# Patient Record
Sex: FEMALE | Race: WHITE | NOT HISPANIC OR LATINO | Employment: OTHER | URBAN - METROPOLITAN AREA
[De-identification: names, ages, dates, MRNs, and addresses within clinical notes are randomized per-mention and may not be internally consistent; named-entity substitution may affect disease eponyms.]

---

## 2017-01-19 ENCOUNTER — OFFICE VISIT (OUTPATIENT)
Dept: GASTROENTEROLOGY | Facility: CLINIC | Age: 82
End: 2017-01-19

## 2017-01-19 VITALS
DIASTOLIC BLOOD PRESSURE: 62 MMHG | SYSTOLIC BLOOD PRESSURE: 104 MMHG | HEIGHT: 60 IN | BODY MASS INDEX: 20.65 KG/M2 | WEIGHT: 105.2 LBS

## 2017-01-19 DIAGNOSIS — R63.4 WEIGHT LOSS: ICD-10-CM

## 2017-01-19 DIAGNOSIS — R19.7 DIARRHEA, UNSPECIFIED TYPE: ICD-10-CM

## 2017-01-19 DIAGNOSIS — K59.00 CONSTIPATION, UNSPECIFIED CONSTIPATION TYPE: ICD-10-CM

## 2017-01-19 DIAGNOSIS — K52.839 MICROSCOPIC COLITIS, UNSPECIFIED MICROSCOPIC COLITIS TYPE: Primary | ICD-10-CM

## 2017-01-19 PROCEDURE — 99213 OFFICE O/P EST LOW 20 MIN: CPT | Performed by: NURSE PRACTITIONER

## 2017-01-19 RX ORDER — LEFLUNOMIDE 10 MG/1
10 TABLET ORAL DAILY
COMMUNITY
Start: 2016-11-12 | End: 2018-05-31 | Stop reason: HOSPADM

## 2017-01-19 RX ORDER — KETOROLAC TROMETHAMINE 5 MG/ML
SOLUTION OPHTHALMIC
COMMUNITY
Start: 2017-01-13 | End: 2018-05-17

## 2017-01-19 NOTE — PROGRESS NOTES
Chief Complaint   Patient presents with   • Diarrhea   • Microscopic Colitis     Subjective     HPI  Arlin Hutson is a 81 y.o. female who presents to follow up after treatment of microscopic colitis with Entocort. At her last visit Nov. 2016 with Dr Shook her diarrhea was improved and she was instructed to taper Entocort slowly. She has taken 1, 3mg entocort for the past several weeks and her diarrhea has resolved. She has 1-2 soft, formed BMs per day. She has mild abdominal cramping prior to defecation but it is minimal in nature. She is concerned that not the diarrhea has resolved she will return to her baseline of constipation and hard stools. We discussed that if that occurs she can take stool softeners as needed which previously relieved her constipation.   According to records she has lost approximatly 8lbs since march2016 due to the diarrhea. Her weight has stabilized since her diarrhea has resolved. She drinks supplement shakes almost daily.   She has a hsitory of GERD well controlled on Prilosec 20mg daily. She denies breakthrough symptoms   She denies SOA, fatigue, trouble swallowing, melena, BRBPR, abdominal pain, nausea, or vomiting.    Past Medical History   Diagnosis Date   • Colon polyp    • CREST syndrome    • GERD (gastroesophageal reflux disease)    • History of CT scan of abdomen 03/11/2011     constipation, sig tics, 6 mm hepatic cyst   • History of rheumatoid arthritis    • Hyperlipidemia    • Hypertension    • Osteoarthritis    • Raynaud's disease    • Sjogren's syndrome        Social History     Social History   • Marital status: Single     Spouse name: N/A   • Number of children: N/A   • Years of education: N/A     Social History Main Topics   • Smoking status: Never Smoker   • Smokeless tobacco: Never Used   • Alcohol use Yes      Comment: on occ   • Drug use: No   • Sexual activity: Not Asked     Other Topics Concern   • None     Social History Narrative         Current Outpatient  Prescriptions:   •  alendronate (FOSAMAX) 70 MG tablet, , Disp: , Rfl:   •  amLODIPine (NORVASC) 5 MG tablet, , Disp: , Rfl:   •  aspirin 81 MG EC tablet, Take 81 mg by mouth daily., Disp: , Rfl:   •  Budesonide (ENTOCORT EC) 3 MG 24 hr capsule, Take 3 mg by mouth every morning., Disp: , Rfl:   •  Calcium Carb-Cholecalciferol (CALCIUM 600 + D PO), Take  by mouth., Disp: , Rfl:   •  Cholecalciferol (VITAMIN D3) 1000 UNITS capsule, Take  by mouth., Disp: , Rfl:   •  escitalopram (LEXAPRO) 10 MG tablet, Take 10 mg by mouth Daily., Disp: , Rfl:   •  ketorolac (ACULAR) 0.5 % ophthalmic solution, , Disp: , Rfl:   •  leflunomide (ARAVA) 10 MG tablet, , Disp: , Rfl:   •  Multiple Vitamins-Minerals (CENTRUM SILVER PO), Take  by mouth., Disp: , Rfl:   •  omeprazole (PriLOSEC) 20 MG capsule, , Disp: , Rfl:   •  SALINE NASAL SPRAY NA, into each nostril., Disp: , Rfl:   •  traZODone (DESYREL) 50 MG tablet, , Disp: , Rfl:   •  vitamin C (ASCORBIC ACID) 500 MG tablet, Take 500 mg by mouth daily., Disp: , Rfl:     Review of Systems   Constitutional: Negative for appetite change and unexpected weight change.   HENT: Negative for trouble swallowing.    Respiratory: Negative for choking and shortness of breath.    Cardiovascular: Negative for chest pain.   Gastrointestinal: Positive for abdominal pain and constipation. Negative for abdominal distention, blood in stool, diarrhea, nausea and vomiting.   Musculoskeletal: Negative for back pain.   Skin: Negative for color change.   Neurological: Negative for dizziness.   Hematological: Does not bruise/bleed easily.   Psychiatric/Behavioral: Negative.        Objective   Vitals:    01/19/17 0957   BP: 104/62       Physical Exam   Constitutional: She is oriented to person, place, and time. She appears well-developed and well-nourished.   HENT:   Head: Normocephalic and atraumatic.   Eyes: EOM are normal. Pupils are equal, round, and reactive to light.   Cardiovascular: Normal rate, regular  rhythm and normal heart sounds.    Pulmonary/Chest: Effort normal.   Abdominal: Soft. Bowel sounds are normal. She exhibits no distension and no mass. There is no tenderness. No hernia.   Musculoskeletal: Normal range of motion.   Neurological: She is alert and oriented to person, place, and time.   Skin: Skin is dry.   Psychiatric: She has a normal mood and affect. Her behavior is normal. Judgment and thought content normal.       Assessment/Plan   Arlin was seen today for diarrhea and microscopic colitis.    Diagnoses and all orders for this visit:    Microscopic colitis, unspecified microscopic colitis type  Comments:  Resolved. Ok to stop entocort. Call if diarrhea returns    Diarrhea, unspecified type  Comments:  resolved    Constipation, unspecified constipation type  Comments:  stool softener if needed. continue probiotic. Water 6-8oz daily.    Weight loss  Comments:  stabilized. Continue ensure or boost shakes as needed. monitor weight weekly      For any additional questions, concerns or changes to your condition after today's office visit please contact the office at [phone].

## 2017-01-19 NOTE — MR AVS SNAPSHOT
Arlin Urbanjuani   1/19/2017 10:00 AM   Office Visit    Dept Phone:  [phone]   Encounter #:  [id number]    Provider:  CHIRAG Merida   Department:  [facility] GASTROENTEROLOGY                Your Full Care Plan              Today's Medication Changes          These changes are accurate as of: 1/19/17 12:34 PM.  If you have any questions, ask your nurse or doctor.               Medication(s)that have changed:     leflunomide 10 MG tablet   Commonly known as:  ARAVA   What changed:  Another medication with the same name was removed. Continue taking this medication, and follow the directions you see here.   Changed by:  CHIRAG Merida         Stop taking medication(s)listed here:     cholestyramine 4 GM/DOSE powder   Commonly known as:  QUESTRAN   Stopped by:  CHIRAG Merida           colestipol 1 G tablet   Commonly known as:  COLESTID   Stopped by:  CHIRAG Merida           diphenoxylate-atropine 2.5-0.025 MG per tablet   Commonly known as:  LOMOTIL   Stopped by:  CHIRAG Merida           docusate sodium 100 MG capsule   Commonly known as:  COLACE   Stopped by:  CHIRAG Merida           Fish Oil 600 MG capsule   Stopped by:  CHIRAG Merida           METAMUCIL PO   Stopped by:  CHIRAG Merida           naproxen 500 MG tablet   Commonly known as:  NAPROSYN   Stopped by:  CHIRAG Merida           REFRESH EYE ITCH RELIEF OP   Stopped by:  CHIRAG Merida           simvastatin 10 MG tablet   Commonly known as:  ZOCOR   Stopped by:  CHIRAG Merida           SYSTANE ULTRA OP   Stopped by:  CHIRAG Merida           VOLTAREN 1 % gel gel   Generic drug:  diclofenac   Stopped by:  CHIRAG Merida                      Your Updated Medication List          This list is accurate as of: 1/19/17 12:34 PM.  Always use your most recent med list.                alendronate 70 MG tablet      Commonly known as:  FOSAMAX       amLODIPine 5 MG tablet   Commonly known as:  NORVASC       aspirin 81 MG EC tablet       Budesonide 3 MG 24 hr capsule   Commonly known as:  ENTOCORT EC       CALCIUM 600 + D PO       CENTRUM SILVER PO       escitalopram 10 MG tablet   Commonly known as:  LEXAPRO       ketorolac 0.5 % ophthalmic solution   Commonly known as:  ACULAR       leflunomide 10 MG tablet   Commonly known as:  ARAVA       omeprazole 20 MG capsule   Commonly known as:  priLOSEC       SALINE NASAL SPRAY NA       traZODone 50 MG tablet   Commonly known as:  DESYREL       vitamin C 500 MG tablet   Commonly known as:  ASCORBIC ACID       Vitamin D3 1000 UNITS capsule               You Were Diagnosed With        Codes Comments    Microscopic colitis, unspecified microscopic colitis type    -  Primary ICD-10-CM: K52.839  ICD-9-CM: 558.9 Resolved. Ok to stop entocort. Call if diarrhea returns    Diarrhea, unspecified type     ICD-10-CM: R19.7  ICD-9-CM: 787.91 resolved    Constipation, unspecified constipation type     ICD-10-CM: K59.00  ICD-9-CM: 564.00 stool softener if needed. continue probiotic. Water 6-8oz daily.    Weight loss     ICD-10-CM: R63.4  ICD-9-CM: 783.21 stabilized. Continue ensure or boost shakes as needed. monitor weight weekly      Instructions     None    Patient Instructions History      Upcoming Appointments     Visit Type Date Time Department    FOLLOW UP 1/19/2017 10:00 AM [facility]      [employer] Signup     Our records indicate that you have an active [facility] account.    You can view your After Visit Summary by going to [employer] and logging in with your [facility] username and password.  If you don't have a [employer] username and password but a parent or guardian has access to your record, the parent or guardian should login with their own [employer] username and password and access your record to view the After Visit Summary.    If you have questions, you  "can email [email or web address] or call [phone] to talk to our MyChart staff.  Remember, MyChart is NOT to be used for urgent needs.  For medical emergencies, dial 911.               Other Info from Your Visit           Allergies     Sulfa Antibiotics        Reason for Visit     Diarrhea     Microscopic Colitis           Vital Signs     Blood Pressure Height Weight Body Mass Index Smoking Status       104/62 60\" (152.4 cm) 105 lb 3.2 oz (47.7 kg) 20.55 kg/m2 Never Smoker       Problems and Diagnoses Noted     Microscopic colitis    -  Primary    Diarrhea        Constipation        Abnormal weight loss            "

## 2017-01-20 RX ORDER — ALENDRONATE SODIUM 70 MG/1
TABLET ORAL
Qty: 12 TABLET | Refills: 3 | Status: SHIPPED | OUTPATIENT
Start: 2017-01-20 | End: 2018-02-22

## 2017-02-16 ENCOUNTER — TELEPHONE (OUTPATIENT)
Dept: GASTROENTEROLOGY | Facility: CLINIC | Age: 82
End: 2017-02-16

## 2017-02-16 NOTE — TELEPHONE ENCOUNTER
Pt called and reports that 3 wks ago she stopped her entocort.  She reports a week ago the diarrhea started again.  She states her diarrhea is watery brown and she gets no warning when it is about to happen. She states she is going 6 plus times a day.  She denies a fever and chills.  She states she has tried pepto bismo and lomotil and nothing is helping.  She reports she does have 2 refills left on her entocort, but if she has to go back on it, she prefers to be on the 3mg dose instead of the 9mg dose.  Pt would like to speak with Dr Shook.  Advised would send message to Dr Shook.  Pt verb understanding.

## 2017-02-16 NOTE — TELEPHONE ENCOUNTER
I called her but did not get an answer.  Please call her back and tell her that she can go ahead and restart the Entocort.  She could take 3 mg pills 1 pill once daily and lets see how that works.  Have her follow-up with me in the office in 6 weeks and if the Entocort does not work have her let me know sooner.

## 2017-02-16 NOTE — TELEPHONE ENCOUNTER
Called pt and advised per Dr Shook that she can go ahead and restart the entocort.  She can take 3mg one pill once daily and see how that works.  Advised her to f/u in 6 wks with Dr Shook and advised her to let us know if entocort does not work sooner. .  Pt verb understanding and made f/u appt for 03/27 at 8a with Dr Shook.

## 2017-03-27 ENCOUNTER — OFFICE VISIT (OUTPATIENT)
Dept: GASTROENTEROLOGY | Facility: CLINIC | Age: 82
End: 2017-03-27

## 2017-03-27 VITALS
DIASTOLIC BLOOD PRESSURE: 72 MMHG | WEIGHT: 104.6 LBS | HEIGHT: 60 IN | BODY MASS INDEX: 20.54 KG/M2 | SYSTOLIC BLOOD PRESSURE: 114 MMHG

## 2017-03-27 DIAGNOSIS — K63.5 COLON POLYPS: ICD-10-CM

## 2017-03-27 DIAGNOSIS — K21.9 GASTROESOPHAGEAL REFLUX DISEASE, ESOPHAGITIS PRESENCE NOT SPECIFIED: ICD-10-CM

## 2017-03-27 DIAGNOSIS — K52.839 MICROSCOPIC COLITIS, UNSPECIFIED MICROSCOPIC COLITIS TYPE: Primary | ICD-10-CM

## 2017-03-27 PROCEDURE — 99213 OFFICE O/P EST LOW 20 MIN: CPT | Performed by: INTERNAL MEDICINE

## 2017-03-27 NOTE — PROGRESS NOTES
Chief Complaint   Patient presents with   • Follow-up     Microscopic Colitis    • Diarrhea       History of Present Illness: 82 yo female with microscopic colitis and is on Entocort 3 mg one daily and still has diarrhea 2-3 times/week. She has 2-4 Bm/day, and she may have a day with no BM's, and that day she will have bloating and gas. This morning she had one normal BM.  No rectal bleeding or melena. She has lower abdominal pain when she has bloating. Her energy level is not good. No nausea or vomiting. NO fevers, chills. Her weight has been stable.     Past Medical History:   Diagnosis Date   • Colon polyp    • CREST syndrome    • GERD (gastroesophageal reflux disease)    • History of CT scan of abdomen 03/11/2011    constipation, sig tics, 6 mm hepatic cyst   • History of rheumatoid arthritis    • Hyperlipidemia    • Hypertension    • Osteoarthritis    • Raynaud's disease    • Sjogren's syndrome        Past Surgical History:   Procedure Laterality Date   • CATARACT EXTRACTION     • COLONOSCOPY  02/26/2016    tics, microscopic colitis,    • COLONOSCOPY  07/01/2011    sig tics, inflammation, polyp   • DILATATION AND CURETTAGE  1980   • KNEE SURGERY Right    • UPPER GASTROINTESTINAL ENDOSCOPY      erythema   • WRIST SURGERY Right 2008         Current Outpatient Prescriptions:   •  alendronate (FOSAMAX) 70 MG tablet, TAKE 1 TABLET ONE TIME WEEKLY, Disp: 12 tablet, Rfl: 3  •  amLODIPine (NORVASC) 5 MG tablet, , Disp: , Rfl:   •  aspirin 81 MG EC tablet, Take 81 mg by mouth daily., Disp: , Rfl:   •  Budesonide (ENTOCORT EC) 3 MG 24 hr capsule, Take 3 mg by mouth every morning., Disp: , Rfl:   •  Calcium Carb-Cholecalciferol (CALCIUM 600 + D PO), Take  by mouth., Disp: , Rfl:   •  Cholecalciferol (VITAMIN D3) 1000 UNITS capsule, Take  by mouth., Disp: , Rfl:   •  escitalopram (LEXAPRO) 10 MG tablet, Take 10 mg by mouth Daily., Disp: , Rfl:   •  leflunomide (ARAVA) 10 MG tablet, , Disp: , Rfl:   •  Multiple  Vitamins-Minerals (CENTRUM SILVER PO), Take  by mouth., Disp: , Rfl:   •  omeprazole (PriLOSEC) 20 MG capsule, , Disp: , Rfl:   •  SALINE NASAL SPRAY NA, into each nostril., Disp: , Rfl:   •  traZODone (DESYREL) 50 MG tablet, , Disp: , Rfl:   •  vitamin C (ASCORBIC ACID) 500 MG tablet, Take 500 mg by mouth daily., Disp: , Rfl:   •  ketorolac (ACULAR) 0.5 % ophthalmic solution, , Disp: , Rfl:     Allergies   Allergen Reactions   • Sulfa Antibiotics        Family History   Problem Relation Age of Onset   • Ulcerative colitis Mother        Social History     Social History   • Marital status: Single     Spouse name: N/A   • Number of children: N/A   • Years of education: N/A     Occupational History   • Not on file.     Social History Main Topics   • Smoking status: Never Smoker   • Smokeless tobacco: Never Used   • Alcohol use Yes      Comment: on occ   • Drug use: No   • Sexual activity: Not on file     Other Topics Concern   • Not on file     Social History Narrative       Review of Systems   Gastrointestinal: Positive for diarrhea.   All other systems reviewed and are negative.      Vitals:    03/27/17 0825   BP: 114/72       Physical Exam   Constitutional: She is oriented to person, place, and time. She appears well-developed and well-nourished. No distress.   HENT:   Head: Normocephalic and atraumatic. Hair is normal.   Right Ear: Hearing, tympanic membrane, external ear and ear canal normal. No drainage. No decreased hearing is noted.   Left Ear: Hearing, tympanic membrane, external ear and ear canal normal. No decreased hearing is noted.   Nose: No nasal deformity.   Mouth/Throat: Oropharynx is clear and moist.   Eyes: Conjunctivae, EOM and lids are normal. Pupils are equal, round, and reactive to light. Right eye exhibits no discharge. Left eye exhibits no discharge.   Neck: Normal range of motion. Neck supple. No JVD present. No tracheal deviation present. No thyromegaly present.   Cardiovascular: Normal  rate, regular rhythm, normal heart sounds, intact distal pulses and normal pulses.  Exam reveals no gallop and no friction rub.    No murmur heard.  Pulmonary/Chest: Effort normal and breath sounds normal. No respiratory distress. She has no wheezes. She has no rales. She exhibits no tenderness.   Abdominal: Soft. Bowel sounds are normal. She exhibits no distension and no mass. There is no tenderness. There is no rebound and no guarding. No hernia.   Musculoskeletal: Normal range of motion. She exhibits no edema, tenderness or deformity.   Lymphadenopathy:     She has no cervical adenopathy.   Neurological: She is alert and oriented to person, place, and time. She has normal reflexes. She displays normal reflexes. No cranial nerve deficit. She exhibits normal muscle tone. Coordination normal.   Skin: Skin is warm and dry. No rash noted. She is not diaphoretic. No erythema.   Psychiatric: She has a normal mood and affect. Her behavior is normal. Judgment and thought content normal.   Vitals reviewed.      Arlin was seen today for follow-up and diarrhea.    Diagnoses and all orders for this visit:    Microscopic colitis, unspecified microscopic colitis type    Gastroesophageal reflux disease, esophagitis presence not specified    Colon polyps     Assessment:  1) Diarrhea with a h/o microscopic colitis.  2) h/o colon polyps.  3) GERD    Recommendations:  1) We talked about possibly increasing the Entocort to 2/day but she prefers to stay on the same dose and to take Lomotil prn.  2) F/U 12 mos.       No Follow-up on file.

## 2017-06-08 ENCOUNTER — HOSPITAL ENCOUNTER (OUTPATIENT)
Dept: GENERAL RADIOLOGY | Facility: HOSPITAL | Age: 82
Discharge: HOME | End: 2017-06-08
Attending: INTERNAL MEDICINE | Admitting: INTERNAL MEDICINE

## 2017-06-08 DIAGNOSIS — R52 PAIN: ICD-10-CM

## 2017-06-08 PROCEDURE — 73560 X-RAY EXAM OF KNEE 1 OR 2: CPT

## 2017-06-13 ENCOUNTER — TRANSCRIBE ORDERS (OUTPATIENT)
Dept: OBSTETRICS AND GYNECOLOGY | Age: 82
End: 2017-06-13

## 2017-06-13 DIAGNOSIS — Z12.31 VISIT FOR SCREENING MAMMOGRAM: Primary | ICD-10-CM

## 2017-06-20 ENCOUNTER — OFFICE VISIT (OUTPATIENT)
Dept: ORTHOPEDIC SURGERY | Facility: CLINIC | Age: 82
End: 2017-06-20

## 2017-06-20 VITALS — WEIGHT: 107 LBS | BODY MASS INDEX: 21.01 KG/M2 | HEIGHT: 60 IN | TEMPERATURE: 98 F

## 2017-06-20 DIAGNOSIS — G89.29 CHRONIC PAIN OF RIGHT KNEE: Primary | ICD-10-CM

## 2017-06-20 DIAGNOSIS — M25.561 CHRONIC PAIN OF RIGHT KNEE: Primary | ICD-10-CM

## 2017-06-20 PROCEDURE — 99213 OFFICE O/P EST LOW 20 MIN: CPT | Performed by: NURSE PRACTITIONER

## 2017-06-20 PROCEDURE — 20610 DRAIN/INJ JOINT/BURSA W/O US: CPT | Performed by: NURSE PRACTITIONER

## 2017-06-20 RX ORDER — LIDOCAINE HYDROCHLORIDE 10 MG/ML
4 INJECTION, SOLUTION INFILTRATION; PERINEURAL
Status: COMPLETED | OUTPATIENT
Start: 2017-06-20 | End: 2017-06-20

## 2017-06-20 RX ORDER — BUPIVACAINE HYDROCHLORIDE 2.5 MG/ML
2 INJECTION, SOLUTION INFILTRATION; PERINEURAL
Status: COMPLETED | OUTPATIENT
Start: 2017-06-20 | End: 2017-06-20

## 2017-06-20 RX ORDER — DIPHENOXYLATE HYDROCHLORIDE AND ATROPINE SULFATE 2.5; .025 MG/1; MG/1
1-2 TABLET ORAL 4 TIMES DAILY PRN
COMMUNITY
Start: 2017-03-28 | End: 2020-06-30

## 2017-06-20 RX ORDER — METHYLPREDNISOLONE ACETATE 80 MG/ML
80 INJECTION, SUSPENSION INTRA-ARTICULAR; INTRALESIONAL; INTRAMUSCULAR; SOFT TISSUE
Status: COMPLETED | OUTPATIENT
Start: 2017-06-20 | End: 2017-06-20

## 2017-06-20 RX ADMIN — LIDOCAINE HYDROCHLORIDE 4 ML: 10 INJECTION, SOLUTION INFILTRATION; PERINEURAL at 10:11

## 2017-06-20 RX ADMIN — BUPIVACAINE HYDROCHLORIDE 2 ML: 2.5 INJECTION, SOLUTION INFILTRATION; PERINEURAL at 10:11

## 2017-06-20 RX ADMIN — METHYLPREDNISOLONE ACETATE 80 MG: 80 INJECTION, SUSPENSION INTRA-ARTICULAR; INTRALESIONAL; INTRAMUSCULAR; SOFT TISSUE at 10:11

## 2017-06-20 NOTE — PROGRESS NOTES
Patient: Arlin Hutson  YOB: 1935 81 y.o. female  Medical Record Number: [id number]    Chief Complaints:   Chief Complaint   Patient presents with   • Right Knee - Pain, Edema, Establish Care       History of Present Illness:Arlin Hutson is a 81 y.o. female who presents With complaints of right knee pain.  Patient reports she has had issues off and on for many years but is progressively gotten worse over the last 6 months.  She denies any injury.  She describes the right knee pain as a moderate to severe constant stabbing type pain with intermittent swelling.  She reports her pain is worse with walking and only slightly better with rest ice and anti-inflammatories.  She also has pain in her lower back which started after her right knee became more painful.    Allergies:   Allergies   Allergen Reactions   • Sulfa Antibiotics        Medications:   Current Outpatient Prescriptions   Medication Sig Dispense Refill   • alendronate (FOSAMAX) 70 MG tablet TAKE 1 TABLET ONE TIME WEEKLY 12 tablet 3   • amLODIPine (NORVASC) 5 MG tablet      • aspirin 81 MG EC tablet Take 81 mg by mouth daily.     • Budesonide (ENTOCORT EC) 3 MG 24 hr capsule Take 3 mg by mouth every morning.     • Calcium Carb-Cholecalciferol (CALCIUM 600 + D PO) Take  by mouth.     • Cholecalciferol (VITAMIN D3) 1000 UNITS capsule Take  by mouth.     • ketorolac (ACULAR) 0.5 % ophthalmic solution      • leflunomide (ARAVA) 10 MG tablet      • Multiple Vitamins-Minerals (CENTRUM SILVER PO) Take  by mouth.     • omeprazole (PriLOSEC) 20 MG capsule      • SALINE NASAL SPRAY NA into each nostril.     • traZODone (DESYREL) 50 MG tablet      • vitamin C (ASCORBIC ACID) 500 MG tablet Take 500 mg by mouth daily.     • diphenoxylate-atropine (LOMOTIL) 2.5-0.025 MG per tablet      • escitalopram (LEXAPRO) 10 MG tablet Take 10 mg by mouth Daily.       No current facility-administered medications for this visit.          The following portions of the  "patient's history were reviewed and updated as appropriate: allergies, current medications, past family history, past medical history, past social history, past surgical history and problem list.    Review of Systems:   A 14 point review of systems was performed. All systems negative except pertinent positives/negative listed in HPI above    Physical Exam:   Vitals:    06/20/17 0935   Temp: 98 °F (36.7 °C)   TempSrc: Temporal Artery    Weight: 107 lb (48.5 kg)   Height: 60\" (152.4 cm)       General: A and O x 3, ASA, NAD    SCLERA:    Normal    DENTITION:   Normal  Skin clear no unusual lesions noted  Right knee no appreciable effusion, 120° flexion neutral in extension with a positive medial Kiera negative Lockman calf is soft and nontender    Radiology:  Xrays right knee were reviewed today secondary to pain and show significant arthritic changes, no comparative views available    Assessment/Plan:  Osteoarthritis right knee    We will proceed with right knee cortisone injection, refer the patient outpatient physical therapy not only for her knee but also her back.  Hopefully if we can get her knee feeling better her back will also follow.  If not we might consider doing x-rays and then MRI.  She will follow up with us in 2 months.    Large Joint Arthrocentesis  Date/Time: 6/20/2017 10:11 AM  Consent given by: patient  Site marked: site marked  Timeout: Immediately prior to procedure a time out was called to verify the correct patient, procedure, equipment, support staff and site/side marked as required   Supporting Documentation  Indications: pain and joint swelling   Procedure Details  Location: knee - R knee  Preparation: Patient was prepped and draped in the usual sterile fashion  Needle size: 18 G  Approach: anterolateral  Medications administered: 4 mL lidocaine 1 %; 80 mg methylPREDNISolone acetate 80 MG/ML; 2 mL bupivacaine            "

## 2017-06-28 ENCOUNTER — HOSPITAL ENCOUNTER (OUTPATIENT)
Dept: PHYSICAL THERAPY | Facility: HOSPITAL | Age: 82
Setting detail: THERAPIES SERIES
Discharge: HOME | End: 2017-06-28

## 2017-06-28 DIAGNOSIS — M54.50 ACUTE LEFT-SIDED LOW BACK PAIN WITHOUT SCIATICA: ICD-10-CM

## 2017-06-28 DIAGNOSIS — G89.29 CHRONIC PAIN OF RIGHT KNEE: Primary | ICD-10-CM

## 2017-06-28 DIAGNOSIS — M25.561 CHRONIC PAIN OF RIGHT KNEE: Primary | ICD-10-CM

## 2017-06-28 DIAGNOSIS — R26.2 DIFFICULTY WALKING: ICD-10-CM

## 2017-06-28 PROCEDURE — 97110 THERAPEUTIC EXERCISES: CPT

## 2017-06-28 PROCEDURE — 97161 PT EVAL LOW COMPLEX 20 MIN: CPT

## 2017-06-28 PROCEDURE — G8978 MOBILITY CURRENT STATUS: HCPCS

## 2017-06-28 PROCEDURE — G8979 MOBILITY GOAL STATUS: HCPCS

## 2017-06-28 NOTE — THERAPY EVALUATION
Outpatient Physical Therapy Ortho Initial Evaluation  [facility]     Patient Name: Arlin Hutson  : 1935  MRN: [id number]  Today's Date: 2017      Visit Date: 2017    There is no problem list on file for this patient.       Past Medical History:   Diagnosis Date   • Colon polyp    • CREST syndrome    • GERD (gastroesophageal reflux disease)    • History of CT scan of abdomen 2011    constipation, sig tics, 6 mm hepatic cyst   • History of rheumatoid arthritis    • Hyperlipidemia    • Hypertension    • Osteoarthritis    • Raynaud's disease    • Sjogren's syndrome         Past Surgical History:   Procedure Laterality Date   • CATARACT EXTRACTION     • COLONOSCOPY  2016    tics, microscopic colitis,    • COLONOSCOPY  2011    sig tics, inflammation, polyp   • DILATATION AND CURETTAGE     • KNEE SURGERY Right    • UPPER GASTROINTESTINAL ENDOSCOPY      erythema   • WRIST SURGERY Right        Visit Dx:     ICD-10-CM ICD-9-CM   1. Chronic pain of right knee M25.561 719.46    G89.29 338.29   2. Difficulty walking R26.2 719.7   3. Acute left-sided low back pain without sciatica M54.5 724.2             Patient History       17 1400          History    Chief Complaint Pain;Difficulty Walking;Difficulty with daily activities;Joint stiffness  -CN      Type of Pain Knee pain;Back pain  -CN      Date Current Problem(s) Began --   Many years  -CN      Brief Description of Current Complaint Pt reports R knee pain ongoing for many years, however condition seems to be worsening. Pt has history of meniscal surgery on the R and had cortizone injection on 17. Pt also c/o L knee pain today as well as L sided low back pain which she believes is secondary to gait abnormality due to knee pain. Pt with history positive for OA and RA and travels a lot. Pt reports that she fells as if her knee might buckle at times, although this has never happened.   -CN      Onset Date- PT  6/28/17  -CN      Patient/Caregiver Goals Relieve pain;Improve mobility;Return to prior level of function  -CN      Occupation/sports/leisure activities Walking, traveling  -CN      What clinical tests have you had for this problem? X-ray  -CN      Results of Clinical Tests Significant arthritis  -CN      Pain     Pain Location Knee;Back  -CN      Pain at Present 7  -CN      Pain at Best 2  -CN      Pain at Worst 9  -CN      Pain Frequency Constant/continuous  -CN      Pain Description Aching;Sharp  -CN      What Performance Factors Make the Current Problem(s) WORSE? Standing, walking, sitting for extended periods, stairs  -CN      What Performance Factors Make the Current Problem(s) BETTER? Heat, elevation, tylenol  -CN      Tolerance Time- Standing 45 min with pain  -CN      Tolerance Time- Walking 45 min with severe pain  -CN      Is your sleep disturbed? Yes   sometimes due to pain, other times restroom  -CN      What position do you sleep in? Supine;Right sidelying;Left sidelying  -CN      Difficulties with ADL's? Yes, difficulty crossing legs when sitting, making bed, vacuuming  -CN      Difficulties with recreational activities? 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 Listening  -CN      Pt Participated in POC and Goals Yes  -CN      Safety    Are you being hurt, hit, or frightened by anyone at home or in your life? No  -CN      Are you being neglected by a caregiver No  -CN        User Key  (r) = Recorded By, (t) = Taken By, (c) = Cosigned By    Initials Name Provider Type    CN Neelima Blake PT Physical Therapist                PT Ortho       06/28/17 1400    Posture/Observations    Alignment Options Forward head;Rounded shoulders;Iliac crests;Genu varus  -CN     Forward Head Mild  -CN    Rounded Shoulders Mild  -CN    Iliac crests Left:;Elevated  -CN    Genu varus Bilateral:;Mild  -CN    Sensation    Sensation WNL? WFL  -CN    Additional Comments Pt denies numbness and tingling  -CN    Lumbosacral Palpation    Quadratus Lumborum Left:;Tender  -CN    Erector Spinae (Paraspinals) Left:;Tender;Guarded/taut  -CN    Hip/Thigh Palpation    Greater Trochanter Left:;Tender  -CN    Knee Palpation    Medial Joint Line Right:;Tender  -CN    Semimembranosis Bilateral:;Tender  -CN    Semitendinosis Bilateral:;Tender  -CN    Patellar Accessory Motions    Superior glide Hypomobile  -CN    Inferior glide Hypomobile  -CN    Medial glide Hypomobile  -CN    Lateral glide Hypomobile  -CN    Lateral tilt Hypomobile  -CN    Knee Special Tests    Valgus stress (MCL lesion) Bilateral:;Negative  -CN    Varus stress (LCL lesion) Bilateral:;Negative  -CN    Left Knee    Extension/Flexion ROM Details ext=-3/0; flex=137/146  -CN    Right Knee    Extension/Flexion ROM Details ext=-1/1; flex=139/145  -CN    Left Hip    Hip Flexion Gross Movement (3+/5) fair plus  -CN    Hip ABduction Gross Movement (4/5) good  -CN    Hip ADduction Gross Movement (4+/5) good plus  -CN    Hip Int (Medial) Rotation Gross Movement (4+/5) good plus  -CN    Hip Ext (Lat) Rotation Gross Movement (4/5) good  -CN    Right Hip    Hip Flexion Gross Movement (4-/5) good minus  -CN    Hip ABduction Gross Movement (4/5) good  -CN    Hip ADduction Gross Movement (4+/5) good plus  -CN    Hip Int (Medial) Rotation Gross Movement (4+/5) good plus  -CN    Hip Ext (Lat) Rotation Gross Movement (4/5) good  -CN    Left Knee    Knee Extension Gross Movement (4/5) good  -CN    Knee Flexion Gross Movement (5/5) normal  -CN    Right Knee    Knee Extension Gross Movement (4/5) good  -CN    Knee Flexion Gross Movement (4/5) good  -CN    Left Ankle/Foot    Ankle PF Gross Movement (5/5) normal  -CN    Ankle Dorsiflexion Gross Movement (5/5) normal   -CN    Right Ankle/Foot    Ankle PF Gross Movement (5/5) normal  -CN    Ankle Dorsiflexion Gross Movement (5/5) normal  -CN    Lower Extremity Flexibility    Hamstrings Mildly limited  -CN    Gastrocnemius Mildly limited  -CN    Gait Assessment/Treatment    Gait, Comment Dec R arm swing, dec hip extension  -CN      User Key  (r) = Recorded By, (t) = Taken By, (c) = Cosigned By    Initials Name Provider Type    NÉSTOR Blake PT Physical Therapist                            Therapy Education       06/28/17 1510          Therapy Education    Given HEP;Symptoms/condition management;Pain management;Posture/body mechanics   Anatomy, goals of PT, eval findings, ambulation with cane  -CN      Program New  -CN      How Provided Verbal;Demonstration;Written  -CN      Provided to Patient  -CN      Level of Understanding Teach back education performed  -CN        User Key  (r) = Recorded By, (t) = Taken By, (c) = Cosigned By    Initials Name Provider Type    NÉSTOR Blake, PT Physical Therapist                PT OP Goals       06/28/17 1500       PT Short Term Goals    STG Date to Achieve 07/19/17  -CN     STG 1 Pt will report subjective pain at 4/10 or less to demonstrate symptom management with ADLs and all household mobility.   -CN     STG 1 Progress New  -CN     STG 2 Pt will be independent and compliant with HEP, symptom management and joint protection in order to minimize stress on affected tissues.    -CN     STG 2 Progress New  -CN     Long Term Goals    LTG Date to Achieve 08/09/17  -CN     LTG 1 Pt will improve gross knee strength to 4+/5 B in order to improve functional mobility.   -CN     LTG 1 Progress New  -CN     LTG 2 Pt will improve score on Knee Outcome Survey  to 75% for improved functional mobility.   -CN     LTG 2 Progress New  -CN     LTG 3 Pt will ambulate with least restrictive AD and normalized gait pattern in order to increase safety with gait and participate in all ADLs.   -CN      LTG 3 Progress New  -CN     Time Calculation    PT Goal Re-Cert Due Date 07/28/17  -CN       User Key  (r) = Recorded By, (t) = Taken By, (c) = Cosigned By    Initials Name Provider Type    NÉSTOR Blake, PT Physical Therapist                PT Assessment/Plan       06/28/17 1514       PT Assessment    Functional Limitations Decreased safety during functional activities;Limitations in community activities;Impaired gait;Limitations in functional capacity and performance;Impaired locomotion;Performance in leisure activities;Limitation in home management;Performance in self-care ADL  -CN     Impairments Balance;Gait;Impaired flexibility;Muscle strength;Pain;Posture;Joint mobility;Poor body mechanics  -CN     Assessment Comments Pt is an 81 year old female presenting to therapy with c/o R knee pain which has been ongoing for many years. Pt has history of meniscal surgery on the R and also c/o L knee and L sided low back pain today. Pt has had xray which shows significant OA inthe R knee. Pt had cortizone injection on 6/20 which helped to relieve R knee pain, however has difficulty ambulating secondary to L knee and low back pain. Pt demonstrates slight genu varus B as well as decreased knee strength. Pt's hip flexor and HS strength decreased on the L compared to the R and pt reports tenderness to palpation in L QL, paraspinals as well as greater trochanter. Pt seems to have multiple body part involvement secondray to impaired gait pattern which she reports began secondary to knee pain. Pt educated on maintaining neutral spine, ambulating with walking stick which she brought to the clinic today as well as education on arthritis. Pt scored 61% on the KOS where 100% is no disability. Pt would benefit from skilled PT to address the deficits and return to PLOF.   -CN     Please refer to paper survey for additional self-reported information Yes  -CN     Rehab Potential Good  -CN     Patient/caregiver  participated in establishment of treatment plan and goals Yes  -CN     Patient would benefit from skilled therapy intervention Yes  -CN     PT Plan    PT Frequency 2x/week  -CN     Predicted Duration of Therapy Intervention (days/wks) 6 weeks  -CN     Planned CPT's? PT EVAL LOW COMPLEXITY: 62434;PT RE-EVAL: 77402;PT THER PROC EA 15 MIN: 16439;PT THER ACT EA 15 MIN: 82242;PT MANUAL THERAPY EA 15 MIN: 35540;PT NEUROMUSC RE-EDUCATION EA 15 MIN: 14601;PT GAIT TRAINING EA 15 MIN: 44149;PT AQUATIC THERAPY EA 15 MIN: 80936;PT HOT OR COLD PACK TREAT MCARE;PT ELECTRICAL STIM UNATTEND:   -CN     Physical Therapy Interventions (Optional Details) neuromuscular re-education;stretching;home exercise program;joint mobilization;patient/family education;postural re-education;strengthening;modalities;gait training;manual therapy techniques  -CN     PT Plan Comments PT to treat 2x/week for 6 weeks consisting of strengthening, flexibility and stability exercises. Consider Nustep warm up, PPT, SAQ, SL clams and LTR next visit.   -CN       User Key  (r) = Recorded By, (t) = Taken By, (c) = Cosigned By    Initials Name Provider Type    NÉSTOR Blake, CHER Physical Therapist                  Exercises       06/28/17 1400          Exercise 1    Exercise Name 1 Quad sets  -CN      Cueing 1 Verbal  -CN      Reps 1 10  -CN      Time (Seconds) 1 5  -CN      Exercise 2    Exercise Name 2 LAQ with pillow squeeze  -CN      Cueing 2 Demo  -CN      Reps 2 10  -CN      Exercise 3    Exercise Name 3 Piriformis stretch  -CN      Cueing 3 Verbal  -CN      Reps 3 3  -CN      Time (Seconds) 3 20  -CN      Exercise 4    Exercise Name 4 HL hip abduction with TA, B and uni  -CN      Cueing 4 Verbal  -CN      Reps 4 10  -CN        User Key  (r) = Recorded By, (t) = Taken By, (c) = Cosigned By    Initials Name Provider Type    NÉSTOR Blake, PT Physical Therapist                              Outcome Measures       06/28/17 1500           Knee Outcome Score    Knee Outcome Score Comments 61% where 100% is no disability  -CN      Functional Assessment    Outcome Measure Options Knee Outcome Score- ADL  -CN        User Key  (r) = Recorded By, (t) = Taken By, (c) = Cosigned By    Initials Name Provider Type    CN Neelima Blake, PT Physical Therapist            Time Calculation:   Start Time: 1415  Stop Time: 1511  Time Calculation (min): 56 min     Therapy Charges for Today     Code Description Service Date Service Provider Modifiers Qty    69175031340 HC PT MOBILITY CURRENT 6/28/2017 Neelima Blake, PT GP, CJ 1    56309546023 HC PT MOBILITY PROJECTED 6/28/2017 Neelima Blake, PT GP, CI 1    60328541321 HC PT THER PROC EA 15 MIN 6/28/2017 Neelima Blake, PT GP 1    73955062374 HC PT EVAL LOW COMPLEXITY 3 6/28/2017 Neelima Blake, PT GP 1          PT G-Codes  PT Professional Judgement Used?: Yes  Outcome Measure Options: Knee Outcome Score- ADL  Score: 61% where 100% is no disability  Functional Limitation: Mobility: Walking and moving around  Mobility: Walking and Moving Around Current Status (): At least 20 percent but less than 40 percent impaired, limited or restricted  Mobility: Walking and Moving Around Goal Status (): At least 1 percent but less than 20 percent impaired, limited or restricted         Neelima Blake PT  6/28/2017

## 2017-06-30 ENCOUNTER — HOSPITAL ENCOUNTER (OUTPATIENT)
Dept: PHYSICAL THERAPY | Facility: HOSPITAL | Age: 82
Setting detail: THERAPIES SERIES
Discharge: HOME | End: 2017-06-30

## 2017-06-30 DIAGNOSIS — M25.561 CHRONIC PAIN OF RIGHT KNEE: Primary | ICD-10-CM

## 2017-06-30 DIAGNOSIS — M54.50 ACUTE LEFT-SIDED LOW BACK PAIN WITHOUT SCIATICA: ICD-10-CM

## 2017-06-30 DIAGNOSIS — G89.29 CHRONIC PAIN OF RIGHT KNEE: Primary | ICD-10-CM

## 2017-06-30 DIAGNOSIS — R26.2 DIFFICULTY WALKING: ICD-10-CM

## 2017-06-30 PROCEDURE — 97110 THERAPEUTIC EXERCISES: CPT | Performed by: PHYSICAL THERAPIST

## 2017-07-05 ENCOUNTER — APPOINTMENT (OUTPATIENT)
Dept: PHYSICAL THERAPY | Facility: HOSPITAL | Age: 82
End: 2017-07-05

## 2017-07-05 ENCOUNTER — HOSPITAL ENCOUNTER (OUTPATIENT)
Dept: PHYSICAL THERAPY | Facility: HOSPITAL | Age: 82
Setting detail: THERAPIES SERIES
Discharge: HOME | End: 2017-07-05

## 2017-07-05 DIAGNOSIS — G89.29 CHRONIC PAIN OF RIGHT KNEE: Primary | ICD-10-CM

## 2017-07-05 DIAGNOSIS — M54.50 ACUTE LEFT-SIDED LOW BACK PAIN WITHOUT SCIATICA: ICD-10-CM

## 2017-07-05 DIAGNOSIS — R26.2 DIFFICULTY WALKING: ICD-10-CM

## 2017-07-05 DIAGNOSIS — M25.561 CHRONIC PAIN OF RIGHT KNEE: Primary | ICD-10-CM

## 2017-07-05 PROCEDURE — 97110 THERAPEUTIC EXERCISES: CPT

## 2017-07-05 NOTE — THERAPY TREATMENT NOTE
Outpatient Physical Therapy Ortho Treatment Note  [facility]     Patient Name: Arlin Hutson  : 1935  MRN: [id number]  Today's Date: 2017      Visit Date: 2017    Visit Dx:    ICD-10-CM ICD-9-CM   1. Chronic pain of right knee M25.561 719.46    G89.29 338.29   2. Difficulty walking R26.2 719.7   3. Acute left-sided low back pain without sciatica M54.5 724.2       There is no problem list on file for this patient.       Past Medical History:   Diagnosis Date   • Colon polyp    • CREST syndrome    • GERD (gastroesophageal reflux disease)    • History of CT scan of abdomen 2011    constipation, sig tics, 6 mm hepatic cyst   • History of rheumatoid arthritis    • Hyperlipidemia    • Hypertension    • Osteoarthritis    • Raynaud's disease    • Sjogren's syndrome         Past Surgical History:   Procedure Laterality Date   • CATARACT EXTRACTION     • COLONOSCOPY  2016    tics, microscopic colitis,    • COLONOSCOPY  2011    sig tics, inflammation, polyp   • DILATATION AND CURETTAGE     • KNEE SURGERY Right    • UPPER GASTROINTESTINAL ENDOSCOPY      erythema   • WRIST SURGERY Right                              PT Assessment/Plan       17 1005       PT Assessment    Assessment Comments Pt reports slight pain with piriformis stretch and educated pt to ease on exercise so only stretch felt, no pain. Pt reports slight L hip pain with standing exercises and cueing given for proper muscle activation in order to decrease pain. Pt continues to demonstrate weakness in quad, glute and abductor tissues.   -CN     PT Plan    PT Plan Comments Updated HEP next session. Consider PPT with bridges, SLR (if no low back pain) and minisquats as well as adding weights to exercises.   -CN       User Key  (r) = Recorded By, (t) = Taken By, (c) = Cosigned By    Initials Name Provider Type    NÉSTOR Blake, PT Physical Therapist                    Exercises       17 0900           Subjective Comments    Subjective Comments I went to a 4th of July community event yesterday and am sore from being up on it. I am doing my exercises every day. Pt confused appointment time today and arrived at 9:25 for 8:30 appointment.   -CN      Subjective Pain    Able to rate subjective pain? yes  -CN      Pre-Treatment Pain Level 5  -CN      Post-Treatment Pain Level 5  -CN      Exercise 1    Exercise Name 1 Quad sets  -CN      Cueing 1 Verbal  -CN      Reps 1 15  -CN      Time (Seconds) 1 5  -CN      Exercise 3    Exercise Name 3 Piriformis stretch  -CN      Cueing 3 Verbal  -CN      Reps 3 3  -CN      Time (Seconds) 3 20  -CN      Exercise 4    Exercise Name 4 HL hip abduction with TA, B and uni  -CN      Cueing 4 Verbal  -CN      Reps 4 15  -CN      Exercise 6    Exercise Name 6 LTR  -CN      Reps 6 20  -CN      Time (Seconds) 6 5  -CN      Exercise 7    Exercise Name 7 PPT  -CN      Reps 7 10  -CN      Time (Seconds) 7 10  -CN      Exercise 9    Exercise Name 9 S/L Clamshells  -CN      Sets 9 2  -CN      Reps 9 10  -CN      Exercise 10    Exercise Name 10 Standing hamstring curls  -CN      Sets 10 2  -CN      Reps 10 10  -CN      Exercise 11    Exercise Name 11 hamstring stretch on mat  -CN      Sets 11 3  -CN      Time (Seconds) 11 30  -CN      Exercise 12    Exercise Name 12 Standing  hip abduction  -CN      Cueing 12 Demo  -CN      Reps 12 10  -CN      Exercise 13    Exercise Name 13 Standing HS stretch on calf  -CN      Cueing 13 Verbal  -CN      Reps 13 3  -CN      Time (Seconds) 13 20  -CN        User Key  (r) = Recorded By, (t) = Taken By, (c) = Cosigned By    Initials Name Provider Type    NÉSTOR Blake, PT Physical Therapist                               PT OP Goals       07/05/17 0900       PT Short Term Goals    STG Date to Achieve 07/19/17  -CN     STG 1 Pt will report subjective pain at 4/10 or less to demonstrate symptom management with ADLs and all household mobility.    -CN     STG 1 Progress Ongoing  -CN     STG 1 Progress Comments Pt reports pain rated 5/10 today.   -CN     STG 2 Pt will be independent and compliant with HEP, symptom management and joint protection in order to minimize stress on affected tissues.    -CN     STG 2 Progress Partially Met  -CN     STG 2 Progress Comments Requires cueing for correct performance of exercises.   -CN     Long Term Goals    LTG Date to Achieve 08/09/17  -CN     LTG 1 Pt will improve gross knee strength to 4+/5 B in order to improve functional mobility.   -CN     LTG 1 Progress Ongoing  -CN     LTG 2 Pt will improve score on Knee Outcome Survey  to 75% for improved functional mobility.   -CN     LTG 2 Progress Ongoing  -CN     LTG 3 Pt will ambulate with least restrictive AD and normalized gait pattern in order to increase safety with gait and participate in all ADLs.   -CN     LTG 3 Progress Ongoing  -CN       User Key  (r) = Recorded By, (t) = Taken By, (c) = Cosigned By    Initials Name Provider Type    NÉSTOR Blake PT Physical Therapist                Therapy Education       07/05/17 0933          Therapy Education    Given HEP;Symptoms/condition management;Pain management;Posture/body mechanics  -CN      Program Progressed  -CN      How Provided Verbal;Demonstration  -CN      Provided to Patient  -CN      Level of Understanding Teach back education performed  -CN        User Key  (r) = Recorded By, (t) = Taken By, (c) = Cosigned By    Initials Name Provider Type    NÉSTOR Blake PT Physical Therapist                Time Calculation:   Start Time: 0925  Stop Time: 1000  Time Calculation (min): 35 min    Therapy Charges for Today     Code Description Service Date Service Provider Modifiers Qty    73163574723  PT THER PROC EA 15 MIN 7/5/2017 Neelima Blake PT GP 2                    Neelima Blake PT  7/5/2017

## 2017-07-06 ENCOUNTER — HOSPITAL ENCOUNTER (OUTPATIENT)
Dept: MAMMOGRAPHY | Facility: HOSPITAL | Age: 82
Discharge: HOME | End: 2017-07-06
Attending: OBSTETRICS & GYNECOLOGY | Admitting: OBSTETRICS & GYNECOLOGY

## 2017-07-06 DIAGNOSIS — Z12.31 VISIT FOR SCREENING MAMMOGRAM: ICD-10-CM

## 2017-07-06 PROCEDURE — G0202 SCR MAMMO BI INCL CAD: HCPCS

## 2017-07-11 ENCOUNTER — HOSPITAL ENCOUNTER (OUTPATIENT)
Dept: PHYSICAL THERAPY | Facility: HOSPITAL | Age: 82
Setting detail: THERAPIES SERIES
Discharge: HOME | End: 2017-07-11

## 2017-07-11 DIAGNOSIS — R26.2 DIFFICULTY WALKING: ICD-10-CM

## 2017-07-11 DIAGNOSIS — M54.50 ACUTE LEFT-SIDED LOW BACK PAIN WITHOUT SCIATICA: ICD-10-CM

## 2017-07-11 DIAGNOSIS — G89.29 CHRONIC PAIN OF RIGHT KNEE: Primary | ICD-10-CM

## 2017-07-11 DIAGNOSIS — M25.561 CHRONIC PAIN OF RIGHT KNEE: Primary | ICD-10-CM

## 2017-07-11 PROCEDURE — 97110 THERAPEUTIC EXERCISES: CPT

## 2017-07-11 NOTE — THERAPY TREATMENT NOTE
Outpatient Physical Therapy Ortho Treatment Note  [facility]     Patient Name: Arlin Hutson  : 1935  MRN: [id number]  Today's Date: 2017      Visit Date: 2017    Visit Dx:    ICD-10-CM ICD-9-CM   1. Chronic pain of right knee M25.561 719.46    G89.29 338.29   2. Difficulty walking R26.2 719.7   3. Acute left-sided low back pain without sciatica M54.5 724.2       There is no problem list on file for this patient.       Past Medical History:   Diagnosis Date   • Colon polyp    • CREST syndrome    • GERD (gastroesophageal reflux disease)    • History of CT scan of abdomen 2011    constipation, sig tics, 6 mm hepatic cyst   • History of rheumatoid arthritis    • Hyperlipidemia    • Hypertension    • Osteoarthritis    • Raynaud's disease    • Sjogren's syndrome         Past Surgical History:   Procedure Laterality Date   • CATARACT EXTRACTION     • COLONOSCOPY  2016    tics, microscopic colitis,    • COLONOSCOPY  2011    sig tics, inflammation, polyp   • DILATATION AND CURETTAGE     • KNEE SURGERY Right    • UPPER GASTROINTESTINAL ENDOSCOPY      erythema   • WRIST SURGERY Right                              PT Assessment/Plan       17 1439       PT Assessment    Assessment Comments Pt reports slightly increased knee pain yesterday and today from unknown origin. Pt states that L sided low back pain seems to be improving today, and pt notes reduced pain at end of therapy session. Progressed R knee and hip strengthening exercises and added weights as tolerated.   -CN     PT Plan    PT Plan Comments Continue with Nustep warm up and assess tolerance to added exercises today. Progress hip and knee strengthening and lumbar spine flexibility and stability exercises as tolerated.   -CN       User Key  (r) = Recorded By, (t) = Taken By, (c) = Cosigned By    Initials Name Provider Type    NÉSTOR Blake, PT Physical Therapist                    Exercises        07/11/17 1200          Subjective Comments    Subjective Comments My knee really started hurting yesterday and I didn't do anything differently. It feels a little better today. My back and L hip feel better today though.   -CN      Subjective Pain    Able to rate subjective pain? yes  -CN      Pre-Treatment Pain Level 5  -CN      Post-Treatment Pain Level 2  -CN      Exercise 1    Exercise Name 1 Quad sets  -CN      Cueing 1 Verbal  -CN      Reps 1 15  -CN      Time (Seconds) 1 5  -CN      Exercise 3    Exercise Name 3 Piriformis stretch  -CN      Cueing 3 Verbal  -CN      Reps 3 3  -CN      Time (Seconds) 3 20  -CN      Exercise 4    Exercise Name 4 HL hip abduction with TA, B and uni  -CN      Cueing 4 Verbal  -CN      Reps 4 15  -CN      Exercise 5    Exercise Name 5 Warmup on Nustep, L5  -CN      Time (Minutes) 5 6  -CN      Exercise 6    Exercise Name 6 LTR  -CN      Reps 6 20  -CN      Time (Seconds) 6 5  -CN      Exercise 7    Exercise Name 7 PPT  -CN      Reps 7 10  -CN      Time (Seconds) 7 10  -CN      Exercise 8    Exercise Name 8 SAQ  -CN      Cueing 8 Verbal  -CN      Weights/Plates 8 2  -CN      Sets 8 2  -CN      Reps 8 10  -CN      Exercise 9    Exercise Name 9 SL hip abduction  -CN      Sets 9 2  -CN      Reps 9 10  -CN      Exercise 10    Exercise Name 10 Standing hamstring curls  -CN      Weights/Plates 10 2  -CN      Sets 10 2  -CN      Reps 10 10  -CN      Exercise 14    Exercise Name 14 Bridges with PPT  -CN      Cueing 14 Verbal  -CN      Reps 14 10  -CN      Exercise 15    Exercise Name 15 SLR with quad set  -CN      Cueing 15 Verbal  -CN      Sets 15 2  -CN      Reps 15 10  -CN        User Key  (r) = Recorded By, (t) = Taken By, (c) = Cosigned By    Initials Name Provider Type    NÉSTOR Blake, PT Physical Therapist                               PT OP Goals       07/11/17 1400       PT Short Term Goals    STG Date to Achieve 07/19/17  -CN     STG 1 Pt will report subjective pain  at 4/10 or less to demonstrate symptom management with ADLs and all household mobility.   -CN     STG 1 Progress Ongoing  -CN     STG 1 Progress Comments Pt reports pain rated 5/10 which decreased to 2/10 after exercises.   -CN     STG 2 Pt will be independent and compliant with HEP, symptom management and joint protection in order to minimize stress on affected tissues.    -CN     STG 2 Progress Met  -CN     STG 2 Progress Comments Pt performing current HEP at home daily.   -CN     Long Term Goals    LTG Date to Achieve 08/09/17  -CN     LTG 1 Pt will improve gross knee strength to 4+/5 B in order to improve functional mobility.   -CN     LTG 1 Progress Ongoing  -CN     LTG 2 Pt will improve score on Knee Outcome Survey  to 75% for improved functional mobility.   -CN     LTG 2 Progress Ongoing  -CN     LTG 3 Pt will ambulate with least restrictive AD and normalized gait pattern in order to increase safety with gait and participate in all ADLs.   -CN     LTG 3 Progress Ongoing  -CN       User Key  (r) = Recorded By, (t) = Taken By, (c) = Cosigned By    Initials Name Provider Type    NÉSTOR Blake PT Physical Therapist                Therapy Education       07/11/17 1224          Therapy Education    Given HEP;Symptoms/condition management;Pain management;Posture/body mechanics  -CN      Program Progressed  -CN      How Provided Verbal;Demonstration;Written  -CN      Provided to Patient  -CN      Level of Understanding Teach back education performed  -CN        User Key  (r) = Recorded By, (t) = Taken By, (c) = Cosigned By    Initials Name Provider Type    NÉSTOR Blake PT Physical Therapist                Time Calculation:   Start Time: 1215  Stop Time: 1300  Time Calculation (min): 45 min    Therapy Charges for Today     Code Description Service Date Service Provider Modifiers Qty    00028509988  PT THER PROC EA 15 MIN 7/11/2017 Neelima Blake PT GP 3                    Neelima  Johnna Blake, PT  7/11/2017

## 2017-07-14 ENCOUNTER — HOSPITAL ENCOUNTER (OUTPATIENT)
Dept: PHYSICAL THERAPY | Facility: HOSPITAL | Age: 82
Setting detail: THERAPIES SERIES
Discharge: HOME | End: 2017-07-14

## 2017-07-14 DIAGNOSIS — R26.2 DIFFICULTY WALKING: ICD-10-CM

## 2017-07-14 DIAGNOSIS — G89.29 CHRONIC PAIN OF RIGHT KNEE: Primary | ICD-10-CM

## 2017-07-14 DIAGNOSIS — M25.561 CHRONIC PAIN OF RIGHT KNEE: Primary | ICD-10-CM

## 2017-07-14 DIAGNOSIS — M54.50 ACUTE LEFT-SIDED LOW BACK PAIN WITHOUT SCIATICA: ICD-10-CM

## 2017-07-14 PROCEDURE — 97110 THERAPEUTIC EXERCISES: CPT | Performed by: PHYSICAL THERAPIST

## 2017-07-14 NOTE — THERAPY TREATMENT NOTE
Outpatient Physical Therapy Ortho Treatment Note  [facility]     Patient Name: Arlin Hutson  : 1935  MRN: [id number]  Today's Date: 2017      Visit Date: 2017    Visit Dx:    ICD-10-CM ICD-9-CM   1. Chronic pain of right knee M25.561 719.46    G89.29 338.29   2. Acute left-sided low back pain without sciatica M54.5 724.2   3. Difficulty walking R26.2 719.7       There is no problem list on file for this patient.       Past Medical History:   Diagnosis Date   • Colon polyp    • CREST syndrome    • GERD (gastroesophageal reflux disease)    • History of CT scan of abdomen 2011    constipation, sig tics, 6 mm hepatic cyst   • History of rheumatoid arthritis    • Hyperlipidemia    • Hypertension    • Osteoarthritis    • Raynaud's disease    • Sjogren's syndrome         Past Surgical History:   Procedure Laterality Date   • CATARACT EXTRACTION     • COLONOSCOPY  2016    tics, microscopic colitis,    • COLONOSCOPY  2011    sig tics, inflammation, polyp   • DILATATION AND CURETTAGE     • KNEE SURGERY Right    • UPPER GASTROINTESTINAL ENDOSCOPY      erythema   • WRIST SURGERY Right                              PT Assessment/Plan       17 1537       PT Assessment    Assessment Comments Reporting decreased pain with activity today. Added 2lb ankle weight with certain exercises to progress strengthening program.  Overall, moving in the right direction with respect to pain and functional mobility.   -CK     PT Plan    PT Plan Comments Continue with Nustep and progression of hip/knee/core strengthening as tolerated by symptoms.   -CK       User Key  (r) = Recorded By, (t) = Taken By, (c) = Cosigned By    Initials Name Provider Type    CHEL Morales, PT Physical Therapist                    Exercises       17 0900          Subjective Comments    Subjective Comments Yesterday was a bad day. Today is not bad.   -CK      Subjective Pain    Able to rate subjective  pain? yes  -CK      Pre-Treatment Pain Level 3  -CK      Post-Treatment Pain Level 2  -CK      Exercise 1    Exercise Name 1 Quad sets  -CK      Cueing 1 Verbal  -CK      Reps 1 15  -CK      Time (Seconds) 1 5  -CK      Exercise 2    Exercise Name 2 LAQ   -CK      Cueing 2 Demo  -CK      Equipment 2 Cuff Weight  -CK      Weights/Plates 2 2  -CK      Reps 2 10  -CK      Time (Seconds) 2 5  -CK      Exercise 3    Exercise Name 3 Piriformis/Figure 4 stretch  -CK      Cueing 3 Verbal  -CK      Reps 3 3  -CK      Time (Seconds) 3 20  -CK      Exercise 4    Exercise Name 4 HL hip abduction with TA, B and uni  -CK      Cueing 4 Verbal  -CK      Resistance 4 Red  -CK      Reps 4 15   10  -CK      Exercise 5    Exercise Name 5 Warmup on Nustep, L5  -CK      Time (Minutes) 5 6  -CK      Exercise 6    Exercise Name 6 LTR  -CK      Reps 6 10  -CK      Time (Seconds) 6 5  -CK      Exercise 7    Exercise Name 7 PPT  -CK      Reps 7 10  -CK      Time (Seconds) 7 10  -CK      Exercise 9    Exercise Name 9 SL hip abduction  -CK      Cueing 9 Verbal  -CK      Equipment 9 Cuff Weight  -CK      Weights/Plates 9 2  -CK      Reps 9 15  -CK      Exercise 10    Exercise Name 10 Standing hamstring curls  -CK      Weights/Plates 10 2  -CK      Sets 10 2  -CK      Reps 10 10  -CK      Exercise 11    Exercise Name 11 hamstring stretch on step  -CK      Sets 11 2  -CK      Time (Seconds) 11 30  -CK      Exercise 12    Exercise Name 12 Standing  hip abduction  -CK      Cueing 12 Demo  -CK      Equipment 12 Cuff Weight  -CK      Weights/Plates 12 2  -CK      Reps 12 10  -CK      Exercise 13    Exercise Name 13 calf stretch on step  -CK      Reps 13 3  -CK      Time (Seconds) 13 20  -CK      Exercise 14    Exercise Name 14 Bridges with adduction  -CK      Cueing 14 Verbal  -CK      Reps 14 10  -CK        User Key  (r) = Recorded By, (t) = Taken By, (c) = Cosigned By    Initials Name Provider Type    CK Latrell Morales, PT Physical Therapist                                        Time Calculation:   Start Time: 0915  Stop Time: 1000  Time Calculation (min): 45 min    Therapy Charges for Today     Code Description Service Date Service Provider Modifiers Qty    86298965505 HC PT THER PROC EA 15 MIN 7/14/2017 Latrell Morales, PT GP 3                    Latrell Morales, PT  7/14/2017

## 2017-07-17 ENCOUNTER — HOSPITAL ENCOUNTER (OUTPATIENT)
Dept: PHYSICAL THERAPY | Facility: HOSPITAL | Age: 82
Setting detail: THERAPIES SERIES
Discharge: HOME | End: 2017-07-17

## 2017-07-17 DIAGNOSIS — M25.561 CHRONIC PAIN OF RIGHT KNEE: Primary | ICD-10-CM

## 2017-07-17 DIAGNOSIS — R26.2 DIFFICULTY WALKING: ICD-10-CM

## 2017-07-17 DIAGNOSIS — M54.50 ACUTE LEFT-SIDED LOW BACK PAIN WITHOUT SCIATICA: ICD-10-CM

## 2017-07-17 DIAGNOSIS — G89.29 CHRONIC PAIN OF RIGHT KNEE: Primary | ICD-10-CM

## 2017-07-17 PROCEDURE — 97110 THERAPEUTIC EXERCISES: CPT

## 2017-07-17 NOTE — THERAPY TREATMENT NOTE
Outpatient Physical Therapy Ortho Treatment Note  [facility]     Patient Name: Arlin Hutson  : 1935  MRN: [id number]  Today's Date: 2017      Visit Date: 2017    Visit Dx:    ICD-10-CM ICD-9-CM   1. Chronic pain of right knee M25.561 719.46    G89.29 338.29   2. Acute left-sided low back pain without sciatica M54.5 724.2   3. Difficulty walking R26.2 719.7       There is no problem list on file for this patient.       Past Medical History:   Diagnosis Date   • Colon polyp    • CREST syndrome    • GERD (gastroesophageal reflux disease)    • History of CT scan of abdomen 2011    constipation, sig tics, 6 mm hepatic cyst   • History of rheumatoid arthritis    • Hyperlipidemia    • Hypertension    • Osteoarthritis    • Raynaud's disease    • Sjogren's syndrome         Past Surgical History:   Procedure Laterality Date   • CATARACT EXTRACTION     • COLONOSCOPY  2016    tics, microscopic colitis,    • COLONOSCOPY  2011    sig tics, inflammation, polyp   • DILATATION AND CURETTAGE     • KNEE SURGERY Right    • UPPER GASTROINTESTINAL ENDOSCOPY      erythema   • WRIST SURGERY Right                              PT Assessment/Plan       17 1456       PT Assessment    Assessment Comments Pt reports decreased pain today and continued improvement with HEP. Continued with hip and knee strenghtening with increased resistance and weights which pt was able to tolerate without pain. Pt continues to c/o L sided low back pain which may be improving slightly with PT.   -CN     PT Plan    PT Plan Comments Continue to progress as tolerated with hip and knee strengthening. Consider minisquats and heel raises next visit.   -CN       User Key  (r) = Recorded By, (t) = Taken By, (c) = Cosigned By    Initials Name Provider Type    NÉSTOR Blake, PT Physical Therapist                    Exercises       17 0900          Subjective Comments    Subjective Comments It  feels really good today. I am not really having any pain. Yesterday was not as good though.   -CN      Subjective Pain    Able to rate subjective pain? yes  -CN      Pre-Treatment Pain Level 0  -CN      Post-Treatment Pain Level 0  -CN      Exercise 1    Exercise Name 1 Quad sets  -CN      Cueing 1 Verbal  -CN      Reps 1 15  -CN      Time (Seconds) 1 5  -CN      Exercise 2    Exercise Name 2 LAQ   -CN      Cueing 2 Demo  -CN      Equipment 2 Cuff Weight  -CN      Weights/Plates 2 2  -CN      Reps 2 10  -CN      Time (Seconds) 2 5  -CN      Exercise 3    Exercise Name 3 Piriformis/Figure 4 stretch  -CN      Cueing 3 Verbal  -CN      Reps 3 3  -CN      Time (Seconds) 3 20  -CN      Exercise 4    Exercise Name 4 HL hip abduction with TA, B and uni  -CN      Cueing 4 Verbal  -CN      Resistance 4 Red  -CN      Reps 4 15   10  -CN      Exercise 5    Exercise Name 5 Warmup on Nustep, L5  -CN      Time (Minutes) 5 6  -CN      Exercise 6    Exercise Name 6 LTR  -CN      Reps 6 10  -CN      Time (Seconds) 6 5  -CN      Exercise 7    Exercise Name 7 PPT  -CN      Reps 7 10  -CN      Time (Seconds) 7 10  -CN      Exercise 9    Exercise Name 9 SL hip abduction  -CN      Cueing 9 Verbal  -CN      Equipment 9 Cuff Weight  -CN      Weights/Plates 9 2  -CN      Reps 9 15  -CN      Exercise 10    Exercise Name 10 Standing hamstring curls  -CN      Weights/Plates 10 2  -CN      Sets 10 2  -CN      Reps 10 10  -CN      Exercise 11    Exercise Name 11 hamstring stretch on step  -CN      Sets 11 3  -CN      Time (Seconds) 11 30  -CN      Exercise 12    Exercise Name 12 Standing  hip abduction  -CN      Cueing 12 Demo  -CN      Equipment 12 Cuff Weight  -CN      Weights/Plates 12 2  -CN      Sets 12 2  -CN      Reps 12 10  -CN      Exercise 13    Exercise Name 13 calf stretch on step  -CN      Reps 13 3  -CN      Time (Seconds) 13 20  -CN      Exercise 14    Exercise Name 14 Bridges with adduction  -CN      Cueing 14 Verbal  -CN       Reps 14 10  -CN      Exercise 15    Exercise Name 15 SLR with quad set  -CN      Cueing 15 Verbal  -CN      Weights/Plates 15 2  -CN      Sets 15 2  -CN      Reps 15 10  -CN        User Key  (r) = Recorded By, (t) = Taken By, (c) = Cosigned By    Initials Name Provider Type    NÉSTOR Blake, PT Physical Therapist                               PT OP Goals       07/17/17 0900       PT Short Term Goals    STG Date to Achieve 07/19/17  -CN     STG 1 Pt will report subjective pain at 4/10 or less to demonstrate symptom management with ADLs and all household mobility.   -CN     STG 1 Progress Ongoing  -CN     STG 1 Progress Comments Pt reports no pain today, however increased pain yesterday.   -CN     STG 2 Pt will be independent and compliant with HEP, symptom management and joint protection in order to minimize stress on affected tissues.    -CN     STG 2 Progress Met  -CN     Long Term Goals    LTG Date to Achieve 08/09/17  -CN     LTG 1 Pt will improve gross knee strength to 4+/5 B in order to improve functional mobility.   -CN     LTG 1 Progress Ongoing  -CN     LTG 2 Pt will improve score on Knee Outcome Survey  to 75% for improved functional mobility.   -CN     LTG 2 Progress Ongoing  -CN     LTG 3 Pt will ambulate with least restrictive AD and normalized gait pattern in order to increase safety with gait and participate in all ADLs.   -CN     LTG 3 Progress Ongoing  -CN       User Key  (r) = Recorded By, (t) = Taken By, (c) = Cosigned By    Initials Name Provider Type    NÉSTOR Blake, CHER Physical Therapist                Therapy Education       07/17/17 0919          Therapy Education    Given HEP;Symptoms/condition management;Pain management;Posture/body mechanics  -CN      Program Progressed  -CN      How Provided Verbal;Demonstration  -CN      Provided to Patient  -CN      Level of Understanding Teach back education performed  -CN        User Key  (r) = Recorded By, (t) = Taken By, (c)  = Cosigned By    Initials Name Provider Type    CN Neelima Blake, PT Physical Therapist                Time Calculation:   Start Time: 0915  Stop Time: 1000  Time Calculation (min): 45 min    Therapy Charges for Today     Code Description Service Date Service Provider Modifiers Qty    [id number]  PT THER PROC EA 15 MIN 7/17/2017 Neelima Blake, PT GP 3                    Neelima Blake, PT  7/17/2017

## 2017-07-18 ENCOUNTER — APPOINTMENT (OUTPATIENT)
Dept: PHYSICAL THERAPY | Facility: HOSPITAL | Age: 82
End: 2017-07-18

## 2017-07-19 ENCOUNTER — HOSPITAL ENCOUNTER (OUTPATIENT)
Dept: PHYSICAL THERAPY | Facility: HOSPITAL | Age: 82
Setting detail: THERAPIES SERIES
Discharge: HOME | End: 2017-07-19

## 2017-07-19 DIAGNOSIS — M54.50 ACUTE LEFT-SIDED LOW BACK PAIN WITHOUT SCIATICA: ICD-10-CM

## 2017-07-19 DIAGNOSIS — M25.561 CHRONIC PAIN OF RIGHT KNEE: Primary | ICD-10-CM

## 2017-07-19 DIAGNOSIS — R26.2 DIFFICULTY WALKING: ICD-10-CM

## 2017-07-19 DIAGNOSIS — G89.29 CHRONIC PAIN OF RIGHT KNEE: Primary | ICD-10-CM

## 2017-07-19 PROCEDURE — 97110 THERAPEUTIC EXERCISES: CPT

## 2017-07-19 NOTE — THERAPY TREATMENT NOTE
Outpatient Physical Therapy Ortho Treatment Note  [facility]     Patient Name: Arlin Hutson  : 1935  MRN: [id number]  Today's Date: 2017      Visit Date: 2017    Visit Dx:    ICD-10-CM ICD-9-CM   1. Chronic pain of right knee M25.561 719.46    G89.29 338.29   2. Acute left-sided low back pain without sciatica M54.5 724.2   3. Difficulty walking R26.2 719.7       There is no problem list on file for this patient.       Past Medical History:   Diagnosis Date   • Colon polyp    • CREST syndrome    • GERD (gastroesophageal reflux disease)    • History of CT scan of abdomen 2011    constipation, sig tics, 6 mm hepatic cyst   • History of rheumatoid arthritis    • Hyperlipidemia    • Hypertension    • Osteoarthritis    • Raynaud's disease    • Sjogren's syndrome         Past Surgical History:   Procedure Laterality Date   • CATARACT EXTRACTION     • COLONOSCOPY  2016    tics, microscopic colitis,    • COLONOSCOPY  2011    sig tics, inflammation, polyp   • DILATATION AND CURETTAGE     • KNEE SURGERY Right    • UPPER GASTROINTESTINAL ENDOSCOPY      erythema   • WRIST SURGERY Right                              PT Assessment/Plan       17 4601       PT Assessment    Assessment Comments Pt continues to report improvement in symptoms and states that L sided low back pain is much improved since evaluation. Pt continues to progress with exercises and able to tolerate weights and increased resistance.   -CN     PT Plan    PT Plan Comments Consider D/C in next 2-3 weeks pending no increase in symptoms.   -CN       User Key  (r) = Recorded By, (t) = Taken By, (c) = Cosigned By    Initials Name Provider Type    NÉSTOR Blake, PT Physical Therapist                    Exercises       17 1703          Subjective Comments    Subjective Comments It is feeling good. I went [place] [employer] today and was walking around and it didn't really bother me. My back is feeling  better than when I started.   -CN      Subjective Pain    Able to rate subjective pain? yes  -CN      Pre-Treatment Pain Level 2  -CN      Exercise 1    Exercise Name 1 Quad sets  -CN      Cueing 1 Verbal  -CN      Reps 1 15  -CN      Time (Seconds) 1 5  -CN      Exercise 2    Exercise Name 2 LAQ   -CN      Cueing 2 Demo  -CN      Equipment 2 Cuff Weight  -CN      Weights/Plates 2 2  -CN      Reps 2 15  -CN      Time (Seconds) 2 5  -CN      Exercise 3    Exercise Name 3 Piriformis/Figure 4 stretch  -CN      Cueing 3 Verbal  -CN      Reps 3 3  -CN      Time (Seconds) 3 20  -CN      Exercise 4    Exercise Name 4 HL hip abduction with TA, B and uni  -CN      Cueing 4 Verbal  -CN      Resistance 4 Green  -CN      Reps 4 15  -CN      Exercise 5    Exercise Name 5 Warmup on Nustep, L5  -CN      Time (Minutes) 5 6  -CN      Exercise 6    Exercise Name 6 LTR  -CN      Reps 6 10  -CN      Time (Seconds) 6 5  -CN      Exercise 7    Exercise Name 7 PPT  -CN      Reps 7 10  -CN      Time (Seconds) 7 10  -CN      Exercise 9    Exercise Name 9 SL hip abduction  -CN      Cueing 9 Verbal  -CN      Equipment 9 Cuff Weight  -CN      Weights/Plates 9 2  -CN      Reps 9 15  -CN      Exercise 10    Exercise Name 10 Standing hamstring curls  -CN      Weights/Plates 10 2  -CN      Sets 10 2  -CN      Reps 10 10  -CN      Exercise 11    Exercise Name 11 hamstring stretch on step  -CN      Reps 11 3  -CN      Time (Seconds) 11 30  -CN      Exercise 12    Exercise Name 12 Standing  hip abduction  -CN      Cueing 12 Demo  -CN      Equipment 12 Cuff Weight  -CN      Weights/Plates 12 2  -CN      Sets 12 2  -CN      Reps 12 10  -CN      Exercise 13    Exercise Name 13 calf stretch on step  -CN      Reps 13 3  -CN      Time (Seconds) 13 20  -CN      Exercise 14    Exercise Name 14 Bridges with adduction  -CN      Cueing 14 Verbal  -CN      Reps 14 10  -CN      Exercise 15    Exercise Name 15 SLR with quad set  -CN      Cueing 15 Verbal  -CN       Weights/Plates 15 2  -CN      Sets 15 2  -CN      Reps 15 10  -CN      Exercise 16    Exercise Name 16 Minisquats (stopped secondary to L knee pain)  -CN      Reps 16 3  -CN      Exercise 17    Exercise Name 17 Heel raises  -CN      Cueing 17 Demo  -CN      Reps 17 20  -CN        User Key  (r) = Recorded By, (t) = Taken By, (c) = Cosigned By    Initials Name Provider Type    NÉSTOR Blake PT Physical Therapist                                   Therapy Education       07/19/17 1702          Therapy Education    Given HEP;Symptoms/condition management;Pain management;Posture/body mechanics  -CN      Program Progressed  -CN      How Provided Verbal;Demonstration  -CN      Provided to Patient  -CN      Level of Understanding Teach back education performed  -CN        User Key  (r) = Recorded By, (t) = Taken By, (c) = Cosigned By    Initials Name Provider Type    NÉSTOR Blake PT Physical Therapist                Time Calculation:   Start Time: 1700  Stop Time: 1745  Time Calculation (min): 45 min    Therapy Charges for Today     Code Description Service Date Service Provider Modifiers Qty    [id number]  PT THER PROC EA 15 MIN 7/19/2017 Neelima Blake, CHER GP 3                    Neelima Blake PT  7/19/2017

## 2017-07-21 ENCOUNTER — APPOINTMENT (OUTPATIENT)
Dept: PHYSICAL THERAPY | Facility: HOSPITAL | Age: 82
End: 2017-07-21

## 2017-07-25 ENCOUNTER — HOSPITAL ENCOUNTER (OUTPATIENT)
Dept: PHYSICAL THERAPY | Facility: HOSPITAL | Age: 82
Setting detail: THERAPIES SERIES
Discharge: HOME | End: 2017-07-25

## 2017-07-25 DIAGNOSIS — M54.50 ACUTE LEFT-SIDED LOW BACK PAIN WITHOUT SCIATICA: ICD-10-CM

## 2017-07-25 DIAGNOSIS — G89.29 CHRONIC PAIN OF RIGHT KNEE: Primary | ICD-10-CM

## 2017-07-25 DIAGNOSIS — R26.2 DIFFICULTY WALKING: ICD-10-CM

## 2017-07-25 DIAGNOSIS — M25.561 CHRONIC PAIN OF RIGHT KNEE: Primary | ICD-10-CM

## 2017-07-25 PROCEDURE — G8979 MOBILITY GOAL STATUS: HCPCS

## 2017-07-25 PROCEDURE — 97110 THERAPEUTIC EXERCISES: CPT

## 2017-07-25 PROCEDURE — G8978 MOBILITY CURRENT STATUS: HCPCS

## 2017-07-25 NOTE — THERAPY PROGRESS REPORT/RE-CERT
Outpatient Physical Therapy Ortho Re-Assessment  [facility]     Patient Name: Arlin Hutson  : 1935  MRN: [id number]  Today's Date: 2017      Visit Date: 2017    There is no problem list on file for this patient.       Past Medical History:   Diagnosis Date   • Colon polyp    • CREST syndrome    • GERD (gastroesophageal reflux disease)    • History of CT scan of abdomen 2011    constipation, sig tics, 6 mm hepatic cyst   • History of rheumatoid arthritis    • Hyperlipidemia    • Hypertension    • Osteoarthritis    • Raynaud's disease    • Sjogren's syndrome         Past Surgical History:   Procedure Laterality Date   • CATARACT EXTRACTION     • COLONOSCOPY  2016    tics, microscopic colitis,    • COLONOSCOPY  2011    sig tics, inflammation, polyp   • DILATATION AND CURETTAGE     • KNEE SURGERY Right    • UPPER GASTROINTESTINAL ENDOSCOPY      erythema   • WRIST SURGERY Right        Visit Dx:     ICD-10-CM ICD-9-CM   1. Chronic pain of right knee M25.561 719.46    G89.29 338.29   2. Acute left-sided low back pain without sciatica M54.5 724.2   3. Difficulty walking R26.2 719.7                 PT Ortho       17 0900    Left Knee    Knee Extension Gross Movement (4+/5) good plus  -CN    Knee Flexion Gross Movement (5/5) normal  -CN    Right Knee    Knee Extension Gross Movement (4+/5) good plus  -CN    Knee Flexion Gross Movement (4+/5) good plus  -CN      User Key  (r) = Recorded By, (t) = Taken By, (c) = Cosigned By    Initials Name Provider Type    CN Neelima Blake, PT Physical Therapist                            Therapy Education       17 1018          Therapy Education    Given HEP;Symptoms/condition management;Pain management;Posture/body mechanics  -CN      Program Reinforced  -CN      How Provided Verbal;Demonstration  -CN      Provided to Patient  -CN      Level of Understanding Teach back education performed  -CN        User Key  (r) =  Recorded By, (t) = Taken By, (c) = Cosigned By    Initials Name Provider Type    NÉSTOR Blake PT Physical Therapist                PT OP Goals       07/25/17 1000       PT Short Term Goals    STG Date to Achieve 07/19/17  -CN     STG 1 Pt will report subjective pain at 4/10 or less to demonstrate symptom management with ADLs and all household mobility.   -CN     STG 1 Progress Met  -CN     STG 1 Progress Comments Pt has reported pain less than or equal to 4/10 for past several weeks.   -CN     STG 2 Pt will be independent and compliant with HEP, symptom management and joint protection in order to minimize stress on affected tissues.    -CN     STG 2 Progress Met  -CN     Long Term Goals    LTG Date to Achieve 08/09/17  -CN     LTG 1 Pt will improve gross knee strength to 4+/5 B in order to improve functional mobility.   -CN     LTG 1 Progress Met  -CN     LTG 2 Pt will improve score on Knee Outcome Survey  to 75% for improved functional mobility.   -CN     LTG 2 Progress Progressing  -CN     LTG 2 Progress Comments Pt scored 69% on the KOS where 100% is no disability.   -CN     LTG 3 Pt will ambulate with least restrictive AD and normalized gait pattern in order to increase safety with gait and participate in all ADLs.   -CN     LTG 3 Progress Progressing  -CN     LTG 3 Progress Comments Pt no longer ambulating with walking stick, however slightly antalgic gait pattern at times due to R knee pain.   -CN       User Key  (r) = Recorded By, (t) = Taken By, (c) = Cosigned By    Initials Name Provider Type    NÉSTOR Blake PT Physical Therapist                PT Assessment/Plan       07/25/17 0926       PT Assessment    Functional Limitations Impaired gait;Limitations in functional capacity and performance  -CN     Impairments Pain;Endurance;Posture  -CN     Assessment Comments Pt has attended 8 skilled therapy sessions for treatment of R knee pain with subsequent L sided low back pain. Pt  reports overall improvement in symptoms with exercises and demonstrates improved walking pattern. Pt continues to report increased pain at times which comes and goes, however advised that secondary to OA, she will likely continue to have increased pain at times. Pt does report increased L sided low back pain with extended ambulation secondary to impairments in gait pattern. Pt states that pain is better managed at this time and she has been compliant with HEP. Pt to trial D/C today and call to reschedule if symptoms worsen.   -CN     Please refer to paper survey for additional self-reported information Yes  -CN     Rehab Potential Good  -CN     Patient/caregiver participated in establishment of treatment plan and goals Yes  -CN     Patient would benefit from skilled therapy intervention Yes  -CN     PT Plan    Planned CPT's? PT RE-EVAL: 40267;PT THER PROC EA 15 MIN: 82881;PT THER ACT EA 15 MIN: 16133;PT MANUAL THERAPY EA 15 MIN: 54298;PT NEUROMUSC RE-EDUCATION EA 15 MIN: 03318;PT GAIT TRAINING EA 15 MIN: 33081;PT AQUATIC THERAPY EA 15 MIN: 95790;PT HOT OR COLD PACK TREAT MCARE  -CN     PT Plan Comments Pt to trial D/C today and continue with current HEP. Pt to return in 2-3 weeks if symptoms worsen.   -CN       User Key  (r) = Recorded By, (t) = Taken By, (c) = Cosigned By    Initials Name Provider Type    CN Neelima Blake, PT Physical Therapist                  Exercises       07/25/17 0900          Subjective Comments    Subjective Comments My knee is hurting a little more today than normal. My back is still feeling better though.   -CN      Subjective Pain    Able to rate subjective pain? yes  -CN      Pre-Treatment Pain Level 4  -CN      Exercise 1    Exercise Name 1 Quad sets  -CN      Cueing 1 Verbal  -CN      Reps 1 15  -CN      Time (Seconds) 1 5  -CN      Exercise 3    Exercise Name 3 Piriformis/Figure 4 stretch  -CN      Cueing 3 Verbal  -CN      Reps 3 3  -CN      Time (Seconds) 3 20  -CN       Exercise 4    Exercise Name 4 HL hip abduction with TA, B and uni  -CN      Cueing 4 Verbal  -CN      Reps 4 15  -CN      Resistance 4 Green  -CN      Exercise 5    Exercise Name 5 Warmup on Nustep, L5  -CN      Time (Minutes) 5 6  -CN      Exercise 10    Exercise Name 10 Standing hamstring curls  -CN      Sets 10 2  -CN      Reps 10 10  -CN      Exercise 11    Exercise Name 11 hamstring stretch on step  -CN      Reps 11 3  -CN      Time (Seconds) 11 30  -CN      Exercise 12    Exercise Name 12 Standing  hip abduction  -CN      Cueing 12 Demo  -CN      Sets 12 2  -CN      Reps 12 10  -CN      Exercise 13    Exercise Name 13 calf stretch on step  -CN      Reps 13 3  -CN      Time (Seconds) 13 20  -CN      Exercise 15    Exercise Name 15 SLR with quad set  -CN      Cueing 15 Verbal  -CN      Sets 15 2  -CN      Reps 15 10  -CN        User Key  (r) = Recorded By, (t) = Taken By, (c) = Cosigned By    Initials Name Provider Type    NÉSTOR Blake PT Physical Therapist                              Outcome Measures       07/25/17 1000          Knee Outcome Score    Knee Outcome Score Comments 69% where 100% is no disability.   -CN      Functional Assessment    Outcome Measure Options Knee Outcome Score- ADL  -CN        User Key  (r) = Recorded By, (t) = Taken By, (c) = Cosigned By    Initials Name Provider Type    NÉSTOR Blake PT Physical Therapist            Time Calculation:   Start Time: 0915  Stop Time: 1000  Time Calculation (min): 45 min     Therapy Charges for Today     Code Description Service Date Service Provider Modifiers Qty    03964458186 HC PT MOBILITY CURRENT 7/25/2017 Neelima Blake, PT GP, CJ 1    41180894986 HC PT MOBILITY PROJECTED 7/25/2017 Neelima Blake, PT GP, CI 1    43470728769 HC PT THER PROC EA 15 MIN 7/25/2017 Neelima Blake, PT GP 3          PT G-Codes  PT Professional Judgement Used?: Yes  Outcome Measure Options: Knee Outcome Score-  ADL  Score: 69% where 100% is no disability  Functional Limitation: Mobility: Walking and moving around  Mobility: Walking and Moving Around Current Status (): At least 20 percent but less than 40 percent impaired, limited or restricted  Mobility: Walking and Moving Around Goal Status (): At least 1 percent but less than 20 percent impaired, limited or restricted         Neelima Blake, PT  7/25/2017

## 2017-07-27 ENCOUNTER — APPOINTMENT (OUTPATIENT)
Dept: PHYSICAL THERAPY | Facility: HOSPITAL | Age: 82
End: 2017-07-27

## 2017-08-01 ENCOUNTER — APPOINTMENT (OUTPATIENT)
Dept: PHYSICAL THERAPY | Facility: HOSPITAL | Age: 82
End: 2017-08-01

## 2017-08-03 ENCOUNTER — APPOINTMENT (OUTPATIENT)
Dept: PHYSICAL THERAPY | Facility: HOSPITAL | Age: 82
End: 2017-08-03

## 2017-08-22 ENCOUNTER — OFFICE VISIT (OUTPATIENT)
Dept: ORTHOPEDIC SURGERY | Facility: CLINIC | Age: 82
End: 2017-08-22

## 2017-08-22 VITALS — BODY MASS INDEX: 20.81 KG/M2 | HEIGHT: 60 IN | WEIGHT: 106 LBS | TEMPERATURE: 98.7 F

## 2017-08-22 DIAGNOSIS — G89.29 CHRONIC LEFT-SIDED LOW BACK PAIN WITHOUT SCIATICA: Primary | ICD-10-CM

## 2017-08-22 DIAGNOSIS — M54.50 CHRONIC LEFT-SIDED LOW BACK PAIN WITHOUT SCIATICA: Primary | ICD-10-CM

## 2017-08-22 PROCEDURE — 99213 OFFICE O/P EST LOW 20 MIN: CPT | Performed by: ORTHOPAEDIC SURGERY

## 2017-08-22 NOTE — PROGRESS NOTES
Patient: Arlin Hutson  YOB: 1935 81 y.o. female  Medical Record Number: [id number]    Chief Complaint:   Chief Complaint   Patient presents with   • Right Knee - Follow-up, Pain   • Lower Back - Follow-up, Pain       History of Present Illness:Arlin Hutson is a 81 y.o. female who presents for follow-up of  Left hip and right knee pain.  She had a right knee injection on 6/20/17 and that has helped tremendously.  Her left hip is still bothering her at times it's an ache at other times it severe pain which limits her.  She describes an aching stabbing pain in that area.    Allergies:   Allergies   Allergen Reactions   • Sulfa Antibiotics        Medications:   Current Outpatient Prescriptions   Medication Sig Dispense Refill   • alendronate (FOSAMAX) 70 MG tablet TAKE 1 TABLET ONE TIME WEEKLY 12 tablet 3   • amLODIPine (NORVASC) 5 MG tablet      • aspirin 81 MG EC tablet Take 81 mg by mouth daily.     • Budesonide (ENTOCORT EC) 3 MG 24 hr capsule Take 3 mg by mouth every morning.     • Calcium Carb-Cholecalciferol (CALCIUM 600 + D PO) Take  by mouth.     • Cholecalciferol (VITAMIN D3) 1000 UNITS capsule Take  by mouth.     • diphenoxylate-atropine (LOMOTIL) 2.5-0.025 MG per tablet      • escitalopram (LEXAPRO) 10 MG tablet Take 10 mg by mouth Daily.     • ketorolac (ACULAR) 0.5 % ophthalmic solution      • leflunomide (ARAVA) 10 MG tablet      • Multiple Vitamins-Minerals (CENTRUM SILVER PO) Take  by mouth.     • omeprazole (PriLOSEC) 20 MG capsule      • SALINE NASAL SPRAY NA into each nostril.     • traZODone (DESYREL) 50 MG tablet      • vitamin C (ASCORBIC ACID) 500 MG tablet Take 500 mg by mouth daily.       No current facility-administered medications for this visit.          The following portions of the patient's history were reviewed and updated as appropriate: allergies, current medications, past family history, past medical history, past social history, past surgical history and problem  "list.    Review of Systems:   A 14 point review of systems was performed. All systems negative except pertinent positives/negative listed in HPI above    Physical Exam:   Vitals:    08/22/17 0955   Temp: 98.7 °F (37.1 °C)   Weight: 106 lb (48.1 kg)   Height: 60\" (152.4 cm)       General: A and O x 3, ASA, NAD    SCLERA:    Normal    DENTITION:   Normal  Right knee shows full range of motion there is no effusion no instability skin is normal there is no irritability irritation with motion.  Left sacroiliac joint is tender to palpation she is mildly prominent in that area.  The left hip shows no pain with range of motion.        Assessment/Plan:  Left-sided sacroiliitis.  At this point she still having persistent pain which ranges anywhere from annoyance to codie pain.  I'm going to set her up for a fluoroscopy guided left sacroiliac joint injection.  She will give it about a month to 6 weeks and we'll continue her therapy exercises.  If she's not better after that time she would call and we can set her up for an MRI of the left sacroiliac region.  Right knee osteoarthritis which has responded very well to the cortisone injection 2 months ago.  She is going on a trip in late October and if her pain returns she could call and we will try and get her into see [place] for a right knee injection      Georges Jon MD  8/22/2017  "

## 2017-08-24 DIAGNOSIS — M54.50 BACK PAIN, LUMBOSACRAL: Primary | ICD-10-CM

## 2017-08-31 ENCOUNTER — HOSPITAL ENCOUNTER (OUTPATIENT)
Dept: GENERAL RADIOLOGY | Facility: HOSPITAL | Age: 82
Discharge: HOME | End: 2017-08-31
Attending: ORTHOPAEDIC SURGERY

## 2017-09-05 ENCOUNTER — HOSPITAL ENCOUNTER (OUTPATIENT)
Dept: CT IMAGING | Facility: HOSPITAL | Age: 82
Discharge: HOME | End: 2017-09-05
Attending: ORTHOPAEDIC SURGERY | Admitting: ORTHOPAEDIC SURGERY

## 2017-09-05 DIAGNOSIS — M54.50 BACK PAIN, LUMBOSACRAL: ICD-10-CM

## 2017-09-05 RX ORDER — METHYLPREDNISOLONE ACETATE 40 MG/ML
80 INJECTION, SUSPENSION INTRA-ARTICULAR; INTRALESIONAL; INTRAMUSCULAR; SOFT TISSUE ONCE
Status: DISCONTINUED | OUTPATIENT
Start: 2017-09-05 | End: 2017-09-06 | Stop reason: HOSPADM

## 2017-09-05 RX ORDER — BUPIVACAINE HYDROCHLORIDE 2.5 MG/ML
3 INJECTION, SOLUTION EPIDURAL; INFILTRATION; INTRACAUDAL ONCE
Status: DISCONTINUED | OUTPATIENT
Start: 2017-09-05 | End: 2017-09-06 | Stop reason: HOSPADM

## 2017-09-05 RX ORDER — LIDOCAINE HYDROCHLORIDE 10 MG/ML
10 INJECTION, SOLUTION INFILTRATION; PERINEURAL ONCE
Status: COMPLETED | OUTPATIENT
Start: 2017-09-05 | End: 2017-09-05

## 2017-09-05 RX ADMIN — LIDOCAINE HYDROCHLORIDE 10 ML: 10 INJECTION, SOLUTION INFILTRATION; PERINEURAL at 08:48

## 2017-10-05 ENCOUNTER — CLINICAL SUPPORT (OUTPATIENT)
Dept: ORTHOPEDIC SURGERY | Facility: CLINIC | Age: 82
End: 2017-10-05

## 2017-10-05 VITALS — WEIGHT: 110 LBS | TEMPERATURE: 98.5 F | HEIGHT: 60 IN | BODY MASS INDEX: 21.6 KG/M2

## 2017-10-05 DIAGNOSIS — G89.29 CHRONIC PAIN OF RIGHT KNEE: Primary | ICD-10-CM

## 2017-10-05 DIAGNOSIS — M25.561 CHRONIC PAIN OF RIGHT KNEE: Primary | ICD-10-CM

## 2017-10-05 PROCEDURE — 20610 DRAIN/INJ JOINT/BURSA W/O US: CPT | Performed by: NURSE PRACTITIONER

## 2017-10-05 RX ORDER — METHYLPREDNISOLONE ACETATE 80 MG/ML
80 INJECTION, SUSPENSION INTRA-ARTICULAR; INTRALESIONAL; INTRAMUSCULAR; SOFT TISSUE
Status: COMPLETED | OUTPATIENT
Start: 2017-10-05 | End: 2017-10-05

## 2017-10-05 RX ORDER — BUPIVACAINE HYDROCHLORIDE 5 MG/ML
2 INJECTION, SOLUTION EPIDURAL; INTRACAUDAL
Status: COMPLETED | OUTPATIENT
Start: 2017-10-05 | End: 2017-10-05

## 2017-10-05 RX ADMIN — METHYLPREDNISOLONE ACETATE 80 MG: 80 INJECTION, SUSPENSION INTRA-ARTICULAR; INTRALESIONAL; INTRAMUSCULAR; SOFT TISSUE at 10:12

## 2017-10-05 RX ADMIN — BUPIVACAINE HYDROCHLORIDE 2 ML: 5 INJECTION, SOLUTION EPIDURAL; INTRACAUDAL at 10:12

## 2017-10-05 NOTE — PROGRESS NOTES
10/5/2017    Arlin Hutson is here today for worsening knee pain. Pt has undergone injection of the knee in the past with good resolution of symptoms. Pt is requesting a repeat injection.     KNEE Injection Procedure Note:    Large Joint Arthrocentesis  Date/Time: 10/5/2017 10:12 AM  Consent given by: patient  Site marked: site marked  Timeout: Immediately prior to procedure a time out was called to verify the correct patient, procedure, equipment, support staff and site/side marked as required   Supporting Documentation  Indications: pain and joint swelling   Procedure Details  Location: knee - R knee  Preparation: Patient was prepped and draped in the usual sterile fashion  Needle size: 18 G  Approach: anterolateral  Medications administered: 2 mL bupivacaine (PF) 0.5 %; 80 mg methylPREDNISolone acetate 80 MG/ML          Prior to injection risks were discussed including but not limited to pain, infection, elevated blood sugars.  Patient verbalized understanding and would like to proceed with injection  At the conclusion of the injection I discussed the importance of continued quad strengthening exercises on a daily basis. I will see the patient back if the symptoms should fail to improve or worsen.    Kadie Sandoval APRN  10/5/2017

## 2017-11-06 ENCOUNTER — TELEPHONE (OUTPATIENT)
Dept: GASTROENTEROLOGY | Facility: CLINIC | Age: 82
End: 2017-11-06

## 2017-11-06 RX ORDER — BUDESONIDE 3 MG/1
CAPSULE, COATED PELLETS ORAL
Qty: 60 CAPSULE | Refills: 0 | Status: SHIPPED | OUTPATIENT
Start: 2017-11-06 | End: 2017-11-06 | Stop reason: SDUPTHER

## 2017-11-06 RX ORDER — BUDESONIDE 3 MG/1
CAPSULE, COATED PELLETS ORAL
Qty: 60 CAPSULE | Refills: 0 | Status: SHIPPED | OUTPATIENT
Start: 2017-11-06 | End: 2017-11-13 | Stop reason: SDUPTHER

## 2017-11-06 NOTE — TELEPHONE ENCOUNTER
Called pt and confirmed with pt that she has not had a fever, abd pain, and she has not been on any antibx.      Advised Dr Emanuel recommends resuming entocort 1 pill a day and can increase to 2 pills daily if no improvement.  Also advised she recommends to f/u with Dr Shook or NP in the next few weeks.  Advised would send message to manager for appt.   Pt verb understanding and reports she needs the  Budesonide sent to her [facility] pharmacy instead [facility].  Script re-escribed to [facility].

## 2017-11-06 NOTE — TELEPHONE ENCOUNTER
Would recommend resuming Entocort 1 pill a day, can increase to 2 pills daily if no improvement.  Please ensure that she's had no fever, no recent antibiotics and no abdominal pain.  Please make sure she has a scheduled follow-up with Dr. Knight or the nurse practitioner in the next few weeks

## 2017-11-06 NOTE — TELEPHONE ENCOUNTER
Pt called and reports she had been doing well up until 2 wks ago.  She states she has again developed water diarrhea with urgency.  She states she is having 5 watery stools per day.  Pt reports she had a few left over entorcort and she took them and she had been taking lomotil 6 per day.  She states this is not helping.  Pt states last time she did this Dr Shook put her on entocort.  Pt reports she only has 6 pills left.Pt denies a fever and chills.  Pt is asking what can she take to help get rid of the diarrhea.  Advised that Dr Shook is covering in the hospital but will send message to Dr Emanuel. Pt verb understanding.

## 2017-11-07 ENCOUNTER — OFFICE VISIT (OUTPATIENT)
Dept: ORTHOPEDIC SURGERY | Facility: CLINIC | Age: 82
End: 2017-11-07

## 2017-11-07 VITALS — TEMPERATURE: 99.7 F | BODY MASS INDEX: 21.6 KG/M2 | HEIGHT: 60 IN | WEIGHT: 110 LBS

## 2017-11-07 DIAGNOSIS — M25.561 CHRONIC PAIN OF RIGHT KNEE: Primary | ICD-10-CM

## 2017-11-07 DIAGNOSIS — G89.29 CHRONIC PAIN OF RIGHT KNEE: Primary | ICD-10-CM

## 2017-11-07 PROCEDURE — 99212 OFFICE O/P EST SF 10 MIN: CPT | Performed by: NURSE PRACTITIONER

## 2017-11-13 ENCOUNTER — OFFICE VISIT (OUTPATIENT)
Dept: GASTROENTEROLOGY | Facility: CLINIC | Age: 82
End: 2017-11-13

## 2017-11-13 VITALS
SYSTOLIC BLOOD PRESSURE: 128 MMHG | BODY MASS INDEX: 20.85 KG/M2 | HEIGHT: 60 IN | WEIGHT: 106.2 LBS | DIASTOLIC BLOOD PRESSURE: 78 MMHG

## 2017-11-13 DIAGNOSIS — K52.838 OTHER MICROSCOPIC COLITIS: Primary | ICD-10-CM

## 2017-11-13 DIAGNOSIS — K21.9 GASTROESOPHAGEAL REFLUX DISEASE, ESOPHAGITIS PRESENCE NOT SPECIFIED: ICD-10-CM

## 2017-11-13 DIAGNOSIS — R63.4 WEIGHT LOSS: ICD-10-CM

## 2017-11-13 DIAGNOSIS — R19.7 DIARRHEA, UNSPECIFIED TYPE: ICD-10-CM

## 2017-11-13 PROCEDURE — 99213 OFFICE O/P EST LOW 20 MIN: CPT | Performed by: INTERNAL MEDICINE

## 2017-11-13 RX ORDER — BUDESONIDE 3 MG/1
CAPSULE, COATED PELLETS ORAL
Qty: 90 CAPSULE | Refills: 11 | Status: SHIPPED | OUTPATIENT
Start: 2017-11-13 | End: 2018-05-17

## 2017-11-13 NOTE — PROGRESS NOTES
Chief Complaint   Patient presents with   • Follow-up     Microscopic colitis       History of Present Illness: 6 weeks ago nonbloody diarrhea restarted. She went back on Entocort 3 mg/day and it didn't help. She went up to 2/day and no help. Yesterday she had 4 BM/day. Some lower abdominal cramping.  Some nausea, no vomiting. No fevers, chills. She has lost 3-4 pounds. Last antibiotics long time. She was on prednisone (for neck pain) 6-7 weeks ago.     Past Medical History:   Diagnosis Date   • Colon polyp    • CREST syndrome    • GERD (gastroesophageal reflux disease)    • History of CT scan of abdomen 03/11/2011    constipation, sig tics, 6 mm hepatic cyst   • History of rheumatoid arthritis    • Hyperlipidemia    • Hypertension    • Osteoarthritis    • Raynaud's disease    • Sjogren's syndrome        Past Surgical History:   Procedure Laterality Date   • CATARACT EXTRACTION     • COLONOSCOPY  02/26/2016    tics, microscopic colitis,    • COLONOSCOPY  07/01/2011    sig tics, inflammation, polyp   • DILATATION AND CURETTAGE  1980   • KNEE SURGERY Right    • UPPER GASTROINTESTINAL ENDOSCOPY      erythema   • WRIST SURGERY Right 2008         Current Outpatient Prescriptions:   •  alendronate (FOSAMAX) 70 MG tablet, TAKE 1 TABLET ONE TIME WEEKLY, Disp: 12 tablet, Rfl: 3  •  amLODIPine (NORVASC) 5 MG tablet, , Disp: , Rfl:   •  aspirin 81 MG EC tablet, Take 81 mg by mouth daily., Disp: , Rfl:   •  Budesonide (ENTOCORT EC) 3 MG 24 hr capsule, Take 3 capsules by mouth every morning, Disp: 90 capsule, Rfl: 11  •  Calcium Carb-Cholecalciferol (CALCIUM 600 + D PO), Take  by mouth., Disp: , Rfl:   •  Cholecalciferol (VITAMIN D3) 1000 UNITS capsule, Take  by mouth., Disp: , Rfl:   •  diphenoxylate-atropine (LOMOTIL) 2.5-0.025 MG per tablet, , Disp: , Rfl:   •  escitalopram (LEXAPRO) 10 MG tablet, Take 10 mg by mouth Daily., Disp: , Rfl:   •  ketorolac (ACULAR) 0.5 % ophthalmic solution, , Disp: , Rfl:   •  leflunomide  (ARAVA) 10 MG tablet, , Disp: , Rfl:   •  Multiple Vitamins-Minerals (CENTRUM SILVER PO), Take  by mouth., Disp: , Rfl:   •  omeprazole (PriLOSEC) 20 MG capsule, , Disp: , Rfl:   •  SALINE NASAL SPRAY NA, into each nostril., Disp: , Rfl:   •  traZODone (DESYREL) 50 MG tablet, , Disp: , Rfl:   •  vitamin C (ASCORBIC ACID) 500 MG tablet, Take 500 mg by mouth daily., Disp: , Rfl:     Allergies   Allergen Reactions   • Sulfa Antibiotics        Family History   Problem Relation Age of Onset   • Ulcerative colitis Mother        Social History     Social History   • Marital status: Single     Spouse name: N/A   • Number of children: N/A   • Years of education: N/A     Occupational History   • Not on file.     Social History Main Topics   • Smoking status: Never Smoker   • Smokeless tobacco: Never Used   • Alcohol use Yes      Comment: Infrequent   • Drug use: No   • Sexual activity: No     Other Topics Concern   • Not on file     Social History Narrative       Review of Systems   Gastrointestinal: Positive for diarrhea.   All other systems reviewed and are negative.      Vitals:    11/13/17 0834   BP: 128/78       Physical Exam   Constitutional: She is oriented to person, place, and time. She appears well-developed and well-nourished. No distress.   HENT:   Head: Normocephalic and atraumatic. Hair is normal.   Right Ear: Hearing, tympanic membrane, external ear and ear canal normal. No drainage. No decreased hearing is noted.   Left Ear: Hearing, tympanic membrane, external ear and ear canal normal. No decreased hearing is noted.   Nose: No nasal deformity.   Mouth/Throat: Oropharynx is clear and moist.   Eyes: Conjunctivae, EOM and lids are normal. Pupils are equal, round, and reactive to light. Right eye exhibits no discharge. Left eye exhibits no discharge.   Neck: Normal range of motion. Neck supple. No JVD present. No tracheal deviation present. No thyromegaly present.   Cardiovascular: Normal rate, regular rhythm,  normal heart sounds, intact distal pulses and normal pulses.  Exam reveals no gallop and no friction rub.    No murmur heard.  Pulmonary/Chest: Effort normal and breath sounds normal. No respiratory distress. She has no wheezes. She has no rales. She exhibits no tenderness.   Abdominal: Soft. Bowel sounds are normal. She exhibits no distension and no mass. There is no tenderness. There is no rebound and no guarding. No hernia.   Musculoskeletal: Normal range of motion. She exhibits no edema, tenderness or deformity.   Lymphadenopathy:     She has no cervical adenopathy.   Neurological: She is alert and oriented to person, place, and time. She has normal reflexes. She displays normal reflexes. No cranial nerve deficit. She exhibits normal muscle tone. Coordination normal.   Skin: Skin is warm and dry. No rash noted. She is not diaphoretic. No erythema.   Psychiatric: She has a normal mood and affect. Her behavior is normal. Judgment and thought content normal.   Vitals reviewed.      Arlin was seen today for follow-up.    Diagnoses and all orders for this visit:    Other microscopic colitis  -     Budesonide (ENTOCORT EC) 3 MG 24 hr capsule; Take 3 capsules by mouth every morning  -     Clostridium Difficile EIA - Stool, Per Rectum  -     CBC & Differential  -     Celiac Ab tTG DGP TIgA  -     Comprehensive Metabolic Panel  -     TSH    Diarrhea, unspecified type  -     Clostridium Difficile EIA - Stool, Per Rectum  -     CBC & Differential  -     Celiac Ab tTG DGP TIgA  -     Comprehensive Metabolic Panel  -     TSH    Gastroesophageal reflux disease, esophagitis presence not specified  -     Clostridium Difficile EIA - Stool, Per Rectum  -     CBC & Differential  -     Celiac Ab tTG DGP TIgA  -     Comprehensive Metabolic Panel  -     TSH    Weight loss  -     Clostridium Difficile EIA - Stool, Per Rectum  -     CBC & Differential  -     Celiac Ab tTG DGP TIgA  -     Comprehensive Metabolic Panel  -      TSH      Assessment:  1) Diarrhea with a h/o microscopic colitis.  2) h/o colon polyps.  3) GERD  4) Weight loss     Recommendations:  1) stool for clostridium dificile toxin  2) Increase the Entocort 3 mg to 3/day.  3) Labs: CBC, CMP, celiac sprue antibodies, TSH  4) f/u 1 week. I want to make sure she can travel to [place] before Xmas!!    Return in about 1 week (around 11/20/2017).    Edilberto Shook MD  11/13/2017

## 2017-11-14 LAB
ALBUMIN SERPL-MCNC: 4.2 G/DL (ref 3.5–5.2)
ALBUMIN/GLOB SERPL: 1.6 G/DL
ALP SERPL-CCNC: 90 U/L (ref 39–117)
ALT SERPL-CCNC: 15 U/L (ref 1–33)
AST SERPL-CCNC: 14 U/L (ref 1–32)
BASOPHILS # BLD AUTO: 0.06 10*3/MM3 (ref 0–0.2)
BASOPHILS NFR BLD AUTO: 0.7 % (ref 0–1.5)
BILIRUB SERPL-MCNC: 0.2 MG/DL (ref 0.1–1.2)
BUN SERPL-MCNC: 12 MG/DL (ref 8–23)
BUN/CREAT SERPL: 18.2 (ref 7–25)
CALCIUM SERPL-MCNC: 9.6 MG/DL (ref 8.6–10.5)
CHLORIDE SERPL-SCNC: 100 MMOL/L (ref 98–107)
CO2 SERPL-SCNC: 28.8 MMOL/L (ref 22–29)
CREAT SERPL-MCNC: 0.66 MG/DL (ref 0.57–1)
EOSINOPHIL # BLD AUTO: 0.32 10*3/MM3 (ref 0–0.7)
EOSINOPHIL NFR BLD AUTO: 4 % (ref 0.3–6.2)
ERYTHROCYTE [DISTWIDTH] IN BLOOD BY AUTOMATED COUNT: 15.6 % (ref 11.7–13)
GFR SERPLBLD CREATININE-BSD FMLA CKD-EPI: 104 ML/MIN/1.73
GFR SERPLBLD CREATININE-BSD FMLA CKD-EPI: 86 ML/MIN/1.73
GLIADIN PEPTIDE IGA SER-ACNC: 5 UNITS (ref 0–19)
GLIADIN PEPTIDE IGG SER-ACNC: 2 UNITS (ref 0–19)
GLOBULIN SER CALC-MCNC: 2.7 GM/DL
GLUCOSE SERPL-MCNC: 92 MG/DL (ref 65–99)
HCT VFR BLD AUTO: 37.1 % (ref 35.6–45.5)
HGB BLD-MCNC: 11.8 G/DL (ref 11.9–15.5)
IGA SERPL-MCNC: 433 MG/DL (ref 64–422)
IMM GRANULOCYTES # BLD: 0.02 10*3/MM3 (ref 0–0.03)
IMM GRANULOCYTES NFR BLD: 0.2 % (ref 0–0.5)
LYMPHOCYTES # BLD AUTO: 1.35 10*3/MM3 (ref 0.9–4.8)
LYMPHOCYTES NFR BLD AUTO: 16.9 % (ref 19.6–45.3)
MCH RBC QN AUTO: 30.4 PG (ref 26.9–32)
MCHC RBC AUTO-ENTMCNC: 31.8 G/DL (ref 32.4–36.3)
MCV RBC AUTO: 95.6 FL (ref 80.5–98.2)
MONOCYTES # BLD AUTO: 0.88 10*3/MM3 (ref 0.2–1.2)
MONOCYTES NFR BLD AUTO: 11 % (ref 5–12)
NEUTROPHILS # BLD AUTO: 5.38 10*3/MM3 (ref 1.9–8.1)
NEUTROPHILS NFR BLD AUTO: 67.2 % (ref 42.7–76)
PLATELET # BLD AUTO: 262 10*3/MM3 (ref 140–500)
POTASSIUM SERPL-SCNC: 3.9 MMOL/L (ref 3.5–5.2)
PROT SERPL-MCNC: 6.9 G/DL (ref 6–8.5)
RBC # BLD AUTO: 3.88 10*6/MM3 (ref 3.9–5.2)
SODIUM SERPL-SCNC: 140 MMOL/L (ref 136–145)
TSH SERPL DL<=0.005 MIU/L-ACNC: 6.84 MIU/ML (ref 0.27–4.2)
TTG IGA SER-ACNC: <2 U/ML (ref 0–3)
TTG IGG SER-ACNC: <2 U/ML (ref 0–5)
WBC # BLD AUTO: 8.01 10*3/MM3 (ref 4.5–10.7)

## 2017-11-16 LAB — C DIFF TOX A+B STL QL IA: NEGATIVE

## 2017-11-17 NOTE — PROGRESS NOTES
Tell her that stool for clostridium dificile toxin came back negative, which is good.  The lab work that she had done shows that she is mildly anemic with a hemoglobin of 11.8.  Her white count and platelet count are normal, which is good.  Her thyroid stimulating hormone is mildly elevated at 6.8.  I would have her go to Dr. Rust to have him recheck her thyroid.

## 2017-11-21 ENCOUNTER — TELEPHONE (OUTPATIENT)
Dept: GASTROENTEROLOGY | Facility: CLINIC | Age: 82
End: 2017-11-21

## 2017-11-21 NOTE — TELEPHONE ENCOUNTER
Called pt and advised per Dr Shook that her cdiff came back neg which is good.  The lab work that she had done shows that she is mildly anemic with a hgb of 11.8. Her white cound and platelet count are nomral , which is good.  He tsh is mildly elevated at 6.8. He recommends she have her pcp recheck her thyroid.      Pt verb understanding and states she has already seen Dr Rust and he is aware of her lab work.

## 2017-11-22 ENCOUNTER — OFFICE VISIT (OUTPATIENT)
Dept: GASTROENTEROLOGY | Facility: CLINIC | Age: 82
End: 2017-11-22

## 2017-11-22 VITALS
WEIGHT: 107 LBS | DIASTOLIC BLOOD PRESSURE: 68 MMHG | SYSTOLIC BLOOD PRESSURE: 110 MMHG | BODY MASS INDEX: 21.01 KG/M2 | HEIGHT: 60 IN | TEMPERATURE: 98.1 F

## 2017-11-22 DIAGNOSIS — K21.9 GASTROESOPHAGEAL REFLUX DISEASE, ESOPHAGITIS PRESENCE NOT SPECIFIED: ICD-10-CM

## 2017-11-22 DIAGNOSIS — K52.838 OTHER MICROSCOPIC COLITIS: Primary | ICD-10-CM

## 2017-11-22 DIAGNOSIS — K63.5 POLYP OF COLON, UNSPECIFIED PART OF COLON, UNSPECIFIED TYPE: ICD-10-CM

## 2017-11-22 DIAGNOSIS — R19.7 DIARRHEA, UNSPECIFIED TYPE: ICD-10-CM

## 2017-11-22 PROCEDURE — 99213 OFFICE O/P EST LOW 20 MIN: CPT | Performed by: INTERNAL MEDICINE

## 2017-11-22 RX ORDER — CHOLESTYRAMINE 4 G/9G
1 POWDER, FOR SUSPENSION ORAL
COMMUNITY
End: 2018-02-22

## 2017-11-22 NOTE — PROGRESS NOTES
Chief Complaint   Patient presents with   • Follow-up     Microscopic colitis (Patient feels better)       History of Present Illness: She feels better since she increased the Entocort 3 mg 3/day. She is also on Cholestyramine, Lomotil 2 BID-TID. Now averages 3 BM/day. No nausea or vomting. No rectal bleeding or melena. No abdominal or chest pain. She has gained a few pounds in the last one year.     Past Medical History:   Diagnosis Date   • Colon polyp    • CREST syndrome    • GERD (gastroesophageal reflux disease)    • History of CT scan of abdomen 03/11/2011    constipation, sig tics, 6 mm hepatic cyst   • History of rheumatoid arthritis    • Hyperlipidemia    • Hypertension    • Osteoarthritis    • Raynaud's disease    • Sjogren's syndrome        Past Surgical History:   Procedure Laterality Date   • CATARACT EXTRACTION     • COLONOSCOPY  02/26/2016    tics, microscopic colitis,    • COLONOSCOPY  07/01/2011    sig tics, inflammation, polyp   • DILATATION AND CURETTAGE  1980   • KNEE SURGERY Right    • UPPER GASTROINTESTINAL ENDOSCOPY      erythema   • WRIST SURGERY Right 2008         Current Outpatient Prescriptions:   •  alendronate (FOSAMAX) 70 MG tablet, TAKE 1 TABLET ONE TIME WEEKLY, Disp: 12 tablet, Rfl: 3  •  amLODIPine (NORVASC) 5 MG tablet, , Disp: , Rfl:   •  aspirin 81 MG EC tablet, Take 81 mg by mouth daily., Disp: , Rfl:   •  Budesonide (ENTOCORT EC) 3 MG 24 hr capsule, Take 3 capsules by mouth every morning, Disp: 90 capsule, Rfl: 11  •  Calcium Carb-Cholecalciferol (CALCIUM 600 + D PO), Take  by mouth., Disp: , Rfl:   •  Cholecalciferol (VITAMIN D3) 1000 UNITS capsule, Take  by mouth., Disp: , Rfl:   •  cholestyramine (QUESTRAN) 4 g packet, Take 1 packet by mouth 3 (Three) Times a Day With Meals., Disp: , Rfl:   •  diphenoxylate-atropine (LOMOTIL) 2.5-0.025 MG per tablet, , Disp: , Rfl:   •  escitalopram (LEXAPRO) 10 MG tablet, Take 10 mg by mouth Daily., Disp: , Rfl:   •  ketorolac (ACULAR) 0.5 %  ophthalmic solution, , Disp: , Rfl:   •  leflunomide (ARAVA) 10 MG tablet, , Disp: , Rfl:   •  Multiple Vitamins-Minerals (CENTRUM SILVER PO), Take  by mouth., Disp: , Rfl:   •  omeprazole (PriLOSEC) 20 MG capsule, , Disp: , Rfl:   •  SALINE NASAL SPRAY NA, into each nostril., Disp: , Rfl:   •  traZODone (DESYREL) 50 MG tablet, , Disp: , Rfl:   •  vitamin C (ASCORBIC ACID) 500 MG tablet, Take 500 mg by mouth daily., Disp: , Rfl:     Allergies   Allergen Reactions   • Sulfa Antibiotics        Family History   Problem Relation Age of Onset   • Ulcerative colitis Mother        Social History     Social History   • Marital status: Single     Spouse name: N/A   • Number of children: N/A   • Years of education: N/A     Occupational History   • Not on file.     Social History Main Topics   • Smoking status: Never Smoker   • Smokeless tobacco: Never Used   • Alcohol use Yes      Comment: Infrequent   • Drug use: No   • Sexual activity: No     Other Topics Concern   • Not on file     Social History Narrative       Review of Systems   All other systems reviewed and are negative.      Vitals:    11/22/17 1039   BP: 110/68   Temp: 98.1 °F (36.7 °C)       Physical Exam   Constitutional: She is oriented to person, place, and time. She appears well-developed and well-nourished. No distress.   HENT:   Head: Normocephalic and atraumatic. Hair is normal.   Right Ear: Hearing, tympanic membrane, external ear and ear canal normal. No drainage. No decreased hearing is noted.   Left Ear: Hearing, tympanic membrane, external ear and ear canal normal. No decreased hearing is noted.   Nose: No nasal deformity.   Mouth/Throat: Oropharynx is clear and moist.   Eyes: Conjunctivae, EOM and lids are normal. Pupils are equal, round, and reactive to light. Right eye exhibits no discharge. Left eye exhibits no discharge.   Neck: Normal range of motion. Neck supple. No JVD present. No tracheal deviation present. No thyromegaly present.    Cardiovascular: Normal rate, regular rhythm, normal heart sounds, intact distal pulses and normal pulses.  Exam reveals no gallop and no friction rub.    No murmur heard.  Pulmonary/Chest: Effort normal and breath sounds normal. No respiratory distress. She has no wheezes. She has no rales. She exhibits no tenderness.   Abdominal: Soft. Bowel sounds are normal. She exhibits no distension and no mass. There is no tenderness. There is no rebound and no guarding. No hernia.   Genitourinary: Rectal exam shows guaiac negative stool.   Genitourinary Comments: Mildly low anal sphincter muscle pressure.   Musculoskeletal: Normal range of motion. She exhibits no edema, tenderness or deformity.   Lymphadenopathy:     She has no cervical adenopathy.   Neurological: She is alert and oriented to person, place, and time. She has normal reflexes. She displays normal reflexes. No cranial nerve deficit. She exhibits normal muscle tone. Coordination normal.   Skin: Skin is warm and dry. No rash noted. She is not diaphoretic. No erythema.   Psychiatric: She has a normal mood and affect. Her behavior is normal. Judgment and thought content normal.   Vitals reviewed.      Arlin was seen today for follow-up.    Diagnoses and all orders for this visit:    Other microscopic colitis    Gastroesophageal reflux disease, esophagitis presence not specified    Polyp of colon, unspecified part of colon, unspecified type    Diarrhea, unspecified type      Assessment:  1) Diarrhea with a h/o microscopic colitis. She is better on Entocort 3 mg, 3/day and cholestyramine.  2) h/o colon polyps.  3) GERD  4) Weight loss, resolved  5) She has mildly low anal sphincter pressure with occaisional loss of stool continence.      Recommendations:  1) Continue Entocort 3mg 3 pills/day and cholestyramine  2) f/u 3 mos  3) Kegel exercises.    Return in about 3 months (around 2/22/2018).    Edilberto Shook MD  11/22/2017

## 2017-12-21 ENCOUNTER — DOCUMENTATION (OUTPATIENT)
Dept: PHYSICAL THERAPY | Facility: HOSPITAL | Age: 82
End: 2017-12-21

## 2017-12-21 DIAGNOSIS — M54.50 ACUTE LEFT-SIDED LOW BACK PAIN WITHOUT SCIATICA: ICD-10-CM

## 2017-12-21 DIAGNOSIS — M25.561 CHRONIC PAIN OF RIGHT KNEE: Primary | ICD-10-CM

## 2017-12-21 DIAGNOSIS — G89.29 CHRONIC PAIN OF RIGHT KNEE: Primary | ICD-10-CM

## 2017-12-21 DIAGNOSIS — R26.2 DIFFICULTY WALKING: ICD-10-CM

## 2017-12-21 NOTE — THERAPY DISCHARGE NOTE
Outpatient Physical Therapy Discharge Summary         Patient Name: Arlin Hutson  : 1935  MRN: [id number]    Today's Date: 2017    Visit Dx:    ICD-10-CM ICD-9-CM   1. Chronic pain of right knee M25.561 719.46    G89.29 338.29   2. Acute left-sided low back pain without sciatica M54.5 724.2   3. Difficulty walking R26.2 719.7             PT OP Goals       17 1400       PT Short Term Goals    STG Date to Achieve 17  -CN     STG 1 Pt will report subjective pain at 4/10 or less to demonstrate symptom management with ADLs and all household mobility.   -CN     STG 1 Progress Met  -CN     STG 2 Pt will be independent and compliant with HEP, symptom management and joint protection in order to minimize stress on affected tissues.    -CN     STG 2 Progress Met  -CN     Long Term Goals    LTG Date to Achieve 17  -CN     LTG 1 Pt will improve gross knee strength to 4+/5 B in order to improve functional mobility.   -CN     LTG 1 Progress Met  -CN     LTG 2 Pt will improve score on Knee Outcome Survey  to 75% for improved functional mobility.   -CN     LTG 2 Progress Not Met  -CN     LTG 2 Progress Comments Pt scored 69%  on the KOS where 100% is no disability at last visit.   -CN     LTG 3 Pt will ambulate with least restrictive AD and normalized gait pattern in order to increase safety with gait and participate in all ADLs.   -CN     LTG 3 Progress Partially Met  -CN     LTG 3 Progress Comments Improved gait pattern with occasional antalgia.   -CN       User Key  (r) = Recorded By, (t) = Taken By, (c) = Cosigned By    Initials Name Provider Type    NÉSTOR Blake, PT Physical Therapist          OP PT Discharge Summary  Date of Discharge: 17  Reason for Discharge: Maximum functional potential achieved  Outcomes Achieved: Patient able to partially acheive established goals  Discharge Destination: Home with home program  Discharge Instructions: Pt attended 8 skilled therapy  sessions for treatment of R knee pain and L low back pain. Pt demonstrated symptom improvement with skilled PT  and encouraged to continue with independent HEP for long term management of symptoms.       Time Calculation:                    Neelima Blake, PT  12/21/2017

## 2018-01-25 ENCOUNTER — TRANSCRIBE ORDERS (OUTPATIENT)
Dept: ADMINISTRATIVE | Facility: HOSPITAL | Age: 83
End: 2018-01-25

## 2018-01-25 DIAGNOSIS — R68.84 MAXILLARY PAIN: Primary | ICD-10-CM

## 2018-02-01 ENCOUNTER — HOSPITAL ENCOUNTER (OUTPATIENT)
Dept: CT IMAGING | Facility: HOSPITAL | Age: 83
Discharge: HOME | End: 2018-02-01
Attending: INTERNAL MEDICINE | Admitting: INTERNAL MEDICINE

## 2018-02-01 DIAGNOSIS — R68.84 MAXILLARY PAIN: ICD-10-CM

## 2018-02-01 PROCEDURE — 70486 CT MAXILLOFACIAL W/O DYE: CPT

## 2018-02-13 ENCOUNTER — OFFICE VISIT (OUTPATIENT)
Dept: OBSTETRICS AND GYNECOLOGY | Age: 83
End: 2018-02-13

## 2018-02-13 ENCOUNTER — PROCEDURE VISIT (OUTPATIENT)
Dept: OBSTETRICS AND GYNECOLOGY | Age: 83
End: 2018-02-13

## 2018-02-13 VITALS
BODY MASS INDEX: 20.38 KG/M2 | WEIGHT: 103.8 LBS | SYSTOLIC BLOOD PRESSURE: 140 MMHG | HEIGHT: 60 IN | DIASTOLIC BLOOD PRESSURE: 70 MMHG

## 2018-02-13 DIAGNOSIS — I10 ESSENTIAL HYPERTENSION: ICD-10-CM

## 2018-02-13 DIAGNOSIS — Z78.0 POST-MENOPAUSAL: Primary | ICD-10-CM

## 2018-02-13 DIAGNOSIS — Z00.00 ANNUAL PHYSICAL EXAM: ICD-10-CM

## 2018-02-13 DIAGNOSIS — K21.9 GASTROESOPHAGEAL REFLUX DISEASE, ESOPHAGITIS PRESENCE NOT SPECIFIED: Primary | ICD-10-CM

## 2018-02-13 DIAGNOSIS — K52.9 COLITIS: ICD-10-CM

## 2018-02-13 DIAGNOSIS — M85.89 OSTEOPENIA OF MULTIPLE SITES: ICD-10-CM

## 2018-02-13 PROCEDURE — 77080 DXA BONE DENSITY AXIAL: CPT | Performed by: OBSTETRICS & GYNECOLOGY

## 2018-02-13 PROCEDURE — G0101 CA SCREEN;PELVIC/BREAST EXAM: HCPCS | Performed by: OBSTETRICS & GYNECOLOGY

## 2018-02-13 RX ORDER — AMLODIPINE BESYLATE 2.5 MG/1
2.5 TABLET ORAL EVERY MORNING
COMMUNITY
Start: 2018-01-24 | End: 2021-03-25 | Stop reason: HOSPADM

## 2018-02-13 RX ORDER — LIFITEGRAST 50 MG/ML
SOLUTION/ DROPS OPHTHALMIC
COMMUNITY
Start: 2018-02-05 | End: 2018-05-17

## 2018-02-13 NOTE — PROGRESS NOTES
Subjective   Arlin Hutson is a 82 y.o. female is being seen today for   Chief Complaint   Patient presents with   • Gynecologic Exam   .    History of Present Illness  Patient is here for an annual exam after 2 years.  Overall she doing very well.  She may not travel as much as she used to the long trips take the whole.  She has had a couple of flareups of colitis seem to be okay when she takes a medicine.  She also has some arthritis typical thing.  Her bowels and bladder are doing well does no new family history.  She is due for another bone density she just stopped Fosamax a couple of weeks ago.  No other complaints  The following portions of the patient's history were reviewed and updated as appropriate: allergies, current medications, past family history, past medical history, past social history, past surgical history and problem list.    Vitals:    18 1016   BP: 140/70       PAST MEDICAL HISTORY  Past Medical History:   Diagnosis Date   • Colon polyp    • CREST syndrome    • GERD (gastroesophageal reflux disease)    • History of CT scan of abdomen 2011    constipation, sig tics, 6 mm hepatic cyst   • History of rheumatoid arthritis    • Hyperlipidemia    • Hypertension    • Osteoarthritis    • Raynaud's disease    • Sjogren's syndrome      OB History      Para Term  AB Living    3 3 3       SAB TAB Ectopic Multiple Live Births                Past Surgical History:   Procedure Laterality Date   • CATARACT EXTRACTION     • COLONOSCOPY  2016    tics, microscopic colitis,    • COLONOSCOPY  2011    sig tics, inflammation, polyp   • DILATATION AND CURETTAGE     • KNEE SURGERY Right    • UPPER GASTROINTESTINAL ENDOSCOPY      erythema   • WRIST SURGERY Right      Family History   Problem Relation Age of Onset   • Ulcerative colitis Mother      History   Smoking Status   • Never Smoker   Smokeless Tobacco   • Never Used       Current Outpatient Prescriptions:   •   amLODIPine (NORVASC) 2.5 MG tablet, , Disp: , Rfl:   •  Budesonide (ENTOCORT EC) 3 MG 24 hr capsule, Take 3 capsules by mouth every morning (Patient taking differently: 3 mg Every Morning. Take 1 capsules by mouth every morning), Disp: 90 capsule, Rfl: 11  •  Calcium Carb-Cholecalciferol (CALCIUM 600 + D PO), Take  by mouth., Disp: , Rfl:   •  Cholecalciferol (VITAMIN D3) 1000 UNITS capsule, Take  by mouth., Disp: , Rfl:   •  escitalopram (LEXAPRO) 10 MG tablet, Take 10 mg by mouth Daily., Disp: , Rfl:   •  leflunomide (ARAVA) 10 MG tablet, , Disp: , Rfl:   •  Multiple Vitamins-Minerals (CENTRUM SILVER PO), Take  by mouth., Disp: , Rfl:   •  omeprazole (PriLOSEC) 20 MG capsule, Taking on Mon Wed and Fri, Disp: , Rfl:   •  SALINE NASAL SPRAY NA, into each nostril., Disp: , Rfl:   •  traZODone (DESYREL) 50 MG tablet, , Disp: , Rfl:   •  vitamin C (ASCORBIC ACID) 500 MG tablet, Take 500 mg by mouth daily., Disp: , Rfl:   •  XIIDRA 5 % solution, , Disp: , Rfl:   •  alendronate (FOSAMAX) 70 MG tablet, TAKE 1 TABLET ONE TIME WEEKLY, Disp: 12 tablet, Rfl: 3  •  amLODIPine (NORVASC) 5 MG tablet, , Disp: , Rfl:   •  aspirin 81 MG EC tablet, Take 81 mg by mouth daily., Disp: , Rfl:   •  cholestyramine (QUESTRAN) 4 g packet, Take 1 packet by mouth 3 (Three) Times a Day With Meals., Disp: , Rfl:   •  diphenoxylate-atropine (LOMOTIL) 2.5-0.025 MG per tablet, , Disp: , Rfl:   •  ketorolac (ACULAR) 0.5 % ophthalmic solution, , Disp: , Rfl:     There is no immunization history on file for this patient.    Review of Systems   Constitutional: Negative for chills, fatigue, fever and unexpected weight change.   Respiratory: Negative for shortness of breath and wheezing.    Cardiovascular: Negative for chest pain.   Gastrointestinal: Positive for diarrhea. Negative for abdominal distention, abdominal pain, blood in stool, constipation and nausea.   Genitourinary: Negative for difficulty urinating, dyspareunia, dysuria, frequency,  hematuria, menstrual problem, pelvic pain, urgency and vaginal discharge.   Musculoskeletal: Positive for arthralgias.   Skin: Negative for rash.       Objective   Physical Exam   Constitutional: She is oriented to person, place, and time. Vital signs are normal. She appears well-developed and well-nourished.   Neck: No thyromegaly present.   Cardiovascular: Normal rate, regular rhythm and normal heart sounds.    Pulmonary/Chest: Effort normal. Right breast exhibits no inverted nipple, no mass, no nipple discharge, no skin change and no tenderness. Left breast exhibits no inverted nipple, no mass, no nipple discharge, no skin change and no tenderness. Breasts are symmetrical. There is no breast swelling.   Abdominal: Soft.   Genitourinary: Vagina normal and uterus normal. No breast tenderness, discharge or bleeding. Pelvic exam was performed with patient supine. No labial fusion. There is no rash, tenderness, lesion or injury on the right labia. There is no rash, tenderness, lesion or injury on the left labia. Cervix exhibits no motion tenderness, no discharge and no friability. Right adnexum displays no mass, no tenderness and no fullness. Left adnexum displays no mass, no tenderness and no fullness.   Neurological: She is alert and oriented to person, place, and time.   Psychiatric: She has a normal mood and affect.   Vitals reviewed.        Assessment/Plan   Arlin was seen today for gynecologic exam.    Diagnoses and all orders for this visit:    Gastroesophageal reflux disease, esophagitis presence not specified    Annual physical exam    Osteopenia of multiple sites    Essential hypertension    Colitis      Exam was normal today.  I did not do a Pap smear today.  I did do bone density and is a very slight improvement from 2 years ago.  We will take 1 year off Fosamax and then resumed that.  Mammograms up-to-date and a colonoscopies up-to-date.  Diet and excise were discussed come back in year

## 2018-02-15 ENCOUNTER — OFFICE VISIT (OUTPATIENT)
Dept: ORTHOPEDIC SURGERY | Facility: CLINIC | Age: 83
End: 2018-02-15

## 2018-02-15 VITALS — TEMPERATURE: 98.7 F | HEIGHT: 60 IN | WEIGHT: 104 LBS | BODY MASS INDEX: 20.42 KG/M2

## 2018-02-15 DIAGNOSIS — M17.11 PRIMARY OSTEOARTHRITIS OF RIGHT KNEE: Primary | ICD-10-CM

## 2018-02-15 PROCEDURE — 99212 OFFICE O/P EST SF 10 MIN: CPT | Performed by: ORTHOPAEDIC SURGERY

## 2018-02-15 NOTE — PROGRESS NOTES
Patient: Arlin Hutson  YOB: 1935 82 y.o. female  Medical Record Number: [id number]    Chief Complaints:   Chief Complaint   Patient presents with   • Right Knee - Follow-up, Pain       History of Present Illness:Arlin Hutson is a 82 y.o. female who presents for follow-up of  Right knee pain.  He still having pain but overall better than last visit.  She underwent an injection of her right knee in October and that is helped for some months now.  She still having an aching pain throughout the knee diffusely.  She was unable to participate in physical therapy.    Allergies:   Allergies   Allergen Reactions   • Sulfa Antibiotics        Medications:   Current Outpatient Prescriptions   Medication Sig Dispense Refill   • alendronate (FOSAMAX) 70 MG tablet TAKE 1 TABLET ONE TIME WEEKLY 12 tablet 3   • amLODIPine (NORVASC) 2.5 MG tablet      • aspirin 81 MG EC tablet Take 81 mg by mouth daily.     • Budesonide (ENTOCORT EC) 3 MG 24 hr capsule Take 3 capsules by mouth every morning (Patient taking differently: 3 mg Every Morning. Take 1 capsules by mouth every morning) 90 capsule 11   • Calcium Carb-Cholecalciferol (CALCIUM 600 + D PO) Take  by mouth.     • Cholecalciferol (VITAMIN D3) 1000 UNITS capsule Take  by mouth.     • cholestyramine (QUESTRAN) 4 g packet Take 1 packet by mouth 3 (Three) Times a Day With Meals.     • diphenoxylate-atropine (LOMOTIL) 2.5-0.025 MG per tablet      • escitalopram (LEXAPRO) 10 MG tablet Take 10 mg by mouth Daily.     • ketorolac (ACULAR) 0.5 % ophthalmic solution      • leflunomide (ARAVA) 10 MG tablet      • Multiple Vitamins-Minerals (CENTRUM SILVER PO) Take  by mouth.     • omeprazole (PriLOSEC) 20 MG capsule Taking on Mon Wed and Fri     • SALINE NASAL SPRAY NA into each nostril.     • traZODone (DESYREL) 50 MG tablet      • vitamin C (ASCORBIC ACID) 500 MG tablet Take 500 mg by mouth daily.     • XIIDRA 5 % solution        No current facility-administered  "medications for this visit.          The following portions of the patient's history were reviewed and updated as appropriate: allergies, current medications, past family history, past medical history, past social history, past surgical history and problem list.    Review of Systems:   A 14 point review of systems was performed. All systems negative except pertinent positives/negative listed in HPI above    Physical Exam:   Vitals:    02/15/18 1506   Temp: 98.7 °F (37.1 °C)   Weight: 47.2 kg (104 lb)   Height: 152.4 cm (60\")       General: A and O x 3, ASA, NAD    SCLERA:    Normal    DENTITION:   Normal  Mild pain and irritability with motion.  No effusion no instability she stands in neutral alignment walks with a slight antalgic gait.  Quad strength is 4+ out of 5.    Radiology:  Xrays 3views right knee (ap,lateral, sunrise) taken previously in June demonstrating moderately advanced varus osteoarthritis with bone on bone articulation, subchondral cysts, and periarticular osteophytes      Assessment/Plan:  Right knee moderately severe osteoarthritis.  I'm going to send her to physical therapy.  She may go on to require replacement surgery in the future hopefully she can continue to optimize her leg and avoid that for as long as possible.  I'll see her back as needed.  "

## 2018-02-22 ENCOUNTER — OFFICE VISIT (OUTPATIENT)
Dept: GASTROENTEROLOGY | Facility: CLINIC | Age: 83
End: 2018-02-22

## 2018-02-22 VITALS
DIASTOLIC BLOOD PRESSURE: 80 MMHG | TEMPERATURE: 98.2 F | SYSTOLIC BLOOD PRESSURE: 128 MMHG | HEIGHT: 60 IN | WEIGHT: 105 LBS | BODY MASS INDEX: 20.62 KG/M2

## 2018-02-22 DIAGNOSIS — K52.838 OTHER MICROSCOPIC COLITIS: ICD-10-CM

## 2018-02-22 DIAGNOSIS — R19.7 DIARRHEA, UNSPECIFIED TYPE: Primary | ICD-10-CM

## 2018-02-22 DIAGNOSIS — K21.00 GASTROESOPHAGEAL REFLUX DISEASE WITH ESOPHAGITIS: ICD-10-CM

## 2018-02-22 DIAGNOSIS — R14.3 FLATUS: ICD-10-CM

## 2018-02-22 PROCEDURE — 99214 OFFICE O/P EST MOD 30 MIN: CPT | Performed by: INTERNAL MEDICINE

## 2018-02-22 NOTE — PROGRESS NOTES
Chief Complaint   Patient presents with   • Microscopic colitis   • Diarrhea     occasionally   • Gas       History of Present Illness: 83 yo female with microscopic colitis. She has decreased the Entocort 3 mg to one daily. She will have 0-3 BM/day. When she has no BM that day she will then have lower abdominal cramping (rarely-once/week) and diarrhea. C/o gas. NO rectal bleeding or melena. No nausea or vomiting. No fevers, chills. Weight stable. She rarely takes lomotil 1-2 times/mo. She drinks milk. She tried a lactose free diet and it didn't help.     Past Medical History:   Diagnosis Date   • Colon polyp    • CREST syndrome    • GERD (gastroesophageal reflux disease)    • History of CT scan of abdomen 03/11/2011    constipation, sig tics, 6 mm hepatic cyst   • History of rheumatoid arthritis    • Hyperlipidemia    • Hypertension    • Osteoarthritis    • Raynaud's disease    • Sjogren's syndrome        Past Surgical History:   Procedure Laterality Date   • CATARACT EXTRACTION     • COLONOSCOPY  02/26/2016    tics, microscopic colitis,    • COLONOSCOPY  07/01/2011    sig tics, inflammation, polyp   • DILATATION AND CURETTAGE  1980   • FLEXIBLE SIGMOIDOSCOPY     • KNEE SURGERY Right    • UPPER GASTROINTESTINAL ENDOSCOPY  03/14/2008    erythema   • WRIST SURGERY Right 2008         Current Outpatient Prescriptions:   •  amLODIPine (NORVASC) 2.5 MG tablet, , Disp: , Rfl:   •  Budesonide (ENTOCORT EC) 3 MG 24 hr capsule, Take 3 capsules by mouth every morning (Patient taking differently: 3 mg Every Morning. Take 1 capsules by mouth every morning), Disp: 90 capsule, Rfl: 11  •  Calcium Carb-Cholecalciferol (CALCIUM 600 + D PO), Take  by mouth., Disp: , Rfl:   •  Cholecalciferol (VITAMIN D3) 1000 UNITS capsule, Take  by mouth., Disp: , Rfl:   •  diphenoxylate-atropine (LOMOTIL) 2.5-0.025 MG per tablet, , Disp: , Rfl:   •  escitalopram (LEXAPRO) 10 MG tablet, Take 10 mg by mouth Daily., Disp: , Rfl:   •  ketorolac  (ACULAR) 0.5 % ophthalmic solution, , Disp: , Rfl:   •  leflunomide (ARAVA) 10 MG tablet, , Disp: , Rfl:   •  Multiple Vitamins-Minerals (CENTRUM SILVER PO), Take  by mouth., Disp: , Rfl:   •  omeprazole (PriLOSEC) 20 MG capsule, Taking on Mon Wed and Fri, Disp: , Rfl:   •  SALINE NASAL SPRAY NA, into each nostril., Disp: , Rfl:   •  traZODone (DESYREL) 50 MG tablet, , Disp: , Rfl:   •  vitamin C (ASCORBIC ACID) 500 MG tablet, Take 500 mg by mouth daily., Disp: , Rfl:   •  XIIDRA 5 % solution, , Disp: , Rfl:   •  aspirin 81 MG EC tablet, Take 81 mg by mouth daily., Disp: , Rfl:     Allergies   Allergen Reactions   • Sulfa Antibiotics        Family History   Problem Relation Age of Onset   • Ulcerative colitis Mother        Social History     Social History   • Marital status: Single     Spouse name: N/A   • Number of children: N/A   • Years of education: N/A     Occupational History   • Not on file.     Social History Main Topics   • Smoking status: Never Smoker   • Smokeless tobacco: Never Used   • Alcohol use Yes      Comment: Infrequent   • Drug use: No   • Sexual activity: No     Other Topics Concern   • Not on file     Social History Narrative       Review of Systems   Gastrointestinal: Positive for abdominal pain and diarrhea.   All other systems reviewed and are negative.      Vitals:    02/22/18 0953   BP: 128/80   Temp: 98.2 °F (36.8 °C)       Physical Exam   Constitutional: She is oriented to person, place, and time. She appears well-developed and well-nourished. No distress.   HENT:   Head: Normocephalic and atraumatic. Hair is normal.   Right Ear: Hearing, tympanic membrane, external ear and ear canal normal. No drainage. No decreased hearing is noted.   Left Ear: Hearing, tympanic membrane, external ear and ear canal normal. No decreased hearing is noted.   Nose: No nasal deformity.   Mouth/Throat: Oropharynx is clear and moist.   Eyes: Conjunctivae, EOM and lids are normal. Pupils are equal, round, and  reactive to light. Right eye exhibits no discharge. Left eye exhibits no discharge.   Neck: Normal range of motion. Neck supple. No JVD present. No tracheal deviation present. No thyromegaly present.   Cardiovascular: Normal rate, regular rhythm, normal heart sounds, intact distal pulses and normal pulses.  Exam reveals no gallop and no friction rub.    No murmur heard.  Pulmonary/Chest: Effort normal and breath sounds normal. No respiratory distress. She has no wheezes. She has no rales. She exhibits no tenderness.   Abdominal: Soft. Bowel sounds are normal. She exhibits no distension and no mass. There is no tenderness. There is no rebound and no guarding. No hernia.   Genitourinary: Rectal exam shows guaiac negative stool.   Genitourinary Comments: Normal anorectal exam.   Musculoskeletal: Normal range of motion. She exhibits no edema, tenderness or deformity.   Lymphadenopathy:     She has no cervical adenopathy.   Neurological: She is alert and oriented to person, place, and time. She has normal reflexes. She displays normal reflexes. No cranial nerve deficit. She exhibits normal muscle tone. Coordination normal.   Skin: Skin is warm and dry. No rash noted. She is not diaphoretic. No erythema.   Psychiatric: She has a normal mood and affect. Her behavior is normal. Judgment and thought content normal.   Vitals reviewed.      Arlin was seen today for microscopic colitis, diarrhea and gas.    Diagnoses and all orders for this visit:    Diarrhea, unspecified type  -     CBC & Differential  -     Comprehensive Metabolic Panel  -     Celiac Ab tTG DGP TIgA    Other microscopic colitis  -     CBC & Differential  -     Comprehensive Metabolic Panel  -     Celiac Ab tTG DGP TIgA    Gastroesophageal reflux disease with esophagitis  -     CBC & Differential  -     Comprehensive Metabolic Panel  -     Celiac Ab tTG DGP TIgA    Flatus  -     CBC & Differential  -     Comprehensive Metabolic Panel  -     Celiac Ab tTG DGP  TIgA      Assessment:  1) Diarrhea with a h/o microscopic colitis. She is better on Entocort 3 mg, 3/day and cholestyramine. Could this be from Arava?  2) h/o colon polyps.  3) GERD  4) Weight loss, resolved  5) She has mildly low anal sphincter pressure with occaisional loss of stool continence.  6) Lower abdominal cramps with diarrhea.       Recommendations:  1) Lactose free diet.   2) Labs: CBC, CMP,   3) F/U 4-6 weeks. I want her to talk to her Rheumatologist about possibly stopping the Arava to see if the diarrhea stops?    Return in about 6 weeks (around 4/5/2018).    Edilberto Shook MD  2/22/2018

## 2018-02-23 ENCOUNTER — HOSPITAL ENCOUNTER (OUTPATIENT)
Dept: PHYSICAL THERAPY | Facility: HOSPITAL | Age: 83
Setting detail: THERAPIES SERIES
Discharge: HOME | End: 2018-02-23
Attending: ORTHOPAEDIC SURGERY

## 2018-02-23 DIAGNOSIS — M25.561 CHRONIC PAIN OF RIGHT KNEE: Primary | ICD-10-CM

## 2018-02-23 DIAGNOSIS — G89.29 CHRONIC PAIN OF RIGHT KNEE: Primary | ICD-10-CM

## 2018-02-23 DIAGNOSIS — Z74.09 IMPAIRED MOBILITY: ICD-10-CM

## 2018-02-23 LAB
ALBUMIN SERPL-MCNC: 4.2 G/DL (ref 3.5–5.2)
ALBUMIN/GLOB SERPL: 1.7 G/DL
ALP SERPL-CCNC: 80 U/L (ref 39–117)
ALT SERPL-CCNC: 17 U/L (ref 1–33)
AST SERPL-CCNC: 16 U/L (ref 1–32)
BASOPHILS # BLD AUTO: 0.07 10*3/MM3 (ref 0–0.2)
BASOPHILS NFR BLD AUTO: 1 % (ref 0–1.5)
BILIRUB SERPL-MCNC: 0.2 MG/DL (ref 0.1–1.2)
BUN SERPL-MCNC: 15 MG/DL (ref 8–23)
BUN/CREAT SERPL: 26.8 (ref 7–25)
CALCIUM SERPL-MCNC: 9.3 MG/DL (ref 8.6–10.5)
CHLORIDE SERPL-SCNC: 99 MMOL/L (ref 98–107)
CO2 SERPL-SCNC: 28.5 MMOL/L (ref 22–29)
CREAT SERPL-MCNC: 0.56 MG/DL (ref 0.57–1)
EOSINOPHIL # BLD AUTO: 0.13 10*3/MM3 (ref 0–0.7)
EOSINOPHIL NFR BLD AUTO: 1.8 % (ref 0.3–6.2)
ERYTHROCYTE [DISTWIDTH] IN BLOOD BY AUTOMATED COUNT: 14.6 % (ref 11.7–13)
GFR SERPLBLD CREATININE-BSD FMLA CKD-EPI: 104 ML/MIN/1.73
GFR SERPLBLD CREATININE-BSD FMLA CKD-EPI: 126 ML/MIN/1.73
GLIADIN PEPTIDE IGA SER-ACNC: 4 UNITS (ref 0–19)
GLIADIN PEPTIDE IGG SER-ACNC: 2 UNITS (ref 0–19)
GLOBULIN SER CALC-MCNC: 2.5 GM/DL
GLUCOSE SERPL-MCNC: 84 MG/DL (ref 65–99)
HCT VFR BLD AUTO: 38.5 % (ref 35.6–45.5)
HGB BLD-MCNC: 12 G/DL (ref 11.9–15.5)
IGA SERPL-MCNC: 464 MG/DL (ref 64–422)
IMM GRANULOCYTES # BLD: 0 10*3/MM3 (ref 0–0.03)
IMM GRANULOCYTES NFR BLD: 0 % (ref 0–0.5)
LYMPHOCYTES # BLD AUTO: 1.07 10*3/MM3 (ref 0.9–4.8)
LYMPHOCYTES NFR BLD AUTO: 14.6 % (ref 19.6–45.3)
MCH RBC QN AUTO: 30.7 PG (ref 26.9–32)
MCHC RBC AUTO-ENTMCNC: 31.2 G/DL (ref 32.4–36.3)
MCV RBC AUTO: 98.5 FL (ref 80.5–98.2)
MONOCYTES # BLD AUTO: 0.71 10*3/MM3 (ref 0.2–1.2)
MONOCYTES NFR BLD AUTO: 9.7 % (ref 5–12)
NEUTROPHILS # BLD AUTO: 5.37 10*3/MM3 (ref 1.9–8.1)
NEUTROPHILS NFR BLD AUTO: 72.9 % (ref 42.7–76)
PLATELET # BLD AUTO: 229 10*3/MM3 (ref 140–500)
POTASSIUM SERPL-SCNC: 4.2 MMOL/L (ref 3.5–5.2)
PROT SERPL-MCNC: 6.7 G/DL (ref 6–8.5)
RBC # BLD AUTO: 3.91 10*6/MM3 (ref 3.9–5.2)
SODIUM SERPL-SCNC: 141 MMOL/L (ref 136–145)
TTG IGA SER-ACNC: <2 U/ML (ref 0–3)
TTG IGG SER-ACNC: <2 U/ML (ref 0–5)
WBC # BLD AUTO: 7.35 10*3/MM3 (ref 4.5–10.7)

## 2018-02-23 PROCEDURE — 97110 THERAPEUTIC EXERCISES: CPT | Performed by: PHYSICAL THERAPIST

## 2018-02-23 PROCEDURE — 97161 PT EVAL LOW COMPLEX 20 MIN: CPT | Performed by: PHYSICAL THERAPIST

## 2018-02-23 PROCEDURE — G8979 MOBILITY GOAL STATUS: HCPCS

## 2018-02-23 PROCEDURE — G8978 MOBILITY CURRENT STATUS: HCPCS

## 2018-02-23 PROCEDURE — G8979 MOBILITY GOAL STATUS: HCPCS | Performed by: PHYSICAL THERAPIST

## 2018-02-23 NOTE — THERAPY EVALUATION
Outpatient Physical Therapy Ortho Treatment Note  [facility]     Patient Name: Arlin Hutson  : 1935  MRN: [id number]  Today's Date: 2018      Visit Date: 2018    Visit Dx:    ICD-10-CM ICD-9-CM   1. Chronic pain of right knee M25.561 719.46    G89.29 338.29   2. Impaired mobility Z74.09 799.89       Patient Active Problem List   Diagnosis   • Gastroesophageal reflux disease   • Osteopenia of multiple sites   • Essential hypertension   • Colitis        Past Medical History:   Diagnosis Date   • Colon polyp    • CREST syndrome    • GERD (gastroesophageal reflux disease)    • History of CT scan of abdomen 2011    constipation, sig tics, 6 mm hepatic cyst   • History of rheumatoid arthritis    • Hyperlipidemia    • Hypertension    • Osteoarthritis    • Raynaud's disease    • Sjogren's syndrome         Past Surgical History:   Procedure Laterality Date   • CATARACT EXTRACTION     • COLONOSCOPY  2016    tics, microscopic colitis,    • COLONOSCOPY  2011    sig tics, inflammation, polyp   • DILATATION AND CURETTAGE     • FLEXIBLE SIGMOIDOSCOPY     • KNEE SURGERY Right    • UPPER GASTROINTESTINAL ENDOSCOPY  2008    erythema   • WRIST SURGERY Right              PT Ortho       18 1500    Posture/Observations    Alignment Options Genu varus  -GJ    Genu varus Bilateral:;Mild  -GJ    Quarter Clearing    Quarter Clearing Lower Quarter Clearing  -GJ    Neural Tension Signs- Lower Quarter Clearing    Slump Negative  -GJ    SLR Negative;Bilateral:  -GJ    Myotomal Screen- Lower Quarter Clearing    Hip flexion (L2) Bilateral:;4+ (Good +)  -GJ    Ankle DF (L4) Bilateral:;5 (Normal)  -GJ    Ankle PF (S1) Bilateral:;4- (Good -)  -GJ    Special Tests/Palpation    Special Tests/Palpation Knee;Hip  -GJ    Hip/Thigh Palpation    Gluteus Medius Left:;Tender;Guarded/taut;Trigger point  -GJ    Gluteus Minimus Left:;Tender;Guarded/taut;Trigger point  -GJ    Piriformis  Left:;Tender;Guarded/taut;Trigger point  -GJ    Hip/Thigh Palpation? Yes  -GJ    Knee Palpation    Pes Anserine Bursa Right:;Tender;Swollen  -GJ    Medial Joint Line Right:;Tender  -GJ    Knee Palpation? Yes  -GJ    Knee Special Tests    Bhumi’s sign (DVT) Bilateral:;Negative  -GJ    ROM (Range of Motion)    General ROM lower extremity range of motion deficits identified  -GJ    General LE Assessment    ROM knee, left: LE ROM deficit;knee, right: LE ROM deficit  -GJ    Left Knee    Extension/Flexion AROM Deficit 0-120  -GJ    Right Knee    Extension/Flexion AROM Deficit 5-112  -GJ    MMT (Manual Muscle Testing)    General MMT Assessment lower extremity strength deficits identified  -GJ    Left Hip    Hip ABduction Gross Movement (4/5) good;(4+/5) good plus  -GJ    Right Hip    Hip ABduction Gross Movement (4/5) good;(4+/5) good plus  -GJ    Lower Extremity    Lower Ext Manual Muscle Testing left knee strength deficit;right knee strength deficit  -GJ    Left Knee    Knee Extension Gross Movement (4+/5) good plus  -GJ    Knee Flexion Gross Movement (4+/5) good plus  -GJ    Right Knee    Knee Extension Gross Movement (4/5) good;(4+/5) good plus  -GJ    Knee Flexion Hamstrings (4+/5) good plus  -GJ    Flexibility    Flexibility Tested? Lower Extremity  -GJ    Lower Extremity Flexibility    Hamstrings Bilateral:;WNL  -GJ    Hip Flexors Bilateral:;Mildly limited  -GJ    Quadriceps Bilateral:;Mildly limited  -GJ    Hip External Rotators Bilateral:;Moderately limited  -GJ    Hip Internal Rotators Bilateral:;Moderately limited  -GJ      User Key  (r) = Recorded By, (t) = Taken By, (c) = Cosigned By    Initials Name Provider Type    BRETT Whiteside PT Physical Therapist                            PT Assessment/Plan       02/23/18 1536       PT Assessment    Functional Limitations Impaired gait;Impaired locomotion;Limitation in home management;Performance in self-care ADL;Performance in leisure activities;Limitations in  community activities  -GJ     Impairments Gait;Impaired flexibility;Muscle strength;Pain;Impaired postural alignment;Joint mobility;Range of motion  -GJ     Assessment Comments Ms. Hutson is an 81 y/o female. She reports chronic R knee pain (10+ years).  She reports deep ache R knee, medial in nature (points near pes anserine tissue).  She reports that she is afraid knee will give way, but denies falls.  Denies catching.  She had therapy last year but stopped her exercises.  She reports nagging pain all over body, (+) RA, (+) R Baker's cyst, (+) L SI joint pain that pt. relates to altered gait pattern secondary to R knee pain.  She is not currently ambulating with an AD.  Aggravating activities include standing, sleeping.  Ms. Hutson presents ambulating without AD, mild limp.  She demonstrates mild R genu varus, mild R knee ROM deficits, (+) TTP with bogginess of R pes anserine tissue, (+) L glut medius/minimus trigger points on L.  She reports a KOS score of 53%, scored 0-100, 100 represents no perceived disability.  Ms. Hutson demonstrates stable s/s consistent with degenerative changes of the R knee which limits her ability to fully participate safely in household, community mobility.  She will benefit from skilled physical therapy intervention to address the above impairments.    -GJ     Please refer to paper survey for additional self-reported information Yes  -GJ     Rehab Potential Good  -GJ     Patient/caregiver participated in establishment of treatment plan and goals Yes  -GJ     Patient would benefit from skilled therapy intervention Yes  -GJ     PT Plan    PT Frequency 2x/week  -GJ     Predicted Duration of Therapy Intervention (days/wks) 4-6 weeks   -GJ     Planned CPT's? PT EVAL LOW COMPLEXITY: 02571;PT RE-EVAL: 66872;PT THER PROC EA 15 MIN: 27799;PT MANUAL THERAPY EA 15 MIN: 86177;PT NEUROMUSC RE-EDUCATION EA 15 MIN: 19445;PT AQUATIC THERAPY EA 15 MIN: 98448;PT GAIT TRAINING EA 15 MIN: 55116;PT HOT OR  COLD PACK TREAT [place];PT ULTRASOUND EA 15 MIN: 25104  -GJ     PT Plan Comments to see BIW x 2 weeks, then once a week for 2 weeks then 2 aquatic sessions to establish water program for her to reproduce at [facility].  Warm up gently on recumbent bike, add SAQ, SL clam.  Consider US, 50% pulsed, 1 MHz, 1.5 W/cm2  to R pes anserine  -GJ       User Key  (r) = Recorded By, (t) = Taken By, (c) = Cosigned By    Initials Name Provider Type    BRETT Whiteside, PT Physical Therapist                    Exercises       02/23/18 1100          Exercise 1    Exercise Name 1 HL hip abd  -GJ      Cueing 1 Verbal  -GJ      Reps 1 10  -GJ      Time (Seconds) 1 2  -GJ      Additional Comments RTB  -GJ      Exercise 2    Exercise Name 2 HL hip add  -GJ      Cueing 2 Verbal  -GJ      Reps 2 15  -GJ      Time (Seconds) 2 2  -GJ      Additional Comments ball  -GJ      Exercise 3    Exercise Name 3 QS  -GJ      Cueing 3 Verbal  -GJ      Reps 3 15  -GJ      Time (Seconds) 3 5  -GJ      Exercise 4    Exercise Name 4 piriformis stretch  -GJ      Cueing 4 Verbal  -GJ      Reps 4 3  -GJ      Time (Seconds) 4 20  -GJ      Exercise 5    Exercise Name 5 LAQ  -GJ      Cueing 5 Verbal  -GJ      Reps 5 15  -GJ      Time (Seconds) 5 3  -GJ      Exercise 6    Exercise Name 6 HR  -GJ      Cueing 6 Verbal  -GJ      Reps 6 15  -GJ      Exercise 7    Exercise Name 7 standing HS curl  -GJ      Cueing 7 Demo  -GJ      Reps 7 10  -GJ      Exercise 8    Exercise Name 8 seated TKE into ball  -GJ        User Key  (r) = Recorded By, (t) = Taken By, (c) = Cosigned By    Initials Name Provider Type    BRETT Whiteside, PT Physical Therapist                               PT OP Goals       02/23/18 1300       PT Short Term Goals    STG Date to Achieve 03/23/18  -GJ     STG 1 pt. to be I with initial HEP to facilitate self management of their condition  -GJ     STG 1 Progress New  -GJ     STG 2 pt. to be educated in/verbalize understanding of the importance of  posture/ergonomics in association with their condition to facilitate self management of their condition  -GJ     STG 2 Progress New  -GJ     STG 3 pt to demonstrate near normal heel to toe gait pattern with cane/walking stick to facilitate ease/safety with community mobility  -GJ     STG 3 Progress New  -GJ     Long Term Goals    LTG Date to Achieve 05/23/18  -GJ     LTG 1 pt. to be I with advanced HEP to facilitate self management of their condition  -GJ     LTG 1 Progress New  -GJ     LTG 2 pt. to report a KOS >/= 65 to demonstrate decreased level of perceived disability  -GJ     LTG 2 Progress New  -GJ     LTG 3 pt to demonstrate near normal heel to toe gait pattern without AD to facilitate ease/safety during community mobility  -GJ     LTG 3 Progress New  -GJ     LTG 4 pt to report >/= 50% improved sleep patterns to facilitate optimal recovery  -GJ     LTG 4 Progress New  -GJ     Time Calculation    PT Goal Re-Cert Due Date 05/23/18  -GJ       User Key  (r) = Recorded By, (t) = Taken By, (c) = Cosigned By    Initials Name Provider Type    BRETT Whiteside, PT Physical Therapist          Therapy Education  Education Details: discussed anatomy of the knee and physiology of healing, including realistic expectations/timeframes, discussed benefits of aquatic exercise program and therapy, discussed use of ice, knee brace (pt. has one at home), and discussed benefits of the use of AD with gait.  Discussed ergonomic changes for vacuum cleaning and washing dishes to help with her L sided LBP.   Given: HEP, Symptoms/condition management, Pain management, Posture/body mechanics, Mobility training, Edema management  Program: New  How Provided: Verbal, Demonstration, Written  Provided to: Patient  Level of Understanding: Verbalized, Teach back education performed, Demonstrated    Outcome Measure Options: Knee Outcome Score- ADL  Knee Outcome Score  Knee Outcome Score Comments: 53      Time Calculation:   Start Time:  1045  Stop Time: 1130  Time Calculation (min): 45 min    Therapy Charges for Today     Code Description Service Date Service Provider Modifiers Qty    76597440817 HC PT MOBILITY PROJECTED 2/23/2018 Nando Whiteside, PT GP, CI 1    16555171408 HC PT EVAL LOW COMPLEXITY 1 2/23/2018 Nando Whiteside, PT GP 1    48220057228 HC PT THER PROC EA 15 MIN 2/23/2018 Nando Whiteside, PT GP 2          PT G-Codes  PT Professional Judgement Used?: Yes  Outcome Measure Options: Knee Outcome Score- ADL  Score: 53  Functional Limitation: Mobility: Walking and moving around  Mobility: Walking and Moving Around Goal Status (): At least 1 percent but less than 20 percent impaired, limited or restricted         Nando Whiteside, PT  2/23/2018

## 2018-02-26 ENCOUNTER — TELEPHONE (OUTPATIENT)
Dept: GASTROENTEROLOGY | Facility: CLINIC | Age: 83
End: 2018-02-26

## 2018-02-26 NOTE — TELEPHONE ENCOUNTER
----- Message from Edilberto Shook MD sent at 2/24/2018  4:04 PM EST -----  Tell her that her labs look good. shital

## 2018-02-28 ENCOUNTER — HOSPITAL ENCOUNTER (OUTPATIENT)
Dept: PHYSICAL THERAPY | Facility: HOSPITAL | Age: 83
Setting detail: THERAPIES SERIES
Discharge: HOME | End: 2018-02-28

## 2018-02-28 DIAGNOSIS — Z74.09 IMPAIRED MOBILITY: ICD-10-CM

## 2018-02-28 DIAGNOSIS — G89.29 CHRONIC PAIN OF RIGHT KNEE: Primary | ICD-10-CM

## 2018-02-28 DIAGNOSIS — R26.2 DIFFICULTY WALKING: ICD-10-CM

## 2018-02-28 DIAGNOSIS — M54.50 ACUTE LEFT-SIDED LOW BACK PAIN WITHOUT SCIATICA: ICD-10-CM

## 2018-02-28 DIAGNOSIS — M25.561 CHRONIC PAIN OF RIGHT KNEE: Primary | ICD-10-CM

## 2018-02-28 PROCEDURE — 97110 THERAPEUTIC EXERCISES: CPT

## 2018-03-02 ENCOUNTER — HOSPITAL ENCOUNTER (OUTPATIENT)
Dept: PHYSICAL THERAPY | Facility: HOSPITAL | Age: 83
Setting detail: THERAPIES SERIES
Discharge: HOME | End: 2018-03-02

## 2018-03-02 DIAGNOSIS — Z74.09 IMPAIRED MOBILITY: ICD-10-CM

## 2018-03-02 DIAGNOSIS — R26.2 DIFFICULTY WALKING: ICD-10-CM

## 2018-03-02 DIAGNOSIS — G89.29 CHRONIC PAIN OF RIGHT KNEE: Primary | ICD-10-CM

## 2018-03-02 DIAGNOSIS — M54.50 ACUTE LEFT-SIDED LOW BACK PAIN WITHOUT SCIATICA: ICD-10-CM

## 2018-03-02 DIAGNOSIS — M25.561 CHRONIC PAIN OF RIGHT KNEE: Primary | ICD-10-CM

## 2018-03-02 PROCEDURE — 97110 THERAPEUTIC EXERCISES: CPT

## 2018-03-02 NOTE — THERAPY TREATMENT NOTE
Outpatient Physical Therapy Ortho Treatment Note  [facility]     Patient Name: Arlin Hutson  : 1935  MRN: [id number]  Today's Date: 3/2/2018      Visit Date: 2018    Visit Dx:    ICD-10-CM ICD-9-CM   1. Chronic pain of right knee M25.561 719.46    G89.29 338.29   2. Impaired mobility Z74.09 799.89   3. Acute left-sided low back pain without sciatica M54.5 724.2   4. Difficulty walking R26.2 719.7       Patient Active Problem List   Diagnosis   • Gastroesophageal reflux disease   • Osteopenia of multiple sites   • Essential hypertension   • Colitis        Past Medical History:   Diagnosis Date   • Colon polyp    • CREST syndrome    • GERD (gastroesophageal reflux disease)    • History of CT scan of abdomen 2011    constipation, sig tics, 6 mm hepatic cyst   • History of rheumatoid arthritis    • Hyperlipidemia    • Hypertension    • Osteoarthritis    • Raynaud's disease    • Sjogren's syndrome         Past Surgical History:   Procedure Laterality Date   • CATARACT EXTRACTION     • COLONOSCOPY  2016    tics, microscopic colitis,    • COLONOSCOPY  2011    sig tics, inflammation, polyp   • DILATATION AND CURETTAGE     • FLEXIBLE SIGMOIDOSCOPY     • KNEE SURGERY Right    • UPPER GASTROINTESTINAL ENDOSCOPY  2008    erythema   • WRIST SURGERY Right                              PT Assessment/Plan       18 1018       PT Assessment    Assessment Comments Added weights to open chain exercises. Patient using ice a home for pain control. Asses tolerance to weights. Patient has made appts for [facility]  -       User Key  (r) = Recorded By, (t) = Taken By, (c) = Cosigned By    Initials Name Provider Type    TANA Merlos PTA Physical Therapy Assistant                Modalities       18 9629          Ice    Ice Applied Yes   ice massage followed by ice wrap  -      Location right knee on 2 pillows  -      Rx Minutes 12 mins  -WS      Ice  S/P Rx Yes  -WS        User Key  (r) = Recorded By, (t) = Taken By, (c) = Cosigned By    Initials Name Provider Type    WS Toni Merlos PTA Physical Therapy Assistant                Exercises       03/02/18 0945          Subjective Comments    Subjective Comments Knee is just stiff  -WS      Subjective Pain    Able to rate subjective pain? yes  -WS      Pre-Treatment Pain Level 0  -WS      Exercise 1    Exercise Name 1 HL hip abd  -WS      Cueing 1 Verbal  -WS      Reps 1 15  -WS      Time (Seconds) 1 2  -WS      Additional Comments RTB  -WS      Exercise 2    Exercise Name 2 HL hip add  -WS      Cueing 2 Verbal  -WS      Reps 2 15  -WS      Time (Seconds) 2 2  -WS      Additional Comments ball  -WS      Exercise 3    Exercise Name 3 QS  -WS      Cueing 3 Verbal  -WS      Reps 3 15  -WS      Time (Seconds) 3 5  -WS      Exercise 4    Exercise Name 4 piriformis stretch  -WS      Cueing 4 Verbal  -WS      Reps 4 3  -WS      Time (Seconds) 4 20  -WS      Exercise 5    Exercise Name 5 LAQ  -WS      Cueing 5 Verbal  -WS      Reps 5 15  -WS      Time (Seconds) 5 3  -WS      Additional Comments 3#  -WS      Exercise 6    Exercise Name 6 HR  -WS      Cueing 6 Verbal  -WS      Reps 6 15  -WS      Exercise 7    Exercise Name 7 standing HS curl  -WS      Cueing 7 Demo  -WS      Reps 7 15  -WS      Additional Comments 2#  -WS      Exercise 8    Exercise Name 8 seated TKE into ball  -WS      Reps 8 15  -WS      Exercise 9    Exercise Name 9  clam  -WS      Reps 9 15  -WS      Exercise 10    Exercise Name 10 SAQ  -WS      Reps 10 15  -WS      Additional Comments 3#  -WS      Exercise 11    Exercise Name 11 bike seat 4 with pillow behind back  -WS      Time (Minutes) 11 5  -WS        User Key  (r) = Recorded By, (t) = Taken By, (c) = Cosigned By    Initials Name Provider Type    WS Toni Merlos PTA Physical Therapy Assistant                               PT OP Goals       03/02/18 1000       PT Short Term Goals    STG  Date to Achieve 03/23/18  -WS     STG 1 pt. to be I with initial HEP to facilitate self management of their condition  -WS     STG 1 Progress Met  -WS     STG 2 pt. to be educated in/verbalize understanding of the importance of posture/ergonomics in association with their condition to facilitate self management of their condition  -WS     STG 2 Progress Ongoing  -WS     STG 3 pt to demonstrate near normal heel to toe gait pattern with cane/walking stick to facilitate ease/safety with community mobility  -WS     STG 3 Progress Ongoing  -WS     Long Term Goals    LTG Date to Achieve 05/23/18  -WS     LTG 1 pt. to be I with advanced HEP to facilitate self management of their condition  -WS     LTG 1 Progress Ongoing  -WS     LTG 2 pt. to report a KOS >/= 65 to demonstrate decreased level of perceived disability  -WS     LTG 2 Progress New  -WS     LTG 3 pt to demonstrate near normal heel to toe gait pattern without AD to facilitate ease/safety during community mobility  -WS     LTG 3 Progress New  -WS     LTG 4 pt to report >/= 50% improved sleep patterns to facilitate optimal recovery  -WS     LTG 4 Progress New  -WS       User Key  (r) = Recorded By, (t) = Taken By, (c) = Cosigned By    Initials Name Provider Type     Toni Merlos PTA Physical Therapy Assistant          Therapy Education  Given: HEP, Symptoms/condition management, Pain management, Edema management  Program: Reinforced  How Provided: Verbal  Provided to: Patient              Time Calculation:   Start Time: 0945  Stop Time: 1025  Time Calculation (min): 40 min    Therapy Charges for Today     Code Description Service Date Service Provider Modifiers Qty    19912255309 HC PT THER PROC EA 15 MIN 3/2/2018 Toni Merlos PTA GP 2    88396315478 HC PT HOT OR COLD PACK TREAT [place] 3/2/2018 Toni Merlos PTA GP 1                    Toni Merlos PTA  3/2/2018

## 2018-03-06 ENCOUNTER — HOSPITAL ENCOUNTER (OUTPATIENT)
Dept: PHYSICAL THERAPY | Facility: HOSPITAL | Age: 83
Setting detail: THERAPIES SERIES
Discharge: HOME | End: 2018-03-06

## 2018-03-06 DIAGNOSIS — M25.561 CHRONIC PAIN OF RIGHT KNEE: Primary | ICD-10-CM

## 2018-03-06 DIAGNOSIS — G89.29 CHRONIC PAIN OF RIGHT KNEE: Primary | ICD-10-CM

## 2018-03-06 DIAGNOSIS — Z74.09 IMPAIRED MOBILITY: ICD-10-CM

## 2018-03-06 DIAGNOSIS — R26.2 DIFFICULTY WALKING: ICD-10-CM

## 2018-03-06 PROCEDURE — 97140 MANUAL THERAPY 1/> REGIONS: CPT | Performed by: PHYSICAL THERAPIST

## 2018-03-06 PROCEDURE — 97035 APP MDLTY 1+ULTRASOUND EA 15: CPT | Performed by: PHYSICAL THERAPIST

## 2018-03-06 PROCEDURE — 97110 THERAPEUTIC EXERCISES: CPT | Performed by: PHYSICAL THERAPIST

## 2018-03-06 NOTE — THERAPY TREATMENT NOTE
Outpatient Physical Therapy Ortho Treatment Note  [facility]     Patient Name: Arlin Hutson  : 1935  MRN: [id number]  Today's Date: 3/6/2018      Visit Date: 2018    Visit Dx:    ICD-10-CM ICD-9-CM   1. Chronic pain of right knee M25.561 719.46    G89.29 338.29   2. Impaired mobility Z74.09 799.89   3. Difficulty walking R26.2 719.7       Patient Active Problem List   Diagnosis   • Gastroesophageal reflux disease   • Osteopenia of multiple sites   • Essential hypertension   • Colitis        Past Medical History:   Diagnosis Date   • Colon polyp    • CREST syndrome    • GERD (gastroesophageal reflux disease)    • History of CT scan of abdomen 2011    constipation, sig tics, 6 mm hepatic cyst   • History of rheumatoid arthritis    • Hyperlipidemia    • Hypertension    • Osteoarthritis    • Raynaud's disease    • Sjogren's syndrome         Past Surgical History:   Procedure Laterality Date   • CATARACT EXTRACTION     • COLONOSCOPY  2016    tics, microscopic colitis,    • COLONOSCOPY  2011    sig tics, inflammation, polyp   • DILATATION AND CURETTAGE     • FLEXIBLE SIGMOIDOSCOPY     • KNEE SURGERY Right    • UPPER GASTROINTESTINAL ENDOSCOPY  2008    erythema   • WRIST SURGERY Right                              PT Assessment/Plan       18 1258       PT Assessment    Assessment Comments Ms. Hutson reports L hip pain which she believes is secondary to altered gait pattern from her R knee.  We discussed use of an AD (which ever hand felt more comfortable).  She reports adherence with her HEP at home, so today we decieded to work on a trial of DDN to L glut medius, minimus, piriformis tissue, as well as US to R pes anserine.    -GJ     PT Plan    PT Plan Comments assess response to US of R pes anserine, and DDN to L hip.  Warm up on Nustep next session, review strengthening exercises, repeat DDN as appropriate, consider repeating US to R pes anserine.  She is now  established for several pool visits so she can take that and practice independent management.    -GJ       User Key  (r) = Recorded By, (t) = Taken By, (c) = Cosigned By    Initials Name Provider Type    BRETT Whiteside, PT Physical Therapist                Modalities       03/06/18 0900          Ultrasound [id number]    Location R pes anserine, pt. supine with BLE elevated over pillow  -GJ      Rx Minutes 8  -GJ      Duty Cycle 50  -GJ      Frequency 1.0 MHz  -GJ      Intensity - Wts/cm 1.5  -GJ      Phonophoresis No  -GJ        User Key  (r) = Recorded By, (t) = Taken By, (c) = Cosigned By    Initials Name Provider Type    BRETT Whiteside, PT Physical Therapist                Exercises       03/06/18 0900          Subjective Comments    Subjective Comments I think I'm getting better, i feel like this has changed my gait, I've been trying to work on that  -GJ      Subjective Pain    Pre-Treatment Pain Level 5  -GJ      Exercise 6    Exercise Name 6 HR  -GJ      Cueing 6 Verbal  -GJ      Reps 6 20  -GJ      Exercise 7    Exercise Name 7 standing HS curl  -GJ      Cueing 7 Verbal  -GJ      Reps 7 15  -GJ      Additional Comments 2#  -GJ      Exercise 11    Exercise Name 11 bike seat 3 with pillow behind back  -GJ      Time (Minutes) 11 5  -GJ      Exercise 12    Exercise Name 12 prateek prateek  -GJ      Cueing 12 Demo  -GJ      Time (Minutes) 12 2  -GJ        User Key  (r) = Recorded By, (t) = Taken By, (c) = Cosigned By    Initials Name Provider Type    BRETT Whiteside PT Physical Therapist                        Manual Rx (last 36 hours)      Manual Treatments       03/06/18 0900          Manual Rx 1    Manual Rx 1 Location intramuscular manual therapy  -GJ Assessed pt. for Dry Needling and intramuscular manual therapy. Discussed risks/benefits of Dry Needling with pt, including but not limited to muscle soreness, bruising, vasovagal response, pneumothorax, nerve injury. Patient signed written consent to proceed with  treatment.    Patient position during treatment: R SL, pillow between knees.   Muscles treated: L glut minimus, L glut medius ,L piriformis.  Response to treatment: several moderate LTR's in all tissues.  No immediate adverse response.     palpation used before, during, and after to facilitate assessment Clean needle technique observed at all times, precautions for lung fields, neurovascular structures observed.    DDN performed by Nando Whiteside II, PT, DPT     Manual Rx 1 Duration total manual tiem 13 min  -GJ        User Key  (r) = Recorded By, (t) = Taken By, (c) = Cosigned By    Initials Name Provider Type    GJ Nando Whiteside, PT Physical Therapist                PT OP Goals       03/06/18 0900       PT Short Term Goals    STG Date to Achieve 03/23/18  -GJ     STG 1 pt. to be I with initial HEP to facilitate self management of their condition  -GJ     STG 1 Progress Met  -GJ     STG 2 pt. to be educated in/verbalize understanding of the importance of posture/ergonomics in association with their condition to facilitate self management of their condition  -GJ     STG 2 Progress Ongoing  -GJ     STG 2 Progress Comments reviewed benefits of walking stick/cane in allowing tissue optimal opportunity to heal  -GJ     STG 3 pt to demonstrate near normal heel to toe gait pattern with cane/walking stick to facilitate ease/safety with community mobility  -GJ     STG 3 Progress Ongoing  -GJ     STG 3 Progress Comments mild limp  -GJ     Long Term Goals    LTG Date to Achieve 05/23/18  -GJ     LTG 1 pt. to be I with advanced HEP to facilitate self management of their condition  -GJ     LTG 1 Progress Ongoing  -GJ     LTG 2 pt. to report a KOS >/= 65 to demonstrate decreased level of perceived disability  -GJ     LTG 2 Progress Ongoing  -GJ     LTG 3 pt to demonstrate near normal heel to toe gait pattern without AD to facilitate ease/safety during community mobility  -GJ     LTG 3 Progress Ongoing  -GJ     LTG 4 pt to  report >/= 50% improved sleep patterns to facilitate optimal recovery  -BRETT     LTG 4 Progress Ongoing  -GJ       User Key  (r) = Recorded By, (t) = Taken By, (c) = Cosigned By    Initials Name Provider Type    BRETT Whiteside PT Physical Therapist          Therapy Education  Education Details: HEP to be every other day, discussed use of walking stick to help decrease stress to tissues to allow for most optimal recovery.  Can use in either hand secondary to R knee pain/L hip pain.    Given: HEP, Symptoms/condition management, Pain management, Posture/body mechanics, Mobility training  Program: New  How Provided: Verbal, Demonstration  Provided to: Patient  Level of Understanding: Teach back education performed, Verbalized              Time Calculation:   Start Time: 1000  Stop Time: 1045  Time Calculation (min): 45 min    Therapy Charges for Today     Code Description Service Date Service Provider Modifiers Qty    39920929482 HC PT MANUAL THERAPY EA 15 MIN 3/6/2018 Nando Whiteside, PT GP 1    10402748290 HC PT ULTRASOUND EA 15 MIN 3/6/2018 Nando Whiteside, PT GP 1    12033254296 HC PT THER PROC EA 15 MIN 3/6/2018 Nando Whiteside, PT GP 1                    Nando Whiteside, PT  3/6/2018

## 2018-03-08 ENCOUNTER — HOSPITAL ENCOUNTER (OUTPATIENT)
Dept: PHYSICAL THERAPY | Facility: HOSPITAL | Age: 83
Setting detail: THERAPIES SERIES
Discharge: HOME | End: 2018-03-08

## 2018-03-08 DIAGNOSIS — Z74.09 IMPAIRED MOBILITY: ICD-10-CM

## 2018-03-08 DIAGNOSIS — R26.2 DIFFICULTY WALKING: ICD-10-CM

## 2018-03-08 DIAGNOSIS — M25.561 CHRONIC PAIN OF RIGHT KNEE: Primary | ICD-10-CM

## 2018-03-08 DIAGNOSIS — G89.29 CHRONIC PAIN OF RIGHT KNEE: Primary | ICD-10-CM

## 2018-03-08 PROCEDURE — 97035 APP MDLTY 1+ULTRASOUND EA 15: CPT | Performed by: PHYSICAL THERAPIST

## 2018-03-08 PROCEDURE — 97110 THERAPEUTIC EXERCISES: CPT | Performed by: PHYSICAL THERAPIST

## 2018-03-08 NOTE — THERAPY TREATMENT NOTE
Outpatient Physical Therapy Ortho Treatment Note  [facility]     Patient Name: Arlin Hutson  : 1935  MRN: [id number]  Today's Date: 3/8/2018      Visit Date: 2018    Visit Dx:    ICD-10-CM ICD-9-CM   1. Chronic pain of right knee M25.561 719.46    G89.29 338.29   2. Impaired mobility Z74.09 799.89   3. Difficulty walking R26.2 719.7       Patient Active Problem List   Diagnosis   • Gastroesophageal reflux disease   • Osteopenia of multiple sites   • Essential hypertension   • Colitis        Past Medical History:   Diagnosis Date   • Colon polyp    • CREST syndrome    • GERD (gastroesophageal reflux disease)    • History of CT scan of abdomen 2011    constipation, sig tics, 6 mm hepatic cyst   • History of rheumatoid arthritis    • Hyperlipidemia    • Hypertension    • Osteoarthritis    • Raynaud's disease    • Sjogren's syndrome         Past Surgical History:   Procedure Laterality Date   • CATARACT EXTRACTION     • COLONOSCOPY  2016    tics, microscopic colitis,    • COLONOSCOPY  2011    sig tics, inflammation, polyp   • DILATATION AND CURETTAGE     • FLEXIBLE SIGMOIDOSCOPY     • KNEE SURGERY Right    • UPPER GASTROINTESTINAL ENDOSCOPY  2008    erythema   • WRIST SURGERY Right                              PT Assessment/Plan       18 1200       PT Assessment    Assessment Comments Ms. Hutson is able to progress through her exercises without increase in pain.  She demosntrated decreased TTP R pes anserine tissue.  We repeated US to R pes anserrine tissue.  HEP to continue to be once every other day.   -GJ     PT Plan    PT Plan Comments warm up on Nustep (doesn't bother her hip like the bike did), continue to strenghten LE's.  Repeat US to R pes anserine tissue. Will attend aquatic therapy x 2 visits to establish program that she can then perform independently. (she has access to pool).   -GJ       User Key  (r) = Recorded By, (t) = Taken By, (c) =  Cosigned By    Initials Name Provider Type    GJ Nando Whiteside, PT Physical Therapist                Modalities       03/08/18 1100          Ultrasound 79547    Location R hanane mercere, pt. supine with BLE elevated over pillow  -GJ      Rx Minutes 8  -GJ      Duty Cycle 50  -GJ      Frequency 1.0 MHz  -GJ      Intensity - Wts/cm 1.5  -GJ      Phonophoresis No  -GJ        User Key  (r) = Recorded By, (t) = Taken By, (c) = Cosigned By    Initials Name Provider Type    GJ Nando Whiteside PT Physical Therapist                Exercises       03/08/18 1100          Subjective Comments    Subjective Comments still a little sore in my hip. I feel a pull in my (R hip).   -GJ      Subjective Pain    Pre-Treatment Pain Level 0  -GJ      Exercise 1    Exercise Name 1 HL hip abd  -GJ      Cueing 1 Verbal  -GJ      Reps 1 15  -GJ      Time (Seconds) 1 3  -GJ      Additional Comments GTB  -GJ      Exercise 2    Exercise Name 2 HL hip add  -GJ      Cueing 2 Verbal  -GJ      Reps 2 15  -GJ      Time (Seconds) 2 3  -GJ      Additional Comments ball  -GJ      Exercise 3    Exercise Name 3 QS  -GJ      Cueing 3 Verbal  -GJ      Reps 3 15  -GJ      Time (Seconds) 3 5  -GJ      Exercise 4    Exercise Name 4 piriformis stretch  -GJ      Cueing 4 Verbal  -GJ      Reps 4 3  -GJ      Time (Seconds) 4 20  -GJ      Exercise 5    Exercise Name 5 LAQ  -GJ      Cueing 5 Verbal  -GJ      Reps 5 20  -GJ      Time (Seconds) 5 3  -GJ      Additional Comments 3#  -GJ      Exercise 6    Exercise Name 6 HR  -GJ      Cueing 6 Verbal  -GJ      Reps 6 20  -GJ      Exercise 7    Exercise Name 7 standing HS curl  -GJ      Cueing 7 Verbal  -GJ      Sets 7 2  -GJ      Reps 7 10  -GJ      Additional Comments 2#  -GJ      Exercise 8    Exercise Name 8 seated TKE into ball  -GJ      Cueing 8 Verbal  -GJ      Reps 8 15  -GJ      Time (Seconds) 8 5  -GJ      Exercise 9    Exercise Name 9  clam, SL, bilaterally   -GJ      Cueing 9 Verbal  -GJ      Reps 9 20  -GJ       Exercise 10    Exercise Name 10 SAQ  -GJ      Cueing 10 Verbal  -GJ      Sets 10 2  -GJ      Reps 10 10  -GJ      Additional Comments 3#  -GJ      Exercise 11    Exercise Name 11 Nustep (bike bothered her last visit)  -GJ      Time (Minutes) 11 5  -GJ        User Key  (r) = Recorded By, (t) = Taken By, (c) = Cosigned By    Initials Name Provider Type    GJ Nando Whiteside, PT Physical Therapist                               PT OP Goals       03/08/18 1100       PT Short Term Goals    STG Date to Achieve 03/23/18  -GJ     STG 1 pt. to be I with initial HEP to facilitate self management of their condition  -GJ     STG 1 Progress Met  -GJ     STG 2 pt. to be educated in/verbalize understanding of the importance of posture/ergonomics in association with their condition to facilitate self management of their condition  -GJ     STG 2 Progress Ongoing  -GJ     STG 3 pt to demonstrate near normal heel to toe gait pattern with cane/walking stick to facilitate ease/safety with community mobility  -GJ     STG 3 Progress Ongoing  -GJ     Long Term Goals    LTG Date to Achieve 05/23/18  -GJ     LTG 1 pt. to be I with advanced HEP to facilitate self management of their condition  -GJ     LTG 1 Progress Ongoing  -GJ     LTG 2 pt. to report a KOS >/= 65 to demonstrate decreased level of perceived disability  -GJ     LTG 2 Progress Ongoing  -GJ     LTG 3 pt to demonstrate near normal heel to toe gait pattern without AD to facilitate ease/safety during community mobility  -GJ     LTG 3 Progress Ongoing  -GJ     LTG 4 pt to report >/= 50% improved sleep patterns to facilitate optimal recovery  -GJ     LTG 4 Progress Ongoing  -GJ       User Key  (r) = Recorded By, (t) = Taken By, (c) = Cosigned By    Initials Name Provider Type     Nando Whiteside PT Physical Therapist          Therapy Education  Given: HEP, Symptoms/condition management, Pain management, Posture/body mechanics, Mobility training  Program: Reinforced  How Provided:  Verbal  Provided to: Patient  Level of Understanding: Verbalized              Time Calculation:   Start Time: 1118  Stop Time: 1158  Time Calculation (min): 40 min    Therapy Charges for Today     Code Description Service Date Service Provider Modifiers Qty    53744293689  PT THER PROC EA 15 MIN 3/8/2018 Nando Whiteside, PT GP 2    [id number]  PT ULTRASOUND EA 15 MIN 3/8/2018 Nando Whiteside, PT GP 1                    Nando Whiteside, PT  3/8/2018

## 2018-03-13 ENCOUNTER — HOSPITAL ENCOUNTER (OUTPATIENT)
Dept: PHYSICAL THERAPY | Facility: HOSPITAL | Age: 83
Setting detail: THERAPIES SERIES
Discharge: HOME | End: 2018-03-13

## 2018-03-13 DIAGNOSIS — M25.561 CHRONIC PAIN OF RIGHT KNEE: Primary | ICD-10-CM

## 2018-03-13 DIAGNOSIS — Z74.09 IMPAIRED MOBILITY: ICD-10-CM

## 2018-03-13 DIAGNOSIS — G89.29 CHRONIC PAIN OF RIGHT KNEE: Primary | ICD-10-CM

## 2018-03-13 PROCEDURE — 97110 THERAPEUTIC EXERCISES: CPT | Performed by: PHYSICAL THERAPIST

## 2018-03-13 PROCEDURE — 97035 APP MDLTY 1+ULTRASOUND EA 15: CPT | Performed by: PHYSICAL THERAPIST

## 2018-03-13 NOTE — THERAPY TREATMENT NOTE
Outpatient Physical Therapy Ortho Treatment Note  [facility]     Patient Name: Arlin Hutson  : 1935  MRN: [id number]  Today's Date: 3/13/2018      Visit Date: 2018    Visit Dx:    ICD-10-CM ICD-9-CM   1. Chronic pain of right knee M25.561 719.46    G89.29 338.29   2. Impaired mobility Z74.09 799.89       Patient Active Problem List   Diagnosis   • Gastroesophageal reflux disease   • Osteopenia of multiple sites   • Essential hypertension   • Colitis        Past Medical History:   Diagnosis Date   • Colon polyp    • CREST syndrome    • GERD (gastroesophageal reflux disease)    • History of CT scan of abdomen 2011    constipation, sig tics, 6 mm hepatic cyst   • History of rheumatoid arthritis    • Hyperlipidemia    • Hypertension    • Osteoarthritis    • Raynaud's disease    • Sjogren's syndrome         Past Surgical History:   Procedure Laterality Date   • CATARACT EXTRACTION     • COLONOSCOPY  2016    tics, microscopic colitis,    • COLONOSCOPY  2011    sig tics, inflammation, polyp   • DILATATION AND CURETTAGE     • FLEXIBLE SIGMOIDOSCOPY     • KNEE SURGERY Right    • UPPER GASTROINTESTINAL ENDOSCOPY  2008    erythema   • WRIST SURGERY Right                              PT Assessment/Plan     Row Name 18 1549          PT Assessment    Assessment Comments We increased repetions/resistance on Ms. Hutson's strengthening program today without immediate exacerbation of her condition.  We changed SL clam to SL hip abd and added brides.  Ms. Hutsno continues to be a good candidate for skilled physical therapy   -GJ        PT Plan    PT Plan Comments warm up on Nustep.  Continue to strengthen LE's, repeat US to R pes anserine.  She has upcoming aquatic visits for development of independent program to strengthen LE's that she can then perform independently.   -GJ       User Key  (r) = Recorded By, (t) = Taken By, (c) = Cosigned By    Initials Name Provider Type     GJ Nando Whiteside, PT Physical Therapist                Modalities     Row Name 03/13/18 1400             Ice    Patient reports will apply ice at home to involved area Yes  -GJ         Ultrasound 20836    Location R pes anserine, pt. supine with BLE elevated over pillow  -GJ      Rx Minutes 8  -GJ      Duty Cycle 50  -GJ      Frequency 1.0 MHz  -GJ      Intensity - Wts/cm 1.5  -GJ      Phonophoresis No  -GJ        User Key  (r) = Recorded By, (t) = Taken By, (c) = Cosigned By    Initials Name Provider Type    GJ Nando Whiteside, PT Physical Therapist                Exercises     Row Name 03/13/18 1400             Subjective Comments    Subjective Comments yesterday my knee felt like it was going to give out on me. I'm wearing my knee brace today so it feels better.   -GJ         Subjective Pain    Pre-Treatment Pain Level 3  -GJ         Exercise 1    Exercise Name 1 HL hip abd  -GJ      Cueing 1 Verbal  -GJ      Reps 1 15  -GJ      Additional Comments GTB  -GJ      Time (Seconds) 1 3  -GJ         Exercise 2    Exercise Name 2 HL hip add  -GJ      Cueing 2 Verbal  -GJ      Reps 2 15  -GJ      Additional Comments ball  -GJ      Time (Seconds) 2 3  -GJ         Exercise 3    Exercise Name 3 QS  -GJ      Cueing 3 Verbal  -GJ      Reps 3 15  -GJ      Time (Seconds) 3 5  -GJ         Exercise 5    Exercise Name 5 LAQ  -GJ      Cueing 5 Verbal  -GJ      Reps 5 20  -GJ      Additional Comments 3#  -GJ         Exercise 6    Exercise Name 6 HR  -GJ      Reps 6 20  -GJ      Cueing 6 Verbal  -GJ         Exercise 7    Exercise Name 7 standing HS curl  -GJ      Reps 7 20  -GJ      Additional Comments 2#  -GJ      Cueing 7 Verbal  -GJ         Exercise 8    Exercise Name 8 seated TKE into ball  -GJ      Reps 8 15  -GJ      Cueing 8 Verbal  -GJ      Time (Seconds) 8 5  -GJ         Exercise 9    Exercise Name 9 SL, hip abd bilaterally   -GJ      Reps 9 15  -GJ      Time 9 3 seconds  -GJ      Cueing 9 Verbal  -GJ         Exercise 10     Exercise Name 10 SAQ  -GJ      Reps 10 20  -GJ      Additional Comments 3#  -GJ      Cueing 10 Verbal  -GJ         Exercise 11    Exercise Name 11 Nustep  -GJ      Time 11 5  -GJ         Exercise 12    Exercise Name 12 bridges with ball  -GJ      Cueing 12 Verbal  -GJ      Reps 12 10  -GJ      Additional Comments 2  -GJ        User Key  (r) = Recorded By, (t) = Taken By, (c) = Cosigned By    Initials Name Provider Type    GJ Nando Whiteside, PT Physical Therapist                               PT OP Goals     Row Name 03/13/18 1300          PT Short Term Goals    STG Date to Achieve 03/23/18  -GJ     STG 1 pt. to be I with initial HEP to facilitate self management of their condition  -GJ     STG 1 Progress Met  -GJ     STG 2 pt. to be educated in/verbalize understanding of the importance of posture/ergonomics in association with their condition to facilitate self management of their condition  -GJ     STG 2 Progress Met  -GJ     STG 3 pt to demonstrate near normal heel to toe gait pattern with cane/walking stick to facilitate ease/safety with community mobility  -GJ     STG 3 Progress Ongoing  -GJ     STG 3 Progress Comments mild limp  -GJ        Long Term Goals    LTG Date to Achieve 05/23/18  -GJ     LTG 1 pt. to be I with advanced HEP to facilitate self management of their condition  -GJ     LTG 1 Progress Ongoing  -GJ     LTG 2 pt. to report a KOS >/= 65 to demonstrate decreased level of perceived disability  -GJ     LTG 2 Progress Ongoing  -GJ     LTG 3 pt to demonstrate near normal heel to toe gait pattern without AD to facilitate ease/safety during community mobility  -GJ     LTG 3 Progress Ongoing  -GJ     LTG 4 pt to report >/= 50% improved sleep patterns to facilitate optimal recovery  -GJ     LTG 4 Progress Ongoing  -GJ       User Key  (r) = Recorded By, (t) = Taken By, (c) = Cosigned By    Initials Name Provider Type    GJ Nando Whiteside PT Physical Therapist          Therapy Education  Education  Details: HEP to continue to be once every other day  Given: HEP, Symptoms/condition management, Pain management, Posture/body mechanics, Fall prevention and home safety, Mobility training, Edema management  Program: New  How Provided: Verbal, Demonstration, Written  Provided to: Patient  Level of Understanding: Teach back education performed, Verbalized, Demonstrated              Time Calculation:   Start Time: 1400  Stop Time: 1445  Time Calculation (min): 45 min    Therapy Charges for Today     Code Description Service Date Service Provider Modifiers Qty    09129714146  PT ULTRASOUND EA 15 MIN 3/13/2018 Nando Whiteside, PT  1    81826500637 HC PT THER PROC EA 15 MIN 3/13/2018 Nando Whiteside, PT  2                    Nando Whiteside, PT  3/13/2018

## 2018-03-20 ENCOUNTER — HOSPITAL ENCOUNTER (OUTPATIENT)
Dept: PHYSICAL THERAPY | Facility: HOSPITAL | Age: 83
Setting detail: THERAPIES SERIES
Discharge: HOME | End: 2018-03-20

## 2018-03-20 DIAGNOSIS — Z74.09 IMPAIRED MOBILITY: ICD-10-CM

## 2018-03-20 DIAGNOSIS — R26.2 DIFFICULTY WALKING: ICD-10-CM

## 2018-03-20 DIAGNOSIS — M25.561 CHRONIC PAIN OF RIGHT KNEE: Primary | ICD-10-CM

## 2018-03-20 DIAGNOSIS — G89.29 CHRONIC PAIN OF RIGHT KNEE: Primary | ICD-10-CM

## 2018-03-20 PROCEDURE — 97035 APP MDLTY 1+ULTRASOUND EA 15: CPT | Performed by: PHYSICAL THERAPIST

## 2018-03-20 PROCEDURE — G8979 MOBILITY GOAL STATUS: HCPCS | Performed by: PHYSICAL THERAPIST

## 2018-03-20 PROCEDURE — G8978 MOBILITY CURRENT STATUS: HCPCS | Performed by: PHYSICAL THERAPIST

## 2018-03-20 PROCEDURE — 97110 THERAPEUTIC EXERCISES: CPT | Performed by: PHYSICAL THERAPIST

## 2018-03-20 NOTE — THERAPY PROGRESS REPORT/RE-CERT
Outpatient Physical Therapy Ortho Progress Note  [facility]     Patient Name: Arlin Hutson  : 1935  MRN: [id number]  Today's Date: 3/20/2018      Visit Date: 2018    Visit Dx:    ICD-10-CM ICD-9-CM   1. Chronic pain of right knee M25.561 719.46    G89.29 338.29   2. Impaired mobility Z74.09 799.89   3. Difficulty walking R26.2 719.7       Patient Active Problem List   Diagnosis   • Gastroesophageal reflux disease   • Osteopenia of multiple sites   • Essential hypertension   • Colitis        Past Medical History:   Diagnosis Date   • Colon polyp    • CREST syndrome    • GERD (gastroesophageal reflux disease)    • History of CT scan of abdomen 2011    constipation, sig tics, 6 mm hepatic cyst   • History of rheumatoid arthritis    • Hyperlipidemia    • Hypertension    • Osteoarthritis    • Raynaud's disease    • Sjogren's syndrome         Past Surgical History:   Procedure Laterality Date   • CATARACT EXTRACTION     • COLONOSCOPY  2016    tics, microscopic colitis,    • COLONOSCOPY  2011    sig tics, inflammation, polyp   • DILATATION AND CURETTAGE     • FLEXIBLE SIGMOIDOSCOPY     • KNEE SURGERY Right    • UPPER GASTROINTESTINAL ENDOSCOPY  2008    erythema   • WRIST SURGERY Right              PT Ortho     Row Name 18 0900       Posture/Observations    Genu varus Bilateral:;Mild  -GJ       General ROM    RT Lower Ext Rt Knee Extension/Flexion  -GJ       Right Lower Ext    Rt Knee Extension/Flexion AROM 5-115  -GJ      User Key  (r) = Recorded By, (t) = Taken By, (c) = Cosigned By    Initials Name Provider Type    BRETT Whiteside PT Physical Therapist                            PT Assessment/Plan     Row Name 18 1635          PT Assessment    Functional Limitations Impaired gait;Impaired locomotion;Limitation in home management;Performance in self-care ADL;Performance in leisure activities;Limitations in community activities  -GJ     Impairments  Gait;Impaired flexibility;Muscle strength;Pain;Impaired postural alignment;Joint mobility;Range of motion  -GJ     Assessment Comments Ms. Hutson is an 81 y/o female with PMHx of OA/RA.  She has attended 7 sessions of physical therapy for her R medial knee pain. Her rheumatologist is changing her medications and has recently put her on a steroid.  She reports exacerbation of her R knee pain over the weekend and is walking with a walking stick today.  She continues to demonstrate good AROM R knee.  She has completed her land based PT and is ready to attend 2 sessions of aquatic therapy prior to independent management of her condition.  She reports a KOS of 52%, scored 0-100, 100 represents no perceived disability, (original score of 53%, not an MDIC).  Ms. Hutson demonstrates stable and unchanging s/s consistent with OA of R knee, with compensatory pain L hip, which is improving, which limits her full participation in household, community activities.  She may benefit from skilled physical therapy x 2 additional sessions.    -GJ     Please refer to paper survey for additional self-reported information Yes  -GJ     Rehab Potential Good  -GJ     Patient/caregiver participated in establishment of treatment plan and goals Yes  -GJ     Patient would benefit from skilled therapy intervention Yes  -GJ        PT Plan    PT Frequency 2x/week  -GJ     Predicted Duration of Therapy Intervention (OT Eval) 4 weeks  -GJ     Planned CPT's? PT RE-EVAL: 13408;PT THER PROC EA 15 MIN: 47525;PT MANUAL THERAPY EA 15 MIN: 89736;PT GAIT TRAINING EA 15 MIN: 25411;PT AQUATIC THERAPY EA 15 MIN: 36053;PT NEUROMUSC RE-EDUCATION EA 15 MIN: 55601;PT ULTRASOUND EA 15 MIN: 77905;PT ELECTRICAL STIM UNATTEND: ;PT HOT OR COLD PACK TREAT MCARE  -GJ     PT Plan Comments DC from land therapy, Aquatic therapy x 2 sessions to develop generalized knee/hip strengthenging/stretching program (possibly pre-hab for R TKA), for pt. to practice independently  after her 2 sessions of aquatics.  Will DC chart at that point.   -GJ       User Key  (r) = Recorded By, (t) = Taken By, (c) = Cosigned By    Initials Name Provider Type    GJ Nando Whiteside, PT Physical Therapist                Modalities     Row Name 03/20/18 0900             Ultrasound 07054    Location R pes anserine, nikki. supine with BLE elevated over pillow  -GJ      Rx Minutes 8  -GJ      Duty Cycle 50  -GJ      Frequency 1.0 MHz  -GJ      Intensity - Wts/cm 1.5  -GJ      Phonophoresis No  -GJ        User Key  (r) = Recorded By, (t) = Taken By, (c) = Cosigned By    Initials Name Provider Type    GJ Nando Whiteside, PT Physical Therapist                Exercises     Row Name 03/20/18 0900             Subjective Comments    Subjective Comments I had a really bad weekend. Not sure why..  My knee is just hurting more. My rhematologist started me on steroids yesterday and tht seems to have helped with my knee.   -GJ         Subjective Pain    Pre-Treatment Pain Level 4  -GJ         Exercise 1    Exercise Name 1 HL hip abd  -GJ      Cueing 1 Verbal  -GJ      Reps 1 15  -GJ      Additional Comments GTB  -GJ      Time (Seconds) 1 3  -GJ         Exercise 2    Exercise Name 2 HL hip add  -GJ      Cueing 2 Verbal  -GJ      Reps 2 15  -GJ      Additional Comments bal  -GJ         Exercise 3    Exercise Name 3 QS  -GJ      Cueing 3 Verbal  -GJ      Reps 3 15  -GJ      Time (Seconds) 3 5  -GJ         Exercise 5    Exercise Name 5 LAQ  -GJ      Cueing 5 Verbal  -GJ      Reps 5 20  -GJ      Additional Comments 3#  -GJ         Exercise 6    Exercise Name 6 HR  -GJ      Reps 6 20  -GJ      Cueing 6 Verbal  -GJ         Exercise 7    Exercise Name 7 standing HS curl  -GJ      Reps 7 20  -GJ      Additional Comments 2#  -GJ      Cueing 7 Verbal  -GJ         Exercise 8    Exercise Name 8 seated TKE into ball  -GJ      Reps 8 15  -GJ      Cueing 8 Verbal  -GJ      Time (Seconds) 8 5  -GJ         Exercise 9    Exercise Name 9 SL, hip  abd bilaterally   -GJ      Reps 9 15  -GJ      Cueing 9 Verbal  -GJ         Exercise 10    Exercise Name 10 SAQ  -GJ      Reps 10 20  -GJ      Additional Comments 3#  -GJ      Cueing 10 Verbal  -GJ         Exercise 11    Exercise Name 11 Nustep  -GJ      Time 11 5  -GJ         Exercise 12    Exercise Name 12 bridges with ball  -GJ      Cueing 12 Verbal  -GJ      Reps 12 10  -GJ      Additional Comments 2  -GJ        User Key  (r) = Recorded By, (t) = Taken By, (c) = Cosigned By    Initials Name Provider Type    BRETT Whiteside, PT Physical Therapist                               PT OP Goals     Row Name 03/20/18 0900          PT Short Term Goals    STG Date to Achieve 03/23/18  -GJ     STG 1 pt. to be I with initial HEP to facilitate self management of their condition  -GJ     STG 1 Progress Met  -GJ     STG 2 pt. to be educated in/verbalize understanding of the importance of posture/ergonomics in association with their condition to facilitate self management of their condition  -GJ     STG 2 Progress Met  -GJ     STG 3 pt to demonstrate near normal heel to toe gait pattern with cane/walking stick to facilitate ease/safety with community mobility  -GJ     STG 3 Progress Ongoing  -GJ     STG 3 Progress Comments mild limp, using walking stick today  -GJ        Long Term Goals    LTG Date to Achieve 05/23/18  -GJ     LTG 1 pt. to be I with advanced HEP to facilitate self management of their condition  -GJ     LTG 1 Progress Ongoing  -GJ     LTG 2 pt. to report a KOS >/= 65 to demonstrate decreased level of perceived disability  -GJ     LTG 2 Progress Ongoing  -GJ     LTG 2 Progress Comments 52  -GJ     LTG 3 pt to demonstrate near normal heel to toe gait pattern without AD to facilitate ease/safety during community mobility  -GJ     LTG 3 Progress Ongoing  -GJ     LTG 3 Progress Comments mild limp, using walking stick today  -GJ     LTG 4 pt to report >/= 50% improved sleep patterns to facilitate optimal recovery   -GJ     LTG 4 Progress Ongoing  -GJ     LTG 4 Progress Comments pt reprots difficulty sleeping last night secondary to knee pain.  -GJ       User Key  (r) = Recorded By, (t) = Taken By, (c) = Cosigned By    Initials Name Provider Type    BRETT Whiteside PT Physical Therapist          Therapy Education  Education Details: at length discussion re: what to expect in the next several weeks to months.  Encouraged pt. to continue HEP every other day for at least 6 months to a year.  Discussed upcoming aquatic therapy.  Following aquatic sessions, to continue HEP for a month, if no better to call MD for follow up. Discussed use of AD and pattern.   Given: HEP, Symptoms/condition management, Pain management, Posture/body mechanics, Mobility training, Edema management  Program: Reinforced  How Provided: Verbal  Provided to: Patient  Level of Understanding: Verbalized, Demonstrated    Outcome Measure Options: Knee Outcome Score- ADL  Knee Outcome Score  Knee Outcome Score Comments: 52      Time Calculation:   Start Time: 1000  Stop Time: 1050  Time Calculation (min): 50 min    Therapy Charges for Today     Code Description Service Date Service Provider Modifiers Qty    74200415113 HC PT MOBILITY PROJECTED 3/20/2018 Nando Whiteside, PT GP, CI 1    [id number] HC PT MOBILITY CURRENT 3/20/2018 Nando Whiteside, PT GP, CJ 1    63607843816 HC PT THER PROC EA 15 MIN 3/20/2018 Nando Whiteside, PT GP 2    47524680962 HC PT ULTRASOUND EA 15 MIN 3/20/2018 Nando Whiteside, PT GP 1          PT G-Codes  PT Professional Judgement Used?: Yes  Outcome Measure Options: Knee Outcome Score- ADL  Score: 52  Functional Limitation: Mobility: Walking and moving around  Mobility: Walking and Moving Around Current Status (): At least 20 percent but less than 40 percent impaired, limited or restricted  Mobility: Walking and Moving Around Goal Status (): At least 1 percent but less than 20 percent impaired, limited or restricted         Nando  ZENAIDA Whiteside, PT  3/20/2018

## 2018-04-02 ENCOUNTER — HOSPITAL ENCOUNTER (OUTPATIENT)
Dept: PHYSICAL THERAPY | Facility: HOSPITAL | Age: 83
Setting detail: THERAPIES SERIES
Discharge: HOME | End: 2018-04-02

## 2018-04-04 ENCOUNTER — HOSPITAL ENCOUNTER (OUTPATIENT)
Dept: PHYSICAL THERAPY | Facility: HOSPITAL | Age: 83
Setting detail: THERAPIES SERIES
Discharge: HOME | End: 2018-04-04

## 2018-04-04 DIAGNOSIS — Z74.09 IMPAIRED MOBILITY: ICD-10-CM

## 2018-04-04 DIAGNOSIS — M54.50 ACUTE LEFT-SIDED LOW BACK PAIN WITHOUT SCIATICA: ICD-10-CM

## 2018-04-04 DIAGNOSIS — G89.29 CHRONIC PAIN OF RIGHT KNEE: Primary | ICD-10-CM

## 2018-04-04 DIAGNOSIS — R26.2 DIFFICULTY WALKING: ICD-10-CM

## 2018-04-04 DIAGNOSIS — M25.561 CHRONIC PAIN OF RIGHT KNEE: Primary | ICD-10-CM

## 2018-04-04 PROCEDURE — 97113 AQUATIC THERAPY/EXERCISES: CPT | Performed by: PHYSICAL THERAPIST

## 2018-04-05 ENCOUNTER — OFFICE VISIT (OUTPATIENT)
Dept: GASTROENTEROLOGY | Facility: CLINIC | Age: 83
End: 2018-04-05

## 2018-04-05 VITALS
SYSTOLIC BLOOD PRESSURE: 130 MMHG | TEMPERATURE: 98 F | DIASTOLIC BLOOD PRESSURE: 72 MMHG | BODY MASS INDEX: 21.17 KG/M2 | HEIGHT: 60 IN | WEIGHT: 107.8 LBS

## 2018-04-05 DIAGNOSIS — R19.7 DIARRHEA, UNSPECIFIED TYPE: ICD-10-CM

## 2018-04-05 DIAGNOSIS — K52.838 OTHER MICROSCOPIC COLITIS: Primary | ICD-10-CM

## 2018-04-05 PROCEDURE — 99213 OFFICE O/P EST LOW 20 MIN: CPT | Performed by: NURSE PRACTITIONER

## 2018-04-05 RX ORDER — SENNOSIDES 8.6 MG
1300 CAPSULE ORAL 2 TIMES DAILY
COMMUNITY
End: 2018-05-31 | Stop reason: HOSPADM

## 2018-04-05 NOTE — PROGRESS NOTES
Chief Complaint   Patient presents with   • Follow-up     diarrhea, requesting Endocort adjustment. diarrhea improved       Arlin Hutson is a  82 y.o. female here for a follow up visit for microscopic colitis.     HPI  81 yo f presents today for follow up visit for microscopic colitis with diarrhea. She is a patient of Dr. Shook. She was last seen in the office on 2/22/2018. She has hx of microscopic colitis with diarrhea and gas. She has been taking Endocort 6 mg daily since the last visit and admits her diarrhea, gas and bloating is much improved. She is feeling so much better. She is also off the ARAVA now too per her rheumatologist. She denies any dysphagia, reflux, abd pain, N&V, diarrhea, constipation, rectal bleeding or melena. She admits her appetite is better and her weight is stable.       Past Medical History:   Diagnosis Date   • Colon polyp    • CREST syndrome    • GERD (gastroesophageal reflux disease)    • History of CT scan of abdomen 03/11/2011    constipation, sig tics, 6 mm hepatic cyst   • History of rheumatoid arthritis    • Hyperlipidemia    • Hypertension    • Osteoarthritis    • Raynaud's disease    • Sjogren's syndrome        Past Surgical History:   Procedure Laterality Date   • CATARACT EXTRACTION     • COLONOSCOPY  02/26/2016    tics, microscopic colitis,    • COLONOSCOPY  07/01/2011    sig tics, inflammation, polyp   • DILATATION AND CURETTAGE  1980   • FLEXIBLE SIGMOIDOSCOPY     • KNEE SURGERY Right    • UPPER GASTROINTESTINAL ENDOSCOPY  03/14/2008    erythema   • WRIST SURGERY Right 2008       Scheduled Meds:    Continuous Infusions:  No current facility-administered medications for this visit.     PRN Meds:.    Allergies   Allergen Reactions   • Sulfa Antibiotics        Social History     Social History   • Marital status: Single     Spouse name: N/A   • Number of children: N/A   • Years of education: N/A     Occupational History   • Not on file.     Social History Main Topics   •  Smoking status: Never Smoker   • Smokeless tobacco: Never Used   • Alcohol use Yes      Comment: Infrequent   • Drug use: No   • Sexual activity: No     Other Topics Concern   • Not on file     Social History Narrative   • No narrative on file       Family History   Problem Relation Age of Onset   • Ulcerative colitis Mother        Review of Systems   Constitutional: Negative for appetite change, chills, diaphoresis, fatigue, fever and unexpected weight change.   HENT: Negative for nosebleeds, postnasal drip, sore throat, trouble swallowing and voice change.    Respiratory: Negative for cough, choking, chest tightness, shortness of breath and wheezing.    Cardiovascular: Negative for chest pain.   Gastrointestinal: Negative for abdominal distention, abdominal pain, anal bleeding, blood in stool, constipation, diarrhea, nausea, rectal pain and vomiting.   Endocrine: Negative for polydipsia, polyphagia and polyuria.   Musculoskeletal: Negative for gait problem.   Skin: Negative for rash and wound.   Allergic/Immunologic: Negative for food allergies.   Neurological: Negative for dizziness, speech difficulty and light-headedness.   Psychiatric/Behavioral: Negative for confusion, self-injury, sleep disturbance and suicidal ideas.       Vitals:    04/05/18 0904   BP: 130/72   Temp: 98 °F (36.7 °C)       Physical Exam   Constitutional: She is oriented to person, place, and time. She appears well-developed and well-nourished. She does not appear ill. No distress.   HENT:   Head: Normocephalic.   Eyes: Pupils are equal, round, and reactive to light.   Cardiovascular: Normal rate, regular rhythm and normal heart sounds.    Pulmonary/Chest: Effort normal and breath sounds normal.   Abdominal: Soft. Bowel sounds are normal. She exhibits no distension and no mass. There is no hepatosplenomegaly. There is no tenderness. There is no rebound and no guarding. No hernia.   Musculoskeletal: Normal range of motion.   Neurological: She  is alert and oriented to person, place, and time.   Skin: Skin is warm and dry.   Psychiatric: She has a normal mood and affect. Her speech is normal and behavior is normal. Judgment normal.       No images are attached to the encounter.    Assessment & Plan     1. Other microscopic colitis    2. Diarrhea, unspecified type    Patient is doing much better. I will agree to have her decrease the Budesonide to 3 mg daily x 6 weeks. Patient to call office with update at that time. Follow up with Dr. Shook in 6 months. Call office with any issues.

## 2018-04-12 ENCOUNTER — HOSPITAL ENCOUNTER (OUTPATIENT)
Dept: PHYSICAL THERAPY | Facility: HOSPITAL | Age: 83
Setting detail: THERAPIES SERIES
Discharge: HOME | End: 2018-04-12

## 2018-04-12 DIAGNOSIS — M54.50 ACUTE LEFT-SIDED LOW BACK PAIN WITHOUT SCIATICA: ICD-10-CM

## 2018-04-12 DIAGNOSIS — M25.561 CHRONIC PAIN OF RIGHT KNEE: Primary | ICD-10-CM

## 2018-04-12 DIAGNOSIS — G89.29 CHRONIC PAIN OF RIGHT KNEE: Primary | ICD-10-CM

## 2018-04-12 DIAGNOSIS — R26.2 DIFFICULTY WALKING: ICD-10-CM

## 2018-04-12 DIAGNOSIS — Z74.09 IMPAIRED MOBILITY: ICD-10-CM

## 2018-04-12 PROCEDURE — 97113 AQUATIC THERAPY/EXERCISES: CPT | Performed by: PHYSICAL THERAPIST

## 2018-04-12 NOTE — THERAPY TREATMENT NOTE
Outpatient Physical Therapy Ortho Treatment Note  [facility]     Patient Name: Arlin Hutson  : 1935  MRN: [id number]  Today's Date: 2018      Visit Date: 2018    Visit Dx:    ICD-10-CM ICD-9-CM   1. Chronic pain of right knee M25.561 719.46    G89.29 338.29   2. Impaired mobility Z74.09 799.89   3. Difficulty walking R26.2 719.7   4. Acute left-sided low back pain without sciatica M54.5 724.2       Patient Active Problem List   Diagnosis   • Gastroesophageal reflux disease   • Osteopenia of multiple sites   • Essential hypertension   • Colitis        Past Medical History:   Diagnosis Date   • Colon polyp    • CREST syndrome    • GERD (gastroesophageal reflux disease)    • History of CT scan of abdomen 2011    constipation, sig tics, 6 mm hepatic cyst   • History of rheumatoid arthritis    • Hyperlipidemia    • Hypertension    • Osteoarthritis    • Raynaud's disease    • Sjogren's syndrome         Past Surgical History:   Procedure Laterality Date   • CATARACT EXTRACTION     • COLONOSCOPY  2016    tics, microscopic colitis,    • COLONOSCOPY  2011    sig tics, inflammation, polyp   • DILATATION AND CURETTAGE     • FLEXIBLE SIGMOIDOSCOPY     • KNEE SURGERY Right    • UPPER GASTROINTESTINAL ENDOSCOPY  2008    erythema   • WRIST SURGERY Right                              PT Assessment/Plan     Row Name 18 1231          PT Assessment    Assessment Comments Arlin is here for her second aquatic session which was intended to be her final session, however she will require one more to be independent in the prescribed aquatic program for her knee.  We did use written instructions with pictures today which helped.  Arlin has difficulty positioning the foam roll for LE stretching partly due to her strength and balance.  -DANIEL       User Key  (r) = Recorded By, (t) = Taken By, (c) = Cosigned By    Initials Name Provider Type    DANIEL Albrecht, PT Physical Therapist                     Exercises     Row Name 04/12/18 1000             Subjective Comments    Subjective Comments I took 2 arthritis Tylenol before coming today.  -DANIEL         Subjective Pain    Able to rate subjective pain? yes  -DANIEL      Pre-Treatment Pain Level 3  -DANIEL      Subjective Pain Comment My knee hurt more the day after doing the water exercise.  -DANIEL         Aquatics    Aquatics performed? Yes  -DANIEL         Aquatics LE    Water Walk side;forward;backward   Holding rail, SBA 2 laps ea  -DANIEL      Stretch 1 Hamstrings 20 sec X 2 (no noodle aupport- bothered knee)  -DANIEL      Stretch 2 Calf 20 sec X 2 ea  -DANIEL      Stretch 3 Quads 20 sec X 2 ea  -DANIEL      Stretch Other 1 BKTC with noodle at rail 15 sec X 2  -DANIEL      Stretch Other 2 Piriformis Stretch 20 sec x 1  -DANIEL      Abdominals noodle   small noodle X 15  -DANIEL      Hip Abd/Add 15X ea  -DANIEL      Hip Flex/Ext 15X ea  -DANIEL      March in Place Leg Press with small noodle shallow X 10 ea.  -DANIEL      Mini Squat 15X  -DANIEL      Toe/Heel Raises -/15X  -DANIEL      Uni-Squat Tandem walk 10 ft X 4  -DANIEL      Step Ups 6 inch X 5  -DANIEL      Bicycle bench X 1 min  -DANIEL        User Key  (r) = Recorded By, (t) = Taken By, (c) = Cosigned By    Initials Name Provider Type    DANIEL Albrecht, PT Physical Therapist                               PT OP Goals     Row Name 04/12/18 1000          PT Short Term Goals    STG Date to Achieve 03/23/18  -DANIEL     STG 1 pt. to be I with initial HEP to facilitate self management of their condition  -DANIEL     STG 1 Progress Met  -DANIEL     STG 2 pt. to be educated in/verbalize understanding of the importance of posture/ergonomics in association with their condition to facilitate self management of their condition  -DANIEL     STG 2 Progress Met  -DANIEL     STG 3 pt to demonstrate near normal heel to toe gait pattern with cane/walking stick to facilitate ease/safety with community mobility  -DANIEL     STG 3 Progress Progressing  -DANIEL        Long Term Goals    LTG Date to  Achieve 05/23/18  -DANIEL     LTG 1 pt. to be I with advanced HEP to facilitate self management of their condition  -DANIEL     LTG 1 Progress Partially Met  -DANIEL     LTG 1 Progress Comments Independent with land based program, currently working on aquatic program.  -DANIEL     LTG 2 pt. to report a KOS >/= 65 to demonstrate decreased level of perceived disability  -DANIEL     LTG 2 Progress Ongoing  -DANIEL     LTG 3 pt to demonstrate near normal heel to toe gait pattern without AD to facilitate ease/safety during community mobility  -DANIEL     LTG 3 Progress Ongoing  -DANIEL     LTG 4 pt to report >/= 50% improved sleep patterns to facilitate optimal recovery  -DANIEL     LTG 4 Progress Ongoing  -DANIEL       User Key  (r) = Recorded By, (t) = Taken By, (c) = Cosigned By    Initials Name Provider Type    DANIEL Albrecht, PT Physical Therapist                         Time Calculation:   Start Time: 0950  Stop Time: 1030  Time Calculation (min): 40 min    Therapy Charges for Today     Code Description Service Date Service Provider Modifiers Qty    14334078594 HC PT AQUATIC THERAPY EA 15 MIN 4/12/2018 Gary Albrecht, PT GP 3                    Gary Albrecht, PT  4/12/2018

## 2018-04-16 ENCOUNTER — HOSPITAL ENCOUNTER (OUTPATIENT)
Dept: PHYSICAL THERAPY | Facility: HOSPITAL | Age: 83
Setting detail: THERAPIES SERIES
End: 2018-04-16

## 2018-04-18 ENCOUNTER — HOSPITAL ENCOUNTER (OUTPATIENT)
Dept: PHYSICAL THERAPY | Facility: HOSPITAL | Age: 83
Setting detail: THERAPIES SERIES
Discharge: HOME | End: 2018-04-18

## 2018-04-18 DIAGNOSIS — M25.561 CHRONIC PAIN OF RIGHT KNEE: Primary | ICD-10-CM

## 2018-04-18 DIAGNOSIS — G89.29 CHRONIC PAIN OF RIGHT KNEE: Primary | ICD-10-CM

## 2018-04-18 DIAGNOSIS — M54.50 ACUTE LEFT-SIDED LOW BACK PAIN WITHOUT SCIATICA: ICD-10-CM

## 2018-04-18 DIAGNOSIS — Z74.09 IMPAIRED MOBILITY: ICD-10-CM

## 2018-04-18 DIAGNOSIS — R26.2 DIFFICULTY WALKING: ICD-10-CM

## 2018-04-18 PROCEDURE — G8979 MOBILITY GOAL STATUS: HCPCS | Performed by: PHYSICAL THERAPIST

## 2018-04-18 PROCEDURE — 97113 AQUATIC THERAPY/EXERCISES: CPT | Performed by: PHYSICAL THERAPIST

## 2018-04-18 PROCEDURE — G8978 MOBILITY CURRENT STATUS: HCPCS | Performed by: PHYSICAL THERAPIST

## 2018-04-18 NOTE — THERAPY PROGRESS REPORT/RE-CERT
Outpatient Physical Therapy Ortho Progress Note  [facility]     Patient Name: Arlin Hutson  : 1935  MRN: [id number]  Today's Date: 2018      Visit Date: 2018    Patient Active Problem List   Diagnosis   • Gastroesophageal reflux disease   • Osteopenia of multiple sites   • Essential hypertension   • Colitis        Past Medical History:   Diagnosis Date   • Colon polyp    • CREST syndrome    • GERD (gastroesophageal reflux disease)    • History of CT scan of abdomen 2011    constipation, sig tics, 6 mm hepatic cyst   • History of rheumatoid arthritis    • Hyperlipidemia    • Hypertension    • Osteoarthritis    • Raynaud's disease    • Sjogren's syndrome         Past Surgical History:   Procedure Laterality Date   • CATARACT EXTRACTION     • COLONOSCOPY  2016    tics, microscopic colitis,    • COLONOSCOPY  2011    sig tics, inflammation, polyp   • DILATATION AND CURETTAGE     • FLEXIBLE SIGMOIDOSCOPY     • KNEE SURGERY Right    • UPPER GASTROINTESTINAL ENDOSCOPY  2008    erythema   • WRIST SURGERY Right 2008       Visit Dx:     ICD-10-CM ICD-9-CM   1. Chronic pain of right knee M25.561 719.46    G89.29 338.29   2. Impaired mobility Z74.09 799.89   3. Difficulty walking R26.2 719.7   4. Acute left-sided low back pain without sciatica M54.5 724.2                                       PT OP Goals     Row Name 18 0800          PT Short Term Goals    STG Date to Achieve 18  -DANIEL     STG 1 pt. to be I with initial HEP to facilitate self management of their condition  -DANIEL     STG 1 Progress Met  -DANIEL     STG 2 pt. to be educated in/verbalize understanding of the importance of posture/ergonomics in association with their condition to facilitate self management of their condition  -DANIEL     STG 2 Progress Met  -DANIEL     STG 3 pt to demonstrate near normal heel to toe gait pattern with cane/walking stick to facilitate ease/safety with community mobility  -DANIEL     STG  3 Progress Progressing  -DANIEL     STG 3 Progress Comments Practices with cane at home, however feels so awkward she doesn’t use the cane regularly.  -DANIEL        Long Term Goals    LTG Date to Achieve 05/23/18  -DANIEL     LTG 1 pt. to be I with advanced HEP to facilitate self management of their condition  -DANIEL     LTG 1 Progress Partially Met  -DANIEL     LTG 1 Progress Comments Independent with current basic aquatic program, may need progression in the future.  -DANIEL     LTG 2 pt. to report a KOS >/= 65 to demonstrate decreased level of perceived disability  -DANIEL     LTG 2 Progress Ongoing  -DANIEL     LTG 2 Progress Comments 42/80 or 53% which correlates to 47% perceived disability  -DANIEL     LTG 3 pt to demonstrate near normal heel to toe gait pattern without AD to facilitate ease/safety during community mobility  -DANIEL     LTG 3 Progress Ongoing  -DANIEL     LTG 3 Progress Comments Limp  -DANIEL     LTG 4 pt to report >/= 50% improved sleep patterns to facilitate optimal recovery  -DANIEL     LTG 4 Progress Ongoing  -DANIEL     LTG 4 Progress Comments Knee pain wakes her several times a night.  -DANIEL       User Key  (r) = Recorded By, (t) = Taken By, (c) = Cosigned By    Initials Name Provider Type    DANIEL Albrecht, PT Physical Therapist                PT Assessment/Plan     Row Name 04/18/18 8462          PT Assessment    Functional Limitations Impaired gait;Impaired locomotion;Limitation in home management;Performance in self-care ADL;Performance in leisure activities;Limitations in community activities  -     Impairments Gait;Impaired flexibility;Muscle strength;Pain;Impaired postural alignment;Joint mobility;Range of motion  -DANIEL     Assessment Comments Arlin has been treated for 6 land based and 3 aquatic PT sessions for her R knee.  Her Knee Outcome Survey- ADLs score remains the same at 42/80.  Pain is persistent and causes antalgic gait. A cane was recommended and Arlin is practicing with it at home, however finds it awkward making it  "\"more difficult to walk\".  She does report one fall recently and is unsure what caused it-did not trip or get dizzy.  Arlin reports waking several times a night because of her knee pain.  She does use voltaren gel which she finds helpful and plans to try biofreeze at different times.  Due to recent fall and patient reporting poor balance, Arlin will return to dry land PT for balance training and gait training with cane.  She is planning to continue the aquatic exercise at a community pool independently.   -DANIEL     Please refer to paper survey for additional self-reported information Yes  -DANIEL     Rehab Potential Good  -DANIEL     Patient/caregiver participated in establishment of treatment plan and goals Yes  -DANIEL     Patient would benefit from skilled therapy intervention Yes  -DANIEL        PT Plan    PT Frequency --   1-2X/week  -DANIEL     Planned CPT's? PT AQUATIC THERAPY EA 15 MIN: 03668  -DANIEL     PT Plan Comments Continue PT as per primary therapist discretion.  -DANIEL        Clinical Impression    Predicted Duration of Therapy Intervention (days/wks) 2-4 weeks  -DANIEL       User Key  (r) = Recorded By, (t) = Taken By, (c) = Cosigned By    Initials Name Provider Type    DANIEL Albrecht, PT Physical Therapist                  Exercises     Row Name 04/18/18 0800             Subjective Comments    Subjective Comments My balance is better some, I got my new glasses.   -DANIEL         Subjective Pain    Able to rate subjective pain? yes  -DANIEL      Pre-Treatment Pain Level 6  -DANIEL      Subjective Pain Comment My knee pain wakes me up at night.  -DANIEL         Aquatics    Aquatics performed? Yes  -DANIEL         Aquatics LE    Water Walk side;forward;backward   Holding rail, SBA 2 laps ea  -DANIEL      Stretch 1 Hamstrings 20 sec X 2 (no noodle aupport- bothered knee)  -DANIEL      Stretch 2 Calf 20 sec X 2 ea  -DANIEL      Stretch 3 Quads 20 sec X 2 ea  -DANIEL      Stretch Other 1 BKTC with noodle at rail 15 sec X 2  -DANIEL      Stretch Other 2 Piriformis Stretch " 20 sec x 1  -DANIEL      Abdominals noodle   small noodle X 15  -DANIEL      Hip Abd/Add 15X ea  -DANIEL      Hip Flex/Ext 15X ea  -DANIEL      March in Place Leg Press with small noodle shallow X 10 ea.  -DANIEL      Mini Squat 15X  -DANIEL      Toe/Heel Raises -/15X  -DANIEL      Uni-Squat Tandem walk 10 ft X 4  -DANIEL      Step Ups --  -DANIEL      Bicycle bench X 1 min  -DANIEL        User Key  (r) = Recorded By, (t) = Taken By, (c) = Cosigned By    Initials Name Provider Type    DANIEL Albrecht, PT Physical Therapist                        Outcome Measure Options: Knee Outcome Score- ADL         Time Calculation:   Start Time: 0815  Stop Time: 0900  Time Calculation (min): 45 min     Therapy Charges for Today     Code Description Service Date Service Provider Modifiers Qty    51717310201 HC PT MOBILITY CURRENT 4/18/2018 Gary Albrecht, PT GP, CK 1    51913986997 HC PT MOBILITY PROJECTED 4/18/2018 Gary Albrecht, PT GP, CI 1    86488331399 HC PT AQUATIC THERAPY EA 15 MIN 4/18/2018 Gary Albrecht, PT GP 3          PT G-Codes  Outcome Measure Options: Knee Outcome Score- ADL  Score: 42/80  Functional Limitation: Mobility: Walking and moving around  Mobility: Walking and Moving Around Current Status (): At least 40 percent but less than 60 percent impaired, limited or restricted  Mobility: Walking and Moving Around Goal Status (): At least 1 percent but less than 20 percent impaired, limited or restricted         Gary Albrecht, PT  4/18/2018

## 2018-04-28 ENCOUNTER — APPOINTMENT (OUTPATIENT)
Dept: GENERAL RADIOLOGY | Facility: HOSPITAL | Age: 83
End: 2018-04-28

## 2018-04-28 ENCOUNTER — HOSPITAL ENCOUNTER (EMERGENCY)
Facility: HOSPITAL | Age: 83
Discharge: HOME | End: 2018-04-28
Attending: EMERGENCY MEDICINE | Admitting: EMERGENCY MEDICINE

## 2018-04-28 VITALS
SYSTOLIC BLOOD PRESSURE: 183 MMHG | DIASTOLIC BLOOD PRESSURE: 86 MMHG | HEIGHT: 60 IN | TEMPERATURE: 98 F | OXYGEN SATURATION: 98 % | BODY MASS INDEX: 20.81 KG/M2 | RESPIRATION RATE: 18 BRPM | HEART RATE: 76 BPM | WEIGHT: 106 LBS

## 2018-04-28 DIAGNOSIS — M17.11 OSTEOARTHRITIS OF RIGHT KNEE, UNSPECIFIED OSTEOARTHRITIS TYPE: ICD-10-CM

## 2018-04-28 DIAGNOSIS — M25.561 ACUTE PAIN OF RIGHT KNEE: Primary | ICD-10-CM

## 2018-04-28 PROCEDURE — 73560 X-RAY EXAM OF KNEE 1 OR 2: CPT

## 2018-04-28 PROCEDURE — 99283 EMERGENCY DEPT VISIT LOW MDM: CPT

## 2018-04-28 RX ORDER — ACETAMINOPHEN AND CODEINE PHOSPHATE 300; 30 MG/1; MG/1
1-2 TABLET ORAL EVERY 6 HOURS PRN
Qty: 20 TABLET | Refills: 0 | Status: SHIPPED | OUTPATIENT
Start: 2018-04-28 | End: 2018-05-01

## 2018-04-28 RX ORDER — HYDROCODONE BITARTRATE AND ACETAMINOPHEN 7.5; 325 MG/1; MG/1
1 TABLET ORAL ONCE
Status: COMPLETED | OUTPATIENT
Start: 2018-04-28 | End: 2018-04-28

## 2018-04-28 RX ADMIN — HYDROCODONE BITARTRATE AND ACETAMINOPHEN 1 TABLET: 7.5; 325 TABLET ORAL at 10:05

## 2018-05-01 ENCOUNTER — OFFICE VISIT (OUTPATIENT)
Dept: ORTHOPEDIC SURGERY | Facility: CLINIC | Age: 83
End: 2018-05-01

## 2018-05-01 VITALS — WEIGHT: 106 LBS | TEMPERATURE: 98.7 F | HEIGHT: 60 IN | BODY MASS INDEX: 20.81 KG/M2

## 2018-05-01 DIAGNOSIS — M17.11 PRIMARY OSTEOARTHRITIS OF RIGHT KNEE: ICD-10-CM

## 2018-05-01 DIAGNOSIS — M17.12 PRIMARY OSTEOARTHRITIS OF LEFT KNEE: Primary | ICD-10-CM

## 2018-05-01 PROCEDURE — 99214 OFFICE O/P EST MOD 30 MIN: CPT | Performed by: ORTHOPAEDIC SURGERY

## 2018-05-01 RX ORDER — PREGABALIN 75 MG/1
150 CAPSULE ORAL ONCE
Status: CANCELLED | OUTPATIENT
Start: 2018-05-30 | End: 2018-05-30

## 2018-05-01 RX ORDER — MELOXICAM 15 MG/1
15 TABLET ORAL ONCE
Status: CANCELLED | OUTPATIENT
Start: 2018-05-30 | End: 2018-05-30

## 2018-05-01 RX ORDER — CEFAZOLIN SODIUM 2 G/100ML
2 INJECTION, SOLUTION INTRAVENOUS ONCE
Status: CANCELLED | OUTPATIENT
Start: 2018-05-30 | End: 2018-05-30

## 2018-05-01 NOTE — PROGRESS NOTES
Patient: Arlin Hutson  YOB: 1935 82 y.o. female  Medical Record Number: [id number]    Chief Complaints:   Chief Complaint   Patient presents with   • Right Knee - Pain, Follow-up       History of Present Illness:Arlin Hutson is a 82 y.o. female who presents for follow-up of  Right knee pain.  The pain has markedly worsened over the past few months.  She has a severe stabbing aching pain on the medial side of the knee.  It is worse with weightbearing.  It is now limiting her activities.  She can only walk short distances.  Previous cortisone injections have not helped.  She has done physical therapy without significant relief.    Allergies:   Allergies   Allergen Reactions   • Codeine Diarrhea and Nausea Only   • Sulfa Antibiotics    • Tylenol With Codeine #3 [Acetaminophen-Codeine] Other (See Comments)     Stomach pain       Medications:   Current Outpatient Prescriptions   Medication Sig Dispense Refill   • acetaminophen (TYLENOL 8 HOUR ARTHRITIS PAIN) 650 MG 8 hr tablet Take 1,300 mg by mouth 2 (Two) Times a Day.     • amLODIPine (NORVASC) 2.5 MG tablet      • aspirin 81 MG EC tablet Take 81 mg by mouth daily.     • Budesonide (ENTOCORT EC) 3 MG 24 hr capsule Take 3 capsules by mouth every morning (Patient taking differently: 3 mg.) 90 capsule 11   • Calcium Carb-Cholecalciferol (CALCIUM 600 + D PO) Take  by mouth.     • Carboxymethylcellul-Glycerin (REFRESH OPTIVE OP) Apply  to eye.     • Cholecalciferol (VITAMIN D3) 1000 UNITS capsule Take  by mouth.     • diclofenac (VOLTAREN) 1 % gel gel Apply 4 g topically 4 (Four) Times a Day As Needed.     • diphenoxylate-atropine (LOMOTIL) 2.5-0.025 MG per tablet      • escitalopram (LEXAPRO) 10 MG tablet Take 10 mg by mouth Daily.     • ketorolac (ACULAR) 0.5 % ophthalmic solution      • leflunomide (ARAVA) 10 MG tablet Daily.     • Multiple Vitamins-Minerals (CENTRUM SILVER PO) Take  by mouth.     • omeprazole (PriLOSEC) 20 MG capsule Taking on Mon  "Wed and Fri     • SALINE NASAL SPRAY NA into each nostril.     • TRAZODONE HCL PO Take 25 mg by mouth.     • vitamin C (ASCORBIC ACID) 500 MG tablet Take 500 mg by mouth daily.     • XIIDRA 5 % solution        No current facility-administered medications for this visit.          The following portions of the patient's history were reviewed and updated as appropriate: allergies, current medications, past family history, past medical history, past social history, past surgical history and problem list.    Review of Systems:   A 14 point review of systems was performed. All systems negative except pertinent positives/negative listed in HPI above    Physical Exam:   Vitals:    05/01/18 1540   Temp: 98.7 °F (37.1 °C)   Weight: 48.1 kg (106 lb)   Height: 152.4 cm (60\")   PainSc:   7       General: A and O x 3, ASA, NAD    SCLERA:    Normal    DENTITION:   Normal  Knee:  right    ALIGNMENT:     Varus  ,   Patella  tracks  midline    GAIT:    Antalgic    SKIN:    No abnormality    RANGE OF MOTION:   0  -  120   DEG    STRENGTH:   4  / 5    LIGAMENTS:    No varus / valgus instability.   Negative  Lachman.    MENISCUS:     Negative   Kiera       DISTAL PULSES:    Paplable    DISTAL SENSATION :   Intact    LYMPHATICS:     No   lymphadenopathy    OTHER:          - Positive   effusion      - Crepitance with ROM       Radiology:  Xrays 3views right knee (ap,lateral, sunrise) were ordered and reviewed for evaluation of knee pain demonstratingadvanced varus osteoarthritis with bone on bone articulation, subchondral cysts, and periarticular osteophytes  todays xrays were compared to previous xrays and demonstrate no change    Assessment/Plan:  Right knee advanced end-stage osteoarthritis.  She has failed a full complement of conservative measures.  Continuation of conservative management vs. TKA discussed.  The patient wishes to proceed with total knee replacement.  At this point the patient has failed the full compliment of " conservative treatment and stating complete understanding of the risks/benefits/ anternatives wishes to proceed with surgical treatment.    Risk and benefits of surgery were reviewed.  Including, but not limited to, blood clots or pulmonary embolism, anesthesia risk, infection, fracture, skin/leg numbness, persistent pain/crepitance/popping/catching, failure of the implant, need for future surgeries, hematoma, possible nerve or blood vessel injury, need for transfusion, and potential risk of stroke,heart attack or death, among others.  The patient understands and wishes to proceed.     It was explained that if tissue has been repaired or reconstructed, there is also an increased chance of failure which may require further management.  Following the completion of the discussion, the patient expressed understanding of this planned course of care, all their questions were answered and consent will be obtained preoperatively.    Operative Plan: Right [employer] Total Knee Replacement an overnight staywith home health rehab

## 2018-05-03 ENCOUNTER — TELEPHONE (OUTPATIENT)
Dept: ORTHOPEDIC SURGERY | Facility: CLINIC | Age: 83
End: 2018-05-03

## 2018-05-03 NOTE — TELEPHONE ENCOUNTER
Letter has been completed and patient is aware that it is at the  @ [facility]

## 2018-05-04 PROBLEM — M17.11 PRIMARY OSTEOARTHRITIS OF RIGHT KNEE: Status: ACTIVE | Noted: 2018-05-04

## 2018-05-08 ENCOUNTER — TRANSCRIBE ORDERS (OUTPATIENT)
Dept: ADMINISTRATIVE | Facility: HOSPITAL | Age: 83
End: 2018-05-08

## 2018-05-08 DIAGNOSIS — Z12.31 SCREENING MAMMOGRAM, ENCOUNTER FOR: Primary | ICD-10-CM

## 2018-05-15 ENCOUNTER — PREP FOR SURGERY (OUTPATIENT)
Dept: OTHER | Facility: HOSPITAL | Age: 83
End: 2018-05-15

## 2018-05-15 DIAGNOSIS — M17.11 PRIMARY OSTEOARTHRITIS OF RIGHT KNEE: Primary | ICD-10-CM

## 2018-05-17 ENCOUNTER — APPOINTMENT (OUTPATIENT)
Dept: PREADMISSION TESTING | Facility: HOSPITAL | Age: 83
End: 2018-05-17

## 2018-05-17 VITALS
RESPIRATION RATE: 20 BRPM | OXYGEN SATURATION: 98 % | TEMPERATURE: 98.2 F | DIASTOLIC BLOOD PRESSURE: 103 MMHG | WEIGHT: 105 LBS | BODY MASS INDEX: 20.62 KG/M2 | HEART RATE: 74 BPM | SYSTOLIC BLOOD PRESSURE: 169 MMHG | HEIGHT: 60 IN

## 2018-05-17 DIAGNOSIS — M17.11 PRIMARY OSTEOARTHRITIS OF RIGHT KNEE: ICD-10-CM

## 2018-05-17 LAB
ANION GAP SERPL CALCULATED.3IONS-SCNC: 11 MMOL/L
BILIRUB UR QL STRIP: NEGATIVE
BUN BLD-MCNC: 15 MG/DL (ref 8–23)
BUN/CREAT SERPL: 25.9 (ref 7–25)
CALCIUM SPEC-SCNC: 8.9 MG/DL (ref 8.6–10.5)
CHLORIDE SERPL-SCNC: 102 MMOL/L (ref 98–107)
CLARITY UR: CLEAR
CO2 SERPL-SCNC: 29 MMOL/L (ref 22–29)
COLOR UR: YELLOW
CREAT BLD-MCNC: 0.58 MG/DL (ref 0.57–1)
DEPRECATED RDW RBC AUTO: 52.9 FL (ref 37–54)
ERYTHROCYTE [DISTWIDTH] IN BLOOD BY AUTOMATED COUNT: 14.4 % (ref 11.7–13)
GFR SERPL CREATININE-BSD FRML MDRD: 100 ML/MIN/1.73
GLUCOSE BLD-MCNC: 80 MG/DL (ref 65–99)
GLUCOSE UR STRIP-MCNC: NEGATIVE MG/DL
HCT VFR BLD AUTO: 40.7 % (ref 35.6–45.5)
HGB BLD-MCNC: 12.8 G/DL (ref 11.9–15.5)
HGB UR QL STRIP.AUTO: NEGATIVE
KETONES UR QL STRIP: NEGATIVE
LEUKOCYTE ESTERASE UR QL STRIP.AUTO: NEGATIVE
MCH RBC QN AUTO: 31.6 PG (ref 26.9–32)
MCHC RBC AUTO-ENTMCNC: 31.4 G/DL (ref 32.4–36.3)
MCV RBC AUTO: 100.5 FL (ref 80.5–98.2)
NITRITE UR QL STRIP: NEGATIVE
PH UR STRIP.AUTO: 6 [PH] (ref 5–8)
PLATELET # BLD AUTO: 236 10*3/MM3 (ref 140–500)
PMV BLD AUTO: 11.5 FL (ref 6–12)
POTASSIUM BLD-SCNC: 3.5 MMOL/L (ref 3.5–5.2)
PROT UR QL STRIP: NEGATIVE
RBC # BLD AUTO: 4.05 10*6/MM3 (ref 3.9–5.2)
SODIUM BLD-SCNC: 142 MMOL/L (ref 136–145)
SP GR UR STRIP: 1.01 (ref 1–1.03)
UROBILINOGEN UR QL STRIP: NORMAL
WBC NRBC COR # BLD: 7.86 10*3/MM3 (ref 4.5–10.7)

## 2018-05-17 PROCEDURE — 93005 ELECTROCARDIOGRAM TRACING: CPT

## 2018-05-17 PROCEDURE — 36415 COLL VENOUS BLD VENIPUNCTURE: CPT

## 2018-05-17 PROCEDURE — 81003 URINALYSIS AUTO W/O SCOPE: CPT | Performed by: ORTHOPAEDIC SURGERY

## 2018-05-17 PROCEDURE — 93010 ELECTROCARDIOGRAM REPORT: CPT | Performed by: INTERNAL MEDICINE

## 2018-05-17 PROCEDURE — 80048 BASIC METABOLIC PNL TOTAL CA: CPT | Performed by: ORTHOPAEDIC SURGERY

## 2018-05-17 PROCEDURE — 85027 COMPLETE CBC AUTOMATED: CPT | Performed by: ORTHOPAEDIC SURGERY

## 2018-05-17 RX ORDER — CHOLECALCIFEROL (VITAMIN D3) 50 MCG
2000 TABLET ORAL DAILY
COMMUNITY
End: 2018-05-31 | Stop reason: HOSPADM

## 2018-05-17 RX ORDER — MV-MN/OM3/DHA/EPA/FISH/LUT/ZEA 250-5-1 MG
1 CAPSULE ORAL DAILY
COMMUNITY
End: 2018-05-31 | Stop reason: HOSPADM

## 2018-05-17 RX ORDER — CHLORHEXIDINE GLUCONATE 500 MG/1
1 CLOTH TOPICAL
COMMUNITY
End: 2018-05-31 | Stop reason: HOSPADM

## 2018-05-17 RX ORDER — TRAZODONE HYDROCHLORIDE 50 MG/1
25-50 TABLET ORAL NIGHTLY
COMMUNITY

## 2018-05-17 RX ORDER — BUDESONIDE 3 MG/1
9 CAPSULE, COATED PELLETS ORAL EVERY MORNING
COMMUNITY
End: 2018-06-05 | Stop reason: HOSPADM

## 2018-05-17 ASSESSMENT — KOOS JR
KOOS JR SCORE: 52.465
KOOS JR SCORE: 14

## 2018-05-17 NOTE — DISCHARGE INSTRUCTIONS
Take the following medications the morning of surgery with a small sip of water:    Amlodipine   omeprazole  General Instructions:  • Do not eat solid food after midnight the night before surgery.  • You may drink clear liquids day of surgery but must stop at least one hour before your hospital arrival time.  • It is beneficial for you to have a clear drink that contains carbohydrates the day of surgery.  We suggest a 12 to 20 ounce bottle of Gatorade or Powerade for non-diabetic patients or a 12 to 20 ounce bottle of G2 or Powerade Zero for diabetic patients. (Pediatric patients, are not advised to drink a 12 to 20 ounce carbohydrate drink)    Clear liquids are liquids you can see through.  Nothing red in color.     Plain water                               Sports drinks  Sodas                                   Gelatin (Jell-O)  Fruit juices without pulp such as white grape juice and apple juice  Popsicles that contain no fruit or yogurt  Tea or coffee (no cream or milk added)  Gatorade / Powerade  G2 / Powerade Zero    • Infants may have breast milk up to four hours before surgery.  • Infants drinking formula may drink formula up to six hours before surgery.   • Patients who avoid smoking, chewing tobacco and alcohol for 4 weeks prior to surgery have a reduced risk of post-operative complications.  Quit smoking as many days before surgery as you can.  • Do not smoke, use chewing tobacco or drink alcohol the day of surgery.   • If applicable bring your C-PAP/ BI-PAP machine.  • Bring any papers given to you in the doctor’s office.  • Wear clean comfortable clothes and socks.  • Do not wear contact lenses or make-up.  Bring a case for your glasses.   • Bring crutches or walker if applicable.  • Remove all piercings.  Leave jewelry and any other valuables at home.  • Hair extensions with metal clips must be removed prior to surgery.  • The Pre-Admission Testing nurse will instruct you to bring medications if unable  to obtain an accurate list in Pre-Admission Testing.        If you were given a blood bank ID arm band remember to bring it with you the day of surgery.    Preventing a Surgical Site Infection:  • For 2 to 3 days before surgery, avoid shaving with a razor because the razor can irritate skin and make it easier to develop an infection.  • The night prior to surgery sleep in a clean bed with clean clothing.  Do not allow pets to sleep with you.  • Shower on the morning of surgery using a fresh bar of anti-bacterial soap (such as Dial) and clean washcloth.  Dry with a clean towel and dress in clean clothing.  • Ask your surgeon if you will be receiving antibiotics prior to surgery.  • Make sure you, your family, and all healthcare providers clean their hands with soap and water or an alcohol based hand  before caring for you or your wound.    Day of surgery:5/30/18   0800  Upon arrival, a Pre-op nurse and Anesthesiologist will review your health history, obtain vital signs, and answer questions you may have.  The only belongings needed at this time will be your home medications and if applicable your C-PAP/BI-PAP machine.  If you are staying overnight your family can leave the rest of your belongings in the car and bring them to your room later.  A Pre-op nurse will start an IV and you may receive medication in preparation for surgery, including something to help you relax.  Your family will be able to see you in the Pre-op area.  While you are in surgery your family should notify the waiting room  if they leave the waiting room area and provide a contact phone number.    Please be aware that surgery does come with discomfort.  We want to make every effort to control your discomfort so please discuss any uncontrolled symptoms with your nurse.   Your doctor will most likely have prescribed pain medications.      If you are going home after surgery you will receive individualized written care  instructions before being discharged.  A responsible adult must drive you to and from the hospital on the day of your surgery and stay with you for 24 hours.    If you are staying overnight following surgery, you will be transported to your hospital room following the recovery period.  [facility] has all private rooms.    If you have any questions please call Pre-Admission Testing at [phone].  Deductibles and co-payments are collected on the day of service. Please be prepared to pay the required co-pay, deductible or deposit on the day of service as defined by your plan.    2% CHLORAHEXIDINE GLUCONATE* CLOTH  Preparing or “prepping” skin before surgery can reduce the risk of infection at the surgical site. To make the process easier, [facility] has chosen disposable cloths moistened with a rinse-free, 2% Chlorhexidine Gluconate (CHG) antiseptic solution. The steps below outline the prepping process and should be carefully followed.        Use the prep cloth on the area that is circled in the diagram             Directions Night before Surgery  1) Shower using a fresh bar of anti-bacterial soap (such as Dial) and clean washcloth.  Use a clean towel to completely dry your skin.  2) Do not use any lotions, oils or creams on your skin.  3) Open the package and remove 1 cloth, wipe your skin for 30 seconds in a circular motion.  Allow to dry for 3 minutes.  4) Repeat #3 with second cloth.  5) Do not touch your eyes, ears, or mouth with the prep cloth.  6) Allow the wet prep solution to air dry.  7) Discard the prep cloth and wash your hands with soap and water.   8) Dress in clean bed clothes and sleep on fresh clean bed sheets.   9) You may experience some temporary itching after the prep.    Directions Day of Surgery  1) Repeat steps 1,2,3,4,5,6,7, and 9.   2) Dress in clean clothes before coming to the hospital.    BACTROBAN NASAL OINTMENT  There are many germs normally in your nose.  Bactroban is an ointment that will help reduce these germs. Please follow these instructions for Bactroban use:      __1__The day before surgery in the morning  Date__5/29/18______    _2___The day before surgery in the evening              Date__5/29/18______    _3___The day of surgery in the morning    Date__5/30/18____    **Squirt ½ package of Bactroban Ointment onto a cotton applicator and apply to inside of 1st nostril.  Squirt the remaining Bactroban and apply to the inside of the other nostril.    PERIDEX- ORAL:  Use only if your surgeon has ordered  Use the night before and morning of surgery - Swish, gargle, and spit - do not swallow.

## 2018-05-18 ENCOUNTER — TELEPHONE (OUTPATIENT)
Dept: ORTHOPEDIC SURGERY | Facility: CLINIC | Age: 83
End: 2018-05-18

## 2018-05-18 NOTE — TELEPHONE ENCOUNTER
Review of her preadmission testing patient was found to have an abnormal EKG area did shows possible anteroseptal infarct could be old but is new compared to her previous EKG.  Have faxed copy of the EKG to Dr. Rust at [phone]

## 2018-05-18 NOTE — TELEPHONE ENCOUNTER
In review of her preadmission testing her preop EKG does show possible anterior septal infarct that is old however it is new compared with a previous EKG done in 2009.  I have contacted Dr. Rust and their office will fax over a previous EKG for comparison.  I have also faxed a copy of it to his office for his review

## 2018-05-18 NOTE — TELEPHONE ENCOUNTER
----- Message from CHIRAG Nicolas sent at 5/18/2018  2:05 PM EDT -----  EKG does show a new change, please schedule her with cardiology for clearance

## 2018-05-23 ENCOUNTER — TELEPHONE (OUTPATIENT)
Dept: ORTHOPEDIC SURGERY | Facility: CLINIC | Age: 83
End: 2018-05-23

## 2018-05-23 NOTE — TELEPHONE ENCOUNTER
Received a note from Dr. Rust stating that the patient's EKG is unchanged from August 2015 patient is okay to proceed with surgery

## 2018-05-24 ENCOUNTER — OFFICE VISIT (OUTPATIENT)
Dept: ORTHOPEDIC SURGERY | Facility: CLINIC | Age: 83
End: 2018-05-24

## 2018-05-24 VITALS
DIASTOLIC BLOOD PRESSURE: 80 MMHG | SYSTOLIC BLOOD PRESSURE: 150 MMHG | TEMPERATURE: 98.1 F | BODY MASS INDEX: 20.81 KG/M2 | WEIGHT: 106 LBS | HEIGHT: 60 IN

## 2018-05-24 DIAGNOSIS — M17.11 PRIMARY OSTEOARTHRITIS OF RIGHT KNEE: Primary | ICD-10-CM

## 2018-05-24 PROCEDURE — S0260 H&P FOR SURGERY: HCPCS | Performed by: NURSE PRACTITIONER

## 2018-05-24 NOTE — H&P
Patient: Arlin Hutson    Date of Admission: 5/30/18    YOB: 1935    Medical Record Number: [id number]    Admitting Physician: Dr. Georges Jon    Reason for Admission: End Stage Right Knee OA    History of Present Illness: 82 y.o. female presents with severe end stage knee osteoarthritis which has not been responsive to the full compliment of conservative measures. Despite conservative attempts, there is still severe, constant activity limiting pain. Given the severity of the pain, the patient has elected to proceed with knee replacement.    Allergies:   Allergies   Allergen Reactions   • Codeine Diarrhea and Nausea Only   • Sulfa Antibiotics Hives         Current Medications:  Scheduled Meds:  PRN Meds:.    PMH:     Past Medical History:   Diagnosis Date   • Baker's cyst    • Colon polyp    • CREST syndrome    • Fractures    • GERD (gastroesophageal reflux disease)    • History of CT scan of abdomen 03/11/2011    constipation, sig tics, 6 mm hepatic cyst   • History of rheumatoid arthritis    • Hyperlipidemia    • Hypertension    • Osteoarthritis    • Raynaud's disease    • Sjogren's syndrome    • Ulcerative colitis        PF/Surg/Soc Hx:     Past Surgical History:   Procedure Laterality Date   • CATARACT EXTRACTION, BILATERAL     • COLONOSCOPY  02/26/2016    tics, microscopic colitis,    • COLONOSCOPY  07/01/2011    sig tics, inflammation, polyp   • DILATATION AND CURETTAGE  1980   • FLEXIBLE SIGMOIDOSCOPY     • HAND SURGERY     • KNEE SURGERY Right    • UPPER GASTROINTESTINAL ENDOSCOPY  03/14/2008    erythema   • WRIST FRACTURE SURGERY     • WRIST SURGERY Right 2008    orif        Social History     Occupational History   • Not on file.     Social History Main Topics   • Smoking status: Never Smoker   • Smokeless tobacco: Never Used   • Alcohol use Yes     1 - 3 Glasses of wine per week      Comment: Infrequent   • Drug use: No   • Sexual activity: Not on file      Social History     Social  "History Narrative   • No narrative on file        Family History   Problem Relation Age of Onset   • Ulcerative colitis Mother    • Malig Hyperthermia Neg Hx          Review of Systems:   A 14 point review of systems was performed, pertinent positives discussed above, all other systems are negative    Physical Exam: 82 y.o. female  Vital Signs :   Vitals:    05/24/18 1343   BP: 150/80   BP Location: Left arm   Patient Position: Sitting   Temp: 98.1 °F (36.7 °C)   TempSrc: Temporal Artery    Weight: 48.1 kg (106 lb)   Height: 152.4 cm (60\")     General: Alert and Oriented x 3, No acute distress.  Psych: mood and affect appropriate; recent and remote memory intact  Eyes: conjunctiva clear; pupils equally round and reactive, sclera nonicteric  CV: no peripheral edema  Resp: normal respiratory effort  Skin: no rashes or wounds; normal turgor  Musculosketetal; pain and crepitance with knee range of motion  Vascular: palpable distal pulses    Xrays:  -3 views (AP, lateral, and sunrise) were reviewed demonstrating end-stage OA with bone on bone articulation.  -A full length AP xray was ordered today for purposes of operative alignment demonstrating end stage arthritic findings. There are no previous full length films for review    Assessment:  End-stage Right knee osteoarthritis. Conservative measures have failed.      Plan:  The plan is to proceed with Right Total Knee Replacement. The patient voiced understanding of the risks, benefits, and alternative forms of treatment that were discussed with Dr Jon at the time of scheduling. Patient is planning on going home with home health next day    Kadie Sandoval, APRN  5/24/2018        "

## 2018-05-30 ENCOUNTER — ANESTHESIA (OUTPATIENT)
Dept: PERIOP | Facility: HOSPITAL | Age: 83
End: 2018-05-30

## 2018-05-30 ENCOUNTER — HOSPITAL ENCOUNTER (OUTPATIENT)
Facility: HOSPITAL | Age: 83
Discharge: HOME HEALTH | End: 2018-05-31
Attending: ORTHOPAEDIC SURGERY | Admitting: ORTHOPAEDIC SURGERY

## 2018-05-30 ENCOUNTER — ANESTHESIA EVENT (OUTPATIENT)
Dept: PERIOP | Facility: HOSPITAL | Age: 83
End: 2018-05-30

## 2018-05-30 ENCOUNTER — APPOINTMENT (OUTPATIENT)
Dept: GENERAL RADIOLOGY | Facility: HOSPITAL | Age: 83
End: 2018-05-30

## 2018-05-30 DIAGNOSIS — M17.11 PRIMARY OSTEOARTHRITIS OF RIGHT KNEE: ICD-10-CM

## 2018-05-30 DIAGNOSIS — R26.2 DIFFICULTY WALKING: Primary | ICD-10-CM

## 2018-05-30 PROBLEM — M17.12 PRIMARY OSTEOARTHRITIS OF LEFT KNEE: Status: ACTIVE | Noted: 2018-05-30

## 2018-05-30 PROCEDURE — A9270 NON-COVERED ITEM OR SERVICE: HCPCS | Performed by: ORTHOPAEDIC SURGERY

## 2018-05-30 PROCEDURE — 63710000001 PREGABALIN 75 MG CAPSULE: Performed by: ORTHOPAEDIC SURGERY

## 2018-05-30 PROCEDURE — 63710000001 POVIDONE-IODINE 10 % SOLUTION 30 ML BOTTLE: Performed by: ORTHOPAEDIC SURGERY

## 2018-05-30 PROCEDURE — 63710000001 HYDROCODONE-ACETAMINOPHEN 7.5-325 MG TABLET: Performed by: ORTHOPAEDIC SURGERY

## 2018-05-30 PROCEDURE — 25010000002 PROPOFOL 10 MG/ML EMULSION: Performed by: NURSE ANESTHETIST, CERTIFIED REGISTERED

## 2018-05-30 PROCEDURE — C1713 ANCHOR/SCREW BN/BN,TIS/BN: HCPCS | Performed by: ORTHOPAEDIC SURGERY

## 2018-05-30 PROCEDURE — 25010000002 DEXAMETHASONE PER 1 MG: Performed by: NURSE ANESTHETIST, CERTIFIED REGISTERED

## 2018-05-30 PROCEDURE — 97161 PT EVAL LOW COMPLEX 20 MIN: CPT

## 2018-05-30 PROCEDURE — 25010000002 FENTANYL CITRATE (PF) 100 MCG/2ML SOLUTION: Performed by: NURSE ANESTHETIST, CERTIFIED REGISTERED

## 2018-05-30 PROCEDURE — 25010000003 CEFAZOLIN IN DEXTROSE 2-4 GM/100ML-% SOLUTION: Performed by: ORTHOPAEDIC SURGERY

## 2018-05-30 PROCEDURE — 25010000002 VANCOMYCIN 750 MG RECONSTITUTED SOLUTION: Performed by: ORTHOPAEDIC SURGERY

## 2018-05-30 PROCEDURE — 25010000002 FENTANYL CITRATE (PF) 100 MCG/2ML SOLUTION: Performed by: ANESTHESIOLOGY

## 2018-05-30 PROCEDURE — 27447 TOTAL KNEE ARTHROPLASTY: CPT | Performed by: ORTHOPAEDIC SURGERY

## 2018-05-30 PROCEDURE — 73560 X-RAY EXAM OF KNEE 1 OR 2: CPT

## 2018-05-30 PROCEDURE — G0378 HOSPITAL OBSERVATION PER HR: HCPCS

## 2018-05-30 PROCEDURE — 63710000001 MASTISOL LIQUID: Performed by: ORTHOPAEDIC SURGERY

## 2018-05-30 PROCEDURE — 25010000002 ONDANSETRON PER 1 MG: Performed by: NURSE ANESTHETIST, CERTIFIED REGISTERED

## 2018-05-30 PROCEDURE — 63710000001 POLYETHYLENE GLYCOL PACK: Performed by: ORTHOPAEDIC SURGERY

## 2018-05-30 PROCEDURE — 63710000001 MELOXICAM 15 MG TABLET: Performed by: ORTHOPAEDIC SURGERY

## 2018-05-30 PROCEDURE — G8978 MOBILITY CURRENT STATUS: HCPCS

## 2018-05-30 PROCEDURE — 97110 THERAPEUTIC EXERCISES: CPT

## 2018-05-30 PROCEDURE — G8979 MOBILITY GOAL STATUS: HCPCS

## 2018-05-30 PROCEDURE — 25010000002 MIDAZOLAM PER 1 MG: Performed by: ANESTHESIOLOGY

## 2018-05-30 PROCEDURE — 63710000001 DOCUSATE SODIUM 100 MG CAPSULE: Performed by: ORTHOPAEDIC SURGERY

## 2018-05-30 PROCEDURE — 63710000001 MUPIROCIN 2 % OINTMENT 1 G TUBE: Performed by: ORTHOPAEDIC SURGERY

## 2018-05-30 PROCEDURE — 25010000002 PROMETHAZINE PER 50 MG: Performed by: NURSE ANESTHETIST, CERTIFIED REGISTERED

## 2018-05-30 PROCEDURE — C1776 JOINT DEVICE (IMPLANTABLE): HCPCS | Performed by: ORTHOPAEDIC SURGERY

## 2018-05-30 PROCEDURE — 63710000001 LEFLUNOMIDE 20 MG TABLET: Performed by: ORTHOPAEDIC SURGERY

## 2018-05-30 PROCEDURE — 25010000002 KETOROLAC TROMETHAMINE PER 15 MG: Performed by: ORTHOPAEDIC SURGERY

## 2018-05-30 DEVICE — GENESIS II CRUCIATE RETAINING DEEP                                    FLEXION INSERT SIZE1-2 11M
Type: IMPLANTABLE DEVICE | Site: KNEE | Status: FUNCTIONAL
Brand: GENESIS II

## 2018-05-30 DEVICE — LEGION CRUCIATE RETAINING OXINIUM                                    FEMORAL SIZE 3 RIGHT
Type: IMPLANTABLE DEVICE | Site: KNEE | Status: FUNCTIONAL
Brand: LEGION

## 2018-05-30 DEVICE — GENESIS II NON-POROUS TIBIAL                                    BASEPLATE SIZE 2 RIGHT
Type: IMPLANTABLE DEVICE | Site: KNEE | Status: FUNCTIONAL
Brand: GENESIS II

## 2018-05-30 DEVICE — CMT BONE PALACOS R HI/VISC 1X40: Type: IMPLANTABLE DEVICE | Site: KNEE | Status: FUNCTIONAL

## 2018-05-30 DEVICE — GENESIS II BICONVEX PATELLA 23MM
Type: IMPLANTABLE DEVICE | Site: KNEE | Status: FUNCTIONAL
Brand: GENESIS II

## 2018-05-30 DEVICE — IMPLANTABLE DEVICE: Type: IMPLANTABLE DEVICE | Site: KNEE | Status: FUNCTIONAL

## 2018-05-30 RX ORDER — PROMETHAZINE HYDROCHLORIDE 25 MG/ML
12.5 INJECTION, SOLUTION INTRAMUSCULAR; INTRAVENOUS ONCE AS NEEDED
Status: COMPLETED | OUTPATIENT
Start: 2018-05-30 | End: 2018-05-30

## 2018-05-30 RX ORDER — ONDANSETRON 4 MG/1
4 TABLET, FILM COATED ORAL EVERY 6 HOURS PRN
Status: DISCONTINUED | OUTPATIENT
Start: 2018-05-30 | End: 2018-05-31 | Stop reason: HOSPADM

## 2018-05-30 RX ORDER — HYDROCODONE BITARTRATE AND ACETAMINOPHEN 7.5; 325 MG/1; MG/1
1 TABLET ORAL EVERY 4 HOURS PRN
Status: DISCONTINUED | OUTPATIENT
Start: 2018-05-30 | End: 2018-05-31 | Stop reason: HOSPADM

## 2018-05-30 RX ORDER — FENTANYL CITRATE 50 UG/ML
INJECTION, SOLUTION INTRAMUSCULAR; INTRAVENOUS AS NEEDED
Status: DISCONTINUED | OUTPATIENT
Start: 2018-05-30 | End: 2018-05-30 | Stop reason: SURG

## 2018-05-30 RX ORDER — EPHEDRINE SULFATE 50 MG/ML
INJECTION, SOLUTION INTRAVENOUS AS NEEDED
Status: DISCONTINUED | OUTPATIENT
Start: 2018-05-30 | End: 2018-05-30 | Stop reason: SURG

## 2018-05-30 RX ORDER — ONDANSETRON 2 MG/ML
4 INJECTION INTRAMUSCULAR; INTRAVENOUS EVERY 6 HOURS PRN
Status: DISCONTINUED | OUTPATIENT
Start: 2018-05-30 | End: 2018-05-31 | Stop reason: HOSPADM

## 2018-05-30 RX ORDER — ASPIRIN 325 MG
325 TABLET, DELAYED RELEASE (ENTERIC COATED) ORAL EVERY 12 HOURS SCHEDULED
Status: DISCONTINUED | OUTPATIENT
Start: 2018-05-31 | End: 2018-05-31 | Stop reason: HOSPADM

## 2018-05-30 RX ORDER — ONDANSETRON 4 MG/1
4 TABLET, ORALLY DISINTEGRATING ORAL EVERY 6 HOURS PRN
Status: DISCONTINUED | OUTPATIENT
Start: 2018-05-30 | End: 2018-05-31 | Stop reason: HOSPADM

## 2018-05-30 RX ORDER — LABETALOL HYDROCHLORIDE 5 MG/ML
5 INJECTION, SOLUTION INTRAVENOUS
Status: DISCONTINUED | OUTPATIENT
Start: 2018-05-30 | End: 2018-05-30 | Stop reason: HOSPADM

## 2018-05-30 RX ORDER — ONDANSETRON 2 MG/ML
INJECTION INTRAMUSCULAR; INTRAVENOUS AS NEEDED
Status: DISCONTINUED | OUTPATIENT
Start: 2018-05-30 | End: 2018-05-30 | Stop reason: SURG

## 2018-05-30 RX ORDER — FLUMAZENIL 0.1 MG/ML
0.2 INJECTION INTRAVENOUS AS NEEDED
Status: DISCONTINUED | OUTPATIENT
Start: 2018-05-30 | End: 2018-05-30 | Stop reason: HOSPADM

## 2018-05-30 RX ORDER — BUDESONIDE 3 MG/1
9 CAPSULE, COATED PELLETS ORAL EVERY MORNING
Status: DISCONTINUED | OUTPATIENT
Start: 2018-05-31 | End: 2018-05-31 | Stop reason: HOSPADM

## 2018-05-30 RX ORDER — PROMETHAZINE HYDROCHLORIDE 25 MG/1
25 SUPPOSITORY RECTAL ONCE AS NEEDED
Status: COMPLETED | OUTPATIENT
Start: 2018-05-30 | End: 2018-05-30

## 2018-05-30 RX ORDER — UREA 10 %
3 LOTION (ML) TOPICAL NIGHTLY PRN
Status: DISCONTINUED | OUTPATIENT
Start: 2018-05-30 | End: 2018-05-31 | Stop reason: HOSPADM

## 2018-05-30 RX ORDER — FAMOTIDINE 10 MG/ML
20 INJECTION, SOLUTION INTRAVENOUS ONCE
Status: COMPLETED | OUTPATIENT
Start: 2018-05-30 | End: 2018-05-30

## 2018-05-30 RX ORDER — MIDAZOLAM HYDROCHLORIDE 1 MG/ML
1 INJECTION INTRAMUSCULAR; INTRAVENOUS
Status: DISCONTINUED | OUTPATIENT
Start: 2018-05-30 | End: 2018-05-30 | Stop reason: HOSPADM

## 2018-05-30 RX ORDER — HYDROMORPHONE HYDROCHLORIDE 1 MG/ML
0.5 INJECTION, SOLUTION INTRAMUSCULAR; INTRAVENOUS; SUBCUTANEOUS
Status: DISCONTINUED | OUTPATIENT
Start: 2018-05-30 | End: 2018-05-30 | Stop reason: HOSPADM

## 2018-05-30 RX ORDER — PANTOPRAZOLE SODIUM 40 MG/1
40 TABLET, DELAYED RELEASE ORAL
Status: DISCONTINUED | OUTPATIENT
Start: 2018-05-31 | End: 2018-05-31 | Stop reason: HOSPADM

## 2018-05-30 RX ORDER — TRANEXAMIC ACID 100 MG/ML
INJECTION, SOLUTION INTRAVENOUS AS NEEDED
Status: DISCONTINUED | OUTPATIENT
Start: 2018-05-30 | End: 2018-05-30 | Stop reason: SURG

## 2018-05-30 RX ORDER — ACETAMINOPHEN 10 MG/ML
1000 INJECTION, SOLUTION INTRAVENOUS ONCE
Status: COMPLETED | OUTPATIENT
Start: 2018-05-30 | End: 2018-05-30

## 2018-05-30 RX ORDER — PREGABALIN 75 MG/1
150 CAPSULE ORAL ONCE
Status: COMPLETED | OUTPATIENT
Start: 2018-05-30 | End: 2018-05-30

## 2018-05-30 RX ORDER — HYDROCODONE BITARTRATE AND ACETAMINOPHEN 7.5; 325 MG/1; MG/1
2 TABLET ORAL EVERY 4 HOURS PRN
Status: DISCONTINUED | OUTPATIENT
Start: 2018-05-30 | End: 2018-05-31 | Stop reason: HOSPADM

## 2018-05-30 RX ORDER — CEFAZOLIN SODIUM 2 G/100ML
2 INJECTION, SOLUTION INTRAVENOUS EVERY 8 HOURS
Status: COMPLETED | OUTPATIENT
Start: 2018-05-30 | End: 2018-05-31

## 2018-05-30 RX ORDER — PROPOFOL 10 MG/ML
VIAL (ML) INTRAVENOUS AS NEEDED
Status: DISCONTINUED | OUTPATIENT
Start: 2018-05-30 | End: 2018-05-30 | Stop reason: SURG

## 2018-05-30 RX ORDER — LIDOCAINE HYDROCHLORIDE 10 MG/ML
0.5 INJECTION, SOLUTION EPIDURAL; INFILTRATION; INTRACAUDAL; PERINEURAL ONCE AS NEEDED
Status: DISCONTINUED | OUTPATIENT
Start: 2018-05-30 | End: 2018-05-30 | Stop reason: HOSPADM

## 2018-05-30 RX ORDER — KETOROLAC TROMETHAMINE 30 MG/ML
30 INJECTION, SOLUTION INTRAMUSCULAR; INTRAVENOUS EVERY 8 HOURS
Status: DISCONTINUED | OUTPATIENT
Start: 2018-05-30 | End: 2018-05-31 | Stop reason: HOSPADM

## 2018-05-30 RX ORDER — CEFAZOLIN SODIUM 2 G/100ML
2 INJECTION, SOLUTION INTRAVENOUS ONCE
Status: COMPLETED | OUTPATIENT
Start: 2018-05-30 | End: 2018-05-30

## 2018-05-30 RX ORDER — MELOXICAM 15 MG/1
15 TABLET ORAL ONCE
Status: COMPLETED | OUTPATIENT
Start: 2018-05-30 | End: 2018-05-30

## 2018-05-30 RX ORDER — ONDANSETRON 2 MG/ML
4 INJECTION INTRAMUSCULAR; INTRAVENOUS ONCE AS NEEDED
Status: COMPLETED | OUTPATIENT
Start: 2018-05-30 | End: 2018-05-30

## 2018-05-30 RX ORDER — DEXAMETHASONE SODIUM PHOSPHATE 10 MG/ML
INJECTION INTRAMUSCULAR; INTRAVENOUS AS NEEDED
Status: DISCONTINUED | OUTPATIENT
Start: 2018-05-30 | End: 2018-05-30 | Stop reason: SURG

## 2018-05-30 RX ORDER — MAGNESIUM HYDROXIDE 1200 MG/15ML
LIQUID ORAL AS NEEDED
Status: DISCONTINUED | OUTPATIENT
Start: 2018-05-30 | End: 2018-05-30 | Stop reason: HOSPADM

## 2018-05-30 RX ORDER — WOUND DRESSING ADHESIVE - LIQUID
LIQUID MISCELLANEOUS AS NEEDED
Status: DISCONTINUED | OUTPATIENT
Start: 2018-05-30 | End: 2018-05-30 | Stop reason: HOSPADM

## 2018-05-30 RX ORDER — SODIUM CHLORIDE, SODIUM LACTATE, POTASSIUM CHLORIDE, CALCIUM CHLORIDE 600; 310; 30; 20 MG/100ML; MG/100ML; MG/100ML; MG/100ML
9 INJECTION, SOLUTION INTRAVENOUS CONTINUOUS
Status: DISCONTINUED | OUTPATIENT
Start: 2018-05-30 | End: 2018-05-30 | Stop reason: HOSPADM

## 2018-05-30 RX ORDER — LEFLUNOMIDE 20 MG/1
10 TABLET ORAL DAILY
Status: DISCONTINUED | OUTPATIENT
Start: 2018-05-30 | End: 2018-05-31 | Stop reason: HOSPADM

## 2018-05-30 RX ORDER — FENTANYL CITRATE 50 UG/ML
50 INJECTION, SOLUTION INTRAMUSCULAR; INTRAVENOUS
Status: DISCONTINUED | OUTPATIENT
Start: 2018-05-30 | End: 2018-05-30 | Stop reason: HOSPADM

## 2018-05-30 RX ORDER — DOCUSATE SODIUM 100 MG/1
100 CAPSULE, LIQUID FILLED ORAL 2 TIMES DAILY
Status: DISCONTINUED | OUTPATIENT
Start: 2018-05-30 | End: 2018-05-31 | Stop reason: HOSPADM

## 2018-05-30 RX ORDER — HYDRALAZINE HYDROCHLORIDE 20 MG/ML
5 INJECTION INTRAMUSCULAR; INTRAVENOUS
Status: DISCONTINUED | OUTPATIENT
Start: 2018-05-30 | End: 2018-05-30 | Stop reason: HOSPADM

## 2018-05-30 RX ORDER — EPHEDRINE SULFATE 50 MG/ML
5 INJECTION, SOLUTION INTRAVENOUS ONCE AS NEEDED
Status: DISCONTINUED | OUTPATIENT
Start: 2018-05-30 | End: 2018-05-30 | Stop reason: HOSPADM

## 2018-05-30 RX ORDER — NALOXONE HCL 0.4 MG/ML
0.2 VIAL (ML) INJECTION AS NEEDED
Status: DISCONTINUED | OUTPATIENT
Start: 2018-05-30 | End: 2018-05-30 | Stop reason: HOSPADM

## 2018-05-30 RX ORDER — MIDAZOLAM HYDROCHLORIDE 1 MG/ML
2 INJECTION INTRAMUSCULAR; INTRAVENOUS
Status: DISCONTINUED | OUTPATIENT
Start: 2018-05-30 | End: 2018-05-30 | Stop reason: HOSPADM

## 2018-05-30 RX ORDER — SODIUM CHLORIDE 0.9 % (FLUSH) 0.9 %
1-10 SYRINGE (ML) INJECTION AS NEEDED
Status: DISCONTINUED | OUTPATIENT
Start: 2018-05-30 | End: 2018-05-30 | Stop reason: HOSPADM

## 2018-05-30 RX ORDER — DIPHENHYDRAMINE HYDROCHLORIDE 50 MG/ML
12.5 INJECTION INTRAMUSCULAR; INTRAVENOUS
Status: DISCONTINUED | OUTPATIENT
Start: 2018-05-30 | End: 2018-05-30 | Stop reason: HOSPADM

## 2018-05-30 RX ORDER — BISACODYL 10 MG
10 SUPPOSITORY, RECTAL RECTAL DAILY PRN
Status: DISCONTINUED | OUTPATIENT
Start: 2018-05-30 | End: 2018-05-31 | Stop reason: HOSPADM

## 2018-05-30 RX ORDER — PROMETHAZINE HYDROCHLORIDE 25 MG/1
25 TABLET ORAL ONCE AS NEEDED
Status: COMPLETED | OUTPATIENT
Start: 2018-05-30 | End: 2018-05-30

## 2018-05-30 RX ORDER — MELOXICAM 15 MG/1
15 TABLET ORAL DAILY
Status: DISCONTINUED | OUTPATIENT
Start: 2018-05-31 | End: 2018-05-31 | Stop reason: HOSPADM

## 2018-05-30 RX ORDER — LIDOCAINE HYDROCHLORIDE 20 MG/ML
INJECTION, SOLUTION INFILTRATION; PERINEURAL AS NEEDED
Status: DISCONTINUED | OUTPATIENT
Start: 2018-05-30 | End: 2018-05-30 | Stop reason: SURG

## 2018-05-30 RX ORDER — AMLODIPINE BESYLATE 2.5 MG/1
2.5 TABLET ORAL EVERY MORNING
Status: DISCONTINUED | OUTPATIENT
Start: 2018-05-31 | End: 2018-05-31 | Stop reason: HOSPADM

## 2018-05-30 RX ADMIN — MIDAZOLAM 1 MG: 1 INJECTION INTRAMUSCULAR; INTRAVENOUS at 09:57

## 2018-05-30 RX ADMIN — FAMOTIDINE 20 MG: 10 INJECTION, SOLUTION INTRAVENOUS at 09:57

## 2018-05-30 RX ADMIN — ACETAMINOPHEN 1000 MG: 10 INJECTION, SOLUTION INTRAVENOUS at 11:10

## 2018-05-30 RX ADMIN — POLYETHYLENE GLYCOL 3350 17 G: 17 POWDER, FOR SOLUTION ORAL at 20:25

## 2018-05-30 RX ADMIN — ONDANSETRON 4 MG: 2 INJECTION INTRAMUSCULAR; INTRAVENOUS at 11:59

## 2018-05-30 RX ADMIN — FENTANYL CITRATE 50 MCG: 50 INJECTION INTRAMUSCULAR; INTRAVENOUS at 11:23

## 2018-05-30 RX ADMIN — FENTANYL CITRATE 50 MCG: 50 INJECTION INTRAMUSCULAR; INTRAVENOUS at 12:51

## 2018-05-30 RX ADMIN — PREGABALIN 150 MG: 75 CAPSULE ORAL at 09:51

## 2018-05-30 RX ADMIN — EPHEDRINE SULFATE 5 MG: 50 INJECTION INTRAMUSCULAR; INTRAVENOUS; SUBCUTANEOUS at 11:28

## 2018-05-30 RX ADMIN — SODIUM CHLORIDE, POTASSIUM CHLORIDE, SODIUM LACTATE AND CALCIUM CHLORIDE 500 ML: 600; 310; 30; 20 INJECTION, SOLUTION INTRAVENOUS at 09:51

## 2018-05-30 RX ADMIN — TRANEXAMIC ACID 1000 MG: 100 INJECTION, SOLUTION INTRAVENOUS at 11:07

## 2018-05-30 RX ADMIN — FENTANYL CITRATE 50 MCG: 50 INJECTION INTRAMUSCULAR; INTRAVENOUS at 13:06

## 2018-05-30 RX ADMIN — LIDOCAINE HYDROCHLORIDE 60 MG: 20 INJECTION, SOLUTION INFILTRATION; PERINEURAL at 10:54

## 2018-05-30 RX ADMIN — DOCUSATE SODIUM 100 MG: 100 CAPSULE, LIQUID FILLED ORAL at 20:25

## 2018-05-30 RX ADMIN — KETOROLAC TROMETHAMINE 30 MG: 30 INJECTION, SOLUTION INTRAMUSCULAR at 16:29

## 2018-05-30 RX ADMIN — DEXAMETHASONE SODIUM PHOSPHATE 8 MG: 10 INJECTION INTRAMUSCULAR; INTRAVENOUS at 11:04

## 2018-05-30 RX ADMIN — PROPOFOL 100 MG: 10 INJECTION, EMULSION INTRAVENOUS at 10:54

## 2018-05-30 RX ADMIN — FENTANYL CITRATE 50 MCG: 50 INJECTION INTRAMUSCULAR; INTRAVENOUS at 10:54

## 2018-05-30 RX ADMIN — CEFAZOLIN SODIUM 2 G: 2 INJECTION, SOLUTION INTRAVENOUS at 18:43

## 2018-05-30 RX ADMIN — HYDROCODONE BITARTRATE AND ACETAMINOPHEN 1 TABLET: 7.5; 325 TABLET ORAL at 23:25

## 2018-05-30 RX ADMIN — HYDROCODONE BITARTRATE AND ACETAMINOPHEN 1 TABLET: 7.5; 325 TABLET ORAL at 18:44

## 2018-05-30 RX ADMIN — MELOXICAM 15 MG: 15 TABLET ORAL at 09:51

## 2018-05-30 RX ADMIN — PROMETHAZINE HYDROCHLORIDE 12.5 MG: 25 INJECTION INTRAMUSCULAR; INTRAVENOUS at 13:18

## 2018-05-30 RX ADMIN — TRANEXAMIC ACID 1000 MG: 100 INJECTION, SOLUTION INTRAVENOUS at 12:05

## 2018-05-30 RX ADMIN — LEFLUNOMIDE 10 MG: 20 TABLET ORAL at 18:43

## 2018-05-30 RX ADMIN — SODIUM CHLORIDE, POTASSIUM CHLORIDE, SODIUM LACTATE AND CALCIUM CHLORIDE: 600; 310; 30; 20 INJECTION, SOLUTION INTRAVENOUS at 12:30

## 2018-05-30 RX ADMIN — MIDAZOLAM 1 MG: 1 INJECTION INTRAMUSCULAR; INTRAVENOUS at 10:30

## 2018-05-30 RX ADMIN — FENTANYL CITRATE 50 MCG: 50 INJECTION INTRAMUSCULAR; INTRAVENOUS at 11:37

## 2018-05-30 RX ADMIN — KETOROLAC TROMETHAMINE 30 MG: 30 INJECTION, SOLUTION INTRAMUSCULAR at 23:40

## 2018-05-30 RX ADMIN — SODIUM CHLORIDE 750 MG: 900 INJECTION, SOLUTION INTRAVENOUS at 09:52

## 2018-05-30 RX ADMIN — ONDANSETRON 4 MG: 2 INJECTION INTRAMUSCULAR; INTRAVENOUS at 12:54

## 2018-05-30 RX ADMIN — CEFAZOLIN SODIUM 2 G: 2 INJECTION, SOLUTION INTRAVENOUS at 11:05

## 2018-05-30 RX ADMIN — MUPIROCIN: 20 OINTMENT TOPICAL at 20:25

## 2018-05-30 RX ADMIN — HYDROCODONE BITARTRATE AND ACETAMINOPHEN 1 TABLET: 7.5; 325 TABLET ORAL at 17:25

## 2018-05-30 RX ADMIN — FENTANYL CITRATE 50 MCG: 50 INJECTION INTRAMUSCULAR; INTRAVENOUS at 09:57

## 2018-05-30 RX ADMIN — SODIUM CHLORIDE, POTASSIUM CHLORIDE, SODIUM LACTATE AND CALCIUM CHLORIDE 9 ML/HR: 600; 310; 30; 20 INJECTION, SOLUTION INTRAVENOUS at 09:52

## 2018-05-30 NOTE — ANESTHESIA POSTPROCEDURE EVALUATION
Patient: Arlin Hutson    Procedure Summary     Date:  05/30/18 Room / Location:  [facility] OR [address] [place] [address] MAIN OR    Anesthesia Start:  1047 Anesthesia Stop:  1240    Procedure:  TOTAL KNEE ARTHROPLASTY (Right Knee) Diagnosis:       Primary osteoarthritis of right knee      (Primary osteoarthritis of right knee [M17.11])    Surgeon:  Georges Jon MD Provider:  Onel Drake MD    Anesthesia Type:  general ASA Status:  2          Anesthesia Type: general  Last vitals  BP   157/80 (05/30/18 1325)   Temp   36.5 °C (97.7 °F) (05/30/18 1237)   Pulse   76 (05/30/18 1325)   Resp   16 (05/30/18 1325)     SpO2   94 % (05/30/18 1325)     Post Anesthesia Care and Evaluation    Patient location during evaluation: bedside  Patient participation: complete - patient participated  Level of consciousness: awake and alert  Pain management: adequate  Airway patency: patent  Anesthetic complications: No anesthetic complications  PONV Status: none  Cardiovascular status: acceptable  Respiratory status: acceptable  Hydration status: acceptable    Comments: /80   Pulse 76   Temp 36.5 °C (97.7 °F) (Oral)   Resp 16   SpO2 94%

## 2018-05-30 NOTE — ANESTHESIA PROCEDURE NOTES
Peripheral Block    Patient location during procedure: holding area  Start time: 5/30/2018 9:56 AM  Stop time: 5/30/2018 10:02 AM  Reason for block: at surgeon's request and post-op pain management  Performed by  Anesthesiologist: KASSANDRA MORA  Preanesthetic Checklist  Completed: patient identified, site marked, surgical consent, pre-op evaluation, timeout performed, IV checked, risks and benefits discussed and monitors and equipment checked  Prep:  Pt Position: prone  Sterile barriers:gloves and sterile barriers  Prep: ChloraPrep  Patient monitoring: blood pressure monitoring, continuous pulse oximetry and EKG  Procedure  Sedation:yes  Performed under: local infiltration  Guidance:ultrasound guided  ULTRASOUND INTERPRETATION. Using ultrasound guidance a gauge needle was placed in close proximity to the femoral nerve, at which point, under ultrasound guidance anesthetic was injected in the area of the nerve and spread of the anesthesia was seen on ultrasound in close proximity thereto.  There were no abnormalities seen on ultrasound; a digital image was taken; and the patient tolerated the procedure with no complications. Images:still images obtained    Laterality:right  Block Type:adductor canal block and femoral  Injection Technique:single-shot  Needle Type:echogenic    Medications  Local Injected:ropivacaine 0.5% Local Amount Injected:30mL  Post Assessment  Injection Assessment: negative aspiration for heme, no paresthesia on injection and incremental injection  Patient Tolerance:comfortable throughout block  Complications:no

## 2018-05-30 NOTE — ANESTHESIA PROCEDURE NOTES
Airway  Urgency: elective    Airway not difficult    General Information and Staff    Patient location during procedure: OR  CRNA: YOVANY SONG    Indications and Patient Condition  Indications for airway management: airway protection    Preoxygenated: yes  Mask difficulty assessment: 1 - vent by mask    Final Airway Details  Final airway type: supraglottic airway      Successful airway: classic  Size 3    Number of attempts at approach: 1    Additional Comments  LMA placed easily.  Cuff MOP.

## 2018-05-30 NOTE — ANESTHESIA PREPROCEDURE EVALUATION
Anesthesia Evaluation     Patient summary reviewed and Nursing notes reviewed   NPO Solid Status: > 8 hours  NPO Liquid Status: > 4 hours           Airway   Mallampati: I  Dental      Pulmonary - negative pulmonary ROS and normal exam    breath sounds clear to auscultation  Cardiovascular - normal exam    ECG reviewed    (+) hypertension poorly controlled less than 2 medications, PVD,       Neuro/Psych- negative ROS  GI/Hepatic/Renal/Endo - negative ROS     Musculoskeletal     Abdominal    Substance History - negative use     OB/GYN          Other   (+) arthritis                     Anesthesia Plan    ASA 2     general     intravenous induction   Anesthetic plan and risks discussed with patient.

## 2018-05-31 VITALS
TEMPERATURE: 97 F | HEART RATE: 89 BPM | SYSTOLIC BLOOD PRESSURE: 141 MMHG | OXYGEN SATURATION: 97 % | DIASTOLIC BLOOD PRESSURE: 75 MMHG | RESPIRATION RATE: 18 BRPM | BODY MASS INDEX: 20.62 KG/M2 | WEIGHT: 105 LBS | HEIGHT: 60 IN

## 2018-05-31 LAB
HCT VFR BLD AUTO: 36 % (ref 35.6–45.5)
HGB BLD-MCNC: 11.1 G/DL (ref 11.9–15.5)

## 2018-05-31 PROCEDURE — A9270 NON-COVERED ITEM OR SERVICE: HCPCS | Performed by: ORTHOPAEDIC SURGERY

## 2018-05-31 PROCEDURE — G0378 HOSPITAL OBSERVATION PER HR: HCPCS

## 2018-05-31 PROCEDURE — 63710000001 HYDROCODONE-ACETAMINOPHEN 7.5-325 MG TABLET: Performed by: ORTHOPAEDIC SURGERY

## 2018-05-31 PROCEDURE — 85018 HEMOGLOBIN: CPT | Performed by: ORTHOPAEDIC SURGERY

## 2018-05-31 PROCEDURE — 85014 HEMATOCRIT: CPT | Performed by: ORTHOPAEDIC SURGERY

## 2018-05-31 PROCEDURE — 63710000001 MELOXICAM 15 MG TABLET: Performed by: ORTHOPAEDIC SURGERY

## 2018-05-31 PROCEDURE — 97150 GROUP THERAPEUTIC PROCEDURES: CPT

## 2018-05-31 PROCEDURE — 25010000003 CEFAZOLIN IN DEXTROSE 2-4 GM/100ML-% SOLUTION: Performed by: ORTHOPAEDIC SURGERY

## 2018-05-31 PROCEDURE — 99024 POSTOP FOLLOW-UP VISIT: CPT | Performed by: NURSE PRACTITIONER

## 2018-05-31 PROCEDURE — 63710000001 POLYETHYLENE GLYCOL PACK: Performed by: ORTHOPAEDIC SURGERY

## 2018-05-31 PROCEDURE — 63710000001 BUDESONIDE 3 MG CAPSULE DELAYED-RELEASE PARTICLES: Performed by: ORTHOPAEDIC SURGERY

## 2018-05-31 PROCEDURE — 63710000001 MUPIROCIN 2 % OINTMENT 1 G TUBE: Performed by: ORTHOPAEDIC SURGERY

## 2018-05-31 PROCEDURE — 63710000001 ASPIRIN EC 325 MG TABLET DELAYED-RELEASE: Performed by: ORTHOPAEDIC SURGERY

## 2018-05-31 PROCEDURE — 97110 THERAPEUTIC EXERCISES: CPT

## 2018-05-31 PROCEDURE — 25010000002 KETOROLAC TROMETHAMINE PER 15 MG: Performed by: ORTHOPAEDIC SURGERY

## 2018-05-31 PROCEDURE — 63710000001 PANTOPRAZOLE 40 MG TABLET DELAYED-RELEASE: Performed by: ORTHOPAEDIC SURGERY

## 2018-05-31 PROCEDURE — 63710000001 LEFLUNOMIDE 20 MG TABLET: Performed by: ORTHOPAEDIC SURGERY

## 2018-05-31 PROCEDURE — 63710000001 AMLODIPINE 2.5 MG TABLET: Performed by: ORTHOPAEDIC SURGERY

## 2018-05-31 RX ORDER — PSEUDOEPHEDRINE HCL 30 MG
100 TABLET ORAL 2 TIMES DAILY
Qty: 27 CAPSULE | Refills: 0 | Status: SHIPPED | OUTPATIENT
Start: 2018-05-31 | End: 2018-06-14

## 2018-05-31 RX ORDER — MELOXICAM 15 MG/1
7.5 TABLET ORAL DAILY
Qty: 5 TABLET | Refills: 0 | Status: SHIPPED | OUTPATIENT
Start: 2018-06-01 | End: 2018-06-11

## 2018-05-31 RX ORDER — ONDANSETRON 4 MG/1
4 TABLET, FILM COATED ORAL EVERY 6 HOURS PRN
Qty: 10 TABLET | Refills: 0 | Status: SHIPPED | OUTPATIENT
Start: 2018-05-31 | End: 2018-08-07

## 2018-05-31 RX ORDER — HYDROCODONE BITARTRATE AND ACETAMINOPHEN 7.5; 325 MG/1; MG/1
2 TABLET ORAL EVERY 4 HOURS PRN
Qty: 60 TABLET | Refills: 0 | Status: SHIPPED | OUTPATIENT
Start: 2018-05-31 | End: 2018-06-09

## 2018-05-31 RX ADMIN — CEFAZOLIN SODIUM 2 G: 2 INJECTION, SOLUTION INTRAVENOUS at 03:25

## 2018-05-31 RX ADMIN — PANTOPRAZOLE SODIUM 40 MG: 40 TABLET, DELAYED RELEASE ORAL at 05:41

## 2018-05-31 RX ADMIN — AMLODIPINE BESYLATE 2.5 MG: 2.5 TABLET ORAL at 08:28

## 2018-05-31 RX ADMIN — MUPIROCIN: 20 OINTMENT TOPICAL at 08:26

## 2018-05-31 RX ADMIN — LEFLUNOMIDE 10 MG: 20 TABLET ORAL at 08:27

## 2018-05-31 RX ADMIN — HYDROCODONE BITARTRATE AND ACETAMINOPHEN 1 TABLET: 7.5; 325 TABLET ORAL at 12:05

## 2018-05-31 RX ADMIN — POLYETHYLENE GLYCOL 3350 17 G: 17 POWDER, FOR SOLUTION ORAL at 08:28

## 2018-05-31 RX ADMIN — HYDROCODONE BITARTRATE AND ACETAMINOPHEN 1 TABLET: 7.5; 325 TABLET ORAL at 08:26

## 2018-05-31 RX ADMIN — MELOXICAM 15 MG: 15 TABLET ORAL at 08:27

## 2018-05-31 RX ADMIN — BUDESONIDE 9 MG: 3 CAPSULE ORAL at 08:26

## 2018-05-31 RX ADMIN — KETOROLAC TROMETHAMINE 30 MG: 30 INJECTION, SOLUTION INTRAMUSCULAR at 08:30

## 2018-05-31 RX ADMIN — ASPIRIN 325 MG: 325 TABLET, DELAYED RELEASE ORAL at 08:26

## 2018-06-01 ENCOUNTER — TELEPHONE (OUTPATIENT)
Dept: ORTHOPEDIC SURGERY | Facility: CLINIC | Age: 83
End: 2018-06-01

## 2018-06-02 ENCOUNTER — HOSPITAL ENCOUNTER (INPATIENT)
Facility: HOSPITAL | Age: 83
LOS: 3 days | Discharge: NURSING, REHAB OR LONG-TERM CARE | End: 2018-06-05
Attending: EMERGENCY MEDICINE | Admitting: HOSPITALIST

## 2018-06-02 ENCOUNTER — APPOINTMENT (OUTPATIENT)
Dept: GENERAL RADIOLOGY | Facility: HOSPITAL | Age: 83
End: 2018-06-02

## 2018-06-02 ENCOUNTER — APPOINTMENT (OUTPATIENT)
Dept: CARDIOLOGY | Facility: HOSPITAL | Age: 83
End: 2018-06-02
Attending: INTERNAL MEDICINE

## 2018-06-02 DIAGNOSIS — R26.2 DIFFICULTY IN WALKING: ICD-10-CM

## 2018-06-02 DIAGNOSIS — J18.9 PNEUMONIA OF LEFT LOWER LOBE DUE TO INFECTIOUS ORGANISM: Primary | ICD-10-CM

## 2018-06-02 PROBLEM — Z96.651 HISTORY OF TOTAL RIGHT KNEE REPLACEMENT: Status: ACTIVE | Noted: 2018-06-02

## 2018-06-02 LAB
ALBUMIN SERPL-MCNC: 3.4 G/DL (ref 3.5–5.2)
ALBUMIN/GLOB SERPL: 1.5 G/DL
ALP SERPL-CCNC: 82 U/L (ref 39–117)
ALT SERPL W P-5'-P-CCNC: 16 U/L (ref 1–33)
ANION GAP SERPL CALCULATED.3IONS-SCNC: 13 MMOL/L
ANION GAP SERPL CALCULATED.3IONS-SCNC: 13.1 MMOL/L
AST SERPL-CCNC: 18 U/L (ref 1–32)
BASOPHILS # BLD AUTO: 0.02 10*3/MM3 (ref 0–0.2)
BASOPHILS # BLD AUTO: 0.02 10*3/MM3 (ref 0–0.2)
BASOPHILS NFR BLD AUTO: 0.2 % (ref 0–1.5)
BASOPHILS NFR BLD AUTO: 0.3 % (ref 0–1.5)
BILIRUB SERPL-MCNC: 0.3 MG/DL (ref 0.1–1.2)
BILIRUB UR QL STRIP: NEGATIVE
BUN BLD-MCNC: 12 MG/DL (ref 8–23)
BUN BLD-MCNC: 14 MG/DL (ref 8–23)
BUN/CREAT SERPL: 18.8 (ref 7–25)
BUN/CREAT SERPL: 23.7 (ref 7–25)
CALCIUM SPEC-SCNC: 7.7 MG/DL (ref 8.6–10.5)
CALCIUM SPEC-SCNC: 7.8 MG/DL (ref 8.6–10.5)
CHLORIDE SERPL-SCNC: 100 MMOL/L (ref 98–107)
CHLORIDE SERPL-SCNC: 101 MMOL/L (ref 98–107)
CLARITY UR: CLEAR
CO2 SERPL-SCNC: 24.9 MMOL/L (ref 22–29)
CO2 SERPL-SCNC: 26 MMOL/L (ref 22–29)
COLOR UR: YELLOW
CREAT BLD-MCNC: 0.59 MG/DL (ref 0.57–1)
CREAT BLD-MCNC: 0.64 MG/DL (ref 0.57–1)
CRP SERPL-MCNC: 8.58 MG/DL (ref 0–0.5)
D-LACTATE SERPL-SCNC: 0.8 MMOL/L (ref 0.5–2)
D-LACTATE SERPL-SCNC: 0.8 MMOL/L (ref 0.5–2)
DEPRECATED RDW RBC AUTO: 50.2 FL (ref 37–54)
DEPRECATED RDW RBC AUTO: 50.6 FL (ref 37–54)
EOSINOPHIL # BLD AUTO: 0.03 10*3/MM3 (ref 0–0.7)
EOSINOPHIL # BLD AUTO: 0.19 10*3/MM3 (ref 0–0.7)
EOSINOPHIL NFR BLD AUTO: 0.3 % (ref 0.3–6.2)
EOSINOPHIL NFR BLD AUTO: 2.5 % (ref 0.3–6.2)
ERYTHROCYTE [DISTWIDTH] IN BLOOD BY AUTOMATED COUNT: 13.9 % (ref 11.7–13)
ERYTHROCYTE [DISTWIDTH] IN BLOOD BY AUTOMATED COUNT: 14 % (ref 11.7–13)
ERYTHROCYTE [SEDIMENTATION RATE] IN BLOOD: 39 MM/HR (ref 0–30)
GFR SERPL CREATININE-BSD FRML MDRD: 89 ML/MIN/1.73
GFR SERPL CREATININE-BSD FRML MDRD: 98 ML/MIN/1.73
GLOBULIN UR ELPH-MCNC: 2.2 GM/DL
GLUCOSE BLD-MCNC: 107 MG/DL (ref 65–99)
GLUCOSE BLD-MCNC: 109 MG/DL (ref 65–99)
GLUCOSE UR STRIP-MCNC: NEGATIVE MG/DL
HCT VFR BLD AUTO: 31.1 % (ref 35.6–45.5)
HCT VFR BLD AUTO: 34.2 % (ref 35.6–45.5)
HGB BLD-MCNC: 10.8 G/DL (ref 11.9–15.5)
HGB BLD-MCNC: 9.8 G/DL (ref 11.9–15.5)
HGB UR QL STRIP.AUTO: NEGATIVE
HOLD SPECIMEN: NORMAL
HOLD SPECIMEN: NORMAL
IMM GRANULOCYTES # BLD: 0.02 10*3/MM3 (ref 0–0.03)
IMM GRANULOCYTES # BLD: 0.02 10*3/MM3 (ref 0–0.03)
IMM GRANULOCYTES NFR BLD: 0.2 % (ref 0–0.5)
IMM GRANULOCYTES NFR BLD: 0.3 % (ref 0–0.5)
KETONES UR QL STRIP: NEGATIVE
LEUKOCYTE ESTERASE UR QL STRIP.AUTO: NEGATIVE
LYMPHOCYTES # BLD AUTO: 0.92 10*3/MM3 (ref 0.9–4.8)
LYMPHOCYTES # BLD AUTO: 1.05 10*3/MM3 (ref 0.9–4.8)
LYMPHOCYTES NFR BLD AUTO: 13.9 % (ref 19.6–45.3)
LYMPHOCYTES NFR BLD AUTO: 8.5 % (ref 19.6–45.3)
MCH RBC QN AUTO: 30.8 PG (ref 26.9–32)
MCH RBC QN AUTO: 31 PG (ref 26.9–32)
MCHC RBC AUTO-ENTMCNC: 31.5 G/DL (ref 32.4–36.3)
MCHC RBC AUTO-ENTMCNC: 31.6 G/DL (ref 32.4–36.3)
MCV RBC AUTO: 97.8 FL (ref 80.5–98.2)
MCV RBC AUTO: 98.3 FL (ref 80.5–98.2)
MONOCYTES # BLD AUTO: 0.53 10*3/MM3 (ref 0.2–1.2)
MONOCYTES # BLD AUTO: 0.69 10*3/MM3 (ref 0.2–1.2)
MONOCYTES NFR BLD AUTO: 4.9 % (ref 5–12)
MONOCYTES NFR BLD AUTO: 9.1 % (ref 5–12)
NEUTROPHILS # BLD AUTO: 5.59 10*3/MM3 (ref 1.9–8.1)
NEUTROPHILS # BLD AUTO: 9.32 10*3/MM3 (ref 1.9–8.1)
NEUTROPHILS NFR BLD AUTO: 73.9 % (ref 42.7–76)
NEUTROPHILS NFR BLD AUTO: 85.9 % (ref 42.7–76)
NITRITE UR QL STRIP: NEGATIVE
NRBC BLD MANUAL-RTO: 0 /100 WBC (ref 0–0)
PH UR STRIP.AUTO: 5.5 [PH] (ref 5–8)
PLATELET # BLD AUTO: 169 10*3/MM3 (ref 140–500)
PLATELET # BLD AUTO: 185 10*3/MM3 (ref 140–500)
PMV BLD AUTO: 10.4 FL (ref 6–12)
PMV BLD AUTO: 10.7 FL (ref 6–12)
POTASSIUM BLD-SCNC: 3.2 MMOL/L (ref 3.5–5.2)
POTASSIUM BLD-SCNC: 3.7 MMOL/L (ref 3.5–5.2)
PROT SERPL-MCNC: 5.6 G/DL (ref 6–8.5)
PROT UR QL STRIP: NEGATIVE
RBC # BLD AUTO: 3.18 10*6/MM3 (ref 3.9–5.2)
RBC # BLD AUTO: 3.48 10*6/MM3 (ref 3.9–5.2)
SODIUM BLD-SCNC: 139 MMOL/L (ref 136–145)
SODIUM BLD-SCNC: 139 MMOL/L (ref 136–145)
SP GR UR STRIP: 1.01 (ref 1–1.03)
UROBILINOGEN UR QL STRIP: NORMAL
WBC NRBC COR # BLD: 10.84 10*3/MM3 (ref 4.5–10.7)
WBC NRBC COR # BLD: 7.56 10*3/MM3 (ref 4.5–10.7)
WHOLE BLOOD HOLD SPECIMEN: NORMAL
WHOLE BLOOD HOLD SPECIMEN: NORMAL

## 2018-06-02 PROCEDURE — 85025 COMPLETE CBC W/AUTO DIFF WBC: CPT | Performed by: INTERNAL MEDICINE

## 2018-06-02 PROCEDURE — 25010000002 ONDANSETRON PER 1 MG: Performed by: NURSE PRACTITIONER

## 2018-06-02 PROCEDURE — 87040 BLOOD CULTURE FOR BACTERIA: CPT | Performed by: EMERGENCY MEDICINE

## 2018-06-02 PROCEDURE — 25010000002 MORPHINE PER 10 MG: Performed by: NURSE PRACTITIONER

## 2018-06-02 PROCEDURE — 94799 UNLISTED PULMONARY SVC/PX: CPT

## 2018-06-02 PROCEDURE — 83605 ASSAY OF LACTIC ACID: CPT | Performed by: INTERNAL MEDICINE

## 2018-06-02 PROCEDURE — 25010000002 VANCOMYCIN PER 500 MG: Performed by: NURSE PRACTITIONER

## 2018-06-02 PROCEDURE — 83605 ASSAY OF LACTIC ACID: CPT | Performed by: EMERGENCY MEDICINE

## 2018-06-02 PROCEDURE — 71046 X-RAY EXAM CHEST 2 VIEWS: CPT

## 2018-06-02 PROCEDURE — 86140 C-REACTIVE PROTEIN: CPT | Performed by: EMERGENCY MEDICINE

## 2018-06-02 PROCEDURE — 25010000002 ENOXAPARIN PER 10 MG: Performed by: INTERNAL MEDICINE

## 2018-06-02 PROCEDURE — 25010000002 PIPERACILLIN SOD-TAZOBACTAM PER 1 G: Performed by: INTERNAL MEDICINE

## 2018-06-02 PROCEDURE — 81003 URINALYSIS AUTO W/O SCOPE: CPT | Performed by: EMERGENCY MEDICINE

## 2018-06-02 PROCEDURE — 99285 EMERGENCY DEPT VISIT HI MDM: CPT

## 2018-06-02 PROCEDURE — 73560 X-RAY EXAM OF KNEE 1 OR 2: CPT

## 2018-06-02 PROCEDURE — 80053 COMPREHEN METABOLIC PANEL: CPT | Performed by: EMERGENCY MEDICINE

## 2018-06-02 PROCEDURE — 93971 EXTREMITY STUDY: CPT

## 2018-06-02 PROCEDURE — 85025 COMPLETE CBC W/AUTO DIFF WBC: CPT | Performed by: EMERGENCY MEDICINE

## 2018-06-02 PROCEDURE — 85652 RBC SED RATE AUTOMATED: CPT | Performed by: EMERGENCY MEDICINE

## 2018-06-02 PROCEDURE — 25010000002 PIPERACILLIN SOD-TAZOBACTAM PER 1 G: Performed by: NURSE PRACTITIONER

## 2018-06-02 RX ORDER — SODIUM CHLORIDE 0.9 % (FLUSH) 0.9 %
1-10 SYRINGE (ML) INJECTION AS NEEDED
Status: DISCONTINUED | OUTPATIENT
Start: 2018-06-02 | End: 2018-06-05 | Stop reason: HOSPADM

## 2018-06-02 RX ORDER — CARBOXYMETHYLCELLULOSE SODIUM 10 MG/ML
1 GEL OPHTHALMIC 4 TIMES DAILY PRN
Status: DISCONTINUED | OUTPATIENT
Start: 2018-06-02 | End: 2018-06-05 | Stop reason: HOSPADM

## 2018-06-02 RX ORDER — DOCUSATE SODIUM 100 MG/1
100 CAPSULE, LIQUID FILLED ORAL 2 TIMES DAILY
Status: DISCONTINUED | OUTPATIENT
Start: 2018-06-02 | End: 2018-06-05 | Stop reason: HOSPADM

## 2018-06-02 RX ORDER — AMLODIPINE BESYLATE 2.5 MG/1
2.5 TABLET ORAL EVERY MORNING
Status: DISCONTINUED | OUTPATIENT
Start: 2018-06-03 | End: 2018-06-05 | Stop reason: HOSPADM

## 2018-06-02 RX ORDER — ACETAMINOPHEN 325 MG/1
650 TABLET ORAL EVERY 4 HOURS PRN
Status: DISCONTINUED | OUTPATIENT
Start: 2018-06-02 | End: 2018-06-05 | Stop reason: HOSPADM

## 2018-06-02 RX ORDER — SODIUM CHLORIDE 450 MG/100ML
75 INJECTION, SOLUTION INTRAVENOUS CONTINUOUS
Status: DISCONTINUED | OUTPATIENT
Start: 2018-06-02 | End: 2018-06-04

## 2018-06-02 RX ORDER — ASPIRIN 325 MG
325 TABLET, DELAYED RELEASE (ENTERIC COATED) ORAL EVERY 12 HOURS SCHEDULED
Status: DISCONTINUED | OUTPATIENT
Start: 2018-06-02 | End: 2018-06-05 | Stop reason: HOSPADM

## 2018-06-02 RX ORDER — ONDANSETRON 2 MG/ML
4 INJECTION INTRAMUSCULAR; INTRAVENOUS ONCE
Status: COMPLETED | OUTPATIENT
Start: 2018-06-02 | End: 2018-06-02

## 2018-06-02 RX ORDER — TRAZODONE HYDROCHLORIDE 50 MG/1
25 TABLET ORAL NIGHTLY
Status: DISCONTINUED | OUTPATIENT
Start: 2018-06-02 | End: 2018-06-05 | Stop reason: HOSPADM

## 2018-06-02 RX ORDER — PANTOPRAZOLE SODIUM 40 MG/1
40 TABLET, DELAYED RELEASE ORAL EVERY MORNING
Status: DISCONTINUED | OUTPATIENT
Start: 2018-06-03 | End: 2018-06-05 | Stop reason: HOSPADM

## 2018-06-02 RX ORDER — ONDANSETRON 4 MG/1
4 TABLET, FILM COATED ORAL EVERY 6 HOURS PRN
Status: DISCONTINUED | OUTPATIENT
Start: 2018-06-02 | End: 2018-06-05 | Stop reason: HOSPADM

## 2018-06-02 RX ORDER — VANCOMYCIN HYDROCHLORIDE 1 G/200ML
1000 INJECTION, SOLUTION INTRAVENOUS EVERY 24 HOURS
Status: DISCONTINUED | OUTPATIENT
Start: 2018-06-03 | End: 2018-06-03

## 2018-06-02 RX ORDER — HYDROCODONE BITARTRATE AND ACETAMINOPHEN 7.5; 325 MG/1; MG/1
2 TABLET ORAL EVERY 4 HOURS PRN
Status: DISCONTINUED | OUTPATIENT
Start: 2018-06-02 | End: 2018-06-04

## 2018-06-02 RX ORDER — SODIUM CHLORIDE 0.9 % (FLUSH) 0.9 %
10 SYRINGE (ML) INJECTION AS NEEDED
Status: DISCONTINUED | OUTPATIENT
Start: 2018-06-02 | End: 2018-06-05 | Stop reason: HOSPADM

## 2018-06-02 RX ORDER — MELOXICAM 7.5 MG/1
7.5 TABLET ORAL DAILY
Status: DISCONTINUED | OUTPATIENT
Start: 2018-06-02 | End: 2018-06-05 | Stop reason: HOSPADM

## 2018-06-02 RX ORDER — VANCOMYCIN HYDROCHLORIDE 1 G/200ML
20 INJECTION, SOLUTION INTRAVENOUS ONCE
Status: COMPLETED | OUTPATIENT
Start: 2018-06-02 | End: 2018-06-02

## 2018-06-02 RX ADMIN — SODIUM CHLORIDE 1000 ML: 9 INJECTION, SOLUTION INTRAVENOUS at 07:26

## 2018-06-02 RX ADMIN — TAZOBACTAM SODIUM AND PIPERACILLIN SODIUM 4.5 G: 500; 4 INJECTION, SOLUTION INTRAVENOUS at 10:40

## 2018-06-02 RX ADMIN — ASPIRIN 325 MG: 325 TABLET, DELAYED RELEASE ORAL at 14:29

## 2018-06-02 RX ADMIN — VANCOMYCIN HYDROCHLORIDE 1000 MG: 1 INJECTION, SOLUTION INTRAVENOUS at 13:38

## 2018-06-02 RX ADMIN — ONDANSETRON 4 MG: 2 INJECTION, SOLUTION INTRAMUSCULAR; INTRAVENOUS at 07:25

## 2018-06-02 RX ADMIN — POLYETHYLENE GLYCOL 3350 17 G: 17 POWDER, FOR SOLUTION ORAL at 20:03

## 2018-06-02 RX ADMIN — SODIUM CHLORIDE 75 ML/HR: 4.5 INJECTION, SOLUTION INTRAVENOUS at 14:30

## 2018-06-02 RX ADMIN — HYDROCODONE BITARTRATE AND ACETAMINOPHEN 2 TABLET: 7.5; 325 TABLET ORAL at 14:28

## 2018-06-02 RX ADMIN — HYDROCODONE BITARTRATE AND ACETAMINOPHEN 2 TABLET: 7.5; 325 TABLET ORAL at 20:02

## 2018-06-02 RX ADMIN — DOCUSATE SODIUM 100 MG: 100 CAPSULE, LIQUID FILLED ORAL at 20:02

## 2018-06-02 RX ADMIN — ENOXAPARIN SODIUM 40 MG: 100 INJECTION SUBCUTANEOUS at 14:29

## 2018-06-02 RX ADMIN — TRAZODONE HYDROCHLORIDE 25 MG: 50 TABLET ORAL at 20:02

## 2018-06-02 RX ADMIN — MELOXICAM 7.5 MG: 7.5 TABLET ORAL at 14:29

## 2018-06-02 RX ADMIN — ASPIRIN 325 MG: 325 TABLET, DELAYED RELEASE ORAL at 20:02

## 2018-06-02 RX ADMIN — TAZOBACTAM SODIUM AND PIPERACILLIN SODIUM 3.38 G: 375; 3 INJECTION, SOLUTION INTRAVENOUS at 23:42

## 2018-06-02 RX ADMIN — TAZOBACTAM SODIUM AND PIPERACILLIN SODIUM 3.38 G: 375; 3 INJECTION, SOLUTION INTRAVENOUS at 16:32

## 2018-06-02 RX ADMIN — MORPHINE SULFATE 4 MG: 4 INJECTION INTRAVENOUS at 07:25

## 2018-06-03 LAB
ANION GAP SERPL CALCULATED.3IONS-SCNC: 9.8 MMOL/L
BASOPHILS # BLD AUTO: 0.03 10*3/MM3 (ref 0–0.2)
BASOPHILS NFR BLD AUTO: 0.6 % (ref 0–1.5)
BH CV LOWER VASCULAR LEFT COMMON FEMORAL AUGMENT: NORMAL
BH CV LOWER VASCULAR LEFT COMMON FEMORAL COMPETENT: NORMAL
BH CV LOWER VASCULAR LEFT COMMON FEMORAL COMPRESS: NORMAL
BH CV LOWER VASCULAR LEFT COMMON FEMORAL PHASIC: NORMAL
BH CV LOWER VASCULAR LEFT COMMON FEMORAL SPONT: NORMAL
BH CV LOWER VASCULAR RIGHT COMMON FEMORAL AUGMENT: NORMAL
BH CV LOWER VASCULAR RIGHT COMMON FEMORAL COMPETENT: NORMAL
BH CV LOWER VASCULAR RIGHT COMMON FEMORAL COMPRESS: NORMAL
BH CV LOWER VASCULAR RIGHT COMMON FEMORAL PHASIC: NORMAL
BH CV LOWER VASCULAR RIGHT COMMON FEMORAL SPONT: NORMAL
BH CV LOWER VASCULAR RIGHT DISTAL FEMORAL COMPRESS: NORMAL
BH CV LOWER VASCULAR RIGHT GASTRONEMIUS COMPRESS: NORMAL
BH CV LOWER VASCULAR RIGHT GREATER SAPH AK COMPRESS: NORMAL
BH CV LOWER VASCULAR RIGHT GREATER SAPH BK COMPRESS: NORMAL
BH CV LOWER VASCULAR RIGHT MID FEMORAL AUGMENT: NORMAL
BH CV LOWER VASCULAR RIGHT MID FEMORAL COMPETENT: NORMAL
BH CV LOWER VASCULAR RIGHT MID FEMORAL COMPRESS: NORMAL
BH CV LOWER VASCULAR RIGHT MID FEMORAL PHASIC: NORMAL
BH CV LOWER VASCULAR RIGHT MID FEMORAL SPONT: NORMAL
BH CV LOWER VASCULAR RIGHT PERONEAL COMPRESS: NORMAL
BH CV LOWER VASCULAR RIGHT POPLITEAL AUGMENT: NORMAL
BH CV LOWER VASCULAR RIGHT POPLITEAL COMPETENT: NORMAL
BH CV LOWER VASCULAR RIGHT POPLITEAL COMPRESS: NORMAL
BH CV LOWER VASCULAR RIGHT POPLITEAL PHASIC: NORMAL
BH CV LOWER VASCULAR RIGHT POPLITEAL SPONT: NORMAL
BH CV LOWER VASCULAR RIGHT POSTERIOR TIBIAL COMPRESS: NORMAL
BH CV LOWER VASCULAR RIGHT PROXIMAL FEMORAL COMPRESS: NORMAL
BH CV LOWER VASCULAR RIGHT SAPHENOFEMORAL JUNCTION AUGMENT: NORMAL
BH CV LOWER VASCULAR RIGHT SAPHENOFEMORAL JUNCTION COMPETENT: NORMAL
BH CV LOWER VASCULAR RIGHT SAPHENOFEMORAL JUNCTION COMPRESS: NORMAL
BH CV LOWER VASCULAR RIGHT SAPHENOFEMORAL JUNCTION PHASIC: NORMAL
BH CV LOWER VASCULAR RIGHT SAPHENOFEMORAL JUNCTION SPONT: NORMAL
BH CV VAS POP FLUID COLLECTED: 1
BUN BLD-MCNC: 13 MG/DL (ref 8–23)
BUN/CREAT SERPL: 20 (ref 7–25)
CALCIUM SPEC-SCNC: 7.6 MG/DL (ref 8.6–10.5)
CHLORIDE SERPL-SCNC: 104 MMOL/L (ref 98–107)
CO2 SERPL-SCNC: 26.2 MMOL/L (ref 22–29)
CREAT BLD-MCNC: 0.65 MG/DL (ref 0.57–1)
DEPRECATED RDW RBC AUTO: 50.9 FL (ref 37–54)
EOSINOPHIL # BLD AUTO: 0.27 10*3/MM3 (ref 0–0.7)
EOSINOPHIL NFR BLD AUTO: 5.6 % (ref 0.3–6.2)
ERYTHROCYTE [DISTWIDTH] IN BLOOD BY AUTOMATED COUNT: 14 % (ref 11.7–13)
GFR SERPL CREATININE-BSD FRML MDRD: 87 ML/MIN/1.73
GLUCOSE BLD-MCNC: 91 MG/DL (ref 65–99)
HCT VFR BLD AUTO: 27.7 % (ref 35.6–45.5)
HGB BLD-MCNC: 9.3 G/DL (ref 11.9–15.5)
IMM GRANULOCYTES # BLD: 0.01 10*3/MM3 (ref 0–0.03)
IMM GRANULOCYTES NFR BLD: 0.2 % (ref 0–0.5)
LYMPHOCYTES # BLD AUTO: 0.83 10*3/MM3 (ref 0.9–4.8)
LYMPHOCYTES NFR BLD AUTO: 17.1 % (ref 19.6–45.3)
MCH RBC QN AUTO: 32.7 PG (ref 26.9–32)
MCHC RBC AUTO-ENTMCNC: 33.6 G/DL (ref 32.4–36.3)
MCV RBC AUTO: 97.5 FL (ref 80.5–98.2)
MONOCYTES # BLD AUTO: 0.44 10*3/MM3 (ref 0.2–1.2)
MONOCYTES NFR BLD AUTO: 9.1 % (ref 5–12)
NEUTROPHILS # BLD AUTO: 3.28 10*3/MM3 (ref 1.9–8.1)
NEUTROPHILS NFR BLD AUTO: 67.6 % (ref 42.7–76)
PLATELET # BLD AUTO: 144 10*3/MM3 (ref 140–500)
PMV BLD AUTO: 10.5 FL (ref 6–12)
POTASSIUM BLD-SCNC: 3.6 MMOL/L (ref 3.5–5.2)
RBC # BLD AUTO: 2.84 10*6/MM3 (ref 3.9–5.2)
SODIUM BLD-SCNC: 140 MMOL/L (ref 136–145)
WBC NRBC COR # BLD: 4.85 10*3/MM3 (ref 4.5–10.7)

## 2018-06-03 PROCEDURE — 25010000002 VANCOMYCIN PER 500 MG: Performed by: INTERNAL MEDICINE

## 2018-06-03 PROCEDURE — 25010000002 ENOXAPARIN PER 10 MG: Performed by: INTERNAL MEDICINE

## 2018-06-03 PROCEDURE — 97110 THERAPEUTIC EXERCISES: CPT

## 2018-06-03 PROCEDURE — 97161 PT EVAL LOW COMPLEX 20 MIN: CPT

## 2018-06-03 PROCEDURE — 80048 BASIC METABOLIC PNL TOTAL CA: CPT | Performed by: INTERNAL MEDICINE

## 2018-06-03 PROCEDURE — 85025 COMPLETE CBC W/AUTO DIFF WBC: CPT | Performed by: INTERNAL MEDICINE

## 2018-06-03 PROCEDURE — 25010000002 PIPERACILLIN SOD-TAZOBACTAM PER 1 G: Performed by: INTERNAL MEDICINE

## 2018-06-03 PROCEDURE — 99024 POSTOP FOLLOW-UP VISIT: CPT | Performed by: ORTHOPAEDIC SURGERY

## 2018-06-03 RX ADMIN — PANTOPRAZOLE SODIUM 40 MG: 40 TABLET, DELAYED RELEASE ORAL at 05:44

## 2018-06-03 RX ADMIN — SODIUM CHLORIDE 75 ML/HR: 4.5 INJECTION, SOLUTION INTRAVENOUS at 18:15

## 2018-06-03 RX ADMIN — POLYETHYLENE GLYCOL 3350 17 G: 17 POWDER, FOR SOLUTION ORAL at 21:30

## 2018-06-03 RX ADMIN — VANCOMYCIN HYDROCHLORIDE 1000 MG: 1 INJECTION, SOLUTION INTRAVENOUS at 05:45

## 2018-06-03 RX ADMIN — HYDROCODONE BITARTRATE AND ACETAMINOPHEN 2 TABLET: 7.5; 325 TABLET ORAL at 12:47

## 2018-06-03 RX ADMIN — TRAZODONE HYDROCHLORIDE 25 MG: 50 TABLET ORAL at 21:30

## 2018-06-03 RX ADMIN — ASPIRIN 325 MG: 325 TABLET, DELAYED RELEASE ORAL at 08:53

## 2018-06-03 RX ADMIN — HYDROCODONE BITARTRATE AND ACETAMINOPHEN 1 TABLET: 7.5; 325 TABLET ORAL at 21:30

## 2018-06-03 RX ADMIN — MELOXICAM 7.5 MG: 7.5 TABLET ORAL at 08:53

## 2018-06-03 RX ADMIN — POLYETHYLENE GLYCOL 3350 17 G: 17 POWDER, FOR SOLUTION ORAL at 08:53

## 2018-06-03 RX ADMIN — HYDROCODONE BITARTRATE AND ACETAMINOPHEN 2 TABLET: 7.5; 325 TABLET ORAL at 07:06

## 2018-06-03 RX ADMIN — HYDROCODONE BITARTRATE AND ACETAMINOPHEN 2 TABLET: 7.5; 325 TABLET ORAL at 00:10

## 2018-06-03 RX ADMIN — DOCUSATE SODIUM 100 MG: 100 CAPSULE, LIQUID FILLED ORAL at 08:53

## 2018-06-03 RX ADMIN — TAZOBACTAM SODIUM AND PIPERACILLIN SODIUM 3.38 G: 375; 3 INJECTION, SOLUTION INTRAVENOUS at 07:39

## 2018-06-03 RX ADMIN — TAZOBACTAM SODIUM AND PIPERACILLIN SODIUM 3.38 G: 375; 3 INJECTION, SOLUTION INTRAVENOUS at 15:38

## 2018-06-03 RX ADMIN — ENOXAPARIN SODIUM 40 MG: 100 INJECTION SUBCUTANEOUS at 14:26

## 2018-06-03 RX ADMIN — ASPIRIN 325 MG: 325 TABLET, DELAYED RELEASE ORAL at 21:30

## 2018-06-03 RX ADMIN — HYDROCODONE BITARTRATE AND ACETAMINOPHEN 2 TABLET: 7.5; 325 TABLET ORAL at 17:02

## 2018-06-03 RX ADMIN — AMLODIPINE BESYLATE 2.5 MG: 2.5 TABLET ORAL at 05:44

## 2018-06-03 RX ADMIN — DOCUSATE SODIUM 100 MG: 100 CAPSULE, LIQUID FILLED ORAL at 21:30

## 2018-06-04 ENCOUNTER — DOCUMENTATION (OUTPATIENT)
Dept: PHYSICAL THERAPY | Facility: HOSPITAL | Age: 83
End: 2018-06-04

## 2018-06-04 DIAGNOSIS — G89.29 CHRONIC PAIN OF RIGHT KNEE: Primary | ICD-10-CM

## 2018-06-04 DIAGNOSIS — Z74.09 IMPAIRED MOBILITY: ICD-10-CM

## 2018-06-04 DIAGNOSIS — R26.2 DIFFICULTY WALKING: ICD-10-CM

## 2018-06-04 DIAGNOSIS — M25.561 CHRONIC PAIN OF RIGHT KNEE: Primary | ICD-10-CM

## 2018-06-04 LAB
ANION GAP SERPL CALCULATED.3IONS-SCNC: 11.8 MMOL/L
BASOPHILS # BLD AUTO: 0.04 10*3/MM3 (ref 0–0.2)
BASOPHILS NFR BLD AUTO: 0.8 % (ref 0–1.5)
BUN BLD-MCNC: 8 MG/DL (ref 8–23)
BUN/CREAT SERPL: 14 (ref 7–25)
CALCIUM SPEC-SCNC: 8.2 MG/DL (ref 8.6–10.5)
CHLORIDE SERPL-SCNC: 103 MMOL/L (ref 98–107)
CO2 SERPL-SCNC: 26.2 MMOL/L (ref 22–29)
CREAT BLD-MCNC: 0.57 MG/DL (ref 0.57–1)
DEPRECATED RDW RBC AUTO: 50.1 FL (ref 37–54)
EOSINOPHIL # BLD AUTO: 0.32 10*3/MM3 (ref 0–0.7)
EOSINOPHIL NFR BLD AUTO: 6 % (ref 0.3–6.2)
ERYTHROCYTE [DISTWIDTH] IN BLOOD BY AUTOMATED COUNT: 13.8 % (ref 11.7–13)
GFR SERPL CREATININE-BSD FRML MDRD: 102 ML/MIN/1.73
GLUCOSE BLD-MCNC: 80 MG/DL (ref 65–99)
HCT VFR BLD AUTO: 29.2 % (ref 35.6–45.5)
HGB BLD-MCNC: 9.1 G/DL (ref 11.9–15.5)
IMM GRANULOCYTES # BLD: 0 10*3/MM3 (ref 0–0.03)
IMM GRANULOCYTES NFR BLD: 0 % (ref 0–0.5)
LYMPHOCYTES # BLD AUTO: 0.96 10*3/MM3 (ref 0.9–4.8)
LYMPHOCYTES NFR BLD AUTO: 18 % (ref 19.6–45.3)
MCH RBC QN AUTO: 30.8 PG (ref 26.9–32)
MCHC RBC AUTO-ENTMCNC: 31.2 G/DL (ref 32.4–36.3)
MCV RBC AUTO: 99 FL (ref 80.5–98.2)
MONOCYTES # BLD AUTO: 0.65 10*3/MM3 (ref 0.2–1.2)
MONOCYTES NFR BLD AUTO: 12.2 % (ref 5–12)
NEUTROPHILS # BLD AUTO: 3.35 10*3/MM3 (ref 1.9–8.1)
NEUTROPHILS NFR BLD AUTO: 63 % (ref 42.7–76)
PLATELET # BLD AUTO: 186 10*3/MM3 (ref 140–500)
PMV BLD AUTO: 10.7 FL (ref 6–12)
POTASSIUM BLD-SCNC: 3.3 MMOL/L (ref 3.5–5.2)
RBC # BLD AUTO: 2.95 10*6/MM3 (ref 3.9–5.2)
SODIUM BLD-SCNC: 141 MMOL/L (ref 136–145)
WBC NRBC COR # BLD: 5.32 10*3/MM3 (ref 4.5–10.7)

## 2018-06-04 PROCEDURE — 85025 COMPLETE CBC W/AUTO DIFF WBC: CPT | Performed by: INTERNAL MEDICINE

## 2018-06-04 PROCEDURE — 97110 THERAPEUTIC EXERCISES: CPT

## 2018-06-04 PROCEDURE — 25010000002 ENOXAPARIN PER 10 MG: Performed by: INTERNAL MEDICINE

## 2018-06-04 PROCEDURE — 80048 BASIC METABOLIC PNL TOTAL CA: CPT | Performed by: INTERNAL MEDICINE

## 2018-06-04 PROCEDURE — 94799 UNLISTED PULMONARY SVC/PX: CPT

## 2018-06-04 PROCEDURE — 25010000002 PIPERACILLIN SOD-TAZOBACTAM PER 1 G: Performed by: INTERNAL MEDICINE

## 2018-06-04 RX ORDER — HYDROCODONE BITARTRATE AND ACETAMINOPHEN 7.5; 325 MG/1; MG/1
1 TABLET ORAL EVERY 6 HOURS PRN
Status: DISCONTINUED | OUTPATIENT
Start: 2018-06-04 | End: 2018-06-05 | Stop reason: HOSPADM

## 2018-06-04 RX ORDER — POTASSIUM CHLORIDE 750 MG/1
40 CAPSULE, EXTENDED RELEASE ORAL ONCE
Status: COMPLETED | OUTPATIENT
Start: 2018-06-04 | End: 2018-06-04

## 2018-06-04 RX ORDER — GUAIFENESIN 600 MG/1
600 TABLET, EXTENDED RELEASE ORAL EVERY 12 HOURS SCHEDULED
Status: DISCONTINUED | OUTPATIENT
Start: 2018-06-04 | End: 2018-06-05 | Stop reason: HOSPADM

## 2018-06-04 RX ORDER — HYDROCODONE BITARTRATE AND ACETAMINOPHEN 7.5; 325 MG/1; MG/1
2 TABLET ORAL EVERY 6 HOURS PRN
Status: DISCONTINUED | OUTPATIENT
Start: 2018-06-04 | End: 2018-06-05 | Stop reason: HOSPADM

## 2018-06-04 RX ADMIN — DOCUSATE SODIUM 100 MG: 100 CAPSULE, LIQUID FILLED ORAL at 21:22

## 2018-06-04 RX ADMIN — TAZOBACTAM SODIUM AND PIPERACILLIN SODIUM 3.38 G: 375; 3 INJECTION, SOLUTION INTRAVENOUS at 08:24

## 2018-06-04 RX ADMIN — POTASSIUM CHLORIDE 40 MEQ: 750 CAPSULE, EXTENDED RELEASE ORAL at 15:57

## 2018-06-04 RX ADMIN — HYDROCODONE BITARTRATE AND ACETAMINOPHEN 1 TABLET: 7.5; 325 TABLET ORAL at 03:38

## 2018-06-04 RX ADMIN — TAZOBACTAM SODIUM AND PIPERACILLIN SODIUM 3.38 G: 375; 3 INJECTION, SOLUTION INTRAVENOUS at 00:19

## 2018-06-04 RX ADMIN — AMLODIPINE BESYLATE 2.5 MG: 2.5 TABLET ORAL at 06:32

## 2018-06-04 RX ADMIN — ASPIRIN 325 MG: 325 TABLET, DELAYED RELEASE ORAL at 08:24

## 2018-06-04 RX ADMIN — HYDROCODONE BITARTRATE AND ACETAMINOPHEN 1 TABLET: 7.5; 325 TABLET ORAL at 10:06

## 2018-06-04 RX ADMIN — TAZOBACTAM SODIUM AND PIPERACILLIN SODIUM 3.38 G: 375; 3 INJECTION, SOLUTION INTRAVENOUS at 15:57

## 2018-06-04 RX ADMIN — HYDROCODONE BITARTRATE AND ACETAMINOPHEN 1 TABLET: 7.5; 325 TABLET ORAL at 15:08

## 2018-06-04 RX ADMIN — SODIUM CHLORIDE 75 ML/HR: 4.5 INJECTION, SOLUTION INTRAVENOUS at 06:33

## 2018-06-04 RX ADMIN — ACETAMINOPHEN 650 MG: 325 TABLET ORAL at 18:30

## 2018-06-04 RX ADMIN — GUAIFENESIN 600 MG: 600 TABLET, EXTENDED RELEASE ORAL at 21:21

## 2018-06-04 RX ADMIN — ONDANSETRON 4 MG: 4 TABLET, FILM COATED ORAL at 10:06

## 2018-06-04 RX ADMIN — HYDROCODONE BITARTRATE AND ACETAMINOPHEN 1 TABLET: 7.5; 325 TABLET ORAL at 21:22

## 2018-06-04 RX ADMIN — POLYETHYLENE GLYCOL 3350 17 G: 17 POWDER, FOR SOLUTION ORAL at 08:24

## 2018-06-04 RX ADMIN — TRAZODONE HYDROCHLORIDE 25 MG: 50 TABLET ORAL at 21:21

## 2018-06-04 RX ADMIN — POLYETHYLENE GLYCOL 3350 17 G: 17 POWDER, FOR SOLUTION ORAL at 21:21

## 2018-06-04 RX ADMIN — ENOXAPARIN SODIUM 40 MG: 100 INJECTION SUBCUTANEOUS at 15:57

## 2018-06-04 RX ADMIN — ASPIRIN 325 MG: 325 TABLET, DELAYED RELEASE ORAL at 21:21

## 2018-06-04 RX ADMIN — PANTOPRAZOLE SODIUM 40 MG: 40 TABLET, DELAYED RELEASE ORAL at 06:33

## 2018-06-04 RX ADMIN — DOCUSATE SODIUM 100 MG: 100 CAPSULE, LIQUID FILLED ORAL at 08:24

## 2018-06-04 RX ADMIN — MELOXICAM 7.5 MG: 7.5 TABLET ORAL at 08:24

## 2018-06-04 NOTE — THERAPY DISCHARGE NOTE
Outpatient Physical Therapy Discharge Summary         Patient Name: Arlin Hutson  : 1935  MRN: [id number]    Today's Date: 2018    Visit Dx:    ICD-10-CM ICD-9-CM   1. Chronic pain of right knee M25.561 719.46    G89.29 338.29   2. Impaired mobility Z74.09 799.89   3. Difficulty walking R26.2 719.7             PT OP Goals     Row Name 18 1548          Time Calculation    PT Goal Re-Cert Due Date 18  -DARCI       User Key  (r) = Recorded By, (t) = Taken By, (c) = Cosigned By    Initials Name Provider Type    DARCI Kim, PT Physical Therapist          OP PT Discharge Summary  Date of Discharge: 18  Reason for Discharge: Independent  Outcomes Achieved: Patient able to partially acheive established goals  Discharge Destination: Home with home program  Discharge Instructions/Additional Comments: Ms. Hutson attended 11 sessiosns of physical therapy (land and aquatic) for R knee pain.  She is independent with her HEP, verbalizes a good understanding of her condition.  She is discharged from outpatient physical therapy to practice self management of her condition.       Time Calculation:                    Nando Whiteside, PT  2018

## 2018-06-05 ENCOUNTER — TELEPHONE (OUTPATIENT)
Dept: ORTHOPEDIC SURGERY | Facility: CLINIC | Age: 83
End: 2018-06-05

## 2018-06-05 VITALS
HEIGHT: 60 IN | OXYGEN SATURATION: 91 % | DIASTOLIC BLOOD PRESSURE: 72 MMHG | TEMPERATURE: 98.3 F | SYSTOLIC BLOOD PRESSURE: 166 MMHG | BODY MASS INDEX: 21.79 KG/M2 | WEIGHT: 111 LBS | HEART RATE: 72 BPM | RESPIRATION RATE: 18 BRPM

## 2018-06-05 LAB
ANION GAP SERPL CALCULATED.3IONS-SCNC: 11.4 MMOL/L
BASOPHILS # BLD AUTO: 0.05 10*3/MM3 (ref 0–0.2)
BASOPHILS NFR BLD AUTO: 0.8 % (ref 0–1.5)
BUN BLD-MCNC: 7 MG/DL (ref 8–23)
BUN/CREAT SERPL: 12.7 (ref 7–25)
CALCIUM SPEC-SCNC: 8 MG/DL (ref 8.6–10.5)
CHLORIDE SERPL-SCNC: 103 MMOL/L (ref 98–107)
CO2 SERPL-SCNC: 24.6 MMOL/L (ref 22–29)
CREAT BLD-MCNC: 0.55 MG/DL (ref 0.57–1)
DEPRECATED RDW RBC AUTO: 48.3 FL (ref 37–54)
EOSINOPHIL # BLD AUTO: 0.21 10*3/MM3 (ref 0–0.7)
EOSINOPHIL NFR BLD AUTO: 3.4 % (ref 0.3–6.2)
ERYTHROCYTE [DISTWIDTH] IN BLOOD BY AUTOMATED COUNT: 13.9 % (ref 11.7–13)
GFR SERPL CREATININE-BSD FRML MDRD: 106 ML/MIN/1.73
GLUCOSE BLD-MCNC: 88 MG/DL (ref 65–99)
HCT VFR BLD AUTO: 29 % (ref 35.6–45.5)
HGB BLD-MCNC: 9.1 G/DL (ref 11.9–15.5)
IMM GRANULOCYTES # BLD: 0.01 10*3/MM3 (ref 0–0.03)
IMM GRANULOCYTES NFR BLD: 0.2 % (ref 0–0.5)
LYMPHOCYTES # BLD AUTO: 0.56 10*3/MM3 (ref 0.9–4.8)
LYMPHOCYTES NFR BLD AUTO: 9.1 % (ref 19.6–45.3)
MCH RBC QN AUTO: 30.1 PG (ref 26.9–32)
MCHC RBC AUTO-ENTMCNC: 31.4 G/DL (ref 32.4–36.3)
MCV RBC AUTO: 96 FL (ref 80.5–98.2)
MONOCYTES # BLD AUTO: 0.86 10*3/MM3 (ref 0.2–1.2)
MONOCYTES NFR BLD AUTO: 14 % (ref 5–12)
NEUTROPHILS # BLD AUTO: 4.45 10*3/MM3 (ref 1.9–8.1)
NEUTROPHILS NFR BLD AUTO: 72.7 % (ref 42.7–76)
NRBC BLD MANUAL-RTO: 0 /100 WBC (ref 0–0)
PLATELET # BLD AUTO: 217 10*3/MM3 (ref 140–500)
PMV BLD AUTO: 10.5 FL (ref 6–12)
POTASSIUM BLD-SCNC: 3.3 MMOL/L (ref 3.5–5.2)
RBC # BLD AUTO: 3.02 10*6/MM3 (ref 3.9–5.2)
SODIUM BLD-SCNC: 139 MMOL/L (ref 136–145)
WBC NRBC COR # BLD: 6.13 10*3/MM3 (ref 4.5–10.7)

## 2018-06-05 PROCEDURE — 80048 BASIC METABOLIC PNL TOTAL CA: CPT | Performed by: INTERNAL MEDICINE

## 2018-06-05 PROCEDURE — 25010000002 PIPERACILLIN SOD-TAZOBACTAM PER 1 G: Performed by: INTERNAL MEDICINE

## 2018-06-05 PROCEDURE — 85025 COMPLETE CBC W/AUTO DIFF WBC: CPT | Performed by: INTERNAL MEDICINE

## 2018-06-05 RX ORDER — POTASSIUM CHLORIDE 750 MG/1
40 CAPSULE, EXTENDED RELEASE ORAL ONCE
Status: COMPLETED | OUTPATIENT
Start: 2018-06-05 | End: 2018-06-05

## 2018-06-05 RX ORDER — DOXYCYCLINE 100 MG/1
100 CAPSULE ORAL EVERY 12 HOURS SCHEDULED
Qty: 22 CAPSULE | Refills: 0
Start: 2018-06-05 | End: 2018-06-16

## 2018-06-05 RX ORDER — DOXYCYCLINE HYCLATE 100 MG/1
100 CAPSULE ORAL EVERY 12 HOURS SCHEDULED
Status: DISCONTINUED | OUTPATIENT
Start: 2018-06-05 | End: 2018-06-05

## 2018-06-05 RX ORDER — DOXYCYCLINE 100 MG/1
100 CAPSULE ORAL EVERY 12 HOURS SCHEDULED
Status: DISCONTINUED | OUTPATIENT
Start: 2018-06-05 | End: 2018-06-05 | Stop reason: HOSPADM

## 2018-06-05 RX ADMIN — PANTOPRAZOLE SODIUM 40 MG: 40 TABLET, DELAYED RELEASE ORAL at 06:46

## 2018-06-05 RX ADMIN — DOCUSATE SODIUM 100 MG: 100 CAPSULE, LIQUID FILLED ORAL at 08:56

## 2018-06-05 RX ADMIN — ASPIRIN 325 MG: 325 TABLET, DELAYED RELEASE ORAL at 08:56

## 2018-06-05 RX ADMIN — HYDROCODONE BITARTRATE AND ACETAMINOPHEN 1 TABLET: 7.5; 325 TABLET ORAL at 03:07

## 2018-06-05 RX ADMIN — ONDANSETRON 4 MG: 4 TABLET, FILM COATED ORAL at 12:27

## 2018-06-05 RX ADMIN — POLYETHYLENE GLYCOL 3350 17 G: 17 POWDER, FOR SOLUTION ORAL at 08:56

## 2018-06-05 RX ADMIN — HYDROCODONE BITARTRATE AND ACETAMINOPHEN 1 TABLET: 7.5; 325 TABLET ORAL at 11:23

## 2018-06-05 RX ADMIN — AMLODIPINE BESYLATE 2.5 MG: 2.5 TABLET ORAL at 06:46

## 2018-06-05 RX ADMIN — DOXYCYCLINE 100 MG: 100 CAPSULE ORAL at 11:23

## 2018-06-05 RX ADMIN — GUAIFENESIN 600 MG: 600 TABLET, EXTENDED RELEASE ORAL at 08:56

## 2018-06-05 RX ADMIN — TAZOBACTAM SODIUM AND PIPERACILLIN SODIUM 3.38 G: 375; 3 INJECTION, SOLUTION INTRAVENOUS at 02:00

## 2018-06-05 RX ADMIN — MELOXICAM 7.5 MG: 7.5 TABLET ORAL at 08:56

## 2018-06-05 RX ADMIN — POTASSIUM CHLORIDE 40 MEQ: 750 CAPSULE, EXTENDED RELEASE ORAL at 11:23

## 2018-06-05 NOTE — TELEPHONE ENCOUNTER
Grady with Home Health called back and said to disregard previous request.  Patient is going zain transferred to [facility] for further treatment/rehab.

## 2018-06-05 NOTE — TELEPHONE ENCOUNTER
Grady with [facility] called in regards to Mrs. Oliveros getting discharged from the hospital today for a Verbal order for Nursing.  That verbal order was given and RBB okayed it.  He also stated that the patient was diagnosed with pneumonia,colitis and HBP.  They will resume PT as well as the nursing.

## 2018-06-07 ENCOUNTER — TELEPHONE (OUTPATIENT)
Dept: ORTHOPEDIC SURGERY | Facility: CLINIC | Age: 83
End: 2018-06-07

## 2018-06-07 LAB
BACTERIA SPEC AEROBE CULT: NORMAL
BACTERIA SPEC AEROBE CULT: NORMAL

## 2018-06-15 ENCOUNTER — HOSPITAL ENCOUNTER (OUTPATIENT)
Dept: GENERAL RADIOLOGY | Facility: HOSPITAL | Age: 83
Discharge: HOME | End: 2018-06-15
Attending: INTERNAL MEDICINE | Admitting: INTERNAL MEDICINE

## 2018-06-15 DIAGNOSIS — J18.9 PNEUMONIA, ORGANISM UNSPECIFIED(486): ICD-10-CM

## 2018-06-15 PROCEDURE — 71046 X-RAY EXAM CHEST 2 VIEWS: CPT

## 2018-06-19 ENCOUNTER — OFFICE VISIT (OUTPATIENT)
Dept: ORTHOPEDIC SURGERY | Facility: CLINIC | Age: 83
End: 2018-06-19

## 2018-06-19 VITALS — HEIGHT: 60 IN | WEIGHT: 106 LBS | BODY MASS INDEX: 20.81 KG/M2 | TEMPERATURE: 97.5 F

## 2018-06-19 DIAGNOSIS — Z98.890 S/P KNEE SURGERY: Primary | ICD-10-CM

## 2018-06-19 PROCEDURE — 99024 POSTOP FOLLOW-UP VISIT: CPT | Performed by: NURSE PRACTITIONER

## 2018-06-19 RX ORDER — TRAMADOL HYDROCHLORIDE 50 MG/1
50 TABLET ORAL EVERY 4 HOURS PRN
Qty: 60 TABLET | Refills: 0 | Status: SHIPPED | OUTPATIENT
Start: 2018-06-19 | End: 2019-01-30

## 2018-06-19 NOTE — PROGRESS NOTES
Arlin Hutson : 1935 MRN: [id number] DATE: 2018    DIAGNOSIS: 2 week follow up right total knee      SUBJECTIVE:Patient returns today for 2 week follow up of right total knee replacement. Patient reports doing well with no unusual complaints. Appears to be progressing appropriately.    OBJECTIVE:   Exam:. The incision is healing appropriately. No sign of infection. Range of motion is progressing as expected. The calf is soft and nontender with a negative Homans sign.    ASSESSMENT: 2 week status post right knee replacement.    PLAN: 1) Staples removed and steri strips applied   2) Order given for PT   3) Discontinue NELSY hose   4) Continue ice PRN   5) aspirin 325 mg orally every day for 1 month   6) Follow up in 6 weeks with repeat Xrays of right knee (3views)    Kadie Sandoval, APRN  2018

## 2018-06-21 ENCOUNTER — TRANSCRIBE ORDERS (OUTPATIENT)
Dept: ADMINISTRATIVE | Facility: HOSPITAL | Age: 83
End: 2018-06-21

## 2018-06-21 DIAGNOSIS — J18.9 HEALTHCARE-ASSOCIATED PNEUMONIA: Primary | ICD-10-CM

## 2018-06-22 ENCOUNTER — HOSPITAL ENCOUNTER (OUTPATIENT)
Dept: CT IMAGING | Facility: HOSPITAL | Age: 83
Discharge: HOME | End: 2018-06-22
Attending: INTERNAL MEDICINE | Admitting: INTERNAL MEDICINE

## 2018-06-22 ENCOUNTER — HOSPITAL ENCOUNTER (OUTPATIENT)
Dept: PHYSICAL THERAPY | Facility: HOSPITAL | Age: 83
Setting detail: THERAPIES SERIES
Discharge: HOME | End: 2018-06-22

## 2018-06-22 DIAGNOSIS — M25.661 STIFFNESS OF KNEE JOINT, RIGHT: ICD-10-CM

## 2018-06-22 DIAGNOSIS — Z47.89 ORTHOPEDIC AFTERCARE: ICD-10-CM

## 2018-06-22 DIAGNOSIS — J18.9 HEALTHCARE-ASSOCIATED PNEUMONIA: ICD-10-CM

## 2018-06-22 DIAGNOSIS — R26.2 DIFFICULTY WALKING: Primary | ICD-10-CM

## 2018-06-22 DIAGNOSIS — Z96.651 HISTORY OF ARTHROPLASTY OF RIGHT KNEE: ICD-10-CM

## 2018-06-22 PROCEDURE — 97110 THERAPEUTIC EXERCISES: CPT | Performed by: PHYSICAL THERAPIST

## 2018-06-22 PROCEDURE — G8979 MOBILITY GOAL STATUS: HCPCS | Performed by: PHYSICAL THERAPIST

## 2018-06-22 PROCEDURE — 71250 CT THORAX DX C-: CPT

## 2018-06-22 PROCEDURE — 97161 PT EVAL LOW COMPLEX 20 MIN: CPT | Performed by: PHYSICAL THERAPIST

## 2018-06-22 PROCEDURE — G8978 MOBILITY CURRENT STATUS: HCPCS | Performed by: PHYSICAL THERAPIST

## 2018-06-22 NOTE — THERAPY EVALUATION
Outpatient Physical Therapy Ortho Initial Evaluation  [facility]     Patient Name: Arlin Hutson  : 1935  MRN: [id number]  Today's Date: 2018      Visit Date: 2018    Patient Active Problem List   Diagnosis   • Gastroesophageal reflux disease   • Osteopenia of multiple sites   • Essential hypertension   • Colitis   • Primary osteoarthritis of right knee   • Primary osteoarthritis of left knee   • Pneumonia of left lower lobe due to infectious organism   • History of total right knee replacement        Past Medical History:   Diagnosis Date   • Baker's cyst    • Colon polyp    • CREST syndrome    • Fractures    • GERD (gastroesophageal reflux disease)    • History of CT scan of abdomen 2011    constipation, sig tics, 6 mm hepatic cyst   • History of rheumatoid arthritis    • Hyperlipidemia    • Hypertension    • Osteoarthritis    • Raynaud's disease    • Sjogren's syndrome    • Ulcerative colitis         Past Surgical History:   Procedure Laterality Date   • CATARACT EXTRACTION, BILATERAL     • COLONOSCOPY  2016    tics, microscopic colitis,    • COLONOSCOPY  2011    sig tics, inflammation, polyp   • DILATATION AND CURETTAGE     • EYE SURGERY     • FLEXIBLE SIGMOIDOSCOPY     • FRACTURE SURGERY     • HAND SURGERY     • JOINT REPLACEMENT     • KNEE SURGERY Right    • TOTAL KNEE ARTHROPLASTY Right 2018    Procedure: TOTAL KNEE ARTHROPLASTY;  Surgeon: Georges Jon MD;  Location: [facility] OR;  Service: Orthopedics   • UPPER GASTROINTESTINAL ENDOSCOPY  2008    erythema   • WRIST FRACTURE SURGERY     • WRIST SURGERY Right     orif       Visit Dx:     ICD-10-CM ICD-9-CM   1. Difficulty walking R26.2 719.7   2. Orthopedic aftercare Z47.89 V54.9   3. Stiffness of knee joint, right M25.661 719.56   4. History of arthroplasty of right knee Z96.651 V43.65             Patient History     Row Name 18 1200             History    Chief Complaint Difficulty  Walking;Balance Problems;Falls/history of falls;Difficulty with daily activities;Joint stiffness;Joint swelling;Muscle weakness;Pain  -ZK      Type of Pain Knee pain  -ZK      Date Current Problem(s) Began 05/30/18  -ZK      Brief Description of Current Complaint 82 year old s/p R TKA went home next day but developed temp of 104 and readmitted for pneumonia. 3 days in hosp then 7 days at [facility] rehab. HH PT the past 2 weeks with ROM -8 to 107 at dc a few days ago. Staples out earlier this week with wound and steri strips nicely in place. pt with no prior surgeries and lives alone the past 17 years with no pets. Still drives but today son brought her and attended eval to help with HEP follow thru. Pt also with 11 visits of prehab and PT this year trying to avoid TKA but pain became intense in May thus submitted to surgery.   -ZK      Patient/Caregiver Goals Improve mobility;Return to prior level of function;Relieve pain;Improve strength;Decrease swelling  -ZK      Patient's Rating of General Health Very good  -ZK      Occupation/sports/leisure activities volunteers. book club. travels  -ZK      Are you or can you be pregnant No  -ZK         Pain     Pain Location Knee  -ZK      Pain at Present 6  -ZK      Pain at Best 2  -ZK      Pain at Worst 7  -ZK      What Performance Factors Make the Current Problem(s) WORSE? see KOS  -ZK      What Performance Factors Make the Current Problem(s) BETTER? ice, elevation  -ZK         Fall Risk Assessment    Any falls in the past year: Yes  -ZK      Number of falls reported in the last 12 months 1   3/2018  -ZK      Factors that contributed to the fall: --   not sure what happened? landed on the ground but no injury  -ZK         Daily Activities    Primary Language English  -ZK      Barriers to learning None  -ZK      Pt Participated in POC and Goals Yes  -ZK         Safety    Are you being hurt, hit, or frightened by anyone at home or in your life? No  -ZK      Are you being  "neglected by a caregiver No  -ZK        User Key  (r) = Recorded By, (t) = Taken By, (c) = Cosigned By    Initials Name Provider Type    ZK Zorre Zeno Kimura, PT Physical Therapist                PT Ortho     Row Name 06/22/18 1200       Posture/Observations    Posture/Observations Comments --   leg length appears good but R knee not fully extended yet  -ZK       General ROM    RT Lower Ext Rt Knee Extension/Flexion   -10 to 95 AROM pre ex. -5 to 105 post ex  -ZK       MMT (Manual Muscle Testing)    Additional Documentation --   4- SLR and 4- LAQ with 10 degree lag/tightness  -ZK       Girth    Girth Measured? Right Lower Extremity   2\" joint line and 1\" ankle edema R LE  -ZK       Gait/Stairs Assessment/Training    Assistive Device (Gait) walker, front-wheeled   also using cane and walls at home.   -ZK    Ascending Technique (Stairs) step-to-step  -ZK    Descending Technique (Stairs) step-to-step  -ZK    Comment (Gait/Stairs) wide based gait at first but normalized with cueing  -ZK      User Key  (r) = Recorded By, (t) = Taken By, (c) = Cosigned By    Initials Name Provider Type    ZK Zorre Zeno Kimura, PT Physical Therapist                                  PT OP Goals     Row Name 06/22/18 1200          PT Short Term Goals    STG Date to Achieve 07/22/18  -ZK     STG 1 improve R knee ROM to -3 ext and 115 flexion to allow biking and normal gait pattern.  -ZK     STG 1 Progress New  -ZK     STG 2 decrease knee joint swelling to 1\" and ankle to 1/4\"  -ZK     STG 2 Progress New  -ZK     STG 3 pt to trust gait with cane for outdoor distances  -ZK     STG 3 Progress New  -ZK     STG 4 KOS score to improve from 60 to < 45%  -ZK     STG 4 Progress New  -ZK        Long Term Goals    LTG Date to Achieve 08/22/18  -ZK     LTG 1 ROM R knee to reach 0- 120 or more with minimal pain  -ZK     LTG 2 Pt to amb without cane unless on unsteady surfaces  -ZK     LTG 3 pt to state minimal pain post ex or amb < 3/10  -ZK     LTG 4 pt " "to be indep with good ongoing HEP  -ZK     LTG 5 KOS score to be < 30% by dc  -ZK     LTG 6 pt to return to driving and community activities and traveling as before.   -ZK        Time Calculation    PT Goal Re-Cert Due Date 09/22/18  -ZK       User Key  (r) = Recorded By, (t) = Taken By, (c) = Cosigned By    Initials Name Provider Type    ZK Zorre Zeno Kimura, PT Physical Therapist                PT Assessment/Plan     Row Name 06/22/18 1251          PT Assessment    Functional Limitations Impaired gait;Impaired locomotion;Limitation in home management;Performance in self-care ADL;Performance in leisure activities;Limitations in community activities  -ZK     Impairments Gait;Impaired flexibility;Muscle strength;Pain;Impaired postural alignment;Joint mobility;Range of motion  -ZK     Assessment Comments pt with a slight set back due to pneumonia but back on track with good HH PT and ROM improved 10 degrees with just some light stretching today. Will work more on knee ext stretches and avoid pillow across the back of her knee. Pt has used a number of her PT sessions for 2018 for prehab thus may have to \"budget\" post op rx. Motivated with good family support so this should not be much of an issue. Condition is stable and not complicated thus with good prognosis for goals.   -ZK     Please refer to paper survey for additional self-reported information Yes  -ZK       User Key  (r) = Recorded By, (t) = Taken By, (c) = Cosigned By    Initials Name Provider Type    ZK Zorre Zeno Kimura, PT Physical Therapist                  Exercises     Row Name 06/22/18 1255 06/22/18 1200          Total Minutes    48908 - PT Therapeutic Exercise Minutes 15  -ZK 15  -ZK        Exercise 1    Exercise Name 1  -- knee ext hang stretch, LAQ and SAQ with holds. passive holds knee flexion with 7# david pressure  -ZK       User Key  (r) = Recorded By, (t) = Taken By, (c) = Cosigned By    Initials Name Provider Type    ZK Zorre Zeno Kimura, PT " Physical Therapist                        Outcome Measure Options: Knee Outcome Score- ADL  Knee Outcome Score  Knee Outcome Score Comments: 60%      Time Calculation:     Therapy Suggested Charges     Code   Minutes Charges    65463 (CPT®) Hc Pt Neuromusc Re Education Ea 15 Min      33272 (CPT®) Hc Pt Ther Proc Ea 15 Min 30 2    73440 (CPT®) Hc Gait Training Ea 15 Min      76496 (CPT®) Hc Pt Therapeutic Act Ea 15 Min      03699 (CPT®) Hc Pt Manual Therapy Ea 15 Min      39878 (CPT®) Hc Pt Ther Massage- Per 15 Min      76892 (CPT®) Hc Pt Iontophoresis Ea 15 Min      65508 (CPT®) Hc Pt Elec Stim Ea-Per 15 Min      50041 (CPT®) Hc Pt Ultrasound Ea 15 Min      59680 (CPT®) Hc Pt Self Care/Mgmt/Train Ea 15 Min      Total  30 2          Start Time: 1045  Stop Time: 1130  Time Calculation (min): 45 min  Total Timed Code Minutes- PT: 45 minute(s)     Therapy Charges for Today     Code Description Service Date Service Provider Modifiers Qty    58209214263 HC PT MOBILITY CURRENT 6/22/2018 Zorre Zeno Kimura, PT GP, CL 1    03073614405 HC PT MOBILITY PROJECTED 6/22/2018 Zorre Zeno Kimura, PT GP, CJ 1    60192979657 HC PT EVAL LOW COMPLEXITY 2 6/22/2018 Zorre Zeno Kimura, PT GP 1    68055748573 HC PT THER PROC EA 15 MIN 6/22/2018 Zorre Zeno Kimura, PT GP 1          PT G-Codes  Outcome Measure Options: Knee Outcome Score- ADL  Score: 60  Functional Limitation: Mobility: Walking and moving around  Mobility: Walking and Moving Around Current Status (): At least 60 percent but less than 80 percent impaired, limited or restricted  Mobility: Walking and Moving Around Goal Status (): At least 20 percent but less than 40 percent impaired, limited or restricted         Zorre Zeno Kimura, PT  6/22/2018

## 2018-06-26 ENCOUNTER — HOSPITAL ENCOUNTER (OUTPATIENT)
Dept: PHYSICAL THERAPY | Facility: HOSPITAL | Age: 83
Setting detail: THERAPIES SERIES
Discharge: HOME | End: 2018-06-26

## 2018-06-26 DIAGNOSIS — Z47.89 ORTHOPEDIC AFTERCARE: Primary | ICD-10-CM

## 2018-06-26 PROCEDURE — 97110 THERAPEUTIC EXERCISES: CPT | Performed by: PHYSICAL THERAPIST

## 2018-06-27 NOTE — THERAPY TREATMENT NOTE
Outpatient Physical Therapy Ortho Treatment Note  [facility]     Patient Name: Arlin Hutson  : 1935  MRN: [id number]  Today's Date: 2018      Visit Date: 2018    Visit Dx:    ICD-10-CM ICD-9-CM   1. Orthopedic aftercare Z47.89 V54.9       Patient Active Problem List   Diagnosis   • Gastroesophageal reflux disease   • Osteopenia of multiple sites   • Essential hypertension   • Colitis   • Primary osteoarthritis of right knee   • Primary osteoarthritis of left knee   • Pneumonia of left lower lobe due to infectious organism   • History of total right knee replacement        Past Medical History:   Diagnosis Date   • Baker's cyst    • Colon polyp    • CREST syndrome    • Fractures    • GERD (gastroesophageal reflux disease)    • History of CT scan of abdomen 2011    constipation, sig tics, 6 mm hepatic cyst   • History of rheumatoid arthritis    • Hyperlipidemia    • Hypertension    • Osteoarthritis    • Raynaud's disease    • Sjogren's syndrome    • Ulcerative colitis         Past Surgical History:   Procedure Laterality Date   • CATARACT EXTRACTION, BILATERAL     • COLONOSCOPY  2016    tics, microscopic colitis,    • COLONOSCOPY  2011    sig tics, inflammation, polyp   • DILATATION AND CURETTAGE     • EYE SURGERY     • FLEXIBLE SIGMOIDOSCOPY     • FRACTURE SURGERY     • HAND SURGERY     • JOINT REPLACEMENT     • KNEE SURGERY Right    • TOTAL KNEE ARTHROPLASTY Right 2018    Procedure: TOTAL KNEE ARTHROPLASTY;  Surgeon: Georges Jon MD;  Location: [facility];  Service: Orthopedics   • UPPER GASTROINTESTINAL ENDOSCOPY  2008    erythema   • WRIST FRACTURE SURGERY     • WRIST SURGERY Right     orif                                     Exercises     Row Name 18 0900             Precautions    Existing Precautions/Restrictions --   i feel tired  -         Subjective Pain    Able to rate subjective pain? yes  -      Pre-Treatment Pain Level 3   -DH      Post-Treatment Pain Level 3  -DH         Total Minutes    50122 - PT Therapeutic Exercise Minutes 45  -DH         Exercise 1    Exercise Name 1 --   see flow sheet  -      Cueing 1 Verbal;Tactile  -        User Key  (r) = Recorded By, (t) = Taken By, (c) = Cosigned By    Initials Name Provider Type     Jer Marie, PT Physical Therapist                                            Time Calculation:   Start Time: 1545  Stop Time: 1630  Time Calculation (min): 45 min  Therapy Suggested Charges     Code   Minutes Charges    11927 (CPT®) Hc Pt Neuromusc Re Education Ea 15 Min      67203 (CPT®) Hc Pt Ther Proc Ea 15 Min 45 3    00535 (CPT®) Hc Gait Training Ea 15 Min      55186 (CPT®) Hc Pt Therapeutic Act Ea 15 Min      54961 (CPT®) Hc Pt Manual Therapy Ea 15 Min      03895 (CPT®) Hc Pt Ther Massage- Per 15 Min      38161 (CPT®) Hc Pt Iontophoresis Ea 15 Min      36948 (CPT®) Hc Pt Elec Stim Ea-Per 15 Min      22047 (CPT®) Hc Pt Ultrasound Ea 15 Min      34506 (CPT®)  Pt Self Care/Mgmt/Train Ea 15 Min      Total  45 3        Therapy Charges for Today     Code Description Service Date Service Provider Modifiers Qty    85145737574 HC PT THER PROC EA 15 MIN 6/26/2018 Jer Marie, PT GP 3                    Jer Marie, PT  6/27/2018

## 2018-06-28 ENCOUNTER — HOSPITAL ENCOUNTER (OUTPATIENT)
Dept: PHYSICAL THERAPY | Facility: HOSPITAL | Age: 83
Setting detail: THERAPIES SERIES
Discharge: HOME | End: 2018-06-28

## 2018-06-28 PROCEDURE — 97110 THERAPEUTIC EXERCISES: CPT | Performed by: PHYSICAL THERAPIST

## 2018-06-29 NOTE — THERAPY TREATMENT NOTE
Outpatient Physical Therapy Ortho Treatment Note  [facility]     Patient Name: Arlin Hutson  : 1935  MRN: [id number]  Today's Date: 2018      Visit Date: 2018    Visit Dx:  No diagnosis found.    Patient Active Problem List   Diagnosis   • Gastroesophageal reflux disease   • Osteopenia of multiple sites   • Essential hypertension   • Colitis   • Primary osteoarthritis of right knee   • Primary osteoarthritis of left knee   • Pneumonia of left lower lobe due to infectious organism   • History of total right knee replacement        Past Medical History:   Diagnosis Date   • Baker's cyst    • Colon polyp    • CREST syndrome    • Fractures    • GERD (gastroesophageal reflux disease)    • History of CT scan of abdomen 2011    constipation, sig tics, 6 mm hepatic cyst   • History of rheumatoid arthritis    • Hyperlipidemia    • Hypertension    • Osteoarthritis    • Raynaud's disease    • Sjogren's syndrome    • Ulcerative colitis         Past Surgical History:   Procedure Laterality Date   • CATARACT EXTRACTION, BILATERAL     • COLONOSCOPY  2016    tics, microscopic colitis,    • COLONOSCOPY  2011    sig tics, inflammation, polyp   • DILATATION AND CURETTAGE     • EYE SURGERY     • FLEXIBLE SIGMOIDOSCOPY     • FRACTURE SURGERY     • HAND SURGERY     • JOINT REPLACEMENT     • KNEE SURGERY Right    • TOTAL KNEE ARTHROPLASTY Right 2018    Procedure: TOTAL KNEE ARTHROPLASTY;  Surgeon: Georges Jon MD;  Location: [facility] OR;  Service: Orthopedics   • UPPER GASTROINTESTINAL ENDOSCOPY  2008    erythema   • WRIST FRACTURE SURGERY     • WRIST SURGERY Right     orif                             PT Assessment/Plan     Row Name 18 0803          PT Assessment    Assessment Comments 1 degree extension lag. ambulating freely with use of spc; no LOB;  implemented mild LE strengthening per tolerance. incision is healing without drainage or erythema.   -      Please refer to paper survey for additional self-reported information Yes  -DH     Rehab Potential Good  -DH     Patient/caregiver participated in establishment of treatment plan and goals Yes  -DH       User Key  (r) = Recorded By, (t) = Taken By, (c) = Cosigned By    Initials Name Provider Type    KIT Marie PT Physical Therapist                    Exercises     Row Name 06/29/18 0800             Subjective Pain    Able to rate subjective pain? yes  -DH      Pre-Treatment Pain Level 4  -DH      Post-Treatment Pain Level 2  -DH         Total Minutes    04160 - PT Therapeutic Exercise Minutes 45  -DH         Exercise 1    Exercise Name 1 see flow sheet  -DH      Cueing 1 Verbal;Tactile  -DH        User Key  (r) = Recorded By, (t) = Taken By, (c) = Cosigned By    Initials Name Provider Type    KIT Marie PT Physical Therapist                                            Time Calculation:   Start Time: 1545  Stop Time: 1630  Time Calculation (min): 45 min  Therapy Suggested Charges     Code   Minutes Charges    05770 (CPT®) Hc Pt Neuromusc Re Education Ea 15 Min      43466 (CPT®) Hc Pt Ther Proc Ea 15 Min 45 3    46869 (CPT®) Hc Gait Training Ea 15 Min      83692 (CPT®) Hc Pt Therapeutic Act Ea 15 Min      67724 (CPT®) Hc Pt Manual Therapy Ea 15 Min      81266 (CPT®) Hc Pt Ther Massage- Per 15 Min      78487 (CPT®) Hc Pt Iontophoresis Ea 15 Min      41648 (CPT®) Hc Pt Elec Stim Ea-Per 15 Min      36364 (CPT®) Hc Pt Ultrasound Ea 15 Min      47703 (CPT®) Hc Pt Self Care/Mgmt/Train Ea 15 Min      Total  45 3        Therapy Charges for Today     Code Description Service Date Service Provider Modifiers Qty    85816044397 HC PT THER PROC EA 15 MIN 6/28/2018 Jer Marie, PT GP 3                    Jer Marie, PT  6/29/2018

## 2018-07-03 ENCOUNTER — HOSPITAL ENCOUNTER (OUTPATIENT)
Dept: PHYSICAL THERAPY | Facility: HOSPITAL | Age: 83
Setting detail: THERAPIES SERIES
Discharge: HOME | End: 2018-07-03

## 2018-07-03 DIAGNOSIS — Z47.89 ORTHOPEDIC AFTERCARE: Primary | ICD-10-CM

## 2018-07-03 NOTE — THERAPY TREATMENT NOTE
Outpatient Physical Therapy Ortho Treatment Note  [facility]     Patient Name: Arlin Hutson  : 1935  MRN: [id number]  Today's Date: 7/3/2018      Visit Date: 2018    Visit Dx:    ICD-10-CM ICD-9-CM   1. Orthopedic aftercare Z47.89 V54.9       Patient Active Problem List   Diagnosis   • Gastroesophageal reflux disease   • Osteopenia of multiple sites   • Essential hypertension   • Colitis   • Primary osteoarthritis of right knee   • Primary osteoarthritis of left knee   • Pneumonia of left lower lobe due to infectious organism (CMS/HCC)   • History of total right knee replacement        Past Medical History:   Diagnosis Date   • Baker's cyst    • Colon polyp    • CREST syndrome (CMS/HCC)    • Fractures    • GERD (gastroesophageal reflux disease)    • History of CT scan of abdomen 2011    constipation, sig tics, 6 mm hepatic cyst   • History of rheumatoid arthritis    • Hyperlipidemia    • Hypertension    • Osteoarthritis    • Raynaud's disease    • Sjogren's syndrome (CMS/HCC)    • Ulcerative colitis (CMS/HCC)         Past Surgical History:   Procedure Laterality Date   • CATARACT EXTRACTION, BILATERAL     • COLONOSCOPY  2016    tics, microscopic colitis,    • COLONOSCOPY  2011    sig tics, inflammation, polyp   • DILATATION AND CURETTAGE     • EYE SURGERY     • FLEXIBLE SIGMOIDOSCOPY     • FRACTURE SURGERY     • HAND SURGERY     • JOINT REPLACEMENT     • KNEE SURGERY Right    • TOTAL KNEE ARTHROPLASTY Right 2018    Procedure: TOTAL KNEE ARTHROPLASTY;  Surgeon: Georges Jon MD;  Location: [facility];  Service: Orthopedics   • UPPER GASTROINTESTINAL ENDOSCOPY  2008    erythema   • WRIST FRACTURE SURGERY     • WRIST SURGERY Right     orif                             PT Assessment/Plan     Row Name 18 0927          PT Assessment    Assessment Comments progressed with LE stabilization;   1 degree extension lag;  20 min ambulation tolerance.   progressing well with all intervention provided.   -     Please refer to paper survey for additional self-reported information Yes  -     Rehab Potential Good  -     Patient/caregiver participated in establishment of treatment plan and goals Yes  -       User Key  (r) = Recorded By, (t) = Taken By, (c) = Cosigned By    Initials Name Provider Type     Jer Marie, PT Physical Therapist                    Exercises     Row Name 07/03/18 0900             Subjective Comments    Subjective Comments walking better each day  -         Subjective Pain    Able to rate subjective pain? yes  -DH      Pre-Treatment Pain Level 2  -DH      Post-Treatment Pain Level 2  -DH         Total Minutes    10123 - PT Therapeutic Exercise Minutes 40  -DH         Exercise 1    Exercise Name 1 see flow sheet  -      Cueing 1 Verbal;Tactile  -        User Key  (r) = Recorded By, (t) = Taken By, (c) = Cosigned By    Initials Name Provider Type     Jer Marie, PT Physical Therapist                                            Time Calculation:   Start Time: 0800  Stop Time: 0845  Time Calculation (min): 45 min  Therapy Suggested Charges     Code   Minutes Charges    21893 (CPT®)  Pt Neuromusc Re Education Ea 15 Min      83739 (CPT®) Hc Pt Ther Proc Ea 15 Min 40 3    89180 (CPT®) Hc Gait Training Ea 15 Min      80861 (CPT®) Hc Pt Therapeutic Act Ea 15 Min      51365 (CPT®) Hc Pt Manual Therapy Ea 15 Min      20533 (CPT®) Hc Pt Ther Massage- Per 15 Min      52380 (CPT®) Hc Pt Iontophoresis Ea 15 Min      29135 (CPT®)  Pt Elec Stim Ea-Per 15 Min      01971 (CPT®)  Pt Ultrasound Ea 15 Min      09133 (CPT®)  Pt Self Care/Mgmt/Train Ea 15 Min      Total  40 3                      Jer Marie, PT  7/3/2018

## 2018-07-06 ENCOUNTER — HOSPITAL ENCOUNTER (OUTPATIENT)
Dept: PHYSICAL THERAPY | Facility: HOSPITAL | Age: 83
Setting detail: THERAPIES SERIES
Discharge: HOME | End: 2018-07-06

## 2018-07-06 DIAGNOSIS — Z47.89 ORTHOPEDIC AFTERCARE: Primary | ICD-10-CM

## 2018-07-06 PROCEDURE — 97110 THERAPEUTIC EXERCISES: CPT | Performed by: PHYSICAL THERAPIST

## 2018-07-06 NOTE — THERAPY TREATMENT NOTE
Outpatient Physical Therapy Ortho Treatment Note  [facility]     Patient Name: Arlin Hutson  : 1935  MRN: [id number]  Today's Date: 2018      Visit Date: 2018    Visit Dx:    ICD-10-CM ICD-9-CM   1. Orthopedic aftercare Z47.89 V54.9       Patient Active Problem List   Diagnosis   • Gastroesophageal reflux disease   • Osteopenia of multiple sites   • Essential hypertension   • Colitis   • Primary osteoarthritis of right knee   • Primary osteoarthritis of left knee   • Pneumonia of left lower lobe due to infectious organism (CMS/HCC)   • History of total right knee replacement        Past Medical History:   Diagnosis Date   • Baker's cyst    • Colon polyp    • CREST syndrome (CMS/HCC)    • Fractures    • GERD (gastroesophageal reflux disease)    • History of CT scan of abdomen 2011    constipation, sig tics, 6 mm hepatic cyst   • History of rheumatoid arthritis    • Hyperlipidemia    • Hypertension    • Osteoarthritis    • Raynaud's disease    • Sjogren's syndrome (CMS/HCC)    • Ulcerative colitis (CMS/HCC)         Past Surgical History:   Procedure Laterality Date   • CATARACT EXTRACTION, BILATERAL     • COLONOSCOPY  2016    tics, microscopic colitis,    • COLONOSCOPY  2011    sig tics, inflammation, polyp   • DILATATION AND CURETTAGE     • EYE SURGERY     • FLEXIBLE SIGMOIDOSCOPY     • FRACTURE SURGERY     • HAND SURGERY     • JOINT REPLACEMENT     • KNEE SURGERY Right    • TOTAL KNEE ARTHROPLASTY Right 2018    Procedure: TOTAL KNEE ARTHROPLASTY;  Surgeon: Georges Jon MD;  Location: [facility];  Service: Orthopedics   • UPPER GASTROINTESTINAL ENDOSCOPY  2008    erythema   • WRIST FRACTURE SURGERY     • WRIST SURGERY Right     orif                             PT Assessment/Plan     Row Name 18 1016          PT Assessment    Assessment Comments full knee extension;  compliant with HEP ; no antalgia with gait.   doing well. possible candidate  dc next 1-2 weeks.   -     Please refer to paper survey for additional self-reported information Yes  -     Rehab Potential Good  -     Patient/caregiver participated in establishment of treatment plan and goals Yes  -       User Key  (r) = Recorded By, (t) = Taken By, (c) = Cosigned By    Initials Name Provider Type     Jer Marie PT Physical Therapist                    Exercises     Row Name 07/06/18 1000             Subjective Comments    Subjective Comments compliant with HEP  -         Subjective Pain    Able to rate subjective pain? yes  -      Pre-Treatment Pain Level 2  -DH      Post-Treatment Pain Level 2  -DH         Total Minutes    91493 - PT Therapeutic Exercise Minutes --   40  -DH         Exercise 1    Exercise Name 1 see flow sheet  -      Cueing 1 Verbal;Tactile  -        User Key  (r) = Recorded By, (t) = Taken By, (c) = Cosigned By    Initials Name Provider Type     Jer Marie, CHER Physical Therapist                                            Time Calculation:   Start Time: 0930  Stop Time: 1015  Time Calculation (min): 45 min  Therapy Suggested Charges     Code   Minutes Charges    None           Therapy Charges for Today     Code Description Service Date Service Provider Modifiers Qty    41578258198  PT THER PROC EA 15 MIN 7/6/2018 Jer Marie, PT GP 3                    Jer Marie PT  7/6/2018

## 2018-07-09 ENCOUNTER — HOSPITAL ENCOUNTER (OUTPATIENT)
Dept: PHYSICAL THERAPY | Facility: HOSPITAL | Age: 83
Setting detail: THERAPIES SERIES
Discharge: HOME | End: 2018-07-09

## 2018-07-09 DIAGNOSIS — Z47.89 ORTHOPEDIC AFTERCARE: Primary | ICD-10-CM

## 2018-07-09 DIAGNOSIS — Z96.651 HISTORY OF ARTHROPLASTY OF RIGHT KNEE: ICD-10-CM

## 2018-07-09 PROCEDURE — 97110 THERAPEUTIC EXERCISES: CPT | Performed by: PHYSICAL THERAPIST

## 2018-07-09 NOTE — THERAPY TREATMENT NOTE
Outpatient Physical Therapy Ortho Treatment Note  [facility]     Patient Name: Arlin Hutson  : 1935  MRN: [id number]  Today's Date: 2018      Visit Date: 2018    Visit Dx:    ICD-10-CM ICD-9-CM   1. Orthopedic aftercare Z47.89 V54.9   2. History of arthroplasty of right knee Z96.651 V43.65       Patient Active Problem List   Diagnosis   • Gastroesophageal reflux disease   • Osteopenia of multiple sites   • Essential hypertension   • Colitis   • Primary osteoarthritis of right knee   • Primary osteoarthritis of left knee   • Pneumonia of left lower lobe due to infectious organism (CMS/HCC)   • History of total right knee replacement        Past Medical History:   Diagnosis Date   • Baker's cyst    • Colon polyp    • CREST syndrome (CMS/HCC)    • Fractures    • GERD (gastroesophageal reflux disease)    • History of CT scan of abdomen 2011    constipation, sig tics, 6 mm hepatic cyst   • History of rheumatoid arthritis    • Hyperlipidemia    • Hypertension    • Osteoarthritis    • Raynaud's disease    • Sjogren's syndrome (CMS/HCC)    • Ulcerative colitis (CMS/HCC)         Past Surgical History:   Procedure Laterality Date   • CATARACT EXTRACTION, BILATERAL     • COLONOSCOPY  2016    tics, microscopic colitis,    • COLONOSCOPY  2011    sig tics, inflammation, polyp   • DILATATION AND CURETTAGE     • EYE SURGERY     • FLEXIBLE SIGMOIDOSCOPY     • FRACTURE SURGERY     • HAND SURGERY     • JOINT REPLACEMENT     • KNEE SURGERY Right    • TOTAL KNEE ARTHROPLASTY Right 2018    Procedure: TOTAL KNEE ARTHROPLASTY;  Surgeon: Georges Jon MD;  [facility];  Service: Orthopedics   • UPPER GASTROINTESTINAL ENDOSCOPY  2008    erythema   • WRIST FRACTURE SURGERY     • WRIST SURGERY Right     orif                             PT Assessment/Plan     Row Name 18 1642          PT Assessment    Assessment Comments progressed with knee AAROm/ LE  strengthening per tolerance.  compliant with positional extensions stretch.    -     Please refer to paper survey for additional self-reported information Yes  -     Rehab Potential Good  -     Patient/caregiver participated in establishment of treatment plan and goals Yes  -       User Key  (r) = Recorded By, (t) = Taken By, (c) = Cosigned By    Initials Name Provider Type     Jer Marie, PT Physical Therapist                    Exercises     Row Name 07/09/18 1600             Subjective Comments    Subjective Comments doing more at [employer]/ 75% improved  -         Subjective Pain    Able to rate subjective pain? yes  -DH      Pre-Treatment Pain Level 3  -DH      Post-Treatment Pain Level 2  -DH         Total Minutes    84640 - PT Therapeutic Exercise Minutes 45  -DH         Exercise 1    Exercise Name 1 see flow sheet  -      Cueing 1 Verbal;Tactile  -        User Key  (r) = Recorded By, (t) = Taken By, (c) = Cosigned By    Initials Name Provider Type     Jer Marie, PT Physical Therapist                                            Time Calculation:   Start Time: 1545  Stop Time: 1630  Time Calculation (min): 45 min  Therapy Suggested Charges     Code   Minutes Charges    68551 (CPT®) Hc Pt Neuromusc Re Education Ea 15 Min      23696 (CPT®) Hc Pt Ther Proc Ea 15 Min 45 3    56399 (CPT®) Hc Gait Training Ea 15 Min      88023 (CPT®) Hc Pt Therapeutic Act Ea 15 Min      27779 (CPT®) Hc Pt Manual Therapy Ea 15 Min      56962 (CPT®) Hc Pt Ther Massage- Per 15 Min      69852 (CPT®) Hc Pt Iontophoresis Ea 15 Min      92027 (CPT®) Hc Pt Elec Stim Ea-Per 15 Min      41945 (CPT®) Hc Pt Ultrasound Ea 15 Min      62085 (CPT®)  Pt Self Care/Mgmt/Train Ea 15 Min      Total  45 3        Therapy Charges for Today     Code Description Service Date Service Provider Modifiers Qty    13268877934 HC PT THER PROC EA 15 MIN 7/9/2018 Jer Marie, PT GP 3                    Jer Marie,  PT  7/9/2018

## 2018-07-11 ENCOUNTER — HOSPITAL ENCOUNTER (OUTPATIENT)
Dept: PHYSICAL THERAPY | Facility: HOSPITAL | Age: 83
Setting detail: THERAPIES SERIES
Discharge: HOME | End: 2018-07-11

## 2018-07-11 DIAGNOSIS — Z47.89 ORTHOPEDIC AFTERCARE: Primary | ICD-10-CM

## 2018-07-11 PROCEDURE — 97110 THERAPEUTIC EXERCISES: CPT | Performed by: PHYSICAL THERAPIST

## 2018-07-12 NOTE — THERAPY TREATMENT NOTE
Outpatient Physical Therapy Ortho Treatment Note  [facility]     Patient Name: Arlin Hutson  : 1935  MRN: [id number]  Today's Date: 2018      Visit Date: 2018    Visit Dx:    ICD-10-CM ICD-9-CM   1. Orthopedic aftercare Z47.89 V54.9       Patient Active Problem List   Diagnosis   • Gastroesophageal reflux disease   • Osteopenia of multiple sites   • Essential hypertension   • Colitis   • Primary osteoarthritis of right knee   • Primary osteoarthritis of left knee   • Pneumonia of left lower lobe due to infectious organism (CMS/HCC)   • History of total right knee replacement        Past Medical History:   Diagnosis Date   • Baker's cyst    • Colon polyp    • CREST syndrome (CMS/HCC)    • Fractures    • GERD (gastroesophageal reflux disease)    • History of CT scan of abdomen 2011    constipation, sig tics, 6 mm hepatic cyst   • History of rheumatoid arthritis    • Hyperlipidemia    • Hypertension    • Osteoarthritis    • Raynaud's disease    • Sjogren's syndrome (CMS/HCC)    • Ulcerative colitis (CMS/HCC)         Past Surgical History:   Procedure Laterality Date   • CATARACT EXTRACTION, BILATERAL     • COLONOSCOPY  2016    tics, microscopic colitis,    • COLONOSCOPY  2011    sig tics, inflammation, polyp   • DILATATION AND CURETTAGE     • EYE SURGERY     • FLEXIBLE SIGMOIDOSCOPY     • FRACTURE SURGERY     • HAND SURGERY     • JOINT REPLACEMENT     • KNEE SURGERY Right    • TOTAL KNEE ARTHROPLASTY Right 2018    Procedure: TOTAL KNEE ARTHROPLASTY;  Surgeon: Georges Jon MD;  Location: [facility];  Service: Orthopedics   • UPPER GASTROINTESTINAL ENDOSCOPY  2008    erythema   • WRIST FRACTURE SURGERY     • WRIST SURGERY Right     orif                             PT Assessment/Plan     Row Name 18 0755          PT Assessment    Assessment Comments progressed with AAROM/ AROM; gait -balance training;  no lOB with frontal plane movement patterns;  weaning of spc;  dressing change with butterflies; no drainage  no erythema. (-) homans.  progressing towards all goals.  -     Please refer to paper survey for additional self-reported information Yes  -     Rehab Potential Good  -     Patient/caregiver participated in establishment of treatment plan and goals Yes  -       User Key  (r) = Recorded By, (t) = Taken By, (c) = Cosigned By    Initials Name Provider Type     Jer Marie, PT Physical Therapist                    Exercises     Row Name 07/12/18 0700             Subjective Comments    Subjective Comments compliant with HEP; walking better  -         Subjective Pain    Able to rate subjective pain? yes  -      Pre-Treatment Pain Level 2  -      Post-Treatment Pain Level 1  -         Total Minutes    20222 - PT Therapeutic Exercise Minutes 45  -DH         Exercise 1    Exercise Name 1 see flow sheet  -      Cueing 1 Verbal;Tactile  -      Additional Comments dressing change 5 mins  -        User Key  (r) = Recorded By, (t) = Taken By, (c) = Cosigned By    Initials Name Provider Type     Jer Marie, PT Physical Therapist                                            Time Calculation:   Start Time: 1415  Stop Time: 1500  Time Calculation (min): 45 min  Therapy Suggested Charges     Code   Minutes Charges    16170 (CPT®)  Pt Neuromusc Re Education Ea 15 Min      65569 (CPT®)  Pt Ther Proc Ea 15 Min 45 3    76067 (CPT®) Hc Gait Training Ea 15 Min      71530 (CPT®)  Pt Therapeutic Act Ea 15 Min      72304 (CPT®)  Pt Manual Therapy Ea 15 Min      83170 (CPT®)  Pt Ther Massage- Per 15 Min      99350 (CPT®)  Pt Iontophoresis Ea 15 Min      40274 (CPT®)  Pt Elec Stim Ea-Per 15 Min      57218 (CPT®)  Pt Ultrasound Ea 15 Min      18613 (CPT®)  Pt Self Care/Mgmt/Train Ea 15 Min      Total  45 3        Therapy Charges for Today     Code Description Service Date Service Provider Modifiers Qty    89176471479 HC PT THER PROC  EA 15 MIN 7/11/2018 Jer Marie, PT GP 3                    Jer Marie, PT  7/12/2018

## 2018-07-16 ENCOUNTER — TRANSCRIBE ORDERS (OUTPATIENT)
Dept: ADMINISTRATIVE | Facility: HOSPITAL | Age: 83
End: 2018-07-16

## 2018-07-16 ENCOUNTER — HOSPITAL ENCOUNTER (OUTPATIENT)
Dept: PHYSICAL THERAPY | Facility: HOSPITAL | Age: 83
Setting detail: THERAPIES SERIES
Discharge: HOME | End: 2018-07-16

## 2018-07-16 DIAGNOSIS — R22.1 MASS OF RIGHT SIDE OF NECK: Primary | ICD-10-CM

## 2018-07-16 DIAGNOSIS — Z96.651 HISTORY OF ARTHROPLASTY OF RIGHT KNEE: Primary | ICD-10-CM

## 2018-07-16 DIAGNOSIS — R26.2 DIFFICULTY WALKING: ICD-10-CM

## 2018-07-16 DIAGNOSIS — Z47.89 ORTHOPEDIC AFTERCARE: ICD-10-CM

## 2018-07-16 PROCEDURE — 97110 THERAPEUTIC EXERCISES: CPT | Performed by: PHYSICAL THERAPIST

## 2018-07-16 NOTE — THERAPY TREATMENT NOTE
Outpatient Physical Therapy Ortho Treatment Note  [facility]     Patient Name: Arlin Hutson  : 1935  MRN: [id number]  Today's Date: 2018      Visit Date: 2018    Visit Dx:    ICD-10-CM ICD-9-CM   1. History of arthroplasty of right knee Z96.651 V43.65   2. Orthopedic aftercare Z47.89 V54.9   3. Difficulty walking R26.2 719.7       Patient Active Problem List   Diagnosis   • Gastroesophageal reflux disease   • Osteopenia of multiple sites   • Essential hypertension   • Colitis   • Primary osteoarthritis of right knee   • Primary osteoarthritis of left knee   • Pneumonia of left lower lobe due to infectious organism (CMS/HCC)   • History of total right knee replacement        Past Medical History:   Diagnosis Date   • Baker's cyst    • Colon polyp    • CREST syndrome (CMS/HCC)    • Fractures    • GERD (gastroesophageal reflux disease)    • History of CT scan of abdomen 2011    constipation, sig tics, 6 mm hepatic cyst   • History of rheumatoid arthritis    • Hyperlipidemia    • Hypertension    • Osteoarthritis    • Raynaud's disease    • Sjogren's syndrome (CMS/HCC)    • Ulcerative colitis (CMS/HCC)         Past Surgical History:   Procedure Laterality Date   • CATARACT EXTRACTION, BILATERAL     • COLONOSCOPY  2016    tics, microscopic colitis,    • COLONOSCOPY  2011    sig tics, inflammation, polyp   • DILATATION AND CURETTAGE     • EYE SURGERY     • FLEXIBLE SIGMOIDOSCOPY     • FRACTURE SURGERY     • HAND SURGERY     • JOINT REPLACEMENT     • KNEE SURGERY Right    • TOTAL KNEE ARTHROPLASTY Right 2018    Procedure: TOTAL KNEE ARTHROPLASTY;  Surgeon: Georges Jon MD;  Location: [facility] OR;  Service: Orthopedics   • UPPER GASTROINTESTINAL ENDOSCOPY  2008    erythema   • WRIST FRACTURE SURGERY     • WRIST SURGERY Right     orif                             PT Assessment/Plan     Row Name 18 1430          PT Assessment    Assessment Comments Pt  "presented today with increased pain over the weekend secondary to attending a baseball game Friday night, but has since then decreased. Progressed AAROM and AROM; Focus on hip strengthening and quad control to improve end range knee extension and overall gait mechanics. Pt cont to wean use of SPC. Pt progressing well towards all goals. No incisional drainage or erythema.  -     Please refer to paper survey for additional self-reported information Yes  -     Rehab Potential Good  -     Patient/caregiver participated in establishment of treatment plan and goals Yes  -     Patient would benefit from skilled therapy intervention Yes  -        PT Plan    PT Frequency 2x/week  -       User Key  (r) = Recorded By, (t) = Taken By, (c) = Cosigned By    Initials Name Provider Type    KIT Marie, PT Physical Therapist                    Exercises     Row Name 07/16/18 1400             Subjective Comments    Subjective Comments Pt reports she attended a baseball game Friday night, during which time she think she \"overdid it\", which caused increased pain over the weekend. Pt reports pain has decreased since then. Compliant with HEP. Still uses cane for community ambulation.  -         Subjective Pain    Able to rate subjective pain? yes  -      Pre-Treatment Pain Level 2  -DH      Post-Treatment Pain Level 2  -DH         Total Minutes    13978 - PT Therapeutic Exercise Minutes 45  -DH         Exercise 1    Exercise Name 1 see flow sheet  -      Cueing 1 Verbal;Tactile  -        User Key  (r) = Recorded By, (t) = Taken By, (c) = Cosigned By    Initials Name Provider Type    KIT Marie, PT Physical Therapist                                            Time Calculation:   Start Time: 1330  Stop Time: 1415  Time Calculation (min): 45 min  Total Timed Code Minutes- PT: 45 minute(s)  Therapy Suggested Charges     Code   Minutes Charges    80719 (CPT®)  Pt Neuromusc Re Education Ea 15 Min      19905 " (CPT®) Hc Pt Ther Proc Ea 15 Min 45 3    52183 (CPT®) Hc Gait Training Ea 15 Min      55113 (CPT®) Hc Pt Therapeutic Act Ea 15 Min      51910 (CPT®) Hc Pt Manual Therapy Ea 15 Min      95100 (CPT®) Hc Pt Ther Massage- Per 15 Min      08553 (CPT®) Hc Pt Iontophoresis Ea 15 Min      37870 (CPT®) Hc Pt Elec Stim Ea-Per 15 Min      13939 (CPT®) Hc Pt Ultrasound Ea 15 Min      69600 (CPT®) Hc Pt Self Care/Mgmt/Train Ea 15 Min      Total  45 3        Therapy Charges for Today     Code Description Service Date Service Provider Modifiers Qty    33144690478 HC PT THER PROC EA 15 MIN 7/16/2018 Jer Marie, PT GP 3                    Jer Marie, PT  7/16/2018

## 2018-07-17 ENCOUNTER — HOSPITAL ENCOUNTER (OUTPATIENT)
Dept: MAMMOGRAPHY | Facility: HOSPITAL | Age: 83
Discharge: HOME | End: 2018-07-17
Attending: OBSTETRICS & GYNECOLOGY | Admitting: OBSTETRICS & GYNECOLOGY

## 2018-07-17 DIAGNOSIS — Z12.31 SCREENING MAMMOGRAM, ENCOUNTER FOR: ICD-10-CM

## 2018-07-17 PROCEDURE — 77067 SCR MAMMO BI INCL CAD: CPT

## 2018-07-17 PROCEDURE — 77063 BREAST TOMOSYNTHESIS BI: CPT

## 2018-07-18 ENCOUNTER — HOSPITAL ENCOUNTER (OUTPATIENT)
Dept: PHYSICAL THERAPY | Facility: HOSPITAL | Age: 83
Setting detail: THERAPIES SERIES
Discharge: HOME | End: 2018-07-18

## 2018-07-18 DIAGNOSIS — R26.2 DIFFICULTY WALKING: ICD-10-CM

## 2018-07-18 DIAGNOSIS — Z47.89 ORTHOPEDIC AFTERCARE: Primary | ICD-10-CM

## 2018-07-18 PROCEDURE — 97110 THERAPEUTIC EXERCISES: CPT | Performed by: PHYSICAL THERAPIST

## 2018-07-18 NOTE — THERAPY TREATMENT NOTE
Outpatient Physical Therapy Ortho Treatment Note  [facility]     Patient Name: Arlin Hutson  : 1935  MRN: [id number]  Today's Date: 2018      Visit Date: 2018    Visit Dx:    ICD-10-CM ICD-9-CM   1. Orthopedic aftercare Z47.89 V54.9   2. Difficulty walking R26.2 719.7       Patient Active Problem List   Diagnosis   • Gastroesophageal reflux disease   • Osteopenia of multiple sites   • Essential hypertension   • Colitis   • Primary osteoarthritis of right knee   • Primary osteoarthritis of left knee   • Pneumonia of left lower lobe due to infectious organism (CMS/HCC)   • History of total right knee replacement        Past Medical History:   Diagnosis Date   • Baker's cyst    • Colon polyp    • CREST syndrome (CMS/HCC)    • Fractures    • GERD (gastroesophageal reflux disease)    • History of CT scan of abdomen 2011    constipation, sig tics, 6 mm hepatic cyst   • History of rheumatoid arthritis    • Hyperlipidemia    • Hypertension    • Osteoarthritis    • Raynaud's disease    • Sjogren's syndrome (CMS/HCC)    • Ulcerative colitis (CMS/HCC)         Past Surgical History:   Procedure Laterality Date   • CATARACT EXTRACTION, BILATERAL     • COLONOSCOPY  2016    tics, microscopic colitis,    • COLONOSCOPY  2011    sig tics, inflammation, polyp   • DILATATION AND CURETTAGE     • EYE SURGERY     • FLEXIBLE SIGMOIDOSCOPY     • FRACTURE SURGERY     • HAND SURGERY     • JOINT REPLACEMENT     • KNEE SURGERY Right    • TOTAL KNEE ARTHROPLASTY Right 2018    Procedure: TOTAL KNEE ARTHROPLASTY;  Surgeon: Georges Jon MD;  Location: [facility];  Service: Orthopedics   • UPPER GASTROINTESTINAL ENDOSCOPY  2008    erythema   • WRIST FRACTURE SURGERY     • WRIST SURGERY Right     orif                             PT Assessment/Plan     Row Name 18 0840          PT Assessment    Assessment Comments mild stiffness post knee;  full extension post PT;  minimal to  no antalgia with gait.  no pain with sit-stand transfer.   -     Please refer to paper survey for additional self-reported information Yes  -     Rehab Potential Good  -     Patient/caregiver participated in establishment of treatment plan and goals Yes  -     Patient would benefit from skilled therapy intervention Yes  -       User Key  (r) = Recorded By, (t) = Taken By, (c) = Cosigned By    Initials Name Provider Type     Jer Marie, PT Physical Therapist                    Exercises     Row Name 07/18/18 0800             Subjective Comments    Subjective Comments mild stiffness back of knee  -         Subjective Pain    Able to rate subjective pain? yes  -DH      Pre-Treatment Pain Level 2  -DH      Post-Treatment Pain Level 1  -DH         Total Minutes    03347 - PT Therapeutic Exercise Minutes 40  -DH         Exercise 1    Exercise Name 1 --   see flow sheet  -      Cueing 1 Verbal;Tactile  -        User Key  (r) = Recorded By, (t) = Taken By, (c) = Cosigned By    Initials Name Provider Type     Jer Marie, PT Physical Therapist                                            Time Calculation:   Start Time: 0800  Stop Time: 0845  Time Calculation (min): 45 min  Therapy Suggested Charges     Code   Minutes Charges    40007 (CPT®) Hc Pt Neuromusc Re Education Ea 15 Min      02331 (CPT®) Hc Pt Ther Proc Ea 15 Min 40 3    91837 (CPT®) Hc Gait Training Ea 15 Min      39788 (CPT®) Hc Pt Therapeutic Act Ea 15 Min      88986 (CPT®) Hc Pt Manual Therapy Ea 15 Min      24695 (CPT®) Hc Pt Ther Massage- Per 15 Min      43428 (CPT®) Hc Pt Iontophoresis Ea 15 Min      96258 (CPT®) Hc Pt Elec Stim Ea-Per 15 Min      39651 (CPT®) Hc Pt Ultrasound Ea 15 Min      19826 (CPT®)  Pt Self Care/Mgmt/Train Ea 15 Min      Total  40 3        Therapy Charges for Today     Code Description Service Date Service Provider Modifiers Qty    61306280099 HC PT THER PROC EA 15 MIN 7/18/2018 Jer Marie, PT GP 3                     Jer Marie, PT  7/18/2018

## 2018-07-20 ENCOUNTER — HOSPITAL ENCOUNTER (OUTPATIENT)
Dept: CT IMAGING | Facility: HOSPITAL | Age: 83
Discharge: HOME | End: 2018-07-20
Attending: INTERNAL MEDICINE | Admitting: INTERNAL MEDICINE

## 2018-07-20 DIAGNOSIS — R22.1 MASS OF RIGHT SIDE OF NECK: ICD-10-CM

## 2018-07-20 LAB — CREAT BLDA-MCNC: 0.6 MG/DL (ref 0.6–1.3)

## 2018-07-20 PROCEDURE — 82565 ASSAY OF CREATININE: CPT

## 2018-07-20 PROCEDURE — 25010000002 IOPAMIDOL 61 % SOLUTION: Performed by: INTERNAL MEDICINE

## 2018-07-20 PROCEDURE — 70491 CT SOFT TISSUE NECK W/DYE: CPT

## 2018-07-20 RX ADMIN — IOPAMIDOL 75 ML: 612 INJECTION, SOLUTION INTRAVENOUS at 13:34

## 2018-07-24 ENCOUNTER — HOSPITAL ENCOUNTER (OUTPATIENT)
Dept: PHYSICAL THERAPY | Facility: HOSPITAL | Age: 83
Setting detail: THERAPIES SERIES
Discharge: HOME | End: 2018-07-24

## 2018-07-24 DIAGNOSIS — Z47.89 ORTHOPEDIC AFTERCARE: Primary | ICD-10-CM

## 2018-07-24 PROCEDURE — 97110 THERAPEUTIC EXERCISES: CPT | Performed by: PHYSICAL THERAPIST

## 2018-07-24 NOTE — THERAPY TREATMENT NOTE
Outpatient Physical Therapy Ortho Treatment Note  [facility]     Patient Name: Arlin Hutson  : 1935  MRN: [id number]  Today's Date: 2018      Visit Date: 2018    Visit Dx:    ICD-10-CM ICD-9-CM   1. Orthopedic aftercare Z47.89 V54.9       Patient Active Problem List   Diagnosis   • Gastroesophageal reflux disease   • Osteopenia of multiple sites   • Essential hypertension   • Colitis   • Primary osteoarthritis of right knee   • Primary osteoarthritis of left knee   • Pneumonia of left lower lobe due to infectious organism (CMS/HCC)   • History of total right knee replacement        Past Medical History:   Diagnosis Date   • Baker's cyst    • Colon polyp    • CREST syndrome (CMS/HCC)    • Fractures    • GERD (gastroesophageal reflux disease)    • History of CT scan of abdomen 2011    constipation, sig tics, 6 mm hepatic cyst   • History of rheumatoid arthritis    • Hyperlipidemia    • Hypertension    • Osteoarthritis    • Raynaud's disease    • Sjogren's syndrome (CMS/HCC)    • Ulcerative colitis (CMS/HCC)         Past Surgical History:   Procedure Laterality Date   • CATARACT EXTRACTION, BILATERAL     • COLONOSCOPY  2016    tics, microscopic colitis,    • COLONOSCOPY  2011    sig tics, inflammation, polyp   • DILATATION AND CURETTAGE     • EYE SURGERY     • FLEXIBLE SIGMOIDOSCOPY     • FRACTURE SURGERY     • HAND SURGERY     • JOINT REPLACEMENT     • KNEE SURGERY Right    • TOTAL KNEE ARTHROPLASTY Right 2018    Procedure: TOTAL KNEE ARTHROPLASTY;  Surgeon: Georges Jon MD;  Location: [facility];  Service: Orthopedics   • UPPER GASTROINTESTINAL ENDOSCOPY  2008    erythema   • WRIST FRACTURE SURGERY     • WRIST SURGERY Right     orif                             PT Assessment/Plan     Row Name 18 1420          PT Assessment    Assessment Comments progressed iwth knee AROM 0/0/ 126;  no antalgia with gait.  possible candidate for discharge next  visit.   -     Please refer to paper survey for additional self-reported information Yes  -     Rehab Potential Good  -     Patient/caregiver participated in establishment of treatment plan and goals Yes  -     Patient would benefit from skilled therapy intervention Yes  -       User Key  (r) = Recorded By, (t) = Taken By, (c) = Cosigned By    Initials Name Provider Type     Jer Marie, PT Physical Therapist                    Exercises     Row Name 07/24/18 1400             Subjective Comments    Subjective Comments --   sorry im late. overslept  -         Subjective Pain    Able to rate subjective pain? yes  -      Pre-Treatment Pain Level 1  -      Post-Treatment Pain Level 0  -DH         Total Minutes    74639 - PT Therapeutic Exercise Minutes 35  -DH         Exercise 1    Exercise Name 1 see flow sheet  -      Cueing 1 Verbal;Tactile  -        User Key  (r) = Recorded By, (t) = Taken By, (c) = Cosigned By    Initials Name Provider Type     Jer Marie, PT Physical Therapist                                            Time Calculation:   Start Time: 1345  Stop Time: 1420  Time Calculation (min): 35 min  Therapy Suggested Charges     Code   Minutes Charges    92941 (CPT®) Hc Pt Neuromusc Re Education Ea 15 Min      78195 (CPT®) Hc Pt Ther Proc Ea 15 Min 35 2    98312 (CPT®) Hc Gait Training Ea 15 Min      65966 (CPT®) Hc Pt Therapeutic Act Ea 15 Min      57258 (CPT®) Hc Pt Manual Therapy Ea 15 Min      26271 (CPT®) Hc Pt Ther Massage- Per 15 Min      03315 (CPT®) Hc Pt Iontophoresis Ea 15 Min      86199 (CPT®) Hc Pt Elec Stim Ea-Per 15 Min      01059 (CPT®) Hc Pt Ultrasound Ea 15 Min      96384 (CPT®) Hc Pt Self Care/Mgmt/Train Ea 15 Min      Total  35 2        Therapy Charges for Today     Code Description Service Date Service Provider Modifiers Qty    44022948950 HC PT THER PROC EA 15 MIN 7/24/2018 Jer Marie, PT GP 2                    Jer Marie, PT  7/24/2018

## 2018-07-26 ENCOUNTER — HOSPITAL ENCOUNTER (OUTPATIENT)
Dept: PHYSICAL THERAPY | Facility: HOSPITAL | Age: 83
Setting detail: THERAPIES SERIES
Discharge: HOME | End: 2018-07-26

## 2018-07-26 DIAGNOSIS — Z47.89 ORTHOPEDIC AFTERCARE: Primary | ICD-10-CM

## 2018-07-26 DIAGNOSIS — R26.2 DIFFICULTY WALKING: ICD-10-CM

## 2018-07-26 PROCEDURE — 97110 THERAPEUTIC EXERCISES: CPT | Performed by: PHYSICAL THERAPIST

## 2018-07-26 PROCEDURE — G8980 MOBILITY D/C STATUS: HCPCS | Performed by: PHYSICAL THERAPIST

## 2018-07-26 PROCEDURE — G8979 MOBILITY GOAL STATUS: HCPCS | Performed by: PHYSICAL THERAPIST

## 2018-07-27 NOTE — THERAPY RE-EVALUATION
Outpatient Physical Therapy Ortho Re-Assessment  [facility]     Patient Name: Arlin Hutson  : 1935  MRN: [id number]  Today's Date: 2018      Visit Date: 2018    Patient Active Problem List   Diagnosis   • Gastroesophageal reflux disease   • Osteopenia of multiple sites   • Essential hypertension   • Colitis   • Primary osteoarthritis of right knee   • Primary osteoarthritis of left knee   • Pneumonia of left lower lobe due to infectious organism (CMS/HCC)   • History of total right knee replacement        Past Medical History:   Diagnosis Date   • Baker's cyst    • Colon polyp    • CREST syndrome (CMS/HCC)    • Fractures    • GERD (gastroesophageal reflux disease)    • History of CT scan of abdomen 2011    constipation, sig tics, 6 mm hepatic cyst   • History of rheumatoid arthritis    • Hyperlipidemia    • Hypertension    • Osteoarthritis    • Raynaud's disease    • Sjogren's syndrome (CMS/HCC)    • Ulcerative colitis (CMS/HCC)         Past Surgical History:   Procedure Laterality Date   • CATARACT EXTRACTION, BILATERAL     • COLONOSCOPY  2016    tics, microscopic colitis,    • COLONOSCOPY  2011    sig tics, inflammation, polyp   • DILATATION AND CURETTAGE     • EYE SURGERY     • FLEXIBLE SIGMOIDOSCOPY     • FRACTURE SURGERY     • HAND SURGERY     • JOINT REPLACEMENT     • KNEE SURGERY Right    • TOTAL KNEE ARTHROPLASTY Right 2018    Procedure: TOTAL KNEE ARTHROPLASTY;  Surgeon: Georges Jon MD;  Location: [facility];  Service: Orthopedics   • UPPER GASTROINTESTINAL ENDOSCOPY  2008    erythema   • WRIST FRACTURE SURGERY     • WRIST SURGERY Right     orif       Visit Dx:     ICD-10-CM ICD-9-CM   1. Orthopedic aftercare Z47.89 V54.9   2. Difficulty walking R26.2 719.7                 PT Ortho     Row Name 18 0700       Special Tests/Palpation    Special Tests/Palpation --   no ttp  -DH       General ROM    RT Lower Ext --   (R) knee AAROM  "0/0/ 126  -       MMT (Manual Muscle Testing)    Additional Documentation --   (R) quad/ glute medius MMT 4/5  -       Orthotic/Prosthetic Management    Additional Documentation --   no antlagia with gait. no pain with ascending stairs.  -      User Key  (r) = Recorded By, (t) = Taken By, (c) = Cosigned By    Initials Name Provider Type     Jer Marie, PT Physical Therapist                                  PT OP Goals     Row Name 07/27/18 0700          PT Short Term Goals    STG Date to Achieve 07/29/18  -     STG 1 improve R knee ROM to -3 ext and 115 flexion to allow biking and normal gait pattern.  -     STG 1 Progress Met  -     STG 2 decrease knee joint swelling to 1\" and ankle to 1/4\"  -     STG 2 Progress Met  [facility]     STG 3 pt to trust gait with cane for outdoor distances  -     STG 3 Progress Met  [facility]     STG 4 KOS score to improve from 60 to < 45%  -     STG 4 Progress Met  [facility]        Long Term Goals    LTG Date to Achieve 08/22/18  -     LTG 1 ROM R knee to reach 0- 120 or more with minimal pain  -     LTG 1 Progress Met  [facility]     LTG 2 Pt to amb without cane unless on unsteady surfaces  -     LTG 2 Progress Met  [facility]     LTG 3 pt to state minimal pain post ex or amb < 3/10  -     LTG 3 Progress Met  [facility]     LTG 4 pt to be indep with good ongoing HEP  -     LTG 4 Progress Ongoing  -     LTG 5 KOS score to be < 30% by [place]  -     LT 5 Progress Met  [facility]     LTG 6 pt to return to driving and community activities and traveling as before.   -       User Key  (r) = Recorded By, (t) = Taken By, (c) = Cosigned By    Initials Name Provider Type     Jer Marie, PT Physical Therapist                PT Assessment/Plan     Row Name 07/27/18 0719          PT Assessment    Assessment Comments Pt has been seen in PT for the evaluation and treatment s/p (R) TKA . pt reports 1/ 10 knee stiffness. demonstrates sound AAROM 0/0/ 126; no antalgia with gait. no pain with ascending " stairs. no pain with sit-stand transfers.  increased LE MMT 4/5 . mild cuing withprogressed HEP.  pt issued progressed HEP.  pt is appropriate for recertification along with discharge at this time.  pt agrees with POC and is satisfied with all intevention provided.   -     Please refer to paper survey for additional self-reported information Yes  -DH     Rehab Potential Good  -     Patient/caregiver participated in establishment of treatment plan and goals Yes  -     Patient would benefit from skilled therapy intervention Yes  -       User Key  (r) = Recorded By, (t) = Taken By, (c) = Cosigned By    Initials Name Provider Type     Jer Marie, PT Physical Therapist                  Exercises     Row Name 07/27/18 0700             Subjective Comments    Subjective Comments doing well. questions about HEP  -DH         Subjective Pain    Able to rate subjective pain? yes  -DH      Pre-Treatment Pain Level 1  -DH      Post-Treatment Pain Level 0  -DH         Total Minutes    01381 - PT Therapeutic Exercise Minutes 45  -DH         Exercise 1    Exercise Name 1 see flow sheet  -      Cueing 1 Verbal;Tactile  -        User Key  (r) = Recorded By, (t) = Taken By, (c) = Cosigned By    Initials Name Provider Type     Jer Marie, PT Physical Therapist                                  Time Calculation:     Therapy Suggested Charges     Code   Minutes Charges    34937 (CPT®)  Pt Neuromusc Re Education Ea 15 Min      13809 (CPT®)  Pt Ther Proc Ea 15 Min 45 3    43813 (CPT®) Hc Gait Training Ea 15 Min      79779 (CPT®)  Pt Therapeutic Act Ea 15 Min      60694 (CPT®)  Pt Manual Therapy Ea 15 Min      74538 (CPT®)  Pt Ther Massage- Per 15 Min      61132 (CPT®)  Pt Iontophoresis Ea 15 Min      09141 (CPT®)  Pt Elec Stim Ea-Per 15 Min      73108 (CPT®)  Pt Ultrasound Ea 15 Min      28350 (CPT®)  Pt Self Care/Mgmt/Train Ea 15 Min      Total  45 3          Start Time: 1500  Stop Time:  1545  Time Calculation (min): 45 min     Therapy Charges for Today     Code Description Service Date Service Provider Modifiers Qty    08431485544 HC PT MOBILITY PROJECTED 7/26/2018 Jre Marie, PT GP, CJ 1    01120123242 HC PT MOBILITY DISCHARGE 7/26/2018 Jer Marie, PT GP, CI 1    90056655935 HC PT THER PROC EA 15 MIN 7/26/2018 Jer Marie, PT GP 3          PT G-Codes  PT Professional Judgement Used?: Yes  Functional Limitation: Mobility: Walking and moving around  Mobility: Walking and Moving Around Goal Status (): At least 20 percent but less than 40 percent impaired, limited or restricted  Mobility: Walking and Moving Around Discharge Status (): At least 1 percent but less than 20 percent impaired, limited or restricted         Jer Marie, PT  7/27/2018

## 2018-08-07 ENCOUNTER — OFFICE VISIT (OUTPATIENT)
Dept: ORTHOPEDIC SURGERY | Facility: CLINIC | Age: 83
End: 2018-08-07

## 2018-08-07 VITALS — BODY MASS INDEX: 20.62 KG/M2 | HEIGHT: 60 IN | WEIGHT: 105 LBS

## 2018-08-07 DIAGNOSIS — Z96.651 S/P TOTAL KNEE ARTHROPLASTY, RIGHT: Primary | ICD-10-CM

## 2018-08-07 PROCEDURE — 73562 X-RAY EXAM OF KNEE 3: CPT | Performed by: ORTHOPAEDIC SURGERY

## 2018-08-07 PROCEDURE — 99024 POSTOP FOLLOW-UP VISIT: CPT | Performed by: ORTHOPAEDIC SURGERY

## 2018-08-07 RX ORDER — ESCITALOPRAM OXALATE 10 MG/1
10 TABLET ORAL DAILY
COMMUNITY
Start: 2018-08-05 | End: 2020-11-17

## 2018-08-07 RX ORDER — BUDESONIDE 3 MG/1
CAPSULE, COATED PELLETS ORAL
COMMUNITY
Start: 2018-08-05 | End: 2019-01-30

## 2018-08-07 NOTE — PROGRESS NOTES
Arlin Hutson : 1935 MRN: [id number] DATE: 2018    DIAGNOSIS: 8 week follow up right total knee      SUBJECTIVE:Patient returns today for 8 week follow up of right total knee replacement. Patient reports doing well with no unusual complaints. Appears to be progressing appropriately.    OBJECTIVE:   Exam:. The incision is well healed. No sign of infection. Range of motion is measured at 1 to 120. The calf is soft and nontender with a negative Homans sign. Strength is progressing and the patient is ambulating appropriately.    DIAGNOSTIC STUDIES  Xrays: 3 views of the right knee (AP, lateral, and sunrise) were ordered and reviewed for evaluation of recent knee replacement. They demonstrate a well positioned, well aligned knee replacement without complicating factors noted. In comparison with previous films there has been no change.    ASSESSMENT: 8 week status post right knee replacement.    PLAN: 1) Continue with PT exercises as prescribed   2) Follow up in 10 months    Georges Jon MD  2018

## 2018-10-15 ENCOUNTER — OFFICE VISIT (OUTPATIENT)
Dept: GASTROENTEROLOGY | Facility: CLINIC | Age: 83
End: 2018-10-15

## 2018-10-15 VITALS
TEMPERATURE: 97.9 F | HEIGHT: 60 IN | DIASTOLIC BLOOD PRESSURE: 64 MMHG | WEIGHT: 105.2 LBS | BODY MASS INDEX: 20.65 KG/M2 | SYSTOLIC BLOOD PRESSURE: 132 MMHG

## 2018-10-15 DIAGNOSIS — R10.9 ABDOMINAL DISCOMFORT: Primary | ICD-10-CM

## 2018-10-15 DIAGNOSIS — R14.0 BLOATING: ICD-10-CM

## 2018-10-15 DIAGNOSIS — K52.838 OTHER MICROSCOPIC COLITIS: ICD-10-CM

## 2018-10-15 DIAGNOSIS — D64.9 ANEMIA, UNSPECIFIED TYPE: ICD-10-CM

## 2018-10-15 LAB
ALBUMIN SERPL-MCNC: 4.3 G/DL (ref 3.5–5.2)
ALBUMIN/GLOB SERPL: 1.7 G/DL
ALP SERPL-CCNC: 81 U/L (ref 39–117)
ALT SERPL-CCNC: 17 U/L (ref 1–33)
AMYLASE SERPL-CCNC: 33 U/L (ref 28–100)
AST SERPL-CCNC: 13 U/L (ref 1–32)
BASOPHILS # BLD AUTO: 0.03 10*3/MM3 (ref 0–0.2)
BASOPHILS NFR BLD AUTO: 0.4 % (ref 0–1.5)
BILIRUB SERPL-MCNC: 0.3 MG/DL (ref 0.1–1.2)
BUN SERPL-MCNC: 23 MG/DL (ref 8–23)
BUN/CREAT SERPL: 34.3 (ref 7–25)
CALCIUM SERPL-MCNC: 9.7 MG/DL (ref 8.6–10.5)
CHLORIDE SERPL-SCNC: 100 MMOL/L (ref 98–107)
CO2 SERPL-SCNC: 27.3 MMOL/L (ref 22–29)
CREAT SERPL-MCNC: 0.67 MG/DL (ref 0.57–1)
EOSINOPHIL # BLD AUTO: 0.13 10*3/MM3 (ref 0–0.7)
EOSINOPHIL NFR BLD AUTO: 1.7 % (ref 0.3–6.2)
ERYTHROCYTE [DISTWIDTH] IN BLOOD BY AUTOMATED COUNT: 15.9 % (ref 11.7–13)
FERRITIN SERPL-MCNC: 26.46 NG/ML (ref 13–150)
GLOBULIN SER CALC-MCNC: 2.5 GM/DL
GLUCOSE SERPL-MCNC: 88 MG/DL (ref 65–99)
HCT VFR BLD AUTO: 37.4 % (ref 35.6–45.5)
HGB BLD-MCNC: 11.9 G/DL (ref 11.9–15.5)
IMM GRANULOCYTES # BLD: 0.02 10*3/MM3 (ref 0–0.03)
IMM GRANULOCYTES NFR BLD: 0.3 % (ref 0–0.5)
IRON SATN MFR SERPL: 31 % (ref 20–50)
IRON SERPL-MCNC: 128 MCG/DL (ref 37–145)
LIPASE SERPL-CCNC: 16 U/L (ref 13–60)
LYMPHOCYTES # BLD AUTO: 0.99 10*3/MM3 (ref 0.9–4.8)
LYMPHOCYTES NFR BLD AUTO: 12.8 % (ref 19.6–45.3)
MCH RBC QN AUTO: 29.3 PG (ref 26.9–32)
MCHC RBC AUTO-ENTMCNC: 31.8 G/DL (ref 32.4–36.3)
MCV RBC AUTO: 92.1 FL (ref 80.5–98.2)
MONOCYTES # BLD AUTO: 0.53 10*3/MM3 (ref 0.2–1.2)
MONOCYTES NFR BLD AUTO: 6.9 % (ref 5–12)
NEUTROPHILS # BLD AUTO: 6.05 10*3/MM3 (ref 1.9–8.1)
NEUTROPHILS NFR BLD AUTO: 78.2 % (ref 42.7–76)
PLATELET # BLD AUTO: 241 10*3/MM3 (ref 140–500)
POTASSIUM SERPL-SCNC: 4.5 MMOL/L (ref 3.5–5.2)
PROT SERPL-MCNC: 6.8 G/DL (ref 6–8.5)
RBC # BLD AUTO: 4.06 10*6/MM3 (ref 3.9–5.2)
SODIUM SERPL-SCNC: 140 MMOL/L (ref 136–145)
TIBC SERPL-MCNC: 408 MCG/DL
TSH SERPL DL<=0.005 MIU/L-ACNC: 2.87 MIU/ML (ref 0.27–4.2)
UIBC SERPL-MCNC: 280 MCG/DL
VIT B12 SERPL-MCNC: 550 PG/ML (ref 211–946)
WBC # BLD AUTO: 7.73 10*3/MM3 (ref 4.5–10.7)

## 2018-10-15 PROCEDURE — 99214 OFFICE O/P EST MOD 30 MIN: CPT | Performed by: INTERNAL MEDICINE

## 2018-10-15 RX ORDER — ASPIRIN 81 MG/1
81 TABLET ORAL DAILY
COMMUNITY
End: 2019-10-08

## 2018-10-15 NOTE — PROGRESS NOTES
Chief Complaint   Patient presents with   • Follow-up     microscopic colitis    • Diarrhea       History of Present Illness: 81 yo female with microscopic colitis. She is on Budesonide 3 mg one/day and she rarely has diarrhea. She averages 2 BM/day. She has bloating and gas intermittently. No abdominal or chest pain. No nausea or vomiting. NO fevers, chills. NO rectal bleeding or melena. Weight has been stable.     Past Medical History:   Diagnosis Date   • Baker's cyst    • Colon polyp    • CREST syndrome (CMS/HCC)    • Fractures    • GERD (gastroesophageal reflux disease)    • History of CT scan of abdomen 03/11/2011    constipation, sig tics, 6 mm hepatic cyst   • History of rheumatoid arthritis    • Hyperlipidemia    • Hypertension    • Osteoarthritis    • Raynaud's disease    • Sjogren's syndrome (CMS/HCC)    • Ulcerative colitis (CMS/HCC)        Past Surgical History:   Procedure Laterality Date   • CATARACT EXTRACTION, BILATERAL     • COLONOSCOPY  02/26/2016    tics, microscopic colitis,    • COLONOSCOPY  07/01/2011    sig tics, inflammation, polyp   • DILATATION AND CURETTAGE  1980   • EYE SURGERY     • FLEXIBLE SIGMOIDOSCOPY     • FRACTURE SURGERY     • HAND SURGERY     • JOINT REPLACEMENT     • KNEE SURGERY Right    • TOTAL KNEE ARTHROPLASTY Right 5/30/2018    Procedure: TOTAL KNEE ARTHROPLASTY;  Surgeon: Georges Jon MD;  Location: [facility];  Service: Orthopedics   • UPPER GASTROINTESTINAL ENDOSCOPY  03/14/2008    erythema   • WRIST FRACTURE SURGERY     • WRIST SURGERY Right 2008    orif         Current Outpatient Prescriptions:   •  amLODIPine (NORVASC) 2.5 MG tablet, Take 2.5 mg by mouth Every Morning., Disp: , Rfl:   •  Ascorbic Acid (VITAMIN C PO), Take  by mouth., Disp: , Rfl:   •  aspirin 81 MG EC tablet, Take 81 mg by mouth Daily., Disp: , Rfl:   •  Budesonide (ENTOCORT EC) 3 MG 24 hr capsule, , Disp: , Rfl:   •  CALCIUM PO, Take  by mouth., Disp: , Rfl:   •  Carboxymethylcell-Hypromellose  (GENTEAL OP), Apply 1 application to eye As Needed., Disp: , Rfl:   •  Carboxymethylcellul-Glycerin (REFRESH OPTIVE OP), Apply 1-2 drops to eye 3 (Three) Times a Day As Needed., Disp: , Rfl:   •  Cholecalciferol (VITAMIN D PO), Take  by mouth., Disp: , Rfl:   •  diphenoxylate-atropine (LOMOTIL) 2.5-0.025 MG per tablet, Take 1-2 tablets by mouth 4 (Four) Times a Day As Needed., Disp: , Rfl:   •  escitalopram (LEXAPRO) 10 MG tablet, 5 mg., Disp: , Rfl:   •  Multiple Vitamins-Minerals (MULTIVITAMIN ADULT PO), Take  by mouth., Disp: , Rfl:   •  omeprazole (PriLOSEC) 20 MG capsule, Take 20 mg by mouth Daily. Taking on Mon Wed and Fri, Disp: , Rfl:   •  traZODone (DESYREL) 50 MG tablet, Take 25-50 mg by mouth Every Night., Disp: , Rfl:   •  traMADol (ULTRAM) 50 MG tablet, Take 1 tablet by mouth Every 4 (Four) Hours As Needed for Moderate Pain ., Disp: 60 tablet, Rfl: 0    Allergies   Allergen Reactions   • Codeine Diarrhea and Nausea Only   • Sulfa Antibiotics Hives       Family History   Problem Relation Age of Onset   • Ulcerative colitis Mother    • Breast cancer Maternal Aunt    • Malig Hyperthermia Neg Hx        Social History     Social History   • Marital status: Single     Spouse name: N/A   • Number of children: N/A   • Years of education: N/A     Occupational History   • Not on file.     Social History Main Topics   • Smoking status: Never Smoker   • Smokeless tobacco: Never Used   • Alcohol use Yes     1 - 3 Glasses of wine per week      Comment: Infrequent   • Drug use: No   • Sexual activity: Defer     Other Topics Concern   • Not on file     Social History Narrative   • No narrative on file       Review of Systems   All other systems reviewed and are negative.      Vitals:    10/15/18 0827   BP: 132/64   Temp: 97.9 °F (36.6 °C)       Physical Exam   Constitutional: She is oriented to person, place, and time. She appears well-developed and well-nourished. No distress.   HENT:   Head: Normocephalic and  atraumatic. Hair is normal.   Right Ear: Hearing, tympanic membrane, external ear and ear canal normal. No drainage. No decreased hearing is noted.   Left Ear: Hearing, tympanic membrane, external ear and ear canal normal. No decreased hearing is noted.   Nose: No nasal deformity.   Mouth/Throat: Oropharynx is clear and moist.   Eyes: Pupils are equal, round, and reactive to light. Conjunctivae, EOM and lids are normal. Right eye exhibits no discharge. Left eye exhibits no discharge.   Neck: Normal range of motion. Neck supple. No JVD present. No tracheal deviation present. No thyromegaly present.   Cardiovascular: Normal rate, regular rhythm, normal heart sounds, intact distal pulses and normal pulses.  Exam reveals no gallop and no friction rub.    No murmur heard.  Pulmonary/Chest: Effort normal and breath sounds normal. No respiratory distress. She has no wheezes. She has no rales. She exhibits no tenderness.   Abdominal: Soft. Bowel sounds are normal. She exhibits no distension and no mass. There is no tenderness. There is no rebound and no guarding. No hernia.   Genitourinary: Rectal exam shows guaiac negative stool.   Musculoskeletal: Normal range of motion. She exhibits no edema, tenderness or deformity.   Lymphadenopathy:     She has no cervical adenopathy.   Neurological: She is alert and oriented to person, place, and time. She has normal reflexes. She displays normal reflexes. No cranial nerve deficit. She exhibits normal muscle tone. Coordination normal.   Skin: Skin is warm and dry. No rash noted. She is not diaphoretic. No erythema.   Psychiatric: She has a normal mood and affect. Her behavior is normal. Judgment and thought content normal.   Vitals reviewed.      Arlin was seen today for follow-up and diarrhea.    Diagnoses and all orders for this visit:    Abdominal discomfort  -     CT Abdomen Pelvis With Contrast; Future  -     CBC & Differential  -     Comprehensive Metabolic Panel  -      Amylase  -     Lipase  -     TSH  -     Ferritin  -     Folate RBC  -     Iron Profile  -     Vitamin B12  -     Celiac Ab tTG DGP TIgA    Bloating  -     CT Abdomen Pelvis With Contrast; Future  -     CBC & Differential  -     Comprehensive Metabolic Panel  -     Amylase  -     Lipase  -     TSH  -     Ferritin  -     Folate RBC  -     Iron Profile  -     Vitamin B12  -     Celiac Ab tTG DGP TIgA    Other microscopic colitis  -     CT Abdomen Pelvis With Contrast; Future  -     CBC & Differential  -     Comprehensive Metabolic Panel  -     Amylase  -     Lipase  -     TSH  -     Ferritin  -     Folate RBC  -     Iron Profile  -     Vitamin B12  -     Celiac Ab tTG DGP TIgA    Anemia, unspecified type  -     CBC & Differential  -     Comprehensive Metabolic Panel  -     Amylase  -     Lipase  -     TSH  -     Ferritin  -     Folate RBC  -     Iron Profile  -     Vitamin B12  -     Celiac Ab tTG DGP TIgA      Assessment:  1) Microscopic colitis. On Entocort 3 mg one daily  2) Bloating and gas intermittently. She is in a study at  for years to evaluate ovarian cancer.   3) Anemia    Recommendations:  1) CT abd/pelvis. Call for results.   2) Labs: CBC, anemia labs.  3) Continue  Entocort 3 mg one daily  4) f/u 3 mos    Return in about 3 months (around 1/15/2019).    Edilberto Shook MD  10/15/2018

## 2018-10-16 LAB
FOLATE BLD-MCNC: 531.7 NG/ML
FOLATE RBC-MCNC: 1422 NG/ML
GLIADIN PEPTIDE IGA SER-ACNC: 5 UNITS (ref 0–19)
GLIADIN PEPTIDE IGG SER-ACNC: 2 UNITS (ref 0–19)
IGA SERPL-MCNC: 456 MG/DL (ref 64–422)
TTG IGA SER-ACNC: <2 U/ML (ref 0–3)
TTG IGG SER-ACNC: <2 U/ML (ref 0–5)

## 2018-10-17 ENCOUNTER — TELEPHONE (OUTPATIENT)
Dept: ORTHOPEDIC SURGERY | Facility: CLINIC | Age: 83
End: 2018-10-17

## 2018-10-19 ENCOUNTER — HOSPITAL ENCOUNTER (OUTPATIENT)
Dept: CT IMAGING | Facility: HOSPITAL | Age: 83
Discharge: HOME | End: 2018-10-19
Attending: INTERNAL MEDICINE | Admitting: INTERNAL MEDICINE

## 2018-10-19 ENCOUNTER — TELEPHONE (OUTPATIENT)
Dept: GASTROENTEROLOGY | Facility: CLINIC | Age: 83
End: 2018-10-19

## 2018-10-19 DIAGNOSIS — R10.9 ABDOMINAL DISCOMFORT: ICD-10-CM

## 2018-10-19 DIAGNOSIS — R14.0 BLOATING: ICD-10-CM

## 2018-10-19 DIAGNOSIS — K52.838 OTHER MICROSCOPIC COLITIS: ICD-10-CM

## 2018-10-19 LAB — CREAT BLDA-MCNC: 0.7 MG/DL (ref 0.6–1.3)

## 2018-10-19 PROCEDURE — 82565 ASSAY OF CREATININE: CPT

## 2018-10-19 PROCEDURE — 25010000002 IOPAMIDOL 61 % SOLUTION: Performed by: INTERNAL MEDICINE

## 2018-10-19 PROCEDURE — 74177 CT ABD & PELVIS W/CONTRAST: CPT

## 2018-10-19 RX ADMIN — IOPAMIDOL 95 ML: 612 INJECTION, SOLUTION INTRAVENOUS at 08:27

## 2018-10-19 NOTE — TELEPHONE ENCOUNTER
----- Message from Edilberto Shook MD sent at 10/18/2018  5:14 PM EDT -----  Tell her that her labs look mostly OK. She is no longer anemic, which is good. We'll see what the CT abd/pelvis shows. Thx. kjh

## 2018-10-19 NOTE — TELEPHONE ENCOUNTER
Called pt and advised per Dr martinez that her labs look mostly ok.  She is no longer anemic, which is good.  He will see what the ct abd/pelvis shows. Pt verb understanding.

## 2018-10-22 ENCOUNTER — PREP FOR SURGERY (OUTPATIENT)
Dept: OTHER | Facility: HOSPITAL | Age: 83
End: 2018-10-22

## 2018-10-22 ENCOUNTER — TELEPHONE (OUTPATIENT)
Dept: GASTROENTEROLOGY | Facility: CLINIC | Age: 83
End: 2018-10-22

## 2018-10-22 DIAGNOSIS — R93.5 ABNORMAL CT OF THE ABDOMEN: Primary | ICD-10-CM

## 2018-10-22 DIAGNOSIS — R93.89 ABNORMAL CT SCAN: Primary | ICD-10-CM

## 2018-10-22 NOTE — TELEPHONE ENCOUNTER
----- Message from Edilberto Shook MD sent at 10/22/2018  7:05 AM EDT -----  Tell her that the CT abd/pelvis shows:  Impression    1. The circumferentially thickened appearance of the 2nd portion of the  duodenum may possibly be artifactual, but if the patient has not had  recent endoscopy, correlation with endoscopy is recommended.  2. There is sigmoid diverticulosis without evidence for diverticulitis.  3. There is significant pelvic floor relaxation and there is a small  cystocele present which may enlarge with straining. There is also likely  a rectocele. The appearance is stable.     The duodenal thickening may be nothing but contraction during the CT abd. The Radiologist recommends that we look down into the duodenum with a scope. I would recommend that we do an EGD to make sure that nothing is down there. If she is in agreement then please schedule the EGD. I ordered in EPIC.       The pelvis relaxation is fairly common in an 81 yo female. No ovarian cancer!!   Thx. kjh

## 2018-10-22 NOTE — TELEPHONE ENCOUNTER
Pt called advised per Dr Shook that her ct scan shows thickening of her sm intestine and the radiologist recommends an egd to make sure that nothing is down there..  Also it showed diverticulosis but no diverticulitis.  There is significant pelvic floor relaxation.  There is also a sm rectocele.    ADvised that this is fairly common in an 82 yr old female and there is no ovarian cancer.      Pt verb understanding and is agreeable to have egd.  ADvised would update Dr Shook.

## 2018-10-22 NOTE — TELEPHONE ENCOUNTER
EGD Case Request done. Please schedule her for an EGD because of an abnormal CT abd showing duodenal thickening. [email or web address]

## 2018-10-23 ENCOUNTER — TELEPHONE (OUTPATIENT)
Dept: GASTROENTEROLOGY | Facility: CLINIC | Age: 83
End: 2018-10-23

## 2018-10-23 NOTE — TELEPHONE ENCOUNTER
Call to pt.  CT results of 10/22 reviewed.  Pt verb understanding.  States will be OOT 10/25-11/5.  Message to Scheduling.

## 2018-10-23 NOTE — TELEPHONE ENCOUNTER
----- Message from Jesusita Valencia sent at 10/23/2018 11:36 AM EDT -----  Regarding: call back   Contact: [phone]  Pt called with questions about yesterday phone call

## 2018-10-24 PROBLEM — R93.5 ABNORMAL CT OF THE ABDOMEN: Status: ACTIVE | Noted: 2018-10-24

## 2018-12-03 ENCOUNTER — ANESTHESIA EVENT (OUTPATIENT)
Dept: GASTROENTEROLOGY | Facility: HOSPITAL | Age: 83
End: 2018-12-03

## 2018-12-03 ENCOUNTER — ANESTHESIA (OUTPATIENT)
Dept: GASTROENTEROLOGY | Facility: HOSPITAL | Age: 83
End: 2018-12-03

## 2018-12-03 ENCOUNTER — HOSPITAL ENCOUNTER (OUTPATIENT)
Facility: HOSPITAL | Age: 83
Setting detail: HOSPITAL OUTPATIENT SURGERY
Discharge: HOME | End: 2018-12-03
Attending: INTERNAL MEDICINE | Admitting: INTERNAL MEDICINE

## 2018-12-03 VITALS
HEIGHT: 60 IN | OXYGEN SATURATION: 96 % | SYSTOLIC BLOOD PRESSURE: 157 MMHG | TEMPERATURE: 98.2 F | WEIGHT: 103.1 LBS | HEART RATE: 68 BPM | RESPIRATION RATE: 16 BRPM | BODY MASS INDEX: 20.24 KG/M2 | DIASTOLIC BLOOD PRESSURE: 80 MMHG

## 2018-12-03 DIAGNOSIS — R93.5 ABNORMAL CT OF THE ABDOMEN: ICD-10-CM

## 2018-12-03 PROCEDURE — 87081 CULTURE SCREEN ONLY: CPT | Performed by: INTERNAL MEDICINE

## 2018-12-03 PROCEDURE — 88313 SPECIAL STAINS GROUP 2: CPT | Performed by: INTERNAL MEDICINE

## 2018-12-03 PROCEDURE — 43239 EGD BIOPSY SINGLE/MULTIPLE: CPT | Performed by: INTERNAL MEDICINE

## 2018-12-03 PROCEDURE — 25010000002 PROPOFOL 10 MG/ML EMULSION: Performed by: ANESTHESIOLOGY

## 2018-12-03 PROCEDURE — 88305 TISSUE EXAM BY PATHOLOGIST: CPT | Performed by: INTERNAL MEDICINE

## 2018-12-03 RX ORDER — LIDOCAINE HYDROCHLORIDE 10 MG/ML
0.5 INJECTION, SOLUTION INFILTRATION; PERINEURAL ONCE AS NEEDED
Status: DISCONTINUED | OUTPATIENT
Start: 2018-12-03 | End: 2018-12-03 | Stop reason: HOSPADM

## 2018-12-03 RX ORDER — LIDOCAINE HYDROCHLORIDE 20 MG/ML
INJECTION, SOLUTION INFILTRATION; PERINEURAL AS NEEDED
Status: DISCONTINUED | OUTPATIENT
Start: 2018-12-03 | End: 2018-12-03 | Stop reason: SURG

## 2018-12-03 RX ORDER — PROPOFOL 10 MG/ML
VIAL (ML) INTRAVENOUS AS NEEDED
Status: DISCONTINUED | OUTPATIENT
Start: 2018-12-03 | End: 2018-12-03 | Stop reason: SURG

## 2018-12-03 RX ORDER — PROPOFOL 10 MG/ML
VIAL (ML) INTRAVENOUS CONTINUOUS PRN
Status: DISCONTINUED | OUTPATIENT
Start: 2018-12-03 | End: 2018-12-03 | Stop reason: SURG

## 2018-12-03 RX ORDER — SODIUM CHLORIDE, SODIUM LACTATE, POTASSIUM CHLORIDE, CALCIUM CHLORIDE 600; 310; 30; 20 MG/100ML; MG/100ML; MG/100ML; MG/100ML
1000 INJECTION, SOLUTION INTRAVENOUS CONTINUOUS
Status: DISCONTINUED | OUTPATIENT
Start: 2018-12-03 | End: 2018-12-03 | Stop reason: HOSPADM

## 2018-12-03 RX ORDER — SODIUM CHLORIDE 0.9 % (FLUSH) 0.9 %
3 SYRINGE (ML) INJECTION AS NEEDED
Status: DISCONTINUED | OUTPATIENT
Start: 2018-12-03 | End: 2018-12-03 | Stop reason: HOSPADM

## 2018-12-03 RX ADMIN — PROPOFOL 100 MG: 10 INJECTION, EMULSION INTRAVENOUS at 14:45

## 2018-12-03 RX ADMIN — LIDOCAINE HYDROCHLORIDE 60 MG: 20 INJECTION, SOLUTION INFILTRATION; PERINEURAL at 14:48

## 2018-12-03 RX ADMIN — PROPOFOL 100 MCG/KG/MIN: 10 INJECTION, EMULSION INTRAVENOUS at 14:45

## 2018-12-03 RX ADMIN — SODIUM CHLORIDE, POTASSIUM CHLORIDE, SODIUM LACTATE AND CALCIUM CHLORIDE 1000 ML: 600; 310; 30; 20 INJECTION, SOLUTION INTRAVENOUS at 13:49

## 2018-12-03 NOTE — ANESTHESIA POSTPROCEDURE EVALUATION
Patient: Arlin Hutson    Procedure Summary     Date:  12/03/18 Room / Location:  [facility] ENDOSCOPY 8 / [facility] ENDOSCOPY    Anesthesia Start:  1445 Anesthesia Stop:  1507    Procedure:  ESOPHAGOGASTRODUODENOSCOPY with Bx's (N/A Esophagus) Diagnosis:       Abnormal CT of the abdomen      (Abnormal CT of the abdomen [R93.5])    Surgeon:  Edilberto Shook MD Provider:  Gato Villanueva MD    Anesthesia Type:  MAC ASA Status:  3          Anesthesia Type: MAC  Last vitals  BP   157/80 (12/03/18 1525)   Temp   36.8 °C (98.2 °F) (12/03/18 1333)   Pulse   68 (12/03/18 1525)   Resp   16 (12/03/18 1333)     SpO2   96 % (12/03/18 1525)     Post Anesthesia Care and Evaluation    Patient location during evaluation: PACU  Patient participation: complete - patient participated  Level of consciousness: awake and alert  Pain management: adequate  Airway patency: patent  Anesthetic complications: No anesthetic complications    Cardiovascular status: acceptable  Respiratory status: acceptable  Hydration status: acceptable    Comments: --------------------            12/03/18               1525     --------------------   BP:       157/80     Pulse:      68       Resp:                Temp:                SpO2:      96%      --------------------

## 2018-12-03 NOTE — H&P
"[facility], [place], operated by [facility]  Pre Procedure History & Physical    Chief Complaint:   82 yo female with an abnormal CT abd/pelvis that showed:   \"The circumferentially thickened appearance of the 2nd portion of the  duodenum may possibly be artifactual, but if the patient has not had  recent endoscopy, correlation with endoscopy is recommended.\"  She has no abdominal pain, nausea, vomiting, melena or rectal bleeding. No weight loss.    Subjective     HPI:   83 y.o. female with an abnormal CT abd/pelvis that showed:   \"The circumferentially thickened appearance of the 2nd portion of the  duodenum may possibly be artifactual, but if the patient has not had  recent endoscopy, correlation with endoscopy is recommended.\"  She has no abdominal pain, nausea, vomiting, melena or rectal bleeding. No weight loss.      Past Medical History:   Past Medical History:   Diagnosis Date   • Baker's cyst    • Colon polyp    • CREST syndrome (CMS/HCC)    • Fractures    • GERD (gastroesophageal reflux disease)    • History of CT scan of abdomen 03/11/2011    constipation, sig tics, 6 mm hepatic cyst   • History of rheumatoid arthritis    • Hyperlipidemia    • Hypertension    • Osteoarthritis    • Raynaud's disease    • Sjogren's syndrome (CMS/HCC)    • Ulcerative colitis (CMS/HCC)        Family History:  Family History   Problem Relation Age of Onset   • Ulcerative colitis Mother    • Breast cancer Maternal Aunt    • Malig Hyperthermia Neg Hx        Social History:   reports that  has never smoked. she has never used smokeless tobacco. She reports that she drinks alcohol. She reports that she does not use drugs.    Medications:   Medications Prior to Admission   Medication Sig Dispense Refill Last Dose   • amLODIPine (NORVASC) 2.5 MG tablet Take 2.5 mg by mouth Every Morning.   12/3/2018 at Unknown time   • Ascorbic Acid (VITAMIN C PO) Take  by mouth.   12/2/2018 at Unknown time   • aspirin 81 MG EC tablet Take 81 mg by mouth " "Daily.   12/2/2018 at Unknown time   • Budesonide (ENTOCORT EC) 3 MG 24 hr capsule    12/2/2018 at Unknown time   • CALCIUM PO Take  by mouth.   12/2/2018 at Unknown time   • Carboxymethylcell-Hypromellose (GENTEAL OP) Apply 1 application to eye As Needed.   12/3/2018 at Unknown time   • Carboxymethylcellul-Glycerin (REFRESH OPTIVE OP) Apply 1-2 drops to eye 3 (Three) Times a Day As Needed.   12/3/2018 at Unknown time   • Cholecalciferol (VITAMIN D PO) Take  by mouth.   12/2/2018 at Unknown time   • escitalopram (LEXAPRO) 10 MG tablet 5 mg.   12/2/2018 at Unknown time   • Multiple Vitamins-Minerals (MULTIVITAMIN ADULT PO) Take  by mouth.   12/2/2018 at Unknown time   • omeprazole (PriLOSEC) 20 MG capsule Take 20 mg by mouth Daily. Taking on Mon Wed and Fri   Past Week at Unknown time   • traZODone (DESYREL) 50 MG tablet Take 25-50 mg by mouth Every Night.   12/2/2018 at Unknown time   • diphenoxylate-atropine (LOMOTIL) 2.5-0.025 MG per tablet Take 1-2 tablets by mouth 4 (Four) Times a Day As Needed.   Unknown at Unknown time   • traMADol (ULTRAM) 50 MG tablet Take 1 tablet by mouth Every 4 (Four) Hours As Needed for Moderate Pain . 60 tablet 0 Not Taking       Allergies:  Codeine and Sulfa antibiotics    ROS:    Pertinent items are noted in HPI     Objective     Blood pressure 159/81, pulse 73, temperature 98.2 °F (36.8 °C), temperature source Oral, resp. rate 16, height 152.4 cm (60\"), weight 46.8 kg (103 lb 1.6 oz), SpO2 99 %.    Physical Exam   Constitutional: Pt is oriented to person, place, and time and well-developed, well-nourished, and in no distress.   HENT:   Mouth/Throat: Oropharynx is clear and moist.   Neck: Normal range of motion. Neck supple.   Cardiovascular: Normal rate, regular rhythm and normal heart sounds.    Pulmonary/Chest: Effort normal and breath sounds normal. No respiratory distress. No  wheezes.   Abdominal: Soft. Bowel sounds are normal.   Skin: Skin is warm and dry.   Psychiatric: Mood, " "memory, affect and judgment normal.     Assessment/Plan     Diagnosis:  83 y.o. female with an abnormal CT abd/pelvis that showed:   \"The circumferentially thickened appearance of the 2nd portion of the  duodenum may possibly be artifactual, but if the patient has not had  recent endoscopy, correlation with endoscopy is recommended.\"  She has no abdominal pain, nausea, vomiting, melena or rectal bleeding. No weight loss.    Anticipated Surgical Procedure:  EGD    The risks, benefits, and alternatives of this procedure have been discussed with the patient or the responsible party- the patient understands and agrees to proceed.                                                                "

## 2018-12-03 NOTE — ANESTHESIA PREPROCEDURE EVALUATION
Anesthesia Evaluation     Patient summary reviewed and Nursing notes reviewed                Airway   Mallampati: I  TM distance: >3 FB  Neck ROM: full  No difficulty expected  Dental - normal exam     Pulmonary - normal exam   (+) pneumonia ,   Cardiovascular - normal exam    (+) hypertension, PVD, hyperlipidemia,       Neuro/Psych- negative ROS  GI/Hepatic/Renal/Endo    (+)  GERD,      Musculoskeletal (-) negative ROS    Abdominal  - normal exam    Bowel sounds: normal.   Substance History - negative use     OB/GYN negative ob/gyn ROS         Other                        Anesthesia Plan    ASA 3     MAC     Anesthetic plan, all risks, benefits, and alternatives have been provided, discussed and informed consent has been obtained with: patient.

## 2018-12-04 LAB — UREASE TISS QL: NEGATIVE

## 2018-12-17 ENCOUNTER — TELEPHONE (OUTPATIENT)
Dept: GASTROENTEROLOGY | Facility: CLINIC | Age: 83
End: 2018-12-17

## 2018-12-17 NOTE — TELEPHONE ENCOUNTER
----- Message from Jesusita Valencia sent at 12/17/2018  8:46 AM EST -----  Regarding: scope report   Contact: [phone]  Asking for egd path report

## 2018-12-18 ENCOUNTER — TELEPHONE (OUTPATIENT)
Dept: GASTROENTEROLOGY | Facility: CLINIC | Age: 83
End: 2018-12-18

## 2018-12-18 NOTE — TELEPHONE ENCOUNTER
Call to pt.  Advise per Dr Shook that path from the EGD showed mild inflammation of the stomach, but no evidence of H pylori, which is good.    Pt verb understanding.  Requesting to make f/u appt as instructed with o/v of 10/15/18.  Scheduled for 1/30/19 @ 3:45 with Dr Shook.

## 2018-12-18 NOTE — TELEPHONE ENCOUNTER
----- Message from Edilberto Shook MD sent at 12/17/2018  6:37 PM EST -----  Tell her that path from the EGD showed mild inflammation of the stomach but no evidence of helicobacter pylori, which is good.  Thx. kjh

## 2019-01-30 ENCOUNTER — OFFICE VISIT (OUTPATIENT)
Dept: GASTROENTEROLOGY | Facility: CLINIC | Age: 84
End: 2019-01-30

## 2019-01-30 VITALS
SYSTOLIC BLOOD PRESSURE: 138 MMHG | BODY MASS INDEX: 20.73 KG/M2 | TEMPERATURE: 98.4 F | WEIGHT: 105.6 LBS | HEIGHT: 60 IN | DIASTOLIC BLOOD PRESSURE: 70 MMHG

## 2019-01-30 DIAGNOSIS — K59.00 CONSTIPATION, UNSPECIFIED CONSTIPATION TYPE: ICD-10-CM

## 2019-01-30 DIAGNOSIS — K63.5 POLYP OF COLON, UNSPECIFIED PART OF COLON, UNSPECIFIED TYPE: ICD-10-CM

## 2019-01-30 DIAGNOSIS — R14.0 BLOATING: Primary | ICD-10-CM

## 2019-01-30 DIAGNOSIS — K52.839 MICROSCOPIC COLITIS, UNSPECIFIED MICROSCOPIC COLITIS TYPE: ICD-10-CM

## 2019-01-30 PROCEDURE — 99213 OFFICE O/P EST LOW 20 MIN: CPT | Performed by: INTERNAL MEDICINE

## 2019-01-30 NOTE — PROGRESS NOTES
Chief Complaint   Patient presents with   • Follow-up   • Constipation       History of Present Illness: 84 yo female who had an EGD in 12/18 that showed a normal duodenum.   She has microscopic coliitis and was recently stopped on the Entocort 3mg one daily. She is off Entocort now. She had one BM yesterday. No diarrhea and no constipation. She was put on Fibercon 2/day and it causes diarrhea, once. NO rectal bleeding or melena. Yesterday she had some abdominal cramping. NO nausea or vomiting. NO fevers, chills. She is on Methotrexate for RA - just started it and is taking one dose weekly. Weight stable. No nausea or vomiting. No fevers, chills.     Past Medical History:   Diagnosis Date   • Baker's cyst    • Colon polyp    • CREST syndrome (CMS/HCC)    • Fractures    • GERD (gastroesophageal reflux disease)    • History of CT scan of abdomen 03/11/2011    constipation, sig tics, 6 mm hepatic cyst   • History of rheumatoid arthritis    • Hyperlipidemia    • Hypertension    • Osteoarthritis    • Raynaud's disease    • Sjogren's syndrome (CMS/HCC)    • Ulcerative colitis (CMS/HCC)        Past Surgical History:   Procedure Laterality Date   • CATARACT EXTRACTION, BILATERAL     • COLONOSCOPY  02/26/2016    tics, microscopic colitis,    • COLONOSCOPY  07/01/2011    sig tics, inflammation, polyp   • DILATATION AND CURETTAGE  1980   • ENDOSCOPY N/A 12/3/2018    erythematous mucosa in stomach, path benign   • EYE SURGERY     • FLEXIBLE SIGMOIDOSCOPY     • FRACTURE SURGERY     • HAND SURGERY     • JOINT REPLACEMENT     • KNEE SURGERY Right    • TOTAL KNEE ARTHROPLASTY Right 5/30/2018    Procedure: TOTAL KNEE ARTHROPLASTY;  Surgeon: Georges Jon MD;  Location: [facility];  Service: Orthopedics   • UPPER GASTROINTESTINAL ENDOSCOPY  03/14/2008    erythema   • WRIST FRACTURE SURGERY     • WRIST SURGERY Right 2008    orif         Current Outpatient Medications:   •  Acetaminophen (TYLENOL EXTRA STRENGTH PO), Take 2 tablets  by mouth Daily As Needed., Disp: , Rfl:   •  amLODIPine (NORVASC) 2.5 MG tablet, Take 2.5 mg by mouth Every Morning., Disp: , Rfl:   •  Ascorbic Acid (VITAMIN C PO), Take 500 mg by mouth Daily., Disp: , Rfl:   •  aspirin 81 MG EC tablet, Take 81 mg by mouth Daily., Disp: , Rfl:   •  CALCIUM PO, Take 600 mg by mouth Daily., Disp: , Rfl:   •  Calcium Polycarbophil (FIBERCON PO), Take 2 tablets by mouth At Night As Needed., Disp: , Rfl:   •  Carboxymethylcell-Hypromellose (GENTEAL OP), Apply 1 application to eye As Needed., Disp: , Rfl:   •  Carboxymethylcellul-Glycerin (REFRESH OPTIVE OP), Apply 1-2 drops to eye 3 (Three) Times a Day As Needed., Disp: , Rfl:   •  Cholecalciferol (VITAMIN D PO), Take 2,000 mg by mouth every night at bedtime., Disp: , Rfl:   •  diclofenac (VOLTAREN) 1 % gel gel, Apply 2 g topically to the appropriate area as directed As Needed., Disp: , Rfl:   •  diphenoxylate-atropine (LOMOTIL) 2.5-0.025 MG per tablet, Take 1-2 tablets by mouth 4 (Four) Times a Day As Needed., Disp: , Rfl:   •  escitalopram (LEXAPRO) 10 MG tablet, Take 10 mg by mouth Daily., Disp: , Rfl:   •  methotrexate 2.5 MG tablet, Take 15 mg by mouth 1 (One) Time Per Week., Disp: , Rfl:   •  Multiple Vitamins-Minerals (MULTIVITAMIN ADULT PO), Take  by mouth., Disp: , Rfl:   •  omeprazole (PriLOSEC) 20 MG capsule, Take 20 mg by mouth Daily. Taking on Mon Wed and Fri, Disp: , Rfl:   •  polyethyl glycol-propyl glycol (SYSTANE) 0.4-0.3 % solution ophthalmic solution, Administer 2 drops to both eyes Every 1 (One) Hour As Needed., Disp: , Rfl:   •  traZODone (DESYREL) 50 MG tablet, Take 25-50 mg by mouth Every Night., Disp: , Rfl:     Allergies   Allergen Reactions   • Codeine Diarrhea and Nausea Only   • Sulfa Antibiotics Hives       Family History   Problem Relation Age of Onset   • Ulcerative colitis Mother    • Breast cancer Maternal Aunt    • Malig Hyperthermia Neg Hx        Social History     Socioeconomic History   • Marital  status: Single     Spouse name: Not on file   • Number of children: Not on file   • Years of education: Not on file   • Highest education level: Not on file   Social Needs   • Financial resource strain: Not on file   • Food insecurity - worry: Not on file   • Food insecurity - inability: Not on file   • Transportation needs - medical: Not on file   • Transportation needs - non-medical: Not on file   Occupational History   • Not on file   Tobacco Use   • Smoking status: Never Smoker   • Smokeless tobacco: Never Used   Substance and Sexual Activity   • Alcohol use: Yes     Types: 1 - 3 Glasses of wine per week     Comment: Infrequent   • Drug use: No   • Sexual activity: No   Other Topics Concern   • Not on file   Social History Narrative   • Not on file       Review of Systems   All other systems reviewed and are negative.      Vitals:    01/30/19 1557   BP: 138/70   Temp: 98.4 °F (36.9 °C)       Physical Exam   Constitutional: She is oriented to person, place, and time. She appears well-developed and well-nourished. No distress.   HENT:   Head: Normocephalic and atraumatic. Hair is normal.   Right Ear: Hearing, tympanic membrane, external ear and ear canal normal. No drainage. No decreased hearing is noted.   Left Ear: Hearing, tympanic membrane, external ear and ear canal normal. No decreased hearing is noted.   Nose: No nasal deformity.   Mouth/Throat: Oropharynx is clear and moist.   Eyes: Conjunctivae, EOM and lids are normal. Pupils are equal, round, and reactive to light. Right eye exhibits no discharge. Left eye exhibits no discharge.   Neck: Normal range of motion. Neck supple. No JVD present. No tracheal deviation present. No thyromegaly present.   Cardiovascular: Normal rate, regular rhythm, normal heart sounds, intact distal pulses and normal pulses. Exam reveals no gallop and no friction rub.   No murmur heard.  Pulmonary/Chest: Effort normal and breath sounds normal. No respiratory distress. She has no  wheezes. She has no rales. She exhibits no tenderness.   Abdominal: Soft. Bowel sounds are normal. She exhibits no distension and no mass. There is no tenderness. There is no rebound and no guarding. No hernia.   Musculoskeletal: Normal range of motion. She exhibits no edema, tenderness or deformity.   Lymphadenopathy:     She has no cervical adenopathy.   Neurological: She is alert and oriented to person, place, and time. She has normal reflexes. She displays normal reflexes. No cranial nerve deficit. She exhibits normal muscle tone. Coordination normal.   Skin: Skin is warm and dry. No rash noted. She is not diaphoretic. No erythema.   Psychiatric: She has a normal mood and affect. Her behavior is normal. Judgment and thought content normal.   Vitals reviewed.      Arlin was seen today for follow-up and constipation.    Diagnoses and all orders for this visit:    Bloating    Constipation, unspecified constipation type    Microscopic colitis, unspecified microscopic colitis type    Polyp of colon, unspecified part of colon, unspecified type       Assessment:  1) Microscopic colitis  2) Constipation    Recommendations:  1) Take fibercon one daily.  2) f/u 6 mos.       Return in about 6 months (around 7/30/2019).    Edilberto Shook MD  1/30/2019

## 2019-02-01 ENCOUNTER — CLINICAL SUPPORT (OUTPATIENT)
Dept: ORTHOPEDIC SURGERY | Facility: CLINIC | Age: 84
End: 2019-02-01

## 2019-02-01 VITALS — HEIGHT: 61 IN | BODY MASS INDEX: 19.83 KG/M2 | TEMPERATURE: 98.3 F | WEIGHT: 105 LBS

## 2019-02-01 DIAGNOSIS — Z96.651 STATUS POST TOTAL RIGHT KNEE REPLACEMENT: Primary | ICD-10-CM

## 2019-02-01 PROCEDURE — 99213 OFFICE O/P EST LOW 20 MIN: CPT | Performed by: NURSE PRACTITIONER

## 2019-02-01 PROCEDURE — 73562 X-RAY EXAM OF KNEE 3: CPT | Performed by: NURSE PRACTITIONER

## 2019-02-01 RX ORDER — FOLIC ACID 1 MG/1
2 TABLET ORAL DAILY
COMMUNITY
Start: 2019-01-07

## 2019-02-01 NOTE — PROGRESS NOTES
Patient: Arlin Hutson  YOB: 1935 83 y.o. female  Medical Record Number: [id number]    Chief Complaints:   Chief Complaint   Patient presents with   • Right Knee - Follow-up       History of Present Illness:Arlin Hutson is a 83 y.o. female who presents for follow-up of  right knee.  She's about 8 months out from total knee replacement and his had some generalized soreness ever since surgery.  She describes the knee pain as a moderate constant ache worse at night when she goes to bed.      Allergies:   Allergies   Allergen Reactions   • Codeine Diarrhea and Nausea Only   • Sulfa Antibiotics Hives       Medications:   Current Outpatient Medications   Medication Sig Dispense Refill   • Acetaminophen (TYLENOL EXTRA STRENGTH PO) Take 2 tablets by mouth Daily As Needed.     • amLODIPine (NORVASC) 2.5 MG tablet Take 2.5 mg by mouth Every Morning.     • Ascorbic Acid (VITAMIN C PO) Take 500 mg by mouth Daily.     • CALCIUM PO Take 600 mg by mouth Daily.     • Calcium Polycarbophil (FIBERCON PO) Take 2 tablets by mouth At Night As Needed.     • Carboxymethylcell-Hypromellose (GENTEAL OP) Apply 1 application to eye As Needed.     • Carboxymethylcellul-Glycerin (REFRESH OPTIVE OP) Apply 1-2 drops to eye 3 (Three) Times a Day As Needed.     • Cholecalciferol (VITAMIN D PO) Take 2,000 mg by mouth every night at bedtime.     • diclofenac (VOLTAREN) 1 % gel gel Apply 2 g topically to the appropriate area as directed As Needed.     • diphenoxylate-atropine (LOMOTIL) 2.5-0.025 MG per tablet Take 1-2 tablets by mouth 4 (Four) Times a Day As Needed.     • escitalopram (LEXAPRO) 10 MG tablet Take 10 mg by mouth Daily.     • folic acid (FOLVITE) 1 MG tablet Take 1 tablet by mouth.     • methotrexate 2.5 MG tablet Take 15 mg by mouth 1 (One) Time Per Week.     • Multiple Vitamins-Minerals (MULTIVITAMIN ADULT PO) Take  by mouth.     • omeprazole (PriLOSEC) 20 MG capsule Take 20 mg by mouth Daily. Taking on Mon Wed and  "Fri     • polyethyl glycol-propyl glycol (SYSTANE) 0.4-0.3 % solution ophthalmic solution Administer 2 drops to both eyes Every 1 (One) Hour As Needed.     • traZODone (DESYREL) 50 MG tablet Take 25-50 mg by mouth Every Night.     • aspirin 81 MG EC tablet Take 81 mg by mouth Daily.       No current facility-administered medications for this visit.          The following portions of the patient's history were reviewed and updated as appropriate: allergies, current medications, past family history, past medical history, past social history, past surgical history and problem list.    Review of Systems:   A 14 point review of systems was performed. All systems negative except pertinent positives/negative listed in HPI above    Physical Exam:   Vitals:    02/01/19 1453   Temp: 98.3 °F (36.8 °C)   Weight: 47.6 kg (105 lb)   Height: 154.9 cm (61\")       General: A and O x 3, ASA, NAD    SCLERA:    Normal    DENTITION:   Normal  Skin clear no unusual lesions noted  Right knee patient has a well-healed midline surgical incision noted she has 120° flexion neutral in extension but is very tender over the pes anserine bursa no instability calf is soft and nontender    Radiology:  Xrays 3views (ap,lateral, sunrise) right knee were ordered and reviewed today secondary to pain and show well-placed well-positioned right total knee replacement.  Comparative views are unchanged    Assessment/Plan:  Status post right TKA    I think some this probably is still just some normal soreness.  She will go to outpatient physical therapy again to see if she would benefit from that and also I gave her samples of some pen said gel to use twice a day and she'll let you know if she wants a prescription for that.  Otherwise she will keep her follow-up in approximately 4 months  "

## 2019-02-06 ENCOUNTER — HOSPITAL ENCOUNTER (OUTPATIENT)
Dept: PHYSICAL THERAPY | Facility: HOSPITAL | Age: 84
Setting detail: THERAPIES SERIES
Discharge: HOME | End: 2019-02-06

## 2019-02-06 DIAGNOSIS — G89.29 CHRONIC PAIN OF RIGHT KNEE: Primary | ICD-10-CM

## 2019-02-06 DIAGNOSIS — Z74.09 IMPAIRED MOBILITY: ICD-10-CM

## 2019-02-06 DIAGNOSIS — M25.561 CHRONIC PAIN OF RIGHT KNEE: Primary | ICD-10-CM

## 2019-02-06 PROCEDURE — 97110 THERAPEUTIC EXERCISES: CPT

## 2019-02-06 PROCEDURE — 97162 PT EVAL MOD COMPLEX 30 MIN: CPT

## 2019-02-06 NOTE — THERAPY EVALUATION
Outpatient Physical Therapy Ortho Initial Evaluation  [facility]     Patient Name: Arlin Hutson  : 1935  MRN: [id number]  Today's Date: 2019      Visit Date: 2019    Patient Active Problem List   Diagnosis   • Gastroesophageal reflux disease   • Osteopenia of multiple sites   • Essential hypertension   • Colitis   • Primary osteoarthritis of right knee   • Primary osteoarthritis of left knee   • Pneumonia of left lower lobe due to infectious organism (CMS/HCC)   • History of total right knee replacement   • Abnormal CT of the abdomen        Past Medical History:   Diagnosis Date   • Baker's cyst    • Colon polyp    • CREST syndrome (CMS/HCC)    • Fractures    • GERD (gastroesophageal reflux disease)    • History of CT scan of abdomen 2011    constipation, sig tics, 6 mm hepatic cyst   • History of rheumatoid arthritis    • Hyperlipidemia    • Hypertension    • Osteoarthritis    • Raynaud's disease    • Sjogren's syndrome (CMS/HCC)    • Ulcerative colitis (CMS/HCC)         Past Surgical History:   Procedure Laterality Date   • CATARACT EXTRACTION, BILATERAL     • COLONOSCOPY  2016    tics, microscopic colitis,    • COLONOSCOPY  2011    sig tics, inflammation, polyp   • DILATATION AND CURETTAGE     • ENDOSCOPY N/A 12/3/2018    erythematous mucosa in stomach, path benign   • EYE SURGERY     • FLEXIBLE SIGMOIDOSCOPY     • FRACTURE SURGERY     • HAND SURGERY     • JOINT REPLACEMENT     • KNEE SURGERY Right    • TOTAL KNEE ARTHROPLASTY Right 2018    Procedure: TOTAL KNEE ARTHROPLASTY;  Surgeon: Georges Jon MD;  Location: [facility];  Service: Orthopedics   • UPPER GASTROINTESTINAL ENDOSCOPY  2008    erythema   • WRIST FRACTURE SURGERY     • WRIST SURGERY Right     orif       Visit Dx:     ICD-10-CM ICD-9-CM   1. Chronic pain of right knee M25.561 719.46    G89.29 338.29   2. Impaired mobility Z74.09 799.89       Patient History     Row Name 19  0900             History    Brief Description of Current Complaint  Pt underwent R TKA on 5/30/18 with PT following. Pt reporting continued R knee pain during the day, however biggest difficulty is nighttime. Pt taking Tylenol for symptoms which does not seem to help much. Went back to MD and reports bursitis. Using gel on knee per MD. Continues with volunteer work and uses knee brace prn. Pt reports continued edema and slight warmth to touch. Pt reports fall 11/1/18 onto L knee and L hip with continued pain in both areas. Pt also reports balance deficits contributing to symptoms. Pt continues with HEP following TKA.   -CN      Previous treatment for THIS PROBLEM  Surgery  -CN      Surgery Date:  05/30/18  -CN      Patient/Caregiver Goals  Relieve pain;Return to prior level of function;Know what to do to help the symptoms  -CN         Pain     Pain Location  Knee  -CN      Pain at Present  4  -CN      Pain at Best  0  -CN      Pain at Worst  8  -CN      Pain Frequency  Intermittent  -CN      Pain Description  Aching  -CN      What Performance Factors Make the Current Problem(s) WORSE?  Worse at night and first thing in am, walking extended distances, stairs, sitting extended periods to time  -CN      What Performance Factors Make the Current Problem(s) BETTER?  Walking in am, heat, gel, pillow under knee  -CN      Is your sleep disturbed?  Yes  -CN      Difficulties with ADL's?  Yes, vacuuming, changing sheets  -CN      Difficulties with recreational activities?  yes, pt goes to gym, volunteers frequently consisting of standing, walking and stairs  -CN         Fall Risk Assessment    Any falls in the past year:  Yes  -CN      Number of falls reported in the last 12 months  2  -CN      Factors that contributed to the fall:  Tripped  -CN         Services    Are you currently receiving Home Health services  No  -CN         Daily Activities    Primary Language  English  -CN      Are you able to read  Yes  -CN      Are  you able to write  Yes  -CN      How does patient learn best?  Reading  -CN      Teaching needs identified  Home Exercise Program;Management of Condition;Falls Prevention  -CN      Barriers to learning  None  -CN      Pt Participated in POC and Goals  Yes  -CN         Safety    Are you being hurt, hit, or frightened by anyone at home or in your life?  No  -CN      Are you being neglected by a caregiver  No  -CN        User Key  (r) = Recorded By, (t) = Taken By, (c) = Cosigned By    Initials Name Provider Type    CN Neelima Blake, PT Physical Therapist          PT Ortho     Row Name 02/06/19 0900       Posture/Observations    Alignment Options  Forward head;Rounded shoulders  -CN    Forward Head  Mild  -CN    Rounded Shoulders  Mild  -CN    Observations  Incision healing  -CN       Myotomal Screen- Lower Quarter Clearing    Hip flexion (L2)  Bilateral:;4+ (Good +)  -CN    Knee extension (L3)  Bilateral:;4+ (Good +) crepitus on the L  -CN    Ankle DF (L4)  Bilateral:;5 (Normal)  -CN    Ankle PF (S1)  Bilateral:;4 (Good)  -CN    Knee flexion (S2)  Right:;4- (Good -);Left:;4 (Good) with pain  -CN       Hip/Thigh Palpation    Gluteus Medius  Left:;Tender;Guarded/taut;Trigger point  -CN    Piriformis  Left:;Tender;Guarded/taut;Trigger point  -CN       Knee Palpation    Pes Anserine Bursa  Right:;Tender;Swollen  -CN    Medial Joint Line  Right:;Tender  -CN    Semimembranosis  Right:;Tender;Guarded/taut  -CN    Semitendinosis  Right:;Tender;Guarded/taut  -CN       General ROM    RT Lower Ext  Rt Knee Extension/Flexion  -CN    LT Lower Ext  Lt Knee Extension/Flexion  -CN       Right Lower Ext    Rt Knee Extension/Flexion AROM  0-5-121  -CN    Rt Knee Extension/Flexion PROM  0-3-123  -CN       Left Lower Ext    Lt Knee Extension/Flexion AROM  0-3-133  -CN    Lt Knee Extension/Flexion PROM  0-2-137  -CN       MMT (Manual Muscle Testing)    General MMT Comments  Hip abduction: R=4-/5 with compensation, L=3+/5  -CN        Lower Extremity Flexibility    Hamstrings  Right:;Mildly limited  -CN      User Key  (r) = Recorded By, (t) = Taken By, (c) = Cosigned By    Initials Name Provider Type    Neelima Reyes PT Physical Therapist                      Therapy Education  Education Details: Anatomy, goals of PT, eval findings, importance of resting affected tissues  Given: HEP, Symptoms/condition management, Pain management, Posture/body mechanics  Program: New  How Provided: Verbal, Demonstration, Written  Provided to: Patient  Level of Understanding: Teach back education performed, Verbalized, Demonstrated     PT OP Goals     Row Name 02/06/19 0900          PT Short Term Goals    STG Date to Achieve  02/27/19  -CN     STG 1  Pt will report subjective pain at 2/10 or less to demonstrate symptom management with ADLs and all household mobility.   -CN     STG 1 Progress  New  -CN     STG 2  Pt will be independent and compliant with HEP, symptom management and joint protection in order to minimize stress on affected tissues.    -CN     STG 2 Progress  New  -CN        Long Term Goals    LTG Date to Achieve  03/20/19  -CN     LTG 1  Pt will improve R HS strength to 4+/5 without pain on in order to improve functional mobility.   -CN     LTG 1 Progress  New  -CN     LTG 2  Pt will improve score on Knee Outcome Survey  to 80% for improved functional mobility.   -CN     LTG 2 Progress  New  -CN     LTG 3  Pt will report 50% improvement in ability ascend/descend stairs with only mild reports of pain in order to return to full ADLs and volunteer activities.   -CN     LTG 3 Progress  New  -CN        Time Calculation    PT Goal Re-Cert Due Date  03/06/19  -CN       User Key  (r) = Recorded By, (t) = Taken By, (c) = Cosigned By    Initials Name Provider Type    Neelima Reyes, CHER Physical Therapist          PT Assessment/Plan     Row Name 02/06/19 0946          PT Assessment    Functional Limitations  Decreased safety during  functional activities;Limitations in functional capacity and performance;Impaired gait;Performance in leisure activities;Limitation in home management;Limitations in community activities  -CN     Impairments  Balance;Gait;Impaired flexibility;Muscle strength;Pain;Range of motion;Posture  -CN     Assessment Comments  Pt is an 83 year old female in evolving clinical condition with c/o R knee pain s/p R TKA 5/30/18. Pt reports posteriomedial knee pain ongoing sine surgery, however worsening overtime. Pt very active and has not slowed activity due to symptoms, however difficulty with stairs and reported balance difficulty per 2 falls in past year. Pt with tenderness to palpation near pes anserine and medial HS tissues. Pt with pain and dec strength noted into knee flexion and mild ROM deficits compared to L knee. Pt also reports L hip pain which began post fall in November. Tenderness and reproduction of symptoms with palpation of L glute med and piriformis tissues. Weakness noted in B hip abductors, L > R, and pt also with balance deficits self repoted with gait activities. Pt scored 66.25% on the KOS where 100% is no disability and is currently limited in ability to ascend/descend stairs, vacuum and sleep through the night. Pt with comorbities/personal factors consisting of chronicity of problem and active lifestyle and symptoms likely attributed to medial HS tendonitis vs bursitis. Pt would benefit from skilled PT to address the deficits and return to PLOF.   -CN     Please refer to paper survey for additional self-reported information  Yes  -CN     Rehab Potential  Good  -CN     Patient/caregiver participated in establishment of treatment plan and goals  Yes  -CN     Patient would benefit from skilled therapy intervention  Yes  -CN        PT Plan    PT Frequency  2x/week  -CN     Predicted Duration of Therapy Intervention (Therapy Eval)  6 weeks  -CN     Planned CPT's?  PT EVAL MOD COMPLELITY: 78980;PT RE-EVAL:  12557;PT THER PROC EA 15 MIN: 25541;PT THER ACT EA 15 MIN: 26608;PT MANUAL THERAPY EA 15 MIN: 74995;PT NEUROMUSC RE-EDUCATION EA 15 MIN: 65229;PT GAIT TRAINING EA 15 MIN: 25731;PT AQUATIC THERAPY EA 15 MIN: 96486;PT HOT OR COLD PACK TREAT MCARE;PT ELECTRICAL STIM UNATTEND: ;PT ULTRASOUND EA 15 MIN: 84132;PT TRACTION LUMBAR: 14964  -CN     PT Plan Comments  PT to treat 2x/week for 6 weeks consisting of strengthening, stability and balance exercises. Consider Nustep warm up, stm with noodle to medial HS and L glute/piriformis tissues, SLR, HS strengthening to tolerance and small range ball squats with glute activation next visit.   -CN       User Key  (r) = Recorded By, (t) = Taken By, (c) = Cosigned By    Initials Name Provider Type    Neelima Reyes PT Physical Therapist            Exercises     Row Name 02/06/19 0900             Total Minutes    59589 - PT Therapeutic Exercise Minutes  10  -CN         Exercise 1    Exercise Name 1  90/90 HS stretch  -CN      Cueing 1  Verbal  -CN      Reps 1  3  -CN      Time 1  20 sec  -CN         Exercise 2    Exercise Name 2  Piriformis stretch  -CN      Cueing 2  Verbal  -CN      Reps 2  3  -CN      Time 2  20 sec  -CN         Exercise 3    Exercise Name 3  HL hip adduction  -CN      Cueing 3  Demo;Verbal  -CN      Reps 3  10   -CN      Time 3  5 sec  -CN         Exercise 4    Exercise Name 4  --  -CN      Cueing 4  --  -CN      Reps 4  --  -CN        User Key  (r) = Recorded By, (t) = Taken By, (c) = Cosigned By    Initials Name Provider Type    Neelima Reyes, CHER Physical Therapist                        Outcome Measure Options: Knee Outcome Score- ADL  Knee Outcome Score  Knee Outcome Score Comments: 66.25% where 100% is no disability      Time Calculation:     Therapy Suggested Charges     Code   Minutes Charges    00189 (CPT®) Hc Pt Neuromusc Re Education Ea 15 Min      20949 (CPT®) Hc Pt Ther Proc Ea 15 Min 10 1    49167 (CPT®) Hc Gait  Training Ea 15 Min      92369 (CPT®) Hc Pt Therapeutic Act Ea 15 Min      97715 (CPT®) Hc Pt Manual Therapy Ea 15 Min      85112 (CPT®) Hc Pt Ther Massage- Per 15 Min      46139 (CPT®) Hc Pt Iontophoresis Ea 15 Min      85172 (CPT®) Hc Pt Elec Stim Ea-Per 15 Min      91615 (CPT®) Hc Pt Ultrasound Ea 15 Min      11689 (CPT®) Hc Pt Self Care/Mgmt/Train Ea 15 Min      15244 (CPT®) Hc Pt Prosthetic (S) Train Initial Encounter, Each 15 Min      46442 (CPT®) Hc Orthotic(S) Mgmt/Train Initial Encounter, Each 15min      20783 (CPT®) Hc Pt Aquatic Therapy Ea 15 Min      57949 (CPT®) Hc Pt Orthotic(S)/Prosthetic(S) Encounter, Each 15 Min       (CPT®) Hc Pt Electrical Stim Unattended      Total  10 1          Start Time: 0915  Stop Time: 1000  Time Calculation (min): 45 min     Therapy Charges for Today     Code Description Service Date Service Provider Modifiers Qty    63610217715 HC PT THER PROC EA 15 MIN 2/6/2019 Neelima Blake, PT GP 1    93305506308 HC PT EVAL MOD COMPLEXITY 2 2/6/2019 Neelima Blake, PT GP 1          PT G-Codes  Outcome Measure Options: Knee Outcome Score- ADL         Neelima Blake, PT  2/6/2019

## 2019-02-14 ENCOUNTER — HOSPITAL ENCOUNTER (OUTPATIENT)
Dept: PHYSICAL THERAPY | Facility: HOSPITAL | Age: 84
Setting detail: THERAPIES SERIES
Discharge: HOME | End: 2019-02-14

## 2019-02-14 DIAGNOSIS — M25.561 CHRONIC PAIN OF RIGHT KNEE: Primary | ICD-10-CM

## 2019-02-14 DIAGNOSIS — G89.29 CHRONIC PAIN OF RIGHT KNEE: Primary | ICD-10-CM

## 2019-02-14 DIAGNOSIS — Z74.09 IMPAIRED MOBILITY: ICD-10-CM

## 2019-02-14 PROCEDURE — 97140 MANUAL THERAPY 1/> REGIONS: CPT

## 2019-02-14 PROCEDURE — 97110 THERAPEUTIC EXERCISES: CPT

## 2019-02-14 NOTE — THERAPY TREATMENT NOTE
Outpatient Physical Therapy Ortho Treatment Note  [facility]     Patient Name: Arlin Hutson  : 1935  MRN: [id number]  Today's Date: 2019      Visit Date: 2019    Visit Dx:    ICD-10-CM ICD-9-CM   1. Chronic pain of right knee M25.561 719.46    G89.29 338.29   2. Impaired mobility Z74.09 799.89       Patient Active Problem List   Diagnosis   • Gastroesophageal reflux disease   • Osteopenia of multiple sites   • Essential hypertension   • Colitis   • Primary osteoarthritis of right knee   • Primary osteoarthritis of left knee   • Pneumonia of left lower lobe due to infectious organism (CMS/HCC)   • History of total right knee replacement   • Abnormal CT of the abdomen        Past Medical History:   Diagnosis Date   • Baker's cyst    • Colon polyp    • CREST syndrome (CMS/HCC)    • Fractures    • GERD (gastroesophageal reflux disease)    • History of CT scan of abdomen 2011    constipation, sig tics, 6 mm hepatic cyst   • History of rheumatoid arthritis    • Hyperlipidemia    • Hypertension    • Osteoarthritis    • Raynaud's disease    • Sjogren's syndrome (CMS/HCC)    • Ulcerative colitis (CMS/HCC)         Past Surgical History:   Procedure Laterality Date   • CATARACT EXTRACTION, BILATERAL     • COLONOSCOPY  2016    tics, microscopic colitis,    • COLONOSCOPY  2011    sig tics, inflammation, polyp   • DILATATION AND CURETTAGE     • ENDOSCOPY N/A 12/3/2018    erythematous mucosa in stomach, path benign   • EYE SURGERY     • FLEXIBLE SIGMOIDOSCOPY     • FRACTURE SURGERY     • HAND SURGERY     • JOINT REPLACEMENT     • KNEE SURGERY Right    • TOTAL KNEE ARTHROPLASTY Right 2018    Procedure: TOTAL KNEE ARTHROPLASTY;  Surgeon: Georges Jon MD;  Location: [facility] OR;  Service: Orthopedics   • UPPER GASTROINTESTINAL ENDOSCOPY  2008    erythema   • WRIST FRACTURE SURGERY     • WRIST SURGERY Right     orif                       PT Assessment/Plan     Row  Name 02/14/19 1021          PT Assessment    Assessment Comments  Pt returns for initial follow up after evaluation and reports no change in symptoms to date. Pt with slight inc in pain after performing initial HEP and D/C hip adduction this date for decreased tension on affected tissues. Added stm to R medial HS and adductors as well as L piriformis/glutes with good pt response. Updated HEP handouts with isometric exercises to improve strength and reduce symptoms.   -CN        PT Plan    PT Plan Comments  Assess response to added exercises and stm and continue if beneficial.   -CN       User Key  (r) = Recorded By, (t) = Taken By, (c) = Cosigned By    Initials Name Provider Type    CN Neelima Blake, PT Physical Therapist              Exercises     Row Name 02/14/19 0900             Subjective Comments    Subjective Comments  I have good days and bad days. If I could sleep it would be a whole lot better.   -CN         Subjective Pain    Able to rate subjective pain?  yes  -CN      Pre-Treatment Pain Level  2  -CN         Total Minutes    26864 - PT Therapeutic Exercise Minutes  30  -CN      07389 - PT Manual Therapy Minutes  10  -CN         Exercise 1    Exercise Name 1  90/90 HS stretch  -CN      Cueing 1  Verbal  -CN      Reps 1  3  -CN      Time 1  20 sec  -CN         Exercise 2    Exercise Name 2  Piriformis stretch  -CN      Cueing 2  Verbal  -CN      Reps 2  3  -CN      Time 2  20 sec  -CN         Exercise 4    Exercise Name 4  Nustep, L5  -CN      Cueing 4  Verbal  -CN      Time 4  5 min  -CN         Exercise 5    Exercise Name 5  QS  -CN      Cueing 5  Demo  -CN      Time 5  10  -CN      Additional Comments  5 sec  -CN         Exercise 6    Exercise Name 6  Glute sets  -CN      Cueing 6  Demo  -CN      Reps 6  10  -CN      Time 6  5 sec  -CN         Exercise 7    Exercise Name 7  HL hip abduction  -CN      Cueing 7  Verbal  -CN      Sets 7  2  -CN      Reps 7  10  -CN      Additional Comments  RTB   -CN        User Key  (r) = Recorded By, (t) = Taken By, (c) = Cosigned By    Initials Name Provider Type    Neelima Reyes, PT Physical Therapist                        Manual Rx (last 36 hours)      Manual Treatments     Row Name 02/14/19 0900             Total Minutes    28405 - PT Manual Therapy Minutes  10  -CN         Manual Rx 1    Manual Rx 1 Location  stm with noodle to R medial HS/adductor tissues and L piriformis/glutes  -CN      Manual Rx 1 Duration  10 min  -CN        User Key  (r) = Recorded By, (t) = Taken By, (c) = Cosigned By    Initials Name Provider Type    Neelima Reyes, PT Physical Therapist          PT OP Goals     Row Name 02/14/19 1000          PT Short Term Goals    STG Date to Achieve  02/27/19  -CN     STG 1  Pt will report subjective pain at 2/10 or less to demonstrate symptom management with ADLs and all household mobility.   -CN     STG 1 Progress  Ongoing  -CN     STG 1 Progress Comments  Pt reports pain rated 2/10 today, however increased pain yesterday and continued high pain at night.   -CN     STG 2  Pt will be independent and compliant with HEP, symptom management and joint protection in order to minimize stress on affected tissues.    -CN     STG 2 Progress  Ongoing  -CN     STG 2 Progress Comments  Pt reporting compliance with current HEP.   -CN        Long Term Goals    LTG Date to Achieve  03/20/19  -CN     LTG 1  Pt will improve R HS strength to 4+/5 without pain on in order to improve functional mobility.   -CN     LTG 1 Progress  Ongoing  -CN     LTG 2  Pt will improve score on Knee Outcome Survey  to 80% for improved functional mobility.   -CN     LTG 2 Progress  Ongoing  -CN     LTG 3  Pt will report 50% improvement in ability ascend/descend stairs with only mild reports of pain in order to return to full ADLs and volunteer activities.   -CN     LTG 3 Progress  Ongoing  -CN       User Key  (r) = Recorded By, (t) = Taken By, (c) = Cosigned By     Initials Name Provider Type    Neelima Reyes, PT Physical Therapist          Therapy Education  Given: HEP, Symptoms/condition management, Pain management, Posture/body mechanics  Program: Progressed  How Provided: Verbal, Demonstration, Written  Provided to: Patient  Level of Understanding: Teach back education performed, Verbalized, Demonstrated              Time Calculation:   Start Time: 0930  Stop Time: 1010  Time Calculation (min): 40 min  Therapy Suggested Charges     Code   Minutes Charges    85401 (CPT®) Hc Pt Neuromusc Re Education Ea 15 Min      74222 (CPT®) Hc Pt Ther Proc Ea 15 Min 30 2    73382 (CPT®) Hc Gait Training Ea 15 Min      68401 (CPT®) Hc Pt Therapeutic Act Ea 15 Min      96621 (CPT®) Hc Pt Manual Therapy Ea 15 Min 10 1    47348 (CPT®) Hc Pt Ther Massage- Per 15 Min      34319 (CPT®) Hc Pt Iontophoresis Ea 15 Min      74237 (CPT®) Hc Pt Elec Stim Ea-Per 15 Min      88649 (CPT®) Hc Pt Ultrasound Ea 15 Min      55697 (CPT®) Hc Pt Self Care/Mgmt/Train Ea 15 Min      74707 (CPT®) Hc Pt Prosthetic (S) Train Initial Encounter, Each 15 Min      82021 (CPT®) Hc Orthotic(S) Mgmt/Train Initial Encounter, Each 15min      13146 (CPT®) Hc Pt Aquatic Therapy Ea 15 Min      30325 (CPT®) Hc Pt Orthotic(S)/Prosthetic(S) Encounter, Each 15 Min       (CPT®) Hc Pt Electrical Stim Unattended      Total  40 3        Therapy Charges for Today     Code Description Service Date Service Provider Modifiers Qty    67812702322 HC PT THER PROC EA 15 MIN 2/14/2019 Neelima Blake, PT GP 2    10690603042 HC PT MANUAL THERAPY EA 15 MIN 2/14/2019 Neelima Blake, PT GP 1                    Neelima Blake PT  2/14/2019

## 2019-02-20 ENCOUNTER — HOSPITAL ENCOUNTER (OUTPATIENT)
Dept: PHYSICAL THERAPY | Facility: HOSPITAL | Age: 84
Setting detail: THERAPIES SERIES
Discharge: HOME | End: 2019-02-20

## 2019-02-20 DIAGNOSIS — R26.2 DIFFICULTY WALKING: ICD-10-CM

## 2019-02-20 DIAGNOSIS — Z96.651 HISTORY OF ARTHROPLASTY OF RIGHT KNEE: ICD-10-CM

## 2019-02-20 DIAGNOSIS — M25.561 CHRONIC PAIN OF RIGHT KNEE: Primary | ICD-10-CM

## 2019-02-20 DIAGNOSIS — Z47.89 ORTHOPEDIC AFTERCARE: ICD-10-CM

## 2019-02-20 DIAGNOSIS — Z74.09 IMPAIRED MOBILITY: ICD-10-CM

## 2019-02-20 DIAGNOSIS — M25.661 STIFFNESS OF KNEE JOINT, RIGHT: ICD-10-CM

## 2019-02-20 DIAGNOSIS — G89.29 CHRONIC PAIN OF RIGHT KNEE: Primary | ICD-10-CM

## 2019-02-20 PROCEDURE — 97035 APP MDLTY 1+ULTRASOUND EA 15: CPT | Performed by: PHYSICAL THERAPIST

## 2019-02-20 PROCEDURE — 97110 THERAPEUTIC EXERCISES: CPT | Performed by: PHYSICAL THERAPIST

## 2019-02-20 PROCEDURE — 97530 THERAPEUTIC ACTIVITIES: CPT | Performed by: PHYSICAL THERAPIST

## 2019-02-20 NOTE — THERAPY TREATMENT NOTE
Outpatient Physical Therapy Ortho Treatment Note  [facility]     Patient Name: Arlin Hutson  : 1935  MRN: [id number]  Today's Date: 2019      Visit Date: 2019    Visit Dx:    ICD-10-CM ICD-9-CM   1. Chronic pain of right knee M25.561 719.46    G89.29 338.29   2. Impaired mobility Z74.09 799.89   3. Orthopedic aftercare Z47.89 V54.9   4. Difficulty walking R26.2 719.7   5. History of arthroplasty of right knee Z96.651 V43.65   6. Stiffness of knee joint, right M25.661 719.56       Patient Active Problem List   Diagnosis   • Gastroesophageal reflux disease   • Osteopenia of multiple sites   • Essential hypertension   • Colitis   • Primary osteoarthritis of right knee   • Primary osteoarthritis of left knee   • Pneumonia of left lower lobe due to infectious organism (CMS/HCC)   • History of total right knee replacement   • Abnormal CT of the abdomen        Past Medical History:   Diagnosis Date   • Baker's cyst    • Colon polyp    • CREST syndrome (CMS/HCC)    • Fractures    • GERD (gastroesophageal reflux disease)    • History of CT scan of abdomen 2011    constipation, sig tics, 6 mm hepatic cyst   • History of rheumatoid arthritis    • Hyperlipidemia    • Hypertension    • Osteoarthritis    • Raynaud's disease    • Sjogren's syndrome (CMS/HCC)    • Ulcerative colitis (CMS/HCC)         Past Surgical History:   Procedure Laterality Date   • CATARACT EXTRACTION, BILATERAL     • COLONOSCOPY  2016    tics, microscopic colitis,    • COLONOSCOPY  2011    sig tics, inflammation, polyp   • DILATATION AND CURETTAGE     • ENDOSCOPY N/A 12/3/2018    erythematous mucosa in stomach, path benign   • EYE SURGERY     • FLEXIBLE SIGMOIDOSCOPY     • FRACTURE SURGERY     • HAND SURGERY     • JOINT REPLACEMENT     • KNEE SURGERY Right    • TOTAL KNEE ARTHROPLASTY Right 2018    Procedure: TOTAL KNEE ARTHROPLASTY;  Surgeon: Georges Jon MD;  Location: [facility] OR;  Service:  Orthopedics   • UPPER GASTROINTESTINAL ENDOSCOPY  03/14/2008    erythema   • WRIST FRACTURE SURGERY     • WRIST SURGERY Right 2008    orif                       PT Assessment/Plan     Row Name 02/20/19 1347          PT Assessment    Assessment Comments  Ms Hutson returns reporing temporary relief following soft tissue work previous sessoin.  We encouraged her to hold on excessive stair climbing (does so when her watch tells her to move) to see if this will decrease stress to pes anserine tissues.  Also, enocuraged to return to use of a cane to decrease stress to L hip/R knee.  We initiated trial of US to R pes anserine and instructed in ice cup massage to R pes anserine to be performed BID.  Ms. Hutson is progressing toward functional goals.   -GJ        PT Plan    PT Plan Comments  assess response to US and ice cup massage.  Progress strengthening as able.  Repeat US if beneficial.   -GJ       User Key  (r) = Recorded By, (t) = Taken By, (c) = Cosigned By    Initials Name Provider Type    GJ Nando Whiteside, PT Physical Therapist          Modalities     Row Name 02/20/19 1000             Ice    Ice Applied  Yes  -GJ      Location  educated pt. in use of ice cup treatment to R pes anserine, performed,  -GJ         Ultrasound 07961    Location  R pes anserine tissue  -GJ      Duty Cycle  50  -GJ      Frequency  3.0 MHz  -GJ      Intensity - Wts/cm  1  -GJ      Phonophoresis  No  -GJ      75359 - PT Ultrasound Minutes  8  -GJ        User Key  (r) = Recorded By, (t) = Taken By, (c) = Cosigned By    Initials Name Provider Type    Nando Rocha, PT Physical Therapist          Exercises     Row Name 02/20/19 1000 02/20/19 0958          Subjective Comments    Subjective Comments  The massage felt good while she was doing it, but it started hurting later that day.  I do think I'm getting a little better  -GJ  --        Total Minutes    94168 - PT Therapeutic Exercise Minutes  --  30  -GJ     08018 - PT Therapeutic  Activity Minutes  --  10 education  -GJ        Exercise 1    Exercise Name 1  HS stretch at step  -GJ  --     Cueing 1  Verbal  -GJ  --     Reps 1  3  -GJ  --     Time 1  20 sec  -GJ  --        Exercise 2    Exercise Name 2  Piriformis stretch  -GJ  --     Cueing 2  Verbal  -GJ  --     Reps 2  3  -GJ  --     Time 2  20 sec  -GJ  --        Exercise 3    Exercise Name 3  HL hip adduction  -GJ  --     Cueing 3  Demo;Verbal  -GJ  --     Reps 3  15  -GJ  --     Time 3  5 sec  -GJ  --     Additional Comments  ball  -GJ  --        Exercise 4    Exercise Name 4  Nustep, L5  -GJ  --     Cueing 4  Verbal  -GJ  --     Time 4  5 min  -GJ  --        Exercise 5    Exercise Name 5  QS  -GJ  --     Cueing 5  Verbal;Tactile;Demo  -GJ  --     Reps 5  15  -GJ  --     Time 5  5 s  -GJ  --        Exercise 6    Exercise Name 6  Glute sets  -GJ  --     Cueing 6  Demo  -GJ  --     Reps 6  10  -GJ  --     Time 6  5 sec  -GJ  --        Exercise 7    Exercise Name 7  HL hip abduction  -GJ  --     Cueing 7  Verbal  -GJ  --     Sets 7  2  -GJ  --     Reps 7  10  -GJ  --     Additional Comments  RTB  -GJ  --        Exercise 8    Exercise Name 8  seated TKE into ball  -GJ  --     Cueing 8  Verbal;Demo  -GJ  --     Reps 8  15  -GJ  --     Time 8  5 s  -GJ  --     Additional Comments  ball  -GJ  --        Exercise 9    Exercise Name 9  HR  -GJ  --     Cueing 9  Verbal;Demo  -GJ  --     Reps 9  15  -GJ  --        Exercise 10    Exercise Name 10  gastroc stretch, step  -GJ  --     Cueing 10  Verbal;Demo  -GJ  --     Reps 10  3  -GJ  --     Time 10  20 s  -GJ  --       User Key  (r) = Recorded By, (t) = Taken By, (c) = Cosigned By    Initials Name Provider Type    GJ Nando Whiteside, PT Physical Therapist                         PT OP Goals     Row Name 02/20/19 1300          PT Short Term Goals    STG Date to Achieve  02/27/19  -GJ     STG 1  Pt will report subjective pain at 2/10 or less to demonstrate symptom management with ADLs and all household  mobility.   -GJ     STG 1 Progress  Partially Met  -GJ     STG 1 Progress Comments  reporting consistent 2/10, will continue to monitor  -GJ     STG 2  Pt will be independent and compliant with HEP, symptom management and joint protection in order to minimize stress on affected tissues.    -GJ     STG 2 Progress  Ongoing  -GJ     STG 2 Progress Comments  intermittent cues  -GJ        Long Term Goals    LTG Date to Achieve  03/20/19  -GJ     LTG 1  Pt will improve R HS strength to 4+/5 without pain on in order to improve functional mobility.   -GJ     LTG 1 Progress  Ongoing  -GJ     LTG 2  Pt will improve score on Knee Outcome Survey  to 80% for improved functional mobility.   -GJ     LTG 2 Progress  Ongoing  -GJ     LTG 3  Pt will report 50% improvement in ability ascend/descend stairs with only mild reports of pain in order to return to full ADLs and volunteer activities.   -GJ     LTG 3 Progress  Ongoing  -GJ       User Key  (r) = Recorded By, (t) = Taken By, (c) = Cosigned By    Initials Name Provider Type    Nando Rocha, PT Physical Therapist          Therapy Education  Education Details: did not issue new exercises for home yet, will do so next session if she tolerates well.  Discussed benefits of the use of a cane to decrease stress to tissues to allow for optimal healing.  Discussed sleeping with pillow between knees.  Encouraged pt. to hold on execessive use of her stairs (reports she does stairs frequently to work on getting her steps in).  Educated in ice cup massage  Given: HEP, Symptoms/condition management, Pain management, Mobility training, Edema management, Fall prevention and home safety  Program: New, Reinforced, Progressed  How Provided: Verbal, Demonstration  Provided to: Patient  Level of Understanding: Teach back education performed, Verbalized, Demonstrated              Time Calculation:   Start Time: 1000  Stop Time: 1055  Time Calculation (min): 55 min  Therapy Suggested Charges      Code   Minutes Charges    87319 (CPT®) Hc Pt Neuromusc Re Education Ea 15 Min      08326 (CPT®) Hc Pt Ther Proc Ea 15 Min 30 2    57366 (CPT®) Hc Gait Training Ea 15 Min      31701 (CPT®) Hc Pt Therapeutic Act Ea 15 Min 10 1    40819 (CPT®) Hc Pt Manual Therapy Ea 15 Min      35198 (CPT®) Hc Pt Ther Massage- Per 15 Min      31753 (CPT®) Hc Pt Iontophoresis Ea 15 Min      62325 (CPT®) Hc Pt Elec Stim Ea-Per 15 Min      83718 (CPT®) Hc Pt Ultrasound Ea 15 Min 16 1    34221 (CPT®) Hc Pt Self Care/Mgmt/Train Ea 15 Min      04983 (CPT®) Hc Pt Prosthetic (S) Train Initial Encounter, Each 15 Min      29679 (CPT®) Hc Orthotic(S) Mgmt/Train Initial Encounter, Each 15min      85992 (CPT®) Hc Pt Aquatic Therapy Ea 15 Min      31713 (CPT®) Hc Pt Orthotic(S)/Prosthetic(S) Encounter, Each 15 Min       (CPT®) Hc Pt Electrical Stim Unattended      Total  56 4        Therapy Charges for Today     Code Description Service Date Service Provider Modifiers Qty    85049751291 HC PT THER PROC EA 15 MIN 2/20/2019 Nando Whiteside, PT GP 2    10900280175 HC PT THERAPEUTIC ACT EA 15 MIN 2/20/2019 Nando Whiteside, PT GP 1    88807176608 HC PT ULTRASOUND EA 15 MIN 2/20/2019 Nando Whiteside, PT GP 1    32882798101 HC PT HOT OR COLD PACK TREAT MCARE 2/20/2019 Nando Whiteside, PT GP 1                    Nando Whiteside, PT  2/20/2019

## 2019-02-22 ENCOUNTER — HOSPITAL ENCOUNTER (OUTPATIENT)
Dept: PHYSICAL THERAPY | Facility: HOSPITAL | Age: 84
Setting detail: THERAPIES SERIES
Discharge: HOME | End: 2019-02-22

## 2019-02-22 DIAGNOSIS — R26.2 DIFFICULTY WALKING: ICD-10-CM

## 2019-02-22 DIAGNOSIS — Z47.89 ORTHOPEDIC AFTERCARE: ICD-10-CM

## 2019-02-22 DIAGNOSIS — Z96.651 HISTORY OF ARTHROPLASTY OF RIGHT KNEE: ICD-10-CM

## 2019-02-22 DIAGNOSIS — M25.661 STIFFNESS OF KNEE JOINT, RIGHT: ICD-10-CM

## 2019-02-22 DIAGNOSIS — G89.29 CHRONIC PAIN OF RIGHT KNEE: Primary | ICD-10-CM

## 2019-02-22 DIAGNOSIS — M25.561 CHRONIC PAIN OF RIGHT KNEE: Primary | ICD-10-CM

## 2019-02-22 DIAGNOSIS — M54.50 ACUTE LEFT-SIDED LOW BACK PAIN WITHOUT SCIATICA: ICD-10-CM

## 2019-02-22 DIAGNOSIS — Z74.09 IMPAIRED MOBILITY: ICD-10-CM

## 2019-02-22 PROCEDURE — 97035 APP MDLTY 1+ULTRASOUND EA 15: CPT | Performed by: PHYSICAL THERAPIST

## 2019-02-22 PROCEDURE — 97110 THERAPEUTIC EXERCISES: CPT | Performed by: PHYSICAL THERAPIST

## 2019-02-22 PROCEDURE — 97140 MANUAL THERAPY 1/> REGIONS: CPT | Performed by: PHYSICAL THERAPIST

## 2019-02-22 NOTE — THERAPY TREATMENT NOTE
Outpatient Physical Therapy Ortho Treatment Note  [facility]     Patient Name: Arlin Hutson  : 1935  MRN: [id number]  Today's Date: 2019      Visit Date: 2019    Visit Dx:    ICD-10-CM ICD-9-CM   1. Chronic pain of right knee M25.561 719.46    G89.29 338.29   2. Impaired mobility Z74.09 799.89   3. Orthopedic aftercare Z47.89 V54.9   4. Difficulty walking R26.2 719.7   5. History of arthroplasty of right knee Z96.651 V43.65   6. Stiffness of knee joint, right M25.661 719.56   7. Acute left-sided low back pain without sciatica M54.5 724.2       Patient Active Problem List   Diagnosis   • Gastroesophageal reflux disease   • Osteopenia of multiple sites   • Essential hypertension   • Colitis   • Primary osteoarthritis of right knee   • Primary osteoarthritis of left knee   • Pneumonia of left lower lobe due to infectious organism (CMS/HCC)   • History of total right knee replacement   • Abnormal CT of the abdomen        Past Medical History:   Diagnosis Date   • Baker's cyst    • Colon polyp    • CREST syndrome (CMS/HCC)    • Fractures    • GERD (gastroesophageal reflux disease)    • History of CT scan of abdomen 2011    constipation, sig tics, 6 mm hepatic cyst   • History of rheumatoid arthritis    • Hyperlipidemia    • Hypertension    • Osteoarthritis    • Raynaud's disease    • Sjogren's syndrome (CMS/HCC)    • Ulcerative colitis (CMS/HCC)         Past Surgical History:   Procedure Laterality Date   • CATARACT EXTRACTION, BILATERAL     • COLONOSCOPY  2016    tics, microscopic colitis,    • COLONOSCOPY  2011    sig tics, inflammation, polyp   • DILATATION AND CURETTAGE     • ENDOSCOPY N/A 12/3/2018    erythematous mucosa in stomach, path benign   • EYE SURGERY     • FLEXIBLE SIGMOIDOSCOPY     • FRACTURE SURGERY     • HAND SURGERY     • JOINT REPLACEMENT     • KNEE SURGERY Right    • TOTAL KNEE ARTHROPLASTY Right 2018    Procedure: TOTAL KNEE ARTHROPLASTY;   Surgeon: Georges Jon MD;  Location: [facility];  Service: Orthopedics   • UPPER GASTROINTESTINAL ENDOSCOPY  03/14/2008    erythema   • WRIST FRACTURE SURGERY     • WRIST SURGERY Right 2008    orif                       PT Assessment/Plan     Row Name 02/22/19 1520          PT Assessment    Assessment Comments  progressing toward STGs. Patient reports will begin ambulating with STC to help pain and balance however has forgotten each day since last session.   -SC        PT Plan    PT Plan Comments  assess manual, progress strengthening as tolerated.   -SC       User Key  (r) = Recorded By, (t) = Taken By, (c) = Cosigned By    Initials Name Provider Type    SC Rosanne Fitzgerald, PT Physical Therapist          Modalities     Row Name 02/22/19 1520             Ice    Location  educated pt. in use of ice cup treatment to R pes anserine, performed,  -SC         Ultrasound 94253    Location  R pes anserine tissue  -SC      Duty Cycle  50  -SC      Frequency  3.0 MHz  -SC      Intensity - Wts/cm  1  -SC      Phonophoresis  No  -SC      40620 - PT Ultrasound Minutes  8  -SC        User Key  (r) = Recorded By, (t) = Taken By, (c) = Cosigned By    Initials Name Provider Type    SC Rosanne Fitzgerald, PT Physical Therapist          Exercises     Row Name 02/22/19 1520             Subjective Comments    Subjective Comments  I feel pretty good today. No pain. It's night that really gets me. That hasn't gotten any better since starting here but I know I just started.   -SC         Subjective Pain    Able to rate subjective pain?  yes  -SC      Pre-Treatment Pain Level  0  -SC         Total Minutes    08979 - PT Therapeutic Exercise Minutes  30  -SC      25114 - PT Manual Therapy Minutes  10  -SC         Exercise 1    Exercise Name 1  HS stretch at step  -SC      Cueing 1  Verbal  -SC      Reps 1  3  -SC      Time 1  20 sec  -SC         Exercise 2    Exercise Name 2  Piriformis stretch  -SC      Cueing 2  Verbal  -SC       Reps 2  3  -SC      Time 2  20 sec  -SC         Exercise 3    Exercise Name 3  HL hip adduction  -SC      Cueing 3  Demo;Verbal  -SC      Reps 3  15  -SC      Time 3  5 sec  -SC      Additional Comments  ball  -SC         Exercise 4    Exercise Name 4  Nustep, L5  -SC      Cueing 4  Verbal  -SC      Time 4  5 min  -SC         Exercise 5    Exercise Name 5  QS  -SC      Cueing 5  Verbal;Tactile;Demo  -SC      Reps 5  15  -SC      Time 5  5 s  -SC         Exercise 6    Exercise Name 6  Glute sets  -SC      Cueing 6  Demo  -SC      Reps 6  15  -SC      Time 6  5 sec  -SC         Exercise 7    Exercise Name 7  HL hip abduction  -SC      Cueing 7  Verbal  -SC      Sets 7  2  -SC      Reps 7  10  -SC      Additional Comments  RTB  -SC         Exercise 8    Exercise Name 8  --  -SC      Cueing 8  --  -SC      Reps 8  --  -SC      Time 8  --  -SC         Exercise 9    Exercise Name 9  --  -SC      Cueing 9  --  -SC      Reps 9  --  -SC         Exercise 10    Exercise Name 10  gastroc stretch, step  -SC      Cueing 10  Verbal;Demo  -SC      Reps 10  3  -SC      Time 10  20 s  -SC         Exercise 11    Exercise Name 11  bridges HL segmental  -SC      Reps 11  10  -SC      Time 11  5 seconds  -SC        User Key  (r) = Recorded By, (t) = Taken By, (c) = Cosigned By    Initials Name Provider Type    Rosanne Morataya, PT Physical Therapist                        Manual Rx (last 36 hours)      Manual Treatments     Row Name 02/22/19 1520             Total Minutes    83901 - PT Manual Therapy Minutes  10  -SC         Manual Rx 1    Manual Rx 1 Location  stm with noodle to R medial HS/adductor tissues and L piriformis/glutes  -SC        User Key  (r) = Recorded By, (t) = Taken By, (c) = Cosigned By    Initials Name Provider Type    Rosanne Morataya PT Physical Therapist          PT OP Goals     Row Name 02/22/19 1520          PT Short Term Goals    STG Date to Achieve  02/27/19  -SC     STG 1  Pt will report  subjective pain at 2/10 or less to demonstrate symptom management with ADLs and all household mobility.   -SC     STG 1 Progress  Partially Met  -[facility] 1 Progress Comments  states 0/10 upon arrival to clinic but reports persisting symptoms at night  -[facility] 2  Pt will be independent and compliant with HEP, symptom management and joint protection in order to minimize stress on affected tissues.    -SC     STG 2 Progress  Progressing  -[facility] 2 Progress Comments  reports compliance with HEP in home  -SC        Long Term Goals    LTG Date to Achieve  03/20/19  -SC     LTG 1  Pt will improve R HS strength to 4+/5 without pain on in order to improve functional mobility.   -SC     LTG 1 Progress  Ongoing  -SC     LTG 2  Pt will improve score on Knee Outcome Survey  to 80% for improved functional mobility.   -SC     LTG 2 Progress  Ongoing  -SC     LTG 3  Pt will report 50% improvement in ability ascend/descend stairs with only mild reports of pain in order to return to full ADLs and volunteer activities.   -SC     LTG 3 Progress  Ongoing  -SC        Time Calculation    PT Goal Re-Cert Due Date  03/20/19  -SC       User Key  (r) = Recorded By, (t) = Taken By, (c) = Cosigned By    Initials Name Provider Type    Rosanne Morataya, PT Physical Therapist          Therapy Education  Given: HEP, Symptoms/condition management, Pain management, Mobility training  Program: Reinforced  How Provided: Verbal, Demonstration  Provided to: Patient  Level of Understanding: Teach back education performed, Verbalized, Demonstrated              Time Calculation:   Start Time: 1520  Stop Time: 1617  Time Calculation (min): 57 min  Therapy Suggested Charges     Code   Minutes Charges    21500 (CPT®)  Pt Neuromusc Re Education Ea 15 Min      71964 (CPT®) Hc Pt Ther Proc Ea 15 Min 30 2    57702 (CPT®) Hc Gait Training Ea 15 Min      94091 (CPT®) Hc Pt Therapeutic Act Ea 15 Min      24372 (CPT®) Hc Pt Manual Therapy Ea 15 Min  10 1    61689 (CPT®) Hc Pt Ther Massage- Per 15 Min      64262 (CPT®) Hc Pt Iontophoresis Ea 15 Min      38605 (CPT®) Hc Pt Elec Stim Ea-Per 15 Min      69223 (CPT®) Hc Pt Ultrasound Ea 15 Min 8     88485 (CPT®) Hc Pt Self Care/Mgmt/Train Ea 15 Min      66717 (CPT®) Hc Pt Prosthetic (S) Train Initial Encounter, Each 15 Min      60795 (CPT®) Hc Orthotic(S) Mgmt/Train Initial Encounter, Each 15min      61860 (CPT®) Hc Pt Aquatic Therapy Ea 15 Min      58275 (CPT®) Hc Pt Orthotic(S)/Prosthetic(S) Encounter, Each 15 Min       (CPT®) Hc Pt Electrical Stim Unattended      Total  48 3        Therapy Charges for Today     Code Description Service Date Service Provider Modifiers Qty    03607292852 HC PT MANUAL THERAPY EA 15 MIN 2/22/2019 Rosanne Fitzgerald, PT GP 1    90699739048 HC PT THER PROC EA 15 MIN 2/22/2019 Rosanne Fitzgerald, PT GP 2    23002976549 HC PT ULTRASOUND EA 15 MIN 2/22/2019 Rosanne Fitzgerald, PT GP 1                    Rosanne Paulson PT  2/22/2019

## 2019-02-27 ENCOUNTER — HOSPITAL ENCOUNTER (OUTPATIENT)
Dept: PHYSICAL THERAPY | Facility: HOSPITAL | Age: 84
Setting detail: THERAPIES SERIES
Discharge: HOME | End: 2019-02-27

## 2019-02-27 DIAGNOSIS — M25.561 CHRONIC PAIN OF RIGHT KNEE: Primary | ICD-10-CM

## 2019-02-27 DIAGNOSIS — Z47.89 ORTHOPEDIC AFTERCARE: ICD-10-CM

## 2019-02-27 DIAGNOSIS — Z96.651 HISTORY OF ARTHROPLASTY OF RIGHT KNEE: ICD-10-CM

## 2019-02-27 DIAGNOSIS — Z74.09 IMPAIRED MOBILITY: ICD-10-CM

## 2019-02-27 DIAGNOSIS — G89.29 CHRONIC PAIN OF RIGHT KNEE: Primary | ICD-10-CM

## 2019-02-27 DIAGNOSIS — R26.2 DIFFICULTY WALKING: ICD-10-CM

## 2019-02-27 PROCEDURE — 97530 THERAPEUTIC ACTIVITIES: CPT | Performed by: PHYSICAL THERAPIST

## 2019-02-27 PROCEDURE — 97035 APP MDLTY 1+ULTRASOUND EA 15: CPT | Performed by: PHYSICAL THERAPIST

## 2019-02-27 PROCEDURE — 97110 THERAPEUTIC EXERCISES: CPT | Performed by: PHYSICAL THERAPIST

## 2019-02-27 NOTE — THERAPY TREATMENT NOTE
Outpatient Physical Therapy Ortho Treatment Note  [facility]     Patient Name: Arlin Hutson  : 1935  MRN: [id number]  Today's Date: 2019      Visit Date: 2019    Visit Dx:    ICD-10-CM ICD-9-CM   1. Chronic pain of right knee M25.561 719.46    G89.29 338.29   2. Impaired mobility Z74.09 799.89   3. Orthopedic aftercare Z47.89 V54.9   4. Difficulty walking R26.2 719.7   5. History of arthroplasty of right knee Z96.651 V43.65       Patient Active Problem List   Diagnosis   • Gastroesophageal reflux disease   • Osteopenia of multiple sites   • Essential hypertension   • Colitis   • Primary osteoarthritis of right knee   • Primary osteoarthritis of left knee   • Pneumonia of left lower lobe due to infectious organism (CMS/HCC)   • History of total right knee replacement   • Abnormal CT of the abdomen        Past Medical History:   Diagnosis Date   • Baker's cyst    • Colon polyp    • CREST syndrome (CMS/HCC)    • Fractures    • GERD (gastroesophageal reflux disease)    • History of CT scan of abdomen 2011    constipation, sig tics, 6 mm hepatic cyst   • History of rheumatoid arthritis    • Hyperlipidemia    • Hypertension    • Osteoarthritis    • Raynaud's disease    • Sjogren's syndrome (CMS/HCC)    • Ulcerative colitis (CMS/HCC)         Past Surgical History:   Procedure Laterality Date   • CATARACT EXTRACTION, BILATERAL     • COLONOSCOPY  2016    tics, microscopic colitis,    • COLONOSCOPY  2011    sig tics, inflammation, polyp   • DILATATION AND CURETTAGE     • ENDOSCOPY N/A 12/3/2018    erythematous mucosa in stomach, path benign   • EYE SURGERY     • FLEXIBLE SIGMOIDOSCOPY     • FRACTURE SURGERY     • HAND SURGERY     • JOINT REPLACEMENT     • KNEE SURGERY Right    • TOTAL KNEE ARTHROPLASTY Right 2018    Procedure: TOTAL KNEE ARTHROPLASTY;  Surgeon: Georges Jon MD;  [facility];  Service: Orthopedics   • UPPER GASTROINTESTINAL ENDOSCOPY   03/14/2008    erythema   • WRIST FRACTURE SURGERY     • WRIST SURGERY Right 2008    orif                       PT Assessment/Plan     Row Name 02/27/19 0925          PT Assessment    Assessment Comments  Ms. Hutson returns reporting improved R knee pain.  Howevver, she reports being dizzy today.  Inital BP today was 152/70, post exercises (seated exercise) /70.   We did add LAQ/HS curl to work muscles in anterior/posterior group.  Ms. Hutson is progressing well toward all goals. We discussed likely discharge to [facility] program next session and she is in agreement.   -GJ        PT Plan    PT Plan Comments  she has one additional session scheduled after which we will likely attempt self management of her condition.  -GJ       User Key  (r) = Recorded By, (t) = Taken By, (c) = Cosigned By    Initials Name Provider Type    Nando Rocha, PT Physical Therapist          Modalities     Row Name 02/27/19 0900             Ice    Location  ice cup massage R pes anserine tissue  -GJ         Ultrasound 76894    Location  R pes anserine tissue  -GJ      Duty Cycle  50  -GJ      Frequency  3.0 MHz  -GJ      Intensity - Wts/cm  1  -GJ      Phonophoresis  No  -GJ      66179 - PT Ultrasound Minutes  8  -GJ        User Key  (r) = Recorded By, (t) = Taken By, (c) = Cosigned By    Initials Name Provider Type    Nando Rocha, PT Physical Therapist          Exercises     Row Name 02/27/19 0924 02/27/19 0900          Subjective Comments    Subjective Comments  --  my knee is feeling better even at night. I think the US is helping.  I feel like I just got off a boat, I'm a little dizzy this morning.  Sometimes this happens to me from time to time.   -GJ        Subjective Pain    Pre-Treatment Pain Level  --  0  -GJ        Total Minutes    29966 - PT Therapeutic Exercise Minutes  20  -GJ  --     67639 - PT Therapeutic Activity Minutes  10  -GJ  --        Exercise 3    Exercise Name 3  --  HL hip adduction  -GJ     Cueing  3  --  Demo;Verbal  -GJ     Reps 3  --  15  -GJ     Time 3  --  5 sec  -GJ     Additional Comments  --  ball  -GJ        Exercise 5    Exercise Name 5  --  QS  -GJ     Cueing 5  --  Verbal;Tactile;Demo  -GJ     Reps 5  --  15  -GJ     Time 5  --  5 s  -GJ        Exercise 7    Exercise Name 7  --  HL hip abduction  -GJ     Cueing 7  --  Verbal  -GJ     Sets 7  --  2  -GJ     Reps 7  --  10  -GJ     Additional Comments  --  RTB  -GJ        Exercise 8    Exercise Name 8  --  seated TKE into ball  -GJ     Cueing 8  --  Verbal;Demo  -GJ     Reps 8  --  15  -GJ     Time 8  --  5 s  -GJ     Additional Comments  --  ball  -GJ        Exercise 12    Exercise Name 12  --  seated HS curl  -GJ     Cueing 12  --  Verbal;Demo  -GJ     Reps 12  --  15  -GJ     Additional Comments  --  RTB  -GJ        Exercise 13    Exercise Name 13  --  LAQ  -GJ     Cueing 13  --  Verbal;Demo  -GJ     Reps 13  --  15  -GJ     Additional Comments  --  2#  -GJ       User Key  (r) = Recorded By, (t) = Taken By, (c) = Cosigned By    Initials Name Provider Type    Nando Rocha, PT Physical Therapist                         PT OP Goals     Row Name 02/27/19 0900          PT Short Term Goals    STG Date to Achieve  02/27/19  -GJ     STG 1  Pt will report subjective pain at 2/10 or less to demonstrate symptom management with ADLs and all household mobility.   -GJ     STG 1 Progress  Met  -GJ     STG 2  Pt will be independent and compliant with HEP, symptom management and joint protection in order to minimize stress on affected tissues.    -GJ     STG 2 Progress  Met  -GJ        Long Term Goals    LTG Date to Achieve  03/20/19  -GJ     LTG 1  Pt will improve R HS strength to 4+/5 without pain on in order to improve functional mobility.   -GJ     LTG 1 Progress  Ongoing  -GJ     LTG 2  Pt will improve score on Knee Outcome Survey  to 80% for improved functional mobility.   -GJ     LTG 2 Progress  Ongoing  -GJ     LTG 3  Pt will report 50% improvement  in ability ascend/descend stairs with only mild reports of pain in order to return to full ADLs and volunteer activities.   -GJ     LTG 3 Progress  Ongoing  -GJ       User Key  (r) = Recorded By, (t) = Taken By, (c) = Cosigned By    Initials Name Provider Type    GJ Nando Whiteside, PT Physical Therapist          Therapy Education  Education Details: [id number] pt to monitor BP at home, and to staart doing it regularly and to keep a log  Given: HEP, Symptoms/condition management, Pain management, Mobility training, Posture/body mechanics  Program: Reinforced  How Provided: Verbal  Provided to: Patient  Level of Understanding: Teach back education performed, Verbalized, Demonstrated              Time Calculation:   Start Time: 0915  Stop Time: 1004  Time Calculation (min): 49 min  Therapy Suggested Charges     Code   Minutes Charges    13126 (CPT®) Hc Pt Neuromusc Re Education Ea 15 Min      20499 (CPT®) Hc Pt Ther Proc Ea 15 Min 20 1    72559 (CPT®) Hc Gait Training Ea 15 Min      55219 (CPT®) Hc Pt Therapeutic Act Ea 15 Min 10 1    79590 (CPT®) Hc Pt Manual Therapy Ea 15 Min      16993 (CPT®) Hc Pt Ther Massage- Per 15 Min      60662 (CPT®) Hc Pt Iontophoresis Ea 15 Min      83732 (CPT®) Hc Pt Elec Stim Ea-Per 15 Min      73107 (CPT®) Hc Pt Ultrasound Ea 15 Min 8 1    02667 (CPT®) Hc Pt Self Care/Mgmt/Train Ea 15 Min      36735 (CPT®) Hc Pt Prosthetic (S) Train Initial Encounter, Each 15 Min      43464 (CPT®) Hc Orthotic(S) Mgmt/Train Initial Encounter, Each 15min      72443 (CPT®) Hc Pt Aquatic Therapy Ea 15 Min      78926 (CPT®) Hc Pt Orthotic(S)/Prosthetic(S) Encounter, Each 15 Min       (CPT®) Hc Pt Electrical Stim Unattended      Total  38 3        Therapy Charges for Today     Code Description Service Date Service Provider Modifiers Qty    86691631502 HC PT THER PROC EA 15 MIN 2/27/2019 Nando Whiteside, PT GP 1    48587976058 HC PT THERAPEUTIC ACT EA 15 MIN 2/27/2019 Nando Whiteside, PT GP 1     07450648484  PT ULTRASOUND EA 15 MIN 2/27/2019 Nando Whiteside, PT GP 1                    Nando Whiteside, PT  2/27/2019

## 2019-03-01 ENCOUNTER — HOSPITAL ENCOUNTER (OUTPATIENT)
Dept: PHYSICAL THERAPY | Facility: HOSPITAL | Age: 84
Setting detail: THERAPIES SERIES
Discharge: HOME | End: 2019-03-01

## 2019-03-01 DIAGNOSIS — Z96.651 HISTORY OF ARTHROPLASTY OF RIGHT KNEE: ICD-10-CM

## 2019-03-01 DIAGNOSIS — M25.561 CHRONIC PAIN OF RIGHT KNEE: Primary | ICD-10-CM

## 2019-03-01 DIAGNOSIS — Z47.89 ORTHOPEDIC AFTERCARE: ICD-10-CM

## 2019-03-01 DIAGNOSIS — R26.2 DIFFICULTY WALKING: ICD-10-CM

## 2019-03-01 DIAGNOSIS — M25.661 STIFFNESS OF KNEE JOINT, RIGHT: ICD-10-CM

## 2019-03-01 DIAGNOSIS — Z74.09 IMPAIRED MOBILITY: ICD-10-CM

## 2019-03-01 DIAGNOSIS — G89.29 CHRONIC PAIN OF RIGHT KNEE: Primary | ICD-10-CM

## 2019-03-01 PROCEDURE — 97110 THERAPEUTIC EXERCISES: CPT | Performed by: PHYSICAL THERAPIST

## 2019-03-01 PROCEDURE — 97035 APP MDLTY 1+ULTRASOUND EA 15: CPT | Performed by: PHYSICAL THERAPIST

## 2019-03-01 NOTE — THERAPY DISCHARGE NOTE
Outpatient Physical Therapy Ortho Treatment Note/Discharge Summary  [facility]     Patient Name: Arlin Hutson  : 1935  MRN: [id number]  Today's Date: 3/1/2019      Visit Date: 2019    Visit Dx:    ICD-10-CM ICD-9-CM   1. Chronic pain of right knee M25.561 719.46    G89.29 338.29   2. Impaired mobility Z74.09 799.89   3. Orthopedic aftercare Z47.89 V54.9   4. Difficulty walking R26.2 719.7   5. History of arthroplasty of right knee Z96.651 V43.65   6. Stiffness of knee joint, right M25.661 719.56       Patient Active Problem List   Diagnosis   • Gastroesophageal reflux disease   • Osteopenia of multiple sites   • Essential hypertension   • Colitis   • Primary osteoarthritis of right knee   • Primary osteoarthritis of left knee   • Pneumonia of left lower lobe due to infectious organism (CMS/HCC)   • History of total right knee replacement   • Abnormal CT of the abdomen        Past Medical History:   Diagnosis Date   • Baker's cyst    • Colon polyp    • CREST syndrome (CMS/HCC)    • Fractures    • GERD (gastroesophageal reflux disease)    • History of CT scan of abdomen 2011    constipation, sig tics, 6 mm hepatic cyst   • History of rheumatoid arthritis    • Hyperlipidemia    • Hypertension    • Osteoarthritis    • Raynaud's disease    • Sjogren's syndrome (CMS/HCC)    • Ulcerative colitis (CMS/HCC)         Past Surgical History:   Procedure Laterality Date   • CATARACT EXTRACTION, BILATERAL     • COLONOSCOPY  2016    tics, microscopic colitis,    • COLONOSCOPY  2011    sig tics, inflammation, polyp   • DILATATION AND CURETTAGE     • ENDOSCOPY N/A 12/3/2018    erythematous mucosa in stomach, path benign   • EYE SURGERY     • FLEXIBLE SIGMOIDOSCOPY     • FRACTURE SURGERY     • HAND SURGERY     • JOINT REPLACEMENT     • KNEE SURGERY Right    • TOTAL KNEE ARTHROPLASTY Right 2018    Procedure: TOTAL KNEE ARTHROPLASTY;  Surgeon: Georges Jon MD;  Location: [facility]  OR;  Service: Orthopedics   • UPPER GASTROINTESTINAL ENDOSCOPY  03/14/2008    erythema   • WRIST FRACTURE SURGERY     • WRIST SURGERY Right 2008    orif                       PT Assessment/Plan     Row Name 03/01/19 1051          PT Assessment    Assessment Comments  Ms. Hutson attended 6 sessions of physical therapy from 2/6/19-3/1/19 for her R knee pain.  She reports decreaed pain in her R knee and improved ability to traverse stairs.  She is independent with her HEP and voices readiness to attempt self management of her condition.  HEP to be BIW.  She is encouraged to call with questions. Ms. Hutson is discharged from outpatient physical therapy to an independent program.    -GJ        PT Plan    PT Plan Comments  DC to HEP, HEP BIW  -GJ       User Key  (r) = Recorded By, (t) = Taken By, (c) = Cosigned By    Initials Name Provider Type    Nando Rocha, PT Physical Therapist          Modalities     Row Name 03/01/19 1000             Ice    Location  ice cup massage R pes anserine tissue  -GJ         Ultrasound 63827    Location  R pes anserine tissue  -GJ      Duty Cycle  50  -GJ      Frequency  3.0 MHz  -GJ      Intensity - Wts/cm  1  -GJ      Phonophoresis  No  -GJ      57232 - PT Ultrasound Minutes  8  -GJ        User Key  (r) = Recorded By, (t) = Taken By, (c) = Cosigned By    Initials Name Provider Type    Nando Rocha, PT Physical Therapist          Exercises     Row Name 03/01/19 1002 03/01/19 1000          Subjective Comments    Subjective Comments  --  I'm not dizzy today, it got better by the end of that last day  -GJ        Subjective Pain    Pre-Treatment Pain Level  --  0  -GJ        Total Minutes    69496 - PT Therapeutic Exercise Minutes  31  -GJ  --        Exercise 1    Exercise Name 1  --  HS stretch at step  -GJ     Cueing 1  --  Verbal  -GJ     Reps 1  --  3  -GJ     Time 1  --  20 sec  -GJ        Exercise 3    Exercise Name 3  --  HL hip adduction  -GJ     Cueing 3  --  Demo;Verbal   -GJ     Reps 3  --  15  -GJ     Time 3  --  5 sec  -GJ     Additional Comments  --  ball  -GJ        Exercise 4    Exercise Name 4  --  Nustep, L5  -GJ     Cueing 4  --  Verbal  -GJ     Time 4  --  5 min  -GJ        Exercise 5    Exercise Name 5  --  QS  -GJ     Cueing 5  --  Verbal;Tactile;Demo  -GJ     Reps 5  --  15  -GJ     Time 5  --  5 s  -GJ        Exercise 7    Exercise Name 7  --  HL hip abduction  -GJ     Cueing 7  --  Verbal  -GJ     Sets 7  --  2  -GJ     Reps 7  --  10  -GJ     Additional Comments  --  RTB  -GJ        Exercise 8    Exercise Name 8  --  seated TKE into ball  -GJ     Cueing 8  --  Verbal;Demo  -GJ     Reps 8  --  15  -GJ     Time 8  --  5 s  -GJ     Additional Comments  --  ball  -GJ        Exercise 10    Exercise Name 10  --  gastroc stretch, step  -GJ     Cueing 10  --  Verbal;Demo  -GJ     Reps 10  --  3  -GJ     Time 10  --  20 s  -GJ        Exercise 11    Exercise Name 11  --  bridges HL segmental  -GJ     Cueing 11  --  Verbal;Demo  -GJ     Reps 11  --  10  -GJ     Time 11  --  5 seconds  -GJ        Exercise 12    Exercise Name 12  --  seated HS curl  -GJ     Cueing 12  --  Verbal;Demo  -GJ     Reps 12  --  15  -GJ     Additional Comments  --  RTB  -GJ        Exercise 13    Exercise Name 13  --  LAQ  -GJ     Cueing 13  --  Verbal;Demo  -GJ     Reps 13  --  15  -GJ     Additional Comments  --  2#  -GJ       User Key  (r) = Recorded By, (t) = Taken By, (c) = Cosigned By    Initials Name Provider Type    GJ Nando Whiteside W, PT Physical Therapist                         PT OP Goals     Row Name 03/01/19 1000          PT Short Term Goals    STG Date to Achieve  02/27/19  -GJ     STG 1  Pt will report subjective pain at 2/10 or less to demonstrate symptom management with ADLs and all household mobility.   -GJ     STG 1 Progress  Met  -GJ     STG 2  Pt will be independent and compliant with HEP, symptom management and joint protection in order to minimize stress on affected tissues.    -GJ      STG 2 Progress  Met  -GJ        Long Term Goals    LTG Date to Achieve  03/20/19  -GJ     LTG 1  Pt will improve R HS strength to 4+/5 without pain on in order to improve functional mobility.   -GJ     LTG 1 Progress  Met  -GJ     LTG 2  Pt will improve score on Knee Outcome Survey  to 80% for improved functional mobility.   -GJ     LTG 2 Progress  Not Met  -GJ     LTG 2 Progress Comments  63%  -GJ     LTG 3  Pt will report 50% improvement in ability ascend/descend stairs with only mild reports of pain in order to return to full ADLs and volunteer activities.   -GJ     LTG 3 Progress  Met  -GJ       User Key  (r) = Recorded By, (t) = Taken By, (c) = Cosigned By    Initials Name Provider Type    Nando Rocha, PT Physical Therapist          Therapy Education  Education Details: HEP to be BIW.  encouraged her to call with questions.  Discussed ambulation on sand/beaches and cautioned about over doing it  Given: HEP, Symptoms/condition management, Pain management, Mobility training, Posture/body mechanics, Edema management  Program: Reinforced  How Provided: Verbal, Demonstration  Provided to: Patient  Level of Understanding: Teach back education performed, Verbalized, Demonstrated    Outcome Measure Options: Knee Outcome Score- ADL  Knee Outcome Score  Knee Outcome Score Comments: 63%      Time Calculation:   Start Time: 1003  Stop Time: 1048  Time Calculation (min): 45 min  Therapy Suggested Charges     Code   Minutes Charges    28841 (CPT®) Hc Pt Neuromusc Re Education Ea 15 Min      50811 (CPT®) Hc Pt Ther Proc Ea 15 Min 31 2    05902 (CPT®) Hc Gait Training Ea 15 Min      98873 (CPT®) Hc Pt Therapeutic Act Ea 15 Min      55460 (CPT®) Hc Pt Manual Therapy Ea 15 Min      09919 (CPT®) Hc Pt Ther Massage- Per 15 Min      25606 (CPT®) Hc Pt Iontophoresis Ea 15 Min      46446 (CPT®) Hc Pt Elec Stim Ea-Per 15 Min      90244 (CPT®) Hc Pt Ultrasound Ea 15 Min 8 1    19302 (CPT®) Hc Pt Self Care/Mgmt/Train Ea 15  Min      82284 (CPT®) Hc Pt Prosthetic (S) Train Initial Encounter, Each 15 Min      32593 (CPT®) Hc Orthotic(S) Mgmt/Train Initial Encounter, Each 15min      12341 (CPT®) Hc Pt Aquatic Therapy Ea 15 Min      46858 (CPT®) Hc Pt Orthotic(S)/Prosthetic(S) Encounter, Each 15 Min       (CPT®) Hc Pt Electrical Stim Unattended      Total  39 3        Therapy Charges for Today     Code Description Service Date Service Provider Modifiers Qty    15202975411 HC PT THER PROC EA 15 MIN 3/1/2019 Nando Whiteside, PT GP 2    45902152298 HC PT ULTRASOUND EA 15 MIN 3/1/2019 Nando Whiteside, PT GP 1          PT G-Codes  Outcome Measure Options: Knee Outcome Score- ADL     OP PT Discharge Summary  Date of Discharge: 03/01/19  Reason for Discharge: Independent  Outcomes Achieved: Patient able to partially acheive established goals  Discharge Destination: Home with home program  Discharge Instructions/Additional Comments: HEP BIW, call with questions      Nando Whiteside, PT  3/1/2019

## 2019-03-06 ENCOUNTER — APPOINTMENT (OUTPATIENT)
Dept: PHYSICAL THERAPY | Facility: HOSPITAL | Age: 84
End: 2019-03-06

## 2019-05-23 ENCOUNTER — TRANSCRIBE ORDERS (OUTPATIENT)
Dept: ADMINISTRATIVE | Facility: HOSPITAL | Age: 84
End: 2019-05-23

## 2019-05-23 ENCOUNTER — OFFICE VISIT (OUTPATIENT)
Dept: ORTHOPEDIC SURGERY | Facility: CLINIC | Age: 84
End: 2019-05-23

## 2019-05-23 VITALS — BODY MASS INDEX: 19.83 KG/M2 | TEMPERATURE: 98.2 F | HEIGHT: 61 IN | WEIGHT: 105 LBS

## 2019-05-23 DIAGNOSIS — Z96.651 HX OF TOTAL KNEE ARTHROPLASTY, RIGHT: Primary | ICD-10-CM

## 2019-05-23 DIAGNOSIS — Z12.31 SCREENING MAMMOGRAM, ENCOUNTER FOR: Primary | ICD-10-CM

## 2019-05-23 PROCEDURE — 73562 X-RAY EXAM OF KNEE 3: CPT | Performed by: ORTHOPAEDIC SURGERY

## 2019-05-23 PROCEDURE — 99213 OFFICE O/P EST LOW 20 MIN: CPT | Performed by: ORTHOPAEDIC SURGERY

## 2019-05-23 NOTE — PROGRESS NOTES
Patient: Arlin Hutson  YOB: 1935 83 y.o. female  Medical Record Number: [id number]    Chief Complaints:   Chief Complaint   Patient presents with   • Right Knee - Follow-up       History of Present Illness:Arlin Hutson is a 83 y.o. female who presents for follow-up of right total knee about a year out she has some medial soreness in the hamstrings and peds anserine bursa also complaining some pain in her plantar right heel described as an ache with weightbearing worse the first she steps the morning    Allergies:   Allergies   Allergen Reactions   • Codeine Diarrhea and Nausea Only   • Sulfa Antibiotics Hives       Medications:   Current Outpatient Medications   Medication Sig Dispense Refill   • Acetaminophen (TYLENOL EXTRA STRENGTH PO) Take 2 tablets by mouth Daily As Needed.     • amLODIPine (NORVASC) 2.5 MG tablet Take 2.5 mg by mouth Every Morning.     • Ascorbic Acid (VITAMIN C PO) Take 500 mg by mouth Daily.     • CALCIUM PO Take 600 mg by mouth Daily.     • Calcium Polycarbophil (FIBERCON PO) Take 2 tablets by mouth At Night As Needed.     • Carboxymethylcell-Hypromellose (GENTEAL OP) Apply 1 application to eye As Needed.     • Carboxymethylcellul-Glycerin (REFRESH OPTIVE OP) Apply 1-2 drops to eye 3 (Three) Times a Day As Needed.     • Cholecalciferol (VITAMIN D PO) Take 2,000 mg by mouth every night at bedtime.     • diclofenac (VOLTAREN) 1 % gel gel Apply 2 g topically to the appropriate area as directed As Needed.     • diphenoxylate-atropine (LOMOTIL) 2.5-0.025 MG per tablet Take 1-2 tablets by mouth 4 (Four) Times a Day As Needed.     • escitalopram (LEXAPRO) 10 MG tablet Take 10 mg by mouth Daily.     • folic acid (FOLVITE) 1 MG tablet Take 1 tablet by mouth.     • methotrexate 2.5 MG tablet Take 15 mg by mouth 1 (One) Time Per Week. 7 tablets once a week     • Multiple Vitamins-Minerals (MULTIVITAMIN ADULT PO) Take  by mouth.     • omeprazole (PriLOSEC) 20 MG capsule Take 20 mg by  "mouth Daily. Taking on Mon Wed and Fri     • polyethyl glycol-propyl glycol (SYSTANE) 0.4-0.3 % solution ophthalmic solution Administer 2 drops to both eyes Every 1 (One) Hour As Needed.     • traZODone (DESYREL) 50 MG tablet Take 25-50 mg by mouth Every Night.     • aspirin 81 MG EC tablet Take 81 mg by mouth Daily.       No current facility-administered medications for this visit.          The following portions of the patient's history were reviewed and updated as appropriate: allergies, current medications, past family history, past medical history, past social history, past surgical history and problem list.    Review of Systems:   A 14 point review of systems was performed. All systems negative except pertinent positives/negative listed in HPI above    Physical Exam:   Vitals:    05/23/19 0927   Temp: 98.2 °F (36.8 °C)   TempSrc: Temporal   Weight: 47.6 kg (105 lb)   Height: 154.9 cm (61\")       General: A and O x 3, ASA, NAD    SCLERA:    Normal    DENTITION:   Normal  Knee:  right    ALIGNMENT:     Neutral  ,   Patella  tracks  Midline without crepitance    GAIT:    Nonantalgic    SKIN:    Well healed midline incision, no erythema or fluctuance    RANGE OF MOTION:   0  -  120   DEG    STRENGTH:   5  / 5    LIGAMENTS:    No varus / valgus instability.   No  Flexion   instability.       DISTAL PULSES:    Paplable    DISTAL SENSATION :   Intact    LYMPHATICS:     No   lymphadenopathy    OTHER:     No   Effusion      Calf soft / nontender ,   Negative Bhumi's sign            Radiology:  Xrays 3views right knee (ap,lateral, sunrise) were ordered and reviewed for evaluation of knee pain demonstratinga well positioned knee replacement without evidence of wear, loosening or osteolysis  todays xrays were compared to previous xrays and demonstrate no change    Assessment/Plan:  Right total knee hamstring tightness recommended hamstring stretching.  I think she has some right plantar fasciitis and recommended Achilles " stretching.  I will see her back as needed.

## 2019-07-23 ENCOUNTER — APPOINTMENT (OUTPATIENT)
Dept: MAMMOGRAPHY | Facility: HOSPITAL | Age: 84
End: 2019-07-23

## 2019-07-24 ENCOUNTER — HOSPITAL ENCOUNTER (OUTPATIENT)
Dept: MAMMOGRAPHY | Facility: HOSPITAL | Age: 84
Discharge: HOME | End: 2019-07-24
Admitting: OBSTETRICS & GYNECOLOGY

## 2019-07-24 DIAGNOSIS — Z12.31 SCREENING MAMMOGRAM, ENCOUNTER FOR: ICD-10-CM

## 2019-07-24 PROCEDURE — 77063 BREAST TOMOSYNTHESIS BI: CPT

## 2019-07-24 PROCEDURE — 77067 SCR MAMMO BI INCL CAD: CPT

## 2019-10-08 ENCOUNTER — OFFICE VISIT (OUTPATIENT)
Dept: GASTROENTEROLOGY | Facility: CLINIC | Age: 84
End: 2019-10-08

## 2019-10-08 VITALS
WEIGHT: 108.2 LBS | HEIGHT: 61 IN | SYSTOLIC BLOOD PRESSURE: 142 MMHG | TEMPERATURE: 98.9 F | DIASTOLIC BLOOD PRESSURE: 80 MMHG | BODY MASS INDEX: 20.43 KG/M2

## 2019-10-08 DIAGNOSIS — K59.04 CHRONIC IDIOPATHIC CONSTIPATION: ICD-10-CM

## 2019-10-08 DIAGNOSIS — K21.9 GASTROESOPHAGEAL REFLUX DISEASE WITHOUT ESOPHAGITIS: Primary | ICD-10-CM

## 2019-10-08 DIAGNOSIS — K52.839 MICROSCOPIC COLITIS, UNSPECIFIED MICROSCOPIC COLITIS TYPE: ICD-10-CM

## 2019-10-08 PROCEDURE — 99214 OFFICE O/P EST MOD 30 MIN: CPT | Performed by: NURSE PRACTITIONER

## 2019-10-08 NOTE — PROGRESS NOTES
Chief Complaint   Patient presents with   • Microscopic colitis       Arlin Hutson is a  83 y.o. female here for a follow up visit for constipation.    HPI  83-year-old female presents today for follow-up visit for constipation, GERD and microscopic colitis.  She is a patient of Dr. Shook.  She was last seen in the office on 1/30/2019.  She has a history of chronic constipation and admits she does well most the time with taking daily FiberCon.  She admits she will have a bowel movement every 2 to 3 days with this medication.  She is tried taking more fiber, but this causes diarrhea.  She does have a history of GERD/gastritis and admits she does well with Prilosec 20 mg but she takes it maybe 2-3 times a week because she is worried about its possible side effects.  She denies any breakthrough reflux at this time.  She does have a history of microscopic colitis and admits she has not had any issues for a while now.  She denies any dysphagia, abdominal pain, nausea and vomiting, diarrhea, rectal bleeding or melena.  She admits her appetite is good and her weight is stable.  She does have a history of colon polyps and her last colonoscopy was done on 2/2016.  Her last EGD was done on 12/2018.    Past Medical History:   Diagnosis Date   • Baker's cyst    • Colon polyp    • CREST syndrome (CMS/HCC)    • Fractures    • GERD (gastroesophageal reflux disease)    • History of CT scan of abdomen 03/11/2011    constipation, sig tics, 6 mm hepatic cyst   • History of rheumatoid arthritis    • Hyperlipidemia    • Hypertension    • Osteoarthritis    • Raynaud's disease    • Sjogren's syndrome (CMS/HCC)    • Ulcerative colitis (CMS/HCC)        Past Surgical History:   Procedure Laterality Date   • CATARACT EXTRACTION, BILATERAL     • COLONOSCOPY  02/26/2016    tics, microscopic colitis,    • COLONOSCOPY  07/01/2011    sig tics, inflammation, polyp   • DILATATION AND CURETTAGE  1980   • ENDOSCOPY N/A 12/3/2018    erythematous  mucosa in stomach, path benign   • EYE SURGERY     • FLEXIBLE SIGMOIDOSCOPY     • FRACTURE SURGERY     • HAND SURGERY     • JOINT REPLACEMENT     • KNEE ARTHROSCOPY     • KNEE SURGERY Right    • TOTAL KNEE ARTHROPLASTY Right 5/30/2018    Procedure: TOTAL KNEE ARTHROPLASTY;  Surgeon: Georges Jon MD;  Location: [facility] OR;  Service: Orthopedics   • UPPER GASTROINTESTINAL ENDOSCOPY  03/14/2008    erythema   • WRIST FRACTURE SURGERY     • WRIST SURGERY Right 2008    orif       Scheduled Meds:    Continuous Infusions:  No current facility-administered medications for this visit.     PRN Meds:.    Allergies   Allergen Reactions   • Codeine Diarrhea and Nausea Only   • Sulfa Antibiotics Hives       Social History     Socioeconomic History   • Marital status: Single     Spouse name: Not on file   • Number of children: Not on file   • Years of education: Not on file   • Highest education level: Not on file   Tobacco Use   • Smoking status: Never Smoker   • Smokeless tobacco: Never Used   Substance and Sexual Activity   • Alcohol use: Yes     Types: 1 - 3 Glasses of wine per week     Comment: Infrequent   • Drug use: No   • Sexual activity: No       Family History   Problem Relation Age of Onset   • Ulcerative colitis Mother    • Breast cancer Maternal Aunt    • Malig Hyperthermia Neg Hx        Review of Systems   Constitutional: Negative for appetite change, chills, diaphoresis, fatigue, fever and unexpected weight change.   HENT: Negative for nosebleeds, postnasal drip, sore throat, trouble swallowing and voice change.    Respiratory: Negative for cough, choking, chest tightness, shortness of breath and wheezing.    Cardiovascular: Negative for chest pain, palpitations and leg swelling.   Gastrointestinal: Negative for abdominal distention, abdominal pain, anal bleeding, blood in stool, constipation, diarrhea, nausea, rectal pain and vomiting.   Endocrine: Negative for polydipsia, polyphagia and polyuria.    Musculoskeletal: Negative for gait problem.   Skin: Negative for rash and wound.   Allergic/Immunologic: Negative for food allergies.   Neurological: Negative for dizziness, speech difficulty and light-headedness.   Psychiatric/Behavioral: Negative for confusion, self-injury, sleep disturbance and suicidal ideas.       Vitals:    10/08/19 1053   BP: 142/80   Temp: 98.9 °F (37.2 °C)       Physical Exam   Constitutional: She is oriented to person, place, and time. She appears well-developed and well-nourished. She does not appear ill. No distress.   HENT:   Head: Normocephalic.   Eyes: Pupils are equal, round, and reactive to light.   Cardiovascular: Normal rate, regular rhythm and normal heart sounds.   Pulmonary/Chest: Effort normal and breath sounds normal.   Abdominal: Soft. Bowel sounds are normal. She exhibits no distension and no mass. There is no hepatosplenomegaly. There is no tenderness. There is no rebound and no guarding. No hernia.   Musculoskeletal: Normal range of motion.   Neurological: She is alert and oriented to person, place, and time.   Skin: Skin is warm and dry.   Psychiatric: She has a normal mood and affect. Her speech is normal and behavior is normal. Judgment normal.       No images are attached to the encounter.    Assessment and plan    1. Gastroesophageal reflux disease without esophagitis    2. Microscopic colitis, unspecified microscopic colitis type    3. Chronic idiopathic constipation    GERD seems well controlled at this time on Prilosec 20 mg as needed.  Due to its possible unwanted side effects will recommend the patient change to Pepcid 20 mill grams once daily OTC.  Constipation seems well controlled at this time on FiberCon daily.  Patient to call the office with any issues.  Patient to follow-up with me or Dr. Shook in the next 6 months.

## 2019-10-10 ENCOUNTER — OFFICE VISIT (OUTPATIENT)
Dept: ORTHOPEDIC SURGERY | Facility: CLINIC | Age: 84
End: 2019-10-10

## 2019-10-10 VITALS — HEIGHT: 60 IN | BODY MASS INDEX: 20.81 KG/M2 | TEMPERATURE: 98.3 F | WEIGHT: 106 LBS

## 2019-10-10 DIAGNOSIS — M70.50 PES ANSERINE BURSITIS: Primary | ICD-10-CM

## 2019-10-10 PROCEDURE — 99213 OFFICE O/P EST LOW 20 MIN: CPT | Performed by: ORTHOPAEDIC SURGERY

## 2019-10-10 RX ORDER — TURMERIC 400 MG
CAPSULE ORAL
COMMUNITY
End: 2021-09-09

## 2019-10-10 RX ORDER — ACETAMINOPHEN 160 MG
TABLET,DISINTEGRATING ORAL
COMMUNITY
End: 2020-04-09 | Stop reason: SDUPTHER

## 2019-10-10 RX ORDER — METHOTREXATE 2.5 MG/1
TABLET ORAL
COMMUNITY
Start: 2019-04-19 | End: 2020-04-09 | Stop reason: DRUGHIGH

## 2019-10-10 RX ORDER — PHENOL 1.4 %
AEROSOL, SPRAY (ML) MUCOUS MEMBRANE
COMMUNITY

## 2019-10-10 RX ORDER — MULTIVIT-MIN/IRON/FOLIC ACID/K 18-600-40
CAPSULE ORAL
COMMUNITY
End: 2020-04-09 | Stop reason: SDUPTHER

## 2019-10-10 RX ORDER — OMEPRAZOLE 20 MG/1
CAPSULE, DELAYED RELEASE ORAL
COMMUNITY
End: 2021-07-28

## 2019-10-10 NOTE — PROGRESS NOTES
Patient: Arlin Hutson  YOB: 1935 83 y.o. female  Medical Record Number: [id number]    Chief Complaints:   Chief Complaint   Patient presents with   • Right Knee - Follow-up, Pain       History of Present Illness:Arlin Hutson is a 83 y.o. female who presents for follow-up of right medial knee pain peds anserine bursitis.  She has been working with [employer] [facility] [employer].  She reports at least 50% improvement over the last few weeks.  She still has an aching pain about the medial and posterior aspect of the right knee.    Allergies:   Allergies   Allergen Reactions   • Codeine Diarrhea and Nausea Only   • Sulfa Antibiotics Hives       Medications:   Current Outpatient Medications   Medication Sig Dispense Refill   • Acetaminophen (TYLENOL EXTRA STRENGTH PO) Take 2 tablets by mouth Daily As Needed.     • amLODIPine (NORVASC) 2.5 MG tablet Take 2.5 mg by mouth Every Morning.     • Ascorbic Acid (VITAMIN C PO) Take 500 mg by mouth Daily.     • Ascorbic Acid (VITAMIN C) 500 MG capsule 1  P.O.daily     • calcium carbonate (CALCIUM 600) 600 MG tablet 1 P.O. daily     • CALCIUM EILEEN-MAX PO Take  by mouth.     • CALCIUM PO Take 600 mg by mouth Daily.     • Calcium Polycarbophil (FIBERCON PO) Take 2 tablets by mouth At Night As Needed.     • Carboxymethylcell-Glycerin PF 0.5-0.9 % solution Apply 1-2 drops to eye(s) as directed by provider.     • Carboxymethylcell-Hypromellose (GENTEAL OP) Apply 1 application to eye As Needed.     • Carboxymethylcellul-Glycerin (REFRESH OPTIVE OP) Apply 1-2 drops to eye 3 (Three) Times a Day As Needed.     • Cholecalciferol (VITAMIN D PO) Take 2,000 mg by mouth every night at bedtime.     • Cholecalciferol (VITAMIN D3) 2000 units capsule 1P.O.  daily     • diclofenac (VOLTAREN) 1 % gel gel Apply 2 g topically to the appropriate area as directed As Needed.     • diphenoxylate-atropine (LOMOTIL) 2.5-0.025 MG per tablet Take 1-2 tablets by mouth 4 (Four) Times a Day As Needed.    "  • escitalopram (LEXAPRO) 10 MG tablet Take 10 mg by mouth Daily.     • folic acid (FOLVITE) 1 MG tablet Take 1 tablet by mouth.     • methotrexate 2.5 MG tablet Take 15 mg by mouth 1 (One) Time Per Week. 7 tablets once a week     • methotrexate 2.5 MG tablet Apply  to the mouth or throat.     • Multiple Vitamins-Minerals (CENTRUM SILVER ULTRA WOMENS PO) 1 P.O. daily     • Multiple Vitamins-Minerals (MULTIVITAMIN ADULT PO) Take  by mouth.     • omeprazole (priLOSEC) 20 MG capsule 1 TABLET  P.O.  M W F     • polyethyl glycol-propyl glycol (SYSTANE) 0.4-0.3 % solution ophthalmic solution Administer 2 drops to both eyes Every 1 (One) Hour As Needed.     • traZODone (DESYREL) 50 MG tablet Take 25-50 mg by mouth Every Night.     • Turmeric 400 MG capsule 1 P.O. daily       No current facility-administered medications for this visit.          The following portions of the patient's history were reviewed and updated as appropriate: allergies, current medications, past family history, past medical history, past social history, past surgical history and problem list.    Review of Systems:   A 14 point review of systems was performed. All systems negative except pertinent positives/negative listed in HPI above    Physical Exam:   Vitals:    10/10/19 1352   Temp: 98.3 °F (36.8 °C)   TempSrc: Temporal   Weight: 48.1 kg (106 lb)   Height: 152.4 cm (60\")       General: A and O x 3, ASA, NAD    SCLERA:    Normal    DENTITION:   Normal  Knee:  left    ALIGNMENT:     Neutral  ,   Patella tracks   midline    GAIT:     Nonantalgic    SKIN:    Healed incision    RANGE OF MOTION:   0  -  135   DEG    STRENGTH:   5 / 5    LIGAMENTS:    No varus / valgus instability.   Negative  Lachman.    MENISCUS:     Negative   Kiera       DISTAL PULSES:    Paplable    DISTAL SENSATION :   Intact    LYMPHATICS:     No   lymphadenopathy    OTHER:          - No  effusion      - No crepitance with ROM      +Swelling and tenderness to palpation pes " anserine bursa         Assessment/Plan:  Right knee peds anserine bursitis needs to continue with physical therapy.  I gave her prescription for anti-inflammatory gel to apply twice daily.  If she feels that she plateaus or moves in the wrong direction she will call back and would bring her in for a pass anserine injection.  I will see her back as needed.

## 2020-02-12 ENCOUNTER — HOSPITAL ENCOUNTER (OUTPATIENT)
Dept: GENERAL RADIOLOGY | Facility: HOSPITAL | Age: 85
Discharge: HOME | End: 2020-02-12
Admitting: INTERNAL MEDICINE

## 2020-02-12 DIAGNOSIS — M25.552 PAIN OF LEFT HIP JOINT: ICD-10-CM

## 2020-02-12 PROCEDURE — 73502 X-RAY EXAM HIP UNI 2-3 VIEWS: CPT

## 2020-02-17 ENCOUNTER — OFFICE VISIT (OUTPATIENT)
Dept: OBSTETRICS AND GYNECOLOGY | Age: 85
End: 2020-02-17

## 2020-02-17 ENCOUNTER — PROCEDURE VISIT (OUTPATIENT)
Dept: OBSTETRICS AND GYNECOLOGY | Age: 85
End: 2020-02-17

## 2020-02-17 VITALS
HEIGHT: 60 IN | SYSTOLIC BLOOD PRESSURE: 122 MMHG | BODY MASS INDEX: 20.81 KG/M2 | WEIGHT: 106 LBS | DIASTOLIC BLOOD PRESSURE: 70 MMHG

## 2020-02-17 DIAGNOSIS — Z78.0 POST-MENOPAUSAL: Primary | ICD-10-CM

## 2020-02-17 DIAGNOSIS — Z11.51 SPECIAL SCREENING EXAMINATION FOR HUMAN PAPILLOMAVIRUS (HPV): ICD-10-CM

## 2020-02-17 DIAGNOSIS — Z00.00 ANNUAL PHYSICAL EXAM: ICD-10-CM

## 2020-02-17 DIAGNOSIS — Z12.4 ROUTINE CERVICAL SMEAR: Primary | ICD-10-CM

## 2020-02-17 DIAGNOSIS — M85.89 OSTEOPENIA OF MULTIPLE SITES: ICD-10-CM

## 2020-02-17 PROBLEM — M05.79 RHEUMATOID ARTHRITIS WITH RHEUMATOID FACTOR OF MULTIPLE SITES WITHOUT ORGAN OR SYSTEMS INVOLVEMENT (HCC): Status: ACTIVE | Noted: 2020-02-17

## 2020-02-17 PROCEDURE — G0101 CA SCREEN;PELVIC/BREAST EXAM: HCPCS | Performed by: OBSTETRICS & GYNECOLOGY

## 2020-02-17 PROCEDURE — 77080 DXA BONE DENSITY AXIAL: CPT | Performed by: OBSTETRICS & GYNECOLOGY

## 2020-02-17 NOTE — PROGRESS NOTES
Subjective   Arlin Hutson is a 84 y.o. female is being seen today for   Chief Complaint   Patient presents with   • Gynecologic Exam     pap unknown, mg 2019, dexa 2018, c-scope 2016   .    History of Present Illness  Patient is here for an annual check is been 2 years.  She is having more problems with her arthritis she has rheumatoid arthritis she has had some local injections but tries to keep moving.  She also used to have more diarrhea now she has become more constipated.  So she is taking different things to try to compensate for that.  Otherwise she is doing well nothing new in the family.  No other complaints  The following portions of the patient's history were reviewed and updated as appropriate: allergies, current medications, past family history, past medical history, past social history, past surgical history and problem list.    Vitals:    20 0902   BP: 122/70       PAST MEDICAL HISTORY  Past Medical History:   Diagnosis Date   • Baker's cyst    • Colon polyp    • CREST syndrome (CMS/HCC)    • Fractures    • GERD (gastroesophageal reflux disease)    • History of CT scan of abdomen 2011    constipation, sig tics, 6 mm hepatic cyst   • History of rheumatoid arthritis    • Hyperlipidemia    • Hypertension    • Osteoarthritis    • Raynaud's disease    • Sjogren's syndrome (CMS/HCC)    • Ulcerative colitis (CMS/HCC)      OB History        3    Para   3    Term   3            AB        Living           SAB        TAB        Ectopic        Molar        Multiple        Live Births                  Past Surgical History:   Procedure Laterality Date   • CATARACT EXTRACTION, BILATERAL     • COLONOSCOPY  2016    tics, microscopic colitis,    • COLONOSCOPY  2011    sig tics, inflammation, polyp   • DILATATION AND CURETTAGE     • ENDOSCOPY N/A 12/3/2018    erythematous mucosa in stomach, path benign   • EYE SURGERY     • FLEXIBLE SIGMOIDOSCOPY     • FRACTURE SURGERY     •  HAND SURGERY     • JOINT REPLACEMENT     • KNEE ARTHROSCOPY     • KNEE SURGERY Right    • TOTAL KNEE ARTHROPLASTY Right 5/30/2018    Procedure: TOTAL KNEE ARTHROPLASTY;  Surgeon: Georges Jon MD;  [facility];  Service: Orthopedics   • UPPER GASTROINTESTINAL ENDOSCOPY  03/14/2008    erythema   • WRIST FRACTURE SURGERY     • WRIST SURGERY Right 2008    orif     Family History   Problem Relation Age of Onset   • Ulcerative colitis Mother    • Breast cancer Maternal Aunt    • Malig Hyperthermia Neg Hx      Social History     Tobacco Use   Smoking Status Never Smoker   Smokeless Tobacco Never Used       Current Outpatient Medications:   •  Acetaminophen (TYLENOL EXTRA STRENGTH PO), Take 2 tablets by mouth Daily As Needed., Disp: , Rfl:   •  amLODIPine (NORVASC) 2.5 MG tablet, Take 2.5 mg by mouth Every Morning., Disp: , Rfl:   •  Ascorbic Acid (VITAMIN C) 500 MG capsule, 1  P.O.daily, Disp: , Rfl:   •  calcium carbonate (CALCIUM 600) 600 MG tablet, 1 P.O. daily, Disp: , Rfl:   •  CALCIUM EILEEN-MAX PO, Take  by mouth., Disp: , Rfl:   •  CALCIUM PO, Take 600 mg by mouth Daily., Disp: , Rfl:   •  Calcium Polycarbophil (FIBERCON PO), Take 2 tablets by mouth At Night As Needed., Disp: , Rfl:   •  Cholecalciferol (VITAMIN D PO), Take 2,000 mg by mouth every night at bedtime., Disp: , Rfl:   •  Cholecalciferol (VITAMIN D3) 2000 units capsule, 1P.O.  daily, Disp: , Rfl:   •  escitalopram (LEXAPRO) 10 MG tablet, Take 10 mg by mouth Daily., Disp: , Rfl:   •  folic acid (FOLVITE) 1 MG tablet, Take 1 tablet by mouth., Disp: , Rfl:   •  methotrexate 2.5 MG tablet, Take 15 mg by mouth 1 (One) Time Per Week. 7 tablets once a week, Disp: , Rfl:   •  Multiple Vitamins-Minerals (CENTRUM SILVER ULTRA WOMENS PO), 1 P.O. daily, Disp: , Rfl:   •  Multiple Vitamins-Minerals (MULTIVITAMIN ADULT PO), Take  by mouth., Disp: , Rfl:   •  omeprazole (priLOSEC) 20 MG capsule, 1 TABLET  P.O.  M W F, Disp: , Rfl:   •  traZODone (DESYREL)  50 MG tablet, Take 25-50 mg by mouth Every Night., Disp: , Rfl:   •  Turmeric 400 MG capsule, 1 P.O. daily, Disp: , Rfl:   •  Ascorbic Acid (VITAMIN C PO), Take 500 mg by mouth Daily., Disp: , Rfl:   •  Carboxymethylcell-Glycerin PF 0.5-0.9 % solution, Apply 1-2 drops to eye(s) as directed by provider., Disp: , Rfl:   •  Carboxymethylcell-Hypromellose (GENTEAL OP), Apply 1 application to eye As Needed., Disp: , Rfl:   •  Carboxymethylcellul-Glycerin (REFRESH OPTIVE OP), Apply 1-2 drops to eye 3 (Three) Times a Day As Needed., Disp: , Rfl:   •  diclofenac (VOLTAREN) 1 % gel gel, Apply 2 g topically to the appropriate area as directed As Needed., Disp: , Rfl:   •  diphenoxylate-atropine (LOMOTIL) 2.5-0.025 MG per tablet, Take 1-2 tablets by mouth 4 (Four) Times a Day As Needed., Disp: , Rfl:   •  methotrexate 2.5 MG tablet, Apply  to the mouth or throat., Disp: , Rfl:   •  polyethyl glycol-propyl glycol (SYSTANE) 0.4-0.3 % solution ophthalmic solution, Administer 2 drops to both eyes Every 1 (One) Hour As Needed., Disp: , Rfl:     There is no immunization history on file for this patient.    Review of Systems   Constitutional: Negative for chills, fatigue, fever and unexpected weight change.   Respiratory: Negative for shortness of breath and wheezing.    Cardiovascular: Negative for chest pain.   Gastrointestinal: Negative for abdominal distention, abdominal pain, blood in stool, constipation, diarrhea and nausea.   Genitourinary: Negative for difficulty urinating, dyspareunia, dysuria, frequency, hematuria, menstrual problem, pelvic pain, urgency and vaginal discharge.   Musculoskeletal: Positive for arthralgias.   Skin: Negative for rash.       Objective   Physical Exam   Constitutional: She is oriented to person, place, and time. Vital signs are normal. She appears well-developed and well-nourished.   Neck: No thyromegaly present.   Cardiovascular: Normal rate, regular rhythm and normal heart sounds.    Pulmonary/Chest: Effort normal. Right breast exhibits no inverted nipple, no mass, no nipple discharge, no skin change and no tenderness. Left breast exhibits no inverted nipple, no mass, no nipple discharge, no skin change and no tenderness. No breast swelling, tenderness, discharge or bleeding. Breasts are symmetrical.   Abdominal: Soft.   Genitourinary: Vagina normal and uterus normal. No breast swelling, tenderness, discharge or bleeding. Pelvic exam was performed with patient supine. No labial fusion. There is no rash, tenderness, lesion or injury on the right labia. There is no rash, tenderness, lesion or injury on the left labia. Cervix exhibits no motion tenderness, no discharge and no friability. Right adnexum displays no mass, no tenderness and no fullness. Left adnexum displays no mass, no tenderness and no fullness.   Neurological: She is alert and oriented to person, place, and time.   Psychiatric: She has a normal mood and affect.   Vitals reviewed.        Assessment/Plan   Arlin was seen today for gynecologic exam.    Diagnoses and all orders for this visit:    Routine cervical smear  -     IGP, Aptima HPV, Rfx 16 / 18,45    Special screening examination for human papillomavirus (HPV)  -     IGP, Aptima HPV, Rfx 16 / 18,45    Annual physical exam    Osteopenia of multiple sites        Normal exam today.  Pap smear done today.  Mammogram was July of each year is up-to-date.  We did a bone density today still has osteopenia spine and hip spinal worse hip really about the same.  She has been on Fosamax we will hold it for now.  Colonoscopy probably has pretty much had her last one.  Again we talked by staying active she is good with that back in a year or 2

## 2020-02-20 LAB
CYTOLOGIST CVX/VAG CYTO: NORMAL
CYTOLOGY CVX/VAG DOC CYTO: NORMAL
CYTOLOGY CVX/VAG DOC THIN PREP: NORMAL
DX ICD CODE: NORMAL
HIV 1 & 2 AB SER-IMP: NORMAL
HPV I/H RISK 4 DNA CVX QL PROBE+SIG AMP: NEGATIVE
Lab: NORMAL
OTHER STN SPEC: NORMAL
STAT OF ADQ CVX/VAG CYTO-IMP: NORMAL

## 2020-02-21 ENCOUNTER — TELEPHONE (OUTPATIENT)
Dept: OBSTETRICS AND GYNECOLOGY | Age: 85
End: 2020-02-21

## 2020-02-21 NOTE — TELEPHONE ENCOUNTER
----- Message from Brando Skinner MD sent at 2/20/2020  4:48 PM EST -----  Tell patient Pap negative

## 2020-04-09 ENCOUNTER — TELEPHONE (OUTPATIENT)
Dept: GASTROENTEROLOGY | Facility: CLINIC | Age: 85
End: 2020-04-09

## 2020-04-09 ENCOUNTER — TELEMEDICINE (OUTPATIENT)
Dept: GASTROENTEROLOGY | Facility: CLINIC | Age: 85
End: 2020-04-09

## 2020-04-09 VITALS — WEIGHT: 106 LBS | BODY MASS INDEX: 20.81 KG/M2 | HEIGHT: 60 IN

## 2020-04-09 DIAGNOSIS — K59.00 CONSTIPATION, UNSPECIFIED CONSTIPATION TYPE: Primary | ICD-10-CM

## 2020-04-09 DIAGNOSIS — R14.0 GASSINESS: ICD-10-CM

## 2020-04-09 DIAGNOSIS — K21.9 GASTROESOPHAGEAL REFLUX DISEASE, ESOPHAGITIS PRESENCE NOT SPECIFIED: ICD-10-CM

## 2020-04-09 DIAGNOSIS — K63.5 POLYP OF COLON, UNSPECIFIED PART OF COLON, UNSPECIFIED TYPE: ICD-10-CM

## 2020-04-09 PROCEDURE — 99213 OFFICE O/P EST LOW 20 MIN: CPT | Performed by: INTERNAL MEDICINE

## 2020-04-09 RX ORDER — VALACYCLOVIR HYDROCHLORIDE 1 G/1
TABLET, FILM COATED ORAL 3 TIMES DAILY
COMMUNITY
Start: 2020-04-06 | End: 2020-06-30

## 2020-04-09 NOTE — PROGRESS NOTES
Chief Complaint   Patient presents with   • Follow-up   • Constipation       History of Present Illness:   84 y.o. female  presents today for follow-up visit for constipation, GERD and microscopic colitis.         She is battling constipation despite taking fibercon 2 daily. No rectal bleeding or melena. Rare diarrhea. Last colonoscopy 2-4 yrs ago. No heartburn while on Omeprazole 40 mg TIW. She has bloating and gas. Weight stable. No abdominal or chest pain. She had labs from Dr. Rust in 2/20 and they were normal. No nausea or vomiting.        We spent 18 minutes on the telephone. She could not get the Video to work for us to do a Video Visit.     Past Medical History:   Diagnosis Date   • Baker's cyst    • Colon polyp    • CREST syndrome (CMS/HCC)    • Fractures    • GERD (gastroesophageal reflux disease)    • History of CT scan of abdomen 03/11/2011    constipation, sig tics, 6 mm hepatic cyst   • History of rheumatoid arthritis    • Hyperlipidemia    • Hypertension    • Osteoarthritis    • Raynaud's disease    • Sjogren's syndrome (CMS/HCC)    • Ulcerative colitis (CMS/HCC)        Past Surgical History:   Procedure Laterality Date   • CATARACT EXTRACTION, BILATERAL     • COLONOSCOPY  02/26/2016    tics, microscopic colitis,    • COLONOSCOPY  07/01/2011    sig tics, inflammation, polyp   • DILATATION AND CURETTAGE  1980   • ENDOSCOPY N/A 12/3/2018    erythematous mucosa in stomach, path benign   • EYE SURGERY     • FLEXIBLE SIGMOIDOSCOPY     • FRACTURE SURGERY     • HAND SURGERY     • JOINT REPLACEMENT     • KNEE ARTHROSCOPY     • KNEE SURGERY Right    • TOTAL KNEE ARTHROPLASTY Right 5/30/2018    Procedure: TOTAL KNEE ARTHROPLASTY;  Surgeon: Georges Jon MD;  Location: [facility];  Service: Orthopedics   • UPPER GASTROINTESTINAL ENDOSCOPY  03/14/2008    erythema   • WRIST FRACTURE SURGERY     • WRIST SURGERY Right 2008    orif         Current Outpatient Medications:   •  Acetaminophen (TYLENOL ARTHRITIS  PAIN PO), Take 2 tablets by mouth Daily., Disp: , Rfl:   •  amLODIPine (NORVASC) 2.5 MG tablet, Take 2.5 mg by mouth Every Morning., Disp: , Rfl:   •  Ascorbic Acid (VITAMIN C PO), Take 500 mg by mouth Daily., Disp: , Rfl:   •  calcium carbonate (CALCIUM 600) 600 MG tablet, 1 P.O. daily, Disp: , Rfl:   •  Calcium Polycarbophil (FIBERCON PO), Take 2 tablets by mouth At Night As Needed., Disp: , Rfl:   •  Carboxymethylcell-Glycerin PF 0.5-0.9 % solution, Apply 1-2 drops to eye(s) as directed by provider., Disp: , Rfl:   •  Carboxymethylcell-Hypromellose (GENTEAL OP), Apply 1 application to eye As Needed., Disp: , Rfl:   •  Carboxymethylcellul-Glycerin (REFRESH OPTIVE OP), Apply 1-2 drops to eye 3 (Three) Times a Day As Needed., Disp: , Rfl:   •  Cholecalciferol (VITAMIN D PO), Take 2,000 mg by mouth every night at bedtime., Disp: , Rfl:   •  diclofenac (VOLTAREN) 1 % gel gel, Apply 2 g topically to the appropriate area as directed As Needed., Disp: , Rfl:   •  diphenoxylate-atropine (LOMOTIL) 2.5-0.025 MG per tablet, Take 1-2 tablets by mouth 4 (Four) Times a Day As Needed., Disp: , Rfl:   •  escitalopram (LEXAPRO) 10 MG tablet, Take 10 mg by mouth Daily., Disp: , Rfl:   •  folic acid (FOLVITE) 1 MG tablet, Take 1 tablet by mouth., Disp: , Rfl:   •  methotrexate 2.5 MG tablet, Take 17.5 mg by mouth 1 (One) Time Per Week. 7 tablets once a week, Disp: , Rfl:   •  Multiple Vitamins-Minerals (CENTRUM SILVER ULTRA WOMENS PO), 1 P.O. daily, Disp: , Rfl:   •  omeprazole (priLOSEC) 20 MG capsule, 1 TABLET  P.O.  M W F, Disp: , Rfl:   •  polyethyl glycol-propyl glycol (SYSTANE) 0.4-0.3 % solution ophthalmic solution, Administer 2 drops to both eyes Every 1 (One) Hour As Needed., Disp: , Rfl:   •  traZODone (DESYREL) 50 MG tablet, Take 25-50 mg by mouth Every Night., Disp: , Rfl:   •  Turmeric 400 MG capsule, 1 P.O. daily, Disp: , Rfl:   •  valACYclovir (VALTREX) 1000 MG tablet, 3 (Three) Times a Day., Disp: , Rfl:   •   Acetaminophen (TYLENOL EXTRA STRENGTH PO), Take 2 tablets by mouth Daily As Needed., Disp: , Rfl:   •  CALCIUM EILEEN-MAX PO, Take  by mouth., Disp: , Rfl:   •  CALCIUM PO, Take 600 mg by mouth Daily., Disp: , Rfl:     Allergies   Allergen Reactions   • Codeine Diarrhea and Nausea Only   • Sulfa Antibiotics Hives       Family History   Problem Relation Age of Onset   • Ulcerative colitis Mother    • Breast cancer Maternal Aunt    • Malig Hyperthermia Neg Hx        Social History     Socioeconomic History   • Marital status: Single     Spouse name: Not on file   • Number of children: Not on file   • Years of education: Not on file   • Highest education level: Not on file   Tobacco Use   • Smoking status: Never Smoker   • Smokeless tobacco: Never Used   Substance and Sexual Activity   • Alcohol use: Yes     Types: 1 - 3 Glasses of wine per week     Comment: Infrequent   • Drug use: No   • Sexual activity: Never       Review of Systems   Gastrointestinal: Positive for constipation.     Pertinent positives and negatives documented in the HPI and all other systems reviewed and were found to be negative.  There were no vitals filed for this visit.    Physical Exam   Constitutional: She is oriented to person, place, and time.   Strong voice, A and O x 3.   Neurological: She is alert and oriented to person, place, and time.   Psychiatric: She has a normal mood and affect. Her behavior is normal. Judgment and thought content normal.       Arlin was seen today for follow-up and constipation.    Diagnoses and all orders for this visit:    Constipation, unspecified constipation type    Gastroesophageal reflux disease, esophagitis presence not specified    Polyp of colon, unspecified part of colon, unspecified type    Gassiness      Assessment:  1.  Gastroesophageal reflux disease.  2.  History of microscopic colitis.  3.  History of colon polyps.  4. Constipation.  5. Gassiness. She is in an ovarian cancer study at  for years.  She sees them yearly.   6. RA on MTX.    Recommendations:  1. Get labs done recently by Dr. Rust.  2. Take Miralax daily  3. We discussed possibly doing a colonoscopy on this 85 yo female. She prefers not.  4. F/u 3 mos.    Return in about 3 months (around 7/9/2020).    Edilberto Shook MD  4/9/2020

## 2020-04-09 NOTE — TELEPHONE ENCOUNTER
----- Message from Edilberto Shook MD sent at 4/9/2020  8:19 AM EDT -----  Please get the recent labs done by the office of Dr. Rust.

## 2020-04-10 NOTE — TELEPHONE ENCOUNTER
Tell her that I did look at the lab results from Dr. Rust.  There were a couple of tests that I would like to check that were not done.  I would like to have a CBC and a thyroid-stimulating hormone done because of her constipation.  If possible have her go by 1 of the [facility] draw stations (such as the one that is close to [employer] [facility] at [address] [facility]) to have a CBC and a thyroid-stimulating hormone blood test done.  If she is okay with having that done please order that to be done by [facility] and I will cosign it. Thx. kjh

## 2020-04-13 NOTE — TELEPHONE ENCOUNTER
Call to pt.  Advise per Dr Shook note.  Verb understanding - agreeable to go to [facility].     Order placed for CBC and TSH - message to Dr Shook.

## 2020-04-15 ENCOUNTER — TELEPHONE (OUTPATIENT)
Dept: GASTROENTEROLOGY | Facility: CLINIC | Age: 85
End: 2020-04-15

## 2020-04-15 LAB
BASOPHILS # BLD AUTO: 0.1 X10E3/UL (ref 0–0.2)
BASOPHILS NFR BLD AUTO: 1 %
EOSINOPHIL # BLD AUTO: 0.3 X10E3/UL (ref 0–0.4)
EOSINOPHIL NFR BLD AUTO: 5 %
ERYTHROCYTE [DISTWIDTH] IN BLOOD BY AUTOMATED COUNT: 14.3 % (ref 11.7–15.4)
HCT VFR BLD AUTO: 36.9 % (ref 34–46.6)
HGB BLD-MCNC: 12 G/DL (ref 11.1–15.9)
IMM GRANULOCYTES # BLD AUTO: 0 X10E3/UL (ref 0–0.1)
IMM GRANULOCYTES NFR BLD AUTO: 0 %
LYMPHOCYTES # BLD AUTO: 1.1 X10E3/UL (ref 0.7–3.1)
LYMPHOCYTES NFR BLD AUTO: 19 %
MCH RBC QN AUTO: 31.1 PG (ref 26.6–33)
MCHC RBC AUTO-ENTMCNC: 32.5 G/DL (ref 31.5–35.7)
MCV RBC AUTO: 96 FL (ref 79–97)
MONOCYTES # BLD AUTO: 0.7 X10E3/UL (ref 0.1–0.9)
MONOCYTES NFR BLD AUTO: 11 %
NEUTROPHILS # BLD AUTO: 3.8 X10E3/UL (ref 1.4–7)
NEUTROPHILS NFR BLD AUTO: 64 %
PLATELET # BLD AUTO: 255 X10E3/UL (ref 150–450)
RBC # BLD AUTO: 3.86 X10E6/UL (ref 3.77–5.28)
TSH SERPL DL<=0.005 MIU/L-ACNC: 4.25 UIU/ML (ref 0.45–4.5)
WBC # BLD AUTO: 6 X10E3/UL (ref 3.4–10.8)

## 2020-04-15 NOTE — TELEPHONE ENCOUNTER
04/15/20  Tell her that the labs that she had drawn on 4/14/2020 came back normal.  Her CBC showed that her hemoglobin was normal at 12.  Her thyroid-stimulating hormone was also normal.  Please fax a copy of this report to her PCP.  Dolly osman

## 2020-04-15 NOTE — TELEPHONE ENCOUNTER
----- Message from Edilberto Shook MD sent at 4/15/2020  9:19 AM EDT -----  04/15/20  Tell her that the labs that she had drawn on 4/14/2020 came back normal.  Her CBC showed that her hemoglobin was normal at 12.  Her thyroid-stimulating hormone was also normal.  Please fax a copy of this report to her PCP.  Dolly kjlemuel

## 2020-04-15 NOTE — TELEPHONE ENCOUNTER
Called pt and advised of Dr Shook's note. Pt verb understanding .     Copy of results sent to Dr Rust thru [facility].

## 2020-06-08 ENCOUNTER — TRANSCRIBE ORDERS (OUTPATIENT)
Dept: ADMINISTRATIVE | Facility: HOSPITAL | Age: 85
End: 2020-06-08

## 2020-06-08 DIAGNOSIS — Z12.39 SCREENING BREAST EXAMINATION: Primary | ICD-10-CM

## 2020-06-30 ENCOUNTER — OFFICE VISIT (OUTPATIENT)
Dept: ORTHOPEDIC SURGERY | Facility: CLINIC | Age: 85
End: 2020-06-30

## 2020-06-30 VITALS — BODY MASS INDEX: 20.83 KG/M2 | TEMPERATURE: 98.7 F | HEIGHT: 60 IN | WEIGHT: 106.1 LBS

## 2020-06-30 DIAGNOSIS — M70.51 PES ANSERINUS BURSITIS OF RIGHT KNEE: ICD-10-CM

## 2020-06-30 DIAGNOSIS — Z96.651 S/P TOTAL KNEE ARTHROPLASTY, RIGHT: Primary | ICD-10-CM

## 2020-06-30 PROCEDURE — 99213 OFFICE O/P EST LOW 20 MIN: CPT | Performed by: NURSE PRACTITIONER

## 2020-06-30 PROCEDURE — 20610 DRAIN/INJ JOINT/BURSA W/O US: CPT | Performed by: NURSE PRACTITIONER

## 2020-06-30 PROCEDURE — 73562 X-RAY EXAM OF KNEE 3: CPT | Performed by: NURSE PRACTITIONER

## 2020-06-30 RX ORDER — METHOTREXATE 25 MG/ML
INJECTION INTRA-ARTERIAL; INTRAMUSCULAR; INTRATHECAL; INTRAVENOUS ONCE
COMMUNITY

## 2020-06-30 RX ORDER — METHYLPREDNISOLONE ACETATE 40 MG/ML
40 INJECTION, SUSPENSION INTRA-ARTICULAR; INTRALESIONAL; INTRAMUSCULAR; SOFT TISSUE
Status: COMPLETED | OUTPATIENT
Start: 2020-06-30 | End: 2020-06-30

## 2020-06-30 RX ADMIN — METHYLPREDNISOLONE ACETATE 40 MG: 40 INJECTION, SUSPENSION INTRA-ARTICULAR; INTRALESIONAL; INTRAMUSCULAR; SOFT TISSUE at 15:31

## 2020-06-30 NOTE — PROGRESS NOTES
Patient: Arlin Hutson  YOB: 1935 84 y.o. female  Medical Record Number: [id number]    Chief Complaints:   Chief Complaint   Patient presents with   • Right Knee - Follow-up, Pain       History of Present Illness:Arlin Hutson is a 84 y.o. female who presents with complaints of right anterior knee pain.  She is now a year out from right total knee replacement overall is been doing okay, has had some anterior knee soreness, has tried Voltaren gel with minimal improvement.  Describes it as a mild to moderate intermittent ache worse at night when she goes to bed.  Slightly better with the Voltaren gel    Allergies:   Allergies   Allergen Reactions   • Codeine Diarrhea and Nausea Only   • Sulfa Antibiotics Hives       Medications:   Current Outpatient Medications   Medication Sig Dispense Refill   • Acetaminophen (TYLENOL ARTHRITIS PAIN PO) Take 2 tablets by mouth Daily.     • Acetaminophen (TYLENOL EXTRA STRENGTH PO) Take 2 tablets by mouth Daily As Needed.     • amLODIPine (NORVASC) 2.5 MG tablet Take 2.5 mg by mouth Every Morning.     • Ascorbic Acid (VITAMIN C PO) Take 500 mg by mouth Daily.     • calcium carbonate (CALCIUM 600) 600 MG tablet 1 P.O. daily     • CALCIUM EILEEN-MAX PO Take  by mouth.     • CALCIUM PO Take 600 mg by mouth Daily.     • Calcium Polycarbophil (FIBERCON PO) Take 2 tablets by mouth At Night As Needed.     • Carboxymethylcell-Glycerin PF 0.5-0.9 % solution Apply 1-2 drops to eye(s) as directed by provider.     • Carboxymethylcell-Hypromellose (GENTEAL OP) Apply 1 application to eye As Needed.     • Cholecalciferol (VITAMIN D PO) Take 2,000 mg by mouth every night at bedtime.     • diclofenac (VOLTAREN) 1 % gel gel Apply 2 g topically to the appropriate area as directed As Needed.     • escitalopram (LEXAPRO) 10 MG tablet Take 10 mg by mouth Daily.     • folic acid (FOLVITE) 1 MG tablet Take 1 tablet by mouth.     • Methotrexate Sodium (METHOTREXATE PF) 50 MG/2ML chemo  "syringe Infuse  into a venous catheter 1 (One) Time.     • Multiple Vitamins-Minerals (CENTRUM SILVER ULTRA WOMENS PO) 1 P.O. daily     • omeprazole (priLOSEC) 20 MG capsule 1 TABLET  P.O.  M W F     • polyethyl glycol-propyl glycol (SYSTANE) 0.4-0.3 % solution ophthalmic solution Administer 2 drops to both eyes Every 1 (One) Hour As Needed.     • traZODone (DESYREL) 50 MG tablet Take 25-50 mg by mouth Every Night.     • Turmeric 400 MG capsule 1 P.O. daily       No current facility-administered medications for this visit.          The following portions of the patient's history were reviewed and updated as appropriate: allergies, current medications, past family history, past medical history, past social history, past surgical history and problem list.    Review of Systems:   A 14 point review of systems was performed. All systems negative except pertinent positives/negative listed in HPI above    Physical Exam:   Vitals:    06/30/20 1510   Temp: 98.7 °F (37.1 °C)   Weight: 48.1 kg (106 lb 1.6 oz)   Height: 152.4 cm (60\")   PainSc:   4       General: A and O x 3, ASA, NAD    SCLERA:    Normal    DENTITION:   Normal  Skin clear no unusual lesions noted  Right knee patient is tender right knee Pez anserine bursa she otherwise has excellent range of motion the knee itself with no instability calf is soft and nontender    Radiology:  Xrays 3views (ap,lateral, sunrise) right knee were ordered and reviewed today secondary to pain show well-placed well-positioned right total knee replacement.  Compared to views are unchanged    Assessment/Plan:  Right knee Pez anserine bursitis    Patient discussed treatment options.  She would like to proceed with injection, prior to injection risks were discussed including pain infection.  Patient verbalized understanding would like to proceed she will continue with Voltaren gel and we will see her back as needed    Large Joint Arthrocentesis: R knee  Date/Time: 6/30/2020 3:31 " PM  Consent given by: patient  Site marked: site marked  Timeout: Immediately prior to procedure a time out was called to verify the correct patient, procedure, equipment, support staff and site/side marked as required   Supporting Documentation  Indications: pain   Procedure Details  Location: knee - R knee (pes anserine bursitis )  Preparation: Patient was prepped and draped in the usual sterile fashion  Needle gauge: 21g.  Approach: lateral  Medications administered: 40 mg methylPREDNISolone acetate 40 MG/ML  Patient tolerance: patient tolerated the procedure well with no immediate complications

## 2020-07-27 ENCOUNTER — OFFICE VISIT (OUTPATIENT)
Dept: GASTROENTEROLOGY | Facility: CLINIC | Age: 85
End: 2020-07-27

## 2020-07-27 VITALS — WEIGHT: 106 LBS | BODY MASS INDEX: 20.81 KG/M2 | TEMPERATURE: 98.7 F | HEIGHT: 60 IN

## 2020-07-27 DIAGNOSIS — K63.5 POLYP OF COLON, UNSPECIFIED PART OF COLON, UNSPECIFIED TYPE: ICD-10-CM

## 2020-07-27 DIAGNOSIS — R14.0 GASSINESS: ICD-10-CM

## 2020-07-27 DIAGNOSIS — K59.00 CONSTIPATION, UNSPECIFIED CONSTIPATION TYPE: Primary | ICD-10-CM

## 2020-07-27 DIAGNOSIS — K21.9 GASTROESOPHAGEAL REFLUX DISEASE, ESOPHAGITIS PRESENCE NOT SPECIFIED: ICD-10-CM

## 2020-07-27 DIAGNOSIS — K52.838 OTHER MICROSCOPIC COLITIS: ICD-10-CM

## 2020-07-27 PROCEDURE — 99213 OFFICE O/P EST LOW 20 MIN: CPT | Performed by: INTERNAL MEDICINE

## 2020-07-27 NOTE — PROGRESS NOTES
Chief Complaint   Patient presents with   • Follow-up   • Change in bowels       History of Present Illness:   84 y.o. female        I last saw her in 4 of 2020 when we had a telemedicine visit.  My assessment and plan was as follows:  Assessment:  1.  Gastroesophageal reflux disease.  2.  History of microscopic colitis.  3.  History of colon polyps.  4. Constipation.  5. Gassiness. She is in an ovarian cancer study at  for years. She sees them yearly.   6. RA on MTX.     Recommendations:  1. Get labs done recently by Dr. Rust.  2. Take Miralax daily  3. We discussed possibly doing a colonoscopy on this 83 yo female. She prefers not.  4. F/u 3 mos.           Can't take Miralax daily because it causes diarrhea. She has rectal pressure. She is constipated. Last colonoscopy 3-4 yrs ago. No rectal bleeding or melena. No nausea or vomiting. Lots of flatus. No abdominal or chest pain. Weight stable.     Past Medical History:   Diagnosis Date   • Baker's cyst    • Colon polyp    • CREST syndrome (CMS/HCC)    • Fractures    • GERD (gastroesophageal reflux disease)    • History of CT scan of abdomen 03/11/2011    constipation, sig tics, 6 mm hepatic cyst   • History of rheumatoid arthritis    • Hyperlipidemia    • Hypertension    • Osteoarthritis    • Raynaud's disease    • Sjogren's syndrome (CMS/HCC)    • Ulcerative colitis (CMS/HCC)        Past Surgical History:   Procedure Laterality Date   • CATARACT EXTRACTION, BILATERAL     • COLONOSCOPY  02/26/2016    tics, microscopic colitis,    • COLONOSCOPY  07/01/2011    sig tics, inflammation, polyp   • DILATATION AND CURETTAGE  1980   • ENDOSCOPY N/A 12/3/2018    erythematous mucosa in stomach, path benign   • EYE SURGERY     • FLEXIBLE SIGMOIDOSCOPY     • FRACTURE SURGERY     • HAND SURGERY     • JOINT REPLACEMENT     • KNEE ARTHROSCOPY     • KNEE SURGERY Right    • TOTAL KNEE ARTHROPLASTY Right 5/30/2018    Procedure: TOTAL KNEE ARTHROPLASTY;  Surgeon: Georges Jon  MD;  Location: [facility];  Service: Orthopedics   • UPPER GASTROINTESTINAL ENDOSCOPY  03/14/2008    erythema   • WRIST FRACTURE SURGERY     • WRIST SURGERY Right 2008    orif         Current Outpatient Medications:   •  Acetaminophen (TYLENOL EXTRA STRENGTH PO), Take 2 tablets by mouth Daily As Needed., Disp: , Rfl:   •  amLODIPine (NORVASC) 2.5 MG tablet, Take 2.5 mg by mouth Every Morning., Disp: , Rfl:   •  Ascorbic Acid (VITAMIN C PO), Take 500 mg by mouth Daily., Disp: , Rfl:   •  calcium carbonate (CALCIUM 600) 600 MG tablet, 1 P.O. daily, Disp: , Rfl:   •  CALCIUM PO, Take 600 mg by mouth Daily., Disp: , Rfl:   •  Carboxymethylcell-Hypromellose (GENTEAL OP), Apply 1 application to eye As Needed., Disp: , Rfl:   •  Cholecalciferol (VITAMIN D PO), Take 2,000 mg by mouth every night at bedtime., Disp: , Rfl:   •  diclofenac (VOLTAREN) 1 % gel gel, Apply 2 g topically to the appropriate area as directed As Needed., Disp: , Rfl:   •  folic acid (FOLVITE) 1 MG tablet, Take 1 tablet by mouth., Disp: , Rfl:   •  Methotrexate Sodium (METHOTREXATE PF) 50 MG/2ML chemo syringe, Infuse  into a venous catheter 1 (One) Time., Disp: , Rfl:   •  Multiple Vitamins-Minerals (CENTRUM SILVER ULTRA WOMENS PO), 1 P.O. daily, Disp: , Rfl:   •  omeprazole (priLOSEC) 20 MG capsule, 1 TABLET  P.O.  M W F, Disp: , Rfl:   •  polyethyl glycol-propyl glycol (SYSTANE) 0.4-0.3 % solution ophthalmic solution, Administer 2 drops to both eyes Every 1 (One) Hour As Needed., Disp: , Rfl:   •  Polyethylene Glycol 3350 (MIRALAX PO), Take  by mouth As Needed., Disp: , Rfl:   •  traZODone (DESYREL) 50 MG tablet, Take 25-50 mg by mouth Every Night., Disp: , Rfl:   •  Turmeric 400 MG capsule, 1 P.O. daily, Disp: , Rfl:   •  Acetaminophen (TYLENOL ARTHRITIS PAIN PO), Take 2 tablets by mouth Daily., Disp: , Rfl:   •  CALCIUM EILEEN-MAX PO, Take  by mouth., Disp: , Rfl:   •  Calcium Polycarbophil (FIBERCON PO), Take 2 tablets by mouth At Night As  Needed., Disp: , Rfl:   •  Carboxymethylcell-Glycerin PF 0.5-0.9 % solution, Apply 1-2 drops to eye(s) as directed by provider., Disp: , Rfl:   •  escitalopram (LEXAPRO) 10 MG tablet, Take 10 mg by mouth Daily., Disp: , Rfl:     Allergies   Allergen Reactions   • Codeine Diarrhea and Nausea Only   • Sulfa Antibiotics Hives       Family History   Problem Relation Age of Onset   • Ulcerative colitis Mother    • Breast cancer Maternal Aunt    • Malig Hyperthermia Neg Hx        Social History     Socioeconomic History   • Marital status: Single     Spouse name: Not on file   • Number of children: Not on file   • Years of education: Not on file   • Highest education level: Not on file   Tobacco Use   • Smoking status: Never Smoker   • Smokeless tobacco: Never Used   Substance and Sexual Activity   • Alcohol use: Yes     Types: 1 - 3 Glasses of wine per week     Comment: Infrequent   • Drug use: No   • Sexual activity: Never       Review of Systems   Gastrointestinal: Negative for abdominal pain.     Pertinent positives and negatives documented in the HPI and all other systems reviewed and were found to be negative.  Vitals:    07/27/20 0809   Temp: 98.7 °F (37.1 °C)       Physical Exam   Constitutional: She is oriented to person, place, and time. She appears well-developed and well-nourished. No distress.   HENT:   Head: Normocephalic and atraumatic. Hair is normal.   Right Ear: Hearing, tympanic membrane, external ear and ear canal normal. No drainage. No decreased hearing is noted.   Left Ear: Hearing, tympanic membrane, external ear and ear canal normal. No decreased hearing is noted.   Nose: No nasal deformity.   Mouth/Throat: Oropharynx is clear and moist.   Eyes: Pupils are equal, round, and reactive to light. Conjunctivae, EOM and lids are normal. Right eye exhibits no discharge. Left eye exhibits no discharge.   Neck: Normal range of motion. Neck supple. No JVD present. No tracheal deviation present. No  thyromegaly present.   Cardiovascular: Normal rate, regular rhythm, normal heart sounds, intact distal pulses and normal pulses. Exam reveals no gallop and no friction rub.   No murmur heard.  Pulmonary/Chest: Effort normal and breath sounds normal. No respiratory distress. She has no wheezes. She has no rales. She exhibits no tenderness.   Abdominal: Soft. Bowel sounds are normal. She exhibits no distension and no mass. There is no tenderness. There is no rebound and no guarding. No hernia.   Genitourinary:   Genitourinary Comments: She doesn't want a rectal exam.   Musculoskeletal: Normal range of motion. She exhibits no edema, tenderness or deformity.   Lymphadenopathy:     She has no cervical adenopathy.   Neurological: She is alert and oriented to person, place, and time. She has normal reflexes. She displays normal reflexes. No cranial nerve deficit. She exhibits normal muscle tone. Coordination normal.   Skin: Skin is warm and dry. No rash noted. She is not diaphoretic. No erythema.   Psychiatric: She has a normal mood and affect. Her behavior is normal. Judgment and thought content normal.   Vitals reviewed.      Arlin was seen today for follow-up and change in bowels.    Diagnoses and all orders for this visit:    Constipation, unspecified constipation type    Gastroesophageal reflux disease, esophagitis presence not specified    Polyp of colon, unspecified part of colon, unspecified type    Gassiness    Other microscopic colitis      Assessment:  1.  Gastroesophageal reflux disease.  2.  History of microscopic colitis.  3.  History of colon polyps.  4. Constipation.  5. Gassiness. She is in an ovarian cancer study at  for years. She sees them yearly.   6. RA on MTX.      Recommendations:  1. We discussed the pros and cons of doing a colonoscopy. She will think about it.   2. Take Miralax 1/2 dose/day.   3. F/u 6 mos.     Return in about 6 months (around 1/27/2021).    Edilberto Shook MD  7/27/2020

## 2020-07-30 ENCOUNTER — HOSPITAL ENCOUNTER (OUTPATIENT)
Dept: MAMMOGRAPHY | Facility: HOSPITAL | Age: 85
Discharge: HOME | End: 2020-07-30
Admitting: INTERNAL MEDICINE

## 2020-07-30 DIAGNOSIS — Z12.39 SCREENING BREAST EXAMINATION: ICD-10-CM

## 2020-07-30 PROCEDURE — 77067 SCR MAMMO BI INCL CAD: CPT

## 2020-07-30 PROCEDURE — 77063 BREAST TOMOSYNTHESIS BI: CPT

## 2020-08-11 ENCOUNTER — OFFICE VISIT (OUTPATIENT)
Dept: ORTHOPEDIC SURGERY | Facility: CLINIC | Age: 85
End: 2020-08-11

## 2020-08-11 VITALS — WEIGHT: 106 LBS | HEIGHT: 60 IN | BODY MASS INDEX: 20.81 KG/M2 | TEMPERATURE: 97.8 F

## 2020-08-11 DIAGNOSIS — M54.50 SPINE PAIN, LUMBAR: Primary | ICD-10-CM

## 2020-08-11 DIAGNOSIS — M48.062 SPINAL STENOSIS OF LUMBAR REGION WITH NEUROGENIC CLAUDICATION: ICD-10-CM

## 2020-08-11 DIAGNOSIS — M43.16 SPONDYLOLISTHESIS OF LUMBAR REGION: ICD-10-CM

## 2020-08-11 PROCEDURE — 72100 X-RAY EXAM L-S SPINE 2/3 VWS: CPT | Performed by: ORTHOPAEDIC SURGERY

## 2020-08-11 PROCEDURE — 99214 OFFICE O/P EST MOD 30 MIN: CPT | Performed by: ORTHOPAEDIC SURGERY

## 2020-08-11 NOTE — PROGRESS NOTES
New patient or new problem visit    Chief Complaint   Patient presents with   • Lumbar Spine - Establish Care, Pain       HPI: Low back pain the left hip pain.  It is gone on chronically for years aching moderate in nature worse with standing.  She is tried exercises and Tylenol and is on methotrexate for rheumatoid arthritis and Sjogren's syndrome.    PFSH: See chart- reviewed    Review of Systems   Constitutional: Positive for activity change and fatigue. Negative for fever.   HENT: Positive for dental problem, hearing loss and sneezing.    Cardiovascular: Negative for chest pain.   Gastrointestinal: Positive for constipation and diarrhea. Negative for abdominal pain.   Genitourinary: Negative for dysuria and pelvic pain.   Musculoskeletal: Positive for arthralgias and back pain.   Neurological: Positive for dizziness, weakness and light-headedness. Negative for numbness and headaches.   Psychiatric/Behavioral: Positive for sleep disturbance.       PE: Constitutional: Vital signs above-noted.  Awake, alert and oriented    Psychiatric: Affect and insight do not appear grossly disturbed.    Pulmonary: Breathing is unlabored, color is good.    Skin: Warm, dry and normal turgor    Cardiac: Pedal pulses intact.  No edema.    Eyesight and hearing appear adequate for examination purposes      Musculoskeletal:  There is some tenderness to percussion and palpation of the spine. Motion appears undisturbed.  Posture is unremarkable to coronal and sagittal inspection.    The skin about the area is intact.  There is no palpable or visible deformity.  There is no local spasm.       Neurologic:   Reflexes are 2+ and symmetrical in the patellae and absent in the Achilles.   Motor function is undisturbed in quadriceps, EHL, and gastrocnemius   sensation appears symmetrically intact to light touch.  In the bilateral lower extremities there is no evidence of atrophy.   Clonus is absent..  Gait appears undisturbed.  SLR test  negative      MEDICAL DECISION MAKING    XRAY: Plain film x-rays of the lumbar spine show spondylolisthesis grade 2 at the lumbosacral junction and grade 1 at 2 levels above this.  Slight loss of anterior sagittal balance on account of this.  Overall decent posture.  No comparison views are available.  A CT scan of the abdomen, viewed under bone windows, performed in 2018 demonstrates that the lumbosacral spondylolisthesis is spontaneously fused but does show substantial stenosis at at least 2 levels above this.    Other: n/a    Impression: Lumbar spinal stenosis lumbar spondylolisthesis    Plan: For now epidural injections.  She thinks she is too old for surgery but I do not know that that is necessarily the case although with surgery were contemplated it would need to include a decompression as well as a fusion but we will cross that bridge when we get there.  Try epidurals for now and follow-up as needed.  Answers for HPI/ROS submitted by the patient on 8/5/2020   Back pain  What is the primary reason for your visit?: Back Pain  Chronicity: chronic  Onset: more than 1 year ago  Frequency: daily  Progression since onset: waxing and waning  Pain location: gluteal, sacro-iliac  Pain quality: aching  Radiates to: left thigh  Pain - numeric: 6/10  Pain is: worse during the night  Aggravated by: lying down, sitting, standing  Stiffness is present: all day  bladder incontinence: No  bowel incontinence: Yes  leg pain: No  paresis: No  paresthesias: No  perianal numbness: No  tingling: No  weight loss: No  Risk factors: lack of exercise

## 2020-08-12 ENCOUNTER — CONSULT (OUTPATIENT)
Dept: ONCOLOGY | Facility: CLINIC | Age: 85
End: 2020-08-12

## 2020-08-12 ENCOUNTER — LAB (OUTPATIENT)
Dept: LAB | Facility: HOSPITAL | Age: 85
End: 2020-08-12

## 2020-08-12 VITALS
OXYGEN SATURATION: 97 % | TEMPERATURE: 97.5 F | DIASTOLIC BLOOD PRESSURE: 76 MMHG | HEART RATE: 82 BPM | RESPIRATION RATE: 16 BRPM | WEIGHT: 105.7 LBS | HEIGHT: 60 IN | SYSTOLIC BLOOD PRESSURE: 155 MMHG | BODY MASS INDEX: 20.75 KG/M2

## 2020-08-12 DIAGNOSIS — D47.2 MONOCLONAL GAMMOPATHY: Primary | ICD-10-CM

## 2020-08-12 LAB
ALBUMIN SERPL-MCNC: 4.6 G/DL (ref 3.5–5.2)
ALBUMIN/GLOB SERPL: 1.5 G/DL (ref 1.1–2.4)
ALP SERPL-CCNC: 109 U/L (ref 38–116)
ALT SERPL W P-5'-P-CCNC: 17 U/L (ref 0–33)
ANION GAP SERPL CALCULATED.3IONS-SCNC: 11.1 MMOL/L (ref 5–15)
AST SERPL-CCNC: 20 U/L (ref 0–32)
B2 MICROGLOB SERPL-MCNC: 2 MG/L (ref 0.8–2.2)
BASOPHILS # BLD AUTO: 0.07 10*3/MM3 (ref 0–0.2)
BASOPHILS NFR BLD AUTO: 0.9 % (ref 0–1.5)
BILIRUB SERPL-MCNC: 0.2 MG/DL (ref 0.2–1.2)
BUN SERPL-MCNC: 12 MG/DL (ref 6–20)
BUN/CREAT SERPL: 22.2 (ref 7.3–30)
CALCIUM SPEC-SCNC: 9.5 MG/DL (ref 8.5–10.2)
CHLORIDE SERPL-SCNC: 100 MMOL/L (ref 98–107)
CO2 SERPL-SCNC: 26.9 MMOL/L (ref 22–29)
CREAT SERPL-MCNC: 0.54 MG/DL (ref 0.6–1.1)
DEPRECATED RDW RBC AUTO: 48.5 FL (ref 37–54)
EOSINOPHIL # BLD AUTO: 0.19 10*3/MM3 (ref 0–0.4)
EOSINOPHIL NFR BLD AUTO: 2.5 % (ref 0.3–6.2)
ERYTHROCYTE [DISTWIDTH] IN BLOOD BY AUTOMATED COUNT: 14.2 % (ref 12.3–15.4)
GFR SERPL CREATININE-BSD FRML MDRD: 108 ML/MIN/1.73
GLOBULIN UR ELPH-MCNC: 3.1 GM/DL (ref 1.8–3.5)
GLUCOSE SERPL-MCNC: 104 MG/DL (ref 74–124)
HCT VFR BLD AUTO: 39.1 % (ref 34–46.6)
HGB BLD-MCNC: 13.1 G/DL (ref 12–15.9)
IMM GRANULOCYTES # BLD AUTO: 0.16 10*3/MM3 (ref 0–0.05)
IMM GRANULOCYTES NFR BLD AUTO: 2.1 % (ref 0–0.5)
LYMPHOCYTES # BLD AUTO: 0.95 10*3/MM3 (ref 0.7–3.1)
LYMPHOCYTES NFR BLD AUTO: 12.5 % (ref 19.6–45.3)
MCH RBC QN AUTO: 32 PG (ref 26.6–33)
MCHC RBC AUTO-ENTMCNC: 33.5 G/DL (ref 31.5–35.7)
MCV RBC AUTO: 95.4 FL (ref 79–97)
MONOCYTES # BLD AUTO: 0.8 10*3/MM3 (ref 0.1–0.9)
MONOCYTES NFR BLD AUTO: 10.5 % (ref 5–12)
NEUTROPHILS NFR BLD AUTO: 5.45 10*3/MM3 (ref 1.7–7)
NEUTROPHILS NFR BLD AUTO: 71.5 % (ref 42.7–76)
NRBC BLD AUTO-RTO: 0.3 /100 WBC (ref 0–0.2)
PLATELET # BLD AUTO: 200 10*3/MM3 (ref 140–450)
PMV BLD AUTO: 10.4 FL (ref 6–12)
POTASSIUM SERPL-SCNC: 3.4 MMOL/L (ref 3.5–4.7)
PROT SERPL-MCNC: 7.7 G/DL (ref 6.3–8)
RBC # BLD AUTO: 4.1 10*6/MM3 (ref 3.77–5.28)
SODIUM SERPL-SCNC: 138 MMOL/L (ref 134–145)
WBC # BLD AUTO: 7.62 10*3/MM3 (ref 3.4–10.8)

## 2020-08-12 PROCEDURE — 82232 ASSAY OF BETA-2 PROTEIN: CPT | Performed by: INTERNAL MEDICINE

## 2020-08-12 PROCEDURE — 36415 COLL VENOUS BLD VENIPUNCTURE: CPT

## 2020-08-12 PROCEDURE — 80053 COMPREHEN METABOLIC PANEL: CPT | Performed by: INTERNAL MEDICINE

## 2020-08-12 PROCEDURE — 85025 COMPLETE CBC W/AUTO DIFF WBC: CPT

## 2020-08-12 PROCEDURE — 99204 OFFICE O/P NEW MOD 45 MIN: CPT | Performed by: INTERNAL MEDICINE

## 2020-08-13 ENCOUNTER — HOSPITAL ENCOUNTER (OUTPATIENT)
Dept: GENERAL RADIOLOGY | Facility: HOSPITAL | Age: 85
Discharge: HOME | End: 2020-08-13
Admitting: INTERNAL MEDICINE

## 2020-08-13 DIAGNOSIS — D47.2 MONOCLONAL GAMMOPATHY: ICD-10-CM

## 2020-08-13 PROCEDURE — 77075 RADEX OSSEOUS SURVEY COMPL: CPT

## 2020-08-13 NOTE — PROGRESS NOTES
.     REASON FOR CONSULTATION: Monoclonal gammopathy  Provide an opinion on any further workup or treatment                             REQUESTING PHYSICIAN: Oren Rust Jr.,*    RECORDS OBTAINED:  Records of the patients history including those obtained from the referring provider were reviewed and summarized in detail.    HISTORY OF PRESENT ILLNESS:  The patient is a 84 y.o. year old female who is here for an initial visit with the above-mentioned history.  The patient has a past medical history significant for microscopic colitis/ulcerative colitis as well as crest syndrome/seropositive rheumatoid arthritis overlap with secondary Sjogren's syndrome.  She also has a history of osteoarthritis, hypertension, GERD, colonic polyps, chronic back pain secondary to spondylolisthesis, osteopenia/osteoporosis (previously treated with Fosamax), vertigo.  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 Martell in rheumatology.  She had been treated previously with Arava, subsequently changed to methotrexate weekly injections.  She has low disease activity and her disease has been under good control.  She does have associated Sjogren's syndrome with dry eyes and dry mouth and subsequent dental issues.  She has chronic pain in her fingers with some degree of sclerodactyly and receives intermittent injections by hand surgery every 4 to 6 months.  In regards to her low back pain, she has recently been evaluated by Dr. Licea in orthopedics and is preparing to undergo an epidural injection early next week.  She is followed for microscopic colitis/ulcerative colitis by Dr. Shook.  She was previously treated with Entocort however is been off of this since 2018.  She has alternating constipation and diarrhea and manages this with MiraLAX 1/2-1/3 dose daily.  She denies any evidence of GI blood loss.    The patient's primary  care physician Dr. Rust had performed a laboratory evaluation on 2/12/2020.  She had a creatinine of 0.61, calcium 9.2, globulin 2.5, albumin 4.3, serum protein electrophoresis which showed a prominent protein band in the beta region, could not rule out monoclonal protein.  She also had a B12 of 640, folate greater than 24.    CBC on 4/14/2020 with WBC 6.0 with normal differential, hemoglobin 12.0, platelet count 255,000.    Subsequent labs with Dr. Rust on 7/1/2020 showed an immunoelectrophoresis confirming an IgA kappa monoclonal protein with IgA 658, IgG 7 or 24, IgM 125.  Creatinine was normal at 0.61 with calcium 9.5.  Patient was referred to our office for further evaluation.    As above, patient notes that she has some mild arthritic pain chronically.  She has stiffness and pain in her fingers chronically.  She has intermittent alternating constipation and diarrhea but no blood in her stool.  She has chronic back pain and right hip pain.  She notes normal appetite.  She reports chronic dry eyes and dry mouth.  She has intermittent difficulty with vertigo and imbalance and has had worsening difficulty with this recently.  She was seen by ENT with plans for additional evaluation and treatment.            Past Medical History:   Diagnosis Date   • Baker's cyst    • Colon polyp    • CREST syndrome (CMS/HCC)    • Fractures    • GERD (gastroesophageal reflux disease)    • History of CT scan of abdomen 03/11/2011    constipation, sig tics, 6 mm hepatic cyst   • History of rheumatoid arthritis    • Hyperlipidemia    • Hypertension    • Osteoarthritis    • Raynaud's disease    • Sjogren's syndrome (CMS/HCC)    • Ulcerative colitis (CMS/HCC)      Past Surgical History:   Procedure Laterality Date   • CATARACT EXTRACTION, BILATERAL     • COLONOSCOPY  02/26/2016    tics, microscopic colitis,    • COLONOSCOPY  07/01/2011    sig tics, inflammation, polyp   • DILATATION AND CURETTAGE  1980   • ENDOSCOPY N/A  12/3/2018    erythematous mucosa in stomach, path benign   • EYE SURGERY     • FLEXIBLE SIGMOIDOSCOPY     • FRACTURE SURGERY     • HAND SURGERY     • JOINT REPLACEMENT     • KNEE ARTHROSCOPY Right     w/meniscal repair   • KNEE SURGERY Right    • TOTAL KNEE ARTHROPLASTY Right 5/30/2018    Procedure: TOTAL KNEE ARTHROPLASTY;  Surgeon: Georges Jon MD;  Location: [facility] OR;  Service: Orthopedics   • UPPER GASTROINTESTINAL ENDOSCOPY  03/14/2008    erythema   • WRIST FRACTURE SURGERY Right    • WRIST SURGERY Right 2008    orif         HEMATOLOGIC/ONCOLOGIC HISTORY:  (History from previous dates can be found in the separate document.)    MEDICATIONS    Current Outpatient Medications:   •  Acetaminophen (TYLENOL ARTHRITIS PAIN PO), Take 2 tablets by mouth Daily., Disp: , Rfl:   •  Acetaminophen (TYLENOL EXTRA STRENGTH PO), Take 2 tablets by mouth Daily As Needed., Disp: , Rfl:   •  amLODIPine (NORVASC) 2.5 MG tablet, Take 2.5 mg by mouth Every Morning., Disp: , Rfl:   •  Ascorbic Acid (VITAMIN C PO), Take 500 mg by mouth Daily., Disp: , Rfl:   •  calcium carbonate (CALCIUM 600) 600 MG tablet, 1 P.O. daily, Disp: , Rfl:   •  CALCIUM EILEEN-MAX PO, Take  by mouth., Disp: , Rfl:   •  CALCIUM PO, Take 600 mg by mouth Daily., Disp: , Rfl:   •  Calcium Polycarbophil (FIBERCON PO), Take 2 tablets by mouth At Night As Needed., Disp: , Rfl:   •  Carboxymethylcell-Glycerin PF 0.5-0.9 % solution, Apply 1-2 drops to eye(s) as directed by provider., Disp: , Rfl:   •  Carboxymethylcell-Hypromellose (GENTEAL OP), Apply 1 application to eye As Needed., Disp: , Rfl:   •  Cholecalciferol (VITAMIN D PO), Take 2,000 mg by mouth every night at bedtime., Disp: , Rfl:   •  diclofenac (VOLTAREN) 1 % gel gel, Apply 2 g topically to the appropriate area as directed As Needed., Disp: , Rfl:   •  escitalopram (LEXAPRO) 10 MG tablet, Take 10 mg by mouth Daily., Disp: , Rfl:   •  folic acid (FOLVITE) 1 MG tablet, Take 1 tablet by mouth., Disp: ,  "Rfl:   •  Methotrexate Sodium (METHOTREXATE PF) 50 MG/2ML chemo syringe, Infuse  into a venous catheter 1 (One) Time., Disp: , Rfl:   •  Multiple Vitamins-Minerals (CENTRUM SILVER ULTRA WOMENS PO), 1 P.O. daily, Disp: , Rfl:   •  omeprazole (priLOSEC) 20 MG capsule, 1 TABLET  P.O.  M W F, Disp: , Rfl:   •  polyethyl glycol-propyl glycol (SYSTANE) 0.4-0.3 % solution ophthalmic solution, Administer 2 drops to both eyes Every 1 (One) Hour As Needed., Disp: , Rfl:   •  Polyethylene Glycol 3350 (MIRALAX PO), Take  by mouth As Needed., Disp: , Rfl:   •  traZODone (DESYREL) 50 MG tablet, Take 25-50 mg by mouth Every Night., Disp: , Rfl:   •  Turmeric 400 MG capsule, 1 P.O. daily, Disp: , Rfl:     ALLERGIES:     Allergies   Allergen Reactions   • Codeine Diarrhea and Nausea Only   • Sulfa Antibiotics Hives       SOCIAL HISTORY:       Social History     Socioeconomic History   • Marital status: Single     Spouse name: Not on file   • Number of children: Not on file   • Years of education: Not on file   • Highest education level: Not on file   Occupational History     Employer: RETIRED   Tobacco Use   • Smoking status: Never Smoker   • Smokeless tobacco: Never Used   Substance and Sexual Activity   • Alcohol use: Yes     Types: 1 - 3 Glasses of wine per week     Comment: Infrequent   • Drug use: No   • Sexual activity: Never         FAMILY HISTORY:  Family History   Problem Relation Age of Onset   • Ulcerative colitis Mother    • Breast cancer Maternal Aunt    • Malig Hyperthermia Neg Hx        REVIEW OF SYSTEMS:  A comprehensive review of systems was performed and was negative except as mentioned above in the HPI.         Vitals:    08/12/20 1518   BP: 155/76   Pulse: 82   Resp: 16   Temp: 97.5 °F (36.4 °C)   TempSrc: Skin   SpO2: 97%   Weight: 47.9 kg (105 lb 11.2 oz)   Height: 153 cm (60.24\")  Comment: new   PainSc:   3   PainLoc: Hip     Current Status 8/12/2020   ECOG score 0       Physical Exam   Constitutional: She is " oriented to person, place, and time. She appears well-developed and well-nourished.   Elderly female no distress   HENT:   Mouth/Throat: Oropharynx is clear and moist.   Eyes: Conjunctivae are normal.   Neck: No thyromegaly present.   Cardiovascular: Normal rate and regular rhythm. Exam reveals no gallop and no friction rub.   No murmur heard.  Pulmonary/Chest: Breath sounds normal. No respiratory distress.   Abdominal: Soft. Bowel sounds are normal. She exhibits no distension. There is no tenderness.   Musculoskeletal: She exhibits edema.   Mild degree of sclerodactyly in the bilateral hands.  Trace bilateral lower extremity edema   Lymphadenopathy:        Head (right side): No submandibular adenopathy present.     She has no cervical adenopathy.     She has no axillary adenopathy.        Right: No inguinal and no supraclavicular adenopathy present.        Left: No inguinal and no supraclavicular adenopathy present.   Neurological: She is alert and oriented to person, place, and time. She displays normal reflexes. No cranial nerve deficit. She exhibits normal muscle tone.   Skin: Skin is warm and dry. No rash noted.   Psychiatric: She has a normal mood and affect. Her behavior is normal.       RECENT LABS:        WBC   Date Value Ref Range Status   08/12/2020 7.62 3.40 - 10.80 10*3/mm3 Final   04/14/2020 6.0 3.4 - 10.8 x10E3/uL Final     Hemoglobin   Date Value Ref Range Status   08/12/2020 13.1 12.0 - 15.9 g/dL Final     Platelets   Date Value Ref Range Status   08/12/2020 200 140 - 450 10*3/mm3 Final       Assessment/Plan     1. 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  · Laboratory studies 2/12/2020 showed a creatinine of 0.61, calcium 9.2, globulin 2.5, albumin 4.3, serum protein electrophoresis with a prominent protein band in the  beta region, could not rule out monoclonal protein.  · CBC on 4/14/2020 with WBC 6.0 with normal differential, hemoglobin 12.0, platelet count 255,000.  · Subsequent labs with Dr. Rust on 7/1/2020 showed an immunoelectrophoresis confirming an IgA kappa monoclonal protein with IgA 658, IgG 7 or 24, IgM 125.  Creatinine was normal at 0.61 with calcium 9.5.    · Patient was referred to our office for further evaluation.  · The patient today has laboratory studies showing hemoglobin 13.1, platelet count 200,000, creatinine 0.54 with calcium of 9.5.  · I had a lengthy discussion with the patient and her daughter today regarding identification of the monoclonal protein which is IgA kappa.  She does have an underlying autoimmune disorder which may be related to her monoclonal protein.  We reviewed that she has no evidence of renal dysfunction, hypercalcemia, anemia, thrombocytopenia and that most likely her diagnosis will be that of MGUS.  We do however need to perform additional evaluation.  We will repeat the serum protein electrophoresis and immunoelectrophoresis and quantify the M spike as well as repeat quantitative immunoglobulins and add free serum light chains.  We will check a 24-hour urine for protein electrophoresis and immunoelectrophoresis.  She has chronic musculoskeletal pain related to her rheumatoid arthritis and osteoarthritis, spondylolisthesis.  We will check a skeletal survey to rule out any lytic lesions.  Patient will return in 2 weeks to review the above evaluation and we will determine at that point whether additional evaluation with bone marrow biopsy is warranted.  I did review at length today details regarding monoclonal proteins, multiple myeloma, MGUS and that her diagnosis is not yet known pending the above evaluation.  I did answer the questions to their satisfaction.  2. Crest syndrome/seropositive rheumatoid arthritis overlap syndrome with associated Sjogren's syndrome:  · Patient  reports being diagnosed around 2012 and is followed by Dr. Martell in rheumatology.    · She had been treated previously with Arava, subsequently changed to methotrexate weekly injections.    · She has low disease activity and her disease has been under good control.    · She does have associated Sjogren's syndrome with dry eyes and dry mouth and subsequent dental issues.  · She has chronic pain in her fingers with some degree of sclerodactyly and receives intermittent injections by hand surgery every 4 to 6 months.  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  3. Lumbar spine stenosis and spondylolisthesis:  · Chronic back pain  · Patient followed by Dr. Licea in orthopedics  · Plans for epidural injection early next week  4. Microscopic colitis/ulcerative colitis:  · Patient is followed by Dr. Shook  · Previously treated with Entocort, discontinued around 2018  · Patient with alternating constipation and diarrhea symptoms managed with MiraLAX 1/2-1/3 dose daily.    Plan:  1. Additional labs today with serum protein electrophoresis, immunoelectrophoresis, quantitative immunoglobulins, free serum light chains, beta-2 microglobulin  2. The patient will collect and return to 24-hour urine for protein electrophoresis and immunoelectrophoresis  3. Skeletal survey in the next week or so  4. MD visit in 2 weeks with CBC, CMP.  We will review the above results and determine the need for any additional evaluation, in particular the possible need for bone marrow biopsy.

## 2020-08-14 LAB
ALBUMIN SERPL-MCNC: 4 G/DL (ref 2.9–4.4)
ALBUMIN/GLOB SERPL: 1.3 {RATIO} (ref 0.7–1.7)
ALPHA1 GLOB FLD ELPH-MCNC: 0.3 G/DL (ref 0–0.4)
ALPHA2 GLOB SERPL ELPH-MCNC: 0.7 G/DL (ref 0.4–1)
B-GLOBULIN SERPL ELPH-MCNC: 1.5 G/DL (ref 0.7–1.3)
GAMMA GLOB SERPL ELPH-MCNC: 0.6 G/DL (ref 0.4–1.8)
GLOBULIN SER CALC-MCNC: 3.2 G/DL (ref 2.2–3.9)
IGA SERPL-MCNC: 652 MG/DL (ref 64–422)
IGG SERPL-MCNC: 743 MG/DL (ref 586–1602)
IGM SERPL-MCNC: 126 MG/DL (ref 26–217)
INTERPRETATION SERPL IEP-IMP: ABNORMAL
KAPPA LC SERPL-MCNC: 87.1 MG/L (ref 3.3–19.4)
KAPPA LC/LAMBDA SER: 7.2 {RATIO} (ref 0.26–1.65)
LAMBDA LC FREE SERPL-MCNC: 12.1 MG/L (ref 5.7–26.3)
Lab: ABNORMAL
M-SPIKE: ABNORMAL G/DL
PROT SERPL-MCNC: 7.2 G/DL (ref 6–8.5)

## 2020-08-17 ENCOUNTER — ANESTHESIA EVENT (OUTPATIENT)
Dept: PAIN MEDICINE | Facility: HOSPITAL | Age: 85
End: 2020-08-17

## 2020-08-17 ENCOUNTER — HOSPITAL ENCOUNTER (OUTPATIENT)
Dept: PAIN MEDICINE | Facility: HOSPITAL | Age: 85
Discharge: HOME | End: 2020-08-17
Admitting: ANESTHESIOLOGY

## 2020-08-17 ENCOUNTER — ANESTHESIA (OUTPATIENT)
Dept: PAIN MEDICINE | Facility: HOSPITAL | Age: 85
End: 2020-08-17

## 2020-08-17 ENCOUNTER — HOSPITAL ENCOUNTER (OUTPATIENT)
Dept: GENERAL RADIOLOGY | Facility: HOSPITAL | Age: 85
Discharge: HOME | End: 2020-08-17

## 2020-08-17 VITALS
WEIGHT: 106 LBS | SYSTOLIC BLOOD PRESSURE: 162 MMHG | RESPIRATION RATE: 16 BRPM | OXYGEN SATURATION: 98 % | TEMPERATURE: 98 F | HEIGHT: 61 IN | BODY MASS INDEX: 20.01 KG/M2 | DIASTOLIC BLOOD PRESSURE: 74 MMHG | HEART RATE: 72 BPM

## 2020-08-17 DIAGNOSIS — M48.062 SPINAL STENOSIS OF LUMBAR REGION WITH NEUROGENIC CLAUDICATION: Primary | ICD-10-CM

## 2020-08-17 DIAGNOSIS — R52 PAIN: ICD-10-CM

## 2020-08-17 PROCEDURE — 77003 FLUOROGUIDE FOR SPINE INJECT: CPT

## 2020-08-17 PROCEDURE — 25010000002 METHYLPREDNISOLONE PER 80 MG: Performed by: ANESTHESIOLOGY

## 2020-08-17 PROCEDURE — C1755 CATHETER, INTRASPINAL: HCPCS

## 2020-08-17 PROCEDURE — 0 IOPAMIDOL 41 % SOLUTION: Performed by: ANESTHESIOLOGY

## 2020-08-17 RX ORDER — METHYLPREDNISOLONE ACETATE 80 MG/ML
80 INJECTION, SUSPENSION INTRA-ARTICULAR; INTRALESIONAL; INTRAMUSCULAR; SOFT TISSUE ONCE
Status: COMPLETED | OUTPATIENT
Start: 2020-08-17 | End: 2020-08-17

## 2020-08-17 RX ORDER — MIDAZOLAM HYDROCHLORIDE 1 MG/ML
1 INJECTION INTRAMUSCULAR; INTRAVENOUS
Status: DISCONTINUED | OUTPATIENT
Start: 2020-08-17 | End: 2020-08-18 | Stop reason: HOSPADM

## 2020-08-17 RX ORDER — FENTANYL CITRATE 50 UG/ML
50 INJECTION, SOLUTION INTRAMUSCULAR; INTRAVENOUS AS NEEDED
Status: DISCONTINUED | OUTPATIENT
Start: 2020-08-17 | End: 2020-08-18 | Stop reason: HOSPADM

## 2020-08-17 RX ORDER — LIDOCAINE HYDROCHLORIDE 10 MG/ML
1 INJECTION, SOLUTION INFILTRATION; PERINEURAL ONCE
Status: DISCONTINUED | OUTPATIENT
Start: 2020-08-17 | End: 2020-08-18 | Stop reason: HOSPADM

## 2020-08-17 RX ADMIN — IOPAMIDOL 2 ML: 408 INJECTION, SOLUTION INTRATHECAL at 13:20

## 2020-08-17 RX ADMIN — METHYLPREDNISOLONE ACETATE 80 MG: 80 INJECTION, SUSPENSION INTRA-ARTICULAR; INTRALESIONAL; INTRAMUSCULAR; SOFT TISSUE at 13:20

## 2020-08-17 NOTE — ANESTHESIA PROCEDURE NOTES
PAIN Epidural block    Pre-sedation assessment completed: 8/17/2020 1:15 PM    Patient reassessed immediately prior to procedure    Patient location during procedure: pain clinic  Start Time: 8/17/2020 1:15 PM  Stop Time: 8/17/2020 1:22 PM  Indication:at surgeon's request and procedure for pain  Performed By  Anesthesiologist: Hayder Doty MD  Preanesthetic Checklist  Completed: patient identified, site marked, surgical consent, pre-op evaluation, timeout performed, IV checked, risks and benefits discussed and monitors and equipment checked  Additional Notes  Dx:  Post-Op Diagnosis Codes:     * Lumbar spinal stenosis (M48.061)     * Spondylolisthesis of lumbar region (M43.16)  80 mg depomedrol in epid    Plan : return to clinic as needed  Prep:  Pt Position:prone (prone)  Sterile Tech:cap, gloves, mask and sterile barrier  Prep:chlorhexidine gluconate and isopropyl alcohol  Monitoring:blood pressure monitoring, EKG and continuous pulse oximetry  Procedure:Sedation: no     Approach:midline  Guidance: fluoroscopy and c arm pa and lat and loss of resistance  Location:lumbar  Level:4-5 (interlaminar)  Needle Type:Alejandra  Needle Gauge:20  Aspiration:negative  Medications:  Depomedrol:80 mg  Preservative Free Saline:3mL  Isovue:2mL    Post Assessment:  Post-procedure: bandaid.  Pt Tolerance:patient tolerated the procedure well with no apparent complications  Complications:no

## 2020-08-17 NOTE — H&P
[facility]    History and Physical    Patient Name: Arlin Hutson  :  1935  MRN:  [id number]  Date of Admission: 2020    Subjective     Patient is a 84 y.o. female presents with chief complaint of chronic, constant, moderate, 4-6/10, due to arthritis, aching low back, hips: left and leg: left pain.  Onset of symptoms was gradual starting 2 years ago.  Symptoms are associated/aggravated by lying down, sitting, standing or walking for more than several minutes. Symptoms improve with relaxation, pain medication, heating pad and rest    The following portions of the patients history were reviewed and updated as appropriate: current medications, allergies, past medical history, past surgical history, past family history, past social history and problem list                Objective     Past Medical History:   Past Medical History:   Diagnosis Date   • Baker's cyst    • Colon polyp    • CREST syndrome (CMS/HCC)    • Fractures    • GERD (gastroesophageal reflux disease)    • History of CT scan of abdomen 2011    constipation, sig tics, 6 mm hepatic cyst   • History of rheumatoid arthritis    • Hyperlipidemia    • Hypertension    • Osteoarthritis    • Raynaud's disease    • Sjogren's syndrome (CMS/HCC)    • Ulcerative colitis (CMS/HCC)      Past Surgical History:   Past Surgical History:   Procedure Laterality Date   • CATARACT EXTRACTION, BILATERAL     • COLONOSCOPY  2016    tics, microscopic colitis,    • COLONOSCOPY  2011    sig tics, inflammation, polyp   • DILATATION AND CURETTAGE     • ENDOSCOPY N/A 12/3/2018    erythematous mucosa in stomach, path benign   • EYE SURGERY     • FLEXIBLE SIGMOIDOSCOPY     • FRACTURE SURGERY     • HAND SURGERY     • JOINT REPLACEMENT     • KNEE ARTHROSCOPY Right     w/meniscal repair   • KNEE SURGERY Right    • TOTAL KNEE ARTHROPLASTY Right 2018    Procedure: TOTAL KNEE ARTHROPLASTY;  Surgeon: Georges Jon MD;  Location: [facility] OR;   Service: Orthopedics   • UPPER GASTROINTESTINAL ENDOSCOPY  03/14/2008    erythema   • WRIST FRACTURE SURGERY Right    • WRIST SURGERY Right 2008    orif     Family History:   Family History   Problem Relation Age of Onset   • Ulcerative colitis Mother    • Breast cancer Maternal Aunt    • Malig Hyperthermia Neg Hx      Social History:   Social History     Tobacco Use   • Smoking status: Never Smoker   • Smokeless tobacco: Never Used   Substance Use Topics   • Alcohol use: Yes     Types: 1 - 3 Glasses of wine per week     Comment: Infrequent   • Drug use: No       Vital Signs Range for the last 24 hours  Temperature:     Temp Source:     BP:     Pulse:     Respirations:     SPO2:     O2 Amount (l/min):     O2 Devices     Weight:           --------------------------------------------------------------------------------    Current Outpatient Medications   Medication Sig Dispense Refill   • Acetaminophen (TYLENOL ARTHRITIS PAIN PO) Take 2 tablets by mouth Daily.     • Acetaminophen (TYLENOL EXTRA STRENGTH PO) Take 2 tablets by mouth Daily As Needed.     • amLODIPine (NORVASC) 2.5 MG tablet Take 2.5 mg by mouth Every Morning.     • Ascorbic Acid (VITAMIN C PO) Take 500 mg by mouth Daily.     • calcium carbonate (CALCIUM 600) 600 MG tablet 1 P.O. daily     • CALCIUM EILEEN-MAX PO Take  by mouth.     • CALCIUM PO Take 600 mg by mouth Daily.     • Calcium Polycarbophil (FIBERCON PO) Take 2 tablets by mouth At Night As Needed.     • Carboxymethylcell-Glycerin PF 0.5-0.9 % solution Apply 1-2 drops to eye(s) as directed by provider.     • Carboxymethylcell-Hypromellose (GENTEAL OP) Apply 1 application to eye As Needed.     • Cholecalciferol (VITAMIN D PO) Take 2,000 mg by mouth every night at bedtime.     • diclofenac (VOLTAREN) 1 % gel gel Apply 2 g topically to the appropriate area as directed As Needed.     • escitalopram (LEXAPRO) 10 MG tablet Take 10 mg by mouth Daily.     • folic acid (FOLVITE) 1 MG tablet Take 1 tablet  by mouth.     • Methotrexate Sodium (METHOTREXATE PF) 50 MG/2ML chemo syringe Infuse  into a venous catheter 1 (One) Time.     • Multiple Vitamins-Minerals (CENTRUM SILVER ULTRA WOMENS PO) 1 P.O. daily     • omeprazole (priLOSEC) 20 MG capsule 1 TABLET  P.O.  M W F     • polyethyl glycol-propyl glycol (SYSTANE) 0.4-0.3 % solution ophthalmic solution Administer 2 drops to both eyes Every 1 (One) Hour As Needed.     • Polyethylene Glycol 3350 (MIRALAX PO) Take  by mouth As Needed.     • traZODone (DESYREL) 50 MG tablet Take 25-50 mg by mouth Every Night.     • Turmeric 400 MG capsule 1 P.O. daily       Current Facility-Administered Medications   Medication Dose Route Frequency Provider Last Rate Last Dose   • fentaNYL citrate (PF) (SUBLIMAZE) injection 50 mcg  50 mcg Intravenous PRN Hayder Doty MD       • iopamidol (ISOVUE-M 200) injection 41%  3 mL Epidural Once in imaging Hayder Doty MD       • lidocaine (XYLOCAINE) 1 % injection 1 mL  1 mL Intradermal Once Hayder Doty MD       • methylPREDNISolone acetate (DEPO-medrol) injection 80 mg  80 mg Epidural Once Hayder Doty MD       • midazolam (VERSED) injection 1 mg  1 mg Intravenous Q5 Min PRN Hayder Doty MD           --------------------------------------------------------------------------------  Assessment/Plan      Anesthesia Evaluation     Patient summary reviewed and Nursing notes reviewed         Pain impairs ability to perform ADLs: Exercise/Activity, Housekeeping, Ambulation, Sleeping and Working  Modalities previously tried to control pain with limited effectiveness within the last 4-6 weeks: Rest, Heat and OTC medications     Airway   Mallampati: II  Dental - normal exam     Pulmonary - negative pulmonary ROS and normal exam   Cardiovascular - normal exam    (+) hypertension,       Neuro/Psych- negative ROS and neuro exam normal  GI/Hepatic/Renal/Endo    (+)  GERD,      Musculoskeletal (-) normal exam    Abdominal  - normal exam    Substance History - negative use     OB/GYN negative ob/gyn ROS         Other   arthritis,               Diagnosis and Plan    Treatment Plan  ASA 3      Procedures: Lumbar Epidural Steroid Injection(LESI), With fluoroscopy,       Anesthetic plan and risks discussed with patient.          Diagnosis     * Lumbar spinal stenosis [M48.061]     * Spondylolisthesis of lumbar region [M43.16]            CHIEF COMPLAINT:       HISTORY OF PRESENT ILLNESS:      PAST MEDICAL HISTORY:  Current Outpatient Medications on File Prior to Encounter   Medication Sig Dispense Refill   • Acetaminophen (TYLENOL ARTHRITIS PAIN PO) Take 2 tablets by mouth Daily.     • Acetaminophen (TYLENOL EXTRA STRENGTH PO) Take 2 tablets by mouth Daily As Needed.     • amLODIPine (NORVASC) 2.5 MG tablet Take 2.5 mg by mouth Every Morning.     • Ascorbic Acid (VITAMIN C PO) Take 500 mg by mouth Daily.     • calcium carbonate (CALCIUM 600) 600 MG tablet 1 P.O. daily     • CALCIUM EILEEN-MAX PO Take  by mouth.     • CALCIUM PO Take 600 mg by mouth Daily.     • Calcium Polycarbophil (FIBERCON PO) Take 2 tablets by mouth At Night As Needed.     • Carboxymethylcell-Glycerin PF 0.5-0.9 % solution Apply 1-2 drops to eye(s) as directed by provider.     • Carboxymethylcell-Hypromellose (GENTEAL OP) Apply 1 application to eye As Needed.     • Cholecalciferol (VITAMIN D PO) Take 2,000 mg by mouth every night at bedtime.     • diclofenac (VOLTAREN) 1 % gel gel Apply 2 g topically to the appropriate area as directed As Needed.     • escitalopram (LEXAPRO) 10 MG tablet Take 10 mg by mouth Daily.     • folic acid (FOLVITE) 1 MG tablet Take 1 tablet by mouth.     • Methotrexate Sodium (METHOTREXATE PF) 50 MG/2ML chemo syringe Infuse  into a venous catheter 1 (One) Time.     • Multiple Vitamins-Minerals (CENTRUM SILVER ULTRA WOMENS PO) 1 P.O. daily     • omeprazole (priLOSEC) 20 MG capsule 1 TABLET  P.O.  M W F     • polyethyl glycol-propyl glycol (SYSTANE) 0.4-0.3 %  solution ophthalmic solution Administer 2 drops to both eyes Every 1 (One) Hour As Needed.     • Polyethylene Glycol 3350 (MIRALAX PO) Take  by mouth As Needed.     • traZODone (DESYREL) 50 MG tablet Take 25-50 mg by mouth Every Night.     • Turmeric 400 MG capsule 1 P.O. daily       No current facility-administered medications on file prior to encounter.        Past Medical History:   Diagnosis Date   • Baker's cyst    • Colon polyp    • CREST syndrome (CMS/HCC)    • Fractures    • GERD (gastroesophageal reflux disease)    • History of CT scan of abdomen 03/11/2011    constipation, sig tics, 6 mm hepatic cyst   • History of rheumatoid arthritis    • Hyperlipidemia    • Hypertension    • Osteoarthritis    • Raynaud's disease    • Sjogren's syndrome (CMS/HCC)    • Ulcerative colitis (CMS/HCC)          SOCIAL HISTORY:  No tobacco    REVIEW OF SYSTEMS:  No hematologic infectious or constitutional symptoms  Other review of systems non-contributory    PHYSICAL EXAM:  LMP  (LMP Unknown)   Well-developed well-nourished no acute distress  Extra ocular movements intact  Mallampati class 2 airway  Cardiac:  Regular rate and rhythm  Lungs:  Clear to auscultation bilaterally with good effort  Alert and oriented ×3  Deep tendon reflexes normal in the bilateral patella  Negative straight leg raise bilaterally  5 out of 5 strength bilateral upper and lower extremities  Lumbar spine without obvious deformities ecchymoses  Lumbar spine nontender to palpation      DIAGNOSIS:  Post-Op Diagnosis Codes:     * Lumbar spinal stenosis [M48.061]     * Spondylolisthesis of lumbar region [M43.16]    PLAN:  1.  Lumbar 4 epidural steroid injections, up to 3, spaced 1-2 weeks apart.  If pain control is acceptable after 1 or 2 injections, it was discussed with the patient that they may return for the subsequent injections if and when their pain returns.  The risks were discussed with the patient including failure of relief, worsening pain,  Headache (post dural puncture headache), bleeding (epidural hematoma) and infection (epidural abscess or skin infection).  2.  Physical therapy exercises at home as prescribed by physical therapy or from the pain clinic handout (given to the patient).  Continuation of these exercises every day, or multiple times per week, even when the patient has good pain relief, was stressed to the patient as a preventative measure to decrease the frequency and severity of future pain episodes.  3.  Continue pain medicines as already prescribed.  If patient not currently taking any, it is recommended to begin Acetaminophen 1000 mg po q 8 hours.  If other medicines containing Acetaminophen are currently prescribed, maintain daily dose at 3000 mg.    4.  If they can tolerate NSAIDS, it is recommended to take Ibuprofen 600 mg po q 6 hours for 7 days during pain exacerbations.  Alternatively, they may substitute an NSAID of their choice (e.g. Aleve).  This may be taken at the same time as Acetaminophen.  5.  Heat and ice to the affected area as tolerated for pain control.  It was discussed that heating pads can cause burns.  6.  Daily low impact exercise such as walking or water exercise was recommended to maintain overall health and aid in weight control.   7.  Follow up as needed for subsequent injections.  8.  Patient was counseled to abstain from tobacco products.    Target : L 4-5

## 2020-08-19 LAB
ALBUMIN 24H MFR UR ELPH: 18.3 %
ALPHA1 GLOB 24H MFR UR ELPH: 2.7 %
ALPHA2 GLOB 24H MFR UR ELPH: 10.3 %
B-GLOBULIN MFR UR ELPH: 10.1 %
GAMMA GLOB 24H MFR UR ELPH: 58.6 %
HIV 1 & 2 AB SER-IMP: ABNORMAL
INTERPRETATION UR IFE-IMP: ABNORMAL
M PROTEIN 24H MFR UR ELPH: 40.6 %
M PROTEIN 24H UR ELPH-MRATE: 166 MG/24 HR
PROT 24H UR-MRATE: 410 MG/24 HR (ref 30–150)
PROT UR-MCNC: 16.4 MG/DL

## 2020-08-26 ENCOUNTER — OFFICE VISIT (OUTPATIENT)
Dept: ONCOLOGY | Facility: CLINIC | Age: 85
End: 2020-08-26

## 2020-08-26 ENCOUNTER — LAB (OUTPATIENT)
Dept: LAB | Facility: HOSPITAL | Age: 85
End: 2020-08-26

## 2020-08-26 VITALS
HEIGHT: 61 IN | RESPIRATION RATE: 16 BRPM | HEART RATE: 84 BPM | SYSTOLIC BLOOD PRESSURE: 138 MMHG | WEIGHT: 108.2 LBS | TEMPERATURE: 98.2 F | OXYGEN SATURATION: 97 % | DIASTOLIC BLOOD PRESSURE: 71 MMHG | BODY MASS INDEX: 20.43 KG/M2

## 2020-08-26 DIAGNOSIS — D64.9 NORMOCYTIC ANEMIA: ICD-10-CM

## 2020-08-26 DIAGNOSIS — D47.2 MONOCLONAL GAMMOPATHY: ICD-10-CM

## 2020-08-26 DIAGNOSIS — D47.2 MGUS (MONOCLONAL GAMMOPATHY OF UNKNOWN SIGNIFICANCE): Primary | ICD-10-CM

## 2020-08-26 LAB
ALBUMIN SERPL-MCNC: 4.1 G/DL (ref 3.5–5.2)
ALBUMIN/GLOB SERPL: 1.5 G/DL (ref 1.1–2.4)
ALP SERPL-CCNC: 94 U/L (ref 38–116)
ALT SERPL W P-5'-P-CCNC: 17 U/L (ref 0–33)
ANION GAP SERPL CALCULATED.3IONS-SCNC: 10.8 MMOL/L (ref 5–15)
AST SERPL-CCNC: 18 U/L (ref 0–32)
BASOPHILS # BLD AUTO: 0.06 10*3/MM3 (ref 0–0.2)
BASOPHILS NFR BLD AUTO: 0.7 % (ref 0–1.5)
BILIRUB SERPL-MCNC: <0.2 MG/DL (ref 0.2–1.2)
BUN SERPL-MCNC: 16 MG/DL (ref 6–20)
BUN/CREAT SERPL: 27.6 (ref 7.3–30)
CALCIUM SPEC-SCNC: 8.9 MG/DL (ref 8.5–10.2)
CHLORIDE SERPL-SCNC: 99 MMOL/L (ref 98–107)
CO2 SERPL-SCNC: 24.2 MMOL/L (ref 22–29)
CREAT SERPL-MCNC: 0.58 MG/DL (ref 0.6–1.1)
DEPRECATED RDW RBC AUTO: 51 FL (ref 37–54)
EOSINOPHIL # BLD AUTO: 0.13 10*3/MM3 (ref 0–0.4)
EOSINOPHIL NFR BLD AUTO: 1.6 % (ref 0.3–6.2)
ERYTHROCYTE [DISTWIDTH] IN BLOOD BY AUTOMATED COUNT: 14.6 % (ref 12.3–15.4)
FERRITIN SERPL-MCNC: 66.3 NG/ML (ref 13–150)
GFR SERPL CREATININE-BSD FRML MDRD: 99 ML/MIN/1.73
GLOBULIN UR ELPH-MCNC: 2.7 GM/DL (ref 1.8–3.5)
GLUCOSE SERPL-MCNC: 115 MG/DL (ref 74–124)
HCT VFR BLD AUTO: 35.3 % (ref 34–46.6)
HGB BLD-MCNC: 11.3 G/DL (ref 12–15.9)
IMM GRANULOCYTES # BLD AUTO: 0.07 10*3/MM3 (ref 0–0.05)
IMM GRANULOCYTES NFR BLD AUTO: 0.9 % (ref 0–0.5)
IRON 24H UR-MRATE: 52 MCG/DL (ref 37–145)
IRON SATN MFR SERPL: 16 % (ref 14–48)
LYMPHOCYTES # BLD AUTO: 0.88 10*3/MM3 (ref 0.7–3.1)
LYMPHOCYTES NFR BLD AUTO: 10.8 % (ref 19.6–45.3)
MCH RBC QN AUTO: 31.4 PG (ref 26.6–33)
MCHC RBC AUTO-ENTMCNC: 32 G/DL (ref 31.5–35.7)
MCV RBC AUTO: 98.1 FL (ref 79–97)
MONOCYTES # BLD AUTO: 0.68 10*3/MM3 (ref 0.1–0.9)
MONOCYTES NFR BLD AUTO: 8.4 % (ref 5–12)
NEUTROPHILS NFR BLD AUTO: 6.3 10*3/MM3 (ref 1.7–7)
NEUTROPHILS NFR BLD AUTO: 77.6 % (ref 42.7–76)
NRBC BLD AUTO-RTO: 0 /100 WBC (ref 0–0.2)
PLATELET # BLD AUTO: 225 10*3/MM3 (ref 140–450)
PMV BLD AUTO: 8.8 FL (ref 6–12)
POTASSIUM SERPL-SCNC: 3.9 MMOL/L (ref 3.5–4.7)
PROT SERPL-MCNC: 6.8 G/DL (ref 6.3–8)
RBC # BLD AUTO: 3.6 10*6/MM3 (ref 3.77–5.28)
SODIUM SERPL-SCNC: 134 MMOL/L (ref 134–145)
TIBC SERPL-MCNC: 322 MCG/DL (ref 249–505)
TRANSFERRIN SERPL-MCNC: 230 MG/DL (ref 200–360)
WBC # BLD AUTO: 8.12 10*3/MM3 (ref 3.4–10.8)

## 2020-08-26 PROCEDURE — 80053 COMPREHEN METABOLIC PANEL: CPT

## 2020-08-26 PROCEDURE — 85025 COMPLETE CBC W/AUTO DIFF WBC: CPT

## 2020-08-26 PROCEDURE — 36415 COLL VENOUS BLD VENIPUNCTURE: CPT

## 2020-08-26 PROCEDURE — 82746 ASSAY OF FOLIC ACID SERUM: CPT | Performed by: INTERNAL MEDICINE

## 2020-08-26 PROCEDURE — 82728 ASSAY OF FERRITIN: CPT | Performed by: INTERNAL MEDICINE

## 2020-08-26 PROCEDURE — 82607 VITAMIN B-12: CPT | Performed by: INTERNAL MEDICINE

## 2020-08-26 PROCEDURE — 83540 ASSAY OF IRON: CPT | Performed by: INTERNAL MEDICINE

## 2020-08-26 PROCEDURE — 84466 ASSAY OF TRANSFERRIN: CPT | Performed by: INTERNAL MEDICINE

## 2020-08-26 PROCEDURE — 99214 OFFICE O/P EST MOD 30 MIN: CPT | Performed by: INTERNAL MEDICINE

## 2020-08-27 LAB
FOLATE SERPL-MCNC: >20 NG/ML (ref 4.78–24.2)
VIT B12 BLD-MCNC: 552 PG/ML (ref 211–946)

## 2020-08-27 NOTE — PROGRESS NOTES
Subjective .     REASONS FOR FOLLOWUP:    1. IgA kappa MGUS  2. 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 year old female who is here for follow-up with the above-mentioned history.    History of Present Illness   patient returns today in follow-up from her initial consultation with laboratory studies, 24-hour urine, and skeletal survey to review.  In the interval, she reports no new complaints.  She has mild chronic arthritic pain with stiffness and pain in her fingers mainly.  She has some alternating constipation and diarrhea which is chronic.  She has some chronic back and hip pain which is unchanged.  She has a normal appetite.  She has chronic dry eyes and dry mouth from Sjogren's syndrome.  She has some intermittent difficulty with vertigo and imbalance.    Past Medical History:   Diagnosis Date   • Baker's cyst    • Colon polyp    • CREST syndrome (CMS/HCC)    • Fractures    • GERD (gastroesophageal reflux disease)    • History of CT scan of abdomen 03/11/2011    constipation, sig tics, 6 mm hepatic cyst   • History of rheumatoid arthritis    • Hyperlipidemia    • Hypertension    • Low back pain    • Normocytic anemia 8/26/2020   • Osteoarthritis    • Raynaud's disease    • Rheumatoid arthritis (CMS/HCC)    • Scleroderma (CMS/HCC)    • Sjogren's syndrome (CMS/HCC)    • Ulcerative colitis (CMS/HCC)      Past Surgical History:   Procedure Laterality Date   • CATARACT EXTRACTION, BILATERAL     • COLONOSCOPY  02/26/2016    tics, microscopic colitis,    • COLONOSCOPY  07/01/2011    sig tics, inflammation, polyp   • DILATATION AND CURETTAGE  1980   • ENDOSCOPY N/A 12/3/2018    erythematous mucosa in stomach, path benign   • EYE SURGERY     • FLEXIBLE SIGMOIDOSCOPY     • FRACTURE SURGERY     • HAND SURGERY     • JOINT REPLACEMENT     • KNEE ARTHROSCOPY Right     w/meniscal  repair   • KNEE SURGERY Right    • TOTAL KNEE ARTHROPLASTY Right 5/30/2018    Procedure: TOTAL KNEE ARTHROPLASTY;  Surgeon: Georges Jon MD;  Location: [facility];  Service: Orthopedics   • UPPER GASTROINTESTINAL ENDOSCOPY  03/14/2008    erythema   • WRIST FRACTURE SURGERY Right    • WRIST SURGERY Right 2008    orif           Current Outpatient Medications on File Prior to Visit   Medication Sig Dispense Refill   • Acetaminophen (TYLENOL ARTHRITIS PAIN PO) Take 2 tablets by mouth Daily.     • Acetaminophen (TYLENOL EXTRA STRENGTH PO) Take 2 tablets by mouth Daily As Needed.     • amLODIPine (NORVASC) 2.5 MG tablet Take 2.5 mg by mouth Every Morning.     • Ascorbic Acid (VITAMIN C PO) Take 500 mg by mouth Daily.     • calcium carbonate (CALCIUM 600) 600 MG tablet 1 P.O. daily     • CALCIUM EILEEN-MAX PO Take  by mouth.     • CALCIUM PO Take 600 mg by mouth Daily.     • Calcium Polycarbophil (FIBERCON PO) Take 2 tablets by mouth At Night As Needed.     • Carboxymethylcell-Glycerin PF 0.5-0.9 % solution Apply 1-2 drops to eye(s) as directed by provider.     • Carboxymethylcell-Hypromellose (GENTEAL OP) Apply 1 application to eye As Needed.     • Cholecalciferol (VITAMIN D PO) Take 2,000 mg by mouth every night at bedtime.     • diclofenac (VOLTAREN) 1 % gel gel Apply 2 g topically to the appropriate area as directed As Needed.     • escitalopram (LEXAPRO) 10 MG tablet Take 10 mg by mouth Daily.     • folic acid (FOLVITE) 1 MG tablet Take 1 tablet by mouth.     • Methotrexate Sodium (METHOTREXATE PF) 50 MG/2ML chemo syringe Infuse  into a venous catheter 1 (One) Time.     • Multiple Vitamins-Minerals (CENTRUM SILVER ULTRA WOMENS PO) 1 P.O. daily     • omeprazole (priLOSEC) 20 MG capsule 1 TABLET  P.O.  M W F     • polyethyl glycol-propyl glycol (SYSTANE) 0.4-0.3 % solution ophthalmic solution Administer 2 drops to both eyes Every 1 (One) Hour As Needed.     • Polyethylene Glycol 3350 (MIRALAX PO) Take  by mouth As  Needed.     • traZODone (DESYREL) 50 MG tablet Take 25-50 mg by mouth Every Night.     • Turmeric 400 MG capsule 1 P.O. daily       No current facility-administered medications on file prior to visit.        ALLERGIES:     Allergies   Allergen Reactions   • Codeine Diarrhea and Nausea Only   • Sulfa Antibiotics Hives       Social History     Socioeconomic History   • Marital status: Single     Spouse name: Not on file   • Number of children: 3   • Years of education: Not on file   • Highest education level: Not on file   Occupational History     Employer: RETIRED   Tobacco Use   • Smoking status: Never Smoker   • Smokeless tobacco: Never Used   Substance and Sexual Activity   • Alcohol use: Yes     Types: 1 - 3 Glasses of wine per week     Comment: Infrequent   • Drug use: No   • Sexual activity: Never     Family History   Problem Relation Age of Onset   • Ulcerative colitis Mother    • Migraines Mother    • Stroke Mother    • Hypertension Mother    • Thyroid disease Mother    • Skin cancer Mother    • Breast cancer Maternal Aunt    • Arthritis Father    • Heart attack Father    • Migraines Sister    • Skin cancer Sister    • Migraines Daughter    • Skin cancer Brother    • Malig Hyperthermia Neg Hx               Review of Systems   Constitutional: Positive for fatigue. Negative for activity change, appetite change, fever and unexpected weight change.   HENT: Negative for congestion, mouth sores, nosebleeds, sore throat and voice change.    Respiratory: Negative for cough, shortness of breath and wheezing.    Cardiovascular: Negative for chest pain, palpitations and leg swelling.   Gastrointestinal: Positive for constipation and diarrhea. Negative for abdominal distention, abdominal pain, blood in stool, nausea and vomiting.   Endocrine: Negative for cold intolerance and heat intolerance.   Genitourinary: Negative for difficulty urinating, dysuria, frequency and hematuria.   Musculoskeletal: Positive for arthralgias  and back pain. Negative for joint swelling and myalgias.   Skin: Negative for rash.   Neurological: Negative for dizziness, syncope, weakness, light-headedness, numbness and headaches.   Hematological: Negative for adenopathy. Does not bruise/bleed easily.   Psychiatric/Behavioral: Negative for confusion and sleep disturbance. The patient is not nervous/anxious.          Objective      Vitals:    08/26/20 1537   BP: 138/71   Pulse: 84   Resp: 16   Temp: 98.2 °F (36.8 °C)   SpO2: 97%      Current Status 8/26/2020   ECOG score 0   Pain 0/10    Physical Exam   Constitutional: She is oriented to person, place, and time. She appears well-developed and well-nourished.   Elderly female no distress   HENT:   Mouth/Throat: Oropharynx is clear and moist.   Eyes: Conjunctivae are normal.   Neck: No thyromegaly present.   Cardiovascular: Normal rate and regular rhythm. Exam reveals no gallop and no friction rub.   No murmur heard.  Pulmonary/Chest: Breath sounds normal. No respiratory distress.   Abdominal: Soft. Bowel sounds are normal. She exhibits no distension. There is no tenderness.   Musculoskeletal: She exhibits edema.   Mild degree of sclerodactyly in the fingers bilaterally.  Trace bilateral lower extremity edema.   Lymphadenopathy:        Head (right side): No submandibular adenopathy present.     She has no cervical adenopathy.     She has no axillary adenopathy.        Right: No inguinal and no supraclavicular adenopathy present.        Left: No inguinal and no supraclavicular adenopathy present.   Neurological: She is alert and oriented to person, place, and time. She displays normal reflexes. No cranial nerve deficit. She exhibits normal muscle tone.   Skin: Skin is warm and dry. No rash noted.   Psychiatric: She has a normal mood and affect. Her behavior is normal.       RECENT LABS:  Hematology WBC   Date Value Ref Range Status   08/26/2020 8.12 3.40 - 10.80 10*3/mm3 Final   04/14/2020 6.0 3.4 - 10.8 x10E3/uL  Final     RBC   Date Value Ref Range Status   08/26/2020 3.60 (L) 3.77 - 5.28 10*6/mm3 Final   04/14/2020 3.86 3.77 - 5.28 x10E6/uL Final     Hemoglobin   Date Value Ref Range Status   08/26/2020 11.3 (L) 12.0 - 15.9 g/dL Final     Hematocrit   Date Value Ref Range Status   08/26/2020 35.3 34.0 - 46.6 % Final     Platelets   Date Value Ref Range Status   08/26/2020 225 140 - 450 10*3/mm3 Final        Lab Results   Component Value Date    GLUCOSE 115 08/26/2020    BUN 16 08/26/2020    CREATININE 0.58 (L) 08/26/2020    EGFRIFNONA 99 08/26/2020    EGFRIFAFRI 102 10/15/2018    BCR 27.6 08/26/2020    K 3.9 08/26/2020    CO2 24.2 08/26/2020    CALCIUM 8.9 08/26/2020    PROTENTOTREF 7.2 08/12/2020    ALBUMIN 4.10 08/26/2020    LABIL2 1.3 08/12/2020    AST 18 08/26/2020    ALT 17 08/26/2020     Additional labs as well as 24-hour urine and skeletal survey reviewed as outlined below.    Assessment/Plan     1. 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  · Laboratory studies 2/12/2020 showed a creatinine of 0.61, calcium 9.2, globulin 2.5, albumin 4.3, serum protein electrophoresis with a prominent protein band in the beta region, could not rule out monoclonal protein.  · CBC on 4/14/2020 with WBC 6.0 with normal differential, hemoglobin 12.0, platelet count 255,000.  · Subsequent labs with Dr. Rsut on 7/1/2020 showed an immunoelectrophoresis confirming an IgA kappa monoclonal protein with IgA 658, IgG 7 or 24, IgM 125.  Creatinine was normal at 0.61 with calcium 9.5.    · Patient was referred to our office for further evaluation.  · At time of initial visit 8/12/2020, hemoglobin 13.1, platelet count 200,000, creatinine 0.54 with calcium of 9.5.  · Additional labs 8/12/2020 with dual IgA kappa M spike (0.6 g and secondary M spike too small to quantify), IgA 652, IgG  743, IgM 126, free kappa light chain 87.1, free lambda light chain 12.1, free light chain ratio 7.0.  · 24-hour urine 8/17/2020 with 410 mg total protein, 40.6% kappa light chain (166 mg).  · Skeletal survey 8/13/2020 with no evidence of lytic lesions.  · Patient returns today in follow-up to review her initial evaluation.  It appears that she has an IgA kappa MGUS.  She has a dual IgA kappa M spike with the dominant band 0.6 g in the minor band too small to quantify.  IgA is mildly elevated at 652 however IgG and IgM are normal.  Free kappa light chain is elevated at 87.1 with ratio of 7.0.  24-hour urine did show evidence of monoclonal free kappa with 410 mg total protein, 40.6% was free kappa (166 mg).  In terms of endorgan damage, skeletal survey on 8/13/2020 showed no evidence of lytic lesions.  She has no evidence of renal dysfunction nor hypercalcemia and no evidence of thrombocytopenia.  On 8/12/2020, hemoglobin was normal at 13.1 however she does have a mild degree of anemia today with hemoglobin of 11.3 that we are evaluating further however is felt to be likely unrelated to her monoclonal gammopathy.  I do feel that likely her MGUS is related to her underlying chronic autoimmune disorder.  I discussed with the patient and her son at length today that IgA monoclonal gammopathy is do carry a slightly higher risk of progression into multiple myeloma.  She also does have a slight elevation in free kappa light chain with elevated free light chain ratio and some evidence of kappa light chain in the urine which are somewhat concerning.  I do not feel there is enough evidence at this point to warrant proceeding with bone marrow biopsy.  I did suggest that we monitor her albeit at a shorter interval in 3 months with reassessment of both serum and urine studies.  If there is evidence of progression of her gammopathy we will consider in the future whether bone marrow biopsy is warranted.  Patient and her son  expressed understanding.  Questions were answered to their satisfaction.  2. Anemia:  · Hemoglobin on 8/12/2020 was normal at 13.1 with MCV 95.4  · Today, hemoglobin is 13.1 with MCV 98.1.  We discussed that she may have anemia secondary to chronic disease with underlying chronic autoimmune disorder.  She is also receiving weekly methotrexate which may be a contributing factor.  We are obtaining additional labs today with iron panel, ferritin, B12, folate and I will contact her with any abnormal findings.  It is doubtful that anemia is related to her low level M spike/MGUS at this point.  We will recheck hemoglobin when she returns in 3 months.  3. 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 Martell in rheumatology.    · She had been treated previously with Arava, subsequently changed to methotrexate weekly injections.    · She has low disease activity and her disease has been under good control.    · She does have associated Sjogren's syndrome with dry eyes and dry mouth and subsequent dental issues.  · She has chronic pain in her fingers with some degree of sclerodactyly and receives intermittent injections by hand surgery every 4 to 6 months.  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  3. Lumbar spine stenosis and spondylolisthesis:  · Chronic back pain  · Patient followed by Dr. Licea in orthopedics  · Patient underwent epidural injection on 8/17/2020  4. Microscopic colitis/ulcerative colitis:  · Patient is followed by Dr. Shook  · Previously treated with Entocort, discontinued around 2018  · Patient with alternating constipation and diarrhea symptoms managed with MiraLAX 1/2-1/3 dose daily.     Plan:  1. Additional labs today with iron panel, ferritin, B12, folate.  I will notify the patient of any significant abnormal  results.  2. Return in 3 months for MD visit.  1 week prior we will draw labs with CBC, CMP, serum protein electrophoresis, immunoelectrophoresis, quantitative immunoglobulins, free serum light chains and the patient will return to 24-hour urine for protein electrophoresis, immunoelectrophoresis.

## 2020-09-14 ENCOUNTER — APPOINTMENT (OUTPATIENT)
Dept: PAIN MEDICINE | Facility: HOSPITAL | Age: 85
End: 2020-09-14

## 2020-10-21 ENCOUNTER — APPOINTMENT (OUTPATIENT)
Dept: PAIN MEDICINE | Facility: HOSPITAL | Age: 85
End: 2020-10-21

## 2020-11-12 ENCOUNTER — LAB (OUTPATIENT)
Dept: LAB | Facility: HOSPITAL | Age: 85
End: 2020-11-12

## 2020-11-12 DIAGNOSIS — D47.2 MGUS (MONOCLONAL GAMMOPATHY OF UNKNOWN SIGNIFICANCE): ICD-10-CM

## 2020-11-12 LAB
ALBUMIN SERPL-MCNC: 4.2 G/DL (ref 3.5–5.2)
ALBUMIN/GLOB SERPL: 1.4 G/DL (ref 1.1–2.4)
ALP SERPL-CCNC: 100 U/L (ref 38–116)
ALT SERPL W P-5'-P-CCNC: 20 U/L (ref 0–33)
ANION GAP SERPL CALCULATED.3IONS-SCNC: 11.2 MMOL/L (ref 5–15)
AST SERPL-CCNC: 21 U/L (ref 0–32)
BASOPHILS # BLD AUTO: 0.06 10*3/MM3 (ref 0–0.2)
BASOPHILS NFR BLD AUTO: 1.2 % (ref 0–1.5)
BILIRUB SERPL-MCNC: 0.2 MG/DL (ref 0.2–1.2)
BUN SERPL-MCNC: 14 MG/DL (ref 6–20)
BUN/CREAT SERPL: 23.7 (ref 7.3–30)
CALCIUM SPEC-SCNC: 9.2 MG/DL (ref 8.5–10.2)
CHLORIDE SERPL-SCNC: 99 MMOL/L (ref 98–107)
CO2 SERPL-SCNC: 25.8 MMOL/L (ref 22–29)
CREAT SERPL-MCNC: 0.59 MG/DL (ref 0.6–1.1)
DEPRECATED RDW RBC AUTO: 52.1 FL (ref 37–54)
EOSINOPHIL # BLD AUTO: 0.19 10*3/MM3 (ref 0–0.4)
EOSINOPHIL NFR BLD AUTO: 3.9 % (ref 0.3–6.2)
ERYTHROCYTE [DISTWIDTH] IN BLOOD BY AUTOMATED COUNT: 14.3 % (ref 12.3–15.4)
GFR SERPL CREATININE-BSD FRML MDRD: 97 ML/MIN/1.73
GLOBULIN UR ELPH-MCNC: 2.9 GM/DL (ref 1.8–3.5)
GLUCOSE SERPL-MCNC: 73 MG/DL (ref 74–124)
HCT VFR BLD AUTO: 38.9 % (ref 34–46.6)
HGB BLD-MCNC: 12.2 G/DL (ref 12–15.9)
IMM GRANULOCYTES # BLD AUTO: 0.01 10*3/MM3 (ref 0–0.05)
IMM GRANULOCYTES NFR BLD AUTO: 0.2 % (ref 0–0.5)
LYMPHOCYTES # BLD AUTO: 1.17 10*3/MM3 (ref 0.7–3.1)
LYMPHOCYTES NFR BLD AUTO: 23.9 % (ref 19.6–45.3)
MCH RBC QN AUTO: 31.4 PG (ref 26.6–33)
MCHC RBC AUTO-ENTMCNC: 31.4 G/DL (ref 31.5–35.7)
MCV RBC AUTO: 100.3 FL (ref 79–97)
MONOCYTES # BLD AUTO: 0.52 10*3/MM3 (ref 0.1–0.9)
MONOCYTES NFR BLD AUTO: 10.6 % (ref 5–12)
NEUTROPHILS NFR BLD AUTO: 2.95 10*3/MM3 (ref 1.7–7)
NEUTROPHILS NFR BLD AUTO: 60.2 % (ref 42.7–76)
NRBC BLD AUTO-RTO: 0 /100 WBC (ref 0–0.2)
PLATELET # BLD AUTO: 240 10*3/MM3 (ref 140–450)
PMV BLD AUTO: 9.4 FL (ref 6–12)
POTASSIUM SERPL-SCNC: 3.7 MMOL/L (ref 3.5–4.7)
PROT SERPL-MCNC: 7.1 G/DL (ref 6.3–8)
RBC # BLD AUTO: 3.88 10*6/MM3 (ref 3.77–5.28)
SODIUM SERPL-SCNC: 136 MMOL/L (ref 134–145)
WBC # BLD AUTO: 4.9 10*3/MM3 (ref 3.4–10.8)

## 2020-11-12 PROCEDURE — 85025 COMPLETE CBC W/AUTO DIFF WBC: CPT

## 2020-11-12 PROCEDURE — 36415 COLL VENOUS BLD VENIPUNCTURE: CPT

## 2020-11-12 PROCEDURE — 80053 COMPREHEN METABOLIC PANEL: CPT

## 2020-11-13 LAB
ALBUMIN SERPL ELPH-MCNC: 3.8 G/DL (ref 2.9–4.4)
ALBUMIN/GLOB SERPL: 1.3 {RATIO} (ref 0.7–1.7)
ALPHA1 GLOB SERPL ELPH-MCNC: 0.3 G/DL (ref 0–0.4)
ALPHA2 GLOB SERPL ELPH-MCNC: 0.9 G/DL (ref 0.4–1)
B-GLOBULIN SERPL ELPH-MCNC: 1.1 G/DL (ref 0.7–1.3)
GAMMA GLOB SERPL ELPH-MCNC: 0.7 G/DL (ref 0.4–1.8)
GLOBULIN SER-MCNC: 3 G/DL (ref 2.2–3.9)
IGA SERPL-MCNC: 627 MG/DL (ref 64–422)
IGG SERPL-MCNC: 684 MG/DL (ref 586–1602)
IGM SERPL-MCNC: 124 MG/DL (ref 26–217)
INTERPRETATION SERPL IEP-IMP: ABNORMAL
KAPPA LC FREE SER-MCNC: 103.4 MG/L (ref 3.3–19.4)
KAPPA LC FREE/LAMBDA FREE SER: 7.95 {RATIO} (ref 0.26–1.65)
LABORATORY COMMENT REPORT: ABNORMAL
LAMBDA LC FREE SERPL-MCNC: 13 MG/L (ref 5.7–26.3)
M PROTEIN SERPL ELPH-MCNC: ABNORMAL G/DL
PROT SERPL-MCNC: 6.8 G/DL (ref 6–8.5)

## 2020-11-16 LAB
ALBUMIN 24H MFR UR ELPH: 25.6 %
ALPHA1 GLOB 24H MFR UR ELPH: 4.1 %
ALPHA2 GLOB 24H MFR UR ELPH: 11.8 %
B-GLOBULIN MFR UR ELPH: 17.3 %
GAMMA GLOB 24H MFR UR ELPH: 41.2 %
HIV 1 & 2 AB SER-IMP: ABNORMAL
INTERPRETATION UR IFE-IMP: ABNORMAL
M PROTEIN 24H MFR UR ELPH: 24.5 %
M PROTEIN 24H UR ELPH-MRATE: 89 MG/24 HR
PROT 24H UR-MRATE: 361 MG/24 HR (ref 30–150)
PROT UR-MCNC: 13.9 MG/DL

## 2020-11-17 ENCOUNTER — HOSPITAL ENCOUNTER (OUTPATIENT)
Dept: PAIN MEDICINE | Facility: HOSPITAL | Age: 85
Discharge: HOME | End: 2020-11-17

## 2020-11-17 ENCOUNTER — HOSPITAL ENCOUNTER (OUTPATIENT)
Dept: GENERAL RADIOLOGY | Facility: HOSPITAL | Age: 85
Discharge: HOME | End: 2020-11-17

## 2020-11-17 ENCOUNTER — ANESTHESIA (OUTPATIENT)
Dept: PAIN MEDICINE | Facility: HOSPITAL | Age: 85
End: 2020-11-17

## 2020-11-17 ENCOUNTER — ANESTHESIA EVENT (OUTPATIENT)
Dept: PAIN MEDICINE | Facility: HOSPITAL | Age: 85
End: 2020-11-17

## 2020-11-17 VITALS
OXYGEN SATURATION: 95 % | DIASTOLIC BLOOD PRESSURE: 67 MMHG | HEART RATE: 66 BPM | TEMPERATURE: 97.8 F | SYSTOLIC BLOOD PRESSURE: 147 MMHG | RESPIRATION RATE: 14 BRPM

## 2020-11-17 DIAGNOSIS — M48.062 SPINAL STENOSIS OF LUMBAR REGION WITH NEUROGENIC CLAUDICATION: ICD-10-CM

## 2020-11-17 DIAGNOSIS — M54.50 LUMBAR PAIN: ICD-10-CM

## 2020-11-17 PROCEDURE — 77003 FLUOROGUIDE FOR SPINE INJECT: CPT

## 2020-11-17 PROCEDURE — C1755 CATHETER, INTRASPINAL: HCPCS

## 2020-11-17 PROCEDURE — 25010000002 METHYLPREDNISOLONE PER 80 MG: Performed by: ANESTHESIOLOGY

## 2020-11-17 PROCEDURE — 0 IOPAMIDOL 41 % SOLUTION: Performed by: ANESTHESIOLOGY

## 2020-11-17 RX ORDER — SODIUM CHLORIDE 0.9 % (FLUSH) 0.9 %
1-10 SYRINGE (ML) INJECTION AS NEEDED
Status: DISCONTINUED | OUTPATIENT
Start: 2020-11-17 | End: 2020-11-18 | Stop reason: HOSPADM

## 2020-11-17 RX ORDER — METHYLPREDNISOLONE ACETATE 80 MG/ML
80 INJECTION, SUSPENSION INTRA-ARTICULAR; INTRALESIONAL; INTRAMUSCULAR; SOFT TISSUE ONCE
Status: COMPLETED | OUTPATIENT
Start: 2020-11-17 | End: 2020-11-17

## 2020-11-17 RX ORDER — MIDAZOLAM HYDROCHLORIDE 1 MG/ML
1 INJECTION INTRAMUSCULAR; INTRAVENOUS AS NEEDED
Status: DISCONTINUED | OUTPATIENT
Start: 2020-11-17 | End: 2020-11-18 | Stop reason: HOSPADM

## 2020-11-17 RX ORDER — FENTANYL CITRATE 50 UG/ML
50 INJECTION, SOLUTION INTRAMUSCULAR; INTRAVENOUS AS NEEDED
Status: DISCONTINUED | OUTPATIENT
Start: 2020-11-17 | End: 2020-11-18 | Stop reason: HOSPADM

## 2020-11-17 RX ORDER — LIDOCAINE HYDROCHLORIDE 10 MG/ML
1 INJECTION, SOLUTION INFILTRATION; PERINEURAL ONCE AS NEEDED
Status: DISCONTINUED | OUTPATIENT
Start: 2020-11-17 | End: 2020-11-18 | Stop reason: HOSPADM

## 2020-11-17 RX ADMIN — METHYLPREDNISOLONE ACETATE 80 MG: 80 INJECTION, SUSPENSION INTRA-ARTICULAR; INTRALESIONAL; INTRAMUSCULAR; SOFT TISSUE at 14:26

## 2020-11-17 RX ADMIN — IOPAMIDOL 10 ML: 408 INJECTION, SOLUTION INTRATHECAL at 14:26

## 2020-11-17 NOTE — ANESTHESIA PROCEDURE NOTES
PAIN Epidural block    Pre-sedation assessment completed: 11/17/2020 2:20 PM    Patient reassessed immediately prior to procedure    Patient location during procedure: pain clinic  Start Time: 11/17/2020 2:20 PM  Stop Time: 11/17/2020 2:28 PM  Indication:at surgeon's request and procedure for pain  Performed By  Anesthesiologist: Drew Li MD  Preanesthetic Checklist  Completed: patient identified, site marked, surgical consent, pre-op evaluation, timeout performed, risks and benefits discussed and monitors and equipment checked  Additional Notes  Post-Op Diagnosis Codes:     * Lumbar neuritis (M54.16)     * Lumbago (M54.5)     * Spinal stenosis of lumbar region without neurogenic claudication (M48.061)    Prep:  Pt Position:prone  Sterile Tech:sterile barrier, mask and gloves  Prep:chlorhexidine gluconate and isopropyl alcohol  Monitoring:blood pressure monitoring, continuous pulse oximetry and EKG  Procedure:Sedation: no     Approach:left paramedian  Guidance: fluoroscopy  Location:lumbar  Level:4-5  Needle Gauge:20 G  Aspiration:negative  Test Dose:negative  Medications:  Depomedrol:80 mg  Preservative Free Saline:1mL  Isovue:2mL  Comments:Lumbar epidural steroid injections performed at the L4-5 level on the left.  There is moderate exacerbation of her back and hip pain with entering the epidural space in the left.  1 mL of Isovue was slowly injected again exacerbating her pain, it did demonstrate slight cranial caudal spread primarily in the left.  The blush was quite faint.  I then injected the Depo-Medrol very slowly, she tolerated procedure well there was no return of red cells or CSF pre or post injection.  Post Assessment:  Pt Tolerance:patient tolerated the procedure well with no apparent complications  Complications:no

## 2020-11-17 NOTE — H&P
[facility]    History and Physical    Patient Name: Arlin Hutson  :  1935  MRN:  [id number]  Date of Admission: 2020    Subjective     Patient is a 85 y.o. female presents with chief complaint of chronic low back and hips: left pain.  Onset of symptoms was gradual starting several years ago.  Symptoms are associated/aggravated by nothing in particular. Symptoms improve with injection    The following portions of the patients history were reviewed and updated as appropriate: current medications, allergies, past medical history, past surgical history, past family history, past social history and problem list    She has a history of low back pain that radiates into her left hip.  She has had several epidural steroid injections in the past.  The most recent one was done in August of this year.  She says she was given at least 70% relief of her pain from that.  She is plain film x-ray reports from her orthopedics office and he says she has some spondylolisthesis as well as some spinal stenosis.  There is no MRI noted in epic that I can find.          Objective     Past Medical History:   Past Medical History:   Diagnosis Date   • Baker's cyst    • Colon polyp    • CREST syndrome (CMS/HCC)    • Fractures    • GERD (gastroesophageal reflux disease)    • History of CT scan of abdomen 2011    constipation, sig tics, 6 mm hepatic cyst   • History of rheumatoid arthritis    • Hyperlipidemia    • Hypertension    • Low back pain    • Normocytic anemia 2020   • Osteoarthritis    • Raynaud's disease    • Rheumatoid arthritis (CMS/HCC)    • Scleroderma (CMS/HCC)    • Sjogren's syndrome (CMS/HCC)    • Ulcerative colitis (CMS/HCC)      Past Surgical History:   Past Surgical History:   Procedure Laterality Date   • CATARACT EXTRACTION, BILATERAL     • COLONOSCOPY  2016    tics, microscopic colitis,    • COLONOSCOPY  2011    sig tics, inflammation, polyp   • DILATATION AND CURETTAGE      • ENDOSCOPY N/A 12/3/2018    erythematous mucosa in stomach, path benign   • EYE SURGERY     • FLEXIBLE SIGMOIDOSCOPY     • FRACTURE SURGERY     • HAND SURGERY     • JOINT REPLACEMENT     • KNEE ARTHROSCOPY Right     w/meniscal repair   • KNEE SURGERY Right    • TEETH EXTRACTION     • TOTAL KNEE ARTHROPLASTY Right 5/30/2018    Procedure: TOTAL KNEE ARTHROPLASTY;  Surgeon: Georges Jon MD;  Location: [facility]  Service: Orthopedics   • UPPER GASTROINTESTINAL ENDOSCOPY  03/14/2008    erythema   • WRIST FRACTURE SURGERY Right    • WRIST SURGERY Right 2008    orif     Family History:   Family History   Problem Relation Age of Onset   • Ulcerative colitis Mother    • Migraines Mother    • Stroke Mother    • Hypertension Mother    • Thyroid disease Mother    • Skin cancer Mother    • Breast cancer Maternal Aunt    • Arthritis Father    • Heart attack Father    • Migraines Sister    • Skin cancer Sister    • Migraines Daughter    • Skin cancer Brother    • Malig Hyperthermia Neg Hx      Social History:   Social History     Tobacco Use   • Smoking status: Never Smoker   • Smokeless tobacco: Never Used   Substance Use Topics   • Alcohol use: Yes     Types: 1 - 3 Glasses of wine per week     Comment: Infrequent   • Drug use: No       Vital Signs Range for the last 24 hours  Temperature:     Temp Source:     BP:     Pulse:     Respirations:     SPO2:     O2 Amount (l/min):     O2 Devices     Weight:           --------------------------------------------------------------------------------    Current Outpatient Medications   Medication Sig Dispense Refill   • Acetaminophen (TYLENOL EXTRA STRENGTH PO) Take 2 tablets by mouth Daily As Needed.     • amLODIPine (NORVASC) 2.5 MG tablet Take 2.5 mg by mouth Every Morning.     • Ascorbic Acid (VITAMIN C PO) Take 500 mg by mouth Daily.     • calcium carbonate (CALCIUM 600) 600 MG tablet 1 P.O. daily     • CALCIUM EILEEN-MAX PO Take  by mouth.     • CALCIUM PO Take 600 mg by  mouth Daily.     • Carboxymethylcell-Glycerin PF 0.5-0.9 % solution Apply 1-2 drops to eye(s) as directed by provider.     • Carboxymethylcell-Hypromellose (GENTEAL OP) Apply 1 application to eye As Needed.     • Cholecalciferol (VITAMIN D PO) Take 2,000 mg by mouth every night at bedtime.     • diclofenac (VOLTAREN) 1 % gel gel Apply 2 g topically to the appropriate area as directed As Needed.     • folic acid (FOLVITE) 1 MG tablet Take 1 tablet by mouth.     • Methotrexate Sodium (METHOTREXATE PF) 50 MG/2ML chemo syringe Infuse  into a venous catheter 1 (One) Time.     • Multiple Vitamins-Minerals (CENTRUM SILVER ULTRA WOMENS PO) 1 P.O. daily     • omeprazole (priLOSEC) 20 MG capsule 1 TABLET  P.O.  M W F     • polyethyl glycol-propyl glycol (SYSTANE) 0.4-0.3 % solution ophthalmic solution Administer 2 drops to both eyes Every 1 (One) Hour As Needed.     • Polyethylene Glycol 3350 (MIRALAX PO) Take  by mouth As Needed.     • traZODone (DESYREL) 50 MG tablet Take 25-50 mg by mouth Every Night.     • Turmeric 400 MG capsule 1 P.O. daily       Current Facility-Administered Medications   Medication Dose Route Frequency Provider Last Rate Last Dose   • fentaNYL citrate (PF) (SUBLIMAZE) injection 50 mcg  50 mcg Intravenous PRN Drew Li MD       • iopamidol (ISOVUE-M 200) injection 41%  12 mL Epidural Once in imaging Drew Li MD       • lidocaine (XYLOCAINE) 1 % injection 1 mL  1 mL Intradermal Once PRN Drew Li MD       • methylPREDNISolone acetate (DEPO-medrol) injection 80 mg  80 mg Intra-articular Once Drew Li MD       • midazolam (VERSED) injection 1 mg  1 mg Intravenous PRN Drew Li MD       • sodium chloride 0.9 % flush 1-10 mL  1-10 mL Intravenous PRN Drew Li MD           --------------------------------------------------------------------------------  Assessment/Plan      Anesthesia Evaluation                  Airway   Mallampati: II  TM distance: >3  "FB  Neck ROM: full  Dental - normal exam     Pulmonary - normal exam   (+) pneumonia ,   Cardiovascular     Rhythm: regular    (+) hypertension well controlled, hyperlipidemia,   (-) murmur    PE comment: Lower extremities are warm without edema    Neuro/Psych  (+)   left straight leg raise test,     (-) normal sensory deficit, right straight leg raise test  GI/Hepatic/Renal/Endo    (+)  GERD,      Musculoskeletal   normal range of motion        PE comment: No obvious weakness in her lower extremities  Station and gait was narrow based and steady  Abdominal  - normal exam   Substance History      OB/GYN          Other                   Diagnosis and Plan    Treatment Plan  ASA 3   Patient has had previous injection/procedure with 50-75% improvement.   Procedures: Lumbar Epidural Steroid Injection(LESI), With fluoroscopy,           Discussed with patient risk and benefits of GHADA including but not limited to : Bleeding, infection, PDPH, inadvertant spinal anesthetic, worsening pain, hyperglycemia, hypertension, CHF, nerve damage, steroid \"toxicity\" and AVN of hips.  Pt agrees to proceed.    Diagnosis     * Lumbar neuritis [M54.16]     * Lumbago [M54.5]     * Spinal stenosis of lumbar region without neurogenic claudication [M48.061]                    "

## 2020-11-19 ENCOUNTER — APPOINTMENT (OUTPATIENT)
Dept: LAB | Facility: HOSPITAL | Age: 85
End: 2020-11-19

## 2020-11-19 ENCOUNTER — OFFICE VISIT (OUTPATIENT)
Dept: ONCOLOGY | Facility: CLINIC | Age: 85
End: 2020-11-19

## 2020-11-19 VITALS
TEMPERATURE: 98.1 F | DIASTOLIC BLOOD PRESSURE: 78 MMHG | HEART RATE: 78 BPM | RESPIRATION RATE: 16 BRPM | WEIGHT: 105.8 LBS | SYSTOLIC BLOOD PRESSURE: 159 MMHG | BODY MASS INDEX: 20.31 KG/M2 | OXYGEN SATURATION: 99 %

## 2020-11-19 DIAGNOSIS — D64.9 NORMOCYTIC ANEMIA: ICD-10-CM

## 2020-11-19 DIAGNOSIS — D47.2 MGUS (MONOCLONAL GAMMOPATHY OF UNKNOWN SIGNIFICANCE): Primary | ICD-10-CM

## 2020-11-19 PROCEDURE — 99213 OFFICE O/P EST LOW 20 MIN: CPT | Performed by: INTERNAL MEDICINE

## 2020-11-19 RX ORDER — TRAMADOL HYDROCHLORIDE 50 MG/1
50 TABLET ORAL EVERY 6 HOURS PRN
COMMUNITY
End: 2021-03-11

## 2020-11-19 NOTE — PROGRESS NOTES
Subjective .     REASONS FOR FOLLOWUP:    1. IgA kappa MGUS  2. 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 year old female who is here for follow-up with the above-mentioned history.    History of Present Illness   patient returns today in follow-up with labs to review as well as 24-hour urine 3-month interval.  Since her last visit, she notes continued difficulty with chronic low back pain and hip pain.  She did undergo 2 epidural injections and is actually having more pain after the last injection on 11/17.  Patient also had 7 teeth extracted on 10/22/2020 and has studs in place for implants.  She has been maintained on a soft diet recently.  Her weight is down 3 pounds.  She has chronic dry eyes and dry mouth related to her Sjogren's syndrome.  She has some intermittent vertigo and imbalance.  She has chronic arthritic pain and stiffness in her fingers.  She does note some increased pain in her left biceps region, feels that this may be related to muscular strain.  She has chronic difficulty with alternating constipation and diarrhea and does take MiraLAX as needed.    Past Medical History:   Diagnosis Date   • Baker's cyst    • Colon polyp    • CREST syndrome (CMS/HCC)    • Fractures    • GERD (gastroesophageal reflux disease)    • History of CT scan of abdomen 03/11/2011    constipation, sig tics, 6 mm hepatic cyst   • History of rheumatoid arthritis    • Hyperlipidemia    • Hypertension    • Low back pain    • Normocytic anemia 8/26/2020   • Osteoarthritis    • Raynaud's disease    • Rheumatoid arthritis (CMS/HCC)    • Scleroderma (CMS/HCC)    • Sjogren's syndrome (CMS/HCC)    • Ulcerative colitis (CMS/HCC)      Past Surgical History:   Procedure Laterality Date   • CATARACT EXTRACTION, BILATERAL     • COLONOSCOPY  02/26/2016    tics, microscopic colitis,    • COLONOSCOPY   07/01/2011    sig tics, inflammation, polyp   • DILATATION AND CURETTAGE  1980   • ENDOSCOPY N/A 12/3/2018    erythematous mucosa in stomach, path benign   • EYE SURGERY     • FLEXIBLE SIGMOIDOSCOPY     • FRACTURE SURGERY     • HAND SURGERY     • JOINT REPLACEMENT     • KNEE ARTHROSCOPY Right     w/meniscal repair   • KNEE SURGERY Right    • TEETH EXTRACTION     • TOTAL KNEE ARTHROPLASTY Right 5/30/2018    Procedure: TOTAL KNEE ARTHROPLASTY;  Surgeon: Georges Jon MD;  Location: [facility] OR;  Service: Orthopedics   • UPPER GASTROINTESTINAL ENDOSCOPY  03/14/2008    erythema   • WRIST FRACTURE SURGERY Right    • WRIST SURGERY Right 2008    orif           Current Outpatient Medications on File Prior to Visit   Medication Sig Dispense Refill   • Acetaminophen (TYLENOL EXTRA STRENGTH PO) Take 2 tablets by mouth Daily As Needed.     • amLODIPine (NORVASC) 2.5 MG tablet Take 2.5 mg by mouth Every Morning.     • Ascorbic Acid (VITAMIN C PO) Take 500 mg by mouth Daily.     • calcium carbonate (CALCIUM 600) 600 MG tablet 1 P.O. daily     • CALCIUM EILEEN-MAX PO Take  by mouth.     • CALCIUM PO Take 600 mg by mouth Daily.     • Carboxymethylcell-Glycerin PF 0.5-0.9 % solution Apply 1-2 drops to eye(s) as directed by provider.     • Carboxymethylcell-Hypromellose (GENTEAL OP) Apply 1 application to eye As Needed.     • Cholecalciferol (VITAMIN D PO) Take 2,000 mg by mouth every night at bedtime.     • diclofenac (VOLTAREN) 1 % gel gel Apply 2 g topically to the appropriate area as directed As Needed.     • folic acid (FOLVITE) 1 MG tablet Take 1 tablet by mouth.     • Methotrexate Sodium (METHOTREXATE PF) 50 MG/2ML chemo syringe Infuse  into a venous catheter 1 (One) Time.     • Multiple Vitamins-Minerals (CENTRUM SILVER ULTRA WOMENS PO) 1 P.O. daily     • omeprazole (priLOSEC) 20 MG capsule 1 TABLET  P.O.  M W F     • polyethyl glycol-propyl glycol (SYSTANE) 0.4-0.3 % solution ophthalmic solution Administer 2 drops to both  eyes Every 1 (One) Hour As Needed.     • Polyethylene Glycol 3350 (MIRALAX PO) Take  by mouth As Needed.     • traMADol (ULTRAM) 50 MG tablet Take 50 mg by mouth Every 6 (Six) Hours As Needed for Moderate Pain .     • traZODone (DESYREL) 50 MG tablet Take 25-50 mg by mouth Every Night.     • Turmeric 400 MG capsule 1 P.O. daily       No current facility-administered medications on file prior to visit.        ALLERGIES:     Allergies   Allergen Reactions   • Codeine Diarrhea and Nausea Only   • Sulfa Antibiotics Hives       Social History     Socioeconomic History   • Marital status: Single     Spouse name: Not on file   • Number of children: 3   • Years of education: Not on file   • Highest education level: Not on file   Occupational History     Employer: RETIRED   Tobacco Use   • Smoking status: Never Smoker   • Smokeless tobacco: Never Used   Substance and Sexual Activity   • Alcohol use: Yes     Types: 1 - 3 Glasses of wine per week     Comment: Infrequent   • Drug use: No   • Sexual activity: Never     Family History   Problem Relation Age of Onset   • Ulcerative colitis Mother    • Migraines Mother    • Stroke Mother    • Hypertension Mother    • Thyroid disease Mother    • Skin cancer Mother    • Breast cancer Maternal Aunt    • Arthritis Father    • Heart attack Father    • Migraines Sister    • Skin cancer Sister    • Migraines Daughter    • Skin cancer Brother    • Malig Hyperthermia Neg Hx               Review of Systems   Constitutional: Positive for fatigue. Negative for activity change, appetite change, fever and unexpected weight change.   HENT: Negative for congestion, mouth sores, nosebleeds, sore throat and voice change.    Respiratory: Negative for cough, shortness of breath and wheezing.    Cardiovascular: Negative for chest pain, palpitations and leg swelling.   Gastrointestinal: Positive for constipation and diarrhea. Negative for abdominal distention, abdominal pain, blood in stool, nausea and  vomiting.   Endocrine: Negative for cold intolerance and heat intolerance.   Genitourinary: Negative for difficulty urinating, dysuria, frequency and hematuria.   Musculoskeletal: Positive for arthralgias and back pain. Negative for joint swelling and myalgias.   Skin: Negative for rash.   Neurological: Negative for dizziness, syncope, weakness, light-headedness, numbness and headaches.   Hematological: Negative for adenopathy. Does not bruise/bleed easily.   Psychiatric/Behavioral: Negative for confusion and sleep disturbance. The patient is not nervous/anxious.    4    Objective      Vitals:    11/19/20 1043   BP: 159/78   Pulse: 78   Resp: 16   Temp: 98.1 °F (36.7 °C)   SpO2: 99%      Current Status 11/19/2020   ECOG score 2   Pain 4/10    Physical Exam   Constitutional: She is oriented to person, place, and time. She appears well-developed.   Elderly female no distress   Eyes: Conjunctivae are normal.   Neck: No thyromegaly present.   Cardiovascular: Normal rate and regular rhythm. Exam reveals no gallop and no friction rub.   No murmur heard.  Pulmonary/Chest: Breath sounds normal. No respiratory distress.   Abdominal: Soft. Bowel sounds are normal. She exhibits no distension. There is no abdominal tenderness.   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  Trace bilateral lower extremity edema.   Lymphadenopathy:        Head (right side): No submandibular adenopathy present.     She has no cervical adenopathy. No inguinal adenopathy noted on the right or left side.        Right: No supraclavicular adenopathy present.        Left: No supraclavicular adenopathy present.   Neurological: She is alert and oriented to person, place, and time. She displays normal reflexes. No cranial nerve deficit. She exhibits normal muscle tone.   Skin: Skin is warm and dry. No rash noted.   Psychiatric: Her behavior is normal.   The patient was examined today, unchanged from  above.    RECENT LABS:  Hematology WBC   Date Value Ref Range Status   11/12/2020 4.90 3.40 - 10.80 10*3/mm3 Final   04/14/2020 6.0 3.4 - 10.8 x10E3/uL Final     RBC   Date Value Ref Range Status   11/12/2020 3.88 3.77 - 5.28 10*6/mm3 Final   04/14/2020 3.86 3.77 - 5.28 x10E6/uL Final     Hemoglobin   Date Value Ref Range Status   11/12/2020 12.2 12.0 - 15.9 g/dL Final     Hematocrit   Date Value Ref Range Status   11/12/2020 38.9 34.0 - 46.6 % Final     Platelets   Date Value Ref Range Status   11/12/2020 240 140 - 450 10*3/mm3 Final        Lab Results   Component Value Date    GLUCOSE 73 (L) 11/12/2020    BUN 14 11/12/2020    CREATININE 0.59 (L) 11/12/2020    EGFRIFNONA 97 11/12/2020    EGFRIFAFRI 102 10/15/2018    BCR 23.7 11/12/2020    K 3.7 11/12/2020    CO2 25.8 11/12/2020    CALCIUM 9.2 11/12/2020    PROTENTOTREF 6.8 11/12/2020    ALBUMIN 4.20 11/12/2020    ALBUMIN 3.8 11/12/2020    LABIL2 1.3 11/12/2020    AST 21 11/12/2020    ALT 20 11/12/2020     Additional labs reviewed today as outlined below.    Assessment/Plan     1. 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  · Laboratory studies 2/12/2020 showed a creatinine of 0.61, calcium 9.2, globulin 2.5, albumin 4.3, serum protein electrophoresis with a prominent protein band in the beta region, could not rule out monoclonal protein.  · CBC on 4/14/2020 with WBC 6.0 with normal differential, hemoglobin 12.0, platelet count 255,000.  · Subsequent labs with Dr. Rust on 7/1/2020 showed an immunoelectrophoresis confirming an IgA kappa monoclonal protein with IgA 658, IgG 7 or 24, IgM 125.  Creatinine was normal at 0.61 with calcium 9.5.    · Patient was referred to our office for further evaluation.  · At time of initial visit 8/12/2020, hemoglobin 13.1, platelet count 200,000, creatinine 0.54 with  calcium of 9.5.  · Additional labs 8/12/2020 with dual IgA kappa M spike (0.6 g and secondary M spike too small to quantify), IgA 652, IgG 743, IgM 126, free kappa light chain 87.1, free lambda light chain 12.1, free light chain ratio 7.0.  · 24-hour urine 8/17/2020 with 410 mg total protein, 40.6% kappa light chain (166 mg).  · Skeletal survey 8/13/2020 with no evidence of lytic lesions.  · Patient returns today in follow-up with repeat labs and 24-hour urine to review at a 3-month interval.  Clinically she is very stable with no new issues, multiple chronic problems with back pain, arthralgias, dry eyes.  We reviewed her labs from 11/12/2020 showing a dual IgA kappa M spike with a dominant band decreased from 0.6 down to 0.4 g in the minor band at only 0.1 g.  IgA decreased at 627, IgG was 684, IgM 124.  Free light chains showed a slight increase in free kappa at 103, free lambda 13, free light chain ratio 7.95.  24-hour urine showed a decrease in total protein from 410 down to 361 mg with decrease in monoclonal percentage from 40.6% down to 24%.  She has experienced an increase in her hemoglobin, no evidence of thrombocytopenia, no evidence of renal dysfunction or hypercalcemia.  We will plan to repeat studies again in 4 months and will include 24-hour urine again.  2. Anemia:  · Hemoglobin on 8/12/2020 was normal at 13.1 with MCV 95.4  · Hemoglobin declined on 8/26/2020 down to 11.3 with MCV 98.1.  Additional evaluation at that time with iron 52, ferritin 66.3, iron saturation 16%, TIBC 322, folate greater than 20, B12 552.  · Suspect that patient has anemia secondary to chronic disease with underlying chronic autoimmune disorder.  She is also receiving weekly methotrexate which may be a contributing factor.    · Hemoglobin today is improved at 12.2.  3. 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 Martell in rheumatology.    · She had been treated previously with Arava, subsequently changed to methotrexate weekly injections.    · She has low disease activity and her disease has been under good control.    · She does have associated Sjogren's syndrome with dry eyes and dry mouth and subsequent dental issues.  · She has chronic pain in her fingers with some degree of sclerodactyly and receives intermittent injections by hand surgery every 4 to 6 months.  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  3. Lumbar spine stenosis and spondylolisthesis:  · Chronic back pain  · Patient followed by Dr. Licea in orthopedics  · Patient continuing on a course of epidural injections  4. Microscopic colitis/ulcerative colitis:  · Patient is followed by Dr. Shook  · Previously treated with Entocort, discontinued around 2018  · Patient with alternating constipation and diarrhea symptoms managed with MiraLAX 1/2-1/3 dose daily.     Plan:    1. Return in 4 months for MD visit.  1 week prior we will draw labs with CBC, CMP, serum protein electrophoresis, immunoelectrophoresis, quantitative immunoglobulins, free serum light chains and the patient will return to 24-hour urine for protein electrophoresis, immunoelectrophoresis.

## 2020-12-08 ENCOUNTER — TRANSCRIBE ORDERS (OUTPATIENT)
Dept: ADMINISTRATIVE | Facility: HOSPITAL | Age: 85
End: 2020-12-08

## 2020-12-08 DIAGNOSIS — Z01.818 OTHER SPECIFIED PRE-OPERATIVE EXAMINATION: Primary | ICD-10-CM

## 2020-12-08 DIAGNOSIS — M34.1 CREST SYNDROME (HCC): Primary | ICD-10-CM

## 2020-12-29 ENCOUNTER — APPOINTMENT (OUTPATIENT)
Dept: PAIN MEDICINE | Facility: HOSPITAL | Age: 85
End: 2020-12-29

## 2021-01-04 ENCOUNTER — ANESTHESIA (OUTPATIENT)
Dept: PAIN MEDICINE | Facility: HOSPITAL | Age: 86
End: 2021-01-04

## 2021-01-04 ENCOUNTER — ANESTHESIA EVENT (OUTPATIENT)
Dept: PAIN MEDICINE | Facility: HOSPITAL | Age: 86
End: 2021-01-04

## 2021-01-04 ENCOUNTER — HOSPITAL ENCOUNTER (OUTPATIENT)
Dept: GENERAL RADIOLOGY | Facility: HOSPITAL | Age: 86
Discharge: HOME | End: 2021-01-04

## 2021-01-04 ENCOUNTER — HOSPITAL ENCOUNTER (OUTPATIENT)
Dept: PAIN MEDICINE | Facility: HOSPITAL | Age: 86
Discharge: HOME | End: 2021-01-04

## 2021-01-04 VITALS
HEART RATE: 73 BPM | RESPIRATION RATE: 16 BRPM | DIASTOLIC BLOOD PRESSURE: 69 MMHG | SYSTOLIC BLOOD PRESSURE: 134 MMHG | TEMPERATURE: 97.8 F | OXYGEN SATURATION: 97 %

## 2021-01-04 DIAGNOSIS — R52 PAIN: ICD-10-CM

## 2021-01-04 DIAGNOSIS — M48.062 SPINAL STENOSIS OF LUMBAR REGION WITH NEUROGENIC CLAUDICATION: ICD-10-CM

## 2021-01-04 PROCEDURE — 77003 FLUOROGUIDE FOR SPINE INJECT: CPT

## 2021-01-04 PROCEDURE — 0 IOPAMIDOL 41 % SOLUTION: Performed by: ANESTHESIOLOGY

## 2021-01-04 PROCEDURE — C1755 CATHETER, INTRASPINAL: HCPCS

## 2021-01-04 PROCEDURE — 25010000002 METHYLPREDNISOLONE PER 80 MG: Performed by: ANESTHESIOLOGY

## 2021-01-04 RX ORDER — LIDOCAINE HYDROCHLORIDE 10 MG/ML
1 INJECTION, SOLUTION INFILTRATION; PERINEURAL ONCE AS NEEDED
Status: DISCONTINUED | OUTPATIENT
Start: 2021-01-04 | End: 2021-01-05 | Stop reason: HOSPADM

## 2021-01-04 RX ORDER — FENTANYL CITRATE 50 UG/ML
50 INJECTION, SOLUTION INTRAMUSCULAR; INTRAVENOUS AS NEEDED
Status: DISCONTINUED | OUTPATIENT
Start: 2021-01-04 | End: 2021-01-05 | Stop reason: HOSPADM

## 2021-01-04 RX ORDER — SODIUM CHLORIDE 0.9 % (FLUSH) 0.9 %
1-10 SYRINGE (ML) INJECTION AS NEEDED
Status: DISCONTINUED | OUTPATIENT
Start: 2021-01-04 | End: 2021-01-05 | Stop reason: HOSPADM

## 2021-01-04 RX ORDER — MIDAZOLAM HYDROCHLORIDE 1 MG/ML
1 INJECTION INTRAMUSCULAR; INTRAVENOUS AS NEEDED
Status: DISCONTINUED | OUTPATIENT
Start: 2021-01-04 | End: 2021-01-05 | Stop reason: HOSPADM

## 2021-01-04 RX ORDER — METHYLPREDNISOLONE ACETATE 80 MG/ML
80 INJECTION, SUSPENSION INTRA-ARTICULAR; INTRALESIONAL; INTRAMUSCULAR; SOFT TISSUE ONCE
Status: COMPLETED | OUTPATIENT
Start: 2021-01-04 | End: 2021-01-04

## 2021-01-04 RX ADMIN — METHYLPREDNISOLONE ACETATE 80 MG: 80 INJECTION, SUSPENSION INTRA-ARTICULAR; INTRALESIONAL; INTRAMUSCULAR; SOFT TISSUE at 10:19

## 2021-01-04 RX ADMIN — IOPAMIDOL 10 ML: 408 INJECTION, SOLUTION INTRATHECAL at 10:18

## 2021-01-04 NOTE — H&P
[facility]    History and Physical    Patient Name: Arlin Hutson  :  1935  MRN:  [id number]  Date of Admission: 2021    Subjective     Patient is a 85 y.o. female presents with chief complaint of chronic low back and hips: left pain.  Onset of symptoms was gradual starting several years ago.  Symptoms are associated/aggravated by nothing in particular. Symptoms improve with injection    The following portions of the patients history were reviewed and updated as appropriate: current medications, allergies, past medical history, past surgical history, past family history, past social history and problem list                Objective     Past Medical History:   Past Medical History:   Diagnosis Date   • Baker's cyst    • Colon polyp    • CREST syndrome (CMS/HCC)    • Fractures    • GERD (gastroesophageal reflux disease)    • History of CT scan of abdomen 2011    constipation, sig tics, 6 mm hepatic cyst   • History of rheumatoid arthritis    • Hyperlipidemia    • Hypertension    • Low back pain    • Normocytic anemia 2020   • Osteoarthritis    • Raynaud's disease    • Rheumatoid arthritis (CMS/HCC)    • Scleroderma (CMS/HCC)    • Sjogren's syndrome (CMS/HCC)    • Ulcerative colitis (CMS/HCC)      Past Surgical History:   Past Surgical History:   Procedure Laterality Date   • CATARACT EXTRACTION, BILATERAL     • COLONOSCOPY  2016    tics, microscopic colitis,    • COLONOSCOPY  2011    sig tics, inflammation, polyp   • DILATATION AND CURETTAGE     • ENDOSCOPY N/A 12/3/2018    erythematous mucosa in stomach, path benign   • EYE SURGERY     • FLEXIBLE SIGMOIDOSCOPY     • FRACTURE SURGERY     • HAND SURGERY     • JOINT REPLACEMENT     • KNEE ARTHROSCOPY Right     w/meniscal repair   • KNEE SURGERY Right    • TEETH EXTRACTION     • TOTAL KNEE ARTHROPLASTY Right 2018    Procedure: TOTAL KNEE ARTHROPLASTY;  Surgeon: Georges Jon MD;  [facility] OR;  Service:  Orthopedics   • UPPER GASTROINTESTINAL ENDOSCOPY  03/14/2008    erythema   • WRIST FRACTURE SURGERY Right    • WRIST SURGERY Right 2008    orif     Family History:   Family History   Problem Relation Age of Onset   • Ulcerative colitis Mother    • Migraines Mother    • Stroke Mother    • Hypertension Mother    • Thyroid disease Mother    • Skin cancer Mother    • Breast cancer Maternal Aunt    • Arthritis Father    • Heart attack Father    • Migraines Sister    • Skin cancer Sister    • Migraines Daughter    • Skin cancer Brother    • Malig Hyperthermia Neg Hx      Social History:   Social History     Tobacco Use   • Smoking status: Never Smoker   • Smokeless tobacco: Never Used   Substance Use Topics   • Alcohol use: Yes     Types: 1 - 3 Glasses of wine per week     Comment: Infrequent   • Drug use: No       Vital Signs Range for the last 24 hours  Temperature: Temp:  [36.6 °C (97.8 °F)] 36.6 °C (97.8 °F)   Temp Source: Temp src: Infrared   BP: BP: (149)/(70) 149/70   Pulse: Heart Rate:  [89] 89   Respirations: Resp:  [16] 16   SPO2: SpO2:  [98 %] 98 %   O2 Amount (l/min):     O2 Devices Device (Oxygen Therapy): room air   Weight:           --------------------------------------------------------------------------------    Current Outpatient Medications   Medication Sig Dispense Refill   • Acetaminophen (TYLENOL EXTRA STRENGTH PO) Take 2 tablets by mouth Daily As Needed.     • amLODIPine (NORVASC) 2.5 MG tablet Take 2.5 mg by mouth Every Morning.     • Ascorbic Acid (VITAMIN C PO) Take 500 mg by mouth Daily.     • calcium carbonate (CALCIUM 600) 600 MG tablet 1 P.O. daily     • CALCIUM EILEEN-MAX PO Take  by mouth.     • CALCIUM PO Take 600 mg by mouth Daily.     • Carboxymethylcell-Glycerin PF 0.5-0.9 % solution Apply 1-2 drops to eye(s) as directed by provider.     • Carboxymethylcell-Hypromellose (GENTEAL OP) Apply 1 application to eye As Needed.     • Cholecalciferol (VITAMIN D PO) Take 2,000 mg by mouth every  night at bedtime.     • diclofenac (VOLTAREN) 1 % gel gel Apply 2 g topically to the appropriate area as directed As Needed.     • folic acid (FOLVITE) 1 MG tablet Take 1 tablet by mouth.     • Methotrexate Sodium (METHOTREXATE PF) 50 MG/2ML chemo syringe Infuse  into a venous catheter 1 (One) Time.     • Multiple Vitamins-Minerals (CENTRUM SILVER ULTRA WOMENS PO) 1 P.O. daily     • omeprazole (priLOSEC) 20 MG capsule 1 TABLET  P.O.  M W F     • polyethyl glycol-propyl glycol (SYSTANE) 0.4-0.3 % solution ophthalmic solution Administer 2 drops to both eyes Every 1 (One) Hour As Needed.     • Polyethylene Glycol 3350 (MIRALAX PO) Take  by mouth As Needed.     • traMADol (ULTRAM) 50 MG tablet Take 50 mg by mouth Every 6 (Six) Hours As Needed for Moderate Pain .     • traZODone (DESYREL) 50 MG tablet Take 25-50 mg by mouth Every Night.     • Turmeric 400 MG capsule 1 P.O. daily       Current Facility-Administered Medications   Medication Dose Route Frequency Provider Last Rate Last Admin   • fentaNYL citrate (PF) (SUBLIMAZE) injection 50 mcg  50 mcg Intravenous PRN Drew Li MD       • iopamidol (ISOVUE-M 200) injection 41%  12 mL Epidural Once in imaging Drew Li MD       • lidocaine (XYLOCAINE) 1 % injection 1 mL  1 mL Intradermal Once PRN Drew Li MD       • methylPREDNISolone acetate (DEPO-medrol) injection 80 mg  80 mg Intra-articular Once Drew Li MD       • midazolam (VERSED) injection 1 mg  1 mg Intravenous PRN Drew Li MD       • sodium chloride 0.9 % flush 1-10 mL  1-10 mL Intravenous PRN Drew Li MD           --------------------------------------------------------------------------------  Assessment/Plan      Anesthesia Evaluation           Pain impairs ability to perform ADLs: Exercise/Activity       Airway   Mallampati: II  Dental - normal exam     Pulmonary - normal exam   Cardiovascular     Rhythm: regular    (-) murmur      Neuro/Psych  (-) left  "straight leg raise test, right straight leg raise test  GI/Hepatic/Renal/Endo      Musculoskeletal         PE comment: Station and gait is slightly antalgic but otherwise narrow based and steady.  Abdominal  - normal exam   Substance History      OB/GYN          Other                   Diagnosis and Plan    Treatment Plan  ASA 3   Patient has had previous injection/procedure with 0% improvement.   Procedures: Lumbar Epidural Steroid Injection(LESI), With fluoroscopy,           Discussed with patient risk and benefits of GHADA including but not limited to : Bleeding, infection, PDPH, inadvertant spinal anesthetic, worsening pain, hyperglycemia, hypertension, CHF, nerve damage, steroid \"toxicity\" and AVN of hips.  Pt agrees to proceed.    Diagnosis     * Lumbar neuritis [M54.16]     * Degeneration of lumbar intervertebral disc [M51.36]                    "

## 2021-01-04 NOTE — ANESTHESIA PROCEDURE NOTES
PAIN Epidural block    Pre-sedation assessment completed: 1/4/2021 10:12 AM    Patient reassessed immediately prior to procedure    Patient location during procedure: pain clinic  Start Time: 1/4/2021 10:12 AM  Stop Time: 1/4/2021 10:20 AM  Indication:at surgeon's request and procedure for pain  Performed By  Anesthesiologist: Drew Li MD  Preanesthetic Checklist  Completed: patient identified, surgical consent, pre-op evaluation, timeout performed, risks and benefits discussed and monitors and equipment checked  Additional Notes  Post-Op Diagnosis Codes:     * Lumbar neuritis (M54.16)     * Degeneration of lumbar intervertebral disc (M51.36)    Prep:  Pt Position:prone  Sterile Tech:sterile barrier, mask and gloves  Prep:chlorhexidine gluconate and isopropyl alcohol  Monitoring:blood pressure monitoring, continuous pulse oximetry and EKG  Procedure:Sedation: no     Approach:left paramedian  Guidance: fluoroscopy  Location:lumbar  Level:L5-S1  Needle Gauge:20 G  Aspiration:negative  Test Dose:negative  Medications:  Depomedrol:80 mg  Preservative Free Saline:2mL    Post Assessment:  Pt Tolerance:patient tolerated the procedure well with no apparent complications  Complications:no

## 2021-01-07 ENCOUNTER — OFFICE VISIT (OUTPATIENT)
Dept: GASTROENTEROLOGY | Facility: CLINIC | Age: 86
End: 2021-01-07

## 2021-01-07 VITALS
DIASTOLIC BLOOD PRESSURE: 70 MMHG | WEIGHT: 104.6 LBS | TEMPERATURE: 98.2 F | BODY MASS INDEX: 20.54 KG/M2 | SYSTOLIC BLOOD PRESSURE: 130 MMHG | HEIGHT: 60 IN

## 2021-01-07 DIAGNOSIS — R19.8 ALTERNATING CONSTIPATION AND DIARRHEA: ICD-10-CM

## 2021-01-07 DIAGNOSIS — R14.0 BLOATING: ICD-10-CM

## 2021-01-07 DIAGNOSIS — R14.0 GASSINESS: ICD-10-CM

## 2021-01-07 DIAGNOSIS — K52.839 MICROSCOPIC COLITIS, UNSPECIFIED MICROSCOPIC COLITIS TYPE: ICD-10-CM

## 2021-01-07 DIAGNOSIS — K63.5 POLYP OF COLON, UNSPECIFIED PART OF COLON, UNSPECIFIED TYPE: ICD-10-CM

## 2021-01-07 DIAGNOSIS — R63.4 WEIGHT LOSS: ICD-10-CM

## 2021-01-07 DIAGNOSIS — K59.00 CONSTIPATION, UNSPECIFIED CONSTIPATION TYPE: Primary | ICD-10-CM

## 2021-01-07 PROCEDURE — 99214 OFFICE O/P EST MOD 30 MIN: CPT | Performed by: INTERNAL MEDICINE

## 2021-01-07 RX ORDER — ANTIARTHRITIC COMBINATION NO.2 900 MG
TABLET ORAL DAILY
COMMUNITY
End: 2022-09-08

## 2021-01-07 NOTE — PROGRESS NOTES
"Chief Complaint  Follow-up and Bloated   I last saw her in the office in 7/20. My assessment and plan was as follows:  Assessment:  1.  Gastroesophageal reflux disease.  2.  History of microscopic colitis.  3.  History of colon polyps.  4. Constipation.  5. Gassiness. She is in an ovarian cancer study at  for years. She sees them yearly.   6. RA on MTX.        Recommendations:  1. We discussed the pros and cons of doing a colonoscopy. She will think about it.   2. Take Miralax 1/2 dose/day.   3. F/u 6 mos.        She last had an EGD in 12/2018.  She last had a colonoscopy 3-4 yrs ago.  She cannot take Miralax 1/2 dose/day. It will cause diarrhea. She has intermittent bloating and gassiness. No abdominal or chest pain. She has lost some weight. She had 7 teeth pulled. She developed diarrhea after being put on antibiotics. She has alternating diarrhea and constipation. She has 3-4 BM/day. No rectal bleeding or melena. She tried a fiber supplement daily and it didn't help the diarrhea/constipation. She tried avvoiding dairy and that didn't help the diarrhea/consitpation.        On [employer] for RA.     Subjective          Arlin Hutson presents to [facility] for   History of Present Illness    Objective   Vital Signs:   /70   Temp 98.2 °F (36.8 °C)   Ht 152.4 cm (60\")   Wt 47.4 kg (104 lb 9.6 oz)   BMI 20.43 kg/m²     Physical Exam  Vitals signs reviewed.   Constitutional:       General: She is not in acute distress.     Appearance: Normal appearance. She is well-developed. She is not diaphoretic.   HENT:      Head: Normocephalic and atraumatic. Hair is normal.      Right Ear: Hearing, tympanic membrane, ear canal and external ear normal. No decreased hearing noted. No drainage.      Left Ear: Hearing, tympanic membrane, ear canal and external ear normal. No decreased hearing noted.      Nose: Nose normal. No nasal deformity.      Mouth/Throat:      Mouth: Mucous membranes " are moist.   Eyes:      General: Lids are normal.         Right eye: No discharge.         Left eye: No discharge.      Extraocular Movements: Extraocular movements intact.      Conjunctiva/sclera: Conjunctivae normal.      Pupils: Pupils are equal, round, and reactive to light.   Neck:      Musculoskeletal: Normal range of motion and neck supple.      Thyroid: No thyromegaly.      Vascular: No JVD.      Trachea: No tracheal deviation.   Cardiovascular:      Rate and Rhythm: Normal rate and regular rhythm.      Pulses: Normal pulses.      Heart sounds: Normal heart sounds. No murmur. No friction rub. No gallop.    Pulmonary:      Effort: Pulmonary effort is normal. No respiratory distress.      Breath sounds: Normal breath sounds. No wheezing or rales.   Chest:      Chest wall: No tenderness.   Abdominal:      General: Bowel sounds are normal. There is no distension.      Palpations: Abdomen is soft. There is no mass.      Tenderness: There is no abdominal tenderness. There is no guarding or rebound.      Hernia: No hernia is present.   Genitourinary:     Rectum: Normal. Guaiac result negative.   Musculoskeletal: Normal range of motion.         General: No tenderness or deformity.   Lymphadenopathy:      Cervical: No cervical adenopathy.   Skin:     General: Skin is warm and dry.      Findings: No erythema or rash.   Neurological:      Mental Status: She is alert and oriented to person, place, and time.      Cranial Nerves: No cranial nerve deficit.      Motor: No abnormal muscle tone.      Coordination: Coordination normal.      Deep Tendon Reflexes: Reflexes are normal and symmetric. Reflexes normal.   Psychiatric:         Mood and Affect: Mood normal.         Behavior: Behavior normal.         Thought Content: Thought content normal.         Judgment: Judgment normal.        Result Review :                 Assessment and Plan    Problem List Items Addressed This Visit     None      Visit Diagnoses      Constipation, unspecified constipation type    -  Primary    Polyp of colon, unspecified part of colon, unspecified type        Gassiness        Relevant Orders    CT Abdomen Pelvis With Contrast    Microscopic colitis, unspecified microscopic colitis type        Bloating        Relevant Orders    CT Abdomen Pelvis With Contrast    Weight loss        Relevant Orders    CT Abdomen Pelvis With Contrast    Alternating constipation and diarrhea            Recommendations:  - CT abd/pelvis - Patient to call for results. If negative consider a barium enema.   - Continue taking fiber daily.   - Stop the Miralax  - f/u 3 mos.       Follow Up   Return in about 3 months (around 4/7/2021).  Patient was given instructions and counseling regarding her condition or for health maintenance advice. Please see specific information pulled into the AVS if appropriate.

## 2021-01-11 ENCOUNTER — LAB (OUTPATIENT)
Dept: LAB | Facility: HOSPITAL | Age: 86
End: 2021-01-11

## 2021-01-11 DIAGNOSIS — Z01.818 OTHER SPECIFIED PRE-OPERATIVE EXAMINATION: ICD-10-CM

## 2021-01-11 PROCEDURE — U0004 COV-19 TEST NON-CDC HGH THRU: HCPCS

## 2021-01-11 PROCEDURE — C9803 HOPD COVID-19 SPEC COLLECT: HCPCS

## 2021-01-12 LAB — SARS-COV-2 RNA RESP QL NAA+PROBE: NOT DETECTED

## 2021-01-13 ENCOUNTER — HOSPITAL ENCOUNTER (OUTPATIENT)
Dept: GENERAL RADIOLOGY | Facility: HOSPITAL | Age: 86
Discharge: HOME | End: 2021-01-13

## 2021-01-13 ENCOUNTER — APPOINTMENT (OUTPATIENT)
Dept: CARDIOLOGY | Facility: HOSPITAL | Age: 86
End: 2021-01-13

## 2021-01-13 ENCOUNTER — HOSPITAL ENCOUNTER (OUTPATIENT)
Dept: RESPIRATORY THERAPY | Facility: HOSPITAL | Age: 86
Discharge: HOME | End: 2021-01-13

## 2021-01-13 ENCOUNTER — HOSPITAL ENCOUNTER (OUTPATIENT)
Dept: CARDIOLOGY | Facility: HOSPITAL | Age: 86
Discharge: HOME | End: 2021-01-13

## 2021-01-13 VITALS
WEIGHT: 104 LBS | SYSTOLIC BLOOD PRESSURE: 132 MMHG | DIASTOLIC BLOOD PRESSURE: 76 MMHG | HEIGHT: 60 IN | BODY MASS INDEX: 20.42 KG/M2

## 2021-01-13 DIAGNOSIS — M34.1 CREST SYNDROME (HCC): ICD-10-CM

## 2021-01-13 DIAGNOSIS — M34.1 CR(E)ST SYNDROME (HCC): ICD-10-CM

## 2021-01-13 DIAGNOSIS — M35.1 OTHER OVERLAP SYNDROMES (HCC): ICD-10-CM

## 2021-01-13 DIAGNOSIS — I73.00 RAYNAUD'S SYNDROME WITHOUT GANGRENE: ICD-10-CM

## 2021-01-13 LAB
AORTIC DIMENSIONLESS INDEX: 0.7 (DI)
BDY SITE: ABNORMAL
BH CV ECHO MEAS - ACS: 1.5 CM
BH CV ECHO MEAS - AO MAX PG: 5 MMHG
BH CV ECHO MEAS - AO MEAN PG (FULL): 2 MMHG
BH CV ECHO MEAS - AO MEAN PG: 3 MMHG
BH CV ECHO MEAS - AO ROOT AREA (BSA CORRECTED): 1.5
BH CV ECHO MEAS - AO ROOT AREA: 3.5 CM^2
BH CV ECHO MEAS - AO ROOT DIAM: 2.1 CM
BH CV ECHO MEAS - AO V2 MAX: 115 CM/SEC
BH CV ECHO MEAS - AO V2 MEAN: 82.3 CM/SEC
BH CV ECHO MEAS - AO V2 VTI: 29 CM
BH CV ECHO MEAS - AVA(I,A): 1.8 CM^2
BH CV ECHO MEAS - AVA(I,D): 1.8 CM^2
BH CV ECHO MEAS - BSA(HAYCOCK): 1.4 M^2
BH CV ECHO MEAS - BSA: 1.4 M^2
BH CV ECHO MEAS - BZI_BMI: 20.3 KILOGRAMS/M^2
BH CV ECHO MEAS - BZI_METRIC_HEIGHT: 152.4 CM
BH CV ECHO MEAS - BZI_METRIC_WEIGHT: 47.2 KG
BH CV ECHO MEAS - EDV(CUBED): 68.9 ML
BH CV ECHO MEAS - EDV(MOD-SP2): 79 ML
BH CV ECHO MEAS - EDV(MOD-SP4): 100 ML
BH CV ECHO MEAS - EDV(TEICH): 74.2 ML
BH CV ECHO MEAS - EF(CUBED): 64.6 %
BH CV ECHO MEAS - EF(MOD-BP): 64 %
BH CV ECHO MEAS - EF(MOD-SP2): 65.8 %
BH CV ECHO MEAS - EF(MOD-SP4): 65 %
BH CV ECHO MEAS - EF(TEICH): 56.6 %
BH CV ECHO MEAS - ESV(CUBED): 24.4 ML
BH CV ECHO MEAS - ESV(MOD-SP2): 27 ML
BH CV ECHO MEAS - ESV(MOD-SP4): 35 ML
BH CV ECHO MEAS - ESV(TEICH): 32.2 ML
BH CV ECHO MEAS - FS: 29.3 %
BH CV ECHO MEAS - IVS/LVPW: 1
BH CV ECHO MEAS - IVSD: 1.1 CM
BH CV ECHO MEAS - LAT PEAK E' VEL: 5.9 CM/SEC
BH CV ECHO MEAS - LV DIASTOLIC VOL/BSA (35-75): 70.7 ML/M^2
BH CV ECHO MEAS - LV MASS(C)D: 151.3 GRAMS
BH CV ECHO MEAS - LV MASS(C)DI: 107 GRAMS/M^2
BH CV ECHO MEAS - LV MAX PG: 2.9 MMHG
BH CV ECHO MEAS - LV MEAN PG: 1 MMHG
BH CV ECHO MEAS - LV SYSTOLIC VOL/BSA (12-30): 24.8 ML/M^2
BH CV ECHO MEAS - LV V1 MAX: 85 CM/SEC
BH CV ECHO MEAS - LV V1 MEAN: 56.2 CM/SEC
BH CV ECHO MEAS - LV V1 VTI: 20.4 CM
BH CV ECHO MEAS - LVIDD: 4.1 CM
BH CV ECHO MEAS - LVIDS: 2.9 CM
BH CV ECHO MEAS - LVLD AP2: 6.7 CM
BH CV ECHO MEAS - LVLD AP4: 6.9 CM
BH CV ECHO MEAS - LVLS AP2: 5.4 CM
BH CV ECHO MEAS - LVLS AP4: 5.9 CM
BH CV ECHO MEAS - LVOT AREA (M): 2.5 CM^2
BH CV ECHO MEAS - LVOT AREA: 2.5 CM^2
BH CV ECHO MEAS - LVOT DIAM: 1.8 CM
BH CV ECHO MEAS - LVPWD: 1.1 CM
BH CV ECHO MEAS - MED PEAK E' VEL: 4.83 CM/SEC
BH CV ECHO MEAS - MR MAX PG: 42.5 MMHG
BH CV ECHO MEAS - MR MAX VEL: 326 CM/SEC
BH CV ECHO MEAS - MV A MAX VEL: 121 CM/SEC
BH CV ECHO MEAS - MV DEC SLOPE: 347 CM/SEC^2
BH CV ECHO MEAS - MV DEC TIME: 282 SEC
BH CV ECHO MEAS - MV E MAX VEL: 81.9 CM/SEC
BH CV ECHO MEAS - MV E/A: 0.68
BH CV ECHO MEAS - MV MEAN PG: 1 MMHG
BH CV ECHO MEAS - MV P1/2T MAX VEL: 102 CM/SEC
BH CV ECHO MEAS - MV P1/2T: 86.1 MSEC
BH CV ECHO MEAS - MV V2 MEAN: 55.1 CM/SEC
BH CV ECHO MEAS - MV V2 VTI: 28.3 CM
BH CV ECHO MEAS - MVA P1/2T LCG: 2.2 CM^2
BH CV ECHO MEAS - MVA(P1/2T): 2.6 CM^2
BH CV ECHO MEAS - MVA(VTI): 1.8 CM^2
BH CV ECHO MEAS - PA MAX PG (FULL): 0.25 MMHG
BH CV ECHO MEAS - PA MAX PG: 1.4 MMHG
BH CV ECHO MEAS - PA V2 MAX: 58.2 CM/SEC
BH CV ECHO MEAS - PVA(V,A): 2.3 CM^2
BH CV ECHO MEAS - PVA(V,D): 2.3 CM^2
BH CV ECHO MEAS - QP/QS: 0.45
BH CV ECHO MEAS - RAP SYSTOLE: 3 MMHG
BH CV ECHO MEAS - RV MAX PG: 1.1 MMHG
BH CV ECHO MEAS - RV MEAN PG: 1 MMHG
BH CV ECHO MEAS - RV V1 MAX: 52.4 CM/SEC
BH CV ECHO MEAS - RV V1 MEAN: 39.4 CM/SEC
BH CV ECHO MEAS - RV V1 VTI: 9.2 CM
BH CV ECHO MEAS - RVOT AREA: 2.5 CM^2
BH CV ECHO MEAS - RVOT DIAM: 1.8 CM
BH CV ECHO MEAS - RVSP: 23 MMHG
BH CV ECHO MEAS - SI(AO): 71.1 ML/M^2
BH CV ECHO MEAS - SI(CUBED): 31.5 ML/M^2
BH CV ECHO MEAS - SI(LVOT): 36.7 ML/M^2
BH CV ECHO MEAS - SI(MOD-SP2): 36.8 ML/M^2
BH CV ECHO MEAS - SI(MOD-SP4): 46 ML/M^2
BH CV ECHO MEAS - SI(TEICH): 29.7 ML/M^2
BH CV ECHO MEAS - SV(AO): 100.4 ML
BH CV ECHO MEAS - SV(CUBED): 44.5 ML
BH CV ECHO MEAS - SV(LVOT): 51.9 ML
BH CV ECHO MEAS - SV(MOD-SP2): 52 ML
BH CV ECHO MEAS - SV(MOD-SP4): 65 ML
BH CV ECHO MEAS - SV(RVOT): 23.3 ML
BH CV ECHO MEAS - SV(TEICH): 42 ML
BH CV ECHO MEAS - TAPSE (>1.6): 1.9 CM
BH CV ECHO MEAS - TR MAX VEL: 225 CM/SEC
BH CV ECHO MEASUREMENTS AVERAGE E/E' RATIO: 15.27
BH CV VAS BP LEFT ARM: NORMAL MMHG
BH CV XLRA - TDI S': 12.1 CM/SEC
HGB BLDA-MCNC: 11.3 G/DL (ref 12–18)
LEFT ATRIUM VOLUME INDEX: 39 ML/M2
MAXIMAL PREDICTED HEART RATE: 135 BPM
SINUS: 2.5 CM
STJ: 2.2 CM
STRESS TARGET HR: 115 BPM

## 2021-01-13 PROCEDURE — 82820 HEMOGLOBIN-OXYGEN AFFINITY: CPT | Performed by: INTERNAL MEDICINE

## 2021-01-13 PROCEDURE — 25010000002 PERFLUTREN (DEFINITY) 8.476 MG IN SODIUM CHLORIDE 0.9 % 10 ML INJECTION: Performed by: INTERNAL MEDICINE

## 2021-01-13 PROCEDURE — 93306 TTE W/DOPPLER COMPLETE: CPT

## 2021-01-13 PROCEDURE — 93306 TTE W/DOPPLER COMPLETE: CPT | Performed by: INTERNAL MEDICINE

## 2021-01-13 PROCEDURE — 94010 BREATHING CAPACITY TEST: CPT

## 2021-01-13 PROCEDURE — 94729 DIFFUSING CAPACITY: CPT

## 2021-01-13 PROCEDURE — 71046 X-RAY EXAM CHEST 2 VIEWS: CPT

## 2021-01-13 PROCEDURE — 94726 PLETHYSMOGRAPHY LUNG VOLUMES: CPT

## 2021-01-13 RX ADMIN — SODIUM CHLORIDE 3 ML: 9 INJECTION INTRAVENOUS at 13:00

## 2021-01-27 ENCOUNTER — HOSPITAL ENCOUNTER (OUTPATIENT)
Dept: CT IMAGING | Facility: HOSPITAL | Age: 86
Discharge: HOME | End: 2021-01-27
Admitting: INTERNAL MEDICINE

## 2021-01-27 DIAGNOSIS — R14.0 GASSINESS: ICD-10-CM

## 2021-01-27 DIAGNOSIS — R14.0 BLOATING: ICD-10-CM

## 2021-01-27 DIAGNOSIS — R63.4 WEIGHT LOSS: ICD-10-CM

## 2021-01-27 LAB — CREAT BLDA-MCNC: 0.6 MG/DL (ref 0.6–1.3)

## 2021-01-27 PROCEDURE — 25010000002 IOPAMIDOL 61 % SOLUTION: Performed by: INTERNAL MEDICINE

## 2021-01-27 PROCEDURE — 82565 ASSAY OF CREATININE: CPT

## 2021-01-27 PROCEDURE — 74177 CT ABD & PELVIS W/CONTRAST: CPT

## 2021-01-27 RX ADMIN — IOPAMIDOL 100 ML: 612 INJECTION, SOLUTION INTRAVENOUS at 16:00

## 2021-01-28 NOTE — PROGRESS NOTES
01/28/21  I called her with the results of this CAT scan of the abdomen and pelvis.  Please fax a copy of this report to her PCP.  Dolly osman

## 2021-02-04 ENCOUNTER — TELEPHONE (OUTPATIENT)
Dept: GASTROENTEROLOGY | Facility: CLINIC | Age: 86
End: 2021-02-04

## 2021-02-04 NOTE — TELEPHONE ENCOUNTER
----- Message from Edilberto Shook MD sent at 1/28/2021 12:38 PM EST -----  01/28/21  I called her with the results of this CAT scan of the abdomen and pelvis.  Please fax a copy of this report to her PCP.  Dolly kjlemuel

## 2021-03-02 DIAGNOSIS — Z23 IMMUNIZATION DUE: ICD-10-CM

## 2021-03-04 ENCOUNTER — LAB (OUTPATIENT)
Dept: LAB | Facility: HOSPITAL | Age: 86
End: 2021-03-04

## 2021-03-04 DIAGNOSIS — D47.2 MGUS (MONOCLONAL GAMMOPATHY OF UNKNOWN SIGNIFICANCE): ICD-10-CM

## 2021-03-04 DIAGNOSIS — D64.9 NORMOCYTIC ANEMIA: ICD-10-CM

## 2021-03-04 LAB
ALBUMIN SERPL-MCNC: 4.2 G/DL (ref 3.5–5.2)
ALBUMIN/GLOB SERPL: 1.5 G/DL (ref 1.1–2.4)
ALP SERPL-CCNC: 94 U/L (ref 38–116)
ALT SERPL W P-5'-P-CCNC: 18 U/L (ref 0–33)
ANION GAP SERPL CALCULATED.3IONS-SCNC: 10.6 MMOL/L (ref 5–15)
AST SERPL-CCNC: 21 U/L (ref 0–32)
BASOPHILS # BLD AUTO: 0.05 10*3/MM3 (ref 0–0.2)
BASOPHILS NFR BLD AUTO: 1 % (ref 0–1.5)
BILIRUB SERPL-MCNC: 0.2 MG/DL (ref 0.2–1.2)
BUN SERPL-MCNC: 17 MG/DL (ref 6–20)
BUN/CREAT SERPL: 26.2 (ref 7.3–30)
CALCIUM SPEC-SCNC: 9.2 MG/DL (ref 8.5–10.2)
CHLORIDE SERPL-SCNC: 102 MMOL/L (ref 98–107)
CO2 SERPL-SCNC: 27.4 MMOL/L (ref 22–29)
CREAT SERPL-MCNC: 0.65 MG/DL (ref 0.6–1.1)
DEPRECATED RDW RBC AUTO: 52.6 FL (ref 37–54)
EOSINOPHIL # BLD AUTO: 0.21 10*3/MM3 (ref 0–0.4)
EOSINOPHIL NFR BLD AUTO: 4.3 % (ref 0.3–6.2)
ERYTHROCYTE [DISTWIDTH] IN BLOOD BY AUTOMATED COUNT: 15 % (ref 12.3–15.4)
GFR SERPL CREATININE-BSD FRML MDRD: 87 ML/MIN/1.73
GLOBULIN UR ELPH-MCNC: 2.8 GM/DL (ref 1.8–3.5)
GLUCOSE SERPL-MCNC: 84 MG/DL (ref 74–124)
HCT VFR BLD AUTO: 36.4 % (ref 34–46.6)
HGB BLD-MCNC: 11.8 G/DL (ref 12–15.9)
IMM GRANULOCYTES # BLD AUTO: 0.02 10*3/MM3 (ref 0–0.05)
IMM GRANULOCYTES NFR BLD AUTO: 0.4 % (ref 0–0.5)
LYMPHOCYTES # BLD AUTO: 1.06 10*3/MM3 (ref 0.7–3.1)
LYMPHOCYTES NFR BLD AUTO: 21.6 % (ref 19.6–45.3)
MCH RBC QN AUTO: 31.3 PG (ref 26.6–33)
MCHC RBC AUTO-ENTMCNC: 32.4 G/DL (ref 31.5–35.7)
MCV RBC AUTO: 96.6 FL (ref 79–97)
MONOCYTES # BLD AUTO: 0.54 10*3/MM3 (ref 0.1–0.9)
MONOCYTES NFR BLD AUTO: 11 % (ref 5–12)
NEUTROPHILS NFR BLD AUTO: 3.03 10*3/MM3 (ref 1.7–7)
NEUTROPHILS NFR BLD AUTO: 61.7 % (ref 42.7–76)
NRBC BLD AUTO-RTO: 0 /100 WBC (ref 0–0.2)
PLATELET # BLD AUTO: 210 10*3/MM3 (ref 140–450)
PMV BLD AUTO: 9.2 FL (ref 6–12)
POTASSIUM SERPL-SCNC: 4 MMOL/L (ref 3.5–4.7)
PROT SERPL-MCNC: 7 G/DL (ref 6.3–8)
RBC # BLD AUTO: 3.77 10*6/MM3 (ref 3.77–5.28)
SODIUM SERPL-SCNC: 140 MMOL/L (ref 134–145)
WBC # BLD AUTO: 4.91 10*3/MM3 (ref 3.4–10.8)

## 2021-03-04 PROCEDURE — 36415 COLL VENOUS BLD VENIPUNCTURE: CPT

## 2021-03-04 PROCEDURE — 85025 COMPLETE CBC W/AUTO DIFF WBC: CPT

## 2021-03-04 PROCEDURE — 80053 COMPREHEN METABOLIC PANEL: CPT

## 2021-03-05 LAB
ALBUMIN SERPL ELPH-MCNC: 3.5 G/DL (ref 2.9–4.4)
ALBUMIN/GLOB SERPL: 1.1 {RATIO} (ref 0.7–1.7)
ALPHA1 GLOB SERPL ELPH-MCNC: 0.3 G/DL (ref 0–0.4)
ALPHA2 GLOB SERPL ELPH-MCNC: 0.7 G/DL (ref 0.4–1)
B-GLOBULIN SERPL ELPH-MCNC: 1.4 G/DL (ref 0.7–1.3)
GAMMA GLOB SERPL ELPH-MCNC: 0.8 G/DL (ref 0.4–1.8)
GLOBULIN SER-MCNC: 3.2 G/DL (ref 2.2–3.9)
IGA SERPL-MCNC: 610 MG/DL (ref 64–422)
IGG SERPL-MCNC: 680 MG/DL (ref 586–1602)
IGM SERPL-MCNC: 110 MG/DL (ref 26–217)
INTERPRETATION SERPL IEP-IMP: ABNORMAL
KAPPA LC FREE SER-MCNC: 85.9 MG/L (ref 3.3–19.4)
KAPPA LC FREE/LAMBDA FREE SER: 7.1 {RATIO} (ref 0.26–1.65)
LABORATORY COMMENT REPORT: ABNORMAL
LAMBDA LC FREE SERPL-MCNC: 12.1 MG/L (ref 5.7–26.3)
M PROTEIN SERPL ELPH-MCNC: ABNORMAL G/DL
PROT SERPL-MCNC: 6.7 G/DL (ref 6–8.5)

## 2021-03-08 LAB
ALBUMIN 24H MFR UR ELPH: 25.6 %
ALPHA1 GLOB 24H MFR UR ELPH: 3.6 %
ALPHA2 GLOB 24H MFR UR ELPH: 7.1 %
B-GLOBULIN MFR UR ELPH: 15.5 %
GAMMA GLOB 24H MFR UR ELPH: 48.2 %
HIV 1 & 2 AB SER-IMP: ABNORMAL
INTERPRETATION UR IFE-IMP: ABNORMAL
M PROTEIN 24H MFR UR ELPH: 35.7 %
M PROTEIN 24H UR ELPH-MRATE: 113 MG/24 HR
PROT 24H UR-MRATE: 316 MG/24 HR (ref 30–150)
PROT UR-MCNC: 11.5 MG/DL

## 2021-03-11 ENCOUNTER — APPOINTMENT (OUTPATIENT)
Dept: LAB | Facility: HOSPITAL | Age: 86
End: 2021-03-11

## 2021-03-11 ENCOUNTER — OFFICE VISIT (OUTPATIENT)
Dept: ONCOLOGY | Facility: CLINIC | Age: 86
End: 2021-03-11

## 2021-03-11 VITALS
HEIGHT: 60 IN | TEMPERATURE: 97.5 F | SYSTOLIC BLOOD PRESSURE: 152 MMHG | WEIGHT: 106.5 LBS | DIASTOLIC BLOOD PRESSURE: 74 MMHG | BODY MASS INDEX: 20.91 KG/M2 | OXYGEN SATURATION: 97 % | RESPIRATION RATE: 18 BRPM | HEART RATE: 83 BPM

## 2021-03-11 DIAGNOSIS — D64.9 NORMOCYTIC ANEMIA: ICD-10-CM

## 2021-03-11 DIAGNOSIS — D47.2 MGUS (MONOCLONAL GAMMOPATHY OF UNKNOWN SIGNIFICANCE): Primary | ICD-10-CM

## 2021-03-11 PROCEDURE — 99214 OFFICE O/P EST MOD 30 MIN: CPT | Performed by: INTERNAL MEDICINE

## 2021-03-11 NOTE — PROGRESS NOTES
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  In the interval since her last visit, the patient had been continuing with intermittent diarrhea and constipation.  She was seen by GI and underwent CT of the abdomen and pelvis on 1/27/2021 which showed no significant findings to explain her symptoms.  More recently she has been using FiberCon 2/day and reports that her bowels are somewhat improved, currently having soft bowel movements.  She does note some mild chronic fatigue.  She has noted some intermittent numbness in her feet, left greater than right however it appears that some of her symptoms may be associated with Raynaud's type changes.  Previous B12 level was normal.  She has never been seen by neurology.      Objective   Vital Signs:   /74   Pulse 83   Temp 97.5 °F (36.4 °C) (Temporal)   Resp 18   Ht 152.4 cm (60\")   Wt 48.3 kg (106 lb 8 oz)   SpO2 97%   BMI 20.80 kg/m²     Physical Exam  Constitutional:       Appearance: She is well-developed.   Eyes:      Conjunctiva/sclera: Conjunctivae normal.   Neck:      Thyroid: No thyromegaly.   Cardiovascular:      Rate and Rhythm: Normal rate and regular rhythm.      Heart sounds: No murmur. No friction rub. No gallop.    Pulmonary:      Effort: No respiratory distress.      Breath sounds: Normal breath sounds.   Abdominal:      General: Bowel sounds are normal. There is no distension.      Palpations: Abdomen is soft.      Tenderness: There is no abdominal tenderness.   Musculoskeletal:      Comments: Sclerodactyly involving the hands   Lymphadenopathy:      Head:      Right side of head: No submandibular adenopathy.      Cervical: No cervical adenopathy.      Upper Body:      Right upper body: No supraclavicular adenopathy.    "   Left upper body: No supraclavicular adenopathy.   Skin:     General: Skin is warm and dry.      Findings: No rash.   Neurological:      Mental Status: She is alert and oriented to person, place, and time.      Cranial Nerves: No cranial nerve deficit.      Motor: No abnormal muscle tone.      Deep Tendon Reflexes: Reflexes normal.   Psychiatric:         Behavior: Behavior normal.        Result Review : Reviewed labs from 3/4/2021 with CBC, CMP, serum protein electrophoresis, immunoelectrophoresis, quantitative immunoglobulins, free serum light chains.  Reviewed 24-hour urine protein electrophoresis, immunoelectrophoresis.  Reviewed CT abdomen and pelvis from 1/27/2021.  Reviewed progress note from GI visit.       Assessment and Plan   1. 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  · Laboratory studies 2/12/2020 showed a creatinine of 0.61, calcium 9.2, globulin 2.5, albumin 4.3, serum protein electrophoresis with a prominent protein band in the beta region, could not rule out monoclonal protein.  · CBC on 4/14/2020 with WBC 6.0 with normal differential, hemoglobin 12.0, platelet count 255,000.  · Subsequent labs with Dr. Rust on 7/1/2020 showed an immunoelectrophoresis confirming an IgA kappa monoclonal protein with IgA 658, IgG 7 or 24, IgM 125.  Creatinine was normal at 0.61 with calcium 9.5.    · Patient was referred to our office for further evaluation.  · At time of initial visit 8/12/2020, hemoglobin 13.1, platelet count 200,000, creatinine 0.54 with calcium of 9.5.  · Additional labs 8/12/2020 with dual IgA kappa M spike (0.6 g and secondary M spike too small to quantify), IgA 652, IgG 743, IgM 126, free kappa light chain 87.1, free lambda light chain 12.1, free light chain ratio 7.0.  · 24-hour urine 8/17/2020 with 410 mg total protein, 40.6%  kappa light chain (166 mg).  · Skeletal survey 8/13/2020 with no evidence of lytic lesions.  · Patient returns today in follow-up with repeat labs and 24-hour urine to review at a 4-month interval.  Clinically she is very stable with no new issues, multiple chronic problems with back pain, arthralgias, dry eyes.  We reviewed her labs from 3/4/2021 showing a dual IgA kappa M spike with a dominant band increased slightly from 0.4 up to 0.6 g and a minor band too small to quantify.  IgA decreased at 610, IgG was 680, IgM 110.  Free light chains showed a a decrease in free kappa at 85.9, free lambda 12.1, free light chain ratio 7.1.  24-hour urine showed a decrease in total protein from 361 down to 316 mg with slight increase in monoclonal percentage from 24% up to 35.7%.  She has a stable hemoglobin, no evidence of thrombocytopenia, no evidence of renal dysfunction or hypercalcemia.  We will plan to repeat studies again in 6 months and will include 24-hour urine again.  2. Anemia:  · Hemoglobin on 8/12/2020 was normal at 13.1 with MCV 95.4  · Hemoglobin declined on 8/26/2020 down to 11.3 with MCV 98.1.  Additional evaluation at that time with iron 52, ferritin 66.3, iron saturation 16%, TIBC 322, folate greater than 20, B12 552.  · Suspect that patient has anemia secondary to chronic disease with underlying chronic autoimmune disorder.  She is also receiving weekly methotrexate which may be a contributing factor.    · Hemoglobin from 3/4/2021 was 11.8  3. 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 Martell in rheumatology.    · She had been treated previously with Arava, subsequently changed to methotrexate weekly injections.    · She has low disease activity and her disease has been under good control.    · She does have associated Sjogren's syndrome with dry eyes and dry mouth and subsequent  dental issues.  · She has chronic pain in her fingers with some degree of sclerodactyly and receives intermittent injections by hand surgery every 4 to 6 months.  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  3. Lumbar spine stenosis and spondylolisthesis:  · Chronic back pain  · Patient followed by Dr. Licea in orthopedics  · Patient continuing on a course of epidural injections  4. Microscopic colitis/ulcerative colitis:  · Patient is followed by Dr. Shook  · Previously treated with Entocort, discontinued around 2018  · Patient did follow-up with GI in January.  She underwent CT abdomen and pelvis on 1/27/2021 which was unrevealing.  She notes that symptoms are somewhat improved currently using FiberCon 2/day.  We did discuss that her last colonoscopy was in 2016, last EGD was in 2018.  Given her underlying MGUS I will request that biopsy specimen to be tested for Congo red to evaluate for amyloidosis.  5. Possible peripheral neuropathy  · Patient reports that she has had some longstanding intermittent symptoms in her feet with numbness that does wax and wane.  It sounds as if her symptoms are associated with Raynaud's type changes, unclear whether this represents a true peripheral neuropathy.  It is not a consistent finding.  Previous B12 level normal at 552 on 8/26/2020.  The patient will discuss this with Dr. Rust in regards to possible referral to neurology.  Unclear whether symptoms could be in part related to her underlying MGUS.     Plan:     1. We will request that previous biopsies from colonoscopy in 2016 and EGD in 2018 be sent for Congo red analysis (to rule out amyloidosis).  2. Return in 6 months for MD visit.  1 week prior we will draw labs with CBC, CMP, serum protein electrophoresis, immunoelectrophoresis, quantitative immunoglobulins, free serum light chains and the patient will return to 24-hour urine for protein electrophoresis,  immunoelectrophoresis.

## 2021-03-16 ENCOUNTER — LAB REQUISITION (OUTPATIENT)
Dept: LAB | Facility: HOSPITAL | Age: 86
End: 2021-03-16

## 2021-03-16 DIAGNOSIS — K52.839 MICROSCOPIC COLITIS, UNSPECIFIED: ICD-10-CM

## 2021-03-16 PROCEDURE — 88313 SPECIAL STAINS GROUP 2: CPT | Performed by: INTERNAL MEDICINE

## 2021-03-18 LAB
CYTO UR: NORMAL
LAB AP CASE REPORT: NORMAL
PATH REPORT.ADDENDUM SPEC: NORMAL
PATH REPORT.FINAL DX SPEC: NORMAL
PATH REPORT.GROSS SPEC: NORMAL

## 2021-03-22 ENCOUNTER — HOSPITAL ENCOUNTER (INPATIENT)
Facility: HOSPITAL | Age: 86
LOS: 3 days | Discharge: HOME | End: 2021-03-25
Attending: EMERGENCY MEDICINE | Admitting: STUDENT IN AN ORGANIZED HEALTH CARE EDUCATION/TRAINING PROGRAM

## 2021-03-22 ENCOUNTER — APPOINTMENT (OUTPATIENT)
Dept: CARDIOLOGY | Facility: HOSPITAL | Age: 86
End: 2021-03-22

## 2021-03-22 DIAGNOSIS — I70.90 ARTERIAL OCCLUSION: Primary | ICD-10-CM

## 2021-03-22 LAB
ANION GAP SERPL CALCULATED.3IONS-SCNC: 8.5 MMOL/L (ref 5–15)
APTT PPP: 30.2 SECONDS (ref 22.7–35.4)
BASOPHILS # BLD AUTO: 0.04 10*3/MM3 (ref 0–0.2)
BASOPHILS # BLD AUTO: 0.07 10*3/MM3 (ref 0–0.2)
BASOPHILS NFR BLD AUTO: 0.6 % (ref 0–1.5)
BASOPHILS NFR BLD AUTO: 1.1 % (ref 0–1.5)
BH CV LOWER ARTERIAL LEFT ABI RATIO: 1
BH CV LOWER ARTERIAL LEFT DORSALIS PEDIS SYS MAX: 192 MMHG
BH CV LOWER ARTERIAL LEFT GREAT TOE SYS MAX: 113 MMHG
BH CV LOWER ARTERIAL LEFT POST TIBIAL SYS MAX: 189 MMHG
BH CV LOWER ARTERIAL LEFT TBI RATIO: 0.6
BH CV LOWER ARTERIAL RIGHT ABI RATIO: 0
BH CV LOWER ARTERIAL RIGHT DORSALIS PEDIS SYS MAX: 0 MMHG
BH CV LOWER ARTERIAL RIGHT GREAT TOE SYS MAX: 0 MMHG
BH CV LOWER ARTERIAL RIGHT POST TIBIAL SYS MAX: 0 MMHG
BH CV LOWER ARTERIAL RIGHT TBI RATIO: 0
BUN SERPL-MCNC: 16 MG/DL (ref 8–23)
BUN/CREAT SERPL: 28.6 (ref 7–25)
CALCIUM SPEC-SCNC: 8.8 MG/DL (ref 8.6–10.5)
CHLORIDE SERPL-SCNC: 105 MMOL/L (ref 98–107)
CO2 SERPL-SCNC: 28.5 MMOL/L (ref 22–29)
CREAT SERPL-MCNC: 0.56 MG/DL (ref 0.57–1)
DEPRECATED RDW RBC AUTO: 46.7 FL (ref 37–54)
DEPRECATED RDW RBC AUTO: 48.5 FL (ref 37–54)
EOSINOPHIL # BLD AUTO: 0.16 10*3/MM3 (ref 0–0.4)
EOSINOPHIL # BLD AUTO: 0.19 10*3/MM3 (ref 0–0.4)
EOSINOPHIL NFR BLD AUTO: 2.5 % (ref 0.3–6.2)
EOSINOPHIL NFR BLD AUTO: 3.1 % (ref 0.3–6.2)
ERYTHROCYTE [DISTWIDTH] IN BLOOD BY AUTOMATED COUNT: 14.1 % (ref 12.3–15.4)
ERYTHROCYTE [DISTWIDTH] IN BLOOD BY AUTOMATED COUNT: 14.1 % (ref 12.3–15.4)
GFR SERPL CREATININE-BSD FRML MDRD: 103 ML/MIN/1.73
GLUCOSE SERPL-MCNC: 83 MG/DL (ref 65–99)
HCT VFR BLD AUTO: 34.8 % (ref 34–46.6)
HCT VFR BLD AUTO: 35.6 % (ref 34–46.6)
HGB BLD-MCNC: 11.6 G/DL (ref 12–15.9)
HGB BLD-MCNC: 12.1 G/DL (ref 12–15.9)
IMM GRANULOCYTES # BLD AUTO: 0.02 10*3/MM3 (ref 0–0.05)
IMM GRANULOCYTES # BLD AUTO: 0.02 10*3/MM3 (ref 0–0.05)
IMM GRANULOCYTES NFR BLD AUTO: 0.3 % (ref 0–0.5)
IMM GRANULOCYTES NFR BLD AUTO: 0.3 % (ref 0–0.5)
INR PPP: 1.02 (ref 0.9–1.1)
LYMPHOCYTES # BLD AUTO: 1.37 10*3/MM3 (ref 0.7–3.1)
LYMPHOCYTES # BLD AUTO: 1.37 10*3/MM3 (ref 0.7–3.1)
LYMPHOCYTES NFR BLD AUTO: 21.5 % (ref 19.6–45.3)
LYMPHOCYTES NFR BLD AUTO: 22.2 % (ref 19.6–45.3)
MCH RBC QN AUTO: 31.6 PG (ref 26.6–33)
MCH RBC QN AUTO: 31.8 PG (ref 26.6–33)
MCHC RBC AUTO-ENTMCNC: 33.3 G/DL (ref 31.5–35.7)
MCHC RBC AUTO-ENTMCNC: 34 G/DL (ref 31.5–35.7)
MCV RBC AUTO: 93.7 FL (ref 79–97)
MCV RBC AUTO: 94.8 FL (ref 79–97)
MONOCYTES # BLD AUTO: 0.44 10*3/MM3 (ref 0.1–0.9)
MONOCYTES # BLD AUTO: 0.6 10*3/MM3 (ref 0.1–0.9)
MONOCYTES NFR BLD AUTO: 7.1 % (ref 5–12)
MONOCYTES NFR BLD AUTO: 9.4 % (ref 5–12)
NEUTROPHILS NFR BLD AUTO: 4.12 10*3/MM3 (ref 1.7–7)
NEUTROPHILS NFR BLD AUTO: 4.16 10*3/MM3 (ref 1.7–7)
NEUTROPHILS NFR BLD AUTO: 65.2 % (ref 42.7–76)
NEUTROPHILS NFR BLD AUTO: 66.7 % (ref 42.7–76)
NRBC BLD AUTO-RTO: 0 /100 WBC (ref 0–0.2)
NRBC BLD AUTO-RTO: 0 /100 WBC (ref 0–0.2)
PLATELET # BLD AUTO: 241 10*3/MM3 (ref 140–450)
PLATELET # BLD AUTO: 248 10*3/MM3 (ref 140–450)
PMV BLD AUTO: 10.2 FL (ref 6–12)
PMV BLD AUTO: 10.4 FL (ref 6–12)
POTASSIUM SERPL-SCNC: 4.2 MMOL/L (ref 3.5–5.2)
PROTHROMBIN TIME: 13.2 SECONDS (ref 11.7–14.2)
RBC # BLD AUTO: 3.67 10*6/MM3 (ref 3.77–5.28)
RBC # BLD AUTO: 3.8 10*6/MM3 (ref 3.77–5.28)
SODIUM SERPL-SCNC: 142 MMOL/L (ref 136–145)
UPPER ARTERIAL LEFT ARM BRACHIAL SYS MAX: 189 MMHG
UPPER ARTERIAL RIGHT ARM BRACHIAL SYS MAX: 186 MMHG
WBC # BLD AUTO: 6.18 10*3/MM3 (ref 3.4–10.8)
WBC # BLD AUTO: 6.38 10*3/MM3 (ref 3.4–10.8)

## 2021-03-22 PROCEDURE — 80048 BASIC METABOLIC PNL TOTAL CA: CPT | Performed by: EMERGENCY MEDICINE

## 2021-03-22 PROCEDURE — U0005 INFEC AGEN DETEC AMPLI PROBE: HCPCS | Performed by: EMERGENCY MEDICINE

## 2021-03-22 PROCEDURE — 99283 EMERGENCY DEPT VISIT LOW MDM: CPT

## 2021-03-22 PROCEDURE — 85025 COMPLETE CBC W/AUTO DIFF WBC: CPT | Performed by: EMERGENCY MEDICINE

## 2021-03-22 PROCEDURE — 93010 ELECTROCARDIOGRAM REPORT: CPT | Performed by: INTERNAL MEDICINE

## 2021-03-22 PROCEDURE — U0004 COV-19 TEST NON-CDC HGH THRU: HCPCS | Performed by: EMERGENCY MEDICINE

## 2021-03-22 PROCEDURE — 93005 ELECTROCARDIOGRAM TRACING: CPT | Performed by: EMERGENCY MEDICINE

## 2021-03-22 PROCEDURE — 93923 UPR/LXTR ART STDY 3+ LVLS: CPT

## 2021-03-22 PROCEDURE — 85610 PROTHROMBIN TIME: CPT | Performed by: EMERGENCY MEDICINE

## 2021-03-22 PROCEDURE — 85730 THROMBOPLASTIN TIME PARTIAL: CPT | Performed by: EMERGENCY MEDICINE

## 2021-03-22 PROCEDURE — 25010000002 HEPARIN (PORCINE) PER 1000 UNITS: Performed by: EMERGENCY MEDICINE

## 2021-03-22 RX ORDER — HEPARIN SODIUM 10000 [USP'U]/100ML
18 INJECTION, SOLUTION INTRAVENOUS
Status: DISCONTINUED | OUTPATIENT
Start: 2021-03-22 | End: 2021-03-22

## 2021-03-22 RX ORDER — HEPARIN SODIUM 5000 [USP'U]/ML
40-80 INJECTION, SOLUTION INTRAVENOUS; SUBCUTANEOUS EVERY 6 HOURS PRN
Status: DISCONTINUED | OUTPATIENT
Start: 2021-03-22 | End: 2021-03-23

## 2021-03-22 RX ORDER — ACETAMINOPHEN 325 MG/1
650 TABLET ORAL EVERY 4 HOURS PRN
Status: DISCONTINUED | OUTPATIENT
Start: 2021-03-22 | End: 2021-03-25 | Stop reason: HOSPADM

## 2021-03-22 RX ORDER — HEPARIN SODIUM 5000 [USP'U]/ML
80 INJECTION, SOLUTION INTRAVENOUS; SUBCUTANEOUS ONCE
Status: DISCONTINUED | OUTPATIENT
Start: 2021-03-22 | End: 2021-03-22

## 2021-03-22 RX ORDER — ONDANSETRON 2 MG/ML
4 INJECTION INTRAMUSCULAR; INTRAVENOUS EVERY 6 HOURS PRN
Status: DISCONTINUED | OUTPATIENT
Start: 2021-03-22 | End: 2021-03-25 | Stop reason: HOSPADM

## 2021-03-22 RX ORDER — ACETAMINOPHEN 160 MG/5ML
650 SOLUTION ORAL EVERY 4 HOURS PRN
Status: DISCONTINUED | OUTPATIENT
Start: 2021-03-22 | End: 2021-03-24

## 2021-03-22 RX ORDER — HEPARIN SODIUM 5000 [USP'U]/ML
40-80 INJECTION, SOLUTION INTRAVENOUS; SUBCUTANEOUS EVERY 6 HOURS PRN
Status: DISCONTINUED | OUTPATIENT
Start: 2021-03-22 | End: 2021-03-22

## 2021-03-22 RX ORDER — SODIUM CHLORIDE 0.9 % (FLUSH) 0.9 %
10 SYRINGE (ML) INJECTION AS NEEDED
Status: DISCONTINUED | OUTPATIENT
Start: 2021-03-22 | End: 2021-03-25 | Stop reason: HOSPADM

## 2021-03-22 RX ORDER — SODIUM CHLORIDE 0.9 % (FLUSH) 0.9 %
10 SYRINGE (ML) INJECTION EVERY 12 HOURS SCHEDULED
Status: DISCONTINUED | OUTPATIENT
Start: 2021-03-23 | End: 2021-03-25 | Stop reason: HOSPADM

## 2021-03-22 RX ORDER — SODIUM CHLORIDE 9 MG/ML
100 INJECTION, SOLUTION INTRAVENOUS CONTINUOUS
Status: DISCONTINUED | OUTPATIENT
Start: 2021-03-23 | End: 2021-03-24

## 2021-03-22 RX ORDER — ACETAMINOPHEN 650 MG/1
650 SUPPOSITORY RECTAL EVERY 4 HOURS PRN
Status: DISCONTINUED | OUTPATIENT
Start: 2021-03-22 | End: 2021-03-24

## 2021-03-22 RX ORDER — HEPARIN SODIUM 10000 [USP'U]/100ML
10.2 INJECTION, SOLUTION INTRAVENOUS
Status: DISCONTINUED | OUTPATIENT
Start: 2021-03-22 | End: 2021-03-23

## 2021-03-22 RX ORDER — TRAMADOL HYDROCHLORIDE 50 MG/1
50 TABLET ORAL EVERY 6 HOURS PRN
COMMUNITY
End: 2021-07-28

## 2021-03-22 RX ORDER — HEPARIN SODIUM 5000 [USP'U]/ML
80 INJECTION, SOLUTION INTRAVENOUS; SUBCUTANEOUS ONCE
Status: COMPLETED | OUTPATIENT
Start: 2021-03-22 | End: 2021-03-22

## 2021-03-22 RX ORDER — ESCITALOPRAM OXALATE 10 MG/1
20 TABLET ORAL DAILY
COMMUNITY
Start: 2021-02-06 | End: 2022-01-05 | Stop reason: ALTCHOICE

## 2021-03-22 RX ORDER — LABETALOL HYDROCHLORIDE 5 MG/ML
10 INJECTION, SOLUTION INTRAVENOUS ONCE
Status: COMPLETED | OUTPATIENT
Start: 2021-03-22 | End: 2021-03-22

## 2021-03-22 RX ORDER — NITROGLYCERIN 0.4 MG/1
0.4 TABLET SUBLINGUAL
Status: DISCONTINUED | OUTPATIENT
Start: 2021-03-22 | End: 2021-03-25 | Stop reason: HOSPADM

## 2021-03-22 RX ADMIN — HEPARIN SODIUM 3900 UNITS: 5000 INJECTION INTRAVENOUS; SUBCUTANEOUS at 22:07

## 2021-03-22 RX ADMIN — LABETALOL HYDROCHLORIDE 10 MG: 5 INJECTION, SOLUTION INTRAVENOUS at 21:44

## 2021-03-22 RX ADMIN — HEPARIN SODIUM 18 UNITS/KG/HR: 10000 INJECTION, SOLUTION INTRAVENOUS at 22:00

## 2021-03-22 NOTE — ED NOTES
Pt presents to ED with comp[laints of R foot numbness for the last two weeks. Pt reports last night the pain increased and stills has numbness. Pt denies any falls or injuries at this time. PT is A&OX4, able to ambulate with walker, and in a mask at this time.             Rajesh Louis, RN  03/22/21 2629

## 2021-03-23 ENCOUNTER — ANESTHESIA (OUTPATIENT)
Dept: PERIOP | Facility: HOSPITAL | Age: 86
End: 2021-03-23

## 2021-03-23 ENCOUNTER — APPOINTMENT (OUTPATIENT)
Dept: GENERAL RADIOLOGY | Facility: HOSPITAL | Age: 86
End: 2021-03-23

## 2021-03-23 ENCOUNTER — ANESTHESIA EVENT (OUTPATIENT)
Dept: PERIOP | Facility: HOSPITAL | Age: 86
End: 2021-03-23

## 2021-03-23 PROBLEM — I74.9 EMBOLISM AND THROMBOSIS OF ARTERY (HCC): Status: ACTIVE | Noted: 2021-03-23

## 2021-03-23 LAB
ABO GROUP BLD: NORMAL
ANION GAP SERPL CALCULATED.3IONS-SCNC: 7.9 MMOL/L (ref 5–15)
APTT PPP: 177 SECONDS (ref 22.7–35.4)
BASOPHILS # BLD AUTO: 0.09 10*3/MM3 (ref 0–0.2)
BASOPHILS NFR BLD AUTO: 1.6 % (ref 0–1.5)
BLD GP AB SCN SERPL QL: NEGATIVE
BUN SERPL-MCNC: 12 MG/DL (ref 8–23)
BUN/CREAT SERPL: 24 (ref 7–25)
CALCIUM SPEC-SCNC: 8.5 MG/DL (ref 8.6–10.5)
CHLORIDE SERPL-SCNC: 107 MMOL/L (ref 98–107)
CO2 SERPL-SCNC: 26.1 MMOL/L (ref 22–29)
CREAT SERPL-MCNC: 0.5 MG/DL (ref 0.57–1)
DEPRECATED RDW RBC AUTO: 50.1 FL (ref 37–54)
EOSINOPHIL # BLD AUTO: 0.13 10*3/MM3 (ref 0–0.4)
EOSINOPHIL NFR BLD AUTO: 2.2 % (ref 0.3–6.2)
ERYTHROCYTE [DISTWIDTH] IN BLOOD BY AUTOMATED COUNT: 14.3 % (ref 12.3–15.4)
GFR SERPL CREATININE-BSD FRML MDRD: 117 ML/MIN/1.73
GLUCOSE BLDC GLUCOMTR-MCNC: 124 MG/DL (ref 70–130)
GLUCOSE SERPL-MCNC: 87 MG/DL (ref 65–99)
HCT VFR BLD AUTO: 36 % (ref 34–46.6)
HGB BLD-MCNC: 11.6 G/DL (ref 12–15.9)
IMM GRANULOCYTES # BLD AUTO: 0.02 10*3/MM3 (ref 0–0.05)
IMM GRANULOCYTES NFR BLD AUTO: 0.3 % (ref 0–0.5)
LYMPHOCYTES # BLD AUTO: 1.44 10*3/MM3 (ref 0.7–3.1)
LYMPHOCYTES NFR BLD AUTO: 24.9 % (ref 19.6–45.3)
MCH RBC QN AUTO: 31.3 PG (ref 26.6–33)
MCHC RBC AUTO-ENTMCNC: 32.2 G/DL (ref 31.5–35.7)
MCV RBC AUTO: 97 FL (ref 79–97)
MONOCYTES # BLD AUTO: 0.53 10*3/MM3 (ref 0.1–0.9)
MONOCYTES NFR BLD AUTO: 9.2 % (ref 5–12)
NEUTROPHILS NFR BLD AUTO: 3.58 10*3/MM3 (ref 1.7–7)
NEUTROPHILS NFR BLD AUTO: 61.8 % (ref 42.7–76)
NRBC BLD AUTO-RTO: 0 /100 WBC (ref 0–0.2)
PLATELET # BLD AUTO: 216 10*3/MM3 (ref 140–450)
PMV BLD AUTO: 10.3 FL (ref 6–12)
POTASSIUM SERPL-SCNC: 3.7 MMOL/L (ref 3.5–5.2)
QT INTERVAL: 412 MS
RBC # BLD AUTO: 3.71 10*6/MM3 (ref 3.77–5.28)
RH BLD: POSITIVE
SARS-COV-2 ORF1AB RESP QL NAA+PROBE: NOT DETECTED
SODIUM SERPL-SCNC: 141 MMOL/L (ref 136–145)
T&S EXPIRATION DATE: NORMAL
WBC # BLD AUTO: 5.79 10*3/MM3 (ref 3.4–10.8)

## 2021-03-23 PROCEDURE — C1724 CATH, TRANS ATHEREC,ROTATION: HCPCS | Performed by: SURGERY

## 2021-03-23 PROCEDURE — 25010000002 FENTANYL CITRATE (PF) 100 MCG/2ML SOLUTION: Performed by: NURSE ANESTHETIST, CERTIFIED REGISTERED

## 2021-03-23 PROCEDURE — 25010000002 PROTAMINE SULFATE PER 10 MG: Performed by: NURSE ANESTHETIST, CERTIFIED REGISTERED

## 2021-03-23 PROCEDURE — 82962 GLUCOSE BLOOD TEST: CPT

## 2021-03-23 PROCEDURE — 36415 COLL VENOUS BLD VENIPUNCTURE: CPT | Performed by: EMERGENCY MEDICINE

## 2021-03-23 PROCEDURE — 25010000002 FENTANYL CITRATE (PF) 100 MCG/2ML SOLUTION: Performed by: ANESTHESIOLOGY

## 2021-03-23 PROCEDURE — 86901 BLOOD TYPING SEROLOGIC RH(D): CPT | Performed by: ANESTHESIOLOGY

## 2021-03-23 PROCEDURE — 25010000003 CEFAZOLIN IN DEXTROSE 2-4 GM/100ML-% SOLUTION: Performed by: SURGERY

## 2021-03-23 PROCEDURE — 25010000002 PROPOFOL 10 MG/ML EMULSION: Performed by: NURSE ANESTHETIST, CERTIFIED REGISTERED

## 2021-03-23 PROCEDURE — 75625 CONTRAST EXAM ABDOMINL AORTA: CPT

## 2021-03-23 PROCEDURE — 25010000002 ONDANSETRON PER 1 MG: Performed by: NURSE ANESTHETIST, CERTIFIED REGISTERED

## 2021-03-23 PROCEDURE — 86850 RBC ANTIBODY SCREEN: CPT | Performed by: ANESTHESIOLOGY

## 2021-03-23 PROCEDURE — 25010000002 HEPARIN (PORCINE) PER 1000 UNITS: Performed by: SURGERY

## 2021-03-23 PROCEDURE — B41D1ZZ FLUOROSCOPY OF AORTA AND BILATERAL LOWER EXTREMITY ARTERIES USING LOW OSMOLAR CONTRAST: ICD-10-PCS | Performed by: SURGERY

## 2021-03-23 PROCEDURE — 25010000002 NEOSTIGMINE 5 MG/10ML SOLUTION: Performed by: NURSE ANESTHETIST, CERTIFIED REGISTERED

## 2021-03-23 PROCEDURE — C1894 INTRO/SHEATH, NON-LASER: HCPCS | Performed by: SURGERY

## 2021-03-23 PROCEDURE — 25010000002 DEXAMETHASONE PER 1 MG: Performed by: NURSE ANESTHETIST, CERTIFIED REGISTERED

## 2021-03-23 PROCEDURE — 85730 THROMBOPLASTIN TIME PARTIAL: CPT | Performed by: EMERGENCY MEDICINE

## 2021-03-23 PROCEDURE — 75710 ARTERY X-RAYS ARM/LEG: CPT

## 2021-03-23 PROCEDURE — C1887 CATHETER, GUIDING: HCPCS | Performed by: SURGERY

## 2021-03-23 PROCEDURE — C1769 GUIDE WIRE: HCPCS | Performed by: SURGERY

## 2021-03-23 PROCEDURE — 0 IOPAMIDOL PER 1 ML: Performed by: STUDENT IN AN ORGANIZED HEALTH CARE EDUCATION/TRAINING PROGRAM

## 2021-03-23 PROCEDURE — 85347 COAGULATION TIME ACTIVATED: CPT

## 2021-03-23 PROCEDURE — 25010000002 MORPHINE PER 10 MG: Performed by: NURSE PRACTITIONER

## 2021-03-23 PROCEDURE — 85025 COMPLETE CBC W/AUTO DIFF WBC: CPT | Performed by: EMERGENCY MEDICINE

## 2021-03-23 PROCEDURE — 80048 BASIC METABOLIC PNL TOTAL CA: CPT | Performed by: NURSE PRACTITIONER

## 2021-03-23 PROCEDURE — 04CM3ZZ EXTIRPATION OF MATTER FROM RIGHT POPLITEAL ARTERY, PERCUTANEOUS APPROACH: ICD-10-PCS | Performed by: SURGERY

## 2021-03-23 PROCEDURE — 86900 BLOOD TYPING SEROLOGIC ABO: CPT | Performed by: ANESTHESIOLOGY

## 2021-03-23 PROCEDURE — C1760 CLOSURE DEV, VASC: HCPCS | Performed by: SURGERY

## 2021-03-23 PROCEDURE — 25010000002 HEPARIN (PORCINE) PER 1000 UNITS: Performed by: NURSE ANESTHETIST, CERTIFIED REGISTERED

## 2021-03-23 PROCEDURE — 25010000002 MORPHINE PER 10 MG: Performed by: STUDENT IN AN ORGANIZED HEALTH CARE EDUCATION/TRAINING PROGRAM

## 2021-03-23 RX ORDER — ESCITALOPRAM OXALATE 20 MG/1
20 TABLET ORAL DAILY
Status: DISCONTINUED | OUTPATIENT
Start: 2021-03-23 | End: 2021-03-25 | Stop reason: HOSPADM

## 2021-03-23 RX ORDER — PANTOPRAZOLE SODIUM 40 MG/1
40 TABLET, DELAYED RELEASE ORAL 3 TIMES WEEKLY
Status: DISCONTINUED | OUTPATIENT
Start: 2021-03-24 | End: 2021-03-25 | Stop reason: HOSPADM

## 2021-03-23 RX ORDER — SODIUM CHLORIDE 0.9 % (FLUSH) 0.9 %
3-10 SYRINGE (ML) INJECTION AS NEEDED
Status: DISCONTINUED | OUTPATIENT
Start: 2021-03-23 | End: 2021-03-23 | Stop reason: HOSPADM

## 2021-03-23 RX ORDER — TRAZODONE HYDROCHLORIDE 50 MG/1
25 TABLET ORAL NIGHTLY
Status: DISCONTINUED | OUTPATIENT
Start: 2021-03-23 | End: 2021-03-25 | Stop reason: HOSPADM

## 2021-03-23 RX ORDER — AMLODIPINE BESYLATE 2.5 MG/1
2.5 TABLET ORAL EVERY MORNING
Status: DISCONTINUED | OUTPATIENT
Start: 2021-03-24 | End: 2021-03-23

## 2021-03-23 RX ORDER — AMLODIPINE BESYLATE 5 MG/1
5 TABLET ORAL DAILY
Status: DISCONTINUED | OUTPATIENT
Start: 2021-03-24 | End: 2021-03-25 | Stop reason: HOSPADM

## 2021-03-23 RX ORDER — NALOXONE HCL 0.4 MG/ML
0.4 VIAL (ML) INJECTION
Status: DISCONTINUED | OUTPATIENT
Start: 2021-03-23 | End: 2021-03-24

## 2021-03-23 RX ORDER — FENTANYL CITRATE 50 UG/ML
50 INJECTION, SOLUTION INTRAMUSCULAR; INTRAVENOUS
Status: DISCONTINUED | OUTPATIENT
Start: 2021-03-23 | End: 2021-03-23 | Stop reason: HOSPADM

## 2021-03-23 RX ORDER — FAMOTIDINE 10 MG/ML
20 INJECTION, SOLUTION INTRAVENOUS ONCE
Status: COMPLETED | OUTPATIENT
Start: 2021-03-23 | End: 2021-03-23

## 2021-03-23 RX ORDER — DIPHENHYDRAMINE HCL 25 MG
25 CAPSULE ORAL
Status: DISCONTINUED | OUTPATIENT
Start: 2021-03-23 | End: 2021-03-23 | Stop reason: HOSPADM

## 2021-03-23 RX ORDER — MORPHINE SULFATE 2 MG/ML
2 INJECTION, SOLUTION INTRAMUSCULAR; INTRAVENOUS ONCE
Status: COMPLETED | OUTPATIENT
Start: 2021-03-23 | End: 2021-03-23

## 2021-03-23 RX ORDER — NALOXONE HCL 0.4 MG/ML
0.2 VIAL (ML) INJECTION AS NEEDED
Status: DISCONTINUED | OUTPATIENT
Start: 2021-03-23 | End: 2021-03-23 | Stop reason: HOSPADM

## 2021-03-23 RX ORDER — NEOSTIGMINE METHYLSULFATE 0.5 MG/ML
INJECTION, SOLUTION INTRAVENOUS AS NEEDED
Status: DISCONTINUED | OUTPATIENT
Start: 2021-03-23 | End: 2021-03-23 | Stop reason: SURG

## 2021-03-23 RX ORDER — CEFAZOLIN SODIUM 2 G/100ML
2 INJECTION, SOLUTION INTRAVENOUS ONCE
Status: COMPLETED | OUTPATIENT
Start: 2021-03-23 | End: 2021-03-23

## 2021-03-23 RX ORDER — SODIUM CHLORIDE 9 MG/ML
50 INJECTION, SOLUTION INTRAVENOUS CONTINUOUS
Status: DISCONTINUED | OUTPATIENT
Start: 2021-03-23 | End: 2021-03-24

## 2021-03-23 RX ORDER — LABETALOL HYDROCHLORIDE 5 MG/ML
INJECTION, SOLUTION INTRAVENOUS AS NEEDED
Status: DISCONTINUED | OUTPATIENT
Start: 2021-03-23 | End: 2021-03-23 | Stop reason: SURG

## 2021-03-23 RX ORDER — EPHEDRINE SULFATE 50 MG/ML
5 INJECTION, SOLUTION INTRAVENOUS ONCE AS NEEDED
Status: DISCONTINUED | OUTPATIENT
Start: 2021-03-23 | End: 2021-03-23 | Stop reason: HOSPADM

## 2021-03-23 RX ORDER — FOLIC ACID 1 MG/1
2 TABLET ORAL DAILY
Status: DISCONTINUED | OUTPATIENT
Start: 2021-03-23 | End: 2021-03-25 | Stop reason: HOSPADM

## 2021-03-23 RX ORDER — FENTANYL CITRATE 50 UG/ML
25 INJECTION, SOLUTION INTRAMUSCULAR; INTRAVENOUS
Status: DISCONTINUED | OUTPATIENT
Start: 2021-03-23 | End: 2021-03-23 | Stop reason: HOSPADM

## 2021-03-23 RX ORDER — TRAMADOL HYDROCHLORIDE 50 MG/1
50 TABLET ORAL EVERY 6 HOURS PRN
Status: DISCONTINUED | OUTPATIENT
Start: 2021-03-23 | End: 2021-03-24

## 2021-03-23 RX ORDER — ATORVASTATIN CALCIUM 20 MG/1
40 TABLET, FILM COATED ORAL NIGHTLY
Status: DISCONTINUED | OUTPATIENT
Start: 2021-03-23 | End: 2021-03-25 | Stop reason: HOSPADM

## 2021-03-23 RX ORDER — ONDANSETRON 2 MG/ML
INJECTION INTRAMUSCULAR; INTRAVENOUS AS NEEDED
Status: DISCONTINUED | OUTPATIENT
Start: 2021-03-23 | End: 2021-03-23 | Stop reason: SURG

## 2021-03-23 RX ORDER — HYDROCODONE BITARTRATE AND ACETAMINOPHEN 5; 325 MG/1; MG/1
1 TABLET ORAL EVERY 6 HOURS PRN
Status: DISCONTINUED | OUTPATIENT
Start: 2021-03-23 | End: 2021-03-24

## 2021-03-23 RX ORDER — SODIUM CHLORIDE, SODIUM LACTATE, POTASSIUM CHLORIDE, CALCIUM CHLORIDE 600; 310; 30; 20 MG/100ML; MG/100ML; MG/100ML; MG/100ML
9 INJECTION, SOLUTION INTRAVENOUS CONTINUOUS
Status: DISCONTINUED | OUTPATIENT
Start: 2021-03-23 | End: 2021-03-23

## 2021-03-23 RX ORDER — PROTAMINE SULFATE 10 MG/ML
INJECTION, SOLUTION INTRAVENOUS AS NEEDED
Status: DISCONTINUED | OUTPATIENT
Start: 2021-03-23 | End: 2021-03-23 | Stop reason: SURG

## 2021-03-23 RX ORDER — HEPARIN SODIUM 10000 [USP'U]/100ML
10.2 INJECTION, SOLUTION INTRAVENOUS CONTINUOUS
Status: DISCONTINUED | OUTPATIENT
Start: 2021-03-23 | End: 2021-03-24

## 2021-03-23 RX ORDER — DEXAMETHASONE SODIUM PHOSPHATE 10 MG/ML
INJECTION INTRAMUSCULAR; INTRAVENOUS AS NEEDED
Status: DISCONTINUED | OUTPATIENT
Start: 2021-03-23 | End: 2021-03-23 | Stop reason: SURG

## 2021-03-23 RX ORDER — HYDROMORPHONE HYDROCHLORIDE 1 MG/ML
0.25 INJECTION, SOLUTION INTRAMUSCULAR; INTRAVENOUS; SUBCUTANEOUS
Status: DISCONTINUED | OUTPATIENT
Start: 2021-03-23 | End: 2021-03-24

## 2021-03-23 RX ORDER — MORPHINE SULFATE 2 MG/ML
2 INJECTION, SOLUTION INTRAMUSCULAR; INTRAVENOUS
Status: DISCONTINUED | OUTPATIENT
Start: 2021-03-23 | End: 2021-03-24

## 2021-03-23 RX ORDER — HYDRALAZINE HYDROCHLORIDE 20 MG/ML
5 INJECTION INTRAMUSCULAR; INTRAVENOUS
Status: DISCONTINUED | OUTPATIENT
Start: 2021-03-23 | End: 2021-03-23 | Stop reason: HOSPADM

## 2021-03-23 RX ORDER — DIPHENHYDRAMINE HYDROCHLORIDE 50 MG/ML
12.5 INJECTION INTRAMUSCULAR; INTRAVENOUS
Status: DISCONTINUED | OUTPATIENT
Start: 2021-03-23 | End: 2021-03-23 | Stop reason: HOSPADM

## 2021-03-23 RX ORDER — GLYCOPYRROLATE 0.2 MG/ML
INJECTION INTRAMUSCULAR; INTRAVENOUS AS NEEDED
Status: DISCONTINUED | OUTPATIENT
Start: 2021-03-23 | End: 2021-03-23 | Stop reason: SURG

## 2021-03-23 RX ORDER — SODIUM CHLORIDE 0.9 % (FLUSH) 0.9 %
3 SYRINGE (ML) INJECTION EVERY 12 HOURS SCHEDULED
Status: DISCONTINUED | OUTPATIENT
Start: 2021-03-23 | End: 2021-03-23 | Stop reason: HOSPADM

## 2021-03-23 RX ORDER — LABETALOL HYDROCHLORIDE 5 MG/ML
5 INJECTION, SOLUTION INTRAVENOUS
Status: DISCONTINUED | OUTPATIENT
Start: 2021-03-23 | End: 2021-03-23 | Stop reason: HOSPADM

## 2021-03-23 RX ORDER — PROMETHAZINE HYDROCHLORIDE 25 MG/1
25 TABLET ORAL ONCE AS NEEDED
Status: DISCONTINUED | OUTPATIENT
Start: 2021-03-23 | End: 2021-03-23 | Stop reason: HOSPADM

## 2021-03-23 RX ORDER — ONDANSETRON 2 MG/ML
4 INJECTION INTRAMUSCULAR; INTRAVENOUS ONCE AS NEEDED
Status: DISCONTINUED | OUTPATIENT
Start: 2021-03-23 | End: 2021-03-23 | Stop reason: HOSPADM

## 2021-03-23 RX ORDER — PROMETHAZINE HYDROCHLORIDE 25 MG/1
25 SUPPOSITORY RECTAL ONCE AS NEEDED
Status: DISCONTINUED | OUTPATIENT
Start: 2021-03-23 | End: 2021-03-23 | Stop reason: HOSPADM

## 2021-03-23 RX ORDER — EPHEDRINE SULFATE 50 MG/ML
INJECTION, SOLUTION INTRAVENOUS AS NEEDED
Status: DISCONTINUED | OUTPATIENT
Start: 2021-03-23 | End: 2021-03-23 | Stop reason: SURG

## 2021-03-23 RX ORDER — PROPOFOL 10 MG/ML
VIAL (ML) INTRAVENOUS AS NEEDED
Status: DISCONTINUED | OUTPATIENT
Start: 2021-03-23 | End: 2021-03-23 | Stop reason: SURG

## 2021-03-23 RX ORDER — HEPARIN SODIUM 1000 [USP'U]/ML
INJECTION, SOLUTION INTRAVENOUS; SUBCUTANEOUS AS NEEDED
Status: DISCONTINUED | OUTPATIENT
Start: 2021-03-23 | End: 2021-03-23 | Stop reason: SURG

## 2021-03-23 RX ORDER — LIDOCAINE HYDROCHLORIDE 20 MG/ML
INJECTION, SOLUTION INFILTRATION; PERINEURAL AS NEEDED
Status: DISCONTINUED | OUTPATIENT
Start: 2021-03-23 | End: 2021-03-23 | Stop reason: SURG

## 2021-03-23 RX ORDER — FENTANYL CITRATE 50 UG/ML
INJECTION, SOLUTION INTRAMUSCULAR; INTRAVENOUS AS NEEDED
Status: DISCONTINUED | OUTPATIENT
Start: 2021-03-23 | End: 2021-03-23 | Stop reason: SURG

## 2021-03-23 RX ORDER — LIDOCAINE HYDROCHLORIDE 10 MG/ML
0.5 INJECTION, SOLUTION EPIDURAL; INFILTRATION; INTRACAUDAL; PERINEURAL ONCE AS NEEDED
Status: DISCONTINUED | OUTPATIENT
Start: 2021-03-23 | End: 2021-03-23 | Stop reason: HOSPADM

## 2021-03-23 RX ORDER — ROCURONIUM BROMIDE 10 MG/ML
INJECTION, SOLUTION INTRAVENOUS AS NEEDED
Status: DISCONTINUED | OUTPATIENT
Start: 2021-03-23 | End: 2021-03-23 | Stop reason: SURG

## 2021-03-23 RX ADMIN — FAMOTIDINE 20 MG: 10 INJECTION INTRAVENOUS at 16:07

## 2021-03-23 RX ADMIN — HEPARIN SODIUM 10.2 UNITS/KG/HR: 10000 INJECTION, SOLUTION INTRAVENOUS at 19:39

## 2021-03-23 RX ADMIN — SODIUM CHLORIDE 100 ML/HR: 9 INJECTION, SOLUTION INTRAVENOUS at 00:14

## 2021-03-23 RX ADMIN — SODIUM CHLORIDE, PRESERVATIVE FREE 10 ML: 5 INJECTION INTRAVENOUS at 00:16

## 2021-03-23 RX ADMIN — ATORVASTATIN CALCIUM 40 MG: 20 TABLET, FILM COATED ORAL at 22:05

## 2021-03-23 RX ADMIN — PROPOFOL 100 MG: 10 INJECTION, EMULSION INTRAVENOUS at 17:05

## 2021-03-23 RX ADMIN — PROTAMINE SULFATE 30 MG: 10 INJECTION, SOLUTION INTRAVENOUS at 18:09

## 2021-03-23 RX ADMIN — ONDANSETRON 4 MG: 2 INJECTION INTRAMUSCULAR; INTRAVENOUS at 18:01

## 2021-03-23 RX ADMIN — FENTANYL CITRATE 50 MCG: 50 INJECTION INTRAMUSCULAR; INTRAVENOUS at 18:08

## 2021-03-23 RX ADMIN — ACETAMINOPHEN 650 MG: 325 TABLET, FILM COATED ORAL at 00:14

## 2021-03-23 RX ADMIN — NEOSTIGMINE METHYLSULFATE 4 MG: 0.5 INJECTION INTRAVENOUS at 18:06

## 2021-03-23 RX ADMIN — ESCITALOPRAM 20 MG: 20 TABLET, FILM COATED ORAL at 22:05

## 2021-03-23 RX ADMIN — HEPARIN SODIUM 5000 UNITS: 1000 INJECTION INTRAVENOUS; SUBCUTANEOUS at 17:40

## 2021-03-23 RX ADMIN — GLYCOPYRROLATE 0.4 MG: 0.2 INJECTION INTRAMUSCULAR; INTRAVENOUS at 18:06

## 2021-03-23 RX ADMIN — LABETALOL HYDROCHLORIDE 5 MG: 5 INJECTION, SOLUTION INTRAVENOUS at 18:06

## 2021-03-23 RX ADMIN — MORPHINE SULFATE 2 MG: 2 INJECTION, SOLUTION INTRAMUSCULAR; INTRAVENOUS at 03:37

## 2021-03-23 RX ADMIN — LIDOCAINE HYDROCHLORIDE 100 MG: 20 INJECTION, SOLUTION INFILTRATION; PERINEURAL at 17:05

## 2021-03-23 RX ADMIN — TRAMADOL HYDROCHLORIDE 50 MG: 50 TABLET, FILM COATED ORAL at 22:06

## 2021-03-23 RX ADMIN — FENTANYL CITRATE 25 MCG: 50 INJECTION INTRAMUSCULAR; INTRAVENOUS at 17:38

## 2021-03-23 RX ADMIN — ROCURONIUM BROMIDE 30 MG: 50 INJECTION INTRAVENOUS at 17:05

## 2021-03-23 RX ADMIN — TRAZODONE HYDROCHLORIDE 25 MG: 50 TABLET ORAL at 22:10

## 2021-03-23 RX ADMIN — SODIUM CHLORIDE, POTASSIUM CHLORIDE, SODIUM LACTATE AND CALCIUM CHLORIDE 9 ML/HR: 600; 310; 30; 20 INJECTION, SOLUTION INTRAVENOUS at 16:08

## 2021-03-23 RX ADMIN — EPHEDRINE SULFATE 10 MG: 50 INJECTION INTRAVENOUS at 17:15

## 2021-03-23 RX ADMIN — FENTANYL CITRATE 25 MCG: 50 INJECTION INTRAMUSCULAR; INTRAVENOUS at 17:00

## 2021-03-23 RX ADMIN — FENTANYL CITRATE 25 MCG: 50 INJECTION, SOLUTION INTRAMUSCULAR; INTRAVENOUS at 16:24

## 2021-03-23 RX ADMIN — HEPARIN SODIUM 2500 UNITS: 1000 INJECTION INTRAVENOUS; SUBCUTANEOUS at 17:53

## 2021-03-23 RX ADMIN — CEFAZOLIN SODIUM 2 G: 2 INJECTION, SOLUTION INTRAVENOUS at 16:52

## 2021-03-23 RX ADMIN — SODIUM CHLORIDE 50 ML/HR: 9 INJECTION, SOLUTION INTRAVENOUS at 20:51

## 2021-03-23 RX ADMIN — DEXAMETHASONE SODIUM PHOSPHATE 8 MG: 10 INJECTION INTRAMUSCULAR; INTRAVENOUS at 17:12

## 2021-03-23 RX ADMIN — IOPAMIDOL 73 ML: 510 INJECTION, SOLUTION INTRAVASCULAR at 18:13

## 2021-03-23 RX ADMIN — MORPHINE SULFATE 2 MG: 2 INJECTION, SOLUTION INTRAMUSCULAR; INTRAVENOUS at 10:50

## 2021-03-23 NOTE — ANESTHESIA POSTPROCEDURE EVALUATION
"Patient: Arlin Hutson    Procedure Summary     Date: 03/23/21 Room / Location:  [facility] /  [facility]19    Anesthesia Start: 1649 Anesthesia Stop: 1835    Procedure: AIF WITH RUN OFF OF RT. LEG, ROTATIONAL ATHERECTOMY OF RIGHT POPLITEAL ARTERY (N/A ) Diagnosis:     Surgeons: Gato Contreras MD Provider: Hayder Frederick MD    Anesthesia Type: general ASA Status: 3          Anesthesia Type: general    Vitals  Vitals Value Taken Time   /74 03/23/21 1915   Temp     Pulse 63 03/23/21 1923   Resp     SpO2 100 % 03/23/21 1923   Vitals shown include unvalidated device data.        Post Anesthesia Care and Evaluation    Patient location during evaluation: bedside  Patient participation: complete - patient participated  Level of consciousness: awake and alert  Pain management: adequate  Airway patency: patent  Anesthetic complications: No anesthetic complications    Cardiovascular status: acceptable  Respiratory status: acceptable  Hydration status: acceptable    Comments: /74   Pulse 66   Temp 36.8 °C (98.2 °F) (Oral)   Resp 18   Ht 152.4 cm (60\")   Wt 48.5 kg (107 lb)   LMP  (LMP Unknown)   SpO2 100%   BMI 20.90 kg/m²       "

## 2021-03-23 NOTE — H&P
Patient Name:  Arlin Hutson  YOB: 1935  MRN:  [id number]  Admit Date:  3/22/2021  Patient Care Team:  Oren Rust Jr., MD as PCP - General  Oren Rust Jr., MD as PCP - Family Medicine  Oren Rust Jr., MD as Referring Physician (Internal Medicine)  Hayder Caruso Jr., MD as Consulting Physician (Hematology and Oncology)      Subjective   History Present Illness     Chief Complaint   Patient presents with   • Numbness   • Foot Pain     HTN  Ms. Hutson is a 85 y.o. with a history of GERD, HTN, RA, osteoarthritis, osteopenia and anemia that presents to [facility] complaining of right foot pain/numbness. Patient reports onset of symptoms 2 weeks ago and has progressively worsened now to the point where she has no feeling in the foot. Pain is hard to describe. Heat will sometimes make it better for a short period of time. She is still able to walk though states is difficult. On admission she was found to have a subacute to acute arterial occlusion of the right lower extremity. Vascular surgery was consulted and are planning for revascularization procedure today. They initiated heparin gtt. The patient denies chest pain, palpitations, SOA, fever, chills, edema, nausea and vomiting.      Review of Systems   Constitutional: Negative for chills and fever.   HENT: Negative for congestion and sore throat.    Eyes: Negative for discharge and itching.   Respiratory: Negative for cough and shortness of breath.    Cardiovascular: Negative for chest pain, palpitations and leg swelling.   Gastrointestinal: Negative for abdominal pain, nausea and vomiting.   Endocrine: Negative for cold intolerance and heat intolerance.   Genitourinary: Negative for difficulty urinating and dysuria.   Musculoskeletal: Positive for arthralgias and gait problem.   Skin: Negative for color change and pallor.   Allergic/Immunologic: Negative for environmental allergies and food allergies.    Neurological: Positive for numbness. Negative for dizziness.   Hematological: Negative for adenopathy. Does not bruise/bleed easily.   Psychiatric/Behavioral: Negative for agitation and behavioral problems.        Personal History     Past Medical History:   Diagnosis Date   • Baker's cyst    • Colon polyp    • CREST syndrome (CMS/HCC)    • Fractures    • GERD (gastroesophageal reflux disease)    • History of CT scan of abdomen 03/11/2011    constipation, sig tics, 6 mm hepatic cyst   • History of rheumatoid arthritis    • Hyperlipidemia    • Hypertension    • Low back pain    • Normocytic anemia 8/26/2020   • Osteoarthritis    • Raynaud's disease    • Rheumatoid arthritis (CMS/HCC)    • Scleroderma (CMS/HCC)    • Sjogren's syndrome (CMS/HCC)    • Ulcerative colitis (CMS/HCC)      Past Surgical History:   Procedure Laterality Date   • CATARACT EXTRACTION, BILATERAL     • COLONOSCOPY  02/26/2016    tics, microscopic colitis,    • COLONOSCOPY  07/01/2011    sig tics, inflammation, polyp   • DILATATION AND CURETTAGE  1980   • ENDOSCOPY N/A 12/3/2018    erythematous mucosa in stomach, path benign   • EYE SURGERY     • FLEXIBLE SIGMOIDOSCOPY     • FRACTURE SURGERY     • HAND SURGERY     • JOINT REPLACEMENT     • KNEE ARTHROSCOPY Right     w/meniscal repair   • KNEE SURGERY Right    • TEETH EXTRACTION     • TOTAL KNEE ARTHROPLASTY Right 5/30/2018    Procedure: TOTAL KNEE ARTHROPLASTY;  Surgeon: Georges Jon MD;  Location: [facility];  Service: Orthopedics   • UPPER GASTROINTESTINAL ENDOSCOPY  03/14/2008    erythema   • WRIST FRACTURE SURGERY Right    • WRIST SURGERY Right 2008    orif     Family History   Problem Relation Age of Onset   • Ulcerative colitis Mother    • Migraines Mother    • Stroke Mother    • Hypertension Mother    • Thyroid disease Mother    • Skin cancer Mother    • Breast cancer Maternal Aunt    • Arthritis Father    • Heart attack Father    • Migraines Sister    • Skin cancer Sister    •  Migraines Daughter    • Skin cancer Brother    • Malig Hyperthermia Neg Hx      Social History     Tobacco Use   • Smoking status: Never Smoker   • Smokeless tobacco: Never Used   Substance Use Topics   • Alcohol use: Yes     Types: 1 - 3 Glasses of wine per week     Comment: Infrequent   • Drug use: No     No current facility-administered medications on file prior to encounter.     Current Outpatient Medications on File Prior to Encounter   Medication Sig Dispense Refill   • Acetaminophen (TYLENOL EXTRA STRENGTH PO) Take 2 tablets by mouth Daily As Needed.     • amLODIPine (NORVASC) 2.5 MG tablet Take 2.5 mg by mouth Every Morning.     • Biotin 5000 MCG tablet Take  by mouth Daily.     • calcium carbonate (CALCIUM 600) 600 MG tablet 1 P.O. daily     • Carboxymethylcell-Glycerin PF 0.5-0.9 % solution Apply 1-2 drops to eye(s) as directed by provider.     • Carboxymethylcell-Hypromellose (GENTEAL OP) Apply 1 application to eye As Needed.     • Cholecalciferol (VITAMIN D PO) Take 2,000 mg by mouth every night at bedtime.     • escitalopram (LEXAPRO) 10 MG tablet 20 mg Daily.     • folic acid (FOLVITE) 1 MG tablet Take 2 mg by mouth Daily.     • Methotrexate Sodium (METHOTREXATE PF) 50 MG/2ML chemo syringe Infuse  into a venous catheter 1 (One) Time.     • Multiple Vitamins-Minerals (CENTRUM SILVER ULTRA WOMENS PO) 1 P.O. daily     • omeprazole (priLOSEC) 20 MG capsule 1 TABLET  P.O.  M W F     • traMADol (ULTRAM) 50 MG tablet Take 50 mg by mouth Every 6 (Six) Hours As Needed for Moderate Pain .     • traZODone (DESYREL) 50 MG tablet Take 25-50 mg by mouth Every Night.     • Turmeric 400 MG capsule 1 P.O. daily     • Ascorbic Acid (VITAMIN C PO) Take 500 mg by mouth Daily.     • diclofenac (VOLTAREN) 1 % gel gel Apply 2 g topically to the appropriate area as directed 4 (Four) Times a Day.     • polyethyl glycol-propyl glycol (SYSTANE) 0.4-0.3 % solution ophthalmic solution Administer 2 drops to both eyes Every 1 (One)  Hour As Needed.       Allergies   Allergen Reactions   • Codeine Diarrhea and Nausea Only   • Sulfa Antibiotics Hives       Objective    Objective     Vital Signs  Temp:  [97.3 °F (36.3 °C)-98.4 °F (36.9 °C)] 98.4 °F (36.9 °C)  Heart Rate:  [63-77] 63  Resp:  [16-18] 16  BP: (155-190)/(74-95) 176/74  SpO2:  [95 %-98 %] 95 %  on   ;   Device (Oxygen Therapy): room air  Body mass index is 20.9 kg/m².    Physical Exam  Vitals and nursing note reviewed.   Constitutional:       Appearance: Normal appearance.      Comments: elderly frail   HENT:      Head: Normocephalic and atraumatic.   Eyes:      Extraocular Movements: Extraocular movements intact.      Conjunctiva/sclera: Conjunctivae normal.   Cardiovascular:      Rate and Rhythm: Normal rate and regular rhythm.   Pulmonary:      Effort: Pulmonary effort is normal. No respiratory distress.      Breath sounds: Normal breath sounds.   Abdominal:      General: Bowel sounds are normal. There is no distension.      Palpations: Abdomen is soft.      Tenderness: There is no abdominal tenderness.   Musculoskeletal:         General: No swelling. Normal range of motion.      Cervical back: Normal range of motion and neck supple.   Skin:     General: Skin is warm and dry.      Coloration: Skin is not jaundiced.      Comments: right foot cold to touch, pale, plantar surface purple   Neurological:      Mental Status: She is alert and oriented to person, place, and time. Mental status is at baseline.   Psychiatric:         Mood and Affect: Mood normal.         Behavior: Behavior normal.         Results Review:  I reviewed the patient's new clinical results.  I reviewed the patient's new imaging results and agree with the interpretation.  I reviewed the patient's other test results and agree with the interpretation  I personally viewed and interpreted the patient's EKG/Telemetry data  Discussed with ED provider.    Lab Results (last 24 hours)     Procedure Component Value Units  Date/Time    CBC & Differential [[id number]]  (Abnormal) Collected: 03/22/21 1931    Specimen: Blood Updated: 03/22/21 1948    Narrative:      The following orders were created for panel order CBC & Differential.  Procedure                               Abnormality         Status                     ---------                               -----------         ------                     CBC Auto Differential[[id number]]        Abnormal            Final result                 Please view results for these tests on the individual orders.    Basic Metabolic Panel [[id number]]  (Abnormal) Collected: 03/22/21 1931    Specimen: Blood Updated: 03/22/21 2005     Glucose 83 mg/dL      BUN 16 mg/dL      Creatinine 0.56 mg/dL      Sodium 142 mmol/L      Potassium 4.2 mmol/L      Chloride 105 mmol/L      CO2 28.5 mmol/L      Calcium 8.8 mg/dL      eGFR Non African Amer 103 mL/min/1.73      BUN/Creatinine Ratio 28.6     Anion Gap 8.5 mmol/L     Narrative:      GFR Normal >60  Chronic Kidney Disease <60  Kidney Failure <15      CBC Auto Differential [[id number]]  (Abnormal) Collected: 03/22/21 1931    Specimen: Blood Updated: 03/22/21 1948     WBC 6.38 10*3/mm3      RBC 3.67 10*6/mm3      Hemoglobin 11.6 g/dL      Hematocrit 34.8 %      MCV 94.8 fL      MCH 31.6 pg      MCHC 33.3 g/dL      RDW 14.1 %      RDW-SD 48.5 fl      MPV 10.2 fL      Platelets 241 10*3/mm3      Neutrophil % 65.2 %      Lymphocyte % 21.5 %      Monocyte % 9.4 %      Eosinophil % 2.5 %      Basophil % 1.1 %      Immature Grans % 0.3 %      Neutrophils, Absolute 4.16 10*3/mm3      Lymphocytes, Absolute 1.37 10*3/mm3      Monocytes, Absolute 0.60 10*3/mm3      Eosinophils, Absolute 0.16 10*3/mm3      Basophils, Absolute 0.07 10*3/mm3      Immature Grans, Absolute 0.02 10*3/mm3      nRBC 0.0 /100 WBC     Protime-INR [[id number]]  (Normal) Collected: 03/22/21 2124    Specimen: Blood Updated: 03/22/21 2157     Protime 13.2 Seconds      INR 1.02    aPTT [[id number]]   (Normal) Collected: 03/22/21 2124    Specimen: Blood Updated: 03/22/21 2157     PTT 30.2 seconds     CBC & Differential [[id number]]  (Normal) Collected: 03/22/21 2124    Specimen: Blood Updated: 03/22/21 2148    Narrative:      The following orders were created for panel order CBC & Differential.  Procedure                               Abnormality         Status                     ---------                               -----------         ------                     CBC Auto Differential[[id number]]        Normal              Final result                 Please view results for these tests on the individual orders.    CBC Auto Differential [[id number]]  (Normal) Collected: 03/22/21 2124    Specimen: Blood Updated: 03/22/21 2148     WBC 6.18 10*3/mm3      RBC 3.80 10*6/mm3      Hemoglobin 12.1 g/dL      Hematocrit 35.6 %      MCV 93.7 fL      MCH 31.8 pg      MCHC 34.0 g/dL      RDW 14.1 %      RDW-SD 46.7 fl      MPV 10.4 fL      Platelets 248 10*3/mm3      Neutrophil % 66.7 %      Lymphocyte % 22.2 %      Monocyte % 7.1 %      Eosinophil % 3.1 %      Basophil % 0.6 %      Immature Grans % 0.3 %      Neutrophils, Absolute 4.12 10*3/mm3      Lymphocytes, Absolute 1.37 10*3/mm3      Monocytes, Absolute 0.44 10*3/mm3      Eosinophils, Absolute 0.19 10*3/mm3      Basophils, Absolute 0.04 10*3/mm3      Immature Grans, Absolute 0.02 10*3/mm3      nRBC 0.0 /100 WBC     COVID PRE-OP / PRE-PROCEDURE SCREENING ORDER (NO ISOLATION) - Swab, Nasopharynx [[id number]]  (Normal) Collected: 03/22/21 2206    Specimen: Swab from Nasopharynx Updated: 03/23/21 0231    Narrative:      The following orders were created for panel order COVID PRE-OP / PRE-PROCEDURE SCREENING ORDER (NO ISOLATION) - Swab, Nasopharynx.  Procedure                               Abnormality         Status                     ---------                               -----------         ------                     COVID-19,APTIMA PANTHER,...[[id number]]  Normal               Final result                 Please view results for these tests on the individual orders.    COVID-19,APTIMA PANTHER,[facility] IN-HOUSE, NP/OP SWAB IN UTM/VTM/SALINE TRANSPORT MEDIA,24 HR TAT - Swab, Nasopharynx [[id number]]  (Normal) Collected: 03/22/21 2206    Specimen: Swab from Nasopharynx Updated: 03/23/21 0231     COVID19 Not Detected    Narrative:      Fact sheet for providers: https://www.fda.gov/media/186496/download     Fact sheet for patients: https://www.fda.gov/media/522692/download    Test performed by RT PCR.    aPTT [[id number]]  (Abnormal) Collected: 03/23/21 0704    Specimen: Blood Updated: 03/23/21 0815     .0 seconds     CBC & Differential [[id number]]  (Abnormal) Collected: 03/23/21 0704    Specimen: Blood Updated: 03/23/21 0728    Narrative:      The following orders were created for panel order CBC & Differential.  Procedure                               Abnormality         Status                     ---------                               -----------         ------                     CBC Auto Differential[[id number]]        Abnormal            Final result                 Please view results for these tests on the individual orders.    CBC Auto Differential [[id number]]  (Abnormal) Collected: 03/23/21 0704    Specimen: Blood Updated: 03/23/21 0728     WBC 5.79 10*3/mm3      RBC 3.71 10*6/mm3      Hemoglobin 11.6 g/dL      Hematocrit 36.0 %      MCV 97.0 fL      MCH 31.3 pg      MCHC 32.2 g/dL      RDW 14.3 %      RDW-SD 50.1 fl      MPV 10.3 fL      Platelets 216 10*3/mm3      Neutrophil % 61.8 %      Lymphocyte % 24.9 %      Monocyte % 9.2 %      Eosinophil % 2.2 %      Basophil % 1.6 %      Immature Grans % 0.3 %      Neutrophils, Absolute 3.58 10*3/mm3      Lymphocytes, Absolute 1.44 10*3/mm3      Monocytes, Absolute 0.53 10*3/mm3      Eosinophils, Absolute 0.13 10*3/mm3      Basophils, Absolute 0.09 10*3/mm3      Immature Grans, Absolute 0.02 10*3/mm3      nRBC 0.0 /100 WBC     Basic  Metabolic Panel [[id number]]  (Abnormal) Collected: 03/23/21 0704    Specimen: Blood Updated: 03/23/21 0754     Glucose 87 mg/dL      BUN 12 mg/dL      Creatinine 0.50 mg/dL      Sodium 141 mmol/L      Potassium 3.7 mmol/L      Chloride 107 mmol/L      CO2 26.1 mmol/L      Calcium 8.5 mg/dL      eGFR Non African Amer 117 mL/min/1.73      BUN/Creatinine Ratio 24.0     Anion Gap 7.9 mmol/L     Narrative:      GFR Normal >60  Chronic Kidney Disease <60  Kidney Failure <15            Imaging Results (Last 24 Hours)     ** No results found for the last 24 hours. **          Results for orders placed during the hospital encounter of 01/13/21    Adult Transthoracic Echo Complete W/ Cont if Necessary Per Protocol    Interpretation Summary  · Calculated left ventricular EF = 64% Estimated left ventricular EF was in agreement with the calculated left ventricular EF. Left ventricular systolic function is normal. Normal left ventricular cavity size noted. Left ventricular wall thickness is consistent with mild concentric hypertrophy. All left ventricular wall segments contract normally. Left ventricular diastolic function is consistent with (grade Ia w/high LAP) impaired relaxation.  · The left atrial cavity is mildly dilated.  · Mild to moderate mitral valve regurgitation is present.  · Mild to moderate tricuspid valve regurgitation is present. Estimated right ventricular systolic pressure from tricuspid regurgitation is normal (<35 mmHg). Calculated right ventricular systolic pressure from tricuspid regurgitation is 23 mmHg.      ECG 12 Lead   Preliminary Result   HEART RATE= 73  bpm   RR Interval= 824  ms   OK Interval= 151  ms   P Horizontal Axis= -5  deg   P Front Axis= 46  deg   QRSD Interval= 91  ms   QT Interval= 412  ms   QRS Axis= -3  deg   T Wave Axis= 67  deg   - ABNORMAL ECG -   Sinus rhythm   Probable anteroseptal infarct, old   Minimal ST depression, lateral leads   Electronically Signed By:    Date and Time of  Study: 2021-03-22 21:21:41           Assessment/Plan     Active Hospital Problems    Diagnosis  POA   • **Arterial occlusion [I70.90]  Yes   • Normocytic anemia [D64.9]  Yes   • MGUS (monoclonal gammopathy of unknown significance) [D47.2]  Yes   • Rheumatoid arthritis with rheumatoid factor of multiple sites without organ or systems involvement (CMS/HCC) [M05.79]  Yes   • Osteopenia of multiple sites [M85.89]  Yes   • Gastroesophageal reflux disease [K21.9]  Yes   • Essential hypertension [I10]  Yes      Resolved Hospital Problems   No resolved problems to display.     · appreciate vascular surgery. plan for revascularization today. NPO for now. Morphine ordered for pain control. PT/OT when appropriate. continue heparin gtt per vascular recs. monitor labs closely.    · resume home antihypertensives   · further recommendations as clinical course unfolds. medically stable for surgery.  · I discussed the patient's findings and my recommendations with patient and nursing staff.    VTE Prophylaxis - heparin gtt.  Code Status - Full code.       CHIRAG Barnard  [facility]  03/23/21  13:38 EDT

## 2021-03-23 NOTE — PLAN OF CARE
Problem: Fall Injury Risk  Goal: Absence of Fall and Fall-Related Injury  Intervention: Promote Injury-Free Environment  Recent Flowsheet Documentation  Taken 3/23/2021 1400 by Andressa Arboleda, DEVIN  Safety Promotion/Fall Prevention:   activity supervised   assistive device/personal items within reach   clutter free environment maintained   fall prevention program maintained   nonskid shoes/slippers when out of bed   room organization consistent   safety round/check completed  Taken 3/23/2021 1200 by Andressa Arboleda, DEVIN  Safety Promotion/Fall Prevention:   activity supervised   assistive device/personal items within reach   clutter free environment maintained   fall prevention program maintained   nonskid shoes/slippers when out of bed   room organization consistent   safety round/check completed  Taken 3/23/2021 1000 by Andressa Arboleda, DEVIN  Safety Promotion/Fall Prevention:   activity supervised   assistive device/personal items within reach   clutter free environment maintained   fall prevention program maintained   nonskid shoes/slippers when out of bed   safety round/check completed   room organization consistent  Taken 3/23/2021 0800 by Andressa Arboleda, DEVIN  Safety Promotion/Fall Prevention:   activity supervised   assistive device/personal items within reach   clutter free environment maintained   fall prevention program maintained   nonskid shoes/slippers when out of bed   room organization consistent   safety round/check completed   Goal Outcome Evaluation:  Plan of Care Reviewed With: patient  Progress: no change  Outcome Summary: VSS. Currently in the OR for thrombectomy and possible EKOS placement. Heparin gtt continued to OR. Complaints of pain managed with prn IV medication. Daughter at bedside and updated on plan of care. Daughter allowed overnight stay for tonight only. Will monitor.

## 2021-03-23 NOTE — ANESTHESIA PROCEDURE NOTES
Airway  Urgency: elective    Date/Time: 3/23/2021 5:08 PM  Airway not difficult    General Information and Staff    Patient location during procedure: OR  Anesthesiologist: Shay Roth MD  CRNA: Irasema Clayton CRNA    Indications and Patient Condition  Indications for airway management: airway protection    Preoxygenated: yes  MILS not maintained throughout  Mask difficulty assessment: 1 - vent by mask    Final Airway Details  Final airway type: endotracheal airway      Successful airway: ETT  Cuffed: yes   Successful intubation technique: direct laryngoscopy  Endotracheal tube insertion site: oral  Blade: Vale  Blade size: 3  ETT size (mm): 7.0  Cormack-Lehane Classification: grade I - full view of glottis  Placement verified by: chest auscultation and capnometry   Cuff volume (mL): 7  Measured from: lips  ETT/EBT  to lips (cm): 21  Number of attempts at approach: 1  Assessment: lips, teeth, and gum same as pre-op and atraumatic intubation    Additional Comments  Pt preoxygenated prior to induction, easy mask airway, atraumatic intubation,+ ETCO2, + bs bilat,  ETT secured and connected to ventilator.

## 2021-03-23 NOTE — ED PROVIDER NOTES
EMERGENCY DEPARTMENT ENCOUNTER    Room Number:  E558/1  Date of encounter:  3/23/2021  PCP: Oren Rust Jr., MD  Historian: Patient and friend      HPI:  Chief Complaint: Numbness and pain in right foot  A complete HPI/ROS/PMH/PSH/SH/FH are unobtainable due to: Not applicable  Context: Arlin Hutson is a 85 y.o. female who presents to the ED c/o symptoms started approximately 1-1/2 weeks to 2 weeks ago.  Patient started with some crampy muscle pain feels as if there is a squeezing right around her midfoot.  She would have some tingling in some time that tingling and altered sensation would go up to her lower portion of her tibia and anterior portion of her ankle.  Never had any motor impairment.  She was unaware of anything that made her symptoms better or worse.  She has had no chest pain, shortness of breath, fevers or chills.  She does have a history of Raynaud's syndrome.  She will sometimes get symptoms of tingling to her upper extremities at the fingers as well as her toes.  Never has lasted this long and has never had quite pain like this before.  Spoke with her primary care doctor Dr. Rust who sent her here to the emergency department.        Previous Episodes: Yes but this seems to be a little bit different and more prolonged  Current Symptoms: See above    MEDICAL HISTORY REVIEWED        PAST MEDICAL HISTORY  Active Ambulatory Problems     Diagnosis Date Noted   • Gastroesophageal reflux disease 02/13/2018   • Osteopenia of multiple sites 02/13/2018   • Essential hypertension 02/13/2018   • Colitis 02/13/2018   • Primary osteoarthritis of right knee 05/04/2018   • Primary osteoarthritis of left knee 05/30/2018   • Pneumonia of left lower lobe due to infectious organism 06/02/2018   • History of total right knee replacement 06/02/2018   • Abnormal CT of the abdomen 10/24/2018   • Rheumatoid arthritis with rheumatoid factor of multiple sites without organ or systems involvement (CMS/[facility])  02/17/2020   • MGUS (monoclonal gammopathy of unknown significance) 08/12/2020   • Normocytic anemia 08/26/2020     Resolved Ambulatory Problems     Diagnosis Date Noted   • No Resolved Ambulatory Problems     Past Medical History:   Diagnosis Date   • Baker's cyst    • Colon polyp    • CREST syndrome (CMS/HCC)    • Fractures    • GERD (gastroesophageal reflux disease)    • History of CT scan of abdomen 03/11/2011   • History of rheumatoid arthritis    • Hyperlipidemia    • Hypertension    • Low back pain    • Osteoarthritis    • Raynaud's disease    • Rheumatoid arthritis (CMS/HCC)    • Scleroderma (CMS/HCC)    • Sjogren's syndrome (CMS/HCC)    • Ulcerative colitis (CMS/HCC)          PAST SURGICAL HISTORY  Past Surgical History:   Procedure Laterality Date   • CATARACT EXTRACTION, BILATERAL     • COLONOSCOPY  02/26/2016    tics, microscopic colitis,    • COLONOSCOPY  07/01/2011    sig tics, inflammation, polyp   • DILATATION AND CURETTAGE  1980   • ENDOSCOPY N/A 12/3/2018    erythematous mucosa in stomach, path benign   • EYE SURGERY     • FLEXIBLE SIGMOIDOSCOPY     • FRACTURE SURGERY     • HAND SURGERY     • JOINT REPLACEMENT     • KNEE ARTHROSCOPY Right     w/meniscal repair   • KNEE SURGERY Right    • TEETH EXTRACTION     • TOTAL KNEE ARTHROPLASTY Right 5/30/2018    Procedure: TOTAL KNEE ARTHROPLASTY;  Surgeon: Georges Jon MD;  Location: [facility] OR;  Service: Orthopedics   • UPPER GASTROINTESTINAL ENDOSCOPY  03/14/2008    erythema   • WRIST FRACTURE SURGERY Right    • WRIST SURGERY Right 2008    orif         FAMILY HISTORY  Family History   Problem Relation Age of Onset   • Ulcerative colitis Mother    • Migraines Mother    • Stroke Mother    • Hypertension Mother    • Thyroid disease Mother    • Skin cancer Mother    • Breast cancer Maternal Aunt    • Arthritis Father    • Heart attack Father    • Migraines Sister    • Skin cancer Sister    • Migraines Daughter    • Skin cancer Brother    • Gokul  Hyperthermia Neg Hx          SOCIAL HISTORY  Social History     Socioeconomic History   • Marital status: Single     Spouse name: Not on file   • Number of children: 3   • Years of education: Not on file   • Highest education level: Not on file   Tobacco Use   • Smoking status: Never Smoker   • Smokeless tobacco: Never Used   Substance and Sexual Activity   • Alcohol use: Yes     Types: 1 - 3 Glasses of wine per week     Comment: Infrequent   • Drug use: No   • Sexual activity: Never         ALLERGIES  Codeine and Sulfa antibiotics        REVIEW OF SYSTEMS  Review of Systems     All systems reviewed and negative except for those discussed in HPI.       PHYSICAL EXAM    I have reviewed the triage vital signs and nursing notes.    ED Triage Vitals   Temp Heart Rate Resp BP SpO2   03/22/21 1754 03/22/21 1754 03/22/21 1754 03/22/21 1756 03/22/21 1754   97.9 °F (36.6 °C) 77 18 158/80 95 %      Temp src Heart Rate Source Patient Position BP Location FiO2 (%)   03/22/21 1754 03/22/21 1754 03/22/21 1756 03/22/21 1756 --   Tympanic Monitor Standing Left arm        GENERAL: Elderly female this pleasant no acute distress.Vital signs on my initial evaluation unremarkable.  Has some mild hypertension.  HENT: nares patent  Head/neck/ face are symmetric without gross deformity, signs of trauma, or swelling  EYES: no scleral icterus, no conjunctival pallor.  NECK: Supple, no meningismus  CV: regular rhythm, regular rate with intact distal pulses.  No murmur or rub.  RESPIRATORY: normal effort and no respiratory distress.  Clear to auscultation bilaterally  ABDOMEN: soft and nontender.  MUSCULOSKELETAL: no deformity..  No edema.  I feel popliteal pulses on the right.  It appears to be a little diminished compared to the one on the left.  I do not feel a good dorsalis pedis or posterior tib on the right compared to the left.  She has some mild cyanosis to the plantar aspect of her toes and also the distal portion of her foot.  There  is mild coolness that is noted to the plantar aspect of her foot on the distal aspect as well as the dorsal aspect of her foot on the distal to midportion.  There is no motor impairment.  She has some mild altered sensation is she says that it is a little bit of tingling.  Does not appear to be in any distress or pain.  She has strong equal femoral pulses bilaterally.  No edema  NEURO: alert and appropriate, moves all extremities, follows commands.  Alert and oriented x3.  SKIN: warm, dry    Vital signs and nursing notes reviewed.        LAB RESULTS  Recent Results (from the past 24 hour(s))   Basic Metabolic Panel    Collection Time: 03/22/21  7:31 PM    Specimen: Blood   Result Value Ref Range    Glucose 83 65 - 99 mg/dL    BUN 16 8 - 23 mg/dL    Creatinine 0.56 (L) 0.57 - 1.00 mg/dL    Sodium 142 136 - 145 mmol/L    Potassium 4.2 3.5 - 5.2 mmol/L    Chloride 105 98 - 107 mmol/L    CO2 28.5 22.0 - 29.0 mmol/L    Calcium 8.8 8.6 - 10.5 mg/dL    eGFR Non African Amer 103 >60 mL/min/1.73    BUN/Creatinine Ratio 28.6 (H) 7.0 - 25.0    Anion Gap 8.5 5.0 - 15.0 mmol/L   CBC Auto Differential    Collection Time: 03/22/21  7:31 PM    Specimen: Blood   Result Value Ref Range    WBC 6.38 3.40 - 10.80 10*3/mm3    RBC 3.67 (L) 3.77 - 5.28 10*6/mm3    Hemoglobin 11.6 (L) 12.0 - 15.9 g/dL    Hematocrit 34.8 34.0 - 46.6 %    MCV 94.8 79.0 - 97.0 fL    MCH 31.6 26.6 - 33.0 pg    MCHC 33.3 31.5 - 35.7 g/dL    RDW 14.1 12.3 - 15.4 %    RDW-SD 48.5 37.0 - 54.0 fl    MPV 10.2 6.0 - 12.0 fL    Platelets 241 140 - 450 10*3/mm3    Neutrophil % 65.2 42.7 - 76.0 %    Lymphocyte % 21.5 19.6 - 45.3 %    Monocyte % 9.4 5.0 - 12.0 %    Eosinophil % 2.5 0.3 - 6.2 %    Basophil % 1.1 0.0 - 1.5 %    Immature Grans % 0.3 0.0 - 0.5 %    Neutrophils, Absolute 4.16 1.70 - 7.00 10*3/mm3    Lymphocytes, Absolute 1.37 0.70 - 3.10 10*3/mm3    Monocytes, Absolute 0.60 0.10 - 0.90 10*3/mm3    Eosinophils, Absolute 0.16 0.00 - 0.40 10*3/mm3    Basophils,  Absolute 0.07 0.00 - 0.20 10*3/mm3    Immature Grans, Absolute 0.02 0.00 - 0.05 10*3/mm3    nRBC 0.0 0.0 - 0.2 /100 WBC   Doppler Arterial Multi Level Lower Extremity - Bilateral CAR    Collection Time: 03/22/21  8:39 PM   Result Value Ref Range    RIGHT DORSALIS PEDIS SYS MAX 0 mmHg    RIGHT POST TIBIAL SYS MAX 0 mmHg    RIGHT GREAT TOE SYS MAX 0 mmHg    RIGHT PABLO RATIO 0     RIGHT TBI RATIO 0     LEFT DORSALIS PEDIS SYS  mmHg    LEFT POST TIBIAL SYS  mmHg    LEFT GREAT TOE SYS  mmHg    LEFT PABLO RATIO 1     LEFT TBI RATIO 0.6     Upper arterial right arm brachial sys max 186 mmHg    Upper arterial left arm brachial sys max 189 mmHg   ECG 12 Lead    Collection Time: 03/22/21  9:21 PM   Result Value Ref Range    QT Interval 412 ms   Protime-INR    Collection Time: 03/22/21  9:24 PM    Specimen: Blood   Result Value Ref Range    Protime 13.2 11.7 - 14.2 Seconds    INR 1.02 0.90 - 1.10   aPTT    Collection Time: 03/22/21  9:24 PM    Specimen: Blood   Result Value Ref Range    PTT 30.2 22.7 - 35.4 seconds   CBC Auto Differential    Collection Time: 03/22/21  9:24 PM    Specimen: Blood   Result Value Ref Range    WBC 6.18 3.40 - 10.80 10*3/mm3    RBC 3.80 3.77 - 5.28 10*6/mm3    Hemoglobin 12.1 12.0 - 15.9 g/dL    Hematocrit 35.6 34.0 - 46.6 %    MCV 93.7 79.0 - 97.0 fL    MCH 31.8 26.6 - 33.0 pg    MCHC 34.0 31.5 - 35.7 g/dL    RDW 14.1 12.3 - 15.4 %    RDW-SD 46.7 37.0 - 54.0 fl    MPV 10.4 6.0 - 12.0 fL    Platelets 248 140 - 450 10*3/mm3    Neutrophil % 66.7 42.7 - 76.0 %    Lymphocyte % 22.2 19.6 - 45.3 %    Monocyte % 7.1 5.0 - 12.0 %    Eosinophil % 3.1 0.3 - 6.2 %    Basophil % 0.6 0.0 - 1.5 %    Immature Grans % 0.3 0.0 - 0.5 %    Neutrophils, Absolute 4.12 1.70 - 7.00 10*3/mm3    Lymphocytes, Absolute 1.37 0.70 - 3.10 10*3/mm3    Monocytes, Absolute 0.44 0.10 - 0.90 10*3/mm3    Eosinophils, Absolute 0.19 0.00 - 0.40 10*3/mm3    Basophils, Absolute 0.04 0.00 - 0.20 10*3/mm3    Immature  Grans, Absolute 0.02 0.00 - 0.05 10*3/mm3    nRBC 0.0 0.0 - 0.2 /100 WBC       Ordered the above labs and independently reviewed the results.        RADIOLOGY  Doppler Arterial Multi Level Lower Extremity - Bilateral CAR    Result Date: 3/22/2021  · Right Conclusion: The right PABLO is severely reduced. Waveforms are consistent with popliteal disease and tib-peroneal disease. Severe digital ischemia. · Left Conclusion: The left PABLO is normal. Normal digital pressures.        I ordered the above noted radiological studies. Reviewed by me and discussed with radiologist.  See dictation for official radiology interpretation.      PROCEDURES    Critical Care  Performed by: Oni Adkins MD  Authorized by: Oni Adkins MD     Critical care provider statement:     Critical care time (minutes): 30.    Critical care time was exclusive of:  Separately billable procedures and treating other patients    Critical care was necessary to treat or prevent imminent or life-threatening deterioration of the following conditions:  Circulatory failure    Critical care was time spent personally by me on the following activities:  Blood draw for specimens, development of treatment plan with patient or surrogate, discussions with consultants, evaluation of patient's response to treatment, examination of patient, obtaining history from patient or surrogate, review of old charts, re-evaluation of patient's condition, pulse oximetry, ordering and review of radiographic studies, ordering and review of laboratory studies and ordering and performing treatments and interventions    I assumed direction of critical care for this patient from another provider in my specialty: no            MEDICATIONS GIVEN IN ER    Medications   heparin 71133 units/250 mL (100 units/mL) in 0.45 % NaCl infusion (18 Units/kg/hr × 49.2 kg Intravenous New Bag 3/22/21 2200)   heparin (porcine) 5000 UNIT/ML injection 2,000-3,900 Units (has no administration in time  range)   sodium chloride 0.9 % flush 10 mL (10 mL Intravenous Given 3/23/21 0016)   sodium chloride 0.9 % flush 10 mL (has no administration in time range)   acetaminophen (TYLENOL) tablet 650 mg (650 mg Oral Given 3/23/21 0014)     Or   acetaminophen (TYLENOL) 160 MG/5ML solution 650 mg ( Oral Not Given:  See Alt 3/23/21 0014)     Or   acetaminophen (TYLENOL) suppository 650 mg ( Rectal Not Given:  See Alt 3/23/21 0014)   nitroglycerin (NITROSTAT) SL tablet 0.4 mg (has no administration in time range)   sodium chloride 0.9 % infusion (100 mL/hr Intravenous New Bag 3/23/21 0014)   ondansetron (ZOFRAN) injection 4 mg (has no administration in time range)   labetalol (NORMODYNE,TRANDATE) injection 10 mg (10 mg Intravenous Given 3/22/21 2144)   heparin (porcine) 5000 UNIT/ML injection 3,900 Units (3,900 Units Intravenous Given 3/22/21 2207)         PROGRESS, DATA ANALYSIS, CONSULTS, AND MEDICAL DECISION MAKING    Informed patient of my concerns and the test that we will order.  All questions answered at this time.  We are currently under a pandemic from the COVID19 infection.  The patient presented to the emergency department by ambulance or personal vehicle. I followed the current protocols required by Infection Control at [facility] in my evaluation and treatment of the patient. The patient was wearing a face mask during my evaluation and throughout my encounter. During my whole encounter with this patient I used appropriate personal protective equipment.  This equipment consisted of eye protection, facemask, gown, and gloves.  I applied this equipment before entering the room.      All labs have been independently reviewed by me.  All radiology studies have been reviewed by me and discussed with radiologist dictating the report.   EKG's independently viewed and interpreted by me.  Discussion below represents my analysis of pertinent findings related to patient's condition, differential diagnosis,  treatment plan and final disposition.      ED Course as of Mar 23 0125   Mon Mar 22, 2021   2125 EKGEKG was done at 2121It is a rate of 73 and it is sinus rhythmThere is mild intraventricular conduction delay.  I do not see any signs of atrial fibrillationThere is borderline left axis deviationThere is Q waves in the anterior septal leads with nonspecific changesI do not appreciate any acute injury pattern.  QT is normal.  This patient denies any chest pain currently or prior to arrival here.  Compared to previous EKG from May 17, 2018 and it did have similar appearance and she was normal sinus rhythm at that time.    [MM]   2126 I did speak with Selina who is on for [facility].  She is a nurse practitioner.  Informed her of this patient's clinical presentation and results of tests and initial treatment plan.  All questions answered at this time.  The attending that will be admitting the patient will be .    [MM]   2131 I discussed the case with Dr. Benjamin Collier is on for vascular surgery.  Explained to him that my evaluation.  And the results of the vascular Doppler.  He actually reviewed the arterial study.  Agrees with heparin typical of how you would treat a DVT or PE at this time.  This does not need any emergent revascularization.  This is been going on for a week and a half.  She has no motor impairment.  She has some sensory impairment only.    [MM]   2132 I discussed the case with the vascular tech    [MM]   2132  there was no PABLO or flow in the right lower extremity that appears like a distal popliteal occlusion on the right.  Patient had good distal flow in the left lower extremity.    [MM]   2133 I reevaluated the patient.  Her exam and status is unchanged.  I informed her of the treatment and my concerns.  I answered all her questions.  She has no contraindications to heparin.  All questions answered.    [MM]   2139 I did speak with Dr. Rust.  I called him per the request of the patient.  Informed  him of the results of initial treatment plan.    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  I answered all of the patient's questions.  The patient will be admitted to monitor bed at this time.  The patient is not hypotensive and is tolerating their current disease condition well enough for a monitored bed at this time.  The patient's current condition does not require intensive care treatment at this time.             Oni Adkins MD  03/23/21 0125

## 2021-03-23 NOTE — OP NOTE
Operative Note  Location: [facility]  Date of Admission:  3/22/2021  OR Date: 3/23/2021    Pre-op Diagnosis:  Subacute arterial ischemia of the right lower extremity    Post-op Diagnosis:  Subacute arterial ischemia of the right lower extremity    Procedure:   1.  Ultrasound-guided access to the left common femoral artery with femoral arteriogram  2.  Aortogram with right lower extremity runoff  3.  RotaRx rotational atherectomy of the right popliteal artery  4.  Successful deployment of left groin Mynx closure device    Surgeon: Gato Contreras MD    Assistant: Clementine Cheung, Provided critical assistance in exposure, retraction, and suction that overall decrease blood loss and operative time.    Anesthesia: General    Staff:   Circulator: Leona Ribeiro RN  Scrub Person: Clementine Cheung; Jose L Escobar; Yamel Clark  Vascular Radiology Technician: Rosy Goldstein    Estimated Blood Loss: minimal    Specimen: None    Complications: None    Findings: Occlusion of the behind knee popliteal artery with mild atherosclerosis involving tibial arteries in the right lower extremity  Successful rotational atherectomy of right popliteal artery with no residual stenosis.    Implants: Nothing was implanted during the procedure    Indications: 85-year-old woman presented to the emergency room with a 2-week history of right foot pain.  Examination demonstrated easily palpable right and left femoral artery pulses, and left pedal artery pulses.  She had neither pulses nor Doppler signals in the right lower extremity, and PABLO was 0.  Arrangements were made for right lower extremity arteriogram possible intervention.       Procedure:  A radial arterial line was placed.  The patient was given Kefzol IV.  She was transferred to the operating room and positioned supine the operating table.  After the induction of general anesthesia, the Dove catheter was placed.  The patient's right and left groins were prepared with ChloraPrep  and draped in a sterile fashion.  Ultrasound-guided interrogation was performed of the left groin.  Ultrasound-guided access to the left groin was undertaken with a micropuncture kit and a wire was used to exchange the needle for a 4 Australian micropuncture catheter.  The micropuncture catheter was used to perform a femoral arteriogram, documenting satisfactory common femoral artery cannulation.  The artery was felt to be suitable for closure device.  An angled Glidewire was passed proximally through the iliac segment and into the aorta, and a 6 Australian sheath was placed.  An Omni Flush catheter was positioned in the abdominal aorta and a flush aortogram was obtained in AP projection, demonstrating relative lack of atherosclerosis involving the aorta and right and left iliac segments.  Incidental note was made of a small splenic artery aneurysm.  The Omni Flush catheter was pulled down to the aortic bifurcation using an angled Glidewire, and the right iliac system was cannulated up and over the aortic bifurcation from the left.  The Omni Flush catheter was removed and a Guzman cross catheter was placed.  A long advantage Glidewire was passed through the Guzman cross catheter and a 6 Australian straight destination sheath was advanced up and over the aortic bifurcation and the tip was positioned in the right common femoral artery.  A femoral arteriogram was obtained following injection of contrast material through the SHEATH in MORALES projection.  An AP runoff arteriogram was then performed following injection of contrast through the sheath.  This demonstrated behind the popliteal artery occlusion, with reconstitution of the below-knee popliteal artery and severe tibial artery occlusive disease.  I felt that this would be amenable to percutaneous revascularization.  The patient was receiving a continuous heparin drip and a random ACT was 139.  She was given heparin 5000 units IV.  The Glidewire was advanced through the popliteal  artery and then Navicross catheter was advanced and a below-knee popliteal arteriogram confirmed endoluminal passage.  A 0.018 inch wire that was part of the Rota Fady system was advanced through the catheter and the catheter was removed.  The Rota Fady device was prepared in the usual fashion and advanced over the wire and positioned in the behind knee popliteal artery.  The device was activated and a back-and-forth approach was used to perform rotational thrombectomy of the popliteal artery without difficulty.  A follow-up arteriogram demonstrated complete resolution of thrombus and normal appearance to the artery.  There was prompt flow of contrast material through the popliteal artery.  The runoff was imaged and there was no evidence of distal embolization.  The result was accepted.  The sheath and wire were withdrawn into the left iliac system and the guidewire was advanced proximally.  The destination sheath was removed and a short 6 Mongolian sheath was placed.  A minx closure device was deployed in the left groin in the usual fashion with good success.  The patient had a small groin hematoma from a sheath exchange, but pressure was held for an extended period of time.  The pedal arteries were interrogated and good signals were present on both sides.  The patient was given protamine 30 mg IV.  At its conclusion the patient had tolerated the procedure well, and without apparent complications.  Sponge and needle counts were correct.  The patient was taken to the recovery area in stable condition.    Radiographic findings: The aorta and right and left common and external iliac arteries are widely patent.  The right common, superficial and deep femoral arteries are normal.  The above-knee popliteal artery shows mild atherosclerotic irregularity without stenosis.  There is abrupt cut off of the popliteal artery contrast column behind the knee, just proximal to the point at which the patient's total knee prosthesis  projects over the knee joint.  There is reconstitution of the below below-knee popliteal artery.  Following rotational atherectomy, the behind knee popliteal artery is seen to be widely patent.  There is prompt flow of contrast material through the popliteal artery.  Imaging of the runoff arteries demonstrates the behind knee popliteal artery to be small with some atherosclerotic irregularity but without significant stenosis.  There is runoff through all 3 tibial arteries, though the peroneal artery appears to be the best.  There is occlusion of the mid posterior tibial artery with reconstitution near the ankle.    Gato Contreras MD     Date: 3/23/2021  Time: 18:47 EDT

## 2021-03-23 NOTE — PROGRESS NOTES
Discharge Planning Assessment  [facility]     Patient Name: Arlin Hutson  MRN: [id number]  Today's Date: 3/23/2021    Admit Date: 3/22/2021    Discharge Needs Assessment     Row Name 03/23/21 1227       Living Environment    Lives With  alone    Current Living Arrangements  home/apartment/condo    Primary Care Provided by  self    Provides Primary Care For  no one    Family Caregiver if Needed  child(stephan), adult    Quality of Family Relationships  supportive       Resource/Environmental Concerns    Resource/Environmental Concerns  none       Transition Planning    Patient/Family Anticipates Transition to  home    Patient/Family Anticipated Services at Transition  none        Discharge Plan     Row Name 03/23/21 1307       Plan    Plan  Return home with HH vs SNF    Patient/Family in Agreement with Plan  yes    Plan Comments  Spoke with pt's daughter/ Erin over phone.  Introduced self, explained CCP role, facesheet verified.  Pt lives alone and is independent with ADLs.  Pt uses walker and cane at home.  Stairs present in home but pt doesn't  use.  Has used HH in past, unsure of agency.  Has been to [facility] for rehab in past but would not want to return there.  Daughter feels pt might needs rehab at discharge.  Will provide daughter with list of facilities using CMS compare and follow up after procedure.  CESAR Giles RN        Continued Care and Services - Admitted Since 3/22/2021    Coordination has not been started for this encounter.         Demographic Summary     Row Name 03/23/21 1223       General Information    Admission Type  inpatient    Arrived From  home    Referral Source  admission list    Reason for Consult  discharge planning    Preferred Language  English       Contact Information    Permission Granted to Share Info With  family/designee Erin Swanson ([phone])        Functional Status    No documentation.       Psychosocial    No documentation.       Abuse/Neglect    No documentation.        Legal    No documentation.       Substance Abuse    No documentation.       Patient Forms    No documentation.           Gloria Giles RN

## 2021-03-23 NOTE — ANESTHESIA PROCEDURE NOTES
Arterial Line      Patient reassessed immediately prior to procedure    Start time: 3/23/2021 4:31 PM  Stop Time:3/23/2021 4:37 PM       Line placed for hemodynamic monitoring and ABGs/Labs/ISTAT.  Performed By   Anesthesiologist: Danielle Morgan MD  Preanesthetic Checklist  Completed: patient identified, IV checked, site marked, risks and benefits discussed, surgical consent, monitors and equipment checked, pre-op evaluation and timeout performed  Arterial Line Prep   Sterile Tech: cap, gloves and sterile barriers  Prep: ChloraPrep  Patient monitoring: EKG, continuous pulse oximetry and blood pressure monitoring  Arterial Line Procedure   Laterality:left  Location:  radial artery  Catheter size: 20 G   Guidance: ultrasound guided  PROCEDURE NOTE/ULTRASOUND INTERPRETATION.  Using ultrasound guidance the potential vascular sites for insertion of the catheter were visualized to determine the patency of the vessel to be used for vascular access.  After selecting the appropriate site for insertion, the needle was visualized under ultrasound being inserted into the radial artery, followed by ultrasound confirmation of wire and catheter placement. There were no abnormalities seen on ultrasound; an image was taken; and the patient tolerated the procedure with no complications.   Number of attempts: 2  Successful placement: yes  Post Assessment   Dressing Type: wrist guard applied, secured with tape and occlusive dressing applied.   Complications no  Circ/Move/Sens Assessment: normal and unchanged.   Patient Tolerance: patient tolerated the procedure well with no apparent complications

## 2021-03-23 NOTE — ANESTHESIA PREPROCEDURE EVALUATION
Anesthesia Evaluation     Patient summary reviewed and Nursing notes reviewed   NPO Solid Status: > 8 hours  NPO Liquid Status: > 2 hours           Airway   Mallampati: II  TM distance: >3 FB  Neck ROM: full  No difficulty expected  Dental    (+) partials        Pulmonary - normal exam   Cardiovascular - normal exam  Exercise tolerance: good (4-7 METS)    ECG reviewed  PT is on anticoagulation therapy    (+) hypertension poorly controlled, valvular problems/murmurs MR and TI, PVD (acute arterial occlusion), hyperlipidemia,   (-) angina      Neuro/Psych  GI/Hepatic/Renal/Endo    (+)  GERD,      Musculoskeletal     Abdominal  - normal exam   Substance History      OB/GYN          Other   arthritis (RA, scleroderma, CREST syndrome),      ROS/Med Hx Other:  right foot pain/numbness. subacute to acute arterial occlusion of the right lower extremity.     · Calculated left ventricular EF = 64% Estimated left ventricular EF was in agreement with the calculated left ventricular EF. Left ventricular systolic function is normal. Normal left ventricular cavity size noted. Left ventricular wall thickness is consistent with mild concentric hypertrophy. All left ventricular wall segments contract normally. Left ventricular diastolic function is consistent with (grade Ia w/high LAP) impaired relaxation.  · The left atrial cavity is mildly dilated.  · Mild to moderate mitral valve regurgitation is present.  · Mild to moderate tricuspid valve regurgitation is present. Estimated right ventricular systolic pressure from tricuspid regurgitation is normal (<35 mmHg). Calculated right ventricular systolic pressure from tricuspid regurgitation is 23 mmHg.                Anesthesia Plan    ASA 3     general   (Arterial line    I have reviewed the patient's history with the patient and the chart, including all pertinent laboratory results and imaging. I have explained the risks of anesthesia including but not limited to dental damage,  "corneal abrasion, nerve injury, MI, stroke, and death.    /74 (BP Location: Right arm, Patient Position: Lying)   Pulse 63   Temp 36.9 °C (98.4 °F) (Oral)   Resp 16   Ht 152.4 cm (60\")   Wt 48.5 kg (107 lb)   LMP  (LMP Unknown)   SpO2 95%   BMI 20.90 kg/m²   )  intravenous induction     Anesthetic plan, all risks, benefits, and alternatives have been provided, discussed and informed consent has been obtained with: patient.    Plan discussed with CRNA.      "

## 2021-03-23 NOTE — PLAN OF CARE
Goal Outcome Evaluation:  Plan of Care Reviewed With: patient  Progress: no change  Outcome Summary: admitted pt ER. pt remians on heparin gtt. no pulses in right foot/ cool and discolored. consult to vascular called and spoke with Nando Boss. will see in am. pt remains to be NPO. 1 x dose of morphine given for pain. labs in am. will continue to monitor.

## 2021-03-23 NOTE — CONSULTS
Patient Name: Arlin Hutson Account #: [id number]    MRN: [id number] Admission Date: 3/22/2021      Consulting Service: Vascular Surgery Date of Evaluation: March 23, 2021    Requesting Provider: CLINT    CHIEF COMPLAINT: Right leg and foot pain  HPI: Arlin Hutson is a 85 y.o. female whose past medical record I have reviewed and summarize below in addition to the findings from today's exam.   The patient is being seen for a consultation and evaluation/management of ischemia right lower extremity.  The patient complains of a 2-week history of pain and numbness of the left foot.  There was short distance claudication associated with this.  More recently this became more numb.  She is still able to walk although difficulty with this.  No calf tenderness or pain.  No leg swelling.  She came to the emergency room for this.    No history of atrial fibrillation.  EKG in the emergency room consistent with old septal infarct, but no cardiac dysrhythmia.  No preceding history of claudication of lower extremities.  No poorly healing ulcerations or tissue loss.    PAST MEDICAL HISTORY:   Past Medical History:   Diagnosis Date   • Baker's cyst    • Colon polyp    • CREST syndrome (CMS/HCC)    • Fractures    • GERD (gastroesophageal reflux disease)    • History of CT scan of abdomen 03/11/2011    constipation, sig tics, 6 mm hepatic cyst   • History of rheumatoid arthritis    • Hyperlipidemia    • Hypertension    • Low back pain    • Normocytic anemia 8/26/2020   • Osteoarthritis    • Raynaud's disease    • Rheumatoid arthritis (CMS/HCC)    • Scleroderma (CMS/HCC)    • Sjogren's syndrome (CMS/HCC)    • Ulcerative colitis (CMS/HCC)       PAST SURGICAL HISTORY:   Past Surgical History:   Procedure Laterality Date   • CATARACT EXTRACTION, BILATERAL     • COLONOSCOPY  02/26/2016    tics, microscopic colitis,    • COLONOSCOPY  07/01/2011    sig tics, inflammation, polyp   • DILATATION AND CURETTAGE  1980   • ENDOSCOPY N/A 12/3/2018     erythematous mucosa in stomach, path benign   • EYE SURGERY     • FLEXIBLE SIGMOIDOSCOPY     • FRACTURE SURGERY     • HAND SURGERY     • JOINT REPLACEMENT     • KNEE ARTHROSCOPY Right     w/meniscal repair   • KNEE SURGERY Right    • TEETH EXTRACTION     • TOTAL KNEE ARTHROPLASTY Right 5/30/2018    Procedure: TOTAL KNEE ARTHROPLASTY;  Surgeon: Georges Jon MD;  Location: [facility] OR;  Service: Orthopedics   • UPPER GASTROINTESTINAL ENDOSCOPY  03/14/2008    erythema   • WRIST FRACTURE SURGERY Right    • WRIST SURGERY Right 2008    orif      FAMILY HISTORY:   Family History   Problem Relation Age of Onset   • Ulcerative colitis Mother    • Migraines Mother    • Stroke Mother    • Hypertension Mother    • Thyroid disease Mother    • Skin cancer Mother    • Breast cancer Maternal Aunt    • Arthritis Father    • Heart attack Father    • Migraines Sister    • Skin cancer Sister    • Migraines Daughter    • Skin cancer Brother    • Malig Hyperthermia Neg Hx       SOCIAL HISTORY:   Social History     Tobacco Use   • Smoking status: Never Smoker   • Smokeless tobacco: Never Used   Substance Use Topics   • Alcohol use: Yes     Types: 1 - 3 Glasses of wine per week     Comment: Infrequent   • Drug use: No      MEDICATIONS:   No current facility-administered medications on file prior to encounter.     Current Outpatient Medications on File Prior to Encounter   Medication Sig Dispense Refill   • Acetaminophen (TYLENOL EXTRA STRENGTH PO) Take 2 tablets by mouth Daily As Needed.     • amLODIPine (NORVASC) 2.5 MG tablet Take 2.5 mg by mouth Every Morning.     • Biotin 5000 MCG tablet Take  by mouth Daily.     • calcium carbonate (CALCIUM 600) 600 MG tablet 1 P.O. daily     • Carboxymethylcell-Glycerin PF 0.5-0.9 % solution Apply 1-2 drops to eye(s) as directed by provider.     • Carboxymethylcell-Hypromellose (GENTEAL OP) Apply 1 application to eye As Needed.     • Cholecalciferol (VITAMIN D PO) Take 2,000 mg by mouth every  night at bedtime.     • escitalopram (LEXAPRO) 10 MG tablet 20 mg Daily.     • folic acid (FOLVITE) 1 MG tablet Take 2 mg by mouth Daily.     • Methotrexate Sodium (METHOTREXATE PF) 50 MG/2ML chemo syringe Infuse  into a venous catheter 1 (One) Time.     • Multiple Vitamins-Minerals (CENTRUM SILVER ULTRA WOMENS PO) 1 P.O. daily     • omeprazole (priLOSEC) 20 MG capsule 1 TABLET  P.O.  M W F     • traMADol (ULTRAM) 50 MG tablet Take 50 mg by mouth Every 6 (Six) Hours As Needed for Moderate Pain .     • traZODone (DESYREL) 50 MG tablet Take 25-50 mg by mouth Every Night.     • Turmeric 400 MG capsule 1 P.O. daily     • Ascorbic Acid (VITAMIN C PO) Take 500 mg by mouth Daily.     • diclofenac (VOLTAREN) 1 % gel gel Apply 2 g topically to the appropriate area as directed 4 (Four) Times a Day.     • polyethyl glycol-propyl glycol (SYSTANE) 0.4-0.3 % solution ophthalmic solution Administer 2 drops to both eyes Every 1 (One) Hour As Needed.               ALLERGIES: Codeine and Sulfa antibiotics   COMPLETE REVIEW OF SYSTEMS:     Review of Systems: 10 system review of system has been performed.  Pertinent positive includes crest syndrome and Raynaud's.  She is also had knee replacement surgery.  Remainder review of systems has been performed and are negative.      Vital Signs  Temp:  [97.3 °F (36.3 °C)-98.1 °F (36.7 °C)] 98.1 °F (36.7 °C)  Heart Rate:  [67-77] 67  Resp:  [16-18] 18  BP: (155-190)/(80-95) 155/80    Physical Exam: Well-developed well-nourished thin female no acute distress awake, alert, and oriented x3  HEENT: Negative, sclera nonicteric, extraocular movements intact.  Normocephalic and atraumatic.  Neck: Supple, for any motion, no JVD  Heart: Normal sinus rhythm without murmur  Chest: Equal bilateral expansion and symmetrical.  Lungs clear to auscultation.  Unlabored breathing.  Abdomen: Soft, benign, no masses palpated, no hepatosplenomegaly  Neuro: Grossly intact appropriately.  There is slightly diminished  sensation to the forefoot but normal in the hindfoot.  Motor function fully intact.  Extremities: There are 2+ palpable brachial and radial pulses bilaterally with 2+ palpable femoral popliteal pulses bilaterally.  2+ palpable pedal pulses on the left.  There is a monophasic Doppler signal in the right posterior tibial artery.  Both feet are warm although the right foot is slightly cooler than the left.  There is capillary refill.  There is superficial venous distention as well.    LABS:      Results Review:       I reviewed the patient's new clinical results.  Results from last 7 days   Lab Units 03/22/21 2124 03/22/21 1931   WBC 10*3/mm3 6.18 6.38   HEMOGLOBIN g/dL 12.1 11.6*   PLATELETS 10*3/mm3 248 241     Results from last 7 days   Lab Units 03/22/21 1931   SODIUM mmol/L 142   POTASSIUM mmol/L 4.2   CHLORIDE mmol/L 105   CO2 mmol/L 28.5   BUN mg/dL 16   CREATININE mg/dL 0.56*   GLUCOSE mg/dL 83   Estimated Creatinine Clearance: 39.4 mL/min (A) (by C-G formula based on SCr of 0.56 mg/dL (L)).  Results from last 7 days   Lab Units 03/22/21 1931   CALCIUM mg/dL 8.8     Results from last 7 days   Lab Units 03/22/21 2124   PROTIME Seconds 13.2   INR  1.02       The following radiologic or non-invasive studies have been reviewed by me: Noninvasive arterial studies from 3/22/2021    Active Hospital Problems    Diagnosis  POA   • Arterial occlusion [I70.90]  Yes      Resolved Hospital Problems   No resolved problems to display.        Problem Points:  3:  Patient has a new problem, with no additional work-up planned (max of 1)  Total problem points:3    Data Points:  2:  I have personally and independently review of image, tracing, or specimen  2:  I have reviewed and summation of old records and/or discussed the patients care with another health care provider  Total data points:4 or more    Risk Points:  High:  Any illness that poses threat to life or body funciton    MDM Prob point Data point Risk   SF 1 1  Minimal   Low 2 2 Low   Mod 3 3 Moderate   High 4 4 High     Code MDM History Exam Time   19093 SF/Low Detailed Detailed 30   03444 Mod Comprehensive Comprehensive 50   84231 High Comprehensive Comprehensive 70     Detailed history:  4 elements HPI or status of 3 chronic problems; 2-9 system ROS  Comprehensive:  4 elements HPI or status of 3 chronic problems;  10 system ROS    Detailed Exam:  12 findings from any organ system  Comprehensive Exam:  2 findings from each of 9 systems.     [place]illin       ASSESSMENT/PLAN: 85 y.o. female with subacute to acute arterial occlusion of the right lower extremity.  The foot is certainly viable currently with no calf tenderness and sensation.  There is a monophasic Doppler signal noted in the posterior tibial artery.    I have discussed options with her including arteriography with endovascular treatment and possible mechanical thrombectomy with possible angioplasty and/or stent.  Also discussed the possibility of placement of a pharmacologic thrombolysis catheter, or even open thromboembolectomy.  I believe there is a reasonable chance that this will work with mechanical thrombectomy.  She is in agreement with the plan.  We will continue the heparin drip and proceed with this today.      Benjamin Collier Jr., MD   21

## 2021-03-24 LAB
ACT BLD: 136 SECONDS (ref 82–152)
ACT BLD: 224 SECONDS (ref 82–152)
ANION GAP SERPL CALCULATED.3IONS-SCNC: 9.7 MMOL/L (ref 5–15)
BASOPHILS # BLD AUTO: 0.02 10*3/MM3 (ref 0–0.2)
BASOPHILS NFR BLD AUTO: 0.3 % (ref 0–1.5)
BUN SERPL-MCNC: 10 MG/DL (ref 8–23)
BUN/CREAT SERPL: 19.6 (ref 7–25)
CALCIUM SPEC-SCNC: 8.2 MG/DL (ref 8.6–10.5)
CHLORIDE SERPL-SCNC: 104 MMOL/L (ref 98–107)
CO2 SERPL-SCNC: 22.3 MMOL/L (ref 22–29)
CREAT SERPL-MCNC: 0.51 MG/DL (ref 0.57–1)
DEPRECATED RDW RBC AUTO: 46.9 FL (ref 37–54)
EOSINOPHIL # BLD AUTO: 0 10*3/MM3 (ref 0–0.4)
EOSINOPHIL NFR BLD AUTO: 0 % (ref 0.3–6.2)
ERYTHROCYTE [DISTWIDTH] IN BLOOD BY AUTOMATED COUNT: 14.1 % (ref 12.3–15.4)
GFR SERPL CREATININE-BSD FRML MDRD: 115 ML/MIN/1.73
GLUCOSE SERPL-MCNC: 117 MG/DL (ref 65–99)
HCT VFR BLD AUTO: 30.7 % (ref 34–46.6)
HGB BLD-MCNC: 10.8 G/DL (ref 12–15.9)
IMM GRANULOCYTES # BLD AUTO: 0.05 10*3/MM3 (ref 0–0.05)
IMM GRANULOCYTES NFR BLD AUTO: 0.7 % (ref 0–0.5)
LAB AP CASE REPORT: NORMAL
LAB AP DIAGNOSIS COMMENT: NORMAL
LYMPHOCYTES # BLD AUTO: 0.43 10*3/MM3 (ref 0.7–3.1)
LYMPHOCYTES NFR BLD AUTO: 5.8 % (ref 19.6–45.3)
MCH RBC QN AUTO: 32.7 PG (ref 26.6–33)
MCHC RBC AUTO-ENTMCNC: 35.2 G/DL (ref 31.5–35.7)
MCV RBC AUTO: 93 FL (ref 79–97)
MONOCYTES # BLD AUTO: 0.23 10*3/MM3 (ref 0.1–0.9)
MONOCYTES NFR BLD AUTO: 3.1 % (ref 5–12)
NEUTROPHILS NFR BLD AUTO: 6.72 10*3/MM3 (ref 1.7–7)
NEUTROPHILS NFR BLD AUTO: 90.1 % (ref 42.7–76)
NRBC BLD AUTO-RTO: 0 /100 WBC (ref 0–0.2)
PLATELET # BLD AUTO: 220 10*3/MM3 (ref 140–450)
PMV BLD AUTO: 10.5 FL (ref 6–12)
POTASSIUM SERPL-SCNC: 3.9 MMOL/L (ref 3.5–5.2)
RBC # BLD AUTO: 3.3 10*6/MM3 (ref 3.77–5.28)
SODIUM SERPL-SCNC: 136 MMOL/L (ref 136–145)
WBC # BLD AUTO: 7.45 10*3/MM3 (ref 3.4–10.8)

## 2021-03-24 PROCEDURE — 85025 COMPLETE CBC W/AUTO DIFF WBC: CPT | Performed by: SURGERY

## 2021-03-24 PROCEDURE — 80048 BASIC METABOLIC PNL TOTAL CA: CPT | Performed by: SURGERY

## 2021-03-24 RX ADMIN — ACETAMINOPHEN 650 MG: 325 TABLET, FILM COATED ORAL at 16:08

## 2021-03-24 RX ADMIN — SODIUM CHLORIDE, PRESERVATIVE FREE 10 ML: 5 INJECTION INTRAVENOUS at 09:20

## 2021-03-24 RX ADMIN — ESCITALOPRAM 20 MG: 20 TABLET, FILM COATED ORAL at 09:20

## 2021-03-24 RX ADMIN — AMLODIPINE BESYLATE 5 MG: 5 TABLET ORAL at 09:20

## 2021-03-24 RX ADMIN — PANTOPRAZOLE SODIUM 40 MG: 40 TABLET, DELAYED RELEASE ORAL at 06:20

## 2021-03-24 RX ADMIN — ACETAMINOPHEN 650 MG: 325 TABLET, FILM COATED ORAL at 20:08

## 2021-03-24 RX ADMIN — SODIUM CHLORIDE, PRESERVATIVE FREE 10 ML: 5 INJECTION INTRAVENOUS at 20:09

## 2021-03-24 RX ADMIN — ATORVASTATIN CALCIUM 40 MG: 20 TABLET, FILM COATED ORAL at 20:09

## 2021-03-24 RX ADMIN — TRAZODONE HYDROCHLORIDE 25 MG: 50 TABLET ORAL at 20:09

## 2021-03-24 RX ADMIN — RIVAROXABAN 20 MG: 20 TABLET, FILM COATED ORAL at 09:20

## 2021-03-24 RX ADMIN — FOLIC ACID 2 MG: 1 TABLET ORAL at 09:20

## 2021-03-24 NOTE — PLAN OF CARE
Goal Outcome Evaluation:  Plan of Care Reviewed With: patient  Progress: improving  Outcome Summary: S/p atherectomy of right popliteal artery. Left groin puncture CDI with mynx closure, soft, scant amount of drainage marked on arrival from PACU. Pulses present per doppler, signals have increased through the night. Medicated x 1 for pain with good results. Pt rested well. FC to bedside drainage with approximately 50cc/hr. BP elevated on arrival from recovery, has decreased without any additional intervention. Tolerating PO well. Daughter at bedside. Will continue to monitor.

## 2021-03-24 NOTE — PROGRESS NOTES
Patient Medication Counseling    Xarelto 20 mg PO daily with food    Patient with acute RLE DVT initially anticoagulated on heparin drip which has now been converted to Xarelto and will be continued upon discharge.     Patient counseled about the need for medication, dose and frequency, administration, drug interactions, and adverse effects including how to respond to signs and symptoms bleeding. She is aware that the medication must be taken with food at the same time everyday.     The patient and her daughter were present for counseling and all of their questions were answered. Teach back method was used to ensure patient understanding. Educational materials were provided.     Thank you for allowing me to participate in the care of your patient,    Saman Bauman, PharmD  3/24/2021

## 2021-03-24 NOTE — PROGRESS NOTES
The patient is 1 day status post right popliteal artery atherectomy with the resulting three-vessel runoff.  Peroneal artery appeared to the the best runoff.  Currently patient doing very well with no issues.  She has had resolution of her numbness.  She is eager to start ambulating.    Left groin puncture site is doing satisfactorily.  Easily palpable strong popliteal pulse.  She has strong biphasic Doppler signals in the peroneal, posterior tibial, and dorsalis pedis arteries.  Right foot warm and well-perfused.    Doing very well following her procedure with reestablishment of essentially normal perfusion of the right lower extremity.  We will start her on oral anticoagulation and plan to discontinue her heparin drip.

## 2021-03-24 NOTE — PROGRESS NOTES
Continued Stay Note  [facility]     Patient Name: Arlin Hutson  MRN: [id number]  Today's Date: 3/24/2021    Admit Date: 3/22/2021    Discharge Plan     Row Name 03/24/21 0948       Plan    Plan  Return Home wiht HH vs SNF    Provided Post Acute Provider List?  Yes    Post Acute Provider List  Inpatient Rehab    Provided Post Acute Provider Quality & Resource List?  Yes    Post Acute Provider Quality and Resource List  Nursing Home    Delivered To  Patient    Method of Delivery  In person    Patient/Family in Agreement with Plan  yes    Plan Comments  List of rehab facilities provided to pt using CMS yordan.  CESAR Giles RN        Discharge Codes    No documentation.             Gloria Giles, RN

## 2021-03-24 NOTE — PLAN OF CARE
Goal Outcome Evaluation:  Plan of Care Reviewed With: patient  Progress: improving  Outcome Summary: patient vss. pain well controlled. dopplerable pulses. will monitor.

## 2021-03-24 NOTE — PROGRESS NOTES
Name: Arlin Hutson ADMIT: 3/22/2021   : 1935  PCP: Oren Rust Jr., MD    MRN: [id number] LOS: 2 days   AGE/SEX: 85 y.o. female  ROOM: [facility]     Subjective   Subjective   no events overnight. patient has feeling again in her right foot. with some calf pain today.    Review of Systems   Constitutional: Negative for chills and fever.   HENT: Negative for congestion and sore throat.    Respiratory: Negative for cough and shortness of breath.    Cardiovascular: Negative for chest pain and palpitations.   Gastrointestinal: Negative for abdominal pain, diarrhea, nausea and vomiting.   Genitourinary: Negative for difficulty urinating and dysuria.   Musculoskeletal: Negative for back pain and gait problem.   Neurological: Negative for dizziness and headaches.   Psychiatric/Behavioral: Negative for behavioral problems and confusion.        Objective   Objective   Vital Signs  Temp:  [97.8 °F (36.6 °C)-99 °F (37.2 °C)] 98.1 °F (36.7 °C)  Heart Rate:  [63-84] 84  Resp:  [16-18] 16  BP: (103-192)/(49-83) 157/72  Arterial Line BP: ()/(34-68) 135/49  SpO2:  [90 %-100 %] 95 %  on  Flow (L/min):  [2-3] 2;   Device (Oxygen Therapy): room air  Body mass index is 20.9 kg/m².  Physical Exam  Vitals and nursing note reviewed.   Constitutional:       Appearance: Normal appearance.      Comments: elderly, appears stated age   Cardiovascular:      Rate and Rhythm: Normal rate and regular rhythm.   Pulmonary:      Effort: Pulmonary effort is normal. No respiratory distress.      Breath sounds: Normal breath sounds.   Abdominal:      General: Bowel sounds are normal. There is no distension.      Palpations: Abdomen is soft.      Tenderness: There is no abdominal tenderness.   Musculoskeletal:         General: Tenderness (right calf) present. No swelling. Normal range of motion.   Neurological:      Mental Status: She is alert and oriented to person, place, and time. Mental status is at baseline.         Results  Review     I reviewed the patient's new clinical results.  Results from last 7 days   Lab Units 03/24/21 0423 03/23/21  0704 03/22/21 2124 03/22/21 1931   WBC 10*3/mm3 7.45 5.79 6.18 6.38   HEMOGLOBIN g/dL 10.8* 11.6* 12.1 11.6*   PLATELETS 10*3/mm3 220 216 248 241     Results from last 7 days   Lab Units 03/24/21 0423 03/23/21  0704 03/22/21 1931   SODIUM mmol/L 136 141 142   POTASSIUM mmol/L 3.9 3.7 4.2   CHLORIDE mmol/L 104 107 105   CO2 mmol/L 22.3 26.1 28.5   BUN mg/dL 10 12 16   CREATININE mg/dL 0.51* 0.50* 0.56*   GLUCOSE mg/dL 117* 87 83   Estimated Creatinine Clearance: 39.4 mL/min (A) (by C-G formula based on SCr of 0.51 mg/dL (L)).    Results from last 7 days   Lab Units 03/24/21 0423 03/23/21 0704 03/22/21 1931   CALCIUM mg/dL 8.2* 8.5* 8.8       COVID19   Date Value Ref Range Status   03/22/2021 Not Detected Not Detected - Ref. Range Final     SARS-CoV-2 PCR   Date Value Ref Range Status   01/11/2021 Not Detected Not Detected Final     Comment:     Nucleic acid specific to SARS-CoV-2 (COVID-19) virus was not detected in  this sample by the TaqPath (TM) COVID-19 Combo Kit.          SARS-CoV-2 (COVID-19) nucleic acid testing performed using [employer] Aptima (R) SARS-CoV-2 Assay or [employer] TaqPath (TM)  COVID-19 Combo Kit as indicated above under Emergency Use Authorization (EUA) from the FDA. Aptima (R) and TaqPath (TM) test performance  characteristics verified by [facility] in accordance with the FDAs Guidance Document (Policy for Diagnostic Tests for Coronavirus Disease-2019  during the Public Health Emergency) issued on March 16, 2020. The laboratory is regulated under CLIA as qualified to perform high-complexity testing  Unless otherwise noted, all testing was performed at [facility], CLIA No. [id number], [place] State Licensee No. [id number]     Glucose   Date/Time Value Ref Range Status   03/23/2021 4110 124 70 - 130 mg/dL Final       Arteriogram (Autofinalize)  This procedure  was auto-finalized with no dictation required.    Scheduled Medications  amLODIPine, 5 mg, Oral, Daily  atorvastatin, 40 mg, Oral, Nightly  escitalopram, 20 mg, Oral, Daily  folic acid, 2 mg, Oral, Daily  pantoprazole, 40 mg, Oral, Once per day on Mon Wed Fri  Rivaroxaban, 20 mg, Oral, Daily  sodium chloride, 10 mL, Intravenous, Q12H  traZODone, 25 mg, Oral, Nightly    Infusions   Diet  Diet Regular       Assessment/Plan     Active Hospital Problems    Diagnosis  POA   • **Arterial occlusion [I70.90]  Yes   • Normocytic anemia [D64.9]  Yes   • MGUS (monoclonal gammopathy of unknown significance) [D47.2]  Yes   • Rheumatoid arthritis with rheumatoid factor of multiple sites without organ or systems involvement (CMS/HCC) [M05.79]  Yes   • Osteopenia of multiple sites [M85.89]  Yes   • Gastroesophageal reflux disease [K21.9]  Yes   • Essential hypertension [I10]  Yes      Resolved Hospital Problems   No resolved problems to display.     · appreciate vascular surgery. status post right popliteal atherectomy with runoff 3/23/21. doing very well postoperatively. Xarelto. Pain control, PT/OT. anemia stable.   · BP remains stable, amlodipine was increased to 5mg on admission.   · Xarelto (home med) for DVT prophylaxis.  · Full code.  · Discussed with patient and nursing staff.  · Anticipate discharge up to vascular but possibly tomorrow or Friday       CHIRAG Barnard  [facility]  03/24/21  15:15 EDT

## 2021-03-25 ENCOUNTER — TELEPHONE (OUTPATIENT)
Dept: ONCOLOGY | Facility: CLINIC | Age: 86
End: 2021-03-25

## 2021-03-25 VITALS
BODY MASS INDEX: 21.01 KG/M2 | TEMPERATURE: 97.8 F | DIASTOLIC BLOOD PRESSURE: 72 MMHG | OXYGEN SATURATION: 95 % | SYSTOLIC BLOOD PRESSURE: 153 MMHG | HEART RATE: 63 BPM | WEIGHT: 107 LBS | HEIGHT: 60 IN | RESPIRATION RATE: 16 BRPM

## 2021-03-25 RX ORDER — ATORVASTATIN CALCIUM 40 MG/1
40 TABLET, FILM COATED ORAL NIGHTLY
Qty: 30 TABLET | Refills: 0 | Status: SHIPPED | OUTPATIENT
Start: 2021-03-25 | End: 2021-09-09

## 2021-03-25 RX ORDER — AMLODIPINE BESYLATE 5 MG/1
5 TABLET ORAL DAILY
Qty: 30 TABLET | Refills: 0 | Status: SHIPPED | OUTPATIENT
Start: 2021-03-25 | End: 2022-03-31

## 2021-03-25 RX ADMIN — AMLODIPINE BESYLATE 5 MG: 5 TABLET ORAL at 08:15

## 2021-03-25 RX ADMIN — RIVAROXABAN 20 MG: 20 TABLET, FILM COATED ORAL at 08:15

## 2021-03-25 RX ADMIN — FOLIC ACID 2 MG: 1 TABLET ORAL at 08:15

## 2021-03-25 RX ADMIN — ACETAMINOPHEN 650 MG: 325 TABLET, FILM COATED ORAL at 08:20

## 2021-03-25 RX ADMIN — ESCITALOPRAM 20 MG: 20 TABLET, FILM COATED ORAL at 08:15

## 2021-03-25 NOTE — TELEPHONE ENCOUNTER
Provider:TREE    Caller: LEIGHTON     Relationship to Patient:SELF    Phone Number: [phone]    Reason for Call: RECENT HOSP.    When was the patient last seen: 3/11/2021    Where is it located: RIGHT LEG    PATIENT CALLED IN TO LET DR. LEAVITT KNOW THAT SHE WAS RECENTLY HOSP. FOR A BLOOD CLOT IN HER RIGHT LEG, SHE & DR. LEAVITT HAD DISCUSSED NUMBNESS IN HER FOOT  & PATIENT STATED THE BLOOD CLOT WAS THE CAUSE.  PATIENT SAW DR. LEAVITT LAST ON 3/11/2021 & WOULD LIKE TO KNOW IF HE WOULD LIKE TO SEE HER BEFORE HER NEXT APPT. ON  9/9/2021.  PLEASE CALL HER @ [phone] & FEEL FREE TO LEAVE VM IF NO ANSWER.

## 2021-03-25 NOTE — PLAN OF CARE
Goal Outcome Evaluation:  Plan of Care Reviewed With: patient  Progress: improving  Outcome Summary: patient vss. pain well controlled. plan to d/c home today. will monitor.

## 2021-03-25 NOTE — PLAN OF CARE
Goal Outcome Evaluation:  VSS. Pain controlled with Tylenol. Left groin c/d/I, soft. Doppler pedal pulses. Plan to dc home today. Will continue to monitor.

## 2021-03-25 NOTE — DISCHARGE SUMMARY
Patient Name: Arlin Hutson  : 1935  MRN: [id number]    Date of Admission: 3/22/2021  Date of Discharge:  3/25/2021  Primary Care Physician: Oren Rust Jr., MD      Chief Complaint:   Numbness and Foot Pain      Discharge Diagnoses     Active Hospital Problems    Diagnosis  POA   • **Arterial occlusion [I70.90]  Yes   • Normocytic anemia [D64.9]  Yes   • MGUS (monoclonal gammopathy of unknown significance) [D47.2]  Yes   • Rheumatoid arthritis with rheumatoid factor of multiple sites without organ or systems involvement (CMS/HCC) [M05.79]  Yes   • Osteopenia of multiple sites [M85.89]  Yes   • Gastroesophageal reflux disease [K21.9]  Yes   • Essential hypertension [I10]  Yes      Resolved Hospital Problems   No resolved problems to display.        Hospital Course     Ms. Hutson is a 85 y.o. female with a history of RA, MGUS and HTN who presented to [facility] initially complaining of pain and numbness in her R foot.  Please see the admitting history and physical for further details.  She was found to have acute ischemia and was admitted to the hospital for further evaluation and treatment.  Vascular surgery was consulted and started her on heparin drip.  They performed the below mentioned surgical procedure which dramatically helped symptoms.  She has been converted from heparin to Xarelto and has been cleared for discharge by vascular to follow up in their office in 3 weeks.  Her BP has been slightly elevated for which we increased her norvasc dose.  She will follow up closely with her PCP on this.       Day of Discharge     Subjective:  No new issues overnight.     Physical Exam:  Temp:  [97.8 °F (36.6 °C)-99 °F (37.2 °C)] 97.8 °F (36.6 °C)  Heart Rate:  [63-84] 63  Resp:  [16] 16  BP: (115-157)/(51-85) 152/63  Body mass index is 20.9 kg/m².  Physical Exam  Vitals and nursing note reviewed.   Constitutional:       Appearance: Normal appearance.      Comments: elderly, appears  stated age   Cardiovascular:      Rate and Rhythm: Normal rate and regular rhythm.   Pulmonary:      Effort: Pulmonary effort is normal. No respiratory distress.      Breath sounds: Normal breath sounds.   Abdominal:      General: Bowel sounds are normal. There is no distension.      Palpations: Abdomen is soft.      Tenderness: There is no abdominal tenderness.   Musculoskeletal:         General: No swelling. Normal range of motion.   Neurological:      Mental Status: She is alert and oriented to person, place, and time. Mental status is at baseline.         Consultants     Consult Orders (all) (From admission, onward)     Start     Ordered    03/22/21 2331  Inpatient Vascular Surgery Consult  Once     Specialty:  Vascular Surgery  Provider:  Benjamin Collier Jr., MD    03/22/21 2332              Procedures     AIF WITH RUN OFF OF RT. LEG, ROTATIONAL ATHERECTOMY OF RIGHT POPLITEAL ARTERY  Surgeon: Gato Contreras MD  Date of Service:  03/23/21    Results for orders placed during the hospital encounter of 03/22/21    Doppler Arterial Multi Level Lower Extremity - Bilateral CAR    Interpretation Summary  · Right Conclusion: The right PABLO is severely reduced. Waveforms are consistent with popliteal disease and tib-peroneal disease. Severe digital ischemia.  · Left Conclusion: The left PABLO is normal. Normal digital pressures.      Pertinent Labs     Results from last 7 days   Lab Units 03/24/21  0423 03/23/21  0704 03/22/21 2124 03/22/21  1931   WBC 10*3/mm3 7.45 5.79 6.18 6.38   HEMOGLOBIN g/dL 10.8* 11.6* 12.1 11.6*   PLATELETS 10*3/mm3 220 216 248 241     Results from last 7 days   Lab Units 03/24/21  0423 03/23/21  0704 03/22/21  1931   SODIUM mmol/L 136 141 142   POTASSIUM mmol/L 3.9 3.7 4.2   CHLORIDE mmol/L 104 107 105   CO2 mmol/L 22.3 26.1 28.5   BUN mg/dL 10 12 16   CREATININE mg/dL 0.51* 0.50* 0.56*   GLUCOSE mg/dL 117* 87 83   Estimated Creatinine Clearance: 39.4 mL/min (A) (by C-G formula  based on SCr of 0.51 mg/dL (L)).    Results from last 7 days   Lab Units 03/24/21  0423 03/23/21  0704 03/22/21  1931   CALCIUM mg/dL 8.2* 8.5* 8.8               Results from last 7 days   Lab Units 03/22/21  2206   COVID19  Not Detected       Test Results Pending at Discharge       Discharge Details        Discharge Medications      New Medications      Instructions Start Date   atorvastatin 40 MG tablet  Commonly known as: LIPITOR   40 mg, Oral, Nightly      rivaroxaban 20 MG tablet  Commonly known as: XARELTO   20 mg, Oral, Daily         Changes to Medications      Instructions Start Date   amLODIPine 5 MG tablet  Commonly known as: NORVASC  What changed:   · medication strength  · how much to take  · when to take this   5 mg, Oral, Daily         Continue These Medications      Instructions Start Date   Biotin 5000 MCG tablet   Oral, Daily      Calcium 600 600 MG tablet  Generic drug: calcium carbonate   1 P.O. daily      Carboxymethylcell-Glycerin PF 0.5-0.9 % solution   1-2 drops, Ophthalmic      escitalopram 10 MG tablet  Commonly known as: LEXAPRO   20 mg, Daily      folic acid 1 MG tablet  Commonly known as: FOLVITE   2 mg, Oral, Daily      GENTEAL OP   1 application, Ophthalmic, As Needed      methotrexate PF 50 MG/2ML chemo syringe   Intravenous, Once      multivitamin with minerals tablet tablet   1 P.O. daily      omeprazole 20 MG capsule  Commonly known as: priLOSEC   1 TABLET  P.O.  M W F      traMADol 50 MG tablet  Commonly known as: ULTRAM   50 mg, Oral, Every 6 Hours PRN      traZODone 50 MG tablet  Commonly known as: DESYREL   25-50 mg, Oral, Nightly      Turmeric 400 MG capsule   1 P.O. daily      TYLENOL EXTRA STRENGTH PO   2 tablets, Oral, Daily PRN      VITAMIN D PO   2,000 mg, Oral, Every Night at Bedtime             Allergies   Allergen Reactions   • Codeine Diarrhea and Nausea Only   • Sulfa Antibiotics Hives         Discharge Disposition:  Home or Self Care    Discharge Diet:  Diet Order    Procedures   • Diet Regular       Discharge Activity:   Activity Instructions     Activity as Tolerated            CODE STATUS:    Code Status and Medical Interventions:   Ordered at: 03/24/21 1756     Level Of Support Discussed With:    Patient     Code Status:    CPR     Medical Interventions (Level of Support Prior to Arrest):    Full       Future Appointments   Date Time Provider Department Center   4/26/2021  8:30 AM Edilberto Shook MD [facility] None   9/2/2021  9:30 AM LAB CHAIR 3 [facility] [place]   9/9/2021 11:00 AM VITALS ONLY [facility]  LAB [facility] [place]   9/9/2021 11:20 AM Hayder Caruso Jr., MD [facility] [place]     Follow-up Information     Oren Rust Jr., MD Follow up in 2 week(s).    Specialty: Internal Medicine  Contact information:  [address] [facility] [address]  [place] [address]  [phone]             Gato Contreras MD Follow up in 3 week(s).    Specialty: Vascular Surgery  Contact information:  [address] [facility] [address]  [place] [address]  [phone]                   Time Spent on Discharge:  Greater than 30 minutes      Harpreet Pandya MD  [facility]  03/25/21  07:38 EDT

## 2021-03-25 NOTE — PROGRESS NOTES
The patient is 2 days status post rotational atherectomy of a right popliteal artery embolus.  She has been anticoagulated following this, initially on heparin now on Xarelto 20 mg every day.    She is doing well with no complaints.  She has had complete resolution of her symptoms.  Left groin puncture site has had no problems.      Right foot hyperemic and warm.  Strong Doppler peroneal, posterior tibial, and dorsalis pedis arteries.  Left groin puncture site is doing satisfactorily.    Okay for discharge home today.  She is to remain on Xarelto 20 mg every day for at least 3 months.  Her activity will be ad althea.  I recommended follow-up in our office in 3 weeks.  A card has been given to her with the phone number.

## 2021-03-26 ENCOUNTER — READMISSION MANAGEMENT (OUTPATIENT)
Dept: CALL CENTER | Facility: HOSPITAL | Age: 86
End: 2021-03-26

## 2021-03-26 NOTE — PROGRESS NOTES
Case Management Discharge Note      Final Note: Pt discharged home. Pt declined HH or SNF.    Provided Post Acute Provider List?: Yes  Post Acute Provider List: Inpatient Rehab  Provided Post Acute Provider Quality & Resource List?: Yes  Post Acute Provider Quality and Resource List: Nursing Home  Delivered To: Patient  Method of Delivery: In person    Selected Continued Care - Discharged on 3/25/2021 Admission date: 3/22/2021 - Discharge disposition: Home or Self Care    Destination    No services have been selected for the patient.              Durable Medical Equipment    No services have been selected for the patient.              Dialysis/Infusion    No services have been selected for the patient.              Home Medical Care    No services have been selected for the patient.              Therapy    No services have been selected for the patient.              Community Resources    No services have been selected for the patient.                  Transportation Services  Private: Car    Final Discharge Disposition Code: 01 - home or self-care

## 2021-03-26 NOTE — OUTREACH NOTE
Prep Survey      Responses   [facility] facility patient discharged from?  [place]   Is LACE score < 7 ?  No   Emergency Room discharge w/ pulse ox?  No   Eligibility  Readm Mgmt   Discharge diagnosis  Arterial occlusion  [s/p AIF WITH RUN OFF OF RT. LEG, ROTATIONAL ATHERECTOMY OF RIGHT POPLITEAL ARTERY]   Does the patient have one of the following disease processes/diagnoses(primary or secondary)?  General Surgery   Does the patient have Home health ordered?  No   Is there a DME ordered?  No   Comments regarding appointments  Needs f/u scheduled   Medication alerts for this patient  Start Xarelto.  Meds per AVS.   Prep survey completed?  Yes          Patricia Menendez RN

## 2021-03-29 ENCOUNTER — TELEPHONE (OUTPATIENT)
Dept: GASTROENTEROLOGY | Facility: CLINIC | Age: 86
End: 2021-03-29

## 2021-03-29 NOTE — TELEPHONE ENCOUNTER
----- Message from Neli Keita sent at 3/29/2021 11:12 AM EDT -----  PT HAS BEEN CONSTIPATED; HAS TAKEN MIRALAX TWICE Saturday AND TWICE Sunday AND IT HAS NOT WORKED.

## 2021-03-29 NOTE — TELEPHONE ENCOUNTER
"Call to pt.  Reports surgery 3/25.  Last \"healthy BM\" 3/22.  States spoke with DR Emanuel over the weekend and was advised to take miralax 2x/day.  Has done so.  Also taking fibercon daily.  Has to strain somewhat and passing \"balls\".  Feels somewhat bloated.     States had stopped taking metamucil because caused indigestion.     Asking if anything else she should be doing to regulate bowel pattern.     Update/question to DR Shook.   "

## 2021-03-30 ENCOUNTER — READMISSION MANAGEMENT (OUTPATIENT)
Dept: CALL CENTER | Facility: HOSPITAL | Age: 86
End: 2021-03-30

## 2021-03-30 NOTE — OUTREACH NOTE
General Surgery Week 1 Survey      Responses   [facility] patient discharged from?  [place]   Does the patient have one of the following disease processes/diagnoses(primary or secondary)?  General Surgery   Week 1 attempt successful?  No   Unsuccessful attempts  Attempt 1          Elie Freire RN

## 2021-03-30 NOTE — TELEPHONE ENCOUNTER
I recommend that she give herself a tap water enema. If that doesn't work then I would go to the ER to be evaluated. Thx .kjh

## 2021-03-30 NOTE — TELEPHONE ENCOUNTER
"Call to pt.  Advise per Dr Shook note.  Verb understanding.     States \"I think things are starting to work - have had some BM today and it was soft\".   "

## 2021-04-01 ENCOUNTER — READMISSION MANAGEMENT (OUTPATIENT)
Dept: CALL CENTER | Facility: HOSPITAL | Age: 86
End: 2021-04-01

## 2021-04-01 NOTE — OUTREACH NOTE
General Surgery Week 1 Survey      Responses   [facility] patient discharged from?  [place]   Does the patient have one of the following disease processes/diagnoses(primary or secondary)?  General Surgery   Week 1 attempt successful?  Yes   Call start time  1153   Call end time  1155   Discharge diagnosis  Arterial occlusion    Meds reviewed with patient/caregiver?  Yes   Is the patient having any side effects they believe may be caused by any medication additions or changes?  No   Does the patient have all medications related to this admission filled (includes all antibiotics, pain medications, etc.)  Yes   Is the patient taking all medications as directed (includes completed medication regime)?  Yes   Does the patient have a follow up appointment scheduled with their surgeon?  Yes   Has the patient kept scheduled appointments due by today?  Yes   Has home health visited the patient within 72 hours of discharge?  N/A   Psychosocial issues?  No   Did the patient receive a copy of their discharge instructions?  Yes   Nursing interventions  Reviewed instructions with patient   What is the patient's perception of their health status since discharge?  Improving   Nursing interventions  Nurse provided patient education   Is the patient /caregiver able to teach back basic post-op care?  No tub bath, swimming, or hot tub until instructed by MD, Lifting as instructed by MD in discharge instructions, Take showers only when approved by MD-sponge bathe until then   Is the patient/caregiver able to teach back signs and symptoms of incisional infection?  Increased redness, swelling or pain at the incisonal site, Increased drainage or bleeding, Fever, Pus or odor from incision   Is the patient/caregiver able to teach back steps to recovery at home?  Set small, achievable goals for return to baseline health, Rest and rebuild strength, gradually increase activity   Is the patient/caregiver able to teach back the hierarchy  of who to call/visit for symptoms/problems? PCP, Specialist, Home health nurse, Urgent Care, ED, 911  Yes   Week 1 call completed?  Yes          Gato Hernandez RN

## 2021-04-09 ENCOUNTER — READMISSION MANAGEMENT (OUTPATIENT)
Dept: CALL CENTER | Facility: HOSPITAL | Age: 86
End: 2021-04-09

## 2021-04-09 NOTE — OUTREACH NOTE
General Surgery Week 2 Survey      Responses   [facility] patient discharged from?  [place]   Does the patient have one of the following disease processes/diagnoses(primary or secondary)?  General Surgery   Week 2 attempt successful?  Yes   Call start time  1507   Call end time  1511   Discharge diagnosis  Arterial occlusion    Meds reviewed with patient/caregiver?  Yes   Is the patient taking all medications as directed (includes completed medication regime)?  Yes   Has the patient kept scheduled appointments due by today?  Yes   Psychosocial issues?  No   What is the patient's perception of their health status since discharge?  Improving   Is the patient/caregiver able to teach back signs and symptoms of incisional infection?  Increased redness, swelling or pain at the incisonal site, Increased drainage or bleeding, Incisional warmth, Pus or odor from incision, Fever   Is the patient/caregiver able to teach back steps to recovery at home?  Rest and rebuild strength, gradually increase activity   Is the patient/caregiver able to teach back the hierarchy of who to call/visit for symptoms/problems? PCP, Specialist, Home health nurse, Urgent Care, ED, 911  Yes   Additional teach back comments  Pt doing well and leg has healed well. Has some med adjustments and she is getting used kris the med for her Raynaud's disease.   Week 2 call completed?  Yes          Myrtle Alberto RN

## 2021-04-19 ENCOUNTER — READMISSION MANAGEMENT (OUTPATIENT)
Dept: CALL CENTER | Facility: HOSPITAL | Age: 86
End: 2021-04-19

## 2021-04-19 NOTE — OUTREACH NOTE
General Surgery Week 3 Survey      Responses   [facility] patient discharged from?  [place]   Does the patient have one of the following disease processes/diagnoses(primary or secondary)?  General Surgery   Week 3 attempt successful?  No   Unsuccessful attempts  Attempt 1          Pastora Gillespie RN

## 2021-04-22 ENCOUNTER — READMISSION MANAGEMENT (OUTPATIENT)
Dept: CALL CENTER | Facility: HOSPITAL | Age: 86
End: 2021-04-22

## 2021-04-22 NOTE — OUTREACH NOTE
General Surgery Week 3 Survey      Responses   [facility] patient discharged from?  [place]   Does the patient have one of the following disease processes/diagnoses(primary or secondary)?  General Surgery   Week 3 attempt successful?  Yes   Call start time  1608   Call end time  1613   Meds reviewed with patient/caregiver?  Yes   Is the patient having any side effects they believe may be caused by any medication additions or changes?  No   Does the patient have all medications related to this admission filled (includes all antibiotics, pain medications, etc.)  Yes   Is the patient taking all medications as directed (includes completed medication regime)?  Yes   Does the patient have a follow up appointment scheduled with their surgeon?  Yes   Has the patient kept scheduled appointments due by today?  Yes   Psychosocial issues?  No   What is the patient's perception of their health status since discharge?  Improving   Nursing interventions  Nurse provided patient education   Is the patient /caregiver able to teach back basic post-op care?  Take showers only when approved by MD-sponge bathe until then, No tub bath, swimming, or hot tub until instructed by MD, Keep incision areas clean,dry and protected   Is the patient/caregiver able to teach back signs and symptoms of incisional infection?  Increased redness, swelling or pain at the incisonal site, Increased drainage or bleeding, Incisional warmth, Pus or odor from incision, Fever   Is the patient/caregiver able to teach back steps to recovery at home?  Rest and rebuild strength, gradually increase activity, Eat a well-balance diet   If the patient is a current smoker, are they able to teach back resources for cessation?  Not a smoker   Is the patient/caregiver able to teach back the hierarchy of who to call/visit for symptoms/problems? PCP, Specialist, Home health nurse, Urgent Care, ED, 911  Yes   Week 3 call completed?  Yes   Wrap up additional comments   Pt states she is doing ok,  having a hard time walking, but walked for 10 minutes. Advised to make small goals with walking and not to overdo. No questions/concerns.          Ramona Dyer RN

## 2021-05-04 ENCOUNTER — OFFICE VISIT (OUTPATIENT)
Dept: GASTROENTEROLOGY | Facility: CLINIC | Age: 86
End: 2021-05-04

## 2021-05-04 VITALS — HEIGHT: 60 IN | TEMPERATURE: 96.8 F | BODY MASS INDEX: 20.85 KG/M2 | WEIGHT: 106.2 LBS

## 2021-05-04 DIAGNOSIS — Z86.010 HISTORY OF COLON POLYPS: ICD-10-CM

## 2021-05-04 DIAGNOSIS — K58.2 IRRITABLE BOWEL SYNDROME WITH BOTH CONSTIPATION AND DIARRHEA: ICD-10-CM

## 2021-05-04 DIAGNOSIS — K21.9 GASTROESOPHAGEAL REFLUX DISEASE WITHOUT ESOPHAGITIS: Primary | ICD-10-CM

## 2021-05-04 DIAGNOSIS — R15.2 FECAL URGENCY: ICD-10-CM

## 2021-05-04 PROCEDURE — 99214 OFFICE O/P EST MOD 30 MIN: CPT | Performed by: NURSE PRACTITIONER

## 2021-05-04 RX ORDER — CILOSTAZOL 50 MG/1
50 TABLET ORAL 2 TIMES DAILY
COMMUNITY
End: 2021-09-09

## 2021-05-04 NOTE — PROGRESS NOTES
Chief Complaint   Patient presents with   • Follow-up     constipation   • Diarrhea       Arlin Hutson is a  85 y.o. female here for a follow up visit for constipation.    HPI  85-year-old female presents today for follow-up visit for IBS-mixed.  She is a patient of Dr. Shook.  She was last seen in the office on 1/7/2021.  She continues to have issues with IBS-mixed.  She will have several days in a row of crazy urgent diarrhea with very soft loose stools with fecal urgency.  Then she might have a day of really hard small balls of stool that she has to strain to push out.  Then she might go a week with no bowel movements at all.  She has tried several over-the-counter remedies including Benefiber, FiberCon, Metamucil and MiraLAX.  None of them really seem to work well for her.  She was down to taking half a dose of MiraLAX a day and this was still making the diarrhea worse.  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  She also has a history of colon polyps.  She also has a history of ovarian cancer and is currently in a study at the [facility].  Her last EGD was on 12/2018.  Her last colonoscopy was on 2/2016.  She is happy to tell me that her son sent her samples I have an over-the-counter supplement called banatrol powder and she would like to take it.  According to the wrapper she brought with her today it is full of soluble fiber with prebiotic's.  The packet recommendation is to take the powder 3 times a day until the diarrhea has resolved.  Patient also has a history of GERD and admits she does well as long she takes her omeprazole 20 mg 3 days a week.  She tells me she does not like taking it every day.  If she does have any breakthrough reflux usually because she is eaten something in particular she will take 1 Tums and she tells me that gets rid of it all.  She denies any dysphagia, reflux, nausea and vomiting, rectal bleeding or melena.  She admits her appetite is good and  her weight is stable.  Past Medical History:   Diagnosis Date   • Baker's cyst    • Colon polyp    • CREST syndrome (CMS/HCC)    • Fractures    • GERD (gastroesophageal reflux disease)    • History of CT scan of abdomen 03/11/2011    constipation, sig tics, 6 mm hepatic cyst   • History of rheumatoid arthritis    • Hyperlipidemia    • Hypertension    • Low back pain    • Normocytic anemia 8/26/2020   • Osteoarthritis    • Raynaud's disease    • Rheumatoid arthritis (CMS/HCC)    • Scleroderma (CMS/HCC)    • Sjogren's syndrome (CMS/HCC)    • Ulcerative colitis (CMS/HCC)        Past Surgical History:   Procedure Laterality Date   • CATARACT EXTRACTION, BILATERAL     • COLONOSCOPY  02/26/2016    tics, microscopic colitis,    • COLONOSCOPY  07/01/2011    sig tics, inflammation, polyp   • DILATATION AND CURETTAGE  1980   • EKOS CATHETER PLACEMENT N/A 3/23/2021    Procedure: AIF WITH RUN OFF OF RT. LEG, ROTATIONAL ATHERECTOMY OF RIGHT POPLITEAL ARTERY;  Surgeon: Gaot Contreras MD;  Location: [facility] 18/19;  Service: Vascular;  Laterality: N/A;   • ENDOSCOPY N/A 12/3/2018    erythematous mucosa in stomach, path benign   • EYE SURGERY     • FLEXIBLE SIGMOIDOSCOPY     • FRACTURE SURGERY     • HAND SURGERY     • JOINT REPLACEMENT     • KNEE ARTHROSCOPY Right     w/meniscal repair   • KNEE SURGERY Right    • TEETH EXTRACTION     • TOTAL KNEE ARTHROPLASTY Right 5/30/2018    Procedure: TOTAL KNEE ARTHROPLASTY;  Surgeon: Georges Jon MD;  Location: [facility];  Service: Orthopedics   • UPPER GASTROINTESTINAL ENDOSCOPY  03/14/2008    erythema   • WRIST FRACTURE SURGERY Right    • WRIST SURGERY Right 2008    orif       Scheduled Meds:    Continuous Infusions:No current facility-administered medications for this visit.      PRN Meds:.    Allergies   Allergen Reactions   • Codeine Diarrhea and Nausea Only   • Sulfa Antibiotics Hives       Social History     Socioeconomic History   • Marital status: Single      Spouse name: Not on file   • Number of children: 3   • Years of education: Not on file   • Highest education level: Not on file   Tobacco Use   • Smoking status: Never Smoker   • Smokeless tobacco: Never Used   Substance and Sexual Activity   • Alcohol use: Yes     Types: 1 - 3 Glasses of wine per week     Comment: Infrequent   • Drug use: No   • Sexual activity: Never       Family History   Problem Relation Age of Onset   • Ulcerative colitis Mother    • Migraines Mother    • Stroke Mother    • Hypertension Mother    • Thyroid disease Mother    • Skin cancer Mother    • Breast cancer Maternal Aunt    • Arthritis Father    • Heart attack Father    • Migraines Sister    • Skin cancer Sister    • Migraines Daughter    • Skin cancer Brother    • Malig Hyperthermia Neg Hx        Review of Systems   Constitutional: Negative for appetite change, chills, diaphoresis, fatigue, fever and unexpected weight change.   HENT: Negative for nosebleeds, postnasal drip, sore throat, trouble swallowing and voice change.    Respiratory: Negative for cough, choking, chest tightness, shortness of breath and wheezing.    Cardiovascular: Negative for chest pain, palpitations and leg swelling.   Gastrointestinal: Positive for constipation and diarrhea. Negative for abdominal distention, abdominal pain, anal bleeding, blood in stool, nausea, rectal pain and vomiting.   Endocrine: Negative for polydipsia, polyphagia and polyuria.   Musculoskeletal: Negative for gait problem.   Skin: Negative for rash and wound.   Allergic/Immunologic: Negative for food allergies.   Neurological: Negative for dizziness, speech difficulty and light-headedness.   Psychiatric/Behavioral: Negative for confusion, self-injury, sleep disturbance and suicidal ideas.       Vitals:    05/04/21 1311   Temp: 96.8 °F (36 °C)       Physical Exam  Constitutional:       General: She is not in acute distress.     Appearance: She is well-developed. She is not ill-appearing.    HENT:      Head: Normocephalic.   Eyes:      Pupils: Pupils are equal, round, and reactive to light.   Cardiovascular:      Rate and Rhythm: Normal rate and regular rhythm.      Heart sounds: Normal heart sounds.   Pulmonary:      Effort: Pulmonary effort is normal.      Breath sounds: Normal breath sounds.   Abdominal:      General: Bowel sounds are normal. There is no distension.      Palpations: Abdomen is soft. There is no mass.      Tenderness: There is no abdominal tenderness. There is no guarding or rebound.      Hernia: No hernia is present.   Musculoskeletal:         General: Normal range of motion.   Skin:     General: Skin is warm and dry.   Neurological:      Mental Status: She is alert and oriented to person, place, and time.   Psychiatric:         Speech: Speech normal.         Behavior: Behavior normal.         Judgment: Judgment normal.         No radiology results for the last 7 days     Assessment and plan     1. Gastroesophageal reflux disease without esophagitis    2. Irritable bowel syndrome with both constipation and diarrhea    3. Fecal urgency    4. History of colon polyps    Reviewed most recent CT scan results with her today.  It did show diverticulosis and a small hiatal hernia.  It was negative for any acute abdominal process.  Patient and I had a discussion about the pros and cons of another colonoscopy.  At her age of 85 years she just does not want to do another scope at this time.  She wants to try the banatrol powder that she was given from her son and let me know how that goes.  I told her at this point I could not approve or deny it since I am not familiar with the medication.  I did explain to her this is not FDA approved it is an over-the-counter supplement.  But she is excited to try it and I think at this point it just sounds like it is fiber plus prebiotic's so I think it sounds like safe enough for her to trial.  I asked the patient to call me next week with an update.   GERD seems well controlled on omeprazole 20 mg 3 times a week.  Continue GERD precautions.  She is increase her water and exercise as tolerated.  Patient to follow-up with me in 1 month.  Patient to go ahead make follow-up with Dr. Zuniga in 3 to 4 months.  Patient is agreeable to the plan.

## 2021-05-11 ENCOUNTER — TELEPHONE (OUTPATIENT)
Dept: GASTROENTEROLOGY | Facility: CLINIC | Age: 86
End: 2021-05-11

## 2021-05-11 NOTE — TELEPHONE ENCOUNTER
----- Message from Neli Meek sent at 5/11/2021  2:02 PM EDT -----  Regarding: update  Contact: [phone]  Pt left a message wanting to talk to Romana or Kinza to give them an update on her.

## 2021-05-11 NOTE — TELEPHONE ENCOUNTER
"Called pt and pt reports that has been taking the bannatrol.  She states \"its awful\".  She reports that it tastes horrible and does not want to mix with anything.  She also reports that it does not work. She states Ophelia had other ideas for her to try and she would like to know those. Advised will send message to Ophelia COYLE.   "

## 2021-05-12 RX ORDER — MONTELUKAST SODIUM 4 MG/1
TABLET, CHEWABLE ORAL
Qty: 60 TABLET | Refills: 11 | Status: SHIPPED | OUTPATIENT
Start: 2021-05-12 | End: 2021-09-09

## 2021-05-12 NOTE — TELEPHONE ENCOUNTER
Has tried just taking a daily stool softener?  Or may be some smooth move senna tea?  That might be a gentler choice for her.

## 2021-05-12 NOTE — TELEPHONE ENCOUNTER
"Call to pt.  Advise per SHERYL Sotelo note.  Verb understanding.      States currently having trouble with constipation, rather than diarrhea.  Frustrated with \"vicioius cycle\" - goes from one extreme to the other.     Update to SHERYL Sotelo.   "

## 2021-05-12 NOTE — TELEPHONE ENCOUNTER
Called pt and pt reports that she has not tried a daily stool softener, but would like to try the smooth move senna tea first.  Pt states she will call back with an update.   Update sent to Ophelia COYLE.

## 2021-05-12 NOTE — TELEPHONE ENCOUNTER
Please call the patient and tell her I think we should try colestipol 1 tab daily and see if that does not help with the diarrhea and give her more formed stool. It has been around for ever it is very safe. She will know after a couple of days of is going to work for her not if she is agreeable I will go ahead and send it to her pharmacy.

## 2021-06-07 ENCOUNTER — OFFICE VISIT (OUTPATIENT)
Dept: GASTROENTEROLOGY | Facility: CLINIC | Age: 86
End: 2021-06-07

## 2021-06-07 VITALS — WEIGHT: 106 LBS | HEIGHT: 61 IN | BODY MASS INDEX: 20.01 KG/M2 | TEMPERATURE: 98.5 F

## 2021-06-07 DIAGNOSIS — R15.2 FECAL URGENCY: ICD-10-CM

## 2021-06-07 DIAGNOSIS — K58.2 IRRITABLE BOWEL SYNDROME WITH BOTH CONSTIPATION AND DIARRHEA: Primary | ICD-10-CM

## 2021-06-07 DIAGNOSIS — Z86.010 HISTORY OF COLON POLYPS: ICD-10-CM

## 2021-06-07 PROCEDURE — 99214 OFFICE O/P EST MOD 30 MIN: CPT | Performed by: NURSE PRACTITIONER

## 2021-06-07 NOTE — PROGRESS NOTES
Chief Complaint   Patient presents with   • Follow-up   • Constipation   • Diarrhea       Arlin Hutson is a  85 y.o. female here for a follow up visit for IBS.    HPI  85-year-old female presents today for follow-up visit for IBS-mixed.  She is a patient of Dr. Shook.  She was last seen in the office by me on 5/4/2021.  She has a longstanding history of IBS type and mixed predominantly constipation here lately.  She has tried a myriad of over-the-counter medications trying to help get her bowels moving better.  She tried senna S pills over-the-counter and those just caused too much diarrhea.  She has tried Metamucil and MiraLAX in the past and she feels like out of everything she is tried MiraLAX has been the best.  She thinks she is going to try it again.  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  Last EGD was on 12/18.  Last colonoscopy was on 2/16.  She is happy to report that she will be starting physical therapy soon and she thinks this will also help get her bowels moving better.  She tells me she tries to drink plenty of water.  She does have a history of GERD and she might take omeprazole 20 mg 3 times a week if that.  She tells me she is really been controlling her reflux symptoms with her diet.  She tells me she tried colestipol but it did not help.  She denies any dysphagia, reflux, abdominal pain, nausea and vomiting, rectal bleeding or melena.  She admits her appetite is good and her weight is stable.  She does have a history of colon polyps.  Past Medical History:   Diagnosis Date   • Baker's cyst    • Colon polyp    • CREST syndrome (CMS/HCC)    • Fractures    • GERD (gastroesophageal reflux disease)    • History of CT scan of abdomen 03/11/2011    constipation, sig tics, 6 mm hepatic cyst   • History of rheumatoid arthritis    • Hyperlipidemia    • Hypertension    • Low back pain    • Normocytic anemia 8/26/2020   • Osteoarthritis    •  Raynaud's disease    • Rheumatoid arthritis (CMS/HCC)    • Scleroderma (CMS/HCC)    • Sjogren's syndrome (CMS/HCC)    • Ulcerative colitis (CMS/HCC)        Past Surgical History:   Procedure Laterality Date   • CATARACT EXTRACTION, BILATERAL     • COLONOSCOPY  02/26/2016    tics, microscopic colitis,    • COLONOSCOPY  07/01/2011    sig tics, inflammation, polyp   • DILATATION AND CURETTAGE  1980   • EKOS CATHETER PLACEMENT N/A 3/23/2021    Procedure: AIF WITH RUN OFF OF RT. LEG, ROTATIONAL ATHERECTOMY OF RIGHT POPLITEAL ARTERY;  Surgeon: Gato Contreras MD;  Location: [facility] OR 18/19;  Service: Vascular;  Laterality: N/A;   • ENDOSCOPY N/A 12/3/2018    erythematous mucosa in stomach, path benign   • EYE SURGERY     • FLEXIBLE SIGMOIDOSCOPY     • FRACTURE SURGERY     • HAND SURGERY     • JOINT REPLACEMENT     • KNEE ARTHROSCOPY Right     w/meniscal repair   • KNEE SURGERY Right    • TEETH EXTRACTION     • TOTAL KNEE ARTHROPLASTY Right 5/30/2018    Procedure: TOTAL KNEE ARTHROPLASTY;  Surgeon: Georges Jon MD;  Location: [facility] OR;  Service: Orthopedics   • UPPER GASTROINTESTINAL ENDOSCOPY  03/14/2008    erythema   • WRIST FRACTURE SURGERY Right    • WRIST SURGERY Right 2008    orif       Scheduled Meds:    Continuous Infusions:No current facility-administered medications for this visit.      PRN Meds:.    Allergies   Allergen Reactions   • Codeine Diarrhea and Nausea Only   • Sulfa Antibiotics Hives       Social History     Socioeconomic History   • Marital status: Single     Spouse name: Not on file   • Number of children: 3   • Years of education: Not on file   • Highest education level: Not on file   Tobacco Use   • Smoking status: Never Smoker   • Smokeless tobacco: Never Used   Substance and Sexual Activity   • Alcohol use: Yes     Types: 1 - 3 Glasses of wine per week     Comment: Infrequent   • Drug use: No   • Sexual activity: Never       Family History   Problem Relation Age of Onset   •  Ulcerative colitis Mother    • Migraines Mother    • Stroke Mother    • Hypertension Mother    • Thyroid disease Mother    • Skin cancer Mother    • Breast cancer Maternal Aunt    • Arthritis Father    • Heart attack Father    • Migraines Sister    • Skin cancer Sister    • Migraines Daughter    • Skin cancer Brother    • Malig Hyperthermia Neg Hx        Review of Systems   Constitutional: Negative for appetite change, chills, diaphoresis, fatigue, fever and unexpected weight change.   HENT: Negative for nosebleeds, postnasal drip, sore throat, trouble swallowing and voice change.    Respiratory: Negative for cough, choking, chest tightness, shortness of breath and wheezing.    Cardiovascular: Negative for chest pain, palpitations and leg swelling.   Gastrointestinal: Positive for constipation. Negative for abdominal distention, abdominal pain, anal bleeding, blood in stool, diarrhea, nausea, rectal pain and vomiting.   Endocrine: Negative for polydipsia, polyphagia and polyuria.   Musculoskeletal: Negative for gait problem.   Skin: Negative for rash and wound.   Allergic/Immunologic: Negative for food allergies.   Neurological: Negative for dizziness, speech difficulty and light-headedness.   Psychiatric/Behavioral: Negative for confusion, self-injury, sleep disturbance and suicidal ideas.       Vitals:    06/07/21 1149   Temp: 98.5 °F (36.9 °C)       Physical Exam  Constitutional:       General: She is not in acute distress.     Appearance: She is well-developed. She is not ill-appearing.   HENT:      Head: Normocephalic.   Eyes:      Pupils: Pupils are equal, round, and reactive to light.   Cardiovascular:      Rate and Rhythm: Normal rate and regular rhythm.      Heart sounds: Normal heart sounds.   Pulmonary:      Effort: Pulmonary effort is normal.      Breath sounds: Normal breath sounds.   Abdominal:      General: Bowel sounds are normal. There is no distension.      Palpations: Abdomen is soft. There is no  mass.      Tenderness: There is no abdominal tenderness. There is no guarding or rebound.      Hernia: No hernia is present.   Musculoskeletal:         General: Normal range of motion.      Comments: Ambulates with a cane   Skin:     General: Skin is warm and dry.   Neurological:      Mental Status: She is alert and oriented to person, place, and time.   Psychiatric:         Speech: Speech normal.         Behavior: Behavior normal.         Judgment: Judgment normal.         No radiology results for the last 7 days     Assessment and plan     1. Irritable bowel syndrome with both constipation and diarrhea    2. Fecal urgency    3. History of colon polyps    IBS-mixed is still not well controlled.  Senna S was clearly too much for her.  It caused explosive diarrhea.  I would like her to try MiraLAX again.  Even if she does start that with a half a dose daily and sees how it goes.  Patient to continue omeprazole 20 mg 3 times a week as needed for reflux.  Continue GERD precautions.  I think with her starting physical therapy that should also help her IBS symptoms.  She is to increase her water as tolerated.  Patient to call the office with any issues.  Patient to follow-up next month with Dr. Shook as planned.  Patient is agreeable to the plan.

## 2021-06-18 ENCOUNTER — TRANSCRIBE ORDERS (OUTPATIENT)
Dept: ADMINISTRATIVE | Facility: HOSPITAL | Age: 86
End: 2021-06-18

## 2021-06-18 DIAGNOSIS — Z12.31 VISIT FOR SCREENING MAMMOGRAM: Primary | ICD-10-CM

## 2021-07-28 ENCOUNTER — OFFICE VISIT (OUTPATIENT)
Dept: GASTROENTEROLOGY | Facility: CLINIC | Age: 86
End: 2021-07-28

## 2021-07-28 VITALS
BODY MASS INDEX: 19.67 KG/M2 | TEMPERATURE: 97.5 F | HEIGHT: 61 IN | WEIGHT: 104.2 LBS | SYSTOLIC BLOOD PRESSURE: 128 MMHG | DIASTOLIC BLOOD PRESSURE: 70 MMHG

## 2021-07-28 DIAGNOSIS — Z86.010 HISTORY OF COLON POLYPS: Primary | ICD-10-CM

## 2021-07-28 DIAGNOSIS — K52.839 MICROSCOPIC COLITIS, UNSPECIFIED MICROSCOPIC COLITIS TYPE: ICD-10-CM

## 2021-07-28 PROCEDURE — 99214 OFFICE O/P EST MOD 30 MIN: CPT | Performed by: INTERNAL MEDICINE

## 2021-07-28 RX ORDER — BUDESONIDE 3 MG/1
9 CAPSULE, COATED PELLETS ORAL DAILY
Qty: 90 CAPSULE | Refills: 3 | Status: SHIPPED | OUTPATIENT
Start: 2021-07-28 | End: 2021-10-26

## 2021-07-28 RX ORDER — LANSOPRAZOLE 15 MG/1
15 CAPSULE, DELAYED RELEASE ORAL DAILY
COMMUNITY
End: 2021-09-09

## 2021-07-28 NOTE — PROGRESS NOTES
Chief Complaint   Patient presents with   • Irritable Bowel Syndrome       History of Present Illness:   85 y.o. female  Who has diarrhea once/week. She is on Colestid once/day. She has diarrhea once/week still. Occasional constipation. No rectal bleeding. No melena. She averages 3-5 BM/day. No abdominal or chest pain. No nausea or vomiting. No fevers, chills. Weight stable.        Last EGD was on 12/18.  Last colonoscopy was on 2/16.  She does have a history of colon polyps.    Past Medical History:   Diagnosis Date   • Atheroembolism of right lower extremity (CMS/HCC)    • Baker's cyst    • Colon polyp    • CREST syndrome (CMS/HCC)    • Fractures    • GERD (gastroesophageal reflux disease)    • History of CT scan of abdomen 03/11/2011    constipation, sig tics, 6 mm hepatic cyst   • History of rheumatoid arthritis    • Hyperlipidemia    • Hypertension    • Low back pain    • Normocytic anemia 8/26/2020   • Osteoarthritis    • Raynaud's disease    • Rheumatoid arthritis (CMS/HCC)    • Scleroderma (CMS/HCC)    • Sjogren's syndrome (CMS/HCC)    • Ulcerative colitis (CMS/HCC)        Past Surgical History:   Procedure Laterality Date   • CATARACT EXTRACTION, BILATERAL     • COLONOSCOPY  02/26/2016    tics, microscopic colitis,    • COLONOSCOPY  07/01/2011    sig tics, inflammation, polyp   • DILATATION AND CURETTAGE  1980   • EKOS CATHETER PLACEMENT N/A 3/23/2021    Procedure: AIF WITH RUN OFF OF RT. LEG, ROTATIONAL ATHERECTOMY OF RIGHT POPLITEAL ARTERY;  Surgeon: Gato Contreras MD;  Location: [facility] 18/19;  Service: Vascular;  Laterality: N/A;   • ENDOSCOPY N/A 12/3/2018    erythematous mucosa in stomach, path benign   • EYE SURGERY     • FLEXIBLE SIGMOIDOSCOPY     • FRACTURE SURGERY     • HAND SURGERY     • JOINT REPLACEMENT     • KNEE ARTHROSCOPY Right     w/meniscal repair   • KNEE SURGERY Right    • TEETH EXTRACTION     • TOTAL KNEE ARTHROPLASTY Right 5/30/2018    Procedure: TOTAL KNEE  ARTHROPLASTY;  Surgeon: Georges Jon MD;  Location: [facility] OR;  Service: Orthopedics   • TRANSLUMINAL ATHERECTOMY POPLITEAL ARTERY     • UPPER GASTROINTESTINAL ENDOSCOPY  03/14/2008    erythema   • WRIST FRACTURE SURGERY Right    • WRIST SURGERY Right 2008    orif         Current Outpatient Medications:   •  Acetaminophen (TYLENOL EXTRA STRENGTH PO), Take 2 tablets by mouth Daily As Needed., Disp: , Rfl:   •  amLODIPine (NORVASC) 5 MG tablet, Take 1 tablet by mouth Daily., Disp: 30 tablet, Rfl: 0  •  atorvastatin (LIPITOR) 40 MG tablet, Take 1 tablet by mouth Every Night., Disp: 30 tablet, Rfl: 0  •  Biotin 5000 MCG tablet, Take  by mouth Daily., Disp: , Rfl:   •  calcium carbonate (CALCIUM 600) 600 MG tablet, 1 P.O. daily, Disp: , Rfl:   •  Carboxymethylcell-Hypromellose (GENTEAL OP), Apply 1 application to eye As Needed., Disp: , Rfl:   •  Cholecalciferol (VITAMIN D PO), Take 2,000 mg by mouth every night at bedtime., Disp: , Rfl:   •  cilostazol (PLETAL) 50 MG tablet, Take 50 mg by mouth 2 (Two) Times a Day., Disp: , Rfl:   •  colestipol (COLESTID) 1 g tablet, Start with one pill daily and increase to 2 daily if no improvement after 2 weeks, Disp: 60 tablet, Rfl: 11  •  escitalopram (LEXAPRO) 10 MG tablet, 20 mg Daily., Disp: , Rfl:   •  folic acid (FOLVITE) 1 MG tablet, Take 2 mg by mouth Daily., Disp: , Rfl:   •  lansoprazole (PREVACID) 15 MG capsule, Take 15 mg by mouth Daily., Disp: , Rfl:   •  Methotrexate Sodium (METHOTREXATE PF) 50 MG/2ML chemo syringe, Infuse  into a venous catheter 1 (One) Time., Disp: , Rfl:   •  Multiple Vitamins-Minerals (CENTRUM SILVER ULTRA WOMENS PO), 1 P.O. daily, Disp: , Rfl:   •  rivaroxaban (XARELTO) 20 MG tablet, Take 1 tablet by mouth Daily. Indications: Arterial thrombosis, Disp: 30 tablet, Rfl: 2  •  traZODone (DESYREL) 50 MG tablet, Take 25-50 mg by mouth Every Night., Disp: , Rfl:   •  Turmeric 400 MG capsule, 1 P.O. daily, Disp: , Rfl:   •  Budesonide (ENTOCORT EC)  3 MG 24 hr capsule, Take 3 capsules by mouth Daily for 90 days., Disp: 90 capsule, Rfl: 3    Allergies   Allergen Reactions   • Codeine Diarrhea and Nausea Only   • Sulfa Antibiotics Hives       Family History   Problem Relation Age of Onset   • Ulcerative colitis Mother    • Migraines Mother    • Stroke Mother    • Hypertension Mother    • Thyroid disease Mother    • Skin cancer Mother    • Breast cancer Maternal Aunt    • Arthritis Father    • Heart attack Father    • Migraines Sister    • Skin cancer Sister    • Migraines Daughter    • Skin cancer Brother    • Malig Hyperthermia Neg Hx        Social History     Socioeconomic History   • Marital status: Single     Spouse name: Not on file   • Number of children: 3   • Years of education: Not on file   • Highest education level: Not on file   Tobacco Use   • Smoking status: Never Smoker   • Smokeless tobacco: Never Used   Substance and Sexual Activity   • Alcohol use: Yes     Types: 1 - 3 Glasses of wine per week     Comment: Infrequent   • Drug use: No   • Sexual activity: Never       Review of Systems   Gastrointestinal: Positive for diarrhea.   All other systems reviewed and are negative.    Pertinent positives and negatives documented in the HPI and all other systems reviewed and were found to be negative.  Vitals:    07/28/21 1514   BP: 128/70   Temp: 97.5 °F (36.4 °C)       Physical Exam  Vitals reviewed.   Constitutional:       General: She is not in acute distress.     Appearance: Normal appearance. She is well-developed. She is not diaphoretic.   HENT:      Head: Normocephalic and atraumatic. Hair is normal.      Right Ear: Hearing, tympanic membrane, ear canal and external ear normal. No decreased hearing noted. No drainage.      Left Ear: Hearing, tympanic membrane, ear canal and external ear normal. No decreased hearing noted.      Nose: Nose normal. No nasal deformity.      Mouth/Throat:      Mouth: Mucous membranes are moist.   Eyes:      General:  Lids are normal.         Right eye: No discharge.         Left eye: No discharge.      Extraocular Movements: Extraocular movements intact.      Conjunctiva/sclera: Conjunctivae normal.      Pupils: Pupils are equal, round, and reactive to light.   Neck:      Thyroid: No thyromegaly.      Vascular: No JVD.      Trachea: No tracheal deviation.   Cardiovascular:      Rate and Rhythm: Normal rate and regular rhythm.      Pulses: Normal pulses.      Heart sounds: Normal heart sounds. No murmur heard.   No friction rub. No gallop.    Pulmonary:      Effort: Pulmonary effort is normal. No respiratory distress.      Breath sounds: Normal breath sounds. No wheezing or rales.   Chest:      Chest wall: No tenderness.   Abdominal:      General: Bowel sounds are normal. There is no distension.      Palpations: Abdomen is soft. There is no mass.      Tenderness: There is no abdominal tenderness. There is no guarding or rebound.      Hernia: No hernia is present.   Genitourinary:     Rectum: Normal. Guaiac result negative.   Musculoskeletal:         General: No tenderness or deformity. Normal range of motion.      Cervical back: Normal range of motion and neck supple.   Lymphadenopathy:      Cervical: No cervical adenopathy.   Skin:     General: Skin is warm and dry.      Findings: No erythema or rash.   Neurological:      Mental Status: She is alert and oriented to person, place, and time.      Cranial Nerves: No cranial nerve deficit.      Motor: No abnormal muscle tone.      Coordination: Coordination normal.      Deep Tendon Reflexes: Reflexes are normal and symmetric. Reflexes normal.   Psychiatric:         Mood and Affect: Mood normal.         Behavior: Behavior normal.         Thought Content: Thought content normal.         Judgment: Judgment normal.         Diagnoses and all orders for this visit:    1. History of colon polyps (Primary)    2. Microscopic colitis, unspecified microscopic colitis type  -     Budesonide  "(ENTOCORT EC) 3 MG 24 hr capsule; Take 3 capsules by mouth Daily for 90 days.  Dispense: 90 capsule; Refill: 3      Assessment:  1.  History of colon polyps. Last colonoscopy was on 2/16.  2. H/o diarrhea, She has \"microscopic colitis\". Budesonide made her constipated.  3. 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  4.     Recommendations:  1. We discussed the pros and cons of doing a c/s in an 84 yo.   2. Trial of Budesonide 3 mg one/day.  3. Stop the Colestid.   4. F/u 3 mos.   5. Think about whether she wants to have another colonoscopy or not.     Return in about 3 months (around 10/28/2021).    Edilberto Shook MD  7/28/2021  "

## 2021-09-02 ENCOUNTER — TELEPHONE (OUTPATIENT)
Dept: ONCOLOGY | Facility: CLINIC | Age: 86
End: 2021-09-02

## 2021-09-02 ENCOUNTER — LAB (OUTPATIENT)
Dept: LAB | Facility: HOSPITAL | Age: 86
End: 2021-09-02

## 2021-09-02 DIAGNOSIS — D47.2 MGUS (MONOCLONAL GAMMOPATHY OF UNKNOWN SIGNIFICANCE): ICD-10-CM

## 2021-09-02 DIAGNOSIS — D64.9 NORMOCYTIC ANEMIA: ICD-10-CM

## 2021-09-02 LAB
ALBUMIN SERPL-MCNC: 4.1 G/DL (ref 3.5–5.2)
ALBUMIN/GLOB SERPL: 1.5 G/DL (ref 1.1–2.4)
ALP SERPL-CCNC: 98 U/L (ref 38–116)
ALT SERPL W P-5'-P-CCNC: 16 U/L (ref 0–33)
ANION GAP SERPL CALCULATED.3IONS-SCNC: 11.4 MMOL/L (ref 5–15)
AST SERPL-CCNC: 17 U/L (ref 0–32)
BASOPHILS # BLD AUTO: 0.03 10*3/MM3 (ref 0–0.2)
BASOPHILS NFR BLD AUTO: 0.5 % (ref 0–1.5)
BILIRUB SERPL-MCNC: 0.2 MG/DL (ref 0.2–1.2)
BUN SERPL-MCNC: 18 MG/DL (ref 6–20)
BUN/CREAT SERPL: 30 (ref 7.3–30)
CALCIUM SPEC-SCNC: 9 MG/DL (ref 8.5–10.2)
CHLORIDE SERPL-SCNC: 104 MMOL/L (ref 98–107)
CO2 SERPL-SCNC: 25.6 MMOL/L (ref 22–29)
CREAT SERPL-MCNC: 0.6 MG/DL (ref 0.6–1.1)
DEPRECATED RDW RBC AUTO: 56.6 FL (ref 37–54)
EOSINOPHIL # BLD AUTO: 0.1 10*3/MM3 (ref 0–0.4)
EOSINOPHIL NFR BLD AUTO: 1.8 % (ref 0.3–6.2)
ERYTHROCYTE [DISTWIDTH] IN BLOOD BY AUTOMATED COUNT: 16.5 % (ref 12.3–15.4)
GFR SERPL CREATININE-BSD FRML MDRD: 95 ML/MIN/1.73
GLOBULIN UR ELPH-MCNC: 2.8 GM/DL (ref 1.8–3.5)
GLUCOSE SERPL-MCNC: 54 MG/DL (ref 74–124)
HCT VFR BLD AUTO: 37.6 % (ref 34–46.6)
HGB BLD-MCNC: 11.5 G/DL (ref 12–15.9)
IMM GRANULOCYTES # BLD AUTO: 0.03 10*3/MM3 (ref 0–0.05)
IMM GRANULOCYTES NFR BLD AUTO: 0.5 % (ref 0–0.5)
LYMPHOCYTES # BLD AUTO: 1.1 10*3/MM3 (ref 0.7–3.1)
LYMPHOCYTES NFR BLD AUTO: 19.8 % (ref 19.6–45.3)
MCH RBC QN AUTO: 29.1 PG (ref 26.6–33)
MCHC RBC AUTO-ENTMCNC: 30.6 G/DL (ref 31.5–35.7)
MCV RBC AUTO: 95.2 FL (ref 79–97)
MONOCYTES # BLD AUTO: 0.49 10*3/MM3 (ref 0.1–0.9)
MONOCYTES NFR BLD AUTO: 8.8 % (ref 5–12)
NEUTROPHILS NFR BLD AUTO: 3.81 10*3/MM3 (ref 1.7–7)
NEUTROPHILS NFR BLD AUTO: 68.6 % (ref 42.7–76)
NRBC BLD AUTO-RTO: 0 /100 WBC (ref 0–0.2)
PLATELET # BLD AUTO: 257 10*3/MM3 (ref 140–450)
PMV BLD AUTO: 9.2 FL (ref 6–12)
POTASSIUM SERPL-SCNC: 3.6 MMOL/L (ref 3.5–4.7)
PROT SERPL-MCNC: 6.9 G/DL (ref 6.3–8)
RBC # BLD AUTO: 3.95 10*6/MM3 (ref 3.77–5.28)
SODIUM SERPL-SCNC: 141 MMOL/L (ref 134–145)
WBC # BLD AUTO: 5.56 10*3/MM3 (ref 3.4–10.8)

## 2021-09-02 PROCEDURE — 80053 COMPREHEN METABOLIC PANEL: CPT

## 2021-09-02 PROCEDURE — 85025 COMPLETE CBC W/AUTO DIFF WBC: CPT

## 2021-09-02 PROCEDURE — 36415 COLL VENOUS BLD VENIPUNCTURE: CPT

## 2021-09-02 NOTE — TELEPHONE ENCOUNTER
Spoke with patient and informed her of her low glucose. Patient stated she had been feeling slightly light-headed, but since leaving the office she had eaten a banana, peanut butter, and a boost. This RN informed her if she became symptomatic to see an urgent care so they could check her sugar. RN suggested eating more carbs and drinking juice when patient got home. Pt v/u

## 2021-09-03 LAB
ALBUMIN SERPL ELPH-MCNC: 3.9 G/DL (ref 2.9–4.4)
ALBUMIN/GLOB SERPL: 1.4 {RATIO} (ref 0.7–1.7)
ALPHA1 GLOB SERPL ELPH-MCNC: 0.2 G/DL (ref 0–0.4)
ALPHA2 GLOB SERPL ELPH-MCNC: 0.6 G/DL (ref 0.4–1)
B-GLOBULIN SERPL ELPH-MCNC: 1.3 G/DL (ref 0.7–1.3)
GAMMA GLOB SERPL ELPH-MCNC: 0.7 G/DL (ref 0.4–1.8)
GLOBULIN SER-MCNC: 2.8 G/DL (ref 2.2–3.9)
IGA SERPL-MCNC: 609 MG/DL (ref 64–422)
IGG SERPL-MCNC: 657 MG/DL (ref 586–1602)
IGM SERPL-MCNC: 122 MG/DL (ref 26–217)
INTERPRETATION SERPL IEP-IMP: ABNORMAL
KAPPA LC FREE SER-MCNC: 93.9 MG/L (ref 3.3–19.4)
KAPPA LC FREE/LAMBDA FREE SER: 7.39 {RATIO} (ref 0.26–1.65)
LABORATORY COMMENT REPORT: ABNORMAL
LAMBDA LC FREE SERPL-MCNC: 12.7 MG/L (ref 5.7–26.3)
M PROTEIN SERPL ELPH-MCNC: ABNORMAL G/DL
PROT SERPL-MCNC: 6.7 G/DL (ref 6–8.5)

## 2021-09-07 LAB
ALBUMIN 24H MFR UR ELPH: 18.5 %
ALPHA1 GLOB 24H MFR UR ELPH: 5.2 %
ALPHA2 GLOB 24H MFR UR ELPH: 11 %
B-GLOBULIN MFR UR ELPH: 13.5 %
GAMMA GLOB 24H MFR UR ELPH: 51.9 %
HIV 1 & 2 AB SER-IMP: ABNORMAL
INTERPRETATION UR IFE-IMP: ABNORMAL
M PROTEIN 24H MFR UR ELPH: 36.6 %
M PROTEIN 24H UR ELPH-MRATE: 111 MG/24 HR
PROT 24H UR-MRATE: 303 MG/24 HR (ref 30–150)
PROT UR-MCNC: 10.1 MG/DL

## 2021-09-09 ENCOUNTER — OFFICE VISIT (OUTPATIENT)
Dept: ONCOLOGY | Facility: CLINIC | Age: 86
End: 2021-09-09

## 2021-09-09 ENCOUNTER — APPOINTMENT (OUTPATIENT)
Dept: LAB | Facility: HOSPITAL | Age: 86
End: 2021-09-09

## 2021-09-09 VITALS
SYSTOLIC BLOOD PRESSURE: 154 MMHG | BODY MASS INDEX: 20.52 KG/M2 | HEART RATE: 84 BPM | TEMPERATURE: 98.6 F | DIASTOLIC BLOOD PRESSURE: 78 MMHG | RESPIRATION RATE: 16 BRPM | OXYGEN SATURATION: 98 % | HEIGHT: 60 IN | WEIGHT: 104.5 LBS

## 2021-09-09 DIAGNOSIS — D47.2 MGUS (MONOCLONAL GAMMOPATHY OF UNKNOWN SIGNIFICANCE): Primary | ICD-10-CM

## 2021-09-09 PROCEDURE — 99214 OFFICE O/P EST MOD 30 MIN: CPT | Performed by: INTERNAL MEDICINE

## 2021-09-09 NOTE — PROGRESS NOTES
"Chief Complaint  IgA kappa MGUS, crest syndrome/rheumatoid arthritis overlap with associated Sjogren's syndrome    Subjective        History of Present Illness  Patient returns today for 6-month interval follow-up visit with laboratory studies and 24 urine to review.  In the interval since her last visit, she did have hospitalization 3/22-3/25/2021 due to an ischemic right foot.  She required vascular surgery procedure on 3/22/2021 with arthrectomy of the right popliteal artery.  She was placed on Xarelto 20 mg daily and has continued on anticoagulation since then with no further issues.  She did follow-up with GI regarding her chronic diarrhea and was placed on budesonide daily.  She reports that this has significantly helped her with resolution of her diarrhea.  In fact she is now having some borderline constipation.  She did receive her third dose Pfizer COVID-19 vaccination due to her immunocompromised status and did well with this.  She has chronic issues related to her Sjogren's with dry eyes and dry mouth and does have some difficulty eating related to this as well as to a dental bridge.  Her weight is down by 3 pounds.  She does note some easy bruising from the Xarelto but no bleeding complications.      Objective   Vital Signs:   /78   Pulse 84   Temp 98.6 °F (37 °C) (Oral)   Resp 16   Ht 152.4 cm (60\")   Wt 47.4 kg (104 lb 8 oz)   SpO2 98%   BMI 20.41 kg/m²     Physical Exam  Constitutional:       Appearance: She is well-developed.   Eyes:      Conjunctiva/sclera: Conjunctivae normal.   Neck:      Thyroid: No thyromegaly.   Cardiovascular:      Rate and Rhythm: Normal rate and regular rhythm.      Heart sounds: No murmur heard.   No friction rub. No gallop.    Pulmonary:      Effort: No respiratory distress.      Breath sounds: Normal breath sounds.   Abdominal:      General: Bowel sounds are normal. There is no distension.      Palpations: Abdomen is soft.      Tenderness: There is no " abdominal tenderness.   Musculoskeletal:      Comments: Sclerodactyly involving the hands   Lymphadenopathy:      Head:      Right side of head: No submandibular adenopathy.      Cervical: No cervical adenopathy.      Upper Body:      Right upper body: No supraclavicular adenopathy.      Left upper body: No supraclavicular adenopathy.   Skin:     General: Skin is warm and dry.      Findings: No rash.   Neurological:      Mental Status: She is alert and oriented to person, place, and time.      Cranial Nerves: No cranial nerve deficit.      Motor: No abnormal muscle tone.      Deep Tendon Reflexes: Reflexes normal.   Psychiatric:         Behavior: Behavior normal.     Patient was examined today, unchanged from above      Result Review : Reviewed labs from 9/2/2021 with CBC, CMP, serum protein electrophoresis, immunoelectrophoresis, quantitative immunoglobulins, free serum light chains.  Reviewed 24-hour urine protein electrophoresis, immunoelectrophoresis.  Reviewed pathology results including Congo red stain from previous EGD and colonoscopy biopsies.       Assessment and Plan   1. 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  · Laboratory studies 2/12/2020 showed a creatinine of 0.61, calcium 9.2, globulin 2.5, albumin 4.3, serum protein electrophoresis with a prominent protein band in the beta region, could not rule out monoclonal protein.  · CBC on 4/14/2020 with WBC 6.0 with normal differential, hemoglobin 12.0, platelet count 255,000.  · Subsequent labs with Dr. Rust on 7/1/2020 showed an immunoelectrophoresis confirming an IgA kappa monoclonal protein with IgA 658, IgG 7 or 24, IgM 125.  Creatinine was normal at 0.61 with calcium 9.5.    · Patient was referred to our office for further evaluation.  · At time of initial visit 8/12/2020, hemoglobin  13.1, platelet count 200,000, creatinine 0.54 with calcium of 9.5.  · Additional labs 8/12/2020 with dual IgA kappa M spike (0.6 g and secondary M spike too small to quantify), IgA 652, IgG 743, IgM 126, free kappa light chain 87.1, free lambda light chain 12.1, free light chain ratio 7.0.  · 24-hour urine 8/17/2020 with 410 mg total protein, 40.6% kappa light chain (166 mg).  · Skeletal survey 8/13/2020 with no evidence of lytic lesions.  · Previous pathology specimens from colonoscopy 2/26/2016 and EGD 12/10/2018 were sent for Congo red stain, both negative.  · Labs on 3/4/2021 showed a dual IgA kappa M spike with a dominant band increased slightly from 0.4 up to 0.6 g and a minor band too small to quantify.  IgA decreased at 610, IgG was 680, IgM 110.  Free light chains showed a a decrease in free kappa at 85.9, free lambda 12.1, free light chain ratio 7.1.  24-hour urine showed a decrease in total protein from 361 down to 316 mg with slight increase in monoclonal percentage from 24% up to 35.7%.    · Patient returns today in follow-up with labs to review from 9/2/2021.  This shows stable dual IgA kappa M spike with major band 0.5 g, moderate band too small to quantify (previously major band 0.6 g), IgA 609 (previously 610), IgG 657, IgM 122, free kappa light chain 93.9 (previously 85.9), free lambda light chain 12.7 with free light chain ratio 7.39.  24-hour urine showed stable total protein at 303 g with stable M spike at 36.6% (111 mg) of free kappa.  The patient has stable mild anemia (attributed to other factors), normal platelet count, no evidence of hypercalcemia with normal renal function.  With stable findings I did suggest that we continue monitoring both her serum and urine at a 6-month interval and the patient agrees.  2. Anemia:  · Hemoglobin on 8/12/2020 was normal at 13.1 with MCV 95.4  · Hemoglobin declined on 8/26/2020 down to 11.3 with MCV 98.1.  Additional evaluation at that time with iron 52,  ferritin 66.3, iron saturation 16%, TIBC 322, folate greater than 20, B12 552.  · Suspect that patient has anemia secondary to chronic disease with underlying chronic autoimmune disorder.  She is also receiving weekly methotrexate which may be a contributing factor.    · Hemoglobin from 9/2/2021 was stable at 11.5  3. 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 Martell in rheumatology.    · She had been treated previously with Arava, subsequently changed to methotrexate weekly injections.    · She has low disease activity and her disease has been under good control.    · She does have associated Sjogren's syndrome with dry eyes and dry mouth and subsequent dental issues.  · She has chronic pain in her fingers with some degree of sclerodactyly and receives intermittent injections by hand surgery every 4 to 6 months.  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  3. Lumbar spine stenosis and spondylolisthesis:  · Chronic back pain  · Patient followed by Dr. Licea in orthopedics  · Patient continuing on a course of epidural injections  4. Microscopic colitis/ulcerative colitis:  · Patient is followed by Dr. Shook  · Previously treated with Entocort, discontinued around 2018  · Patient did follow-up with GI in January.  She underwent CT abdomen and pelvis on 1/27/2021 which was unrevealing.      · Previous pathology specimens from colonoscopy 2/26/2016 and EGD 12/10/2018 were sent for Congo red stain and were negative.  · Patient recently was placed on budesonide by GI with improvement in symptoms.  Currently she is bordering on constipation rather than diarrhea.  5. Possible peripheral neuropathy  · Patient has had some longstanding intermittent symptoms in her feet with numbness that does wax and wane.  It sounds as if her symptoms are  associated with Raynaud's type changes, unclear whether this represents a true peripheral neuropathy.  It is not a consistent finding.  Previous B12 level normal at 552 on 8/26/2020.  Unclear whether symptoms could be in part related to her underlying MGUS.  6. Peripheral vascular disease  · Patient developed acute ischemia right foot and was hospitalized 3/22-3/25/2021.  On 3/22/2021 patient underwent arthrectomy right popliteal artery.  She was subsequently placed on Xarelto 20 mg daily.  7. COVID-19 vaccination  · Patient received second Pfizer vaccine in February 2021 and recently received her third dose due to her immunocompromise status.     Plan:     1. Return in 6 months for MD visit.  1 week prior we will draw labs with CBC, CMP, serum protein electrophoresis, immunoelectrophoresis, quantitative immunoglobulins, free serum light chains and the patient will return to 24-hour urine for protein electrophoresis, immunoelectrophoresis.

## 2021-09-17 ENCOUNTER — HOSPITAL ENCOUNTER (OUTPATIENT)
Dept: MAMMOGRAPHY | Facility: HOSPITAL | Age: 86
Discharge: HOME | End: 2021-09-17
Admitting: INTERNAL MEDICINE

## 2021-09-17 DIAGNOSIS — Z12.31 VISIT FOR SCREENING MAMMOGRAM: ICD-10-CM

## 2021-09-17 PROCEDURE — 77063 BREAST TOMOSYNTHESIS BI: CPT

## 2021-09-17 PROCEDURE — 77067 SCR MAMMO BI INCL CAD: CPT

## 2021-10-28 ENCOUNTER — OFFICE VISIT (OUTPATIENT)
Dept: GASTROENTEROLOGY | Facility: CLINIC | Age: 86
End: 2021-10-28

## 2021-10-28 VITALS
HEART RATE: 82 BPM | TEMPERATURE: 97.3 F | BODY MASS INDEX: 21.28 KG/M2 | WEIGHT: 108.4 LBS | SYSTOLIC BLOOD PRESSURE: 142 MMHG | HEIGHT: 60 IN | OXYGEN SATURATION: 96 % | DIASTOLIC BLOOD PRESSURE: 80 MMHG

## 2021-10-28 DIAGNOSIS — Z86.010 HISTORY OF COLON POLYPS: Primary | ICD-10-CM

## 2021-10-28 DIAGNOSIS — K59.00 CONSTIPATION, UNSPECIFIED CONSTIPATION TYPE: ICD-10-CM

## 2021-10-28 DIAGNOSIS — K21.9 GASTROESOPHAGEAL REFLUX DISEASE WITHOUT ESOPHAGITIS: ICD-10-CM

## 2021-10-28 DIAGNOSIS — K52.839 MICROSCOPIC COLITIS, UNSPECIFIED MICROSCOPIC COLITIS TYPE: ICD-10-CM

## 2021-10-28 PROCEDURE — 99214 OFFICE O/P EST MOD 30 MIN: CPT | Performed by: INTERNAL MEDICINE

## 2021-10-28 RX ORDER — OMEPRAZOLE 20 MG/1
20 CAPSULE, DELAYED RELEASE ORAL
COMMUNITY
Start: 2021-09-30

## 2021-10-28 RX ORDER — BUDESONIDE 3 MG/1
3 CAPSULE, COATED PELLETS ORAL AS NEEDED
COMMUNITY
End: 2022-08-09

## 2021-10-28 NOTE — PROGRESS NOTES
"Chief Complaint   Patient presents with   • Diarrhea   • Constipation   • Bloated       History of Present Illness:   85 y.o. female        Last EGD was on 12/18.  Last colonoscopy was on 2/16.  She does have a history of colon polyps.  She was last seen in 7 of 2021.  My assessment and plan was as follows:  Assessment:  1.  History of colon polyps. Last colonoscopy was on 2/16.  2. H/o diarrhea, She has \"microscopic colitis\". Budesonide made her constipated.  3. 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     Recommendations:  1. We discussed the pros and cons of doing a c/s in an 86 yo.   2. Trial of Budesonide 3 mg one/day.  3. Stop the Colestid.   4. F/u 3 mos.   5. Think about whether she wants to have another colonoscopy or not.         Everytime she goes to BM she passes grape like. The Budesonide 3 mg/day helps but she has occaisonal diarrhea. No rectal bleeding. She averages 6-8 BM/day and hard usually. No rectal bleeding or melena. No abdominal or chest pain. Sometimes bloated. Weight stable.     Past Medical History:   Diagnosis Date   • Atheroembolism of right lower extremity (HCC)    • Baker's cyst    • Colon polyp    • CREST syndrome (HCC)    • Fractures    • GERD (gastroesophageal reflux disease)    • History of CT scan of abdomen 03/11/2011    constipation, sig tics, 6 mm hepatic cyst   • History of rheumatoid arthritis    • Hyperlipidemia    • Hypertension    • Low back pain    • Normocytic anemia 8/26/2020   • Osteoarthritis    • Raynaud's disease    • Rheumatoid arthritis (HCC)    • Scleroderma (HCC)    • Sjogren's syndrome (HCC)    • Ulcerative colitis (HCC)        Past Surgical History:   Procedure Laterality Date   • CATARACT EXTRACTION, BILATERAL     • COLONOSCOPY  02/26/2016    tics, microscopic colitis,    • COLONOSCOPY  07/01/2011    sig tics, inflammation, polyp   • DILATATION AND CURETTAGE  1980   • EKOS CATHETER PLACEMENT N/A 3/23/2021    Procedure: AIF WITH RUN OFF OF RT. LEG, ROTATIONAL " ATHERECTOMY OF RIGHT POPLITEAL ARTERY;  Surgeon: Gato Contreras MD;  Location: [facility] OR 18/19;  Service: Vascular;  Laterality: N/A;   • ENDOSCOPY N/A 12/3/2018    erythematous mucosa in stomach, path benign   • EYE SURGERY     • FLEXIBLE SIGMOIDOSCOPY     • FRACTURE SURGERY     • HAND SURGERY     • JOINT REPLACEMENT     • KNEE ARTHROSCOPY Right     w/meniscal repair   • KNEE SURGERY Right    • TEETH EXTRACTION     • TOTAL KNEE ARTHROPLASTY Right 5/30/2018    Procedure: TOTAL KNEE ARTHROPLASTY;  Surgeon: Georges Jon MD;  Location: [facility] OR;  Service: Orthopedics   • TRANSLUMINAL ATHERECTOMY POPLITEAL ARTERY     • UPPER GASTROINTESTINAL ENDOSCOPY  03/14/2008    erythema   • WRIST FRACTURE SURGERY Right    • WRIST SURGERY Right 2008    orif         Current Outpatient Medications:   •  Acetaminophen (TYLENOL EXTRA STRENGTH PO), Take 2 tablets by mouth Daily As Needed., Disp: , Rfl:   •  amLODIPine (NORVASC) 5 MG tablet, Take 1 tablet by mouth Daily., Disp: 30 tablet, Rfl: 0  •  Biotin 5000 MCG tablet, Take  by mouth Daily., Disp: , Rfl:   •  Budesonide (Entocort EC) 3 MG 24 hr capsule, , Disp: , Rfl:   •  calcium carbonate (CALCIUM 600) 600 MG tablet, 1 P.O. daily, Disp: , Rfl:   •  Carboxymethylcell-Hypromellose (GENTEAL OP), Apply 1 application to eye As Needed., Disp: , Rfl:   •  Cholecalciferol (VITAMIN D PO), Take 2,000 mg by mouth every night at bedtime., Disp: , Rfl:   •  escitalopram (LEXAPRO) 10 MG tablet, 20 mg Daily., Disp: , Rfl:   •  folic acid (FOLVITE) 1 MG tablet, Take 2 mg by mouth Daily., Disp: , Rfl:   •  Methotrexate Sodium (METHOTREXATE PF) 50 MG/2ML chemo syringe, Infuse  into a venous catheter 1 (One) Time., Disp: , Rfl:   •  Multiple Vitamins-Minerals (CENTRUM SILVER ULTRA WOMENS PO), 1 P.O. daily, Disp: , Rfl:   •  omeprazole (priLOSEC) 20 MG capsule, , Disp: , Rfl:   •  rivaroxaban (XARELTO) 20 MG tablet, Take 1 tablet by mouth Daily. Indications: Arterial thrombosis,  Disp: 30 tablet, Rfl: 2  •  traZODone (DESYREL) 50 MG tablet, Take 25-50 mg by mouth Every Night., Disp: , Rfl:     Allergies   Allergen Reactions   • Codeine Diarrhea and Nausea Only   • Sulfa Antibiotics Hives       Family History   Problem Relation Age of Onset   • Ulcerative colitis Mother    • Migraines Mother    • Stroke Mother    • Hypertension Mother    • Thyroid disease Mother    • Skin cancer Mother    • Breast cancer Maternal Aunt    • Arthritis Father    • Heart attack Father    • Migraines Sister    • Skin cancer Sister    • Migraines Daughter    • Skin cancer Brother    • Malig Hyperthermia Neg Hx        Social History     Socioeconomic History   • Marital status: Single   • Number of children: 3   Tobacco Use   • Smoking status: Never Smoker   • Smokeless tobacco: Never Used   Substance and Sexual Activity   • Alcohol use: Yes     Types: 1 - 3 Glasses of wine per week     Comment: Infrequent   • Drug use: No   • Sexual activity: Never       Review of Systems   Gastrointestinal: Positive for constipation and diarrhea.   All other systems reviewed and are negative.    Pertinent positives and negatives documented in the HPI and all other systems reviewed and were found to be negative.  Vitals:    10/28/21 0912   BP: 142/80   Pulse: 82   Temp: 97.3 °F (36.3 °C)   SpO2: 96%       Physical Exam  Vitals reviewed.   Constitutional:       General: She is not in acute distress.     Appearance: Normal appearance. She is well-developed. She is not diaphoretic.   HENT:      Head: Normocephalic and atraumatic. Hair is normal.      Right Ear: Hearing, tympanic membrane, ear canal and external ear normal. No decreased hearing noted. No drainage.      Left Ear: Hearing, tympanic membrane, ear canal and external ear normal. No decreased hearing noted.      Nose: Nose normal. No nasal deformity.      Mouth/Throat:      Mouth: Mucous membranes are moist.   Eyes:      General: Lids are normal.         Right eye: No  discharge.         Left eye: No discharge.      Extraocular Movements: Extraocular movements intact.      Conjunctiva/sclera: Conjunctivae normal.      Pupils: Pupils are equal, round, and reactive to light.   Neck:      Thyroid: No thyromegaly.      Vascular: No JVD.      Trachea: No tracheal deviation.   Cardiovascular:      Rate and Rhythm: Normal rate and regular rhythm.      Pulses: Normal pulses.      Heart sounds: Normal heart sounds. No murmur heard.  No friction rub. No gallop.    Pulmonary:      Effort: Pulmonary effort is normal. No respiratory distress.      Breath sounds: Normal breath sounds. No wheezing or rales.   Chest:      Chest wall: No tenderness.   Abdominal:      General: Bowel sounds are normal. There is no distension.      Palpations: Abdomen is soft. There is no mass.      Tenderness: There is no abdominal tenderness. There is no guarding or rebound.      Hernia: No hernia is present.   Musculoskeletal:         General: No tenderness or deformity. Normal range of motion.      Cervical back: Normal range of motion and neck supple.   Lymphadenopathy:      Cervical: No cervical adenopathy.   Skin:     General: Skin is warm and dry.      Findings: No erythema or rash.   Neurological:      Mental Status: She is alert and oriented to person, place, and time.      Cranial Nerves: No cranial nerve deficit.      Motor: No abnormal muscle tone.      Coordination: Coordination normal.      Deep Tendon Reflexes: Reflexes are normal and symmetric. Reflexes normal.   Psychiatric:         Mood and Affect: Mood normal.         Behavior: Behavior normal.         Thought Content: Thought content normal.         Judgment: Judgment normal.         Diagnoses and all orders for this visit:    1. History of colon polyps (Primary)    2. Microscopic colitis, unspecified microscopic colitis type    3. Gastroesophageal reflux disease without esophagitis    4. Constipation, unspecified constipation  "type      Assessment:  1. History of colon polyps. Last colonoscopy was on 2/16.  2. H/o diarrhea, She has \"microscopic colitis\". Budesonide made her constipated and she still has diarrhea.   3. 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    Recommendations:  1. Trial of fiber.  2. Continue the Budesonide 3 mg one/day.  3. We discussed the pros and cons of doing a colonoscopy in an 86 yo? She would need to be off of Xarelto for 3 days prior to colonsocopy. Should we do a barium enema? She will talk to Dr. Rust.  4. F/u 8 weeks.     Return in about 2 months (around 12/28/2021).    Edilberto Shook MD  10/28/2021  "

## 2022-01-05 ENCOUNTER — OFFICE VISIT (OUTPATIENT)
Dept: GASTROENTEROLOGY | Facility: CLINIC | Age: 87
End: 2022-01-05

## 2022-01-05 VITALS
HEART RATE: 84 BPM | OXYGEN SATURATION: 94 % | DIASTOLIC BLOOD PRESSURE: 70 MMHG | SYSTOLIC BLOOD PRESSURE: 139 MMHG | HEIGHT: 60 IN | BODY MASS INDEX: 20.81 KG/M2 | TEMPERATURE: 97.4 F | WEIGHT: 106 LBS

## 2022-01-05 DIAGNOSIS — K52.839 MICROSCOPIC COLITIS, UNSPECIFIED MICROSCOPIC COLITIS TYPE: ICD-10-CM

## 2022-01-05 DIAGNOSIS — Z86.010 HISTORY OF COLON POLYPS: Primary | ICD-10-CM

## 2022-01-05 DIAGNOSIS — K59.00 CONSTIPATION, UNSPECIFIED CONSTIPATION TYPE: ICD-10-CM

## 2022-01-05 PROCEDURE — 99214 OFFICE O/P EST MOD 30 MIN: CPT | Performed by: INTERNAL MEDICINE

## 2022-01-05 RX ORDER — CITALOPRAM 10 MG/1
20 TABLET ORAL DAILY
COMMUNITY
End: 2022-03-25

## 2022-01-05 RX ORDER — ATORVASTATIN CALCIUM 20 MG/1
20 TABLET, FILM COATED ORAL DAILY
COMMUNITY
Start: 2021-12-08

## 2022-01-05 RX ORDER — CLOPIDOGREL BISULFATE 75 MG/1
TABLET ORAL
COMMUNITY
Start: 2021-12-31 | End: 2022-10-27 | Stop reason: ALTCHOICE

## 2022-01-05 NOTE — PROGRESS NOTES
"Chief Complaint   Patient presents with   • Constipation   • chnge in bowel habits       History of Present Illness:   86 y.o. female        Last EGD was on 12/18.  Last colonoscopy was on 2/16.  She does have a history of colon polyps.        I last saw her in 10 of 2021.  My assessment and plan at that time was as follows:  Assessment:  1. History of colon polyps. Last colonoscopy was on 2/16.  2. H/o diarrhea, She has \"microscopic colitis\". Budesonide made her constipated and she still has diarrhea.   3. 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     Recommendations:  1. Trial of fiber.  2. Continue the Budesonide 3 mg one/day.  3. We discussed the pros and cons of doing a colonoscopy in an 84 yo? She would need to be off of Xarelto for 3 days prior to colonsocopy. Should we do a barium enema? She will talk to Dr. Rust.  4. F/u 8 weeks.        She has occasional constiaption. No diarrhea since Thanksgiving. NO rectal bleeding or melena. NO abdominal pain or chest pain. No nausea or vomiting. No fevers, chills. No weight loss. On Budesonide 3 mg one/day. She has a small BM everytime she goes to urinate, has to force BM, stool is hard.        Past Medical History:   Diagnosis Date   • Atheroembolism of right lower extremity (HCC)    • Baker's cyst    • Colon polyp    • CREST syndrome (HCC)    • Fractures    • GERD (gastroesophageal reflux disease)    • History of CT scan of abdomen 03/11/2011    constipation, sig tics, 6 mm hepatic cyst   • History of rheumatoid arthritis    • Hyperlipidemia    • Hypertension    • Low back pain    • Normocytic anemia 8/26/2020   • Osteoarthritis    • Raynaud's disease    • Rheumatoid arthritis (HCC)    • Scleroderma (HCC)    • Sjogren's syndrome (HCC)    • Ulcerative colitis (HCC)        Past Surgical History:   Procedure Laterality Date   • CATARACT EXTRACTION, BILATERAL     • COLONOSCOPY  02/26/2016    tics, microscopic colitis,    • COLONOSCOPY  07/01/2011    sig tics, inflammation, " polyp   • DILATATION AND CURETTAGE  1980   • EKOS CATHETER PLACEMENT N/A 3/23/2021    Procedure: AIF WITH RUN OFF OF RT. LEG, ROTATIONAL ATHERECTOMY OF RIGHT POPLITEAL ARTERY;  Surgeon: Gato Contreras MD;  Location: [facility] OR 18/19;  Service: Vascular;  Laterality: N/A;   • ENDOSCOPY N/A 12/3/2018    erythematous mucosa in stomach, path benign   • EYE SURGERY     • FLEXIBLE SIGMOIDOSCOPY     • FRACTURE SURGERY     • HAND SURGERY     • JOINT REPLACEMENT     • KNEE ARTHROSCOPY Right     w/meniscal repair   • KNEE SURGERY Right    • TEETH EXTRACTION     • TOTAL KNEE ARTHROPLASTY Right 5/30/2018    Procedure: TOTAL KNEE ARTHROPLASTY;  Surgeon: Georges Jon MD;  Location: [facility] OR;  Service: Orthopedics   • TRANSLUMINAL ATHERECTOMY POPLITEAL ARTERY     • UPPER GASTROINTESTINAL ENDOSCOPY  03/14/2008    erythema   • WRIST FRACTURE SURGERY Right    • WRIST SURGERY Right 2008    orif         Current Outpatient Medications:   •  Acetaminophen (TYLENOL EXTRA STRENGTH PO), Take 2 tablets by mouth Daily As Needed., Disp: , Rfl:   •  amLODIPine (NORVASC) 5 MG tablet, Take 1 tablet by mouth Daily., Disp: 30 tablet, Rfl: 0  •  atorvastatin (LIPITOR) 20 MG tablet, , Disp: , Rfl:   •  Biotin 5000 MCG tablet, Take  by mouth Daily., Disp: , Rfl:   •  Budesonide (Entocort EC) 3 MG 24 hr capsule, , Disp: , Rfl:   •  calcium carbonate (CALCIUM 600) 600 MG tablet, 1 P.O. daily, Disp: , Rfl:   •  Carboxymethylcell-Hypromellose (GENTEAL OP), Apply 1 application to eye As Needed., Disp: , Rfl:   •  Cholecalciferol (VITAMIN D PO), Take 2,000 mg by mouth every night at bedtime., Disp: , Rfl:   •  citalopram (CeleXA) 10 MG tablet, Take 20 mg by mouth Daily., Disp: , Rfl:   •  clopidogrel (PLAVIX) 75 MG tablet, , Disp: , Rfl:   •  folic acid (FOLVITE) 1 MG tablet, Take 2 mg by mouth Daily., Disp: , Rfl:   •  Methotrexate Sodium (METHOTREXATE PF) 50 MG/2ML chemo syringe, Infuse  into a venous catheter 1 (One) Time., Disp: ,  Rfl:   •  Multiple Vitamins-Minerals (CENTRUM SILVER ULTRA WOMENS PO), 1 P.O. daily, Disp: , Rfl:   •  omeprazole (priLOSEC) 20 MG capsule, , Disp: , Rfl:   •  traZODone (DESYREL) 50 MG tablet, Take 25-50 mg by mouth Every Night., Disp: , Rfl:     Allergies   Allergen Reactions   • Codeine Diarrhea and Nausea Only   • Sulfa Antibiotics Hives       Family History   Problem Relation Age of Onset   • Ulcerative colitis Mother    • Migraines Mother    • Stroke Mother    • Hypertension Mother    • Thyroid disease Mother    • Skin cancer Mother    • Breast cancer Maternal Aunt    • Arthritis Father    • Heart attack Father    • Migraines Sister    • Skin cancer Sister    • Migraines Daughter    • Skin cancer Brother    • Malig Hyperthermia Neg Hx        Social History     Socioeconomic History   • Marital status: Single   • Number of children: 3   Tobacco Use   • Smoking status: Never Smoker   • Smokeless tobacco: Never Used   Substance and Sexual Activity   • Alcohol use: Yes     Types: 1 - 3 Glasses of wine per week     Comment: Infrequent   • Drug use: No   • Sexual activity: Never       Review of Systems   Gastrointestinal: Negative for abdominal pain.   All other systems reviewed and are negative.    Pertinent positives and negatives documented in the HPI and all other systems reviewed and were found to be negative.  Vitals:    01/05/22 1000   BP: 139/70   Pulse: 84   Temp: 97.4 °F (36.3 °C)   SpO2: 94%       Physical Exam  Vitals reviewed.   Constitutional:       General: She is not in acute distress.     Appearance: Normal appearance. She is well-developed. She is not diaphoretic.   HENT:      Head: Normocephalic and atraumatic. Hair is normal.      Right Ear: Hearing, tympanic membrane, ear canal and external ear normal. No decreased hearing noted. No drainage.      Left Ear: Hearing, tympanic membrane, ear canal and external ear normal. No decreased hearing noted.      Nose: Nose normal. No nasal deformity.       Mouth/Throat:      Mouth: Mucous membranes are moist.   Eyes:      General: Lids are normal.         Right eye: No discharge.         Left eye: No discharge.      Extraocular Movements: Extraocular movements intact.      Conjunctiva/sclera: Conjunctivae normal.      Pupils: Pupils are equal, round, and reactive to light.   Neck:      Thyroid: No thyromegaly.      Vascular: No JVD.      Trachea: No tracheal deviation.   Cardiovascular:      Rate and Rhythm: Normal rate and regular rhythm.      Pulses: Normal pulses.      Heart sounds: Normal heart sounds. No murmur heard.  No friction rub. No gallop.    Pulmonary:      Effort: Pulmonary effort is normal. No respiratory distress.      Breath sounds: Normal breath sounds. No wheezing or rales.   Chest:      Chest wall: No tenderness.   Abdominal:      General: Bowel sounds are normal. There is no distension.      Palpations: Abdomen is soft. There is no mass.      Tenderness: There is no abdominal tenderness. There is no guarding or rebound.      Hernia: No hernia is present.   Genitourinary:     Rectum: Normal. Guaiac result negative.   Musculoskeletal:         General: No tenderness or deformity. Normal range of motion.      Cervical back: Normal range of motion and neck supple.   Lymphadenopathy:      Cervical: No cervical adenopathy.   Skin:     General: Skin is warm and dry.      Findings: No erythema or rash.   Neurological:      Mental Status: She is alert and oriented to person, place, and time.      Cranial Nerves: No cranial nerve deficit.      Motor: No abnormal muscle tone.      Coordination: Coordination normal.      Deep Tendon Reflexes: Reflexes are normal and symmetric. Reflexes normal.   Psychiatric:         Mood and Affect: Mood normal.         Behavior: Behavior normal.         Thought Content: Thought content normal.         Judgment: Judgment normal.         Diagnoses and all orders for this visit:    1. History of colon polyps (Primary)    2.  Microscopic colitis, unspecified microscopic colitis type    3. Constipation, unspecified constipation type      Assessment:  1. On Plavix for arterial blood clot in 3/21.  2. H/o microscopic colitis - on Budesonide 3 mg one/day.  3. Constipation with hard stool.     Recommendations:  1. Decrease the Budesonide 3 mg one every other day.   2. Take fiber daily and drink more water.   3. We discussed possibly doing a barium enema?  4. If she were to have a colonoscopy then she would need to be off of Plavix for 5 days prior to the colonoscopy. She prefers no colonoscopy now.   5. F/u 6 mos. We can talk again about colonoscopy then.   6. I don't think that [facility] will pay for ColoGuard after age 85 yrs?    Return in about 6 months (around 7/5/2022).    Edilberto Shook MD  1/5/2022

## 2022-01-07 ENCOUNTER — HOSPITAL ENCOUNTER (OUTPATIENT)
Dept: MRI IMAGING | Facility: HOSPITAL | Age: 87
Discharge: HOME | End: 2022-01-07
Admitting: INTERNAL MEDICINE

## 2022-01-07 ENCOUNTER — TRANSCRIBE ORDERS (OUTPATIENT)
Dept: ADMINISTRATIVE | Facility: HOSPITAL | Age: 87
End: 2022-01-07

## 2022-01-07 DIAGNOSIS — R27.0 ATAXIA: Primary | ICD-10-CM

## 2022-01-07 DIAGNOSIS — R27.0 ATAXIA: ICD-10-CM

## 2022-01-07 LAB — CREAT BLDA-MCNC: 0.5 MG/DL (ref 0.6–1.3)

## 2022-01-07 PROCEDURE — A9577 INJ MULTIHANCE: HCPCS | Performed by: INTERNAL MEDICINE

## 2022-01-07 PROCEDURE — 70553 MRI BRAIN STEM W/O & W/DYE: CPT

## 2022-01-07 PROCEDURE — 82565 ASSAY OF CREATININE: CPT

## 2022-01-07 PROCEDURE — 0 GADOBENATE DIMEGLUMINE 529 MG/ML SOLUTION: Performed by: INTERNAL MEDICINE

## 2022-01-07 RX ADMIN — GADOBENATE DIMEGLUMINE 10 ML: 529 INJECTION, SOLUTION INTRAVENOUS at 18:48

## 2022-01-20 ENCOUNTER — OFFICE VISIT (OUTPATIENT)
Dept: ORTHOPEDIC SURGERY | Facility: CLINIC | Age: 87
End: 2022-01-20

## 2022-01-20 VITALS — HEIGHT: 66 IN | WEIGHT: 104 LBS | BODY MASS INDEX: 16.71 KG/M2 | TEMPERATURE: 96.8 F

## 2022-01-20 DIAGNOSIS — Z96.651 HX OF TOTAL KNEE ARTHROPLASTY, RIGHT: Primary | ICD-10-CM

## 2022-01-20 DIAGNOSIS — M25.561 CHRONIC PAIN OF RIGHT KNEE: ICD-10-CM

## 2022-01-20 DIAGNOSIS — G89.29 CHRONIC PAIN OF RIGHT KNEE: ICD-10-CM

## 2022-01-20 PROCEDURE — 73562 X-RAY EXAM OF KNEE 3: CPT | Performed by: NURSE PRACTITIONER

## 2022-01-20 PROCEDURE — 99214 OFFICE O/P EST MOD 30 MIN: CPT | Performed by: NURSE PRACTITIONER

## 2022-01-20 RX ORDER — ESCITALOPRAM OXALATE 20 MG/1
20 TABLET ORAL EVERY MORNING
COMMUNITY
Start: 2021-12-08

## 2022-01-20 NOTE — PROGRESS NOTES
Patient: Arlin Hutson  YOB: 1935 86 y.o. female  Medical Record Number: [id number]    Chief Complaints:   Chief Complaint   Patient presents with   • Right Knee - Follow-up, Pain       History of Present Illness:Arlin Hutson is a 86 y.o. female who presents with complaints of right anterior knee pain.  She her right knee replaced about 3-1/2 years ago but ever since surgery she has had some anterior knee pain.  She denies any injury.  She has had previous Pez anserine bursa injections which she thinks has helped but its been well over a year and a half.  She most recently started with physical therapy for her balance and the physical therapist did an assessment and was very concerned about the amount of pain she was having along the anterior portion of her right knee.  So she was scheduled to see me today.  She describes as a constant moderate sometimes severe ache worse at night when she lies down.  She does have chronic low back pains but she denies any radicular symptoms at this time    Allergies:   Allergies   Allergen Reactions   • Codeine Diarrhea and Nausea Only   • Penicillin G Rash   • Sulfa Antibiotics Hives       Medications:   Current Outpatient Medications   Medication Sig Dispense Refill   • Acetaminophen (TYLENOL EXTRA STRENGTH PO) Take 2 tablets by mouth Daily As Needed.     • amLODIPine (NORVASC) 5 MG tablet Take 1 tablet by mouth Daily. 30 tablet 0   • Biotin 5000 MCG tablet Take  by mouth Daily.     • Budesonide (Entocort EC) 3 MG 24 hr capsule      • calcium carbonate (CALCIUM 600) 600 MG tablet 1 P.O. daily     • Cholecalciferol (VITAMIN D PO) Take 2,000 mg by mouth every night at bedtime.     • clopidogrel (PLAVIX) 75 MG tablet      • escitalopram (LEXAPRO) 20 MG tablet      • folic acid (FOLVITE) 1 MG tablet Take 2 mg by mouth Daily.     • Methotrexate Sodium (METHOTREXATE PF) 50 MG/2ML chemo syringe Infuse  into a venous catheter 1 (One) Time.     • Multiple  "Vitamins-Minerals (CENTRUM SILVER ULTRA WOMENS PO) 1 P.O. daily     • omeprazole (priLOSEC) 20 MG capsule      • traZODone (DESYREL) 50 MG tablet Take 25-50 mg by mouth Every Night.     • atorvastatin (LIPITOR) 20 MG tablet      • Carboxymethylcell-Hypromellose (GENTEAL OP) Apply 1 application to eye As Needed.     • citalopram (CeleXA) 10 MG tablet Take 20 mg by mouth Daily.       No current facility-administered medications for this visit.         The following portions of the patient's history were reviewed and updated as appropriate: allergies, current medications, past family history, past medical history, past social history, past surgical history and problem list.    Review of Systems:   A 14 point review of systems was performed. All systems negative except pertinent positives/negative listed in HPI above    Physical Exam:   Vitals:    01/20/22 1249   Temp: 96.8 °F (36 °C)   Weight: 47.2 kg (104 lb)  Comment: per patient   Height: 166.4 cm (65.5\")       General: A and O x 3, ASA, NAD    SCLERA:    Normal    Skin clear no unusual lesions noted  Right knee the patient is well-healed surgical incision noted she is very tender over Pez anserine bursa otherwise has good range of motion of the knee with no instability calf is soft and nontender         Radiology:  Xrays 3views (ap,lateral, sunrise) right knee were ordered and reviewed today secondary to pain and show well-placed well-positioned right total knee replacement.  Compared to views are unchanged    Assessment/Plan: Status post right TKA with chronic anterior knee pain    Patient I discussed options, given the fact she has had continued pain we will proceed with a bone scan with attention to right knee.  She is can follow-up with me in about 3 weeks and if that is negative we can always try another cortisone injection into the bursa.  She was given samples of Pennsaid gel to use twice a day will let me know if she like a prescription she will start using " walker with limited weightbearing until we get the results of the bone scan back      Kadie Sandoval, APRN  1/20/2022

## 2022-01-31 ENCOUNTER — HOSPITAL ENCOUNTER (OUTPATIENT)
Dept: NUCLEAR MEDICINE | Facility: HOSPITAL | Age: 87
Discharge: HOME | End: 2022-01-31

## 2022-01-31 DIAGNOSIS — M25.561 CHRONIC PAIN OF RIGHT KNEE: ICD-10-CM

## 2022-01-31 DIAGNOSIS — G89.29 CHRONIC PAIN OF RIGHT KNEE: ICD-10-CM

## 2022-01-31 DIAGNOSIS — Z96.651 HX OF TOTAL KNEE ARTHROPLASTY, RIGHT: ICD-10-CM

## 2022-01-31 PROCEDURE — A9503 TC99M MEDRONATE: HCPCS | Performed by: NURSE PRACTITIONER

## 2022-01-31 PROCEDURE — 0 TECHNETIUM MEDRONATE KIT: Performed by: NURSE PRACTITIONER

## 2022-01-31 PROCEDURE — 78315 BONE IMAGING 3 PHASE: CPT

## 2022-01-31 RX ORDER — TC 99M MEDRONATE 20 MG/10ML
19.9 INJECTION, POWDER, LYOPHILIZED, FOR SOLUTION INTRAVENOUS
Status: COMPLETED | OUTPATIENT
Start: 2022-01-31 | End: 2022-01-31

## 2022-01-31 RX ADMIN — Medication 19.9 MILLICURIE: at 09:52

## 2022-02-08 ENCOUNTER — OFFICE VISIT (OUTPATIENT)
Dept: ORTHOPEDIC SURGERY | Facility: CLINIC | Age: 87
End: 2022-02-08

## 2022-02-08 VITALS — HEIGHT: 66 IN | TEMPERATURE: 97.4 F | WEIGHT: 104 LBS | RESPIRATION RATE: 18 BRPM | BODY MASS INDEX: 16.71 KG/M2

## 2022-02-08 DIAGNOSIS — M70.51 PES ANSERINUS BURSITIS OF RIGHT KNEE: Primary | ICD-10-CM

## 2022-02-08 PROCEDURE — 20610 DRAIN/INJ JOINT/BURSA W/O US: CPT | Performed by: NURSE PRACTITIONER

## 2022-02-08 PROCEDURE — 99213 OFFICE O/P EST LOW 20 MIN: CPT | Performed by: NURSE PRACTITIONER

## 2022-02-08 RX ORDER — TRIAMCINOLONE ACETONIDE 40 MG/ML
80 INJECTION, SUSPENSION INTRA-ARTICULAR; INTRAMUSCULAR
Status: COMPLETED | OUTPATIENT
Start: 2022-02-08 | End: 2022-02-08

## 2022-02-08 RX ORDER — FLUTICASONE PROPIONATE 50 MCG
1-2 SPRAY, SUSPENSION (ML) NASAL
COMMUNITY

## 2022-02-08 RX ORDER — POLYETHYLENE GLYCOL 400 AND PROPYLENE GLYCOL 4; 3 MG/ML; MG/ML
SOLUTION/ DROPS OPHTHALMIC
COMMUNITY

## 2022-02-08 RX ADMIN — TRIAMCINOLONE ACETONIDE 80 MG: 40 INJECTION, SUSPENSION INTRA-ARTICULAR; INTRAMUSCULAR at 10:40

## 2022-02-08 NOTE — PROGRESS NOTES
Patient: Arlin Hutson  YOB: 1935 86 y.o. female  Medical Record Number: [id number]    Chief Complaints:   Chief Complaint   Patient presents with   • Right Knee - Follow-up, Pain       History of Present Illness:Arlin Hutson is a 86 y.o. female who presents with complaints of anterior right knee pain.  She had a bone scan which fortunately was normal.  She has been using the Pennsaid gel that I gave her and that actually has helped.  She still describes the pain as a moderate constant ache worse with putting pressure on that area also prolonged walking.  Better with the Pennsaid gel    Allergies:   Allergies   Allergen Reactions   • Codeine Diarrhea and Nausea Only   • Penicillin G Rash   • Sulfa Antibiotics Hives       Medications:   Current Outpatient Medications   Medication Sig Dispense Refill   • Acetaminophen (TYLENOL EXTRA STRENGTH PO) Take 2 tablets by mouth Daily As Needed.     • amLODIPine (NORVASC) 5 MG tablet Take 1 tablet by mouth Daily. 30 tablet 0   • atorvastatin (LIPITOR) 20 MG tablet      • Biotin 5000 MCG tablet Take  by mouth Daily.     • Budesonide (Entocort EC) 3 MG 24 hr capsule      • calcium carbonate (CALCIUM 600) 600 MG tablet 1 P.O. daily     • Carboxymethylcell-Hypromellose (GENTEAL OP) Apply 1 application to eye As Needed.     • Cholecalciferol (VITAMIN D PO) Take 2,000 mg by mouth every night at bedtime.     • clopidogrel (PLAVIX) 75 MG tablet      • Dextran 70-Hypromellose 0.1-0.3 % solution      • escitalopram (LEXAPRO) 20 MG tablet      • fluticasone (Flonase) 50 MCG/ACT nasal spray 1-2 sprays into the nostril(s) as directed by provider.     • folic acid (FOLVITE) 1 MG tablet Take 2 mg by mouth Daily.     • Methotrexate Sodium (METHOTREXATE PF) 50 MG/2ML chemo syringe Infuse  into a venous catheter 1 (One) Time.     • Multiple Vitamins-Minerals (CENTRUM SILVER ULTRA WOMENS PO) 1 P.O. daily     • mupirocin (BACTROBAN) 2 % ointment      • omeprazole (priLOSEC) 20  "MG capsule      • polyethyl glycol-propyl glycol (Systane) 0.4-0.3 % solution ophthalmic solution (artificial tears)      • traZODone (DESYREL) 50 MG tablet Take 25-50 mg by mouth Every Night.     • citalopram (CeleXA) 10 MG tablet Take 20 mg by mouth Daily.       No current facility-administered medications for this visit.         The following portions of the patient's history were reviewed and updated as appropriate: allergies, current medications, past family history, past medical history, past social history, past surgical history and problem list.    Review of Systems:   A 14 point review of systems was performed. All systems negative except pertinent positives/negative listed in HPI above    Physical Exam:   Vitals:    02/08/22 1022   Resp: 18   Temp: 97.4 °F (36.3 °C)   Weight: 47.2 kg (104 lb)   Height: 166.4 cm (65.5\")       General: A and O x 3, ASA, NAD    SCLERA:    Normal    Skin clear no unusual lesions noted  Right knee patient is very tender over right knee Pes anserine bursa she otherwise has excellent range of motion with no instability calf is soft and nontender       Radiology:  Xrays 3views (ap,lateral, sunrise) previous x-rays of the right knee as well as bone scan were reviewed with the patient and findings above    Assessment/Plan: Right knee pes anserine bursitis    Patient I discussed options, we will proceed with right knee Pez anserine bursa injection, she may resume physical therapy, continue Pennsaid gel, and I will see her back in 3 months if needed    Large Joint Arthrocentesis: R knee  Date/Time: 2/8/2022 10:40 AM  Consent given by: patient  Site marked: site marked  Timeout: Immediately prior to procedure a time out was called to verify the correct patient, procedure, equipment, support staff and site/side marked as required   Supporting Documentation  Indications: pain and joint swelling   Procedure Details  Location: knee - R knee  Preparation: Patient was prepped and draped in " the usual sterile fashion  Needle size: 22 G  Approach: anterior (pes anserine bursa)  Medications administered: 80 mg triamcinolone acetonide 40 MG/ML  Patient tolerance: patient tolerated the procedure well with no immediate complications            Kadie Sandoval, CHIRAG  2/8/2022

## 2022-03-10 ENCOUNTER — TELEPHONE (OUTPATIENT)
Dept: ONCOLOGY | Facility: CLINIC | Age: 87
End: 2022-03-10

## 2022-03-10 NOTE — TELEPHONE ENCOUNTER
Caller: Arlin Hutson    Relationship: Self    Best call back number: [phone]    What is the best time to reach you: ANYTIME    Who are you requesting to speak with (clinical staff, provider,  specific staff member): DR LEAVITT OR NURSE    What was the call regarding: PT CALLED - SHE HAS A LAB ON 3/18. SHE WASN'T SURE IF HER 24 HOUR SHOULD BE COLLECTED ON 3/17 TO BRING WITH HER ON 3/18 OR NOT.    PLEASE CALL PT TO ADVISE.     Do you require a callback: YES

## 2022-03-10 NOTE — TELEPHONE ENCOUNTER
Returned call to patient to let her know that she should collect her 24 hour urine on 3/17/2022 and bring in at her appointment on 3/18/2022. Message left with these directions and to return my call with any questions.

## 2022-03-18 ENCOUNTER — LAB (OUTPATIENT)
Dept: LAB | Facility: HOSPITAL | Age: 87
End: 2022-03-18

## 2022-03-18 DIAGNOSIS — D47.2 MGUS (MONOCLONAL GAMMOPATHY OF UNKNOWN SIGNIFICANCE): ICD-10-CM

## 2022-03-18 LAB
ALBUMIN SERPL-MCNC: 4.2 G/DL (ref 3.5–5.2)
ALBUMIN/GLOB SERPL: 1.5 G/DL (ref 1.1–2.4)
ALP SERPL-CCNC: 97 U/L (ref 38–116)
ALT SERPL W P-5'-P-CCNC: 20 U/L (ref 0–33)
ANION GAP SERPL CALCULATED.3IONS-SCNC: 9.4 MMOL/L (ref 5–15)
AST SERPL-CCNC: 16 U/L (ref 0–32)
BASOPHILS # BLD AUTO: 0.05 10*3/MM3 (ref 0–0.2)
BASOPHILS NFR BLD AUTO: 0.7 % (ref 0–1.5)
BILIRUB SERPL-MCNC: 0.2 MG/DL (ref 0.2–1.2)
BUN SERPL-MCNC: 21 MG/DL (ref 6–20)
BUN/CREAT SERPL: 31.8 (ref 7.3–30)
CALCIUM SPEC-SCNC: 8.8 MG/DL (ref 8.5–10.2)
CHLORIDE SERPL-SCNC: 106 MMOL/L (ref 98–107)
CO2 SERPL-SCNC: 27.6 MMOL/L (ref 22–29)
CREAT SERPL-MCNC: 0.66 MG/DL (ref 0.6–1.1)
DEPRECATED RDW RBC AUTO: 62.1 FL (ref 37–54)
EGFRCR SERPLBLD CKD-EPI 2021: 85.6 ML/MIN/1.73
EOSINOPHIL # BLD AUTO: 0.2 10*3/MM3 (ref 0–0.4)
EOSINOPHIL NFR BLD AUTO: 2.9 % (ref 0.3–6.2)
ERYTHROCYTE [DISTWIDTH] IN BLOOD BY AUTOMATED COUNT: 20.3 % (ref 12.3–15.4)
GLOBULIN UR ELPH-MCNC: 2.8 GM/DL (ref 1.8–3.5)
GLUCOSE SERPL-MCNC: 96 MG/DL (ref 74–124)
HCT VFR BLD AUTO: 33.7 % (ref 34–46.6)
HGB BLD-MCNC: 10.4 G/DL (ref 12–15.9)
IMM GRANULOCYTES # BLD AUTO: 0.02 10*3/MM3 (ref 0–0.05)
IMM GRANULOCYTES NFR BLD AUTO: 0.3 % (ref 0–0.5)
LYMPHOCYTES # BLD AUTO: 0.9 10*3/MM3 (ref 0.7–3.1)
LYMPHOCYTES NFR BLD AUTO: 13 % (ref 19.6–45.3)
MCH RBC QN AUTO: 26.7 PG (ref 26.6–33)
MCHC RBC AUTO-ENTMCNC: 30.9 G/DL (ref 31.5–35.7)
MCV RBC AUTO: 86.6 FL (ref 79–97)
MONOCYTES # BLD AUTO: 0.67 10*3/MM3 (ref 0.1–0.9)
MONOCYTES NFR BLD AUTO: 9.7 % (ref 5–12)
NEUTROPHILS NFR BLD AUTO: 5.08 10*3/MM3 (ref 1.7–7)
NEUTROPHILS NFR BLD AUTO: 73.4 % (ref 42.7–76)
NRBC BLD AUTO-RTO: 0 /100 WBC (ref 0–0.2)
PLATELET # BLD AUTO: 283 10*3/MM3 (ref 140–450)
PMV BLD AUTO: 9.5 FL (ref 6–12)
POTASSIUM SERPL-SCNC: 3.7 MMOL/L (ref 3.5–4.7)
PROT SERPL-MCNC: 7 G/DL (ref 6.3–8)
RBC # BLD AUTO: 3.89 10*6/MM3 (ref 3.77–5.28)
SODIUM SERPL-SCNC: 143 MMOL/L (ref 134–145)
WBC NRBC COR # BLD: 6.92 10*3/MM3 (ref 3.4–10.8)

## 2022-03-18 PROCEDURE — 36415 COLL VENOUS BLD VENIPUNCTURE: CPT

## 2022-03-18 PROCEDURE — 85025 COMPLETE CBC W/AUTO DIFF WBC: CPT

## 2022-03-18 PROCEDURE — 80053 COMPREHEN METABOLIC PANEL: CPT

## 2022-03-21 LAB
ALBUMIN SERPL ELPH-MCNC: 3.9 G/DL (ref 2.9–4.4)
ALBUMIN/GLOB SERPL: 1.4 {RATIO} (ref 0.7–1.7)
ALPHA1 GLOB SERPL ELPH-MCNC: 0.3 G/DL (ref 0–0.4)
ALPHA2 GLOB SERPL ELPH-MCNC: 0.7 G/DL (ref 0.4–1)
B-GLOBULIN SERPL ELPH-MCNC: 1.4 G/DL (ref 0.7–1.3)
GAMMA GLOB SERPL ELPH-MCNC: 0.7 G/DL (ref 0.4–1.8)
GLOBULIN SER-MCNC: 3 G/DL (ref 2.2–3.9)
IGA SERPL-MCNC: 630 MG/DL (ref 64–422)
IGG SERPL-MCNC: 635 MG/DL (ref 586–1602)
IGM SERPL-MCNC: 122 MG/DL (ref 26–217)
INTERPRETATION SERPL IEP-IMP: ABNORMAL
KAPPA LC FREE SER-MCNC: 75.7 MG/L (ref 3.3–19.4)
KAPPA LC FREE/LAMBDA FREE SER: 5.82 {RATIO} (ref 0.26–1.65)
LABORATORY COMMENT REPORT: ABNORMAL
LAMBDA LC FREE SERPL-MCNC: 13 MG/L (ref 5.7–26.3)
M PROTEIN SERPL ELPH-MCNC: ABNORMAL G/DL
PROT SERPL-MCNC: 6.9 G/DL (ref 6–8.5)

## 2022-03-22 LAB
ALBUMIN 24H MFR UR ELPH: 20.9 %
ALPHA1 GLOB 24H MFR UR ELPH: 6 %
ALPHA2 GLOB 24H MFR UR ELPH: 13.7 %
B-GLOBULIN MFR UR ELPH: 20.3 %
GAMMA GLOB 24H MFR UR ELPH: 39.1 %
HIV 1 & 2 AB SER-IMP: ABNORMAL
INTERPRETATION UR IFE-IMP: ABNORMAL
M PROTEIN 24H MFR UR ELPH: 30.2 %
M PROTEIN 24H UR ELPH-MRATE: 112 MG/24 HR
PROT 24H UR-MRATE: 372 MG/24 HR (ref 30–150)
PROT UR-MCNC: 13.3 MG/DL

## 2022-03-24 NOTE — PROGRESS NOTES
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  In the interval since her last visit, the patient has had a number of difficulties.  She reports having significant trouble with her balance.  She denies any vertigo type symptoms.  She has not experienced any falls.  She does ambulate with the use of a cane.  She underwent MRI brain on 1/7/2022 which did not show any significant abnormal findings, only diffuse atrophy and chronic small vessel ischemic change.  She was seen by ENT and apparently had a negative evaluation there as well.  She was referred to physical therapy and has been continuing with this since 2/21/2022 and does note some improvement.  She does continue on antihypertensive medication with amlodipine 5 mg daily.  She was noted by her physical therapist to have orthostatic values when checking her blood pressure just recently.  She does have some mild orthostatic symptoms intermittently.  She was previously experiencing diarrhea however that is transition now to constipation and she is no longer taking budesonide.  She is using MiraLAX daily as needed.  She follows up with GI in the near future.  She has followed up with rheumatology, had labs on 1/24/2022 that showed minimal evidence of inflammatory change.  She developed a flare in her arthritic symptoms in her hands more recently and was placed on a short prednisone taper beginning 3/10/2022 with 10 mg x 5 days followed by 5 mg x 5 days which did help.  There has been some discussion regarding the use of Orencia.  She does continue currently on weekly methotrexate injections.  She developed some right knee pain recently and did undergo a bone scan on 1/31/2022 that was negative to evaluate her right knee.  Pain has improved.  She  "notes that vascular surgery did not change her from Xarelto to Plavix in December 2021.      Objective   Vital Signs:   /74   Pulse 72   Temp 96.6 °F (35.9 °C) (Temporal)   Resp 16   Ht 152.4 cm (60\")   Wt 48.5 kg (107 lb)   SpO2 97%   BMI 20.90 kg/m²     Physical Exam  Constitutional:       Appearance: She is well-developed.   Eyes:      Conjunctiva/sclera: Conjunctivae normal.   Neck:      Thyroid: No thyromegaly.   Cardiovascular:      Rate and Rhythm: Normal rate and regular rhythm.      Heart sounds: No murmur heard.    No friction rub. No gallop.   Pulmonary:      Effort: No respiratory distress.      Breath sounds: Normal breath sounds.   Chest:   Breasts:      Right: No supraclavicular adenopathy.      Left: No supraclavicular adenopathy.       Abdominal:      General: Bowel sounds are normal. There is no distension.      Palpations: Abdomen is soft.      Tenderness: There is no abdominal tenderness.   Musculoskeletal:      Comments: Sclerodactyly involving the hands   Lymphadenopathy:      Head:      Right side of head: No submandibular adenopathy.      Cervical: No cervical adenopathy.      Upper Body:      Right upper body: No supraclavicular adenopathy.      Left upper body: No supraclavicular adenopathy.   Skin:     General: Skin is warm and dry.      Findings: No rash.   Neurological:      Mental Status: She is alert and oriented to person, place, and time.      Cranial Nerves: No cranial nerve deficit.      Motor: No abnormal muscle tone.      Deep Tendon Reflexes: Reflexes normal.   Psychiatric:         Behavior: Behavior normal.     Patient was examined today, unchanged from above      Result Review : Reviewed labs from 3/18/2022 with CBC, CMP, serum protein electrophoresis, immunoelectrophoresis, quantitative immunoglobulins, free serum light chains.  Reviewed 24-hour urine protein electrophoresis, immunoelectrophoresis.  Reviewed laboratory records from rheumatology 1/24/2022.  " Reviewed records from orthopedics and GI.  Reviewed bone scan and MRI brain.       Assessment and Plan   1. 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  · Laboratory studies 2/12/2020 showed a creatinine of 0.61, calcium 9.2, globulin 2.5, albumin 4.3, serum protein electrophoresis with a prominent protein band in the beta region, could not rule out monoclonal protein.  · CBC on 4/14/2020 with WBC 6.0 with normal differential, hemoglobin 12.0, platelet count 255,000.  · Subsequent labs with Dr. Rust on 7/1/2020 showed an immunoelectrophoresis confirming an IgA kappa monoclonal protein with IgA 658, IgG 7 or 24, IgM 125.  Creatinine was normal at 0.61 with calcium 9.5.    · Patient was referred to our office for further evaluation.  · At time of initial visit 8/12/2020, hemoglobin 13.1, platelet count 200,000, creatinine 0.54 with calcium of 9.5.  · Additional labs 8/12/2020 with dual IgA kappa M spike (0.6 g and secondary M spike too small to quantify), IgA 652, IgG 743, IgM 126, free kappa light chain 87.1, free lambda light chain 12.1, free light chain ratio 7.0.  · 24-hour urine 8/17/2020 with 410 mg total protein, 40.6% kappa light chain (166 mg).  · Skeletal survey 8/13/2020 with no evidence of lytic lesions.  · Previous pathology specimens from colonoscopy 2/26/2016 and EGD 12/10/2018 were sent for Congo red stain, both negative.  · Labs on 3/4/2021 showed a dual IgA kappa M spike with a dominant band increased slightly from 0.4 up to 0.6 g and a minor band too small to quantify.  IgA decreased at 610, IgG was 680, IgM 110.  Free light chains showed a a decrease in free kappa at 85.9, free lambda 12.1, free light chain ratio 7.1.  24-hour urine showed a decrease in total protein from 361 down to 316 mg with slight increase in monoclonal percentage  from 24% up to 35.7%.    · Labs 9/2/2021 showed stable dual IgA kappa M spike with major band 0.5 g, moderate band too small to quantify (previously major band 0.6 g), IgA 609 (previously 610), IgG 657, IgM 122, free kappa light chain 93.9 (previously 85.9), free lambda light chain 12.7 with free light chain ratio 7.39.  24-hour urine showed stable total protein at 303 g with stable M spike at 36.6% (111 mg) of free kappa.    · Patient returns at a 6-month interval with labs and 24-hour urine to review from 3/18/2022.  M spike is stable, remains a dual IgA kappa with major band 0.5 g and minor band too small to quantify.  IgA was 630, IgG 635, IgM 122 with free kappa light chain 75.7, free lambda light chain 13 with free light chain ratio 5.82.  24-hour urine with 372 mg total protein, 30.2% monoclonal kappa (112 mg).  With stable findings in both serum and urine, persistent anemia felt to be related to other factors, normal platelet count of 283,000, no evidence of anemia nor renal dysfunction, we will continue monitoring her MGUS with repeat serum and urine studies at a 6-month interval.  2. Anemia:  · Hemoglobin on 8/12/2020 was normal at 13.1 with MCV 95.4  · Hemoglobin declined on 8/26/2020 down to 11.3 with MCV 98.1.  Additional evaluation at that time with iron 52, ferritin 66.3, iron saturation 16%, TIBC 322, folate greater than 20, B12 552.  · Suspect that patient has anemia secondary to chronic disease with underlying chronic autoimmune disorder.  She is also receiving weekly methotrexate which may be a contributing factor.    · Hemoglobin today has declined slightly from prior values down to 10.4, previously in the 11-12 range.  Although this may represent an ongoing anemia secondary to chronic disease, there may be another component and we will send off iron panel, ferritin, B12, folate, ESR, CRP from today and I will notify the patient with any significant abnormal findings.  3. 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 Martell in rheumatology.    · She had been treated previously with Arava, subsequently changed to methotrexate weekly injections.    · She has low disease activity and her disease has been under good control.    · She does have associated Sjogren's syndrome with dry eyes and dry mouth and subsequent dental issues.  · She has chronic pain in her fingers with some degree of sclerodactyly and receives intermittent injections by hand surgery every 4 to 6 months.  · Suspect that patient's monoclonal gammopathy is associated with her underlying rheumatologic disorder.  · Patient reports that her arthritic symptoms did flare recently and she required a tapered dose of prednisone earlier in March 2022.  There has been discussion regarding potential addition of Orencia to her weekly methotrexate injections.  3. Lumbar spine stenosis and spondylolisthesis:  · Chronic back pain  · Patient followed by Dr. Licea in orthopedics  · Patient received a course of epidural injections  4. Microscopic colitis/ulcerative colitis:  · Patient is followed by Dr. Shook  · Previously treated with Entocort, discontinued around 2018  · Patient did follow-up with GI in January.  She underwent CT abdomen and pelvis on 1/27/2021 which was unrevealing.      · Previous pathology specimens from colonoscopy 2/26/2016 and EGD 12/10/2018 were sent for Congo red stain and were negative.  · Patient recently was placed on budesonide by GI with improvement in symptoms.   · More recently the patient has developed constipation is off of budesonide.  5. Possible peripheral neuropathy  · Patient has had some longstanding intermittent symptoms in her feet with numbness that does wax and wane.  It sounds as if her symptoms are associated with Raynaud's type changes, unclear whether this represents a  true peripheral neuropathy.  It is not a consistent finding.  Previous B12 level normal at 552 on 8/26/2020.  Unclear whether symptoms could be in part related to her underlying MGUS.  6. Peripheral vascular disease  · Patient developed acute ischemia right foot and was hospitalized 3/22-3/25/2021.  On 3/22/2021 patient underwent arthrectomy right popliteal artery.  She was subsequently placed on Xarelto 20 mg daily.  · Patient was subsequently transitioned from Xarelto to Plavix 75 mg daily by vascular surgery in December 2021.  7. COVID-19 vaccination  · Patient received booster Pfizer COVID-19 injection on 8/26/2021     Plan:     1. We will draw additional labs today with iron panel, ferritin, B12, folate, CRP, ESR.  I will notify patient of any significant abnormal findings.  2. Patient will continue routine follow-up with Dr. Rust, rheumatology, GI.  3. Return in 6 months for MD visit.  1 week prior we will draw labs with CBC, CMP, serum protein electrophoresis, immunoelectrophoresis, quantitative immunoglobulins, free serum light chains and the patient will return to 24-hour urine for protein electrophoresis, immunoelectrophoresis.     I did spend 40 minutes in total time caring for the patient today, time spent in review of records, face-to-face consultation, coordination of care, placement of orders, completion of documentation.  The patient and her son today had multiple questions which I answered to their satisfaction.     Addendum: Labs from earlier today with iron 43, ferritin 8, iron saturation 10%, TIBC 442, folate greater than 20, B12 602, CRP less than 0.3, ESR 18.  Patient with iron deficiency anemia.  She does continue routine follow-up with GI, will defer to GI regarding need for any additional endoscopic evaluation.  Significant concern regarding GI tolerance of oral iron given her underlying colitis with current constipation and previous difficulty with diarrhea.  I would recommend use of IV  iron with Injectafer 750 mg weekly x2 if approved.  We will notify patient and schedule her for IV iron.  We will add lab visit in 3 months with CBC, iron panel, ferritin and RN review.

## 2022-03-25 ENCOUNTER — APPOINTMENT (OUTPATIENT)
Dept: LAB | Facility: HOSPITAL | Age: 87
End: 2022-03-25

## 2022-03-25 ENCOUNTER — OFFICE VISIT (OUTPATIENT)
Dept: ONCOLOGY | Facility: CLINIC | Age: 87
End: 2022-03-25

## 2022-03-25 VITALS
WEIGHT: 107 LBS | SYSTOLIC BLOOD PRESSURE: 152 MMHG | TEMPERATURE: 96.6 F | DIASTOLIC BLOOD PRESSURE: 74 MMHG | BODY MASS INDEX: 21.01 KG/M2 | OXYGEN SATURATION: 97 % | HEIGHT: 60 IN | HEART RATE: 72 BPM | RESPIRATION RATE: 16 BRPM

## 2022-03-25 DIAGNOSIS — D47.2 MGUS (MONOCLONAL GAMMOPATHY OF UNKNOWN SIGNIFICANCE): Primary | ICD-10-CM

## 2022-03-25 DIAGNOSIS — D50.8 OTHER IRON DEFICIENCY ANEMIA: ICD-10-CM

## 2022-03-25 DIAGNOSIS — D64.9 NORMOCYTIC ANEMIA: ICD-10-CM

## 2022-03-25 PROBLEM — D50.9 IRON DEFICIENCY ANEMIA: Status: ACTIVE | Noted: 2022-03-25

## 2022-03-25 LAB
CRP SERPL-MCNC: <0.3 MG/DL (ref 0–0.5)
ERYTHROCYTE [SEDIMENTATION RATE] IN BLOOD: 18 MM/HR (ref 0–30)
FERRITIN SERPL-MCNC: 8 NG/ML (ref 13–150)
FOLATE SERPL-MCNC: >20 NG/ML (ref 4.78–24.2)
IRON 24H UR-MRATE: 43 MCG/DL (ref 37–145)
IRON SATN MFR SERPL: 10 % (ref 14–48)
TIBC SERPL-MCNC: 442 MCG/DL (ref 249–505)
TRANSFERRIN SERPL-MCNC: 316 MG/DL (ref 200–360)
VIT B12 BLD-MCNC: 602 PG/ML (ref 211–946)

## 2022-03-25 PROCEDURE — 36415 COLL VENOUS BLD VENIPUNCTURE: CPT | Performed by: INTERNAL MEDICINE

## 2022-03-25 PROCEDURE — 86140 C-REACTIVE PROTEIN: CPT | Performed by: INTERNAL MEDICINE

## 2022-03-25 PROCEDURE — 82728 ASSAY OF FERRITIN: CPT | Performed by: INTERNAL MEDICINE

## 2022-03-25 PROCEDURE — 83540 ASSAY OF IRON: CPT | Performed by: INTERNAL MEDICINE

## 2022-03-25 PROCEDURE — 84466 ASSAY OF TRANSFERRIN: CPT | Performed by: INTERNAL MEDICINE

## 2022-03-25 PROCEDURE — 82746 ASSAY OF FOLIC ACID SERUM: CPT | Performed by: INTERNAL MEDICINE

## 2022-03-25 PROCEDURE — 85652 RBC SED RATE AUTOMATED: CPT | Performed by: INTERNAL MEDICINE

## 2022-03-25 PROCEDURE — 99215 OFFICE O/P EST HI 40 MIN: CPT | Performed by: INTERNAL MEDICINE

## 2022-03-25 PROCEDURE — 82607 VITAMIN B-12: CPT | Performed by: INTERNAL MEDICINE

## 2022-03-28 ENCOUNTER — TELEPHONE (OUTPATIENT)
Dept: ONCOLOGY | Facility: CLINIC | Age: 87
End: 2022-03-28

## 2022-03-28 PROBLEM — T45.4X5A ADVERSE EFFECT OF IRON: Status: ACTIVE | Noted: 2022-03-28

## 2022-03-28 NOTE — TELEPHONE ENCOUNTER
Phoned patient to inform her that her labs show evidence of iron deficiency. Explained to patient that Dr. Caruso does not feel she is a candidate for oral iron due to chronic constipation with intermittent diarrhea, as well as chronic colitis, and that we are pursuing approval for IV iron. Advised patient that Dr. Caruso would like to re-check her iron studies in 3 months. Informed patient that scheduling would be getting in touch with her to set everything up. Patient v/u. Supportive plan for Injectafer entered and scheduling notified.

## 2022-04-04 ENCOUNTER — INFUSION (OUTPATIENT)
Dept: ONCOLOGY | Facility: HOSPITAL | Age: 87
End: 2022-04-04

## 2022-04-04 VITALS
OXYGEN SATURATION: 96 % | DIASTOLIC BLOOD PRESSURE: 72 MMHG | WEIGHT: 108 LBS | SYSTOLIC BLOOD PRESSURE: 149 MMHG | HEART RATE: 77 BPM | TEMPERATURE: 98.2 F | BODY MASS INDEX: 21.09 KG/M2 | RESPIRATION RATE: 16 BRPM

## 2022-04-04 DIAGNOSIS — D50.8 OTHER IRON DEFICIENCY ANEMIA: ICD-10-CM

## 2022-04-04 DIAGNOSIS — T45.4X5A ADVERSE EFFECT OF IRON, INITIAL ENCOUNTER: Primary | ICD-10-CM

## 2022-04-04 PROCEDURE — 96365 THER/PROPH/DIAG IV INF INIT: CPT

## 2022-04-04 PROCEDURE — 63710000001 PROCHLORPERAZINE MALEATE PER 10 MG: Performed by: INTERNAL MEDICINE

## 2022-04-04 PROCEDURE — 25010000002 FERRIC CARBOXYMALTOSE 750 MG/15ML SOLUTION 15 ML VIAL: Performed by: INTERNAL MEDICINE

## 2022-04-04 RX ORDER — PROCHLORPERAZINE MALEATE 10 MG
10 TABLET ORAL ONCE
Status: COMPLETED | OUTPATIENT
Start: 2022-04-04 | End: 2022-04-04

## 2022-04-04 RX ORDER — SODIUM CHLORIDE 9 MG/ML
250 INJECTION, SOLUTION INTRAVENOUS ONCE
Status: COMPLETED | OUTPATIENT
Start: 2022-04-04 | End: 2022-04-04

## 2022-04-04 RX ADMIN — SODIUM CHLORIDE 250 ML: 9 INJECTION, SOLUTION INTRAVENOUS at 12:25

## 2022-04-04 RX ADMIN — FERRIC CARBOXYMALTOSE INJECTION 727.5 MG: 50 INJECTION, SOLUTION INTRAVENOUS at 12:25

## 2022-04-04 RX ADMIN — PROCHLORPERAZINE MALEATE 10 MG: 10 TABLET ORAL at 12:20

## 2022-04-11 ENCOUNTER — INFUSION (OUTPATIENT)
Dept: ONCOLOGY | Facility: HOSPITAL | Age: 87
End: 2022-04-11

## 2022-04-11 VITALS
BODY MASS INDEX: 20.9 KG/M2 | OXYGEN SATURATION: 99 % | HEART RATE: 66 BPM | WEIGHT: 107 LBS | DIASTOLIC BLOOD PRESSURE: 68 MMHG | SYSTOLIC BLOOD PRESSURE: 159 MMHG | RESPIRATION RATE: 16 BRPM

## 2022-04-11 DIAGNOSIS — D50.8 OTHER IRON DEFICIENCY ANEMIA: ICD-10-CM

## 2022-04-11 DIAGNOSIS — T45.4X5A ADVERSE EFFECT OF IRON, INITIAL ENCOUNTER: Primary | ICD-10-CM

## 2022-04-11 PROCEDURE — 25010000002 FERRIC CARBOXYMALTOSE 750 MG/15ML SOLUTION 15 ML VIAL: Performed by: INTERNAL MEDICINE

## 2022-04-11 PROCEDURE — 63710000001 PROCHLORPERAZINE MALEATE PER 10 MG: Performed by: INTERNAL MEDICINE

## 2022-04-11 PROCEDURE — 96374 THER/PROPH/DIAG INJ IV PUSH: CPT

## 2022-04-11 PROCEDURE — 96365 THER/PROPH/DIAG IV INF INIT: CPT

## 2022-04-11 RX ORDER — PROCHLORPERAZINE MALEATE 10 MG
10 TABLET ORAL ONCE
Status: COMPLETED | OUTPATIENT
Start: 2022-04-11 | End: 2022-04-11

## 2022-04-11 RX ORDER — SODIUM CHLORIDE 9 MG/ML
250 INJECTION, SOLUTION INTRAVENOUS ONCE
Status: COMPLETED | OUTPATIENT
Start: 2022-04-11 | End: 2022-04-11

## 2022-04-11 RX ADMIN — PROCHLORPERAZINE MALEATE 10 MG: 10 TABLET ORAL at 12:01

## 2022-04-11 RX ADMIN — SODIUM CHLORIDE 250 ML: 9 INJECTION, SOLUTION INTRAVENOUS at 12:17

## 2022-04-11 RX ADMIN — FERRIC CARBOXYMALTOSE INJECTION 750 MG: 50 INJECTION, SOLUTION INTRAVENOUS at 12:18

## 2022-05-10 ENCOUNTER — CLINICAL SUPPORT (OUTPATIENT)
Dept: ORTHOPEDIC SURGERY | Facility: CLINIC | Age: 87
End: 2022-05-10

## 2022-05-10 VITALS — HEIGHT: 60 IN | WEIGHT: 107 LBS | BODY MASS INDEX: 21.01 KG/M2 | TEMPERATURE: 97.5 F

## 2022-05-10 DIAGNOSIS — M70.51 PES ANSERINUS BURSITIS OF RIGHT KNEE: Primary | ICD-10-CM

## 2022-05-10 PROCEDURE — 20610 DRAIN/INJ JOINT/BURSA W/O US: CPT | Performed by: NURSE PRACTITIONER

## 2022-05-10 RX ORDER — METHYLPREDNISOLONE ACETATE 80 MG/ML
80 INJECTION, SUSPENSION INTRA-ARTICULAR; INTRALESIONAL; INTRAMUSCULAR; SOFT TISSUE
Status: COMPLETED | OUTPATIENT
Start: 2022-05-10 | End: 2022-05-10

## 2022-05-10 RX ADMIN — METHYLPREDNISOLONE ACETATE 80 MG: 80 INJECTION, SUSPENSION INTRA-ARTICULAR; INTRALESIONAL; INTRAMUSCULAR; SOFT TISSUE at 13:20

## 2022-05-10 NOTE — PROGRESS NOTES
5/10/2022     Patient is here today for right knee Pez anserine bursa injection.  Patient has had injection previously has done well would like another injection today    Large Joint Arthrocentesis: R knee  Date/Time: 5/10/2022 1:20 PM  Consent given by: patient  Site marked: site marked  Timeout: Immediately prior to procedure a time out was called to verify the correct patient, procedure, equipment, support staff and site/side marked as required   Supporting Documentation  Indications: pain and joint swelling   Procedure Details  Location: knee - R knee  Preparation: Patient was prepped and draped in the usual sterile fashion  Needle size: 18 G  Approach: medial  Medications administered: 80 mg methylPREDNISolone acetate 80 MG/ML        Patient will follow-up with us as needed    Kadie Sandoval, APRN  5/10/2022

## 2022-06-17 ENCOUNTER — CLINICAL SUPPORT (OUTPATIENT)
Dept: ONCOLOGY | Facility: HOSPITAL | Age: 87
End: 2022-06-17

## 2022-06-17 ENCOUNTER — LAB (OUTPATIENT)
Dept: LAB | Facility: HOSPITAL | Age: 87
End: 2022-06-17

## 2022-06-17 DIAGNOSIS — D50.8 OTHER IRON DEFICIENCY ANEMIA: ICD-10-CM

## 2022-06-17 LAB
BASOPHILS # BLD AUTO: 0.06 10*3/MM3 (ref 0–0.2)
BASOPHILS NFR BLD AUTO: 0.8 % (ref 0–1.5)
DEPRECATED RDW RBC AUTO: 67.5 FL (ref 37–54)
EOSINOPHIL # BLD AUTO: 0.14 10*3/MM3 (ref 0–0.4)
EOSINOPHIL NFR BLD AUTO: 1.8 % (ref 0.3–6.2)
ERYTHROCYTE [DISTWIDTH] IN BLOOD BY AUTOMATED COUNT: 19.9 % (ref 12.3–15.4)
FERRITIN SERPL-MCNC: 591.5 NG/ML (ref 13–150)
HCT VFR BLD AUTO: 40.7 % (ref 34–46.6)
HGB BLD-MCNC: 13.1 G/DL (ref 12–15.9)
IMM GRANULOCYTES # BLD AUTO: 0.03 10*3/MM3 (ref 0–0.05)
IMM GRANULOCYTES NFR BLD AUTO: 0.4 % (ref 0–0.5)
IRON 24H UR-MRATE: 92 MCG/DL (ref 37–145)
IRON SATN MFR SERPL: 35 % (ref 14–48)
LYMPHOCYTES # BLD AUTO: 1.46 10*3/MM3 (ref 0.7–3.1)
LYMPHOCYTES NFR BLD AUTO: 18.3 % (ref 19.6–45.3)
MCH RBC QN AUTO: 30.8 PG (ref 26.6–33)
MCHC RBC AUTO-ENTMCNC: 32.2 G/DL (ref 31.5–35.7)
MCV RBC AUTO: 95.8 FL (ref 79–97)
MONOCYTES # BLD AUTO: 0.67 10*3/MM3 (ref 0.1–0.9)
MONOCYTES NFR BLD AUTO: 8.4 % (ref 5–12)
NEUTROPHILS NFR BLD AUTO: 5.6 10*3/MM3 (ref 1.7–7)
NEUTROPHILS NFR BLD AUTO: 70.3 % (ref 42.7–76)
NRBC BLD AUTO-RTO: 0 /100 WBC (ref 0–0.2)
PLATELET # BLD AUTO: 235 10*3/MM3 (ref 140–450)
PMV BLD AUTO: 9.4 FL (ref 6–12)
RBC # BLD AUTO: 4.25 10*6/MM3 (ref 3.77–5.28)
TIBC SERPL-MCNC: 266 MCG/DL (ref 249–505)
TRANSFERRIN SERPL-MCNC: 190 MG/DL (ref 200–360)
WBC NRBC COR # BLD: 7.96 10*3/MM3 (ref 3.4–10.8)

## 2022-06-17 PROCEDURE — 36415 COLL VENOUS BLD VENIPUNCTURE: CPT

## 2022-06-17 PROCEDURE — 85025 COMPLETE CBC W/AUTO DIFF WBC: CPT

## 2022-06-17 PROCEDURE — 84466 ASSAY OF TRANSFERRIN: CPT

## 2022-06-17 PROCEDURE — G0463 HOSPITAL OUTPT CLINIC VISIT: HCPCS

## 2022-06-17 PROCEDURE — 82728 ASSAY OF FERRITIN: CPT

## 2022-06-17 PROCEDURE — 83540 ASSAY OF IRON: CPT

## 2022-06-17 NOTE — NURSING NOTE
Lab Results   Component Value Date    WBC 7.96 06/17/2022    HGB 13.1 06/17/2022    HCT 40.7 06/17/2022    MCV 95.8 06/17/2022     06/17/2022     Pt is here for lab with RN review.  CBC reviewed with pt, counts are stable for this pt at this time. Pt reports low energy and states that she gets tired, but attributes some of this to aging. Denies shortness of air. She states that she fell at home and hit her  tailbone.  Copy of CBC results given to pt and f/u appt reviewed. Pt is instructed to call the office with any concerns or new symptoms prior to next visit. Pt vu and discharge in stable condition.  Ferritin and iron profile results not available while patient here. She was called to notify her that results were normal.

## 2022-07-11 ENCOUNTER — OFFICE VISIT (OUTPATIENT)
Dept: GASTROENTEROLOGY | Facility: CLINIC | Age: 87
End: 2022-07-11

## 2022-07-11 VITALS
HEART RATE: 71 BPM | WEIGHT: 104.6 LBS | TEMPERATURE: 96.2 F | BODY MASS INDEX: 20.54 KG/M2 | DIASTOLIC BLOOD PRESSURE: 74 MMHG | SYSTOLIC BLOOD PRESSURE: 161 MMHG | HEIGHT: 60 IN

## 2022-07-11 DIAGNOSIS — Z86.010 HISTORY OF COLON POLYPS: Primary | ICD-10-CM

## 2022-07-11 DIAGNOSIS — K59.00 CONSTIPATION, UNSPECIFIED CONSTIPATION TYPE: ICD-10-CM

## 2022-07-11 DIAGNOSIS — K21.9 GASTROESOPHAGEAL REFLUX DISEASE WITHOUT ESOPHAGITIS: ICD-10-CM

## 2022-07-11 DIAGNOSIS — K52.839 MICROSCOPIC COLITIS, UNSPECIFIED MICROSCOPIC COLITIS TYPE: ICD-10-CM

## 2022-07-11 DIAGNOSIS — R63.4 WEIGHT LOSS: ICD-10-CM

## 2022-07-11 PROCEDURE — 99214 OFFICE O/P EST MOD 30 MIN: CPT | Performed by: INTERNAL MEDICINE

## 2022-07-11 RX ORDER — AMLODIPINE BESYLATE 2.5 MG/1
TABLET ORAL
COMMUNITY
Start: 2022-06-20

## 2022-07-11 NOTE — PROGRESS NOTES
Chief Complaint   Patient presents with   • Diarrhea   • Heartburn       History of Present Illness:   86 y.o. female        Last EGD was on 12/18.  Last colonoscopy was on 2/16.  She does have a history of colon polyps.        I last saw her in 1/22:  Assessment:  1. On Plavix for arterial blood clot in 3/21.  2. H/o microscopic colitis - on Budesonide 3 mg one/day.  3. Constipation with hard stool.      Recommendations:  1. Decrease the Budesonide 3 mg one every other day.   2. Take fiber daily and drink more water.   3. We discussed possibly doing a barium enema?  4. If she were to have a colonoscopy then she would need to be off of Plavix for 5 days prior to the colonoscopy. She prefers no colonoscopy now.   5. F/u 6 mos. We can talk again about colonoscopy then.   6. I don't think that [facility] will pay for ColoGuard after age 85 yrs?       Saturday she had bad diarrhea and soft stool since then. Several BM's yesterday and again today. Yesterday had a BM every 2 hrs. 2 BM today, small ones, soft. She usually goes from constipation to diarrhea. Takes Miralax when constipated, took it 3 days in a row last week.        Indigestion for which she takes Hiwot Selzer. On Omeprazole 20 mg TIW. NO rectal bleeding. Sometimes stools dark. She has not been taking the Budesonide.        Occas glass of wine. No recent antibiotics.             Past Medical History:   Diagnosis Date   • Atheroembolism of right lower extremity (HCC)    • Baker's cyst    • Colon polyp    • CREST syndrome (HCC)    • Fractures    • GERD (gastroesophageal reflux disease)    • History of CT scan of abdomen 03/11/2011    constipation, sig tics, 6 mm hepatic cyst   • History of rheumatoid arthritis    • Hyperlipidemia    • Hypertension    • Irritable bowel syndrome 1990s   • Low back pain    • Normocytic anemia 08/26/2020   • Osteoarthritis    • Raynaud's disease    • Rheumatoid arthritis (HCC)    • Scleroderma (HCC)    • Sjogren's syndrome (HCC)    •  Ulcerative colitis (HCC)        Past Surgical History:   Procedure Laterality Date   • CATARACT EXTRACTION, BILATERAL     • COLONOSCOPY  02/26/2016    tics, microscopic colitis,    • COLONOSCOPY  07/01/2011    sig tics, inflammation, polyp   • DILATATION AND CURETTAGE  1980   • EKOS CATHETER PLACEMENT N/A 3/23/2021    Procedure: AIF WITH RUN OFF OF RT. LEG, ROTATIONAL ATHERECTOMY OF RIGHT POPLITEAL ARTERY;  Surgeon: Gato Contreras MD;  Location: [facility] OR 18/19;  Service: Vascular;  Laterality: N/A;   • ENDOSCOPY N/A 12/3/2018    erythematous mucosa in stomach, path benign   • EYE SURGERY     • FLEXIBLE SIGMOIDOSCOPY     • FRACTURE SURGERY     • HAND SURGERY     • JOINT REPLACEMENT     • KNEE ARTHROSCOPY Right     w/meniscal repair   • KNEE SURGERY Right    • TEETH EXTRACTION     • TOTAL KNEE ARTHROPLASTY Right 5/30/2018    Procedure: TOTAL KNEE ARTHROPLASTY;  Surgeon: Georges Jon MD;  Location: [facility] OR;  Service: Orthopedics   • TRANSLUMINAL ATHERECTOMY POPLITEAL ARTERY     • UPPER GASTROINTESTINAL ENDOSCOPY  03/14/2008    erythema   • WRIST FRACTURE SURGERY Right    • WRIST SURGERY Right 2008    orif         Current Outpatient Medications:   •  Acetaminophen (TYLENOL EXTRA STRENGTH PO), Take 2 tablets by mouth Daily As Needed., Disp: , Rfl:   •  amLODIPine (NORVASC) 2.5 MG tablet, , Disp: , Rfl:   •  atorvastatin (LIPITOR) 20 MG tablet, , Disp: , Rfl:   •  Biotin 5000 MCG tablet, Take  by mouth Daily., Disp: , Rfl:   •  Budesonide (ENTOCORT EC) 3 MG 24 hr capsule, 3 mg As Needed., Disp: , Rfl:   •  calcium carbonate (OS-DWIGHT) 600 MG tablet, 1 P.O. daily, Disp: , Rfl:   •  Carboxymethylcell-Hypromellose (GENTEAL OP), Apply 1 application to eye As Needed., Disp: , Rfl:   •  Cholecalciferol (VITAMIN D PO), Take 2,000 mg by mouth every night at bedtime., Disp: , Rfl:   •  clopidogrel (PLAVIX) 75 MG tablet, , Disp: , Rfl:   •  escitalopram (LEXAPRO) 20 MG tablet, , Disp: , Rfl:   •  fluticasone  (FLONASE) 50 MCG/ACT nasal spray, 1-2 sprays into the nostril(s) as directed by provider., Disp: , Rfl:   •  folic acid (FOLVITE) 1 MG tablet, Take 2 mg by mouth Daily., Disp: , Rfl:   •  Methotrexate Sodium (METHOTREXATE PF) 50 MG/2ML chemo syringe, Infuse  into a venous catheter 1 (One) Time., Disp: , Rfl:   •  Multiple Vitamins-Minerals (CENTRUM SILVER ULTRA WOMENS PO), 1 P.O. daily, Disp: , Rfl:   •  omeprazole (priLOSEC) 20 MG capsule, 20 mg. Monday Wednesday friday, Disp: , Rfl:   •  polyethyl glycol-propyl glycol (Systane) 0.4-0.3 % solution ophthalmic solution (artificial tears), , Disp: , Rfl:   •  traZODone (DESYREL) 50 MG tablet, Take 25-50 mg by mouth Every Night., Disp: , Rfl:   •  mupirocin (BACTROBAN) 2 % ointment, As Needed., Disp: , Rfl:     Allergies   Allergen Reactions   • Codeine Diarrhea and Nausea Only   • Penicillin G Rash   • Sulfa Antibiotics Hives       Family History   Problem Relation Age of Onset   • Ulcerative colitis Mother    • Migraines Mother    • Stroke Mother    • Hypertension Mother    • Thyroid disease Mother    • Skin cancer Mother    • Breast cancer Maternal Aunt    • Arthritis Father    • Heart attack Father    • Migraines Sister    • Skin cancer Sister    • Migraines Daughter    • Skin cancer Brother    • Malig Hyperthermia Neg Hx        Social History     Socioeconomic History   • Marital status: Single   • Number of children: 3   Tobacco Use   • Smoking status: Never Smoker   • Smokeless tobacco: Never Used   Vaping Use   • Vaping Use: Never used   Substance and Sexual Activity   • Alcohol use: Yes     Alcohol/week: 1.0 standard drink     Types: 1 Glasses of wine per week     Comment: Infrequent   • Drug use: No   • Sexual activity: Never       Review of Systems   Gastrointestinal: Negative for abdominal pain.   All other systems reviewed and are negative.    Pertinent positives and negatives documented in the HPI and all other systems reviewed and were found to be  negative.  Vitals:    07/11/22 0850   BP: 161/74   Pulse: 71   Temp: 96.2 °F (35.7 °C)       Physical Exam  Vitals reviewed.   Constitutional:       General: She is not in acute distress.     Appearance: Normal appearance. She is well-developed. She is not diaphoretic.   HENT:      Head: Normocephalic and atraumatic. Hair is normal.      Right Ear: Hearing, tympanic membrane, ear canal and external ear normal. No decreased hearing noted. No drainage.      Left Ear: Hearing, tympanic membrane, ear canal and external ear normal. No decreased hearing noted.      Nose: Nose normal. No nasal deformity.      Mouth/Throat:      Mouth: Mucous membranes are moist.   Eyes:      General: Lids are normal.         Right eye: No discharge.         Left eye: No discharge.      Extraocular Movements: Extraocular movements intact.      Conjunctiva/sclera: Conjunctivae normal.      Pupils: Pupils are equal, round, and reactive to light.   Neck:      Thyroid: No thyromegaly.      Vascular: No JVD.      Trachea: No tracheal deviation.   Cardiovascular:      Rate and Rhythm: Normal rate and regular rhythm.      Pulses: Normal pulses.      Heart sounds: Normal heart sounds. No murmur heard.    No friction rub. No gallop.   Pulmonary:      Effort: Pulmonary effort is normal. No respiratory distress.      Breath sounds: Normal breath sounds. No wheezing or rales.   Chest:      Chest wall: No tenderness.   Abdominal:      General: Bowel sounds are normal. There is no distension.      Palpations: Abdomen is soft. There is no mass.      Tenderness: There is no abdominal tenderness. There is no guarding or rebound.      Hernia: No hernia is present.   Genitourinary:     Rectum: Normal. Guaiac result negative.   Musculoskeletal:         General: No tenderness or deformity. Normal range of motion.      Cervical back: Normal range of motion and neck supple.   Lymphadenopathy:      Cervical: No cervical adenopathy.   Skin:     General: Skin is  warm and dry.      Findings: No erythema or rash.   Neurological:      Mental Status: She is alert and oriented to person, place, and time.      Cranial Nerves: No cranial nerve deficit.      Motor: No abnormal muscle tone.      Coordination: Coordination normal.      Deep Tendon Reflexes: Reflexes are normal and symmetric. Reflexes normal.   Psychiatric:         Mood and Affect: Mood normal.         Behavior: Behavior normal.         Thought Content: Thought content normal.         Judgment: Judgment normal.         Diagnoses and all orders for this visit:    1. History of colon polyps (Primary)    2. Microscopic colitis, unspecified microscopic colitis type    3. Constipation, unspecified constipation type    4. Gastroesophageal reflux disease without esophagitis    5. Weight loss  -     CT Abdomen Pelvis With Contrast; Future      Assessment:  1. On Plavix for arterial blood clot in 3/21.  2. H/o microscopic colitis - on Budesonide 3 mg one/day periodically.  3. H/o iron def anemia. She has recently received 2 iron infusions by Dr. Caruso.   4. Weight loss.     Recommendations:  1. We discussed the pros and cons of doing a colonoscopy in an 87 yo. She prefers no colonoscopy.  2. Stay off of aspirin, ibuprofen, Aleve, etc.  3. Take Budesonide 3 mg one daily.  4. Continue Omeprazole 20 mg  5. F/u 6 weeks.   6. CT abd/pelvis with pancreatic protocol     Return in about 6 weeks (around 8/22/2022).    Edilberto Shook MD  7/11/2022

## 2022-07-13 ENCOUNTER — TRANSCRIBE ORDERS (OUTPATIENT)
Dept: ADMINISTRATIVE | Facility: HOSPITAL | Age: 87
End: 2022-07-13

## 2022-07-13 DIAGNOSIS — Z12.31 VISIT FOR SCREENING MAMMOGRAM: Primary | ICD-10-CM

## 2022-07-14 ENCOUNTER — TELEPHONE (OUTPATIENT)
Dept: GASTROENTEROLOGY | Facility: CLINIC | Age: 87
End: 2022-07-14

## 2022-07-14 NOTE — TELEPHONE ENCOUNTER
Patient called and stated her lab work from rheumatology has come back and she wanted to let Dr. Shook know. Please call back at [phone] if any questions.

## 2022-08-05 DIAGNOSIS — K52.839 MICROSCOPIC COLITIS, UNSPECIFIED MICROSCOPIC COLITIS TYPE: Primary | ICD-10-CM

## 2022-08-09 RX ORDER — BUDESONIDE 3 MG/1
CAPSULE, COATED PELLETS ORAL
Qty: 90 CAPSULE | Refills: 3 | Status: SHIPPED | OUTPATIENT
Start: 2022-08-09 | End: 2022-09-08

## 2022-08-15 ENCOUNTER — HOSPITAL ENCOUNTER (OUTPATIENT)
Dept: CT IMAGING | Facility: HOSPITAL | Age: 87
Discharge: HOME | End: 2022-08-15
Admitting: INTERNAL MEDICINE

## 2022-08-15 DIAGNOSIS — R63.4 WEIGHT LOSS: ICD-10-CM

## 2022-08-15 PROCEDURE — 74177 CT ABD & PELVIS W/CONTRAST: CPT

## 2022-08-15 PROCEDURE — 25010000002 IOPAMIDOL 61 % SOLUTION: Performed by: INTERNAL MEDICINE

## 2022-08-15 PROCEDURE — 82565 ASSAY OF CREATININE: CPT

## 2022-08-15 PROCEDURE — 0 DIATRIZOATE MEGLUMINE & SODIUM PER 1 ML: Performed by: INTERNAL MEDICINE

## 2022-08-15 RX ADMIN — DIATRIZOATE MEGLUMINE AND DIATRIZOATE SODIUM 30 ML: 660; 100 LIQUID ORAL; RECTAL at 11:09

## 2022-08-15 RX ADMIN — IOPAMIDOL 85 ML: 612 INJECTION, SOLUTION INTRAVENOUS at 11:09

## 2022-08-17 ENCOUNTER — TELEPHONE (OUTPATIENT)
Dept: GASTROENTEROLOGY | Facility: CLINIC | Age: 87
End: 2022-08-17

## 2022-08-17 DIAGNOSIS — R93.5 ABNORMAL CT OF THE ABDOMEN: Primary | ICD-10-CM

## 2022-08-17 DIAGNOSIS — R19.7 DIARRHEA, UNSPECIFIED TYPE: ICD-10-CM

## 2022-08-17 DIAGNOSIS — K52.839 MICROSCOPIC COLITIS, UNSPECIFIED MICROSCOPIC COLITIS TYPE: ICD-10-CM

## 2022-08-17 DIAGNOSIS — R63.4 WEIGHT LOSS: ICD-10-CM

## 2022-08-17 NOTE — TELEPHONE ENCOUNTER
"See budesonide rx of 8/9/22 with receipt confirmed.  Message to MA\"s for PA.     Message to Dr Shook requesting ct results.   "

## 2022-08-17 NOTE — PROGRESS NOTES
08/17/22       I called her with the results of this CAT scan of the abdomen and pelvis.  I am going to schedule her for a CT of the abdomen and pelvis with small bowel enterography to evaluate the possible distal small bowel thickening.       please send a copy of this report to her PCP.  Dolly osman

## 2022-08-17 NOTE — TELEPHONE ENCOUNTER
Caller: Arlin Hutson    Relationship to patient: Self    Best call back number: [phone]    Patient is needing: PHARMACY NEEDS TO HEAR FROM THE DOCTOR BEFORE CAN FILL PRESCRIPTION BUDESONIDE     PT HAD CAT SCAN W/CONTRAST ON 8/15 - WANT TO KNOW OF RESULTS; PATIENT CURRENTLY HAVING BAD DIARRHEA .    REQUESTING TO HEAR BEFORE HER APPOINTMENT ON 8/22//22.    PHARMACY - [facility] [phone]

## 2022-08-22 ENCOUNTER — TELEPHONE (OUTPATIENT)
Dept: GASTROENTEROLOGY | Facility: CLINIC | Age: 87
End: 2022-08-22

## 2022-08-22 NOTE — TELEPHONE ENCOUNTER
Encounter sent to Dr. Shook to document why the second CT scan is needed. I do not see a results message in the chart that typically has the doctors results and recommendations. I think that Dr. Shook was going to go over the results and recommendations to have another scan while the patient was in the office today.     I will call the patient to discuss once I hear back from Dr. Shook.     Bouchra Boss,     Can you please elaborate as to why the patient needs to have another CT scan completed and what we will be looking for on the new scan?     Thank you

## 2022-08-22 NOTE — TELEPHONE ENCOUNTER
Caller: Mumtaz Hutson    Relationship: Emergency Contact    Best call back number: [phone]    What is the best time to reach you: ANYTIME    Who are you requesting to speak with (clinical staff, provider,  specific staff member): REBEL PINON    What was the call regarding: PTS SON CALLED, PT WAS IN OFFICE TODAY. 10:45 APPT WAS NEVER SEEN WAITED WELL OVER AN HOUR. PT CURIOUS AS TO WHY A 2ND CAT SCAN IS NEEDED. PTS SON WANTS TO KNOW WHAT EXPECTATIONS ARE GOING FORWARD CONCERNING APPTS SO THAT HE CAN PLAN TO BE OFF WORK LONGER AND PT WON'T HAVE TO SIT SO LONG    Do you require a callback: YES

## 2022-08-23 NOTE — TELEPHONE ENCOUNTER
"The reason for the \"other\" CT abdomen is to see the small bowel more clearly. The CT abd/pelvis done earlier this month had a report that said:  IMPRESSION:  1. Normal CT appearance of the pancreas.  2. Diffuse pelvic floor weakening with cystocele.  3. Colonic diverticulosis. There is suggestion of a long segment distal  small bowel wall thickening, I suspect this may be related to  peristalsis, however follow-up with CT enterography protocol is  recommended to rule out a true wall thickening.        Because of the \"long segment of distal small bowel wall thickening\" I wanted to look at the small bowel more carefully so I ordered a \"CT abdomen with small bowel enterography\" which is different than the CT abdomen that was done earlier. This second CT scan was recommended by the Radiologist as a way to see the small bowel more clearly.  If the son would prefer that we not do this then cancel that CT abd/pelvis with small bowel enterography and have the patient and any family that wants to to come see me in the office and we can discuss what to do next, if anything? Thx. kjh  "

## 2022-08-24 ENCOUNTER — TELEPHONE (OUTPATIENT)
Dept: GASTROENTEROLOGY | Facility: CLINIC | Age: 87
End: 2022-08-24

## 2022-08-24 NOTE — TELEPHONE ENCOUNTER
----- Message from Edilberto Shook MD sent at 8/17/2022  6:32 PM EDT -----  08/17/22       I called her with the results of this CAT scan of the abdomen and pelvis.  I am going to schedule her for a CT of the abdomen and pelvis with small bowel enterography to evaluate the possible distal small bowel thickening.       please send a copy of this report to her PCP.  Nicolex. kjh

## 2022-08-26 NOTE — TELEPHONE ENCOUNTER
Please see the long note below.   Patient's son is requesting a video visit for the follow up after the CT enterography or a 7:30am appointment if possible.     Please advise. Thank you

## 2022-08-26 NOTE — TELEPHONE ENCOUNTER
Called and spoke with patients son and we discussed the results. Patient stated that he already had the results and that his wife read them before us discussing today and he did not have any questions about the results. Patient and family are going to move forward with the other CT that Dr. Shook is requesting to be done. Patient stated that he just really wanted to speak with management to discuss the wait times to be seen in the office.     Mumtaz stated that his mother confirmed that every time she comes to the office she has to wait a little while before she is room and sometimes 45 mins while in the room. Patients son was concerned about his mother having to wait so long in the waiting room and exam room for the provider because she is elderly. Mumtaz stated that his mother told him that she loves Dr. Shook but the wait is just long.     I advised patient that Dr. Shook does typically have a wait because he provides complete care to each patient that he is seeing in office. I advised patient that the best time for appointments is early in the morning or right at 1pm if they are wanting to have shorter wait times. I also advised that they have the option to see Ophelia BEARD.    Mumtaz is asking if Mychart video visits are an option to follow up after this second CT scan. I advised patient that our office has tried to stop doing visits through Telehealth due to insurance not paying for the visits.   I advised patient that I would send the request to Dr. Shook to see if he is willing to do a telehealth visit or work patient in at like    7:30 am so that the son can get his mother seen and not have to miss a lot of time at work   Patient's son Blaise is aware that if Dr. Shook agrees to a video visit that insurance may not pay for the visit and his mother would have to pay out of pocket. Mumtaz states that he will have his mother call her insurance to check in the mean time to see if the visit will be covered in case   Bouchra approves.

## 2022-08-30 NOTE — TELEPHONE ENCOUNTER
Called and spoke with Mumtaz and informed him of the options below for Dr. Shook and following up. Mumtaz stated that a 7:30 am in office appointment would be perfect.   Patient is scheduled to have her imaging done on 09/07/2022. Dr. Shook is out on vacation the week of 09/12/2022 through 09/16/2022.   Mumtaz stated that he is working at home the week of 09/19/22 through 09/21/2022.     I advised Mumtaz that the two dates we are looking at for a 7:30 am appointment would be on either September 19th or 21st but I need to check with the provider and  to make sure that he does not have any early morning meetings scheduled those days before we schedule the patient.     Mumtaz expressed understanding to everything discussed and thanked me for my time.

## 2022-08-31 NOTE — TELEPHONE ENCOUNTER
Patient scheduled for September 19th at 7:30 am.   Okay per Dr. Shook since he has 8 am clinic that day and no early morning meetings that morning.    Called and spoke with Mumtaz and confirmed appointment date and time for Monday September 19, 2022 at 7:30 am. Mumtaz expressed understanding and confirmed appointment.

## 2022-09-02 ENCOUNTER — LAB (OUTPATIENT)
Dept: LAB | Facility: HOSPITAL | Age: 87
End: 2022-09-02

## 2022-09-02 DIAGNOSIS — D50.8 OTHER IRON DEFICIENCY ANEMIA: ICD-10-CM

## 2022-09-02 DIAGNOSIS — D64.9 NORMOCYTIC ANEMIA: ICD-10-CM

## 2022-09-02 DIAGNOSIS — D47.2 MGUS (MONOCLONAL GAMMOPATHY OF UNKNOWN SIGNIFICANCE): ICD-10-CM

## 2022-09-02 LAB
ALBUMIN SERPL-MCNC: 4.3 G/DL (ref 3.5–5.2)
ALBUMIN/GLOB SERPL: 1.7 G/DL (ref 1.1–2.4)
ALP SERPL-CCNC: 115 U/L (ref 38–116)
ALT SERPL W P-5'-P-CCNC: 29 U/L (ref 0–33)
ANION GAP SERPL CALCULATED.3IONS-SCNC: 11.9 MMOL/L (ref 5–15)
AST SERPL-CCNC: 20 U/L (ref 0–32)
BASOPHILS # BLD AUTO: 0.06 10*3/MM3 (ref 0–0.2)
BASOPHILS NFR BLD AUTO: 0.9 % (ref 0–1.5)
BILIRUB SERPL-MCNC: 0.2 MG/DL (ref 0.2–1.2)
BUN SERPL-MCNC: 16 MG/DL (ref 6–20)
BUN/CREAT SERPL: 27.1 (ref 7.3–30)
CALCIUM SPEC-SCNC: 8.9 MG/DL (ref 8.5–10.2)
CHLORIDE SERPL-SCNC: 101 MMOL/L (ref 98–107)
CO2 SERPL-SCNC: 23.1 MMOL/L (ref 22–29)
CREAT SERPL-MCNC: 0.59 MG/DL (ref 0.6–1.1)
DEPRECATED RDW RBC AUTO: 48.8 FL (ref 37–54)
EGFRCR SERPLBLD CKD-EPI 2021: 87.9 ML/MIN/1.73
EOSINOPHIL # BLD AUTO: 0.18 10*3/MM3 (ref 0–0.4)
EOSINOPHIL NFR BLD AUTO: 2.8 % (ref 0.3–6.2)
ERYTHROCYTE [DISTWIDTH] IN BLOOD BY AUTOMATED COUNT: 13.2 % (ref 12.3–15.4)
FERRITIN SERPL-MCNC: 362.5 NG/ML (ref 13–150)
GLOBULIN UR ELPH-MCNC: 2.6 GM/DL (ref 1.8–3.5)
GLUCOSE SERPL-MCNC: 108 MG/DL (ref 74–124)
HCT VFR BLD AUTO: 36.2 % (ref 34–46.6)
HGB BLD-MCNC: 11.8 G/DL (ref 12–15.9)
IMM GRANULOCYTES # BLD AUTO: 0.04 10*3/MM3 (ref 0–0.05)
IMM GRANULOCYTES NFR BLD AUTO: 0.6 % (ref 0–0.5)
IRON 24H UR-MRATE: 83 MCG/DL (ref 37–145)
IRON SATN MFR SERPL: 32 % (ref 14–48)
LYMPHOCYTES # BLD AUTO: 1.16 10*3/MM3 (ref 0.7–3.1)
LYMPHOCYTES NFR BLD AUTO: 18.1 % (ref 19.6–45.3)
MCH RBC QN AUTO: 32.8 PG (ref 26.6–33)
MCHC RBC AUTO-ENTMCNC: 32.6 G/DL (ref 31.5–35.7)
MCV RBC AUTO: 100.6 FL (ref 79–97)
MONOCYTES # BLD AUTO: 0.49 10*3/MM3 (ref 0.1–0.9)
MONOCYTES NFR BLD AUTO: 7.6 % (ref 5–12)
NEUTROPHILS NFR BLD AUTO: 4.48 10*3/MM3 (ref 1.7–7)
NEUTROPHILS NFR BLD AUTO: 70 % (ref 42.7–76)
NRBC BLD AUTO-RTO: 0 /100 WBC (ref 0–0.2)
PLATELET # BLD AUTO: 194 10*3/MM3 (ref 140–450)
PMV BLD AUTO: 9.8 FL (ref 6–12)
POTASSIUM SERPL-SCNC: 4.1 MMOL/L (ref 3.5–4.7)
PROT SERPL-MCNC: 6.9 G/DL (ref 6.3–8)
RBC # BLD AUTO: 3.6 10*6/MM3 (ref 3.77–5.28)
SODIUM SERPL-SCNC: 136 MMOL/L (ref 134–145)
TIBC SERPL-MCNC: 263 MCG/DL (ref 249–505)
TRANSFERRIN SERPL-MCNC: 188 MG/DL (ref 200–360)
WBC NRBC COR # BLD: 6.41 10*3/MM3 (ref 3.4–10.8)

## 2022-09-02 PROCEDURE — 80053 COMPREHEN METABOLIC PANEL: CPT

## 2022-09-02 PROCEDURE — 36415 COLL VENOUS BLD VENIPUNCTURE: CPT

## 2022-09-02 PROCEDURE — 84466 ASSAY OF TRANSFERRIN: CPT

## 2022-09-02 PROCEDURE — 85025 COMPLETE CBC W/AUTO DIFF WBC: CPT

## 2022-09-02 PROCEDURE — 82728 ASSAY OF FERRITIN: CPT

## 2022-09-02 PROCEDURE — 83540 ASSAY OF IRON: CPT

## 2022-09-06 LAB
ALBUMIN SERPL ELPH-MCNC: 3.9 G/DL (ref 2.9–4.4)
ALBUMIN/GLOB SERPL: 1.5 {RATIO} (ref 0.7–1.7)
ALPHA1 GLOB SERPL ELPH-MCNC: 0.3 G/DL (ref 0–0.4)
ALPHA2 GLOB SERPL ELPH-MCNC: 0.7 G/DL (ref 0.4–1)
B-GLOBULIN SERPL ELPH-MCNC: 1.2 G/DL (ref 0.7–1.3)
GAMMA GLOB SERPL ELPH-MCNC: 0.5 G/DL (ref 0.4–1.8)
GLOBULIN SER-MCNC: 2.7 G/DL (ref 2.2–3.9)
IGA SERPL-MCNC: 583 MG/DL (ref 64–422)
IGG SERPL-MCNC: 602 MG/DL (ref 586–1602)
IGM SERPL-MCNC: 103 MG/DL (ref 26–217)
INTERPRETATION SERPL IEP-IMP: ABNORMAL
KAPPA LC FREE SER-MCNC: 71.6 MG/L (ref 3.3–19.4)
KAPPA LC FREE/LAMBDA FREE SER: 5.55 {RATIO} (ref 0.26–1.65)
LABORATORY COMMENT REPORT: ABNORMAL
LAMBDA LC FREE SERPL-MCNC: 12.9 MG/L (ref 5.7–26.3)
M PROTEIN SERPL ELPH-MCNC: 0.5 G/DL
PROT SERPL-MCNC: 6.6 G/DL (ref 6–8.5)

## 2022-09-07 ENCOUNTER — HOSPITAL ENCOUNTER (OUTPATIENT)
Dept: CT IMAGING | Facility: HOSPITAL | Age: 87
Discharge: HOME | End: 2022-09-07
Admitting: INTERNAL MEDICINE

## 2022-09-07 DIAGNOSIS — R93.5 ABNORMAL CT OF THE ABDOMEN: ICD-10-CM

## 2022-09-07 DIAGNOSIS — R63.4 WEIGHT LOSS: ICD-10-CM

## 2022-09-07 DIAGNOSIS — R19.7 DIARRHEA, UNSPECIFIED TYPE: ICD-10-CM

## 2022-09-07 LAB
ALBUMIN 24H MFR UR ELPH: 21 %
ALPHA1 GLOB 24H MFR UR ELPH: 1.2 %
ALPHA2 GLOB 24H MFR UR ELPH: 7.8 %
B-GLOBULIN MFR UR ELPH: 21.9 %
GAMMA GLOB 24H MFR UR ELPH: 48.2 %
HIV 1 & 2 AB SER-IMP: ABNORMAL
INTERPRETATION UR IFE-IMP: ABNORMAL
M PROTEIN 24H MFR UR ELPH: 36.1 %
M PROTEIN 24H UR ELPH-MRATE: 179 MG/24 HR
PROT 24H UR-MRATE: 495 MG/24 HR (ref 30–150)
PROT UR-MCNC: 22.5 MG/DL

## 2022-09-07 PROCEDURE — 25010000002 IOPAMIDOL 61 % SOLUTION: Performed by: INTERNAL MEDICINE

## 2022-09-07 PROCEDURE — 74177 CT ABD & PELVIS W/CONTRAST: CPT

## 2022-09-07 RX ADMIN — Medication 237 ML: at 15:05

## 2022-09-07 RX ADMIN — Medication 237 ML: at 14:45

## 2022-09-07 RX ADMIN — IOPAMIDOL 100 ML: 612 INJECTION, SOLUTION INTRAVENOUS at 14:30

## 2022-09-07 RX ADMIN — Medication 237 ML: at 13:25

## 2022-09-07 NOTE — PROGRESS NOTES
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  At her last visit we did check labs to evaluate her anemia and found that she was iron deficient.  With her chronic GI issues of diarrhea, alternating constipation and diarrhea from microscopic colitis it was felt that she would be a poor candidate for oral iron and therefore she received IV iron in the form of Injectafer, received 727 mg on 4/4/2022 and 750 mg on 4/11/2022.  She tolerated this well.  She has had no clinical evidence of GI blood loss.  She did see Dr. Shook in follow-up on 7/11/2022 and they discussed whether or not to proceed with colonoscopy and elected to defer for now.  She was continuing on budesonide 3 mg daily which had helped to control her diarrhea however due to cost of the medication went off of this recently and has noted an increase in loose stools, still experiencing some alternating constipation and diarrhea.  She did undergo a CT abdomen and pelvis on 8/15/2022 which did show a long segment of distal small bowel with wall thickening possibly secondary to peristalsis and recommended CT enterography which was performed on 9/7/2022 however the report is currently pending.  She is following up with Dr. Shook on 10/5.  She does note recent increase in generalized fatigue.  She had acute exacerbation of her diarrhea yesterday after CT contrast however today has resolved.  Weight is fairly stable at 106 pounds.  She continues follow-up with rheumatology, symptoms from that standpoint remained stable continuing on weekly methotrexate injections.  She continues to be reasonably active for her age, ambulates with the use of a walker.      Objective   Vital Signs:   /72   Pulse 62   Temp 97.3 °F  "(36.3 °C) (Temporal)   Resp 16   Ht 152.4 cm (60\")   Wt 48.2 kg (106 lb 3.2 oz)   SpO2 95%   BMI 20.74 kg/m²     Physical Exam  Constitutional:       Appearance: She is well-developed.   Eyes:      Conjunctiva/sclera: Conjunctivae normal.   Neck:      Thyroid: No thyromegaly.   Cardiovascular:      Rate and Rhythm: Normal rate and regular rhythm.      Heart sounds: No murmur heard.    No friction rub. No gallop.   Pulmonary:      Effort: No respiratory distress.      Breath sounds: Normal breath sounds.   Chest:   Breasts:      Right: No supraclavicular adenopathy.      Left: No supraclavicular adenopathy.       Abdominal:      General: Bowel sounds are normal. There is no distension.      Palpations: Abdomen is soft.      Tenderness: There is no abdominal tenderness.   Musculoskeletal:      Comments: Sclerodactyly involving the hands   Lymphadenopathy:      Head:      Right side of head: No submandibular adenopathy.      Cervical: No cervical adenopathy.      Upper Body:      Right upper body: No supraclavicular adenopathy.      Left upper body: No supraclavicular adenopathy.   Skin:     General: Skin is warm and dry.      Findings: No rash.   Neurological:      Mental Status: She is alert and oriented to person, place, and time.      Cranial Nerves: No cranial nerve deficit.      Motor: No abnormal muscle tone.      Deep Tendon Reflexes: Reflexes normal.   Psychiatric:         Behavior: Behavior normal.     Patient was examined today, unchanged from above      Result Review : Reviewed labs from 9/2/2022 with CBC, CMP, serum protein electrophoresis, immunoelectrophoresis, quantitative immunoglobulins, free serum light chains.  Reviewed 24-hour urine protein electrophoresis, immunoelectrophoresis.  Reviewed records from GI as well as CT abdomen and pelvis from 8/15/2022       Assessment and Plan   1. 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  · Laboratory studies 2/12/2020 showed a creatinine of 0.61, calcium 9.2, globulin 2.5, albumin 4.3, serum protein electrophoresis with a prominent protein band in the beta region, could not rule out monoclonal protein.  · CBC on 4/14/2020 with WBC 6.0 with normal differential, hemoglobin 12.0, platelet count 255,000.  · Subsequent labs with Dr. Rust on 7/1/2020 showed an immunoelectrophoresis confirming an IgA kappa monoclonal protein with IgA 658, IgG 7 or 24, IgM 125.  Creatinine was normal at 0.61 with calcium 9.5.    · Patient was referred to our office for further evaluation.  · At time of initial visit 8/12/2020, hemoglobin 13.1, platelet count 200,000, creatinine 0.54 with calcium of 9.5.  · Additional labs 8/12/2020 with dual IgA kappa M spike (0.6 g and secondary M spike too small to quantify), IgA 652, IgG 743, IgM 126, free kappa light chain 87.1, free lambda light chain 12.1, free light chain ratio 7.0.  · 24-hour urine 8/17/2020 with 410 mg total protein, 40.6% kappa light chain (166 mg).  · Skeletal survey 8/13/2020 with no evidence of lytic lesions.  · Previous pathology specimens from colonoscopy 2/26/2016 and EGD 12/10/2018 were sent for Congo red stain, both negative.  · Patient returns today for 6-month interval follow-up visit.  We are continuing to monitor both serum and urine studies on a 6-month basis and her labs have shown relative stability over time.  We are reviewing labs from 9/2/2022 which show stable findings with M spike 0.5 g (IgA kappa) which is stable from last value of 0.5 g.  Previously there was a dual IgA kappa M spike with the second band too small to quantify however currently there is only 1 band present.  Additional labs with IgA stable to improved at 583, IgG 602, IgM 103, free kappa light chain stable at 71.6 (previously 75.7), free lambda light chain 12.9, free light chain  ratio stable at 5.55 (previously 5.82).  24-hour urine reviewed with slight increase in total protein at 495 mg (previously 372 mg), stable M spike comprising 36% with corresponding value of 179 mg.  Patient has normal creatinine, normal calcium.  She has a very minimal degree of anemia which will be discussed below and is likely unrelated to her MGUS and has a normal platelet count.  With stable findings, we will continue to monitor labs month interval requesting both serum and urine studies prior to her return visit.  2. Anemia:  · Hemoglobin on 8/12/2020 was normal at 13.1 with MCV 95.4  · Hemoglobin declined on 8/26/2020 down to 11.3 with MCV 98.1.  Additional evaluation at that time with iron 52, ferritin 66.3, iron saturation 16%, TIBC 322, folate greater than 20, B12 552.  · Suspect that patient has anemia secondary to chronic disease with underlying chronic autoimmune disorder.  She is also receiving weekly methotrexate which may be a contributing factor.    · Hemoglobin on 3/18/2022 declined to 10.4.  Additional labs with evidence of iron deficiency with iron 43, ferritin 8.0, iron saturation 10%, TIBC 442, folate greater than 20, B12 602, CRP less than 0.3, ESR 18  · Concern regarding of patient's ability to tolerate oral iron with underlying colitis with alternating constipation and diarrhea.  Therefore proceeded with IV iron in the form of Injectafer, received 727 mg on 4/4 and 750 mg on 4/11/2022.  · Labs on 6/17/2022 with significant response, hemoglobin up to 13.1, ferritin 591, iron saturation 35%  · Patient was seen by GI on 7/11/2022, no clinical evidence to suggest GI blood loss.  Patient opted to forego colonoscopy given her age and potential risks.  · CT abdomen and pelvis 8/15/2022 with suggestion of long segment distal small bowel wall thickening possibly related to peristalsis, recommended CT enterography.  · CT enterography performed 9/7/2022 showed multifocal areas of prominent bowel wall  thickening particularly involving the jejunum, distal ileum that were nonspecific and possibly indicative of enteritis.  Distention of stomach and proximal duodenum secondary to ingestion of contrast.  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  · Patient returns today in follow-up with labs from 9/2/2022 that showed iron replete status with iron 83, ferritin 362, iron saturation 32%, TIBC 263.  Patient has borderline residual anemia with hemoglobin of 11.8 today and MCV elevated at 100.6.  Anemia may be related to ongoing methotrexate use, particularly with macrocytic indices.  Unclear whether she has a component of anemia of chronic disease related to her underlying autoimmune issues.  We will check additional labs today with B12, folate, ESR, CRP and I will notify the patient if there are any significant abnormal findings.  She will follow-up with GI regarding her CT findings.  She does continue on budesonide 3 mg daily for scopic colitis.  We will recheck hemoglobin as well as iron studies when she returns in 6 months.  3. 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 Martell in rheumatology.    · She had been treated previously with Arava, subsequently changed to methotrexate weekly injections.    · She has low disease activity and her disease has been under good control.    · She does have associated Sjogren's syndrome with dry eyes and dry mouth and subsequent dental issues.  · She has chronic pain in her fingers with some degree of sclerodactyly and receives intermittent injections by hand surgery every 4 to 6 months.  · Suspect that patient's monoclonal gammopathy is associated with her underlying rheumatologic disorder.  · There has been discussion regarding potential addition of Orencia to her weekly methotrexate injections.  · Patient returns today continuing on  weekly methotrexate injections.  She continues follow-up with rheumatology, due to be seen again next week.  We are checking CRP and ESR today.  3. Lumbar spine stenosis and spondylolisthesis:  · Chronic back pain  · Patient followed by Dr. Licea in orthopedics  · Patient received a course of epidural injections  4. Microscopic colitis/ulcerative colitis:  · Patient is followed by Dr. Shook  · Previously treated with Entocort, discontinued around 2018  · Patient did follow-up with GI in January.  She underwent CT abdomen and pelvis on 1/27/2021 which was unrevealing.      · Previous pathology specimens from colonoscopy 2/26/2016 and EGD 12/10/2018 were sent for [place] red stain and were negative.  · Patient recently was placed on budesonide by GI with improvement in symptoms.   · Patient continuing on budesonide 3 mg daily per GI.  See above discussion regarding recent CT findings suggestive of enteritis.  Patient will be following up with GI to review findings.  She continues with alternating constipation and diarrhea.  5. Possible peripheral neuropathy  · Patient has had some longstanding intermittent symptoms in her feet with numbness that does wax and wane.  It sounds as if her symptoms are associated with Raynaud's type changes, unclear whether this represents a true peripheral neuropathy.  It is not a consistent finding.  Previous B12 level normal at 552 on 8/26/2020.  Unclear whether symptoms could be in part related to her underlying MGUS.  6. Peripheral vascular disease  · Patient developed acute ischemia right foot and was hospitalized 3/22-3/25/2021.  On 3/22/2021 patient underwent arthrectomy right popliteal artery.  She was subsequently placed on Xarelto 20 mg daily.  · Patient was subsequently transitioned from Xarelto to Plavix 75 mg daily by vascular surgery in December 2021.     Plan:     1. We will draw additional labs today with ESR, CRP, B12, folate and I will notify her of any significant  abnormal findings.  2. Noted that patient is continuing on weekly methotrexate injections per rheumatology.  This is likely contributing to her mild macrocytic anemia  3. Patient will follow-up with GI regarding CT enterography findings in the setting of known colitis.  4. Patient will continue follow-up with Dr. Rust, rheumatology, GI.  5. Return in 6 months for MD visit.  1 week prior we will draw labs with CBC, CMP, serum protein electrophoresis, immunoelectrophoresis, quantitative immunoglobulins, free serum light chains, iron panel, ferritin and the patient will return to 24-hour urine for protein electrophoresis, immunoelectrophoresis.     I did spend 40 minutes in total time caring for the patient today, time spent in review of records, face-to-face consultation, coordination of care, placement of orders, completion of documentation.  The patient and her son today had multiple questions which I answered to their satisfaction.

## 2022-09-08 ENCOUNTER — APPOINTMENT (OUTPATIENT)
Dept: LAB | Facility: HOSPITAL | Age: 87
End: 2022-09-08

## 2022-09-08 ENCOUNTER — LAB (OUTPATIENT)
Dept: LAB | Facility: HOSPITAL | Age: 87
End: 2022-09-08

## 2022-09-08 ENCOUNTER — OFFICE VISIT (OUTPATIENT)
Dept: ONCOLOGY | Facility: CLINIC | Age: 87
End: 2022-09-08

## 2022-09-08 VITALS
SYSTOLIC BLOOD PRESSURE: 145 MMHG | HEIGHT: 60 IN | TEMPERATURE: 97.3 F | DIASTOLIC BLOOD PRESSURE: 72 MMHG | HEART RATE: 62 BPM | BODY MASS INDEX: 20.85 KG/M2 | WEIGHT: 106.2 LBS | RESPIRATION RATE: 16 BRPM | OXYGEN SATURATION: 95 %

## 2022-09-08 DIAGNOSIS — D50.8 OTHER IRON DEFICIENCY ANEMIA: ICD-10-CM

## 2022-09-08 DIAGNOSIS — D64.9 NORMOCYTIC ANEMIA: Primary | ICD-10-CM

## 2022-09-08 DIAGNOSIS — D47.2 MGUS (MONOCLONAL GAMMOPATHY OF UNKNOWN SIGNIFICANCE): ICD-10-CM

## 2022-09-08 LAB
BASOPHILS # BLD AUTO: 0.08 10*3/MM3 (ref 0–0.2)
BASOPHILS NFR BLD AUTO: 1.2 % (ref 0–1.5)
CRP SERPL-MCNC: <0.3 MG/DL (ref 0–0.5)
DEPRECATED RDW RBC AUTO: 50.4 FL (ref 37–54)
EOSINOPHIL # BLD AUTO: 0.29 10*3/MM3 (ref 0–0.4)
EOSINOPHIL NFR BLD AUTO: 4.4 % (ref 0.3–6.2)
ERYTHROCYTE [DISTWIDTH] IN BLOOD BY AUTOMATED COUNT: 13.6 % (ref 12.3–15.4)
ERYTHROCYTE [SEDIMENTATION RATE] IN BLOOD: 13 MM/HR (ref 0–30)
FOLATE SERPL-MCNC: >20 NG/ML (ref 4.78–24.2)
HCT VFR BLD AUTO: 38.5 % (ref 34–46.6)
HGB BLD-MCNC: 12.4 G/DL (ref 12–15.9)
IMM GRANULOCYTES # BLD AUTO: 0.02 10*3/MM3 (ref 0–0.05)
IMM GRANULOCYTES NFR BLD AUTO: 0.3 % (ref 0–0.5)
LYMPHOCYTES # BLD AUTO: 1.09 10*3/MM3 (ref 0.7–3.1)
LYMPHOCYTES NFR BLD AUTO: 16.6 % (ref 19.6–45.3)
MCH RBC QN AUTO: 32.7 PG (ref 26.6–33)
MCHC RBC AUTO-ENTMCNC: 32.2 G/DL (ref 31.5–35.7)
MCV RBC AUTO: 101.6 FL (ref 79–97)
MONOCYTES # BLD AUTO: 0.72 10*3/MM3 (ref 0.1–0.9)
MONOCYTES NFR BLD AUTO: 11 % (ref 5–12)
NEUTROPHILS NFR BLD AUTO: 4.37 10*3/MM3 (ref 1.7–7)
NEUTROPHILS NFR BLD AUTO: 66.5 % (ref 42.7–76)
NRBC BLD AUTO-RTO: 0 /100 WBC (ref 0–0.2)
PLATELET # BLD AUTO: 223 10*3/MM3 (ref 140–450)
PMV BLD AUTO: 9.7 FL (ref 6–12)
RBC # BLD AUTO: 3.79 10*6/MM3 (ref 3.77–5.28)
VIT B12 BLD-MCNC: 593 PG/ML (ref 211–946)
WBC NRBC COR # BLD: 6.57 10*3/MM3 (ref 3.4–10.8)

## 2022-09-08 PROCEDURE — 82607 VITAMIN B-12: CPT | Performed by: INTERNAL MEDICINE

## 2022-09-08 PROCEDURE — 86140 C-REACTIVE PROTEIN: CPT | Performed by: INTERNAL MEDICINE

## 2022-09-08 PROCEDURE — 99215 OFFICE O/P EST HI 40 MIN: CPT | Performed by: INTERNAL MEDICINE

## 2022-09-08 PROCEDURE — 36415 COLL VENOUS BLD VENIPUNCTURE: CPT | Performed by: INTERNAL MEDICINE

## 2022-09-08 PROCEDURE — 82746 ASSAY OF FOLIC ACID SERUM: CPT | Performed by: INTERNAL MEDICINE

## 2022-09-08 PROCEDURE — 85025 COMPLETE CBC W/AUTO DIFF WBC: CPT

## 2022-09-08 PROCEDURE — 85652 RBC SED RATE AUTOMATED: CPT | Performed by: INTERNAL MEDICINE

## 2022-09-09 NOTE — PROGRESS NOTES
09/09/22       Tell her that the CT of the abdomen and pelvis with small bowel enterography showed lots of small things but nothing that I am convinced because of this an idea of why she is losing weight.  I will review this with the radiologist between now and when I talk to her next during her office visit.  Dolly kjlemuel

## 2022-09-14 ENCOUNTER — TELEPHONE (OUTPATIENT)
Dept: GASTROENTEROLOGY | Facility: CLINIC | Age: 87
End: 2022-09-14

## 2022-09-14 NOTE — TELEPHONE ENCOUNTER
----- Message from Edilberto Shook MD sent at 9/9/2022  6:16 PM EDT -----  09/09/22       Tell her that the CT of the abdomen and pelvis with small bowel enterography showed lots of small things but nothing that I am convinced because of this an idea of why she is losing weight.  I will review this with the radiologist between now and when I talk to her next during her office visit.  Thx. kjh

## 2022-09-14 NOTE — TELEPHONE ENCOUNTER
Caller: Arlin Hutson    Relationship: Self    Best call back number: [phone]    What is the best time to reach you: ANYTIME TODAY- OR TOMORROW AFTER 11AM    Who are you requesting to speak with (clinical staff, provider,  specific staff member): CLINICAL STAFF    Do you know the name of the person who called: CHRISTOPHE MINOR RN    Do you require a callback: YES, PLEASE CALL PREFERRED NUMBER [phone]

## 2022-09-14 NOTE — TELEPHONE ENCOUNTER
Called pt and advised of Dr Shook's note. Verb understanding.     Pt reports she is not losing weight. She state that is her daughter stating that. Pt reports that she has lost weight since being a pt of Dr Shook's , but is currently not losing weight.

## 2022-09-19 ENCOUNTER — OFFICE VISIT (OUTPATIENT)
Dept: GASTROENTEROLOGY | Facility: CLINIC | Age: 87
End: 2022-09-19

## 2022-09-19 VITALS
BODY MASS INDEX: 20.5 KG/M2 | OXYGEN SATURATION: 93 % | WEIGHT: 104.4 LBS | SYSTOLIC BLOOD PRESSURE: 174 MMHG | DIASTOLIC BLOOD PRESSURE: 81 MMHG | HEART RATE: 81 BPM | HEIGHT: 60 IN

## 2022-09-19 DIAGNOSIS — K52.839 MICROSCOPIC COLITIS, UNSPECIFIED MICROSCOPIC COLITIS TYPE: Primary | ICD-10-CM

## 2022-09-19 DIAGNOSIS — R63.4 WEIGHT LOSS: ICD-10-CM

## 2022-09-19 DIAGNOSIS — R19.8 ALTERNATING CONSTIPATION AND DIARRHEA: ICD-10-CM

## 2022-09-19 DIAGNOSIS — R93.5 ABNORMAL CT OF THE ABDOMEN: ICD-10-CM

## 2022-09-19 DIAGNOSIS — Z86.010 HISTORY OF COLON POLYPS: ICD-10-CM

## 2022-09-19 PROCEDURE — 99214 OFFICE O/P EST MOD 30 MIN: CPT | Performed by: INTERNAL MEDICINE

## 2022-09-19 NOTE — PROGRESS NOTES
Chief Complaint   Patient presents with   • Follow up      CT        History of Present Illness:   86 y.o. female        Last EGD was on 12/18.  Last colonoscopy was on 2/16.  She does have a history of colon polyps.        I last saw her in 7/22:  Assessment:  1. On Plavix for arterial blood clot in 3/21.  2. H/o microscopic colitis - on Budesonide 3 mg one/day periodically.  3. H/o iron def anemia. She has recently received 2 iron infusions by Dr. Caruso.   4. Weight loss.      Recommendations:  1. We discussed the pros and cons of doing a colonoscopy in an 87 yo. She prefers no colonoscopy.  2. Stay off of aspirin, ibuprofen, Aleve, etc.  3. Take Budesonide 3 mg one daily.  4. Continue Omeprazole 20 mg  5. F/u 6 weeks.   6. CT abd/pelvis with pancreatic protocol         On 8/15/22 she had a CT abd/pelvis with pancreatic protocol that showed:  IMPRESSION:  1. Normal CT appearance of the pancreas.  2. Diffuse pelvic floor weakening with cystocele.  3. Colonic diverticulosis. There is suggestion of a long segment distal  small bowel wall thickening, I suspect this may be related to  peristalsis, however follow-up with CT enterography protocol is  recommended to rule out a true wall thickening.      This report was finalized on 8/17/2022 2:30 PM by Dr. Aye Rg M.D.       On 9/7/22 she had a CT of the abdomen and pelvis with small bowel enterography:  IMPRESSION:  1.  Multifocal areas of prominent bowel wall thickening particularly  involving the jejunum and distal ileum are nonspecific and may be seen  with enteritis. Recommend correlation with clinical symptoms and  attention on short-term follow-up or capsule endoscopy for further  characterization.  2.  Distention of the stomach and proximal duodenum resuming normal  caliber the transverse portion likely secondary to recent ingestion of  contrast however, please correlate with clinical symptoms.  3.  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  4.  Colonic diverticulosis.  5.  Please see above for additional findings/recommendations.      This report was finalized on 9/8/2022 5:34 PM by Dr. Jake Angulo M.D.       She feels fine. She has alternating diarrhea and constipation. On budesonide 3 mg 1/day. No rectal bleeding or melena. No abdominal or chest pain. She has not recently lost anymore weight.     Past Medical History:   Diagnosis Date   • Atheroembolism of right lower extremity (HCC)    • Baker's cyst    • Colon polyp    • CREST syndrome (HCC)    • Fractures    • GERD (gastroesophageal reflux disease)    • History of CT scan of abdomen 03/11/2011    constipation, sig tics, 6 mm hepatic cyst   • History of rheumatoid arthritis    • Hyperlipidemia    • Hypertension    • Irritable bowel syndrome 1990s   • Low back pain    • Normocytic anemia 08/26/2020   • Osteoarthritis    • Raynaud's disease    • Rheumatoid arthritis (HCC)    • Scleroderma (HCC)    • Sjogren's syndrome (HCC)    • Ulcerative colitis (HCC)        Past Surgical History:   Procedure Laterality Date   • CATARACT EXTRACTION, BILATERAL     • COLONOSCOPY  02/26/2016    tics, microscopic colitis,    • COLONOSCOPY  07/01/2011    sig tics, inflammation, polyp   • DILATATION AND CURETTAGE  1980   • EKOS CATHETER PLACEMENT N/A 3/23/2021    Procedure: AIF WITH RUN OFF OF RT. LEG, ROTATIONAL ATHERECTOMY OF RIGHT POPLITEAL ARTERY;  Surgeon: Gato Contreras MD;  Location: [facility] 18/19;  Service: Vascular;  Laterality: N/A;   • ENDOSCOPY N/A 12/3/2018    erythematous mucosa in stomach, path benign   • EYE SURGERY     • FLEXIBLE SIGMOIDOSCOPY     • FRACTURE SURGERY     • HAND SURGERY     • JOINT REPLACEMENT     • KNEE ARTHROSCOPY Right     w/meniscal repair   • KNEE SURGERY Right    • TEETH EXTRACTION     • TOTAL KNEE ARTHROPLASTY Right 5/30/2018    Procedure: TOTAL KNEE ARTHROPLASTY;  Surgeon: Georges Jon MD;  Location: [facility];  Service: Orthopedics    • TRANSLUMINAL ATHERECTOMY POPLITEAL ARTERY     • UPPER GASTROINTESTINAL ENDOSCOPY  03/14/2008    erythema   • WRIST FRACTURE SURGERY Right    • WRIST SURGERY Right 2008    orif         Current Outpatient Medications:   •  Acetaminophen (TYLENOL EXTRA STRENGTH PO), Take 2 tablets by mouth Daily As Needed., Disp: , Rfl:   •  amLODIPine (NORVASC) 2.5 MG tablet, , Disp: , Rfl:   •  atorvastatin (LIPITOR) 20 MG tablet, , Disp: , Rfl:   •  calcium carbonate (OS-DWIGHT) 600 MG tablet, 1 P.O. daily, Disp: , Rfl:   •  Carboxymethylcell-Hypromellose (GENTEAL OP), Apply 1 application to eye As Needed., Disp: , Rfl:   •  Cholecalciferol (VITAMIN D PO), Take 2,000 mg by mouth every night at bedtime., Disp: , Rfl:   •  clopidogrel (PLAVIX) 75 MG tablet, , Disp: , Rfl:   •  escitalopram (LEXAPRO) 20 MG tablet, , Disp: , Rfl:   •  fluticasone (FLONASE) 50 MCG/ACT nasal spray, 1-2 sprays into the nostril(s) as directed by provider., Disp: , Rfl:   •  folic acid (FOLVITE) 1 MG tablet, Take 2 mg by mouth Daily., Disp: , Rfl:   •  Methotrexate Sodium (METHOTREXATE PF) 50 MG/2ML chemo syringe, Infuse  into a venous catheter 1 (One) Time., Disp: , Rfl:   •  Multiple Vitamins-Minerals (CENTRUM SILVER ULTRA WOMENS PO), 1 P.O. daily, Disp: , Rfl:   •  omeprazole (priLOSEC) 20 MG capsule, 20 mg. Monday Wednesday friday, Disp: , Rfl:   •  polyethyl glycol-propyl glycol (Systane) 0.4-0.3 % solution ophthalmic solution (artificial tears), , Disp: , Rfl:   •  traZODone (DESYREL) 50 MG tablet, Take 25-50 mg by mouth Every Night., Disp: , Rfl:     Allergies   Allergen Reactions   • Codeine Diarrhea and Nausea Only   • Penicillin G Rash   • Sulfa Antibiotics Hives       Family History   Problem Relation Age of Onset   • Ulcerative colitis Mother    • Migraines Mother    • Stroke Mother    • Hypertension Mother    • Thyroid disease Mother    • Skin cancer Mother    • Breast cancer Maternal Aunt    • Arthritis Father    • Heart attack Father    •  Migraines Sister    • Skin cancer Sister    • Migraines Daughter    • Skin cancer Brother    • Malig Hyperthermia Neg Hx        Social History     Socioeconomic History   • Marital status: Single   • Number of children: 3   Tobacco Use   • Smoking status: Never Smoker   • Smokeless tobacco: Never Used   Vaping Use   • Vaping Use: Never used   Substance and Sexual Activity   • Alcohol use: Yes     Comment: I rarely have a glass of wine.   • Drug use: Never   • Sexual activity: Not Currently     Comment: Not sexual active       Review of Systems   Gastrointestinal: Positive for constipation and diarrhea. Negative for abdominal pain.   All other systems reviewed and are negative.    Pertinent positives and negatives documented in the HPI and all other systems reviewed and were found to be negative.  Vitals:    09/19/22 0731   BP: 174/81   Pulse: 81   SpO2: 93%       Physical Exam  Vitals reviewed.   Constitutional:       General: She is not in acute distress.     Appearance: Normal appearance. She is well-developed. She is not diaphoretic.   HENT:      Head: Normocephalic and atraumatic. Hair is normal.      Right Ear: Hearing, tympanic membrane, ear canal and external ear normal. No decreased hearing noted. No drainage.      Left Ear: Hearing, tympanic membrane, ear canal and external ear normal. No decreased hearing noted.      Nose: Nose normal. No nasal deformity.      Mouth/Throat:      Mouth: Mucous membranes are moist.   Eyes:      General: Lids are normal.         Right eye: No discharge.         Left eye: No discharge.      Extraocular Movements: Extraocular movements intact.      Conjunctiva/sclera: Conjunctivae normal.      Pupils: Pupils are equal, round, and reactive to light.   Neck:      Thyroid: No thyromegaly.      Vascular: No JVD.      Trachea: No tracheal deviation.   Cardiovascular:      Rate and Rhythm: Normal rate and regular rhythm.      Pulses: Normal pulses.      Heart sounds: Normal heart  sounds. No murmur heard.    No friction rub. No gallop.   Pulmonary:      Effort: Pulmonary effort is normal. No respiratory distress.      Breath sounds: Normal breath sounds. No wheezing or rales.   Chest:      Chest wall: No tenderness.   Abdominal:      General: Bowel sounds are normal. There is no distension.      Palpations: Abdomen is soft. There is no mass.      Tenderness: There is no abdominal tenderness. There is no guarding or rebound.      Hernia: No hernia is present.   Musculoskeletal:         General: No tenderness or deformity. Normal range of motion.      Cervical back: Normal range of motion and neck supple.   Lymphadenopathy:      Cervical: No cervical adenopathy.   Skin:     General: Skin is warm and dry.      Findings: No erythema or rash.   Neurological:      Mental Status: She is alert and oriented to person, place, and time.      Cranial Nerves: No cranial nerve deficit.      Motor: No abnormal muscle tone.      Coordination: Coordination normal.      Deep Tendon Reflexes: Reflexes are normal and symmetric. Reflexes normal.   Psychiatric:         Mood and Affect: Mood normal.         Behavior: Behavior normal.         Thought Content: Thought content normal.         Judgment: Judgment normal.         Diagnoses and all orders for this visit:    1. Microscopic colitis, unspecified microscopic colitis type (Primary)    2. Abnormal CT of the abdomen    3. Weight loss    4. History of colon polyps    5. Alternating constipation and diarrhea      Assessment:  1.  On Plavix for arterial blood clot in 3/21.  2. H/o microscopic colitis - on Budesonide 3 mg one/day periodically.  3. H/o iron def anemia. She has recently received 2 iron infusions by Dr. Caruso.   4. Weight loss.   5. Abnormal small bowel thickening seen on CT abd/pelvis    Recommendations:  1. Take fiber daily. Take Fibercon 2/day.  2. Continue the Budesonide 1/day.  3. I will review her CT abd/pevlis Enterography with the Radiologist.    4. F/u 3 mos.     Return in about 3 months (around 12/19/2022).    Edilberto Shook MD  9/19/2022

## 2022-09-20 ENCOUNTER — HOSPITAL ENCOUNTER (OUTPATIENT)
Dept: MAMMOGRAPHY | Facility: HOSPITAL | Age: 87
Discharge: HOME | End: 2022-09-20
Admitting: INTERNAL MEDICINE

## 2022-09-20 ENCOUNTER — TELEPHONE (OUTPATIENT)
Dept: GASTROENTEROLOGY | Facility: CLINIC | Age: 87
End: 2022-09-20

## 2022-09-20 DIAGNOSIS — Z12.31 VISIT FOR SCREENING MAMMOGRAM: ICD-10-CM

## 2022-09-20 DIAGNOSIS — R93.5 ABNORMAL CT OF THE ABDOMEN: Primary | ICD-10-CM

## 2022-09-20 PROCEDURE — 77063 BREAST TOMOSYNTHESIS BI: CPT

## 2022-09-20 PROCEDURE — 77067 SCR MAMMO BI INCL CAD: CPT

## 2022-09-20 NOTE — TELEPHONE ENCOUNTER
CT enterography order for 12/19 placed - message to DR Shook.     Message to Manager re: Giorgi haynes.     Update to DR Oren Rust.

## 2022-09-20 NOTE — TELEPHONE ENCOUNTER
9/20/22 - I discussed with Dr. Jake Angulo (Radiologist at [facility]) the results of her CT abd/pelvis with Small Bowel Enterography done 9/7/22. She compared it with the CT abd/pelvis done 8/15/22 and also with the CT abd/pelvis done 1/27/21.        The 9/7/2022 small bowel enterography did show intermittent small bowel thickening in the mid jejunum and proximal ileum.  She compared to the CT of the abdomen and pelvis done 8/15/2022 when she saw similar abnormalities of the small bowel although more of the distal ileum was involved during that CAT scan.  She is wondering if this could be some type of enterography.  She does not think that it is anything like cancer or lymphoma.  There are no lymph nodes and the spleen is not enlarged.  The CT of the abdomen done 1/27/2021 did not have any small bowel abnormalities.  I asked her if she thought that I could get to any of the small bowel abnormalities either with an EGD small bowel enteroscopy for a colonoscopy trying to get up into the distal small bowel.  She felt that I would probably not be able to get to these areas because the abnormalities now are more in the mid small bowel.  She recommends that we do a repeat CT of the abdomen in 3 months to see if this has resolved.       I called the patient and discussed this with her.  I will schedule her for a CT of the abdomen and pelvis with small bowel enterography to be done towards the end of December.  I will ask that she be followed up with me in the office in early January to go over the results.  Patient is in agreement with this.       SA/MT -please order a CT of the abdomen and pelvis with small bowel enterography to be done towards the end of December to reevaluate the small bowel abnormality seen on the previous CAT scans of the abdomen and pelvis.  Also have her scheduled to see me in the office in early January 2023.       Please send a copy of this report to Dr. Oren Rust.

## 2022-10-03 ENCOUNTER — TELEPHONE (OUTPATIENT)
Dept: GASTROENTEROLOGY | Facility: CLINIC | Age: 87
End: 2022-10-03

## 2022-10-03 LAB — CREAT BLDA-MCNC: 0.5 MG/DL (ref 0.6–1.3)

## 2022-10-03 NOTE — TELEPHONE ENCOUNTER
Caller: Arlin Hutson    Relationship to patient: Self    Best call back number: [phone]     Patient is needing: PATIENT MISSED NURSE CALL AND IS REQUESTING CALL BACK.  REQUESTING CALL SINCE NOT AWARE THAT NEEDS FOLLOW UP  AND WOULD LIKE TO DISCUSS.

## 2022-10-27 ENCOUNTER — OFFICE VISIT (OUTPATIENT)
Dept: NEUROLOGY | Facility: CLINIC | Age: 87
End: 2022-10-27

## 2022-10-27 VITALS
DIASTOLIC BLOOD PRESSURE: 76 MMHG | HEIGHT: 60 IN | RESPIRATION RATE: 14 BRPM | HEART RATE: 60 BPM | BODY MASS INDEX: 20.54 KG/M2 | WEIGHT: 104.6 LBS | SYSTOLIC BLOOD PRESSURE: 142 MMHG

## 2022-10-27 DIAGNOSIS — R27.0 ATAXIA: Primary | ICD-10-CM

## 2022-10-27 PROBLEM — M77.42 METATARSALGIA OF LEFT FOOT: Status: ACTIVE | Noted: 2022-10-27

## 2022-10-27 PROBLEM — B35.1 ONYCHOMYCOSIS DUE TO DERMATOPHYTE: Status: ACTIVE | Noted: 2022-10-27

## 2022-10-27 PROBLEM — L60.0 ONYCHOCRYPTOSIS: Status: ACTIVE | Noted: 2022-10-27

## 2022-10-27 PROBLEM — IMO0002 PARONYCHIA: Status: ACTIVE | Noted: 2022-10-27

## 2022-10-27 PROBLEM — M79.671 PAIN IN RIGHT FOOT: Status: ACTIVE | Noted: 2022-10-27

## 2022-10-27 PROBLEM — R20.2 PARESTHESIA: Status: ACTIVE | Noted: 2022-10-27

## 2022-10-27 PROBLEM — M79.609 PAIN IN LIMB: Status: ACTIVE | Noted: 2022-10-27

## 2022-10-27 PROCEDURE — 99205 OFFICE O/P NEW HI 60 MIN: CPT | Performed by: PSYCHIATRY & NEUROLOGY

## 2022-10-27 RX ORDER — BUDESONIDE 3 MG/1
3 CAPSULE, COATED PELLETS ORAL DAILY
COMMUNITY
Start: 2022-09-09

## 2022-10-27 NOTE — PROGRESS NOTES
CC: Gait disturbance    HPI:  Arlin Hutson is a  86 y.o. right-handed white female who was sent for neurological consultation by Dr. Rust regarding balance trouble.  The patient indicates that symptoms probably started several years ago.  She has a mixture of rheumatoid arthritis, osteoarthritis and CREST syndrome.  She has had Raynoud's since she was in her 40s.  She states that her feet hurt after walking on them in the same pair of shoes all day and she has to change shoes.  She feels like there is a vice on her feet or she is walking on hot [place] or they feel very cold.  She uses a cane or a walker.  She has fallen once this year when she got up to go to the bathroom with one hand on the walker she fell backwards and landed on her tailbone which was sore for quite a while she says.  She is on methotrexate and folic acid for her rheumatoid arthritis.  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  Review of an echocardiogram from 2021 showed a normal ejection fraction with a mildly dilated left atrium and mild to moderate mitral regurgitation.    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  She has some urinary urgency and urinary frequency but no urinary incontinence.  She denies any numbness or weakness in her hands.  She has history of Sjogren's syndrome.  She has dizziness which she states is more like lightheadedness and feeling off balance.  She denies any particular trigger in the usual sense such as lying down, rolling over standing up or bending but she states randomly she will feel more lightheaded and she simply feels unsteady on her feet.  She was seen by her physical therapist who sent her to [facility] for vestibular rehab.  She states that the therapist there laid her down and rolled her over and she got very dizzy.  She was told she had crystal problems in her ears.    She denies history of significant head or spine  trauma, meningitis, seizures, stroke or syncope.  There is positive family history of stroke in her mother at age 86 as well as in a maternal aunt in her 80s.  Her mother's older sister  with dementia age 90.  The patient's sister had a stroke several years ago.  No one with a brain tumor, brain hemorrhage or aneurysm of the brain artery.    The patient had an MRI of the brain which I reviewed the films.  She has atrophy and moderate small vessel changes which are fairly confluent in the periventricular region.  The ventricles are mildly dilated.  There is no smoke in the third ventricle and the cerebral aqueduct shows flow-void but is not enlarged.  The temporal tips are enlarged and perhaps slightly rounded.  Last month she had a vitamin B12 and folic acid which were both quite normal.  She had a TSH 2-1/2 years ago which was normal but not recently.        Past Medical History:   Diagnosis Date   • Atheroembolism of right lower extremity (HCC)    • Baker's cyst    • Colon polyp    • CREST syndrome (HCC)    • Difficulty walking About 5 yrs ago    I  use a cane when I leave my house   • Fractures    • GERD (gastroesophageal reflux disease)    • History of CT scan of abdomen 2011    constipation, sig tics, 6 mm hepatic cyst   • History of rheumatoid arthritis    • HL (hearing loss) About    • Hyperlipidemia    • Hypertension    • Irritable bowel syndrome    • Low back pain    • Normocytic anemia 2020   • Osteoarthritis    • Peripheral neuropathy Cannot remember    I have Raynaudsin my hands and feet   • Raynaud's disease    • Rheumatoid arthritis (HCC)    • Scleroderma (HCC)    • Shingles     I have had both shingles vaccines   • Sjogren's syndrome (HCC)    • Ulcerative colitis (HCC)          Past Surgical History:   Procedure Laterality Date   • CATARACT EXTRACTION, BILATERAL     • COLONOSCOPY  2016    tics, microscopic colitis,    • COLONOSCOPY  2011    sig tics, inflammation,  polyp   • DILATATION AND CURETTAGE  1980   • EKOS CATHETER PLACEMENT N/A 03/23/2021    Procedure: AIF WITH RUN OFF OF RT. LEG, ROTATIONAL ATHERECTOMY OF RIGHT POPLITEAL ARTERY;  Surgeon: Gato Contreras MD;  Location: [facility] OR 18/19;  Service: Vascular;  Laterality: N/A;   • ENDOSCOPY N/A 12/03/2018    erythematous mucosa in stomach, path benign   • EYE SURGERY     • FLEXIBLE SIGMOIDOSCOPY     • FRACTURE SURGERY     • HAND SURGERY     • JOINT REPLACEMENT     • KNEE ARTHROSCOPY Right     w/meniscal repair   • KNEE SURGERY Right    • TEETH EXTRACTION     • TOTAL KNEE ARTHROPLASTY Right 05/30/2018    Procedure: TOTAL KNEE ARTHROPLASTY;  Surgeon: Georges Jon MD;  Location: [facility] OR;  Service: Orthopedics   • TRANSLUMINAL ATHERECTOMY POPLITEAL ARTERY     • UPPER GASTROINTESTINAL ENDOSCOPY  03/14/2008    erythema   • VASCULAR SURGERY  Spring of 2020    Blood clot in an artery behind my right knee   • WRIST FRACTURE SURGERY Right    • WRIST SURGERY Right 2008    orif           Current Outpatient Medications:   •  Acetaminophen (TYLENOL EXTRA STRENGTH PO), Take 2 tablets by mouth Daily As Needed., Disp: , Rfl:   •  amLODIPine (NORVASC) 2.5 MG tablet, , Disp: , Rfl:   •  atorvastatin (LIPITOR) 20 MG tablet, , Disp: , Rfl:   •  Budesonide (ENTOCORT EC) 3 MG 24 hr capsule, , Disp: , Rfl:   •  calcium carbonate (OS-DWIGHT) 600 MG tablet, 1 P.O. daily, Disp: , Rfl:   •  Carboxymethylcell-Hypromellose (GENTEAL OP), Apply 1 application to eye As Needed., Disp: , Rfl:   •  Cholecalciferol (VITAMIN D PO), Take 2,000 mg by mouth every night at bedtime., Disp: , Rfl:   •  escitalopram (LEXAPRO) 20 MG tablet, , Disp: , Rfl:   •  fluticasone (FLONASE) 50 MCG/ACT nasal spray, 1-2 sprays into the nostril(s) as directed by provider., Disp: , Rfl:   •  folic acid (FOLVITE) 1 MG tablet, Take 2 mg by mouth Daily., Disp: , Rfl:   •  Methotrexate Sodium (METHOTREXATE PF) 50 MG/2ML chemo syringe, Infuse  into a venous  catheter 1 (One) Time., Disp: , Rfl:   •  Multiple Vitamins-Minerals (CENTRUM SILVER ULTRA WOMENS PO), 1 P.O. daily, Disp: , Rfl:   •  omeprazole (priLOSEC) 20 MG capsule, 20 mg. Monday Wednesday friday, Disp: , Rfl:   •  polyethyl glycol-propyl glycol (Systane) 0.4-0.3 % solution ophthalmic solution (artificial tears), , Disp: , Rfl:   •  traZODone (DESYREL) 50 MG tablet, Take 25-50 mg by mouth Every Night., Disp: , Rfl:       Family History   Problem Relation Age of Onset   • Ulcerative colitis Mother    • Migraines Mother    • Stroke Mother    • Hypertension Mother    • Thyroid disease Mother    • Skin cancer Mother    • Breast cancer Maternal Aunt    • Arthritis Father    • Heart attack Father    • Migraines Sister         Sister   • Skin cancer Sister    • Migraines Daughter    • Skin cancer Brother    • Malig Hyperthermia Neg Hx          Social History     Socioeconomic History   • Marital status: Single   • Number of children: 3   Tobacco Use   • Smoking status: Never   • Smokeless tobacco: Never   Vaping Use   • Vaping Use: Never used   Substance and Sexual Activity   • Alcohol use: Yes     Comment: I rarely have a glass of wine.   • Drug use: Never   • Sexual activity: Not Currently     Comment: Not sexual active         Allergies   Allergen Reactions   • Codeine Diarrhea and Nausea Only   • Penicillin G Rash   • Sulfa Antibiotics Hives         Pain Scale: 0/10 currently        ROS:  Review of Systems   Constitutional: Positive for activity change and fatigue.   HENT: Positive for hearing loss and trouble swallowing. Negative for congestion, nosebleeds and tinnitus.    Eyes: Negative for photophobia and visual disturbance.   Respiratory: Positive for choking. Negative for chest tightness, shortness of breath and wheezing.    Cardiovascular: Negative for chest pain, palpitations and leg swelling.   Gastrointestinal: Negative for abdominal pain, blood in stool and rectal pain.   Endocrine: Positive for cold  "intolerance. Negative for heat intolerance.   Genitourinary: Positive for difficulty urinating, frequency and urgency. Negative for vaginal pain.   Musculoskeletal: Positive for back pain and gait problem. Negative for neck pain and neck stiffness.   Allergic/Immunologic: Negative for food allergies.   Neurological: Positive for dizziness and light-headedness. Negative for speech difficulty and headaches.   Hematological: Does not bruise/bleed easily.   Psychiatric/Behavioral: Negative for confusion, decreased concentration and sleep disturbance. The patient is not nervous/anxious.          I have reviewed and agree with the above ROS completed by the medical assistant.      Physical Exam:  Vitals:    10/27/22 0828   BP: 142/76   Pulse: 60   Resp: 14   Weight: 47.4 kg (104 lb 9.6 oz)   Height: 152.4 cm (60\")     Orthostatic BP: Supine blood pressure 140/70; standing blood pressure unchanged in the right arm    Body mass index is 20.43 kg/m².    Physical Exam  General: Slender elderly white female no acute distress  HEENT: Normocephalic no evidence of trauma.  Discs poorly seen.  Throat negative  Neck: Supple.  No thyromegaly.  No cervical bruits.  Heart: Regular rate and rhythm no murmurs.  No pedal edema  Extremities: Radial pulses were strong and simultaneous.    Neurological Exam:   Mental Status: Awake, alert, oriented to person, place and time.  Conversant without evidence of an affective disorder, thought disorder, delusions or hallucinations.  Attention span and concentration are normal.  HCF: No aphasia, apraxia or dysarthria.  Recent and remote memory intact.  Knowledge  of recent events intact.  CN: I:   II: Visual fields full without left inattention   III, IV, VI: Eye movements intact without nystagmus or ptosis.  Pupils equal  round and reactive to light.   V,VII: Light touch and pinprick intact all 3 divisions of V.  Facial muscles symmetrical.   VIII: Hearing intact to finger rub with hearing aids " in   IX,X: Soft palate elevates symmetrically   XI: Sternomastoid and trapezius are strong.   XII: Tongue midline without atrophy or fasciculations  Motor: Normal tone and bulk in the upper and lower extremities   Power testing: Full power in all muscles tested in the arms.  Mild toe extensor weakness bilateral but otherwise normal in the legs  Reflexes: Upper extremities: +1 diffusely        Lower extremities: +1 knee jerks and ankle jerks        Toe signs: Downgoing bilaterally  Sensory: Light touch: Reduced in the fingers and thumbs of both hands otherwise intact in the arms.  Intact in the legs and feet        Pinprick: Diffusely intact in the arms and legs.        Vibration: Reduced at the ankle and absent at the first CMC joints of each foot        Position: Intact in the great toes    Cerebellar: Finger-to-nose: Slightly slowed and slightly past-pointing           Rapid movement: Slightly slowed           Heel-to-shin: Mildly dysmetric  Gait and Station: Comes to stand without difficulty but is broad-based and unsteady.  She ambulates with her cane.  Standing on a narrow base she is unsteady and can feel herself swaying.  Eye closure caused falling    Results:      Lab Results   Component Value Date    GLUCOSE 108 09/02/2022    BUN 16 09/02/2022    CREATININE 0.59 (L) 09/02/2022    EGFRIFNONA 95 09/02/2021    EGFRIFAFRI 102 10/15/2018    BCR 27.1 09/02/2022    CO2 23.1 09/02/2022    CALCIUM 8.9 09/02/2022    PROTENTOTREF 6.6 09/02/2022    ALBUMIN 4.30 09/02/2022    ALBUMIN 3.9 09/02/2022    LABIL2 1.5 09/02/2022    AST 20 09/02/2022    ALT 29 09/02/2022       Lab Results   Component Value Date    WBC 6.57 09/08/2022    HGB 12.4 09/08/2022    HCT 38.5 09/08/2022    .6 (H) 09/08/2022     09/08/2022         .No results found for: RPR      Lab Results   Component Value Date    TSH 4.250 04/14/2020         Lab Results   Component Value Date    DYISYRTJ99 593 09/08/2022         Lab Results   Component  Value Date    FOLATE >20.00 09/08/2022         No results found for: HGBA1C      Lab Results   Component Value Date    GLUCOSE 108 09/02/2022    BUN 16 09/02/2022    CREATININE 0.59 (L) 09/02/2022    EGFRIFNONA 95 09/02/2021    EGFRIFAFRI 102 10/15/2018    BCR 27.1 09/02/2022    K 4.1 09/02/2022    CO2 23.1 09/02/2022    CALCIUM 8.9 09/02/2022    PROTENTOTREF 6.6 09/02/2022    ALBUMIN 4.30 09/02/2022    ALBUMIN 3.9 09/02/2022    LABIL2 1.5 09/02/2022    AST 20 09/02/2022    ALT 29 09/02/2022         Lab Results   Component Value Date    WBC 6.57 09/08/2022    HGB 12.4 09/08/2022    HCT 38.5 09/08/2022    .6 (H) 09/08/2022     09/08/2022             Assessment:   1.  Gait ataxia-I suspect this is a combination of inner ear malfunctioning and a spinocerebellar atrophy.  She does not have family history but her coordination is somewhat worse than expected then I would ordinarily see in vestibular malfunctioning only.  She does not have enough sensory loss to clearly have a sensory dysmetria either.    She does not have any evidence of a spinal cord syndrome otherwise such as a myelopathy.  She has minor evidence of peripheral neuropathy but again does not seem to be enough to cause this degree of problem.  A clue is that her vibration sense is reduced greater than expected for any spinothalamic tract modality issue.  I am not convinced of this presentation being due to normal pressure hydrocephalus.  Patient does have quite a bit of small vessel change on MRI but not really in the cerebellum.  The cerebellar tonsils are a bit low-lying but not to the degree seen in a Chiari malformation          Plan:  1.  I had a lengthy Discussion with the patient.  I recommended checking her thyroid to verify no problem there.  I do not think further investigation of peripheral neuropathy, myelopathy or normal pressure hydrocephalus is indicated.  2.  From a practical point of view avoiding falls with injury is  paramount and recommended continued use of an assistive device while on her feet.  She is already plugged into discal therapy as well as special vestibular therapy.  3.  Little else to add.  Follow-up as needed            Time: 60 minutes          Dictated utilizing Dragon dictation.

## 2022-11-02 ENCOUNTER — PATIENT ROUNDING (BHMG ONLY) (OUTPATIENT)
Dept: NEUROLOGY | Facility: CLINIC | Age: 87
End: 2022-11-02

## 2022-12-06 ENCOUNTER — OFFICE VISIT (OUTPATIENT)
Dept: ORTHOPEDIC SURGERY | Facility: CLINIC | Age: 87
End: 2022-12-06

## 2022-12-06 VITALS — HEIGHT: 60 IN | TEMPERATURE: 97.6 F | BODY MASS INDEX: 20.42 KG/M2 | WEIGHT: 104 LBS

## 2022-12-06 DIAGNOSIS — R52 PAIN: Primary | ICD-10-CM

## 2022-12-06 DIAGNOSIS — M70.51 PES ANSERINUS BURSITIS OF RIGHT KNEE: ICD-10-CM

## 2022-12-06 PROCEDURE — 20610 DRAIN/INJ JOINT/BURSA W/O US: CPT | Performed by: NURSE PRACTITIONER

## 2022-12-06 PROCEDURE — 99213 OFFICE O/P EST LOW 20 MIN: CPT | Performed by: NURSE PRACTITIONER

## 2022-12-06 PROCEDURE — 73562 X-RAY EXAM OF KNEE 3: CPT | Performed by: NURSE PRACTITIONER

## 2022-12-06 RX ORDER — METHYLPREDNISOLONE ACETATE 80 MG/ML
80 INJECTION, SUSPENSION INTRA-ARTICULAR; INTRALESIONAL; INTRAMUSCULAR; SOFT TISSUE
Status: COMPLETED | OUTPATIENT
Start: 2022-12-06 | End: 2022-12-06

## 2022-12-06 RX ADMIN — METHYLPREDNISOLONE ACETATE 80 MG: 80 INJECTION, SUSPENSION INTRA-ARTICULAR; INTRALESIONAL; INTRAMUSCULAR; SOFT TISSUE at 11:59

## 2022-12-06 NOTE — PROGRESS NOTES
"Patient: Arlin Hutson  YOB: 1935 87 y.o. female  Medical Record Number: [id number]    Chief Complaints:   Chief Complaint   Patient presents with   • Right Knee - Initial Evaluation       History of Present Illness:Arlin Hutson is a 87 y.o. female who presents with complaints of right anterior medial knee pain.  She has been seen previously for knee Pez anserine bursitis.  She has had a couple of injections while they seem to help she reports\" they do not seem to last\" she is tried Voltaren gel over-the-counter with minimal improvement.  She has been going to physical therapy.  Patient denies any injury.  She had right total knee replacement done about 4 years ago denies any pain within the knee joint.  Denies any known injury or recent illness.    Allergies:   Allergies   Allergen Reactions   • Codeine Diarrhea and Nausea Only   • Penicillin G Rash   • Sulfa Antibiotics Hives       Medications:   Current Outpatient Medications   Medication Sig Dispense Refill   • Acetaminophen (TYLENOL EXTRA STRENGTH PO) Take 2 tablets by mouth Daily As Needed.     • amLODIPine (NORVASC) 2.5 MG tablet      • atorvastatin (LIPITOR) 20 MG tablet      • Budesonide (ENTOCORT EC) 3 MG 24 hr capsule      • calcium carbonate (OS-DWIGHT) 600 MG tablet 1 P.O. daily     • Carboxymethylcell-Hypromellose (GENTEAL OP) Apply 1 application to eye As Needed.     • Cholecalciferol (VITAMIN D PO) Take 2,000 mg by mouth every night at bedtime.     • escitalopram (LEXAPRO) 20 MG tablet      • fluticasone (FLONASE) 50 MCG/ACT nasal spray 1-2 sprays into the nostril(s) as directed by provider.     • folic acid (FOLVITE) 1 MG tablet Take 2 mg by mouth Daily.     • Methotrexate Sodium (METHOTREXATE PF) 50 MG/2ML chemo syringe Infuse  into a venous catheter 1 (One) Time.     • Multiple Vitamins-Minerals (CENTRUM SILVER ULTRA WOMENS PO) 1 P.O. daily     • omeprazole (priLOSEC) 20 MG capsule 20 mg. Monday Wednesday friday     • polyethyl " "glycol-propyl glycol (Systane) 0.4-0.3 % solution ophthalmic solution (artificial tears)      • traZODone (DESYREL) 50 MG tablet Take 25-50 mg by mouth Every Night.       No current facility-administered medications for this visit.         The following portions of the patient's history were reviewed and updated as appropriate: allergies, current medications, past family history, past medical history, past social history, past surgical history and problem list.    Review of Systems:   A 14 point review of systems was performed. All systems negative except pertinent positives/negative listed in HPI above    Physical Exam:   Vitals:    12/06/22 1125   Temp: 97.6 °F (36.4 °C)   TempSrc: Temporal   Weight: 47.2 kg (104 lb)   Height: 152.4 cm (60\")       General: A and O x 3, ASA, NAD    SCLERA:    Normal    Skin clear no unusual lesions noted  Right knee patient has a well-healed surgical incision noted she is still very tender over the right knee Pez anserine bursa she otherwise has X range of motion with no instability calf is soft and nontender  Right hip patient has X range of motion with normal internal X rotation with a negative Stinchfield negative logroll       Radiology:  Xrays 3views (ap,lateral, sunrise) right knee were ordered and reviewed today secondary to pain show well-placed well-positioned right total knee replacement.  Compared to views are unchanged    Assessment/Plan: Status post right TKA with right knee Pez anserine bursitis    Patient and I discussed options, we will try another injection into the Pez anserine bursa.  I will also give her a prescription for [employer] compound cream, continue physical therapy, I will have her follow-up in 6 weeks.  She had a bone scan done in January which was normal so I do not think is an issue with replacement.  It may actually be referred pain perhaps from her lower back since she has chronic low back issues.  Her hip examination completely    Large Joint " Arthrocentesis: R knee  Date/Time: 12/6/2022 11:59 AM  Consent given by: patient  Site marked: site marked  Timeout: Immediately prior to procedure a time out was called to verify the correct patient, procedure, equipment, support staff and site/side marked as required   Supporting Documentation  Indications: pain and joint swelling   Procedure Details  Location: knee - R knee  Preparation: Patient was prepped and draped in the usual sterile fashion  Needle size: 18 G  Approach: medial (pes anserine bursa)  Medications administered: 80 mg methylPREDNISolone acetate 80 MG/ML         normal today so I do not think it is coming from her hip.      Kadie Sandoval, APRN  12/6/2022

## 2022-12-22 ENCOUNTER — HOSPITAL ENCOUNTER (OUTPATIENT)
Dept: CT IMAGING | Facility: HOSPITAL | Age: 87
Discharge: HOME | End: 2022-12-22
Admitting: INTERNAL MEDICINE

## 2022-12-22 DIAGNOSIS — R93.5 ABNORMAL CT OF THE ABDOMEN: ICD-10-CM

## 2022-12-22 LAB — CREAT BLDA-MCNC: 0.6 MG/DL (ref 0.6–1.3)

## 2022-12-22 PROCEDURE — 74177 CT ABD & PELVIS W/CONTRAST: CPT

## 2022-12-22 PROCEDURE — 82565 ASSAY OF CREATININE: CPT

## 2022-12-22 PROCEDURE — 25010000002 IOPAMIDOL 61 % SOLUTION: Performed by: INTERNAL MEDICINE

## 2022-12-22 RX ADMIN — IOPAMIDOL 95 ML: 612 INJECTION, SOLUTION INTRAVENOUS at 11:13

## 2022-12-23 NOTE — PROGRESS NOTES
12/23/22       I called her with the results of this CAT scan of the abdomen and pelvis with small bowel enterography.  Please send a copy of this report to her PCP.  Dolly osman

## 2022-12-27 ENCOUNTER — TELEPHONE (OUTPATIENT)
Dept: GASTROENTEROLOGY | Facility: CLINIC | Age: 87
End: 2022-12-27

## 2023-01-05 ENCOUNTER — OFFICE VISIT (OUTPATIENT)
Dept: GASTROENTEROLOGY | Facility: CLINIC | Age: 88
End: 2023-01-05
Payer: MEDICARE

## 2023-01-05 VITALS
SYSTOLIC BLOOD PRESSURE: 137 MMHG | BODY MASS INDEX: 20.73 KG/M2 | HEART RATE: 80 BPM | DIASTOLIC BLOOD PRESSURE: 73 MMHG | WEIGHT: 105.6 LBS | HEIGHT: 60 IN | TEMPERATURE: 96.4 F

## 2023-01-05 DIAGNOSIS — R63.4 WEIGHT LOSS: ICD-10-CM

## 2023-01-05 DIAGNOSIS — R93.5 ABNORMAL CT OF THE ABDOMEN: Primary | ICD-10-CM

## 2023-01-05 DIAGNOSIS — Z86.010 HISTORY OF COLON POLYPS: ICD-10-CM

## 2023-01-05 DIAGNOSIS — K52.839 MICROSCOPIC COLITIS, UNSPECIFIED MICROSCOPIC COLITIS TYPE: ICD-10-CM

## 2023-01-05 DIAGNOSIS — R19.8 ALTERNATING CONSTIPATION AND DIARRHEA: ICD-10-CM

## 2023-01-05 LAB
ALBUMIN SERPL-MCNC: 4.1 G/DL (ref 3.5–5.2)
ALBUMIN/GLOB SERPL: 2.1 G/DL
ALP SERPL-CCNC: 90 U/L (ref 39–117)
ALT SERPL-CCNC: 21 U/L (ref 1–33)
AST SERPL-CCNC: 18 U/L (ref 1–32)
BASOPHILS # BLD AUTO: 0.04 10*3/MM3 (ref 0–0.2)
BASOPHILS NFR BLD AUTO: 0.6 % (ref 0–1.5)
BILIRUB SERPL-MCNC: 0.4 MG/DL (ref 0–1.2)
BUN SERPL-MCNC: 17 MG/DL (ref 8–23)
BUN/CREAT SERPL: 27.4 (ref 7–25)
CALCIUM SERPL-MCNC: 9 MG/DL (ref 8.6–10.5)
CHLORIDE SERPL-SCNC: 107 MMOL/L (ref 98–107)
CO2 SERPL-SCNC: 28.9 MMOL/L (ref 22–29)
CREAT SERPL-MCNC: 0.62 MG/DL (ref 0.57–1)
EGFRCR SERPLBLD CKD-EPI 2021: 86.3 ML/MIN/1.73
EOSINOPHIL # BLD AUTO: 0.2 10*3/MM3 (ref 0–0.4)
EOSINOPHIL NFR BLD AUTO: 2.8 % (ref 0.3–6.2)
ERYTHROCYTE [DISTWIDTH] IN BLOOD BY AUTOMATED COUNT: 12.8 % (ref 12.3–15.4)
GLOBULIN SER CALC-MCNC: 2 GM/DL
GLUCOSE SERPL-MCNC: 85 MG/DL (ref 65–99)
HCT VFR BLD AUTO: 35.7 % (ref 34–46.6)
HGB BLD-MCNC: 12.1 G/DL (ref 12–15.9)
IMM GRANULOCYTES # BLD AUTO: 0.03 10*3/MM3 (ref 0–0.05)
IMM GRANULOCYTES NFR BLD AUTO: 0.4 % (ref 0–0.5)
LYMPHOCYTES # BLD AUTO: 1.03 10*3/MM3 (ref 0.7–3.1)
LYMPHOCYTES NFR BLD AUTO: 14.7 % (ref 19.6–45.3)
MCH RBC QN AUTO: 31.8 PG (ref 26.6–33)
MCHC RBC AUTO-ENTMCNC: 33.9 G/DL (ref 31.5–35.7)
MCV RBC AUTO: 93.9 FL (ref 79–97)
MONOCYTES # BLD AUTO: 0.69 10*3/MM3 (ref 0.1–0.9)
MONOCYTES NFR BLD AUTO: 9.8 % (ref 5–12)
NEUTROPHILS # BLD AUTO: 5.04 10*3/MM3 (ref 1.7–7)
NEUTROPHILS NFR BLD AUTO: 71.7 % (ref 42.7–76)
NRBC BLD AUTO-RTO: 0 /100 WBC (ref 0–0.2)
PLATELET # BLD AUTO: 203 10*3/MM3 (ref 140–450)
POTASSIUM SERPL-SCNC: 3.5 MMOL/L (ref 3.5–5.2)
PROT SERPL-MCNC: 6.1 G/DL (ref 6–8.5)
RBC # BLD AUTO: 3.8 10*6/MM3 (ref 3.77–5.28)
SODIUM SERPL-SCNC: 146 MMOL/L (ref 136–145)
TSH SERPL DL<=0.005 MIU/L-ACNC: 4.3 UIU/ML (ref 0.27–4.2)
WBC # BLD AUTO: 7.03 10*3/MM3 (ref 3.4–10.8)

## 2023-01-05 PROCEDURE — 99213 OFFICE O/P EST LOW 20 MIN: CPT | Performed by: INTERNAL MEDICINE

## 2023-01-05 RX ORDER — ASPIRIN 81 MG/1
81 TABLET, CHEWABLE ORAL DAILY
COMMUNITY

## 2023-01-05 NOTE — PROGRESS NOTES
Chief Complaint   Patient presents with   • Abdominal Pain   • Diarrhea   • Constipation   • Microscopic colitis       History of Present Illness:   87 y.o. female who I last saw in the office in 9 of 2022:  Assessment:  1.  On Plavix for arterial blood clot in 3/21.  2. H/o microscopic colitis - on Budesonide 3 mg one/day periodically.  3. H/o iron def anemia. She has recently received 2 iron infusions by Dr. Caruso.   4. Weight loss.   5. Abnormal small bowel thickening seen on CT abd/pelvis     Recommendations:  1. Take fiber daily. Take Fibercon 2/day.  2. Continue the Budesonide 1/day.  3. I will review her CT abd/pevlis Enterography with the Radiologist.   4. F/u 3 mos.        She had a repeat CT of the abdomen and pelvis with small bowel enterography on 12/23/2022:  IMPRESSION:     Previous appearance of multifocal small bowel wall thickening appears at  least mostly resolved, with scattered appearance of mild residual wall  thickening versus peristalsis in proximal small bowel.      This report was finalized on 12/23/2022 12:49 PM by Dr. Nando Rossi M.D.       Some days she feels good and some days not. Rare diarrhea. She is more constipated. Takes Fiber pills 2/day. On Budesonide 3 mg 1/day and she thinks it helps her diarrhea. Sometimes lower abdominal discomfort, that is worse when more constipated. No chest pain. No rectal bleeding or melena. She may have gained a few pounds over Xmas. NO fevers, chills or nite sweats.        Last colonoscopy in 2016. Last EGD in 12/18.     Past Medical History:   Diagnosis Date   • Atheroembolism of right lower extremity (HCC)    • Baker's cyst    • Colon polyp    • CREST syndrome (HCC)    • Difficulty walking About 5 yrs ago    I  use a cane when I leave my house   • Fractures    • GERD (gastroesophageal reflux disease)    • History of CT scan of abdomen 03/11/2011    constipation, sig tics, 6 mm hepatic cyst   • History of rheumatoid arthritis    • HL (hearing  loss) About 1995   • Hyperlipidemia    • Hypertension    • Irritable bowel syndrome 1990s   • Low back pain    • Normocytic anemia 08/26/2020   • Osteoarthritis    • Peripheral neuropathy Cannot remember    I have Raynaudsin my hands and feet   • Raynaud's disease    • Rheumatoid arthritis (HCC)    • Scleroderma (HCC)    • Shingles     I have had both shingles vaccines   • Sjogren's syndrome (HCC)    • Ulcerative colitis (HCC)        Past Surgical History:   Procedure Laterality Date   • CATARACT EXTRACTION, BILATERAL     • COLONOSCOPY  02/26/2016    tics, microscopic colitis,    • COLONOSCOPY  07/01/2011    sig tics, inflammation, polyp   • DILATATION AND CURETTAGE  1980   • EKOS CATHETER PLACEMENT N/A 03/23/2021    Procedure: AIF WITH RUN OFF OF RT. LEG, ROTATIONAL ATHERECTOMY OF RIGHT POPLITEAL ARTERY;  Surgeon: Gato Contreras MD;  Location: [facility] 18/19;  Service: Vascular;  Laterality: N/A;   • ENDOSCOPY N/A 12/03/2018    erythematous mucosa in stomach, path benign   • EYE SURGERY     • FLEXIBLE SIGMOIDOSCOPY     • FRACTURE SURGERY     • HAND SURGERY     • JOINT REPLACEMENT     • KNEE ARTHROSCOPY Right     w/meniscal repair   • KNEE SURGERY Right    • TEETH EXTRACTION     • TOTAL KNEE ARTHROPLASTY Right 05/30/2018    Procedure: TOTAL KNEE ARTHROPLASTY;  Surgeon: Georges Jon MD;  Location: [facility];  Service: Orthopedics   • TRANSLUMINAL ATHERECTOMY POPLITEAL ARTERY     • UPPER GASTROINTESTINAL ENDOSCOPY  03/14/2008    erythema   • VASCULAR SURGERY  Spring of 2020    Blood clot in an artery behind my right knee   • WRIST FRACTURE SURGERY Right    • WRIST SURGERY Right 2008    orif         Current Outpatient Medications:   •  amLODIPine (NORVASC) 2.5 MG tablet, , Disp: , Rfl:   •  aspirin 81 MG chewable tablet, Chew 81 mg Daily., Disp: , Rfl:   •  atorvastatin (LIPITOR) 20 MG tablet, , Disp: , Rfl:   •  Budesonide (ENTOCORT EC) 3 MG 24 hr capsule, , Disp: , Rfl:   •  calcium carbonate  (OS-DWIGHT) 600 MG tablet, 1 P.O. daily, Disp: , Rfl:   •  Cholecalciferol (VITAMIN D PO), Take 2,000 mg by mouth every night at bedtime., Disp: , Rfl:   •  escitalopram (LEXAPRO) 20 MG tablet, , Disp: , Rfl:   •  fluticasone (FLONASE) 50 MCG/ACT nasal spray, 1-2 sprays into the nostril(s) as directed by provider., Disp: , Rfl:   •  folic acid (FOLVITE) 1 MG tablet, Take 2 mg by mouth Daily., Disp: , Rfl:   •  Methotrexate Sodium (METHOTREXATE PF) 50 MG/2ML chemo syringe, Infuse  into a venous catheter 1 (One) Time., Disp: , Rfl:   •  Multiple Vitamins-Minerals (CENTRUM SILVER ULTRA WOMENS PO), 1 P.O. daily, Disp: , Rfl:   •  omeprazole (priLOSEC) 20 MG capsule, 20 mg. Monday Wednesday friday, Disp: , Rfl:   •  polyethyl glycol-propyl glycol (Systane) 0.4-0.3 % solution ophthalmic solution (artificial tears), , Disp: , Rfl:   •  traZODone (DESYREL) 50 MG tablet, Take 25-50 mg by mouth Every Night., Disp: , Rfl:   •  Acetaminophen (TYLENOL EXTRA STRENGTH PO), Take 2 tablets by mouth Daily As Needed., Disp: , Rfl:   •  Carboxymethylcell-Hypromellose (GENTEAL OP), Apply 1 application to eye As Needed., Disp: , Rfl:     Allergies   Allergen Reactions   • Codeine Diarrhea and Nausea Only   • Penicillin G Rash   • Sulfa Antibiotics Hives       Family History   Problem Relation Age of Onset   • Ulcerative colitis Mother    • Migraines Mother    • Stroke Mother    • Hypertension Mother    • Thyroid disease Mother    • Skin cancer Mother    • Breast cancer Maternal Aunt    • Arthritis Father    • Heart attack Father    • Migraines Sister         Sister   • Skin cancer Sister    • Migraines Daughter    • Skin cancer Brother    • Malig Hyperthermia Neg Hx        Social History     Socioeconomic History   • Marital status: Single   • Number of children: 3   Tobacco Use   • Smoking status: Never   • Smokeless tobacco: Never   Vaping Use   • Vaping Use: Never used   Substance and Sexual Activity   • Alcohol use: Yes     Comment: I  rarely have a glass of wine.   • Drug use: Never   • Sexual activity: Not Currently     Comment: Not sexual active       Review of Systems   Gastrointestinal: Positive for abdominal pain and diarrhea.   All other systems reviewed and are negative.    Pertinent positives and negatives documented in the HPI and all other systems reviewed and were found to be negative.  Vitals:    01/05/23 0806   BP: 137/73   Pulse: 80   Temp: 96.4 °F (35.8 °C)       Physical Exam  Vitals reviewed.   Constitutional:       General: She is not in acute distress.     Appearance: Normal appearance. She is well-developed. She is not diaphoretic.   HENT:      Head: Normocephalic and atraumatic. Hair is normal.      Right Ear: Hearing, tympanic membrane, ear canal and external ear normal. No decreased hearing noted. No drainage.      Left Ear: Hearing, tympanic membrane, ear canal and external ear normal. No decreased hearing noted.      Nose: Nose normal. No nasal deformity.      Mouth/Throat:      Mouth: Mucous membranes are moist.   Eyes:      General: Lids are normal.         Right eye: No discharge.         Left eye: No discharge.      Extraocular Movements: Extraocular movements intact.      Conjunctiva/sclera: Conjunctivae normal.      Pupils: Pupils are equal, round, and reactive to light.   Neck:      Thyroid: No thyromegaly.      Vascular: No JVD.      Trachea: No tracheal deviation.   Cardiovascular:      Rate and Rhythm: Normal rate and regular rhythm.      Pulses: Normal pulses.      Heart sounds: Normal heart sounds. No murmur heard.    No friction rub. No gallop.   Pulmonary:      Effort: Pulmonary effort is normal. No respiratory distress.      Breath sounds: Normal breath sounds. No wheezing or rales.   Chest:      Chest wall: No tenderness.   Abdominal:      General: Bowel sounds are normal. There is no distension.      Palpations: Abdomen is soft. There is no mass.      Tenderness: There is no abdominal tenderness. There is  no guarding or rebound.      Hernia: No hernia is present.   Musculoskeletal:         General: No tenderness or deformity. Normal range of motion.      Cervical back: Normal range of motion and neck supple.   Lymphadenopathy:      Cervical: No cervical adenopathy.   Skin:     General: Skin is warm and dry.      Findings: No erythema or rash.   Neurological:      Mental Status: She is alert and oriented to person, place, and time.      Cranial Nerves: No cranial nerve deficit.      Motor: No abnormal muscle tone.      Coordination: Coordination normal.      Deep Tendon Reflexes: Reflexes are normal and symmetric. Reflexes normal.   Psychiatric:         Mood and Affect: Mood normal.         Behavior: Behavior normal.         Thought Content: Thought content normal.         Judgment: Judgment normal.         Diagnoses and all orders for this visit:    1. Abnormal CT of the abdomen (Primary)  -     CBC & Differential  -     Comprehensive Metabolic Panel  -     TSH  -     Celiac Ab tTG DGP TIgA    2. Microscopic colitis, unspecified microscopic colitis type  -     CBC & Differential  -     Comprehensive Metabolic Panel  -     TSH  -     Celiac Ab tTG DGP TIgA    3. Weight loss  -     CBC & Differential  -     Comprehensive Metabolic Panel  -     TSH  -     Celiac Ab tTG DGP TIgA    4. History of colon polyps  -     CBC & Differential  -     Comprehensive Metabolic Panel  -     TSH  -     Celiac Ab tTG DGP TIgA    5. Alternating constipation and diarrhea  -     CBC & Differential  -     Comprehensive Metabolic Panel  -     TSH  -     Celiac Ab tTG DGP TIgA      Assessment:  1. On Plavix for arterial blood clot in 3/21.  2. H/o microscopic colitis - on Budesonide 3 mg one/day periodically.  3. H/o iron def anemia. She has received iron infusions by Dr. Caruso.   4. Weight loss - resolved  5. Abnormal small bowel thickening seen on CT abd/pelvis - resolved?  6.      Recommendations:  1. She doesn't want a colonoscopy  2.  CBC, CMP  3. F/u 6 mos.     Return in about 6 months (around 7/5/2023).    Edilberto Shook MD  1/5/2023

## 2023-01-06 LAB
GLIADIN PEPTIDE IGA SER-ACNC: 5 UNITS (ref 0–19)
GLIADIN PEPTIDE IGG SER-ACNC: 2 UNITS (ref 0–19)
IGA SERPL-MCNC: 524 MG/DL (ref 64–422)
TTG IGA SER-ACNC: <2 U/ML (ref 0–3)
TTG IGG SER-ACNC: <2 U/ML (ref 0–5)

## 2023-01-07 NOTE — PROGRESS NOTES
01/07/23       Tell her that her labs show that her celiac sprue antibody panel, CBC, and CMP are normal. The TSH is 4.3 which is minimally elevated. She can discuss with Dr. Rust about this minimally elevated TSH. Please send a copy of this report to her PCP.   Dolly osman

## 2023-01-09 ENCOUNTER — TELEPHONE (OUTPATIENT)
Dept: GASTROENTEROLOGY | Facility: CLINIC | Age: 88
End: 2023-01-09

## 2023-01-17 ENCOUNTER — OFFICE VISIT (OUTPATIENT)
Dept: ORTHOPEDIC SURGERY | Facility: CLINIC | Age: 88
End: 2023-01-17
Payer: MEDICARE

## 2023-01-17 VITALS — RESPIRATION RATE: 12 BRPM | WEIGHT: 108.7 LBS | HEIGHT: 60 IN | TEMPERATURE: 96.4 F | BODY MASS INDEX: 21.34 KG/M2

## 2023-01-17 DIAGNOSIS — M70.51 PES ANSERINUS BURSITIS OF RIGHT KNEE: Primary | ICD-10-CM

## 2023-01-17 PROCEDURE — 99213 OFFICE O/P EST LOW 20 MIN: CPT | Performed by: ORTHOPAEDIC SURGERY

## 2023-01-17 NOTE — PROGRESS NOTES
Patient: Arlin Hutson  YOB: 1935 87 y.o. female  Medical Record Number: [id number]    Chief Complaints:   Chief Complaint   Patient presents with   • Right Knee - Follow-up       History of Present Illness:Arlin Hutson is a 87 y.o. female who presents for follow-up of  Right knee pain.  She is 5 years out from total knee replacement overall doing well but has had some issues with Pez anserine bursitis she is use gel has been changing injections anti-inflammatories with mild intermittent relief.    Allergies:   Allergies   Allergen Reactions   • Codeine Diarrhea and Nausea Only   • Penicillin G Rash   • Sulfa Antibiotics Hives       Medications:   Current Outpatient Medications   Medication Sig Dispense Refill   • Acetaminophen (TYLENOL EXTRA STRENGTH PO) Take 2 tablets by mouth Daily As Needed.     • amLODIPine (NORVASC) 2.5 MG tablet      • aspirin 81 MG chewable tablet Chew 81 mg Daily.     • atorvastatin (LIPITOR) 20 MG tablet      • Budesonide (ENTOCORT EC) 3 MG 24 hr capsule      • calcium carbonate (OS-DWIGHT) 600 MG tablet 1 P.O. daily     • Carboxymethylcell-Hypromellose (GENTEAL OP) Apply 1 application to eye As Needed.     • Cholecalciferol (VITAMIN D PO) Take 2,000 mg by mouth every night at bedtime.     • escitalopram (LEXAPRO) 20 MG tablet      • fluticasone (FLONASE) 50 MCG/ACT nasal spray 1-2 sprays into the nostril(s) as directed by provider.     • folic acid (FOLVITE) 1 MG tablet Take 2 mg by mouth Daily.     • Methotrexate Sodium (METHOTREXATE PF) 50 MG/2ML chemo syringe Infuse  into a venous catheter 1 (One) Time.     • Multiple Vitamins-Minerals (CENTRUM SILVER ULTRA WOMENS PO) 1 P.O. daily     • omeprazole (priLOSEC) 20 MG capsule 20 mg. Monday Wednesday friday     • polyethyl glycol-propyl glycol (Systane) 0.4-0.3 % solution ophthalmic solution (artificial tears)      • traZODone (DESYREL) 50 MG tablet Take 25-50 mg by mouth Every Night.       No current facility-administered  "medications for this visit.         The following portions of the patient's history were reviewed and updated as appropriate: allergies, current medications, past family history, past medical history, past social history, past surgical history and problem list.    Review of Systems:   A 14 point review of systems was performed. All systems negative except pertinent positives/negative listed in HPI above    Physical Exam:   Vitals:    01/17/23 1105   Resp: 12   Temp: 96.4 °F (35.8 °C)   Weight: 49.3 kg (108 lb 11.2 oz)   Height: 152.4 cm (60\")       General: A and O x 3, ASA, NAD    SCLERA:    Normal    DENTITION:   Normal  She has mild tenderness around the Pez anserine region there is no varus valgus or flexion instability the patella tracks midline skin about the knee is normal incision is well-healed there is no joint effusion    Radiology:  Xrays 3views right knee (ap,lateral, sunrise) taken previously demonstratinga well positioned knee replacement without evidence of wear, loosening or osteolysis  todays xrays were compared to previous xrays and demonstrate no change    Assessment/Plan:  Right total knee with moderate discomfort around the Pez anserine region.  I see no mechanical problems with the knee replacement.  Going to send her back to physical therapy to work on soft tissue modalities hamstring stretching  "

## 2023-03-09 ENCOUNTER — LAB (OUTPATIENT)
Dept: LAB | Facility: HOSPITAL | Age: 88
End: 2023-03-09
Payer: MEDICARE

## 2023-03-09 DIAGNOSIS — D64.9 NORMOCYTIC ANEMIA: ICD-10-CM

## 2023-03-09 DIAGNOSIS — D47.2 MGUS (MONOCLONAL GAMMOPATHY OF UNKNOWN SIGNIFICANCE): ICD-10-CM

## 2023-03-09 LAB
ALBUMIN SERPL-MCNC: 4.2 G/DL (ref 3.5–5.2)
ALBUMIN/GLOB SERPL: 1.6 G/DL (ref 1.1–2.4)
ALP SERPL-CCNC: 94 U/L (ref 38–116)
ALT SERPL W P-5'-P-CCNC: 23 U/L (ref 0–33)
ANION GAP SERPL CALCULATED.3IONS-SCNC: 13.5 MMOL/L (ref 5–15)
AST SERPL-CCNC: 18 U/L (ref 0–32)
BASOPHILS # BLD AUTO: 0.07 10*3/MM3 (ref 0–0.2)
BASOPHILS NFR BLD AUTO: 1 % (ref 0–1.5)
BILIRUB SERPL-MCNC: 0.3 MG/DL (ref 0.2–1.2)
BUN SERPL-MCNC: 15 MG/DL (ref 6–20)
BUN/CREAT SERPL: 28.8 (ref 7.3–30)
CALCIUM SPEC-SCNC: 9.1 MG/DL (ref 8.5–10.2)
CHLORIDE SERPL-SCNC: 102 MMOL/L (ref 98–107)
CO2 SERPL-SCNC: 25.5 MMOL/L (ref 22–29)
CREAT SERPL-MCNC: 0.52 MG/DL (ref 0.6–1.1)
DEPRECATED RDW RBC AUTO: 53.8 FL (ref 37–54)
EGFRCR SERPLBLD CKD-EPI 2021: 90.1 ML/MIN/1.73
EOSINOPHIL # BLD AUTO: 0.26 10*3/MM3 (ref 0–0.4)
EOSINOPHIL NFR BLD AUTO: 3.8 % (ref 0.3–6.2)
ERYTHROCYTE [DISTWIDTH] IN BLOOD BY AUTOMATED COUNT: 14.2 % (ref 12.3–15.4)
FERRITIN SERPL-MCNC: 301.9 NG/ML (ref 13–150)
GLOBULIN UR ELPH-MCNC: 2.6 GM/DL (ref 1.8–3.5)
GLUCOSE SERPL-MCNC: 75 MG/DL (ref 74–124)
HCT VFR BLD AUTO: 37.8 % (ref 34–46.6)
HGB BLD-MCNC: 12.2 G/DL (ref 12–15.9)
IMM GRANULOCYTES # BLD AUTO: 0.03 10*3/MM3 (ref 0–0.05)
IMM GRANULOCYTES NFR BLD AUTO: 0.4 % (ref 0–0.5)
IRON 24H UR-MRATE: 86 MCG/DL (ref 37–145)
IRON SATN MFR SERPL: 30 % (ref 14–48)
LYMPHOCYTES # BLD AUTO: 1.13 10*3/MM3 (ref 0.7–3.1)
LYMPHOCYTES NFR BLD AUTO: 16.7 % (ref 19.6–45.3)
MCH RBC QN AUTO: 33.2 PG (ref 26.6–33)
MCHC RBC AUTO-ENTMCNC: 32.3 G/DL (ref 31.5–35.7)
MCV RBC AUTO: 102.7 FL (ref 79–97)
MONOCYTES # BLD AUTO: 0.67 10*3/MM3 (ref 0.1–0.9)
MONOCYTES NFR BLD AUTO: 9.9 % (ref 5–12)
NEUTROPHILS NFR BLD AUTO: 4.6 10*3/MM3 (ref 1.7–7)
NEUTROPHILS NFR BLD AUTO: 68.2 % (ref 42.7–76)
NRBC BLD AUTO-RTO: 0 /100 WBC (ref 0–0.2)
PLATELET # BLD AUTO: 193 10*3/MM3 (ref 140–450)
PMV BLD AUTO: 9.4 FL (ref 6–12)
POTASSIUM SERPL-SCNC: 3.6 MMOL/L (ref 3.5–4.7)
PROT SERPL-MCNC: 6.8 G/DL (ref 6.3–8)
RBC # BLD AUTO: 3.68 10*6/MM3 (ref 3.77–5.28)
SODIUM SERPL-SCNC: 141 MMOL/L (ref 134–145)
TIBC SERPL-MCNC: 286 MCG/DL (ref 249–505)
TRANSFERRIN SERPL-MCNC: 204 MG/DL (ref 200–360)
WBC NRBC COR # BLD: 6.76 10*3/MM3 (ref 3.4–10.8)

## 2023-03-09 PROCEDURE — 83540 ASSAY OF IRON: CPT

## 2023-03-09 PROCEDURE — 80053 COMPREHEN METABOLIC PANEL: CPT

## 2023-03-09 PROCEDURE — 85025 COMPLETE CBC W/AUTO DIFF WBC: CPT

## 2023-03-09 PROCEDURE — 84466 ASSAY OF TRANSFERRIN: CPT

## 2023-03-09 PROCEDURE — 82728 ASSAY OF FERRITIN: CPT

## 2023-03-09 PROCEDURE — 36415 COLL VENOUS BLD VENIPUNCTURE: CPT

## 2023-03-13 LAB
ALBUMIN 24H MFR UR ELPH: 21.9 %
ALBUMIN SERPL ELPH-MCNC: 4.1 G/DL (ref 2.9–4.4)
ALBUMIN/GLOB SERPL: 1.6 {RATIO} (ref 0.7–1.7)
ALPHA1 GLOB 24H MFR UR ELPH: 3.5 %
ALPHA1 GLOB SERPL ELPH-MCNC: 0.2 G/DL (ref 0–0.4)
ALPHA2 GLOB 24H MFR UR ELPH: 7.8 %
ALPHA2 GLOB SERPL ELPH-MCNC: 0.7 G/DL (ref 0.4–1)
B-GLOBULIN MFR UR ELPH: 16.2 %
B-GLOBULIN SERPL ELPH-MCNC: 1.2 G/DL (ref 0.7–1.3)
GAMMA GLOB 24H MFR UR ELPH: 50.7 %
GAMMA GLOB SERPL ELPH-MCNC: 0.6 G/DL (ref 0.4–1.8)
GLOBULIN SER-MCNC: 2.7 G/DL (ref 2.2–3.9)
HIV 1 & 2 AB SER-IMP: ABNORMAL
IGA SERPL-MCNC: 582 MG/DL (ref 64–422)
IGG SERPL-MCNC: 621 MG/DL (ref 586–1602)
IGM SERPL-MCNC: 99 MG/DL (ref 26–217)
INTERPRETATION SERPL IEP-IMP: ABNORMAL
INTERPRETATION UR IFE-IMP: ABNORMAL
KAPPA LC FREE SER-MCNC: 71.7 MG/L (ref 3.3–19.4)
KAPPA LC FREE/LAMBDA FREE SER: 5.88 {RATIO} (ref 0.26–1.65)
LABORATORY COMMENT REPORT: ABNORMAL
LAMBDA LC FREE SERPL-MCNC: 12.2 MG/L (ref 5.7–26.3)
M PROTEIN 24H MFR UR ELPH: ABNORMAL %
M PROTEIN SERPL ELPH-MCNC: ABNORMAL G/DL
PROT 24H UR-MRATE: 310 MG/24 HR (ref 30–150)
PROT SERPL-MCNC: 6.8 G/DL (ref 6–8.5)
PROT UR-MCNC: 21.4 MG/DL

## 2023-03-15 NOTE — PROGRESS NOTES
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  In the interval, patient has not had any significant medical issues apart from a fall that she sustained around a month ago, falling backwards at home.  She did hit her head and her left hip.  She did not seek immediate medical attention.  She did not have any obvious significant injury apart from a slight hematoma around her left hip area.  She reports that her chronic arthralgias in her hands and feet remain stable.  She does continue with alternating diarrhea and constipation and continues on budesonide under the direction of GI.  She is due to see her rheumatologist in April.  She continues with ECOG performance status of 1, remains very active.      Objective   Vital Signs:   /80   Pulse 65   Temp 96.9 °F (36.1 °C) (Temporal)   Resp 16   Ht 152.4 cm (60\")   Wt 49.9 kg (110 lb 1.6 oz)   SpO2 95%   BMI 21.50 kg/m²     Physical Exam  Constitutional:       Appearance: She is well-developed.   Eyes:      Conjunctiva/sclera: Conjunctivae normal.   Neck:      Thyroid: No thyromegaly.   Cardiovascular:      Rate and Rhythm: Normal rate and regular rhythm.      Heart sounds: No murmur heard.    No friction rub. No gallop.   Pulmonary:      Effort: No respiratory distress.      Breath sounds: Normal breath sounds.   Abdominal:      General: Bowel sounds are normal. There is no distension.      Palpations: Abdomen is soft.      Tenderness: There is no abdominal tenderness.   Musculoskeletal:      Comments: Sclerodactyly involving the hands   Lymphadenopathy:      Head:      Right side of head: No submandibular adenopathy.      Cervical: No cervical adenopathy.      Upper Body:      Right upper body: No supraclavicular " adenopathy.      Left upper body: No supraclavicular adenopathy.   Skin:     General: Skin is warm and dry.      Findings: No rash.   Neurological:      Mental Status: She is alert and oriented to person, place, and time.      Cranial Nerves: No cranial nerve deficit.      Motor: No abnormal muscle tone.      Deep Tendon Reflexes: Reflexes normal.   Psychiatric:         Behavior: Behavior normal.     Patient was examined today, unchanged from above      Result Review : Reviewed labs from 3/9/2023 with CBC, CMP, serum protein electrophoresis, immunoelectrophoresis, quantitative immunoglobulins, free serum light chains.  Reviewed 24-hour urine protein electrophoresis, immunoelectrophoresis.        Assessment and Plan   1. 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  · Laboratory studies 2/12/2020 showed a creatinine of 0.61, calcium 9.2, globulin 2.5, albumin 4.3, serum protein electrophoresis with a prominent protein band in the beta region, could not rule out monoclonal protein.  · CBC on 4/14/2020 with WBC 6.0 with normal differential, hemoglobin 12.0, platelet count 255,000.  · Subsequent labs with Dr. Rust on 7/1/2020 showed an immunoelectrophoresis confirming an IgA kappa monoclonal protein with IgA 658, IgG 7 or 24, IgM 125.  Creatinine was normal at 0.61 with calcium 9.5.    · Patient was referred to our office for further evaluation.  · At time of initial visit 8/12/2020, hemoglobin 13.1, platelet count 200,000, creatinine 0.54 with calcium of 9.5.  · Additional labs 8/12/2020 with dual IgA kappa M spike (0.6 g and secondary M spike too small to quantify), IgA 652, IgG 743, IgM 126, free kappa light chain 87.1, free lambda light chain 12.1, free light chain ratio 7.0.  · 24-hour urine 8/17/2020 with 410 mg total protein, 40.6% kappa light chain  (166 mg).  · Skeletal survey 8/13/2020 with no evidence of lytic lesions.  · Previous pathology specimens from colonoscopy 2/26/2016 and EGD 12/10/2018 were sent for Congo red stain, both negative.  · Patient returns today for 6-month interval follow-up visit.  We are continuing to monitor both serum and urine studies on a 6-month basis and her labs have shown relative stability over time.  We are reviewing labs from 3/9/2023 which show stable findings with again a dual M spike, dominant band stable at 0.6 g (IgA kappa) and minor band too small to quantify (IgA kappa).  Additional labs with IgA stable at 582, IgG 621, IgM 99, free kappa light chain stable at 71.7, free lambda light chain 12.2, free light chain ratio stable at 5.88.  24-hour urine with decrease in total protein at 310 mg, 25.9% and 11.2% free light chain bands respectively.  Patient has normal creatinine, normal calcium.  There is no current evidence of anemia nor thrombocytopenia.  With no changes we will continue monitoring her expectantly.  We did discuss the potential to switch to annual follow-up however given the high risk nature of her IgA MGUS and urine involvement I did recommend for now staying on a 6-month basis and she agrees.  2. Anemia:  · Hemoglobin on 8/12/2020 was normal at 13.1 with MCV 95.4  · Hemoglobin declined on 8/26/2020 down to 11.3 with MCV 98.1.  Additional evaluation at that time with iron 52, ferritin 66.3, iron saturation 16%, TIBC 322, folate greater than 20, B12 552.  · Suspect that patient has anemia secondary to chronic disease with underlying chronic autoimmune disorder.  She is also receiving weekly methotrexate which may be a contributing factor.    · Hemoglobin on 3/18/2022 declined to 10.4.  Additional labs with evidence of iron deficiency with iron 43, ferritin 8.0, iron saturation 10%, TIBC 442, folate greater than 20, B12 602, CRP less than 0.3, ESR 18  · Concern regarding of patient's ability to tolerate  oral iron with underlying colitis with alternating constipation and diarrhea.  Therefore proceeded with IV iron in the form of Injectafer, received 727 mg on 4/4 and 750 mg on 4/11/2022.  · Labs on 6/17/2022 with significant response, hemoglobin up to 13.1, ferritin 591, iron saturation 35%  · Patient was seen by GI on 7/11/2022, no clinical evidence to suggest GI blood loss.  Patient opted to forego colonoscopy given her age and potential risks.  · CT abdomen and pelvis 8/15/2022 with suggestion of long segment distal small bowel wall thickening possibly related to peristalsis, recommended CT enterography.  · CT enterography performed 9/7/2022 showed multifocal areas of prominent bowel wall thickening particularly involving the jejunum, distal ileum that were nonspecific and possibly indicative of enteritis.  Distention of stomach and proximal duodenum secondary to ingestion of contrast.  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  · Mild persistent anemia may be related to ongoing methotrexate use, particularly with macrocytic indices.  Unclear whether she has a component of anemia of chronic disease related to her underlying autoimmune issues (although labs on 9/8/2022 with ESR normal at 13, CRP less than 0.3).    · Hemoglobin 3/9/2023 normal at 12 with iron replete status, iron 86, ferritin 301.9, iron saturation 30%, TIBC 286.  3. 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 Martell in rheumatology.    · She had been treated previously with Arava, subsequently changed to methotrexate weekly injections.    · She has low disease activity and her disease has been under good control.    · She does have associated Sjogren's syndrome with dry eyes and dry mouth and subsequent dental issues.  · She has chronic pain in her fingers with some degree of sclerodactyly and receives  intermittent injections by hand surgery every 4 to 6 months.  · Suspect that patient's monoclonal gammopathy is associated with her underlying rheumatologic disorder.  · There has been discussion regarding potential addition of Orencia to her weekly methotrexate injections.  · Labs on 9/8/2022 with ESR 13, CRP less than 0.3  · Patient returns today continuing on weekly methotrexate injections.  She continues follow-up with rheumatology  3. Lumbar spine stenosis and spondylolisthesis:  · Chronic back pain  · Patient followed by Dr. Licea in orthopedics  · Patient received a course of epidural injections  4. Microscopic colitis/ulcerative colitis:  · Patient is followed by Dr. Shook  · Previously treated with Entocort, discontinued around 2018  · Patient did follow-up with GI in January.  She underwent CT abdomen and pelvis on 1/27/2021 which was unrevealing.      · Previous pathology specimens from colonoscopy 2/26/2016 and EGD 12/10/2018 were sent for Congo red stain and were negative.  · Patient recently was placed on budesonide by GI with improvement in symptoms.   · Patient continuing on budesonide 3 mg daily per GI.  See above discussion regarding recent CT findings suggestive of enteritis.  Patient will be following up with GI to review findings.  She continues with alternating constipation and diarrhea.  5. Possible peripheral neuropathy  · Patient has had some longstanding intermittent symptoms in her feet with numbness that does wax and wane.  It sounds as if her symptoms are associated with Raynaud's type changes, unclear whether this represents a true peripheral neuropathy.  It is not a consistent finding.  Previous B12 level normal at 552 on 8/26/2020.  Unclear whether symptoms could be in part related to her underlying MGUS.  6. Peripheral vascular disease  · Patient developed acute ischemia right foot and was hospitalized 3/22-3/25/2021.  On 3/22/2021 patient underwent arthrectomy right popliteal  artery.  She was subsequently placed on Xarelto 20 mg daily.  · Patient was subsequently transitioned from Xarelto to Plavix 75 mg daily by vascular surgery in December 2021.     Plan:     1. Patient continues on a course of observation/surveillance regarding her IgA kappa MGUS with no evidence of change on current labs.  2. Patient continues on weekly methotrexate injections per rheumatology.    3. Patient will continue follow-up with Dr. Rust, rheumatology, GI.  4. Return in  6 months for MD visit.  1 week prior we will draw labs with CBC, CMP, serum protein electrophoresis, immunoelectrophoresis, quantitative immunoglobulins, free serum light chains, iron panel, ferritin and the patient will return to 24-hour urine for protein electrophoresis, immunoelectrophoresis.

## 2023-03-16 ENCOUNTER — OFFICE VISIT (OUTPATIENT)
Dept: ONCOLOGY | Facility: CLINIC | Age: 88
End: 2023-03-16
Payer: MEDICARE

## 2023-03-16 VITALS
OXYGEN SATURATION: 95 % | DIASTOLIC BLOOD PRESSURE: 80 MMHG | RESPIRATION RATE: 16 BRPM | HEART RATE: 65 BPM | SYSTOLIC BLOOD PRESSURE: 164 MMHG | HEIGHT: 60 IN | BODY MASS INDEX: 21.62 KG/M2 | WEIGHT: 110.1 LBS | TEMPERATURE: 96.9 F

## 2023-03-16 DIAGNOSIS — D50.8 OTHER IRON DEFICIENCY ANEMIA: ICD-10-CM

## 2023-03-16 DIAGNOSIS — D47.2 MGUS (MONOCLONAL GAMMOPATHY OF UNKNOWN SIGNIFICANCE): Primary | ICD-10-CM

## 2023-03-16 PROCEDURE — 1126F AMNT PAIN NOTED NONE PRSNT: CPT | Performed by: INTERNAL MEDICINE

## 2023-03-16 PROCEDURE — 99214 OFFICE O/P EST MOD 30 MIN: CPT | Performed by: INTERNAL MEDICINE

## 2023-03-16 PROCEDURE — 1160F RVW MEDS BY RX/DR IN RCRD: CPT | Performed by: INTERNAL MEDICINE

## 2023-03-16 PROCEDURE — 1159F MED LIST DOCD IN RCRD: CPT | Performed by: INTERNAL MEDICINE

## 2023-03-27 ENCOUNTER — APPOINTMENT (OUTPATIENT)
Dept: GENERAL RADIOLOGY | Facility: HOSPITAL | Age: 88
End: 2023-03-27
Payer: MEDICARE

## 2023-03-27 ENCOUNTER — HOSPITAL ENCOUNTER (EMERGENCY)
Facility: HOSPITAL | Age: 88
Discharge: HOME | End: 2023-03-27
Attending: EMERGENCY MEDICINE | Admitting: EMERGENCY MEDICINE
Payer: MEDICARE

## 2023-03-27 VITALS
HEART RATE: 71 BPM | SYSTOLIC BLOOD PRESSURE: 160 MMHG | OXYGEN SATURATION: 98 % | RESPIRATION RATE: 18 BRPM | DIASTOLIC BLOOD PRESSURE: 95 MMHG | HEIGHT: 60 IN | WEIGHT: 107 LBS | BODY MASS INDEX: 21.01 KG/M2 | TEMPERATURE: 97.8 F

## 2023-03-27 DIAGNOSIS — N39.0 URINARY TRACT INFECTION WITHOUT HEMATURIA, SITE UNSPECIFIED: ICD-10-CM

## 2023-03-27 DIAGNOSIS — R27.0 ATAXIA: Primary | ICD-10-CM

## 2023-03-27 DIAGNOSIS — D47.2 MGUS (MONOCLONAL GAMMOPATHY OF UNKNOWN SIGNIFICANCE): ICD-10-CM

## 2023-03-27 LAB
ALBUMIN SERPL-MCNC: 4.4 G/DL (ref 3.5–5.2)
ALBUMIN/GLOB SERPL: 1.7 G/DL
ALP SERPL-CCNC: 97 U/L (ref 39–117)
ALT SERPL W P-5'-P-CCNC: 26 U/L (ref 1–33)
ANION GAP SERPL CALCULATED.3IONS-SCNC: 11 MMOL/L (ref 5–15)
AST SERPL-CCNC: 17 U/L (ref 1–32)
BACTERIA UR QL AUTO: ABNORMAL /HPF
BASOPHILS # BLD AUTO: 0.01 10*3/MM3 (ref 0–0.2)
BASOPHILS NFR BLD AUTO: 0.1 % (ref 0–1.5)
BILIRUB SERPL-MCNC: 0.2 MG/DL (ref 0–1.2)
BILIRUB UR QL STRIP: NEGATIVE
BUN SERPL-MCNC: 26 MG/DL (ref 8–23)
BUN/CREAT SERPL: 37.1 (ref 7–25)
CALCIUM SPEC-SCNC: 9 MG/DL (ref 8.6–10.5)
CHLORIDE SERPL-SCNC: 102 MMOL/L (ref 98–107)
CLARITY UR: ABNORMAL
CO2 SERPL-SCNC: 25 MMOL/L (ref 22–29)
COLOR UR: YELLOW
CREAT SERPL-MCNC: 0.7 MG/DL (ref 0.57–1)
DEPRECATED RDW RBC AUTO: 43.6 FL (ref 37–54)
EGFRCR SERPLBLD CKD-EPI 2021: 83.8 ML/MIN/1.73
EOSINOPHIL # BLD AUTO: 0 10*3/MM3 (ref 0–0.4)
EOSINOPHIL NFR BLD AUTO: 0 % (ref 0.3–6.2)
ERYTHROCYTE [DISTWIDTH] IN BLOOD BY AUTOMATED COUNT: 12.4 % (ref 12.3–15.4)
GLOBULIN UR ELPH-MCNC: 2.6 GM/DL
GLUCOSE SERPL-MCNC: 103 MG/DL (ref 65–99)
GLUCOSE UR STRIP-MCNC: NEGATIVE MG/DL
HCT VFR BLD AUTO: 38.6 % (ref 34–46.6)
HGB BLD-MCNC: 12.9 G/DL (ref 12–15.9)
HGB UR QL STRIP.AUTO: NEGATIVE
HOLD SPECIMEN: NORMAL
HOLD SPECIMEN: NORMAL
HYALINE CASTS UR QL AUTO: ABNORMAL /LPF
IMM GRANULOCYTES # BLD AUTO: 0.05 10*3/MM3 (ref 0–0.05)
IMM GRANULOCYTES NFR BLD AUTO: 0.4 % (ref 0–0.5)
KETONES UR QL STRIP: NEGATIVE
LEUKOCYTE ESTERASE UR QL STRIP.AUTO: ABNORMAL
LYMPHOCYTES # BLD AUTO: 0.58 10*3/MM3 (ref 0.7–3.1)
LYMPHOCYTES NFR BLD AUTO: 4.2 % (ref 19.6–45.3)
MAGNESIUM SERPL-MCNC: 2.3 MG/DL (ref 1.6–2.4)
MCH RBC QN AUTO: 32.1 PG (ref 26.6–33)
MCHC RBC AUTO-ENTMCNC: 33.4 G/DL (ref 31.5–35.7)
MCV RBC AUTO: 96 FL (ref 79–97)
MONOCYTES # BLD AUTO: 0.53 10*3/MM3 (ref 0.1–0.9)
MONOCYTES NFR BLD AUTO: 3.8 % (ref 5–12)
NEUTROPHILS NFR BLD AUTO: 12.74 10*3/MM3 (ref 1.7–7)
NEUTROPHILS NFR BLD AUTO: 91.5 % (ref 42.7–76)
NITRITE UR QL STRIP: POSITIVE
NRBC BLD AUTO-RTO: 0 /100 WBC (ref 0–0.2)
PH UR STRIP.AUTO: 5.5 [PH] (ref 5–8)
PLATELET # BLD AUTO: 226 10*3/MM3 (ref 140–450)
PMV BLD AUTO: 10.2 FL (ref 6–12)
POTASSIUM SERPL-SCNC: 3.9 MMOL/L (ref 3.5–5.2)
PROT SERPL-MCNC: 7 G/DL (ref 6–8.5)
PROT UR QL STRIP: NEGATIVE
QT INTERVAL: 384 MS
RBC # BLD AUTO: 4.02 10*6/MM3 (ref 3.77–5.28)
RBC # UR STRIP: ABNORMAL /HPF
REF LAB TEST METHOD: ABNORMAL
SODIUM SERPL-SCNC: 138 MMOL/L (ref 136–145)
SP GR UR STRIP: 1.01 (ref 1–1.03)
SQUAMOUS #/AREA URNS HPF: ABNORMAL /HPF
TROPONIN T SERPL HS-MCNC: 13 NG/L
UROBILINOGEN UR QL STRIP: ABNORMAL
WBC # UR STRIP: ABNORMAL /HPF
WBC NRBC COR # BLD: 13.91 10*3/MM3 (ref 3.4–10.8)
WHOLE BLOOD HOLD COAG: NORMAL
WHOLE BLOOD HOLD SPECIMEN: NORMAL

## 2023-03-27 PROCEDURE — 85025 COMPLETE CBC W/AUTO DIFF WBC: CPT

## 2023-03-27 PROCEDURE — 36415 COLL VENOUS BLD VENIPUNCTURE: CPT

## 2023-03-27 PROCEDURE — 99284 EMERGENCY DEPT VISIT MOD MDM: CPT

## 2023-03-27 PROCEDURE — 71045 X-RAY EXAM CHEST 1 VIEW: CPT

## 2023-03-27 PROCEDURE — 80053 COMPREHEN METABOLIC PANEL: CPT

## 2023-03-27 PROCEDURE — 93005 ELECTROCARDIOGRAM TRACING: CPT

## 2023-03-27 PROCEDURE — 83735 ASSAY OF MAGNESIUM: CPT

## 2023-03-27 PROCEDURE — 93010 ELECTROCARDIOGRAM REPORT: CPT | Performed by: INTERNAL MEDICINE

## 2023-03-27 PROCEDURE — 81001 URINALYSIS AUTO W/SCOPE: CPT

## 2023-03-27 PROCEDURE — 84484 ASSAY OF TROPONIN QUANT: CPT

## 2023-03-27 RX ORDER — CEPHALEXIN 500 MG/1
500 CAPSULE ORAL 3 TIMES DAILY
Qty: 21 CAPSULE | Refills: 0 | Status: SHIPPED | OUTPATIENT
Start: 2023-03-27

## 2023-03-27 RX ORDER — SODIUM CHLORIDE 0.9 % (FLUSH) 0.9 %
10 SYRINGE (ML) INJECTION AS NEEDED
Status: DISCONTINUED | OUTPATIENT
Start: 2023-03-27 | End: 2023-03-27 | Stop reason: HOSPADM

## 2023-03-27 NOTE — ED NOTES
Pt has been off balance since Thursday.  She was told on sat at the [facility]  that she has fluid in her ears    Patient was placed in face mask during first look triage.  Patient was wearing a face mask throughout encounter.  I wore personal protective equipment throughout the encounter.  Hand hygiene was performed before and after patient encounter.

## 2023-03-27 NOTE — ED TRIAGE NOTES
"Pt states that she feels \"off balance\" and \"unsteady\" that started on Thursday. Son states that she has had similar episodes in the past that did not last as long. Pt was seen at urgent care Saturday and was placed on meclizine.    This RN wore mask and goggles during time of contact    "

## 2023-03-27 NOTE — DISCHARGE INSTRUCTIONS
I suspect your difficulty walking is related to recent urine infection on top of chronic ataxia.  Please follow-up with neurology if symptoms are not improved after treatment of urinary tract infection.  Symptoms are not suggestive of stroke tumor or hemorrhage in my opinion

## 2023-04-03 ENCOUNTER — TRANSCRIBE ORDERS (OUTPATIENT)
Dept: ADMINISTRATIVE | Facility: HOSPITAL | Age: 88
End: 2023-04-03
Payer: MEDICARE

## 2023-04-03 DIAGNOSIS — R42 DIZZINESS: Primary | ICD-10-CM

## 2023-04-04 ENCOUNTER — HOSPITAL ENCOUNTER (OUTPATIENT)
Dept: MRI IMAGING | Facility: HOSPITAL | Age: 88
Discharge: HOME | End: 2023-04-04
Admitting: INTERNAL MEDICINE
Payer: MEDICARE

## 2023-04-04 DIAGNOSIS — R42 DIZZINESS: ICD-10-CM

## 2023-04-04 PROCEDURE — A9577 INJ MULTIHANCE: HCPCS | Performed by: INTERNAL MEDICINE

## 2023-04-04 PROCEDURE — 0 GADOBENATE DIMEGLUMINE 529 MG/ML SOLUTION: Performed by: INTERNAL MEDICINE

## 2023-04-04 PROCEDURE — 70553 MRI BRAIN STEM W/O & W/DYE: CPT

## 2023-04-04 RX ADMIN — GADOBENATE DIMEGLUMINE 10 ML: 529 INJECTION, SOLUTION INTRAVENOUS at 09:18

## 2023-04-26 ENCOUNTER — TELEPHONE (OUTPATIENT)
Dept: NEUROLOGY | Facility: CLINIC | Age: 88
End: 2023-04-26
Payer: MEDICARE

## 2023-04-26 NOTE — TELEPHONE ENCOUNTER
Provider: WES HAMLIN MD    Caller: LEIGHTON    Relationship to Patient: SELF    Phone Number: [phone]    Reason for Call: PT RETURNING CALL FOR A SOONER APPT.  CALLED S/W SATINDER AT CLINIC, WARM TRANSFERRED.

## 2023-04-26 NOTE — TELEPHONE ENCOUNTER
Spoke with patient to reschedule and patient is asking if she can see lara to get in sooner.  Please advise

## 2023-04-28 ENCOUNTER — TELEPHONE (OUTPATIENT)
Dept: NEUROLOGY | Facility: CLINIC | Age: 88
End: 2023-04-28
Payer: MEDICARE

## 2023-05-14 NOTE — TELEPHONE ENCOUNTER
----- Message from Edilberto Shook MD sent at 11/17/2017  6:43 AM EST -----  Tell her that stool for clostridium dificile toxin came back negative, which is good.  The lab work that she had done shows that she is mildly anemic with a hemoglobin of 11.8.  Her white count and platelet count are normal, which is good.  Her thyroid stimulating hormone is mildly elevated at 6.8.  I would have her go to Dr. Rust to have him recheck her thyroid.   amLODIPine 5 mg oral tablet: 1 orally once a day  aspirin 81 mg oral tablet: 1 orally once a day  atorvastatin 20 mg oral tablet: 1 orally once a day  MetFORMIN (Eqv-Fortamet) 1000 mg oral tablet, extended release: 1 orally 2 times a day   amLODIPine 5 mg oral tablet: 1 orally once a day  aspirin 81 mg oral tablet: 1 orally once a day  atorvastatin 20 mg oral tablet: 1 orally once a day  doxycycline hyclate 100 mg oral tablet: 1 tab(s) orally 2 times a day  MetFORMIN (Eqv-Fortamet) 1000 mg oral tablet, extended release: 1 orally 2 times a day   amLODIPine 5 mg oral tablet: 1 orally once a day  aspirin 81 mg oral tablet: 1 orally once a day  atorvastatin 20 mg oral tablet: 1 orally once a day  doxycycline hyclate 100 mg oral tablet: 1 tab(s) orally 2 times a day MDD: 200 mg  MetFORMIN (Eqv-Fortamet) 1000 mg oral tablet, extended release: 1 orally 2 times a day

## 2023-05-26 ENCOUNTER — OFFICE VISIT (OUTPATIENT)
Dept: NEUROLOGY | Facility: CLINIC | Age: 88
End: 2023-05-26
Payer: MEDICARE

## 2023-05-26 VITALS
BODY MASS INDEX: 21.01 KG/M2 | HEIGHT: 60 IN | DIASTOLIC BLOOD PRESSURE: 68 MMHG | SYSTOLIC BLOOD PRESSURE: 120 MMHG | HEART RATE: 57 BPM | OXYGEN SATURATION: 100 % | WEIGHT: 107 LBS

## 2023-05-26 DIAGNOSIS — D47.2 MGUS (MONOCLONAL GAMMOPATHY OF UNKNOWN SIGNIFICANCE): ICD-10-CM

## 2023-05-26 DIAGNOSIS — G63 NEUROPATHY ASSOCIATED WITH MGUS: ICD-10-CM

## 2023-05-26 DIAGNOSIS — R27.0 ATAXIA: Primary | ICD-10-CM

## 2023-05-26 DIAGNOSIS — D47.2 NEUROPATHY ASSOCIATED WITH MGUS: ICD-10-CM

## 2023-05-26 NOTE — PROGRESS NOTES
"CC: Gait disturbance    HPI:  Arlin Hutson is a  87 y.o.  right-handed white female who I am seeing in follow-up for the first time for balance troubles.  She is accompanied by her best friend of 55 years.  She has a past medical history of rheumatoid arthritis, osteoarthritis, Sjogren's syndrome, CREST syndrome, MGUS with peripheral neuropathy, and hypertension.  She was last seen by Dr. Blancas 10/27/2022, the following history taken from that note with additions/modifications as indicated:    Patient indicates that her symptoms started 5 or 6 years ago.  She states that her feet hurt after walking on them in the same pair of shoes all day and she has to change shoes.  She feels like she is walking on hot coals or they feel very cold.  She uses a cane or a walker.  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  She has some urinary urgency and urinary frequency but no significant urinary incontinence.  She does wear a pad.  She denies any numbness or weakness in her hands.  She states that she has lightheadedness and a feeling of off balance but not actual dizziness.  She denies any triggers such as lying down, rolling over, standing up or bending.  She has been seen by [facility] for vestibular rehab.    Interim history    Patient states that she has had 3 falls since she was seen in October.  She said 2 of the falls she fell backwards and one of them she fell forward when she was walking with her walker.  She states that she does not realize that she is leaning backwards until it is too late and she loses her balance and falls.  She denies any dizziness with those falls.  She states she feels like her \"eyes feel loose in head\".  She states that they do not rotate or the world is not spinning but it is just a weird sensation that she feels.  She describes these episodes as being daily and vary in severity.  She denies any double vision, dysphagia, aphasia, or confusion.  She " continues physical therapy for balance control.  She states that she does have a walk-in shower with grab bars.  She states that she does have to hold onto the grab bars when she closes her eyes to wash her hair or she will fall backwards.    She states that she has a burning sensation in the soles of her feet.  It is worse when she is walking and she does not notice it at night or when she is not weightbearing.  She states that it feels good when she takes her shoes off and walks on the cold floor.  She has seen a podiatrist in the past and had custom made orthotics for her shoes which helped at the time but she feels like her feet have changed and does not wear them now.  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 Caruso.  She reports some low back pain which is relieved with a heating pad or Tylenol.  She states that this morning the pain was 8/10 and was relieved with Tylenol.  She states that she does not necessarily have neck pain to some neck tightness but denies any shooting pain down her arms.    In March patient went to the ER for worsening of her balance.  They diagnosed her with a UTI and sent her home on antibiotics.  She states that her balance did not improve after she completed the antibiotics.  She followed up with Dr. Rust who ordered an MRI of the brain.  She had an MRI on 4/3/2023 with the following impression described below:    1. No change when compared to the prior MRI of the brain from  on 01/07/2022 with no acute intracranial abnormality  identified.  2. 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   3. There is diffuse cerebral atrophy and the lateral and third  ventricles are prominent in size. I think this is most probably on the  basis of central volume  loss or atrophy rather than NPH and correlate  clinically. The remainder of the MRI of the brain is normal.  Specifically, no acute infarct is identified and the etiology of  patient's dizziness is not established on this exam.        Past Medical History:   Diagnosis Date   • Arthritis of neck ?   • Atheroembolism of right lower extremity    • Baker's cyst    • Colon polyp    • CREST syndrome    • Difficulty walking About 5 yrs ago    I  use a cane when I leave my house   • Fracture of wrist     Surgery about 19 years ago on ribght wrist   • Fractures    • GERD (gastroesophageal reflux disease)    • Hip arthrosis ? Maybe 7 or 8 years ago   • History of CT scan of abdomen 03/11/2011    constipation, sig tics, 6 mm hepatic cyst   • History of rheumatoid arthritis    • HL (hearing loss) About 1995   • Hyperlipidemia    • Hypertension    • Irritable bowel syndrome 1990s   • Knee swelling 5 or 6 years ago    Right knee replacement 4 years [facility]   • Low back pain    • Normocytic anemia 08/26/2020   • Osteoarthritis    • Peripheral neuropathy Cannot remember    I have Raynaudsin my hands and feet   • Raynaud's disease    • Rheumatoid arthritis    • Scleroderma    • Shingles     I have had both shingles vaccines   • Sjogren's syndrome    • Tear of meniscus of knee     Torn meniscus surgery   • Ulcerative colitis          Past Surgical History:   Procedure Laterality Date   • CATARACT EXTRACTION, BILATERAL     • COLONOSCOPY  02/26/2016    tics, microscopic colitis,    • COLONOSCOPY  07/01/2011    sig tics, inflammation, polyp   • DILATATION AND CURETTAGE  1980   • EKOS CATHETER PLACEMENT N/A 03/23/2021    Procedure: AIF WITH RUN OFF OF RT. LEG, ROTATIONAL ATHERECTOMY OF RIGHT POPLITEAL ARTERY;  Surgeon: Gato Contreras MD;  Location: [facility] OR 18/19;  Service: Vascular;  Laterality: N/A;   • ENDOSCOPY N/A 12/03/2018    erythematous mucosa in stomach, path benign   • EYE SURGERY     • FLEXIBLE SIGMOIDOSCOPY     •  FRACTURE SURGERY     • HAND SURGERY     • JOINT REPLACEMENT     • KNEE ARTHROSCOPY Right     w/meniscal repair   • KNEE SURGERY Right    • TEETH EXTRACTION     • TOTAL KNEE ARTHROPLASTY Right 05/30/2018    Procedure: TOTAL KNEE ARTHROPLASTY;  Surgeon: Georges Jon MD;  Location: [facility] OR;  Service: Orthopedics   • TRANSLUMINAL ATHERECTOMY POPLITEAL ARTERY     • TRIGGER POINT INJECTION     • UPPER GASTROINTESTINAL ENDOSCOPY  03/14/2008    erythema   • VASCULAR SURGERY  Spring of 2020    Blood clot in an artery behind my right knee   • WRIST FRACTURE SURGERY Right    • WRIST SURGERY Right 2008    orif           Current Outpatient Medications:   •  Acetaminophen (TYLENOL EXTRA STRENGTH PO), Take 2 tablets by mouth Daily As Needed., Disp: , Rfl:   •  amLODIPine (NORVASC) 2.5 MG tablet, , Disp: , Rfl:   •  aspirin 81 MG chewable tablet, Chew 1 tablet Daily., Disp: , Rfl:   •  atorvastatin (LIPITOR) 20 MG tablet, Take 1 tablet by mouth Daily., Disp: , Rfl:   •  Budesonide (ENTOCORT EC) 3 MG 24 hr capsule, Take 1 capsule by mouth Daily., Disp: , Rfl:   •  calcium carbonate (OS-DWIGHT) 600 MG tablet, 1 P.O. daily, Disp: , Rfl:   •  Carboxymethylcell-Hypromellose (GENTEAL OP), Apply 1 application to eye As Needed., Disp: , Rfl:   •  Cholecalciferol (VITAMIN D PO), Take 2,000 mg by mouth every night at bedtime., Disp: , Rfl:   •  escitalopram (LEXAPRO) 20 MG tablet, Take 1 tablet by mouth Every Morning., Disp: , Rfl:   •  fluticasone (FLONASE) 50 MCG/ACT nasal spray, 1-2 sprays into the nostril(s) as directed by provider., Disp: , Rfl:   •  folic acid (FOLVITE) 1 MG tablet, Take 2 tablets by mouth Daily., Disp: , Rfl:   •  Methotrexate Sodium (METHOTREXATE PF) 50 MG/2ML chemo syringe, Infuse  into a venous catheter 1 (One) Time., Disp: , Rfl:   •  Multiple Vitamins-Minerals (CENTRUM SILVER ULTRA WOMENS PO), 1 P.O. daily, Disp: , Rfl:   •  omeprazole (priLOSEC) 20 MG capsule, 1 capsule. Monday Wednesday friday, Disp: ,  Rfl:   •  polyethyl glycol-propyl glycol (Systane) 0.4-0.3 % solution ophthalmic solution (artificial tears), , Disp: , Rfl:   •  traZODone (DESYREL) 50 MG tablet, Take 25-50 mg by mouth Every Night., Disp: , Rfl:   •  cephalexin (KEFLEX) 500 MG capsule, Take 1 capsule by mouth 3 (Three) Times a Day. (Patient not taking: Reported on 5/26/2023), Disp: 21 capsule, Rfl: 0  •  meclizine (ANTIVERT) 25 MG tablet, Take 1 tablet by mouth 3 (Three) Times a Day As Needed for Dizziness (/feeling off balance). (Patient not taking: Reported on 5/26/2023), Disp: 20 tablet, Rfl: 0  •  predniSONE (DELTASONE) 10 MG (21) dose pack, Take  by mouth Daily. Use as directed on package (Patient not taking: Reported on 5/26/2023), Disp: 21 each, Rfl: 0      Family History   Problem Relation Age of Onset   • Ulcerative colitis Mother    • Migraines Mother    • Stroke Mother    • Hypertension Mother    • Thyroid disease Mother    • Skin cancer Mother    • Breast cancer Maternal Aunt    • Arthritis Father    • Heart attack Father    • Migraines Sister         Sister   • Skin cancer Sister    • Migraines Daughter    • Skin cancer Brother    • Malig Hyperthermia Neg Hx          Social History     Socioeconomic History   • Marital status: Single   • Number of children: 3   Tobacco Use   • Smoking status: Never   • Smokeless tobacco: Never   Vaping Use   • Vaping Use: Never used   Substance and Sexual Activity   • Alcohol use: Yes     Comment: I rarely have a glass of wine.   • Drug use: Never   • Sexual activity: Not Currently     Partners: Male     Comment: Not sexual active         Allergies   Allergen Reactions   • Codeine Diarrhea and Nausea Only   • Penicillin G Rash   • Sulfa Antibiotics Hives         Pain Scale: 0/10 currently        ROS:  Review of Systems   Musculoskeletal: Positive for gait problem (3 fall since LOV).   Neurological: Positive for dizziness (off and on).         I have reviewed and agree with the above ROS completed by  "the medical assistant.    Physical Exam:  Vitals:    05/26/23 1254   BP: 120/68   Pulse: 57   SpO2: 100%   Weight: 48.5 kg (107 lb)   Height: 152.4 cm (60\")     Orthostatic BP:    Body mass index is 20.9 kg/m².    Physical Exam  General: Slender elderly white female no acute distress  HEENT: Normocephalic no evidence of trauma  Neck: Supple.  No thyromegaly.  No cervical bruits.  Heart: Regular rate and rhythm no murmurs.  No pedal edema.  Extremities: Radial pulses were strong and simultaneous      Neurological Exam:   Mental Status: Awake, alert, oriented to person, place and time.  Conversant without evidence of an affective disorder, thought disorder, delusions or hallucinations.  Attention span and concentration are normal.  HCF: No aphasia, apraxia or dysarthria.  Recent and remote memory intact.  Knowledge of recent events intact.  CN: I:   II: Visual fields full without left inattention   III, IV, VI: Eye movements intact without nystagmus or ptosis.  Pupils equal round and reactive to light.   V,VII: Light touch and pinprick intact all 3 divisions of V.  Facial muscles symmetrical.   VIII: Hearing intact to finger rub.  Hearing aids bilaterally   IX,X: Soft palate elevates symmetrically   XI: Sternomastoid and trapezius are strong.   XII: Tongue midline without atrophy or fasciculations  Motor: Normal tone and bulk in the upper and lower extremities   Power testing: Full power in all muscles tested in the arms and legs.  Reflexes: Upper extremities: +1 diffusely        Lower extremities: +1 diffusely        Toe signs: Downgoing  Sensory: Light touch: Reduced on the fingers bilaterally and reduced on the feet and toes.        Pinprick: Diffusely intact in the arms and reduced on the toes.        Vibration: Reduced at the ankles and first CMC joints        Position: Intact at the great toes    Cerebellar: Finger-to-nose: Normal           Rapid movement: Normal           Heel-to-shin: Mildly dysmetric  Gait " and Station: Comes to stand without difficulty and is broad-based.  She ambulated with cane and was fairly steady.  Walked at a normal pace and normal step length with a few extra steps on turns.  Does not have a magnetic walk.  Standing on a narrow-base she is very unsteady and tends to lean backwards.     Results:      Lab Results   Component Value Date    GLUCOSE 103 (H) 03/27/2023    BUN 26 (H) 03/27/2023    CREATININE 0.70 03/27/2023    EGFRIFNONA 95 09/02/2021    EGFRIFAFRI 102 10/15/2018    BCR 37.1 (H) 03/27/2023    CO2 25.0 03/27/2023    CALCIUM 9.0 03/27/2023    PROTENTOTREF 6.8 03/09/2023    ALBUMIN 4.4 03/27/2023    LABIL2 1.6 03/09/2023    AST 17 03/27/2023    ALT 26 03/27/2023       Lab Results   Component Value Date    WBC 13.91 (H) 03/27/2023    HGB 12.9 03/27/2023    HCT 38.6 03/27/2023    MCV 96.0 03/27/2023     03/27/2023         .No results found for: RPR      Lab Results   Component Value Date    TSH 4.300 (H) 01/05/2023         Lab Results   Component Value Date    CAOMLWWW80 593 09/08/2022         Lab Results   Component Value Date    FOLATE >20.00 09/08/2022         No results found for: HGBA1C      Lab Results   Component Value Date    GLUCOSE 103 (H) 03/27/2023    BUN 26 (H) 03/27/2023    CREATININE 0.70 03/27/2023    EGFRIFNONA 95 09/02/2021    EGFRIFAFRI 102 10/15/2018    BCR 37.1 (H) 03/27/2023    K 3.9 03/27/2023    CO2 25.0 03/27/2023    CALCIUM 9.0 03/27/2023    PROTENTOTREF 6.8 03/09/2023    ALBUMIN 4.4 03/27/2023    LABIL2 1.6 03/09/2023    AST 17 03/27/2023    ALT 26 03/27/2023         Lab Results   Component Value Date    WBC 13.91 (H) 03/27/2023    HGB 12.9 03/27/2023    HCT 38.6 03/27/2023    MCV 96.0 03/27/2023     03/27/2023             Assessment:   1.  Gait ataxia- there was question if this could be NPH on most recent MRI but her physical exam makes that less likely.  She walked at a normal speed and no memory impairments.  She does have some urinary urgency  and frequency with minimal incontinence and just requires a pad.  The enlarged ventricles are likely related to increased atrophy.  2.  Peripheral neuropathy with MGUS- likely contributing some to her balance troubles.  Mild sensory disturbances but no weakness.        Plan:  1.  Continue with physical therapy for balance control  2.  Encouraged safe exercises and use of assistive devices for ambulation  3.  Encouraged controlling risk factors for small vessel disease- blood pressure less than 120/80, LDL less than 70, A1c in target range  4.  Follow-up as needed-she does have an appointment with Dr. Blancas in November and would like to keep this appointment for if she needs it but she will cancel if her symptoms remain stable.          Time: Spent 50 minutes in total encounter time. This included time for chart review, obtaining history, performing pertinent physical examination, updating medical information, ordering tests/referrals, discussion of diagnosis, medical management, counseling, and encounter documentation.                 Dictated utilizing Dragon dictation.

## 2023-07-13 ENCOUNTER — OFFICE VISIT (OUTPATIENT)
Dept: GASTROENTEROLOGY | Facility: CLINIC | Age: 88
End: 2023-07-13
Payer: MEDICARE

## 2023-07-13 VITALS
SYSTOLIC BLOOD PRESSURE: 128 MMHG | DIASTOLIC BLOOD PRESSURE: 64 MMHG | TEMPERATURE: 97.7 F | OXYGEN SATURATION: 92 % | HEIGHT: 60 IN | HEART RATE: 78 BPM | WEIGHT: 108 LBS | BODY MASS INDEX: 21.2 KG/M2

## 2023-07-13 DIAGNOSIS — K52.839 MICROSCOPIC COLITIS, UNSPECIFIED MICROSCOPIC COLITIS TYPE: Primary | ICD-10-CM

## 2023-07-13 DIAGNOSIS — R19.7 DIARRHEA, UNSPECIFIED TYPE: ICD-10-CM

## 2023-07-13 DIAGNOSIS — R19.8 ALTERNATING CONSTIPATION AND DIARRHEA: ICD-10-CM

## 2023-07-13 DIAGNOSIS — K59.00 CONSTIPATION, UNSPECIFIED CONSTIPATION TYPE: ICD-10-CM

## 2023-07-13 DIAGNOSIS — K21.9 GASTROESOPHAGEAL REFLUX DISEASE WITHOUT ESOPHAGITIS: ICD-10-CM

## 2023-07-13 DIAGNOSIS — R10.9 RIGHT SIDED ABDOMINAL PAIN: ICD-10-CM

## 2023-07-13 DIAGNOSIS — Z86.010 HISTORY OF COLON POLYPS: ICD-10-CM

## 2023-07-13 PROCEDURE — 99214 OFFICE O/P EST MOD 30 MIN: CPT | Performed by: INTERNAL MEDICINE

## 2023-07-13 RX ORDER — AMOXICILLIN 500 MG/1
CAPSULE ORAL
COMMUNITY
Start: 2023-06-06

## 2023-07-13 NOTE — PROGRESS NOTES
Chief Complaint   Patient presents with    alternating bowels between constipation and diarrhea       History of Present Illness:   87 y.o. female with rheumatoid arthritis, osteoarthritis, Sjogren's syndrome, CREST syndrome, MGUS with peripheral neuropathy, and hypertension.        Last colonoscopy in 2016. Last EGD in 12/18.        I last saw her in 1 of 2023:  Assessment:  1. On Plavix for arterial blood clot in 3/21.  2. H/o microscopic colitis - on Budesonide 3 mg one/day periodically.  3. H/o iron def anemia. She has received iron infusions by Dr. Caruso.   4. Weight loss - resolved  5. Abnormal small bowel thickening seen on CT abd/pelvis - resolved?  6.      Recommendations:  1. She doesn't want a colonoscopy  2. CBC, CMP  3. F/u 6 mos.        On Budesonide 3 mg one/day. She goes from explosive diarrhea with no warning (this past Sunday and the Sunday before)to constipation. She has average of 0-1 BM/day. No rectal bleeding or melena. NO chest pain. She has right abd pain worse when lies down at night or when takes a deep breath. No change with eating. No SOA. Weight stable. NO nausea or vomiting. Lots of heartburn.     Past Medical History:   Diagnosis Date    Arthritis of neck ?    Atheroembolism of right lower extremity     Baker's cyst     Colon polyp     CREST syndrome     Difficulty walking About 5 yrs ago    I  use a cane when I leave my house    Fracture of wrist     Surgery about 19 years ago on ribght wrist    Fractures     GERD (gastroesophageal reflux disease)     Hip arthrosis ? Maybe 7 or 8 years ago    History of CT scan of abdomen 03/11/2011    constipation, sig tics, 6 mm hepatic cyst    History of rheumatoid arthritis     HL (hearing loss) About 1995    Hyperlipidemia     Hypertension     Irritable bowel syndrome 1990s    Knee swelling 5 or 6 years ago    Right knee replacement 4 years [facility]    Low back pain     Normocytic anemia 08/26/2020    Osteoarthritis     Peripheral neuropathy Cannot  remember    I have Raynaudsin my hands and feet    Raynaud's disease     Rheumatoid arthritis     Scleroderma     Shingles     I have had both shingles vaccines    Sjogren's syndrome     Tear of meniscus of knee     Torn meniscus surgery    Ulcerative colitis        Past Surgical History:   Procedure Laterality Date    CATARACT EXTRACTION, BILATERAL      COLONOSCOPY  02/26/2016    tics, microscopic colitis,     COLONOSCOPY  07/01/2011    sig tics, inflammation, polyp    DILATATION AND CURETTAGE  1980    EKOS CATHETER PLACEMENT N/A 03/23/2021    Procedure: AIF WITH RUN OFF OF RT. LEG, ROTATIONAL ATHERECTOMY OF RIGHT POPLITEAL ARTERY;  Surgeon: Gato Contreras MD;  [facility]  Service: Vascular;  Laterality: N/A;    ENDOSCOPY N/A 12/03/2018    erythematous mucosa in stomach, path benign    EYE SURGERY      FLEXIBLE SIGMOIDOSCOPY      FRACTURE SURGERY      HAND SURGERY      JOINT REPLACEMENT      KNEE ARTHROSCOPY Right     w/meniscal repair    KNEE SURGERY Right     TEETH EXTRACTION      TOTAL KNEE ARTHROPLASTY Right 05/30/2018    Procedure: TOTAL KNEE ARTHROPLASTY;  Surgeon: Georges Jon MD;  Location: [facility] MAIN OR;  Service: Orthopedics    TRANSLUMINAL ATHERECTOMY POPLITEAL ARTERY      TRIGGER POINT INJECTION      UPPER GASTROINTESTINAL ENDOSCOPY  03/14/2008    erythema    VASCULAR SURGERY  Spring of 2020    Blood clot in an artery behind my right knee    WRIST FRACTURE SURGERY Right     WRIST SURGERY Right 2008    orif         Current Outpatient Medications:     Acetaminophen (TYLENOL EXTRA STRENGTH PO), Take 2 tablets by mouth Daily As Needed., Disp: , Rfl:     amLODIPine (NORVASC) 2.5 MG tablet, , Disp: , Rfl:     aspirin 81 MG chewable tablet, Chew 1 tablet Daily., Disp: , Rfl:     atorvastatin (LIPITOR) 20 MG tablet, Take 1 tablet by mouth Daily., Disp: , Rfl:     Budesonide (ENTOCORT EC) 3 MG 24 hr capsule, Take 1 capsule by mouth Daily., Disp: , Rfl:     calcium carbonate  (OS-DWIGHT) 600 MG tablet, 1 P.O. daily, Disp: , Rfl:     Carboxymethylcell-Hypromellose (GENTEAL OP), Apply 1 application to eye As Needed., Disp: , Rfl:     Cholecalciferol (VITAMIN D PO), Take 2,000 mg by mouth every night at bedtime., Disp: , Rfl:     escitalopram (LEXAPRO) 20 MG tablet, Take 1 tablet by mouth Every Morning., Disp: , Rfl:     fluticasone (FLONASE) 50 MCG/ACT nasal spray, 1-2 sprays into the nostril(s) as directed by provider., Disp: , Rfl:     folic acid (FOLVITE) 1 MG tablet, Take 2 tablets by mouth Daily., Disp: , Rfl:     Methotrexate Sodium (METHOTREXATE PF) 50 MG/2ML chemo syringe, Infuse  into a venous catheter 1 (One) Time., Disp: , Rfl:     Multiple Vitamins-Minerals (CENTRUM SILVER ULTRA WOMENS PO), 1 P.O. daily, Disp: , Rfl:     omeprazole (priLOSEC) 20 MG capsule, 1 capsule. Monday Wednesday friday, Disp: , Rfl:     polyethyl glycol-propyl glycol (Systane) 0.4-0.3 % solution ophthalmic solution (artificial tears), , Disp: , Rfl:     traZODone (DESYREL) 50 MG tablet, Take 25-50 mg by mouth Every Night., Disp: , Rfl:     amoxicillin (AMOXIL) 500 MG capsule, , Disp: , Rfl:     predniSONE (DELTASONE) 10 MG (21) dose pack, Take  by mouth Daily. Use as directed on package (Patient not taking: Reported on 5/26/2023), Disp: 21 each, Rfl: 0    Allergies   Allergen Reactions    Codeine Diarrhea and Nausea Only    Penicillin G Rash    Sulfa Antibiotics Hives       Family History   Problem Relation Age of Onset    Ulcerative colitis Mother     Migraines Mother     Stroke Mother     Hypertension Mother     Thyroid disease Mother     Skin cancer Mother     Breast cancer Maternal Aunt     Arthritis Father     Heart attack Father     Migraines Sister         Sister    Skin cancer Sister     Migraines Daughter     Skin cancer Brother     Malig Hyperthermia Neg Hx        Social History     Socioeconomic History    Marital status: Single    Number of children: 3   Tobacco Use    Smoking status: Never     Smokeless tobacco: Never   Vaping Use    Vaping Use: Never used   Substance and Sexual Activity    Alcohol use: Yes     Comment: I rarely have a glass of wine.    Drug use: Never    Sexual activity: Not Currently     Partners: Male     Comment: Not sexual active       Review of Systems   Gastrointestinal:  Positive for abdominal pain, constipation and diarrhea.   All other systems reviewed and are negative.  Pertinent positives and negatives documented in the HPI and all other systems reviewed and were found to be negative.  Vitals:    07/13/23 0814   BP: 128/64   Pulse: 78   Temp: 97.7 °F (36.5 °C)   SpO2: 92%       Physical Exam  Vitals reviewed.   Constitutional:       General: She is not in acute distress.     Appearance: Normal appearance. She is well-developed. She is not diaphoretic.   HENT:      Head: Normocephalic and atraumatic. Hair is normal.      Right Ear: Hearing, tympanic membrane, ear canal and external ear normal. No decreased hearing noted. No drainage.      Left Ear: Hearing, tympanic membrane, ear canal and external ear normal. No decreased hearing noted.      Nose: Nose normal. No nasal deformity.      Mouth/Throat:      Mouth: Mucous membranes are moist.   Eyes:      General: Lids are normal.         Right eye: No discharge.         Left eye: No discharge.      Extraocular Movements: Extraocular movements intact.      Conjunctiva/sclera: Conjunctivae normal.      Pupils: Pupils are equal, round, and reactive to light.   Neck:      Thyroid: No thyromegaly.      Vascular: No JVD.      Trachea: No tracheal deviation.   Cardiovascular:      Rate and Rhythm: Normal rate and regular rhythm.      Pulses: Normal pulses.      Heart sounds: Normal heart sounds. No murmur heard.    No friction rub. No gallop.   Pulmonary:      Effort: Pulmonary effort is normal. No respiratory distress.      Breath sounds: Normal breath sounds. No wheezing or rales.   Chest:      Chest wall: No tenderness.   Abdominal:       General: Bowel sounds are normal. There is no distension.      Palpations: Abdomen is soft. There is no mass.      Tenderness: There is no abdominal tenderness. There is no guarding or rebound.      Hernia: No hernia is present.   Genitourinary:     Rectum: Normal. Guaiac result negative.   Musculoskeletal:         General: No tenderness or deformity. Normal range of motion.      Cervical back: Normal range of motion and neck supple.   Lymphadenopathy:      Cervical: No cervical adenopathy.   Skin:     General: Skin is warm and dry.      Findings: No erythema or rash.   Neurological:      Mental Status: She is alert and oriented to person, place, and time.      Cranial Nerves: No cranial nerve deficit.      Motor: No abnormal muscle tone.      Coordination: Coordination normal.      Deep Tendon Reflexes: Reflexes are normal and symmetric. Reflexes normal.   Psychiatric:         Mood and Affect: Mood normal.         Behavior: Behavior normal.         Thought Content: Thought content normal.         Judgment: Judgment normal.       Diagnoses and all orders for this visit:    1. Microscopic colitis, unspecified microscopic colitis type (Primary)  -     Amylase  -     Lipase  -     CBC & Differential  -     Comprehensive Metabolic Panel  -     Celiac Ab tTG DGP TIgA  -     TSH    2. History of colon polyps  -     Amylase  -     Lipase  -     CBC & Differential  -     Comprehensive Metabolic Panel  -     Celiac Ab tTG DGP TIgA  -     TSH    3. Alternating constipation and diarrhea  -     Amylase  -     Lipase  -     CBC & Differential  -     Comprehensive Metabolic Panel  -     Celiac Ab tTG DGP TIgA  -     TSH    4. Diarrhea, unspecified type  -     Amylase  -     Lipase  -     CBC & Differential  -     Comprehensive Metabolic Panel  -     Celiac Ab tTG DGP TIgA  -     TSH    5. Constipation, unspecified constipation type  -     Amylase  -     Lipase  -     CBC & Differential  -     Comprehensive Metabolic Panel  -      Celiac Ab tTG DGP TIgA  -     TSH    6. Gastroesophageal reflux disease without esophagitis  -     Amylase  -     Lipase  -     CBC & Differential  -     Comprehensive Metabolic Panel  -     Celiac Ab tTG DGP TIgA  -     TSH    7. Right sided abdominal pain  -     Amylase  -     Lipase  -     CBC & Differential  -     Comprehensive Metabolic Panel  -     Celiac Ab tTG DGP TIgA  -     TSH  -     US Gallbladder; Future      Assessment:  Alternating consitaption and explosive diarrhea. She is on a fiber suppleement daily.   H/o microscopic colitis - on Budesonide 3 mg one/day periodically.  H/o iron def anemia. She has received iron infusions by Dr. Caruso.   Weight loss - resolved  Abnormal small bowel thickening seen on CT abd/pelvis - resolved  Right sided abdominal pain.  GERD    Recommendations:  TAke Omeprazole 20 mg/day, everyday.  Labs: CBC, CMP  She does not want a colonoscopy.  Increase the Budesonide 3mg to 2 pills/day  Add Colace 100 mg/day  US of the gb  F/u 3 mos.     No follow-ups on file.    dEilberto Shook MD  7/13/2023

## 2023-07-13 NOTE — LETTER
July 26, 2023     Oren Rust Jr., MD  [address]  [facility] [address]  [facility] [address]    Patient: Arlin Hutson   YOB: 1935   Date of Visit: 7/13/2023     Dear Oren Rust Jr., MD:       Thank you for referring Arlin Hutson to me for evaluation. Below are the relevant portions of my assessment and plan of care.    If you have questions, please do not hesitate to call me. I look forward to following Arlin along with you.         Sincerely,        Edilberto Shook MD        CC: No Recipients    Edilberto Shook MD  07/24/23 1803  Signed  07/24/23       Tell her that most of her labs look good.  Her TSH is minimally elevated at 4.54.       We will see what the ultrasound of the gallbladder shows?  Please send a copy of this report to her PCP.  Nicolex. shitalEdilberto Evangelista MD  07/26/23 1224  Signed  Chief Complaint   Patient presents with   • alternating bowels between constipation and diarrhea       History of Present Illness:   87 y.o. female with rheumatoid arthritis, osteoarthritis, Sjogren's syndrome, CREST syndrome, MGUS with peripheral neuropathy, and hypertension.        Last colonoscopy in 2016. Last EGD in 12/18.        I last saw her in 1 of 2023:  Assessment:  1. On Plavix for arterial blood clot in 3/21.  2. H/o microscopic colitis - on Budesonide 3 mg one/day periodically.  3. H/o iron def anemia. She has received iron infusions by Dr. Caruso.   4. Weight loss - resolved  5. Abnormal small bowel thickening seen on CT abd/pelvis - resolved?  6.      Recommendations:  1. She doesn't want a colonoscopy  2. CBC, CMP  3. F/u 6 mos.        On Budesonide 3 mg one/day. She goes from explosive diarrhea with no warning (this past Sunday and the Sunday before)to constipation. She has average of 0-1 BM/day. No rectal bleeding or melena. NO chest pain. She has right abd pain worse when lies down at night or when takes a deep breath. No change with eating. No SOA. Weight stable. NO nausea or vomiting.  Lots of heartburn.     Past Medical History:   Diagnosis Date   • Arthritis of neck ?   • Atheroembolism of right lower extremity    • Baker's cyst    • Colon polyp    • CREST syndrome    • Difficulty walking About 5 yrs ago    I  use a cane when I leave my house   • Fracture of wrist     Surgery about 19 years ago on ribght wrist   • Fractures    • GERD (gastroesophageal reflux disease)    • Hip arthrosis ? Maybe 7 or 8 years ago   • History of CT scan of abdomen 03/11/2011    constipation, sig tics, 6 mm hepatic cyst   • History of rheumatoid arthritis    • HL (hearing loss) About 1995   • Hyperlipidemia    • Hypertension    • Irritable bowel syndrome 1990s   • Knee swelling 5 or 6 years ago    Right knee replacement 4 years [facility]   • Low back pain    • Normocytic anemia 08/26/2020   • Osteoarthritis    • Peripheral neuropathy Cannot remember    I have Raynaudsin my hands and feet   • Raynaud's disease    • Rheumatoid arthritis    • Scleroderma    • Shingles     I have had both shingles vaccines   • Sjogren's syndrome    • Tear of meniscus of knee     Torn meniscus surgery   • Ulcerative colitis        Past Surgical History:   Procedure Laterality Date   • CATARACT EXTRACTION, BILATERAL     • COLONOSCOPY  02/26/2016    tics, microscopic colitis,    • COLONOSCOPY  07/01/2011    sig tics, inflammation, polyp   • DILATATION AND CURETTAGE  1980   • EKOS CATHETER PLACEMENT N/A 03/23/2021    Procedure: AIF WITH RUN OFF OF RT. LEG, ROTATIONAL ATHERECTOMY OF RIGHT POPLITEAL ARTERY;  Surgeon: Gato Contreras MD;  Location: [facility] 18/19;  Service: Vascular;  Laterality: N/A;   • ENDOSCOPY N/A 12/03/2018    erythematous mucosa in stomach, path benign   • EYE SURGERY     • FLEXIBLE SIGMOIDOSCOPY     • FRACTURE SURGERY     • HAND SURGERY     • JOINT REPLACEMENT     • KNEE ARTHROSCOPY Right     w/meniscal repair   • KNEE SURGERY Right    • TEETH EXTRACTION     • TOTAL KNEE ARTHROPLASTY Right 05/30/2018     Procedure: TOTAL KNEE ARTHROPLASTY;  Surgeon: Georges Jon MD;  Location: [facility];  Service: Orthopedics   • TRANSLUMINAL ATHERECTOMY POPLITEAL ARTERY     • TRIGGER POINT INJECTION     • UPPER GASTROINTESTINAL ENDOSCOPY  03/14/2008    erythema   • VASCULAR SURGERY  Spring of 2020    Blood clot in an artery behind my right knee   • WRIST FRACTURE SURGERY Right    • WRIST SURGERY Right 2008    orif         Current Outpatient Medications:   •  Acetaminophen (TYLENOL EXTRA STRENGTH PO), Take 2 tablets by mouth Daily As Needed., Disp: , Rfl:   •  amLODIPine (NORVASC) 2.5 MG tablet, , Disp: , Rfl:   •  aspirin 81 MG chewable tablet, Chew 1 tablet Daily., Disp: , Rfl:   •  atorvastatin (LIPITOR) 20 MG tablet, Take 1 tablet by mouth Daily., Disp: , Rfl:   •  Budesonide (ENTOCORT EC) 3 MG 24 hr capsule, Take 1 capsule by mouth Daily., Disp: , Rfl:   •  calcium carbonate (OS-DWIGHT) 600 MG tablet, 1 P.O. daily, Disp: , Rfl:   •  Carboxymethylcell-Hypromellose (GENTEAL OP), Apply 1 application to eye As Needed., Disp: , Rfl:   •  Cholecalciferol (VITAMIN D PO), Take 2,000 mg by mouth every night at bedtime., Disp: , Rfl:   •  escitalopram (LEXAPRO) 20 MG tablet, Take 1 tablet by mouth Every Morning., Disp: , Rfl:   •  fluticasone (FLONASE) 50 MCG/ACT nasal spray, 1-2 sprays into the nostril(s) as directed by provider., Disp: , Rfl:   •  folic acid (FOLVITE) 1 MG tablet, Take 2 tablets by mouth Daily., Disp: , Rfl:   •  Methotrexate Sodium (METHOTREXATE PF) 50 MG/2ML chemo syringe, Infuse  into a venous catheter 1 (One) Time., Disp: , Rfl:   •  Multiple Vitamins-Minerals (CENTRUM SILVER ULTRA WOMENS PO), 1 P.O. daily, Disp: , Rfl:   •  omeprazole (priLOSEC) 20 MG capsule, 1 capsule. Monday Wednesday friday, Disp: , Rfl:   •  polyethyl glycol-propyl glycol (Systane) 0.4-0.3 % solution ophthalmic solution (artificial tears), , Disp: , Rfl:   •  traZODone (DESYREL) 50 MG tablet, Take 25-50 mg by mouth Every Night., Disp: ,  Rfl:   •  amoxicillin (AMOXIL) 500 MG capsule, , Disp: , Rfl:   •  predniSONE (DELTASONE) 10 MG (21) dose pack, Take  by mouth Daily. Use as directed on package (Patient not taking: Reported on 5/26/2023), Disp: 21 each, Rfl: 0    Allergies   Allergen Reactions   • Codeine Diarrhea and Nausea Only   • Penicillin G Rash   • Sulfa Antibiotics Hives       Family History   Problem Relation Age of Onset   • Ulcerative colitis Mother    • Migraines Mother    • Stroke Mother    • Hypertension Mother    • Thyroid disease Mother    • Skin cancer Mother    • Breast cancer Maternal Aunt    • Arthritis Father    • Heart attack Father    • Migraines Sister         Sister   • Skin cancer Sister    • Migraines Daughter    • Skin cancer Brother    • Malig Hyperthermia Neg Hx        Social History     Socioeconomic History   • Marital status: Single   • Number of children: 3   Tobacco Use   • Smoking status: Never   • Smokeless tobacco: Never   Vaping Use   • Vaping Use: Never used   Substance and Sexual Activity   • Alcohol use: Yes     Comment: I rarely have a glass of wine.   • Drug use: Never   • Sexual activity: Not Currently     Partners: Male     Comment: Not sexual active       Review of Systems   Gastrointestinal:  Positive for abdominal pain, constipation and diarrhea.   All other systems reviewed and are negative.  Pertinent positives and negatives documented in the HPI and all other systems reviewed and were found to be negative.  Vitals:    07/13/23 0814   BP: 128/64   Pulse: 78   Temp: 97.7 °F (36.5 °C)   SpO2: 92%       Physical Exam  Vitals reviewed.   Constitutional:       General: She is not in acute distress.     Appearance: Normal appearance. She is well-developed. She is not diaphoretic.   HENT:      Head: Normocephalic and atraumatic. Hair is normal.      Right Ear: Hearing, tympanic membrane, ear canal and external ear normal. No decreased hearing noted. No drainage.      Left Ear: Hearing, tympanic membrane,  ear canal and external ear normal. No decreased hearing noted.      Nose: Nose normal. No nasal deformity.      Mouth/Throat:      Mouth: Mucous membranes are moist.   Eyes:      General: Lids are normal.         Right eye: No discharge.         Left eye: No discharge.      Extraocular Movements: Extraocular movements intact.      Conjunctiva/sclera: Conjunctivae normal.      Pupils: Pupils are equal, round, and reactive to light.   Neck:      Thyroid: No thyromegaly.      Vascular: No JVD.      Trachea: No tracheal deviation.   Cardiovascular:      Rate and Rhythm: Normal rate and regular rhythm.      Pulses: Normal pulses.      Heart sounds: Normal heart sounds. No murmur heard.    No friction rub. No gallop.   Pulmonary:      Effort: Pulmonary effort is normal. No respiratory distress.      Breath sounds: Normal breath sounds. No wheezing or rales.   Chest:      Chest wall: No tenderness.   Abdominal:      General: Bowel sounds are normal. There is no distension.      Palpations: Abdomen is soft. There is no mass.      Tenderness: There is no abdominal tenderness. There is no guarding or rebound.      Hernia: No hernia is present.   Genitourinary:     Rectum: Normal. Guaiac result negative.   Musculoskeletal:         General: No tenderness or deformity. Normal range of motion.      Cervical back: Normal range of motion and neck supple.   Lymphadenopathy:      Cervical: No cervical adenopathy.   Skin:     General: Skin is warm and dry.      Findings: No erythema or rash.   Neurological:      Mental Status: She is alert and oriented to person, place, and time.      Cranial Nerves: No cranial nerve deficit.      Motor: No abnormal muscle tone.      Coordination: Coordination normal.      Deep Tendon Reflexes: Reflexes are normal and symmetric. Reflexes normal.   Psychiatric:         Mood and Affect: Mood normal.         Behavior: Behavior normal.         Thought Content: Thought content normal.         Judgment:  Judgment normal.       Diagnoses and all orders for this visit:    1. Microscopic colitis, unspecified microscopic colitis type (Primary)  -     Amylase  -     Lipase  -     CBC & Differential  -     Comprehensive Metabolic Panel  -     Celiac Ab tTG DGP TIgA  -     TSH    2. History of colon polyps  -     Amylase  -     Lipase  -     CBC & Differential  -     Comprehensive Metabolic Panel  -     Celiac Ab tTG DGP TIgA  -     TSH    3. Alternating constipation and diarrhea  -     Amylase  -     Lipase  -     CBC & Differential  -     Comprehensive Metabolic Panel  -     Celiac Ab tTG DGP TIgA  -     TSH    4. Diarrhea, unspecified type  -     Amylase  -     Lipase  -     CBC & Differential  -     Comprehensive Metabolic Panel  -     Celiac Ab tTG DGP TIgA  -     TSH    5. Constipation, unspecified constipation type  -     Amylase  -     Lipase  -     CBC & Differential  -     Comprehensive Metabolic Panel  -     Celiac Ab tTG DGP TIgA  -     TSH    6. Gastroesophageal reflux disease without esophagitis  -     Amylase  -     Lipase  -     CBC & Differential  -     Comprehensive Metabolic Panel  -     Celiac Ab tTG DGP TIgA  -     TSH    7. Right sided abdominal pain  -     Amylase  -     Lipase  -     CBC & Differential  -     Comprehensive Metabolic Panel  -     Celiac Ab tTG DGP TIgA  -     TSH  -     US Gallbladder; Future      Assessment:  Alternating consitaption and explosive diarrhea. She is on a fiber suppleement daily.   H/o microscopic colitis - on Budesonide 3 mg one/day periodically.  H/o iron def anemia. She has received iron infusions by Dr. Caruso.   Weight loss - resolved  Abnormal small bowel thickening seen on CT abd/pelvis - resolved  Right sided abdominal pain.  GERD    Recommendations:  TAke Omeprazole 20 mg/day, everyday.  Labs: CBC, CMP  She does not want a colonoscopy.  Increase the Budesonide 3mg to 2 pills/day  Add Colace 100 mg/day  US of the gb  F/u 3 mos.     No follow-ups on  file.    Edilberto Shook MD  7/13/2023

## 2023-07-14 LAB
ALBUMIN SERPL-MCNC: 4.2 G/DL (ref 3.7–4.7)
ALBUMIN/GLOB SERPL: 1.9 {RATIO} (ref 1.2–2.2)
ALP SERPL-CCNC: 107 IU/L (ref 44–121)
ALT SERPL-CCNC: 19 IU/L (ref 0–32)
AMYLASE SERPL-CCNC: 40 U/L (ref 31–110)
AST SERPL-CCNC: 19 IU/L (ref 0–40)
BASOPHILS # BLD AUTO: 0.1 X10E3/UL (ref 0–0.2)
BASOPHILS NFR BLD AUTO: 1 %
BILIRUB SERPL-MCNC: 0.2 MG/DL (ref 0–1.2)
BUN SERPL-MCNC: 16 MG/DL (ref 8–27)
BUN/CREAT SERPL: 24 (ref 12–28)
CALCIUM SERPL-MCNC: 9.4 MG/DL (ref 8.7–10.3)
CHLORIDE SERPL-SCNC: 105 MMOL/L (ref 96–106)
CO2 SERPL-SCNC: 26 MMOL/L (ref 20–29)
CREAT SERPL-MCNC: 0.66 MG/DL (ref 0.57–1)
EGFRCR SERPLBLD CKD-EPI 2021: 85 ML/MIN/1.73
EOSINOPHIL # BLD AUTO: 0.2 X10E3/UL (ref 0–0.4)
EOSINOPHIL NFR BLD AUTO: 3 %
ERYTHROCYTE [DISTWIDTH] IN BLOOD BY AUTOMATED COUNT: 14.8 % (ref 11.7–15.4)
GLIADIN PEPTIDE IGA SER-ACNC: 5 UNITS (ref 0–19)
GLIADIN PEPTIDE IGG SER-ACNC: 4 UNITS (ref 0–19)
GLOBULIN SER CALC-MCNC: 2.2 G/DL (ref 1.5–4.5)
GLUCOSE SERPL-MCNC: 65 MG/DL (ref 70–99)
HCT VFR BLD AUTO: 39.1 % (ref 34–46.6)
HGB BLD-MCNC: 12.7 G/DL (ref 11.1–15.9)
IGA SERPL-MCNC: 553 MG/DL (ref 64–422)
IMM GRANULOCYTES # BLD AUTO: 0 X10E3/UL (ref 0–0.1)
IMM GRANULOCYTES NFR BLD AUTO: 1 %
LIPASE SERPL-CCNC: 19 U/L (ref 14–85)
LYMPHOCYTES # BLD AUTO: 1.3 X10E3/UL (ref 0.7–3.1)
LYMPHOCYTES NFR BLD AUTO: 19 %
MCH RBC QN AUTO: 31.1 PG (ref 26.6–33)
MCHC RBC AUTO-ENTMCNC: 32.5 G/DL (ref 31.5–35.7)
MCV RBC AUTO: 96 FL (ref 79–97)
MONOCYTES # BLD AUTO: 0.7 X10E3/UL (ref 0.1–0.9)
MONOCYTES NFR BLD AUTO: 11 %
NEUTROPHILS # BLD AUTO: 4.5 X10E3/UL (ref 1.4–7)
NEUTROPHILS NFR BLD AUTO: 65 %
PLATELET # BLD AUTO: 238 X10E3/UL (ref 150–450)
POTASSIUM SERPL-SCNC: 3.9 MMOL/L (ref 3.5–5.2)
PROT SERPL-MCNC: 6.4 G/DL (ref 6–8.5)
RBC # BLD AUTO: 4.08 X10E6/UL (ref 3.77–5.28)
SODIUM SERPL-SCNC: 145 MMOL/L (ref 134–144)
TSH SERPL DL<=0.005 MIU/L-ACNC: 4.54 UIU/ML (ref 0.45–4.5)
TTG IGA SER-ACNC: <2 U/ML (ref 0–3)
TTG IGG SER-ACNC: <2 U/ML (ref 0–5)
WBC # BLD AUTO: 6.8 X10E3/UL (ref 3.4–10.8)

## 2023-07-24 DIAGNOSIS — R55 NEAR SYNCOPE: Primary | ICD-10-CM

## 2023-07-24 NOTE — PROGRESS NOTES
07/24/23       Tell her that most of her labs look good.  Her TSH is minimally elevated at 4.54.       We will see what the ultrasound of the gallbladder shows?  Please send a copy of this report to her PCP.  Dolly osman

## 2023-07-26 ENCOUNTER — TELEPHONE (OUTPATIENT)
Dept: GASTROENTEROLOGY | Facility: CLINIC | Age: 88
End: 2023-07-26
Payer: MEDICARE

## 2023-07-26 NOTE — TELEPHONE ENCOUNTER
Called pt and advised of Dr Shook's note. Verb understanding.      Results sent to pcp thru [facility].

## 2023-07-26 NOTE — TELEPHONE ENCOUNTER
----- Message from Edilberto Shook MD sent at 7/24/2023  6:03 PM EDT -----  07/24/23       Tell her that most of her labs look good.  Her TSH is minimally elevated at 4.54.       We will see what the ultrasound of the gallbladder shows?  Please send a copy of this report to her PCP.  Dolly osman

## 2023-07-28 ENCOUNTER — TELEPHONE (OUTPATIENT)
Dept: CARDIOLOGY | Facility: CLINIC | Age: 88
End: 2023-07-28
Payer: MEDICARE

## 2023-07-28 NOTE — TELEPHONE ENCOUNTER
----- Message from CHIRAG Santo sent at 7/28/2023 12:58 PM EDT -----  Can you please let patient know that monitor was normal.  No dysrhythmias to account for near syncope.

## 2023-07-28 NOTE — TELEPHONE ENCOUNTER
Left voicemail for Arlin Hutson requesting callback.    Thank you,  Annia GREY RN  Triage Nurse [facility]   13:05 EDT

## 2023-07-28 NOTE — TELEPHONE ENCOUNTER
Arlin Hutson returned call.  Reviewed results with patient and she verbalized understanding of results.    Thank you,  Annia GREY RN  Triage Nurse [facility]   15:51 EDT

## 2023-08-10 ENCOUNTER — HOSPITAL ENCOUNTER (OUTPATIENT)
Dept: ULTRASOUND IMAGING | Facility: HOSPITAL | Age: 88
Discharge: HOME | End: 2023-08-10
Admitting: INTERNAL MEDICINE
Payer: MEDICARE

## 2023-08-10 DIAGNOSIS — R10.9 RIGHT SIDED ABDOMINAL PAIN: ICD-10-CM

## 2023-08-10 PROCEDURE — 76705 ECHO EXAM OF ABDOMEN: CPT

## 2023-08-10 NOTE — PROGRESS NOTES
08/10/23       Tell her that her ultrasound of the gallbladder shows a normal looking gallbladder.  There are 2 benign-appearing cysts in the liver.  The rest of the ultrasound looks good.  We can discuss in more detail when I see her in follow-up in the office.  Please send a copy of this report to her PCP.  Dolly osman

## 2023-08-11 ENCOUNTER — TELEPHONE (OUTPATIENT)
Dept: GASTROENTEROLOGY | Facility: CLINIC | Age: 88
End: 2023-08-11
Payer: MEDICARE

## 2023-08-11 NOTE — TELEPHONE ENCOUNTER
Caller: Arlin Hutson    Relationship: Self    Best call back number: [phone]     What is the best time to reach you: ANYTIME    Who are you requesting to speak with (clinical staff, provider,  specific staff member): SRIDEVI    Do you know the name of the person who called: AVELINA    What was the call regarding:   PT RETURNING CALL FROM AVELINA RE: GALLBLADDER US RESULTS FROM 8/10/23. CALL BACK ANYTIME OK TO [facility].       
See results encounter from today  
No Exposure

## 2023-08-11 NOTE — TELEPHONE ENCOUNTER
----- Message from Edilberto Shook MD sent at 8/10/2023  5:29 PM EDT -----  08/10/23       Tell her that her ultrasound of the gallbladder shows a normal looking gallbladder.  There are 2 benign-appearing cysts in the liver.  The rest of the ultrasound looks good.  We can discuss in more detail when I see her in follow-up in the office.  Please send a copy of this report to her PCP.  Dolly osman

## 2023-08-21 RX ORDER — BUDESONIDE 3 MG/1
CAPSULE, COATED PELLETS ORAL
Qty: 90 CAPSULE | Refills: 0 | Status: SHIPPED | OUTPATIENT
Start: 2023-08-21

## 2023-09-27 ENCOUNTER — LAB (OUTPATIENT)
Dept: LAB | Facility: HOSPITAL | Age: 88
End: 2023-09-27
Payer: MEDICARE

## 2023-09-27 DIAGNOSIS — D50.8 OTHER IRON DEFICIENCY ANEMIA: ICD-10-CM

## 2023-09-27 DIAGNOSIS — D47.2 MGUS (MONOCLONAL GAMMOPATHY OF UNKNOWN SIGNIFICANCE): ICD-10-CM

## 2023-09-27 LAB
ALBUMIN SERPL-MCNC: 4 G/DL (ref 3.5–5.2)
ALBUMIN/GLOB SERPL: 1.6 G/DL
ALP SERPL-CCNC: 94 U/L (ref 39–117)
ALT SERPL W P-5'-P-CCNC: 21 U/L (ref 1–33)
ANION GAP SERPL CALCULATED.3IONS-SCNC: 12.7 MMOL/L (ref 5–15)
AST SERPL-CCNC: 22 U/L (ref 1–32)
BASOPHILS # BLD AUTO: 0.06 10*3/MM3 (ref 0–0.2)
BASOPHILS NFR BLD AUTO: 0.9 % (ref 0–1.5)
BILIRUB SERPL-MCNC: 0.2 MG/DL (ref 0–1.2)
BUN SERPL-MCNC: 17 MG/DL (ref 8–23)
BUN/CREAT SERPL: 25 (ref 7–25)
CALCIUM SPEC-SCNC: 8.7 MG/DL (ref 8.6–10.5)
CHLORIDE SERPL-SCNC: 101 MMOL/L (ref 98–107)
CO2 SERPL-SCNC: 24.3 MMOL/L (ref 22–29)
CREAT SERPL-MCNC: 0.68 MG/DL (ref 0.6–1.1)
DEPRECATED RDW RBC AUTO: 51 FL (ref 37–54)
EGFRCR SERPLBLD CKD-EPI 2021: 84.4 ML/MIN/1.73
EOSINOPHIL # BLD AUTO: 0.23 10*3/MM3 (ref 0–0.4)
EOSINOPHIL NFR BLD AUTO: 3.3 % (ref 0.3–6.2)
ERYTHROCYTE [DISTWIDTH] IN BLOOD BY AUTOMATED COUNT: 13.7 % (ref 12.3–15.4)
FERRITIN SERPL-MCNC: 266 NG/ML (ref 13–150)
GLOBULIN UR ELPH-MCNC: 2.5 GM/DL
GLUCOSE SERPL-MCNC: 96 MG/DL (ref 65–99)
HCT VFR BLD AUTO: 41.1 % (ref 34–46.6)
HGB BLD-MCNC: 13.1 G/DL (ref 12–15.9)
IMM GRANULOCYTES # BLD AUTO: 0.04 10*3/MM3 (ref 0–0.05)
IMM GRANULOCYTES NFR BLD AUTO: 0.6 % (ref 0–0.5)
IRON 24H UR-MRATE: 81 MCG/DL (ref 37–145)
IRON SATN MFR SERPL: 32 % (ref 20–50)
LYMPHOCYTES # BLD AUTO: 1.34 10*3/MM3 (ref 0.7–3.1)
LYMPHOCYTES NFR BLD AUTO: 19.5 % (ref 19.6–45.3)
MCH RBC QN AUTO: 32.3 PG (ref 26.6–33)
MCHC RBC AUTO-ENTMCNC: 31.9 G/DL (ref 31.5–35.7)
MCV RBC AUTO: 101.5 FL (ref 79–97)
MONOCYTES # BLD AUTO: 0.63 10*3/MM3 (ref 0.1–0.9)
MONOCYTES NFR BLD AUTO: 9.2 % (ref 5–12)
NEUTROPHILS NFR BLD AUTO: 4.57 10*3/MM3 (ref 1.7–7)
NEUTROPHILS NFR BLD AUTO: 66.5 % (ref 42.7–76)
NRBC BLD AUTO-RTO: 0 /100 WBC (ref 0–0.2)
PLATELET # BLD AUTO: 218 10*3/MM3 (ref 140–450)
PMV BLD AUTO: 9.9 FL (ref 6–12)
POTASSIUM SERPL-SCNC: 3.5 MMOL/L (ref 3.5–5.2)
PROT SERPL-MCNC: 6.5 G/DL (ref 6–8.5)
RBC # BLD AUTO: 4.05 10*6/MM3 (ref 3.77–5.28)
SODIUM SERPL-SCNC: 138 MMOL/L (ref 136–145)
TIBC SERPL-MCNC: 256 MCG/DL (ref 298–536)
TRANSFERRIN SERPL-MCNC: 183 MG/DL (ref 200–360)
WBC NRBC COR # BLD: 6.87 10*3/MM3 (ref 3.4–10.8)

## 2023-09-27 PROCEDURE — 80053 COMPREHEN METABOLIC PANEL: CPT

## 2023-09-27 PROCEDURE — 82728 ASSAY OF FERRITIN: CPT

## 2023-09-27 PROCEDURE — 84466 ASSAY OF TRANSFERRIN: CPT

## 2023-09-27 PROCEDURE — 36415 COLL VENOUS BLD VENIPUNCTURE: CPT

## 2023-09-27 PROCEDURE — 85025 COMPLETE CBC W/AUTO DIFF WBC: CPT

## 2023-09-27 PROCEDURE — 83540 ASSAY OF IRON: CPT

## 2023-09-28 LAB
ALBUMIN SERPL ELPH-MCNC: 3.9 G/DL (ref 2.9–4.4)
ALBUMIN/GLOB SERPL: 1.4 {RATIO} (ref 0.7–1.7)
ALPHA1 GLOB SERPL ELPH-MCNC: 0.3 G/DL (ref 0–0.4)
ALPHA2 GLOB SERPL ELPH-MCNC: 0.7 G/DL (ref 0.4–1)
B-GLOBULIN SERPL ELPH-MCNC: 1.3 G/DL (ref 0.7–1.3)
GAMMA GLOB SERPL ELPH-MCNC: 0.7 G/DL (ref 0.4–1.8)
GLOBULIN SER-MCNC: 2.9 G/DL (ref 2.2–3.9)
IGA SERPL-MCNC: 577 MG/DL (ref 64–422)
IGG SERPL-MCNC: 625 MG/DL (ref 586–1602)
IGM SERPL-MCNC: 100 MG/DL (ref 26–217)
INTERPRETATION SERPL IEP-IMP: ABNORMAL
KAPPA LC FREE SER-MCNC: 52.4 MG/L (ref 3.3–19.4)
KAPPA LC FREE/LAMBDA FREE SER: 4.68 {RATIO} (ref 0.26–1.65)
LABORATORY COMMENT REPORT: ABNORMAL
LAMBDA LC FREE SERPL-MCNC: 11.2 MG/L (ref 5.7–26.3)
M PROTEIN SERPL ELPH-MCNC: ABNORMAL G/DL
PROT SERPL-MCNC: 6.8 G/DL (ref 6–8.5)

## 2023-09-29 LAB
ALBUMIN 24H MFR UR ELPH: 33.4 %
ALPHA1 GLOB 24H MFR UR ELPH: 0.7 %
ALPHA2 GLOB 24H MFR UR ELPH: 8 %
B-GLOBULIN MFR UR ELPH: 11.8 %
GAMMA GLOB 24H MFR UR ELPH: 46 %
HIV 1 & 2 AB SER-IMP: ABNORMAL
INTERPRETATION UR IFE-IMP: ABNORMAL
M PROTEIN 24H MFR UR ELPH: 31 %
M PROTEIN 24H UR ELPH-MRATE: 138 MG/24 HR
PROT 24H UR-MRATE: 447 MG/24 HR (ref 30–150)
PROT UR-MCNC: 22.9 MG/DL

## 2023-10-03 NOTE — PROGRESS NOTES
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  In the interval, the patient reports that she has done quite well.  Her rheumatoid arthritis remains under excellent control continuing on weekly methotrexate.  She notes that she has experienced 3 falls this year, fortunately has not experienced any significant injury.  She is preparing to transition from her condominium to assisted living at [facility].  She remains however very active, ECOG performance status of 1.      Objective   Vital Signs:   /70   Pulse 84   Temp 96.4 °F (35.8 °C) (Temporal)   Resp 18   Ht 152.4 cm (60\")   Wt 49.6 kg (109 lb 4.8 oz)   SpO2 94%   BMI 21.35 kg/m²     Physical Exam  Constitutional:       Appearance: She is well-developed.      Comments: Patient ambulating with use of a cane   Eyes:      Conjunctiva/sclera: Conjunctivae normal.   Neck:      Thyroid: No thyromegaly.   Cardiovascular:      Rate and Rhythm: Normal rate and regular rhythm.      Heart sounds: No murmur heard.    No friction rub. No gallop.   Pulmonary:      Effort: No respiratory distress.      Breath sounds: Normal breath sounds.   Abdominal:      General: Bowel sounds are normal. There is no distension.      Palpations: Abdomen is soft.      Tenderness: There is no abdominal tenderness.   Musculoskeletal:      Comments: Sclerodactyly involving the hands   Lymphadenopathy:      Head:      Right side of head: No submandibular adenopathy.      Cervical: No cervical adenopathy.      Upper Body:      Right upper body: No supraclavicular adenopathy.      Left upper body: No supraclavicular adenopathy.   Skin:     General: Skin is warm and dry.      Findings: No rash.   Neurological:      Mental Status: She is alert and " oriented to person, place, and time.      Cranial Nerves: No cranial nerve deficit.      Motor: No abnormal muscle tone.      Deep Tendon Reflexes: Reflexes normal.   Psychiatric:         Behavior: Behavior normal.       Result Review : Reviewed labs from 9/27/2023 with CBC, CMP, serum protein electrophoresis, immunoelectrophoresis, quantitative immunoglobulins, free serum light chains.  Reviewed 24-hour urine protein electrophoresis, immunoelectrophoresis.        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  Laboratory studies 2/12/2020 showed a creatinine of 0.61, calcium 9.2, globulin 2.5, albumin 4.3, serum protein electrophoresis with a prominent protein band in the beta region, could not rule out monoclonal protein.  CBC on 4/14/2020 with WBC 6.0 with normal differential, hemoglobin 12.0, platelet count 255,000.  Subsequent labs with Dr. Rust on 7/1/2020 showed an immunoelectrophoresis confirming an IgA kappa monoclonal protein with IgA 658, IgG 7 or 24, IgM 125.  Creatinine was normal at 0.61 with calcium 9.5.    Patient was referred to our office for further evaluation.  At time of initial visit 8/12/2020, hemoglobin 13.1, platelet count 200,000, creatinine 0.54 with calcium of 9.5.  Additional labs 8/12/2020 with dual IgA kappa M spike (0.6 g and secondary M spike too small to quantify), IgA 652, IgG 743, IgM 126, free kappa light chain 87.1, free lambda light chain 12.1, free light chain ratio 7.0.  24-hour urine 8/17/2020 with 410 mg total protein, 40.6% kappa light chain (166 mg).  Skeletal survey 8/13/2020 with no evidence of lytic lesions.  Previous pathology specimens from colonoscopy 2/26/2016 and EGD 12/10/2018 were sent for Congo red stain, both negative.  Patient returns today for 6-month interval follow-up visit.   We are continuing to monitor both serum and urine studies on a 6-month basis and her labs have shown relative stability over time.  We are reviewing labs from 9/27/2023 which show stable findings with again a dual M spike, dominant band slightly improved at 0.5 g (IgA kappa) and minor 0.1 g (IgA kappa).  Additional labs with IgA stable at 577, , IgM 100, free kappa light chain improved at 52.4, free lambda light chain 11.2, free light chain ratio improved at 4.68.  24-hour urine with total protein 447 mg (slightly increased), with 31% clonal protein (138 mg).  Patient continues with normal creatinine, normal calcium.  There is no current evidence of anemia nor thrombocytopenia.  With no changes we will continue monitoring her expectantly.  Due to the high risk nature of her IgA MGUS and urine involvement I did recommend for now staying on a 6-month basis and patient is in agreement    *Anemia:  Hemoglobin on 8/12/2020 was normal at 13.1 with MCV 95.4  Hemoglobin declined on 8/26/2020 down to 11.3 with MCV 98.1.  Additional evaluation at that time with iron 52, ferritin 66.3, iron saturation 16%, TIBC 322, folate greater than 20, B12 552.  Suspect that patient has anemia secondary to chronic disease with underlying chronic autoimmune disorder.  She is also receiving weekly methotrexate which may be a contributing factor.    Hemoglobin on 3/18/2022 declined to 10.4.  Additional labs with evidence of iron deficiency with iron 43, ferritin 8.0, iron saturation 10%, TIBC 442, folate greater than 20, B12 602, CRP less than 0.3, ESR 18  Concern regarding of patient's ability to tolerate oral iron with underlying colitis with alternating constipation and diarrhea.  Therefore proceeded with IV iron in the form of Injectafer, received 727 mg on 4/4 and 750 mg on 4/11/2022.  Labs on 6/17/2022 with significant response, hemoglobin up to 13.1, ferritin 591, iron saturation 35%  Patient was seen by GI on 7/11/2022, no  clinical evidence to suggest GI blood loss.  Patient opted to forego colonoscopy given her age and potential risks.  CT abdomen and pelvis 8/15/2022 with suggestion of long segment distal small bowel wall thickening possibly related to peristalsis, recommended CT enterography.  CT enterography performed 9/7/2022 showed multifocal areas of prominent bowel wall thickening particularly involving the jejunum, distal ileum that were nonspecific and possibly indicative of enteritis.  Distention of stomach and proximal duodenum secondary to ingestion of contrast.  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  Mild persistent anemia may be related to ongoing methotrexate use, particularly with macrocytic indices.  Unclear whether she has a component of anemia of chronic disease related to her underlying autoimmune issues (although labs on 9/8/2022 with ESR normal at 13, CRP less than 0.3).    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 Martell in rheumatology.    She had been treated previously with Arava, subsequently changed to methotrexate weekly injections.    She has low disease activity and her disease has been under good control.    She does have associated Sjogren's syndrome with dry eyes and dry mouth and subsequent dental issues.  She has chronic pain in her fingers with some degree of sclerodactyly and receives intermittent injections by hand surgery every 4 to 6 months.  Suspect that patient's monoclonal gammopathy is associated with her underlying rheumatologic disorder.  There has been discussion regarding potential addition of Orencia to her weekly methotrexate injections.  Labs on 9/8/2022 with ESR 13, CRP less than 0.3  Patient returns today continuing on  weekly methotrexate injections.  She continues follow-up with rheumatology    *Lumbar spine stenosis and spondylolisthesis:  Chronic back pain  Patient followed by Dr. Licea in orthopedics  Patient received a course of epidural injections    *Microscopic colitis/ulcerative colitis:  Patient is followed by Dr. Shook  Previously treated with Entocort, discontinued around 2018  Patient did follow-up with GI in January.  She underwent CT abdomen and pelvis on 1/27/2021 which was unrevealing.      Previous pathology specimens from colonoscopy 2/26/2016 and EGD 12/10/2018 were sent for Congo red stain and were negative.  Patient recently was placed on budesonide by GI with improvement in symptoms.   Patient continuing on budesonide 3 mg daily per GI.  Patient continues with alternating constipation and diarrhea.    *Possible peripheral neuropathy  Patient has had some longstanding intermittent symptoms in her feet with numbness that does wax and wane.  It sounds as if her symptoms are associated with Raynaud's type changes, unclear whether this represents a true peripheral neuropathy.  It is not a consistent finding.  Previous B12 level normal at 552 on 8/26/2020.  Unclear whether symptoms could be in part related to her underlying MGUS.    *Peripheral vascular disease  Patient developed acute ischemia right foot and was hospitalized 3/22-3/25/2021.  On 3/22/2021 patient underwent arthrectomy right popliteal artery.  She was subsequently placed on Xarelto 20 mg daily.  Patient was subsequently transitioned from Xarelto to Plavix 75 mg daily by vascular surgery in December 2021.     Plan:     Patient continues on a course of observation/surveillance regarding her IgA kappa MGUS with no evidence of change on current labs.  Patient continues on weekly methotrexate injections per rheumatology.    Patient will continue follow-up with Dr. Rust, rheumatology, GI.  MD visit in 6 months.  Labs 1 week prior with CBC, CMP,  serum protein electrophoresis, immunoelectrophoresis, quantitative immunoglobulins, free serum light chains, iron panel, ferritin and the patient will return to 24-hour urine for protein electrophoresis, immunoelectrophoresis.

## 2023-10-04 ENCOUNTER — OFFICE VISIT (OUTPATIENT)
Dept: ONCOLOGY | Facility: CLINIC | Age: 88
End: 2023-10-04
Payer: MEDICARE

## 2023-10-04 VITALS
BODY MASS INDEX: 21.46 KG/M2 | DIASTOLIC BLOOD PRESSURE: 70 MMHG | HEIGHT: 60 IN | HEART RATE: 84 BPM | RESPIRATION RATE: 18 BRPM | TEMPERATURE: 96.4 F | OXYGEN SATURATION: 94 % | WEIGHT: 109.3 LBS | SYSTOLIC BLOOD PRESSURE: 112 MMHG

## 2023-10-04 DIAGNOSIS — D47.2 MGUS (MONOCLONAL GAMMOPATHY OF UNKNOWN SIGNIFICANCE): Primary | ICD-10-CM

## 2023-10-04 DIAGNOSIS — D50.8 OTHER IRON DEFICIENCY ANEMIA: ICD-10-CM

## 2023-10-04 PROCEDURE — 1160F RVW MEDS BY RX/DR IN RCRD: CPT | Performed by: INTERNAL MEDICINE

## 2023-10-04 PROCEDURE — 99214 OFFICE O/P EST MOD 30 MIN: CPT | Performed by: INTERNAL MEDICINE

## 2023-10-04 PROCEDURE — 1125F AMNT PAIN NOTED PAIN PRSNT: CPT | Performed by: INTERNAL MEDICINE

## 2023-10-04 PROCEDURE — 1159F MED LIST DOCD IN RCRD: CPT | Performed by: INTERNAL MEDICINE

## 2023-10-04 NOTE — LETTER
"October 4, 2023     Oren Rust Jr., MD  [address]  [place] [id number]    Patient: Arlin Hutson   YOB: 1935   Date of Visit: 10/4/2023     Dear Oren Rust Jr., MD:       Thank you for referring Arlin Hutson to me for evaluation. Below are the relevant portions of my assessment and plan of care.    If you have questions, please do not hesitate to call me. I look forward to following Arlin along with you.         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  In the interval, the patient reports that she has done quite well.  Her rheumatoid arthritis remains under excellent control continuing on weekly methotrexate.  She notes that she has experienced 3 falls this year, fortunately has not experienced any significant injury.  She is preparing to transition from her condominium to assisted living at [place].  She remains however very active, ECOG performance status of 1.      Objective   Vital Signs:   /70   Pulse 84   Temp 96.4 °F (35.8 °C) (Temporal)   Resp 18   Ht 152.4 cm (60\")   Wt 49.6 kg (109 lb 4.8 oz)   SpO2 94%   BMI 21.35 kg/m²     Physical Exam  Constitutional:       Appearance: She is well-developed.      Comments: Patient ambulating with use of a cane   Eyes:      Conjunctiva/sclera: Conjunctivae normal.   Neck:      Thyroid: No thyromegaly.   Cardiovascular:      Rate and Rhythm: Normal rate and regular rhythm.      Heart sounds: No murmur heard.    No friction rub. No gallop.   Pulmonary:      Effort: No respiratory distress.      Breath sounds: Normal breath sounds. "   Abdominal:      General: Bowel sounds are normal. There is no distension.      Palpations: Abdomen is soft.      Tenderness: There is no abdominal tenderness.   Musculoskeletal:      Comments: Sclerodactyly involving the hands   Lymphadenopathy:      Head:      Right side of head: No submandibular adenopathy.      Cervical: No cervical adenopathy.      Upper Body:      Right upper body: No supraclavicular adenopathy.      Left upper body: No supraclavicular adenopathy.   Skin:     General: Skin is warm and dry.      Findings: No rash.   Neurological:      Mental Status: She is alert and oriented to person, place, and time.      Cranial Nerves: No cranial nerve deficit.      Motor: No abnormal muscle tone.      Deep Tendon Reflexes: Reflexes normal.   Psychiatric:         Behavior: Behavior normal.       Result Review : Reviewed labs from 9/27/2023 with CBC, CMP, serum protein electrophoresis, immunoelectrophoresis, quantitative immunoglobulins, free serum light chains.  Reviewed 24-hour urine protein electrophoresis, immunoelectrophoresis.        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  Laboratory studies 2/12/2020 showed a creatinine of 0.61, calcium 9.2, globulin 2.5, albumin 4.3, serum protein electrophoresis with a prominent protein band in the beta region, could not rule out monoclonal protein.  CBC on 4/14/2020 with WBC 6.0 with normal differential, hemoglobin 12.0, platelet count 255,000.  Subsequent labs with Dr. Rust on 7/1/2020 showed an immunoelectrophoresis confirming an IgA kappa monoclonal protein with IgA 658, IgG 7 or 24, IgM 125.  Creatinine was normal at 0.61 with calcium 9.5.    Patient was referred to our office for further evaluation.  At time of initial visit 8/12/2020, hemoglobin 13.1, platelet count  200,000, creatinine 0.54 with calcium of 9.5.  Additional labs 8/12/2020 with dual IgA kappa M spike (0.6 g and secondary M spike too small to quantify), IgA 652, IgG 743, IgM 126, free kappa light chain 87.1, free lambda light chain 12.1, free light chain ratio 7.0.  24-hour urine 8/17/2020 with 410 mg total protein, 40.6% kappa light chain (166 mg).  Skeletal survey 8/13/2020 with no evidence of lytic lesions.  Previous pathology specimens from colonoscopy 2/26/2016 and EGD 12/10/2018 were sent for Congo red stain, both negative.  Patient returns today for 6-month interval follow-up visit.  We are continuing to monitor both serum and urine studies on a 6-month basis and her labs have shown relative stability over time.  We are reviewing labs from 9/27/2023 which show stable findings with again a dual M spike, dominant band slightly improved at 0.5 g (IgA kappa) and minor 0.1 g (IgA kappa).  Additional labs with IgA stable at 577, , IgM 100, free kappa light chain improved at 52.4, free lambda light chain 11.2, free light chain ratio improved at 4.68.  24-hour urine with total protein 447 mg (slightly increased), with 31% clonal protein (138 mg).  Patient continues with normal creatinine, normal calcium.  There is no current evidence of anemia nor thrombocytopenia.  With no changes we will continue monitoring her expectantly.  Due to the high risk nature of her IgA MGUS and urine involvement I did recommend for now staying on a 6-month basis and patient is in agreement    *Anemia:  Hemoglobin on 8/12/2020 was normal at 13.1 with MCV 95.4  Hemoglobin declined on 8/26/2020 down to 11.3 with MCV 98.1.  Additional evaluation at that time with iron 52, ferritin 66.3, iron saturation 16%, TIBC 322, folate greater than 20, B12 552.  Suspect that patient has anemia secondary to chronic disease with underlying chronic autoimmune disorder.  She is also receiving weekly methotrexate which may be a contributing factor.     Hemoglobin on 3/18/2022 declined to 10.4.  Additional labs with evidence of iron deficiency with iron 43, ferritin 8.0, iron saturation 10%, TIBC 442, folate greater than 20, B12 602, CRP less than 0.3, ESR 18  Concern regarding of patient's ability to tolerate oral iron with underlying colitis with alternating constipation and diarrhea.  Therefore proceeded with IV iron in the form of Injectafer, received 727 mg on 4/4 and 750 mg on 4/11/2022.  Labs on 6/17/2022 with significant response, hemoglobin up to 13.1, ferritin 591, iron saturation 35%  Patient was seen by GI on 7/11/2022, no clinical evidence to suggest GI blood loss.  Patient opted to forego colonoscopy given her age and potential risks.  CT abdomen and pelvis 8/15/2022 with suggestion of long segment distal small bowel wall thickening possibly related to peristalsis, recommended CT enterography.  CT enterography performed 9/7/2022 showed multifocal areas of prominent bowel wall thickening particularly involving the jejunum, distal ileum that were nonspecific and possibly indicative of enteritis.  Distention of stomach and proximal duodenum secondary to ingestion of contrast.  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  Mild persistent anemia may be related to ongoing methotrexate use, particularly with macrocytic indices.  Unclear whether she has a component of anemia of chronic disease related to her underlying autoimmune issues (although labs on 9/8/2022 with ESR normal at 13, CRP less than 0.3).    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 Martell in rheumatology.    She had been treated previously with Arava, subsequently changed to methotrexate weekly injections.    She has low disease  activity and her disease has been under good control.    She does have associated Sjogren's syndrome with dry eyes and dry mouth and subsequent dental issues.  She has chronic pain in her fingers with some degree of sclerodactyly and receives intermittent injections by hand surgery every 4 to 6 months.  Suspect that patient's monoclonal gammopathy is associated with her underlying rheumatologic disorder.  There has been discussion regarding potential addition of Orencia to her weekly methotrexate injections.  Labs on 9/8/2022 with ESR 13, CRP less than 0.3  Patient returns today continuing on weekly methotrexate injections.  She continues follow-up with rheumatology    *Lumbar spine stenosis and spondylolisthesis:  Chronic back pain  Patient followed by Dr. Licea in orthopedics  Patient received a course of epidural injections    *Microscopic colitis/ulcerative colitis:  Patient is followed by Dr. Shook  Previously treated with Entocort, discontinued around 2018  Patient did follow-up with GI in January.  She underwent CT abdomen and pelvis on 1/27/2021 which was unrevealing.      Previous pathology specimens from colonoscopy 2/26/2016 and EGD 12/10/2018 were sent for Congo red stain and were negative.  Patient recently was placed on budesonide by GI with improvement in symptoms.   Patient continuing on budesonide 3 mg daily per GI.  Patient continues with alternating constipation and diarrhea.    *Possible peripheral neuropathy  Patient has had some longstanding intermittent symptoms in her feet with numbness that does wax and wane.  It sounds as if her symptoms are associated with Raynaud's type changes, unclear whether this represents a true peripheral neuropathy.  It is not a consistent finding.  Previous B12 level normal at 552 on 8/26/2020.  Unclear whether symptoms could be in part related to her underlying MGUS.    *Peripheral vascular disease  Patient developed acute ischemia right foot and was  hospitalized 3/22-3/25/2021.  On 3/22/2021 patient underwent arthrectomy right popliteal artery.  She was subsequently placed on Xarelto 20 mg daily.  Patient was subsequently transitioned from Xarelto to Plavix 75 mg daily by vascular surgery in December 2021.     Plan:     Patient continues on a course of observation/surveillance regarding her IgA kappa MGUS with no evidence of change on current labs.  Patient continues on weekly methotrexate injections per rheumatology.    Patient will continue follow-up with Dr. Rust, rheumatology, GI.  MD visit in 6 months.  Labs 1 week prior with CBC, CMP, serum protein electrophoresis, immunoelectrophoresis, quantitative immunoglobulins, free serum light chains, iron panel, ferritin and the patient will return to 24-hour urine for protein electrophoresis, immunoelectrophoresis.

## 2023-10-31 ENCOUNTER — TELEPHONE (OUTPATIENT)
Dept: GASTROENTEROLOGY | Facility: CLINIC | Age: 88
End: 2023-10-31
Payer: MEDICARE

## 2023-10-31 NOTE — TELEPHONE ENCOUNTER
Rec'd fax from [facility] pharmacy requesting a refill on Budesonide 3mg cap, take 3 caps po qd.     Msg sent to ADAIR Gregorio.

## 2023-11-02 ENCOUNTER — OFFICE VISIT (OUTPATIENT)
Dept: GASTROENTEROLOGY | Facility: CLINIC | Age: 88
End: 2023-11-02
Payer: MEDICARE

## 2023-11-02 VITALS
HEIGHT: 60 IN | HEART RATE: 68 BPM | SYSTOLIC BLOOD PRESSURE: 144 MMHG | BODY MASS INDEX: 21.17 KG/M2 | WEIGHT: 107.8 LBS | DIASTOLIC BLOOD PRESSURE: 75 MMHG | TEMPERATURE: 97.5 F

## 2023-11-02 DIAGNOSIS — R19.8 ALTERNATING CONSTIPATION AND DIARRHEA: ICD-10-CM

## 2023-11-02 DIAGNOSIS — Z86.010 HISTORY OF COLON POLYPS: ICD-10-CM

## 2023-11-02 DIAGNOSIS — K21.9 GASTROESOPHAGEAL REFLUX DISEASE WITHOUT ESOPHAGITIS: ICD-10-CM

## 2023-11-02 DIAGNOSIS — K52.839 MICROSCOPIC COLITIS, UNSPECIFIED MICROSCOPIC COLITIS TYPE: Primary | ICD-10-CM

## 2023-11-02 PROCEDURE — 99214 OFFICE O/P EST MOD 30 MIN: CPT | Performed by: INTERNAL MEDICINE

## 2023-11-02 RX ORDER — BUDESONIDE 3 MG/1
9 CAPSULE, COATED PELLETS ORAL DAILY
Qty: 90 CAPSULE | Refills: 11 | Status: SHIPPED | OUTPATIENT
Start: 2023-11-02

## 2023-11-02 NOTE — PROGRESS NOTES
Chief Complaint   Patient presents with    Microscopic colitis       History of Present Illness:   87 y.o. female female with rheumatoid arthritis, osteoarthritis, Sjogren's syndrome, CREST syndrome, MGUS with peripheral neuropathy, and hypertension.        Last colonoscopy in 2016. Last EGD in 12/18.        I last saw her in 7/23:  Assessment:  Alternating consitaption and explosive diarrhea. She is on a fiber suppleement daily.   H/o microscopic colitis - on Budesonide 3 mg one/day periodically.  H/o iron def anemia. She has received iron infusions by Dr. Caruso.   Weight loss - resolved  Abnormal small bowel thickening seen on CT abd/pelvis - resolved  Right sided abdominal pain.  GERD     Recommendations:  TAke Omeprazole 20 mg/day, everyday.  Labs: CBC, CMP  She does not want a colonoscopy.  Increase the Budesonide 3mg to 2 pills/day  Add Colace 100 mg/day  US of the gb - Done 8/10/23 and showed liver cysts only.  F/u 3 mos.         I reviewed labs from 7 of 23 and 9 of 23.       She feels good. She has alternating intermittent diarrhea and constipation. No rectal bleeding or melena. NO abdominal or chest pain. Weight stable. She is on fiber daily and Budesonide 3 mg 2/day. Almost evertime she urinates there is a small amount of stool found. She has 0-3 BM/day.     Past Medical History:   Diagnosis Date    Arthritis of neck ?    Atheroembolism of right lower extremity     Baker's cyst     Colon polyp     CREST syndrome     Difficulty walking About 5 yrs ago    I  use a cane when I leave my house    Fracture of wrist     Surgery about 19 years ago on ribght wrist    Fractures     GERD (gastroesophageal reflux disease)     Hip arthrosis ? Maybe 7 or 8 years ago    History of CT scan of abdomen 03/11/2011    constipation, sig tics, 6 mm hepatic cyst    History of rheumatoid arthritis     HL (hearing loss) About 1995    Hyperlipidemia     Hypertension     Irritable bowel syndrome 1990s    Knee swelling 5 or 6 years  ago    Right knee replacement 4 years [facility]    Low back pain     Normocytic anemia 08/26/2020    Osteoarthritis     Peripheral neuropathy Cannot remember    I have Raynaudsin my hands and feet    Raynaud's disease     Rheumatoid arthritis     Scleroderma     Shingles     I have had both shingles vaccines    Sjogren's syndrome     Tear of meniscus of knee     Torn meniscus surgery    Ulcerative colitis        Past Surgical History:   Procedure Laterality Date    CATARACT EXTRACTION, BILATERAL      COLONOSCOPY  02/26/2016    tics, microscopic colitis,     COLONOSCOPY  07/01/2011    sig tics, inflammation, polyp    DILATATION AND CURETTAGE  1980    EKOS CATHETER PLACEMENT N/A 03/23/2021    Procedure: AIF WITH RUN OFF OF RT. LEG, ROTATIONAL ATHERECTOMY OF RIGHT POPLITEAL ARTERY;  Surgeon: Gato Contreras MD;  Location: [facility] OR 18/19;  Service: Vascular;  Laterality: N/A;    ENDOSCOPY N/A 12/03/2018    erythematous mucosa in stomach, path benign    EYE SURGERY      FLEXIBLE SIGMOIDOSCOPY      FRACTURE SURGERY      HAND SURGERY      JOINT REPLACEMENT      KNEE ARTHROSCOPY Right     w/meniscal repair    KNEE SURGERY Right     TEETH EXTRACTION      TOTAL KNEE ARTHROPLASTY Right 05/30/2018    Procedure: TOTAL KNEE ARTHROPLASTY;  Surgeon: Georges Jon MD;  Location: [facility] OR;  Service: Orthopedics    TRANSLUMINAL ATHERECTOMY POPLITEAL ARTERY      TRIGGER POINT INJECTION      UPPER GASTROINTESTINAL ENDOSCOPY  03/14/2008    erythema    VASCULAR SURGERY  Spring of 2020    Blood clot in an artery behind my right knee    WRIST FRACTURE SURGERY Right     WRIST SURGERY Right 2008    orif         Current Outpatient Medications:     Acetaminophen (TYLENOL EXTRA STRENGTH PO), Take 2 tablets by mouth Daily As Needed., Disp: , Rfl:     amLODIPine (NORVASC) 2.5 MG tablet, , Disp: , Rfl:     aspirin 81 MG chewable tablet, Chew 1 tablet Daily., Disp: , Rfl:     atorvastatin (LIPITOR) 20 MG tablet, Take 1 tablet by mouth  Daily., Disp: , Rfl:     Budesonide (ENTOCORT EC) 3 MG 24 hr capsule, Take 3 capsules by mouth Daily., Disp: 90 capsule, Rfl: 11    calcium carbonate (OS-DWIGHT) 600 MG tablet, 1 P.O. daily, Disp: , Rfl:     Carboxymethylcell-Hypromellose (GENTEAL OP), Apply 1 application to eye As Needed., Disp: , Rfl:     Cholecalciferol (VITAMIN D PO), Take 2,000 mg by mouth every night at bedtime., Disp: , Rfl:     escitalopram (LEXAPRO) 20 MG tablet, Take 1 tablet by mouth Every Morning., Disp: , Rfl:     fluticasone (FLONASE) 50 MCG/ACT nasal spray, 1-2 sprays into the nostril(s) as directed by provider., Disp: , Rfl:     folic acid (FOLVITE) 1 MG tablet, Take 2 tablets by mouth Daily., Disp: , Rfl:     Methotrexate Sodium (METHOTREXATE PF) 50 MG/2ML chemo syringe, Infuse  into a venous catheter 1 (One) Time., Disp: , Rfl:     Multiple Vitamins-Minerals (CENTRUM SILVER ULTRA WOMENS PO), 1 P.O. daily, Disp: , Rfl:     omeprazole (priLOSEC) 20 MG capsule, 1 capsule. Monday Wednesday friday, Disp: , Rfl:     polyethyl glycol-propyl glycol (Systane) 0.4-0.3 % solution ophthalmic solution (artificial tears), , Disp: , Rfl:     traZODone (DESYREL) 50 MG tablet, Take 0.5-1 tablets by mouth Every Night., Disp: , Rfl:     Allergies   Allergen Reactions    Codeine Diarrhea and Nausea Only    Penicillin G Rash    Sulfa Antibiotics Hives       Family History   Problem Relation Age of Onset    Ulcerative colitis Mother     Migraines Mother     Stroke Mother     Hypertension Mother     Thyroid disease Mother     Arthritis Father     Heart attack Father     Migraines Sister         Sister    Skin cancer Sister     Diabetes Brother     Skin cancer Brother     Breast cancer Maternal Aunt     Migraines Daughter     Malig Hyperthermia Neg Hx        Social History     Socioeconomic History    Marital status: Single    Number of children: 3   Tobacco Use    Smoking status: Never    Smokeless tobacco: Never   Vaping Use    Vaping Use: Never used    Substance and Sexual Activity    Alcohol use: Yes     Comment: I rarely have a glass of wine.    Drug use: Never    Sexual activity: Not Currently     Partners: Male     Comment: Not sexual active       Review of Systems   Gastrointestinal:  Positive for constipation and diarrhea.   All other systems reviewed and are negative.    Pertinent positives and negatives documented in the HPI and all other systems reviewed and were found to be negative.  Vitals:    11/02/23 0812   BP: 144/75   Pulse: 68   Temp: 97.5 °F (36.4 °C)       Physical Exam  Vitals reviewed.   Constitutional:       General: She is not in acute distress.     Appearance: Normal appearance. She is well-developed. She is not diaphoretic.   HENT:      Head: Normocephalic and atraumatic. Hair is normal.      Right Ear: Hearing, tympanic membrane, ear canal and external ear normal. No decreased hearing noted. No drainage.      Left Ear: Hearing, tympanic membrane, ear canal and external ear normal. No decreased hearing noted.      Nose: Nose normal. No nasal deformity.      Mouth/Throat:      Mouth: Mucous membranes are moist.   Eyes:      General: Lids are normal.         Right eye: No discharge.         Left eye: No discharge.      Extraocular Movements: Extraocular movements intact.      Conjunctiva/sclera: Conjunctivae normal.      Pupils: Pupils are equal, round, and reactive to light.   Neck:      Thyroid: No thyromegaly.      Vascular: No JVD.      Trachea: No tracheal deviation.   Cardiovascular:      Rate and Rhythm: Normal rate and regular rhythm.      Pulses: Normal pulses.      Heart sounds: Normal heart sounds. No murmur heard.     No friction rub. No gallop.   Pulmonary:      Effort: Pulmonary effort is normal. No respiratory distress.      Breath sounds: Normal breath sounds. No wheezing or rales.   Chest:      Chest wall: No tenderness.   Abdominal:      General: Bowel sounds are normal. There is no distension.      Palpations: Abdomen is  soft. There is no mass.      Tenderness: There is no abdominal tenderness. There is no guarding or rebound.      Hernia: No hernia is present.   Musculoskeletal:         General: No tenderness or deformity. Normal range of motion.      Cervical back: Normal range of motion and neck supple.   Lymphadenopathy:      Cervical: No cervical adenopathy.   Skin:     General: Skin is warm and dry.      Findings: No erythema or rash.   Neurological:      Mental Status: She is alert and oriented to person, place, and time.      Cranial Nerves: No cranial nerve deficit.      Motor: No abnormal muscle tone.      Coordination: Coordination normal.      Deep Tendon Reflexes: Reflexes are normal and symmetric. Reflexes normal.   Psychiatric:         Mood and Affect: Mood normal.         Behavior: Behavior normal.         Thought Content: Thought content normal.         Judgment: Judgment normal.         Diagnoses and all orders for this visit:    1. Microscopic colitis, unspecified microscopic colitis type (Primary)  -     Budesonide (ENTOCORT EC) 3 MG 24 hr capsule; Take 3 capsules by mouth Daily.  Dispense: 90 capsule; Refill: 11    2. History of colon polyps    3. Alternating constipation and diarrhea    4. Gastroesophageal reflux disease without esophagitis      Assessment:  Alternating consitaption and explosive diarrhea. She is on a fiber suppleement daily.   H/o microscopic colitis - on Budesonide 3 mg one/day periodically.  H/o iron def anemia. She has received iron infusions by Dr. Caruso.       Recommendations:  Take Budesonide 3 mg 3/day.  F/u 3 mos.     No follow-ups on file.    Edilberto Shook MD  11/2/2023

## 2023-11-06 ENCOUNTER — OFFICE VISIT (OUTPATIENT)
Dept: NEUROLOGY | Facility: CLINIC | Age: 88
End: 2023-11-06
Payer: MEDICARE

## 2023-11-06 VITALS
HEIGHT: 60 IN | SYSTOLIC BLOOD PRESSURE: 112 MMHG | DIASTOLIC BLOOD PRESSURE: 70 MMHG | BODY MASS INDEX: 21.2 KG/M2 | HEART RATE: 74 BPM | OXYGEN SATURATION: 96 % | WEIGHT: 108 LBS

## 2023-11-06 DIAGNOSIS — G60.9 IDIOPATHIC PERIPHERAL NEUROPATHY: ICD-10-CM

## 2023-11-06 DIAGNOSIS — R27.0 ATAXIA: Primary | ICD-10-CM

## 2023-11-06 PROCEDURE — 99214 OFFICE O/P EST MOD 30 MIN: CPT | Performed by: PSYCHIATRY & NEUROLOGY

## 2023-11-06 NOTE — PROGRESS NOTES
"CC: Gait ataxia and mild peripheral neuropathy    HPI:  Arlin Hutson is a  87 y.o.  right-handed white female who I am seeing in follow-up for gait ataxia and mild peripheral neuropathy.  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       Symptoms started 6 years ago.  She states that her feet hurt after walking on them.  She describes burning pain on the balls of the feet.  She feels like she is walking on hot coals or they feel very cold.  She uses a cane or a walker.  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  She has some urinary urgency and urinary frequency but no significant urinary incontinence.  She does wear a pad.  She denies any numbness or weakness in her hands.  She states that she has lightheadedness and a feeling of off balance but not actual dizziness.  She denies any triggers such as lying down, rolling over, standing up or bending.  [facility]     Patient states that she has had 3 falls this year.  One of her falls was backwards and one of them she fell forward when she was walking with her walker.  She states that she does not realize that she is leaning backwards until it is too late and she loses her balance and falls.  She denies any dizziness  She states she feels like her \"eyes feel loose in head\".  She states that they do not rotate or the world is not spinning but it is just a weird sensation that she feels.  She describes these episodes as being daily and vary in severity.  She denies any double vision, dysphagia, aphasia, or confusion.  She continues physical therapy for balance control.  She states " that she does have a walk-in shower with grab bars.  She states that she does have to hold onto the grab bars when she closes her eyes to wash her hair or she will fall backwards.     She states that she has a burning sensation in the soles of her feet.  It is worse when she is walking and she does not notice it at night or when she is not weightbearing.  She states that it feels good when she takes her shoes off and walks on the cold floor.  She has seen a podiatrist in the past and had custom made orthotics for her shoes .  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 Caruso.  She reports some low back pain which is relieved with a heating pad or Tylenol.   In March she went to the ER for worsening of her balance and was diagnosed with a UTI and sent her home on antibiotics.  Her balance did not improve after she completed the antibiotics and an MRI of the brain showed:     Impression:  1. No change when compared to the prior MRI of the brain from [facility] on 01/07/2022 with no acute intracranial abnormality  identified.  2. 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   3. There is diffuse cerebral atrophy and the lateral and third  ventricles are prominent in size. I think this is most probably on the  basis of central volume loss or atrophy rather than NPH and correlate  clinically. The remainder of the MRI of the brain is normal.  Specifically, no acute infarct is identified and the etiology of  patient's dizziness is not established on this exam.        Past Medical History:   Diagnosis Date    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    Arthritis of neck ?    Atheroembolism of right lower extremity     Baker's cyst     Colon polyp     CREST syndrome     Difficulty walking About 5 yrs ago    I  use a  cane when I leave my house    Fracture of wrist     Surgery about 19 years ago on ribght wrist    Fractures     GERD (gastroesophageal reflux disease)     Hip arthrosis ? Maybe 7 or 8 years ago    History of CT scan of abdomen 03/11/2011    constipation, sig tics, 6 mm hepatic cyst    History of rheumatoid arthritis     HL (hearing loss) About 1995    Hyperlipidemia     Hypertension     Irritable bowel syndrome 1990s    Knee swelling 5 or 6 years ago    Right knee replacement 4 years [facility]    Low back pain     Normocytic anemia 08/26/2020    Osteoarthritis     Peripheral neuropathy Cannot remember    I have Raynaudsin my hands and feet    Raynaud's disease     Rheumatoid arthritis     Scleroderma     Shingles     I have had both shingles vaccines    Sjogren's syndrome     Tear of meniscus of knee     Torn meniscus surgery    Ulcerative colitis          Past Surgical History:   Procedure Laterality Date    CATARACT EXTRACTION, BILATERAL      COLONOSCOPY  02/26/2016    tics, microscopic colitis,     COLONOSCOPY  07/01/2011    sig tics, inflammation, polyp    DILATATION AND CURETTAGE  1980    EKOS CATHETER PLACEMENT N/A 03/23/2021    Procedure: AIF WITH RUN OFF OF RT. LEG, ROTATIONAL ATHERECTOMY OF RIGHT POPLITEAL ARTERY;  Surgeon: Gtao Contreras MD;  Location: [facility] OR 18/19;  Service: Vascular;  Laterality: N/A;    ENDOSCOPY N/A 12/03/2018    erythematous mucosa in stomach, path benign    EYE SURGERY      FLEXIBLE SIGMOIDOSCOPY      FRACTURE SURGERY      HAND SURGERY      JOINT REPLACEMENT      KNEE ARTHROSCOPY Right     w/meniscal repair    KNEE SURGERY Right     TEETH EXTRACTION      TOTAL KNEE ARTHROPLASTY Right 05/30/2018    Procedure: TOTAL KNEE ARTHROPLASTY;  Surgeon: Georges Jon MD;  Location: [facility] OR;  Service: Orthopedics    TRANSLUMINAL ATHERECTOMY POPLITEAL ARTERY      TRIGGER POINT INJECTION      UPPER GASTROINTESTINAL ENDOSCOPY  03/14/2008    erythema    VASCULAR SURGERY  Spring of  2020    Blood clot in an artery behind my right knee    WRIST FRACTURE SURGERY Right     WRIST SURGERY Right 2008    [facility]           Current Outpatient Medications:     Acetaminophen (TYLENOL EXTRA STRENGTH PO), Take 2 tablets by mouth Daily As Needed., Disp: , Rfl:     amLODIPine (NORVASC) 2.5 MG tablet, , Disp: , Rfl:     aspirin 81 MG chewable tablet, Chew 1 tablet Daily., Disp: , Rfl:     atorvastatin (LIPITOR) 20 MG tablet, Take 1 tablet by mouth Daily., Disp: , Rfl:     Budesonide (ENTOCORT EC) 3 MG 24 hr capsule, Take 3 capsules by mouth Daily., Disp: 90 capsule, Rfl: 11    calcium carbonate (OS-DWIGHT) 600 MG tablet, 1 P.O. daily, Disp: , Rfl:     Carboxymethylcell-Hypromellose (GENTEAL OP), Apply 1 application to eye As Needed., Disp: , Rfl:     Cholecalciferol (VITAMIN D PO), Take 2,000 mg by mouth every night at bedtime., Disp: , Rfl:     escitalopram (LEXAPRO) 20 MG tablet, Take 1 tablet by mouth Every Morning., Disp: , Rfl:     fluticasone (FLONASE) 50 MCG/ACT nasal spray, 1-2 sprays into the nostril(s) as directed by provider., Disp: , Rfl:     folic acid (FOLVITE) 1 MG tablet, Take 2 tablets by mouth Daily., Disp: , Rfl:     Methotrexate Sodium (METHOTREXATE PF) 50 MG/2ML chemo syringe, Infuse  into a venous catheter 1 (One) Time., Disp: , Rfl:     Multiple Vitamins-Minerals (CENTRUM SILVER ULTRA WOMENS PO), 1 P.O. daily, Disp: , Rfl:     omeprazole (priLOSEC) 20 MG capsule, 1 capsule. Monday Wednesday friday, Disp: , Rfl:     polyethyl glycol-propyl glycol (Systane) 0.4-0.3 % solution ophthalmic solution (artificial tears), , Disp: , Rfl:     traZODone (DESYREL) 50 MG tablet, Take 0.5-1 tablets by mouth Every Night., Disp: , Rfl:       Family History   Problem Relation Age of Onset    Ulcerative colitis Mother     Migraines Mother     Stroke Mother     Hypertension Mother     Thyroid disease Mother     Arthritis Father     Heart attack Father     Migraines Sister         Sister    Skin cancer Sister      "Diabetes Brother     Skin cancer Brother     Breast cancer Maternal Aunt     Migraines Daughter     Gokul Hyperthermia Neg Hx          Social History     Socioeconomic History    Marital status: Single    Number of children: 3   Tobacco Use    Smoking status: Never    Smokeless tobacco: Never   Vaping Use    Vaping Use: Never used   Substance and Sexual Activity    Alcohol use: Yes     Comment: I rarely have a glass of wine.    Drug use: Never    Sexual activity: Not Currently     Partners: Male     Comment: Not sexual active         Allergies   Allergen Reactions    Codeine Diarrhea and Nausea Only    Penicillin G Rash    Sulfa Antibiotics Hives         Pain Scale: 4/10        ROS:  Review of Systems   Musculoskeletal:  Positive for gait problem (balance worsening, > 2 falls since LOV).         I have reviewed and agree with the above ROS completed by the medical assistant.      Physical Exam:  Vitals:    11/06/23 1601   BP: 112/70   Pulse: 74   SpO2: 96%   Weight: 49 kg (108 lb)   Height: 152.4 cm (60\")     Orthostatic BP:    Body mass index is 21.09 kg/m².    Physical Exam  General: Slender elderly white female no acute distress  HEENT: Normocephalic no evidence of trauma  Neck: Supple  Heart: Regular rate and rhythm  Extremities: No pedal edema      Neurological Exam:   Mental Status: Awake, alert, oriented to person, place and time.  Conversant without evidence of an affective disorder, thought disorder, delusions or hallucinations.  Attention span and concentration are normal.  HCF: No aphasia, apraxia or dysarthria.  Recent and remote memory intact.  Knowledge of recent events intact.  CN: I:   II: Visual fields full without left inattention   III, IV, VI: Eye movements intact without nystagmus or ptosis.  Pupils equal   round and reactive to light.   V,VII: Light touch and pinprick intact all 3 divisions of V.  Facial muscles symmetrical.   VIII: Hearing intact to finger rub   IX,X: Soft palate elevates " "symmetrically   XI: Sternomastoid and trapezius are strong.   XII: Tongue midline without atrophy or fasciculations  Motor: Normal tone and bulk in the upper and lower extremities   Power testing: Good power in all muscles tested in the upper extremities.  There is toe extensor weakness bilaterally  Reflexes: Upper extremities:        Lower extremities:        Toe signs:  Sensory: Light touch: Reduced in the feet but intact on the toes        Pinprick: Reduced in the small toes        Vibration: Reduced at the ankles        Position: Intact at the great toes    Cerebellar: Finger-to-nose: Intact           Rapid movement: Intact           Heel-to-shin: Intact  Gait and Station: Mildly broad-based and unsteady.  Uses a rolling walker    Results:      Lab Results   Component Value Date    GLUCOSE 96 09/27/2023    BUN 17 09/27/2023    CREATININE 0.68 09/27/2023    EGFRIFNONA 95 09/02/2021    EGFRIFAFRI 102 10/15/2018    BCR 25.0 09/27/2023    CO2 24.3 09/27/2023    CALCIUM 8.7 09/27/2023    PROTENTOTREF 6.8 09/27/2023    ALBUMIN 4.0 09/27/2023    ALBUMIN 3.9 09/27/2023    LABIL2 1.4 09/27/2023    AST 22 09/27/2023    ALT 21 09/27/2023       Lab Results   Component Value Date    WBC 6.87 09/27/2023    HGB 13.1 09/27/2023    HCT 41.1 09/27/2023    .5 (H) 09/27/2023     09/27/2023         .No results found for: \"RPR\"      Lab Results   Component Value Date    TSH 4.540 (H) 07/13/2023         Lab Results   Component Value Date    SXBLEKMN42 593 09/08/2022         Lab Results   Component Value Date    FOLATE >20.00 09/08/2022         No results found for: \"HGBA1C\"      Lab Results   Component Value Date    GLUCOSE 96 09/27/2023    BUN 17 09/27/2023    CREATININE 0.68 09/27/2023    EGFRIFNONA 95 09/02/2021    EGFRIFAFRI 102 10/15/2018    BCR 25.0 09/27/2023    K 3.5 09/27/2023    CO2 24.3 09/27/2023    CALCIUM 8.7 09/27/2023    PROTENTOTREF 6.8 09/27/2023    ALBUMIN 4.0 09/27/2023    ALBUMIN 3.9 09/27/2023    " LABIL2 1.4 09/27/2023    AST 22 09/27/2023    ALT 21 09/27/2023         Lab Results   Component Value Date    WBC 6.87 09/27/2023    HGB 13.1 09/27/2023    HCT 41.1 09/27/2023    .5 (H) 09/27/2023     09/27/2023             Assessment:   1.  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  Mild sensory peripheral neuropathy  3.  Burning on the balls of the feet with weightbearing        Plan:  1.  Trial of neuropathy cream from [facility]  2.  Continue use of rolling walker for gait stabilization  3.  Follow-up 6 months Stephanie Lam        Time: 30 minutes

## 2023-11-06 NOTE — LETTER
November 6, 2023       No Recipients    Patient: Arlin Hutson   YOB: 1935   Date of Visit: 11/6/2023     Dear Oren Rust Jr., MD:       Thank you for referring Arlin Hutson to me for evaluation. Below are the relevant portions of my assessment and plan of care.    If you have questions, please do not hesitate to call me. I look forward to following Arlin along with you.         Sincerely,        Ralph Blancas MD        CC:   No Recipients    Ralph Blancas MD  11/06/23 1823  Sign when Signing Visit  CC: Gait ataxia and mild peripheral neuropathy    HPI:  Arlin Hutson is a  87 y.o.  right-handed white female who I am seeing in follow-up for gait ataxia and mild peripheral neuropathy.  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       Symptoms started 6 years ago.  She states that her feet hurt after walking on them.  She describes burning pain on the balls of the feet.  She feels like she is walking on hot coals or they feel very cold.  She uses a cane or a walker.  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  She has some urinary urgency and urinary frequency but no significant urinary incontinence.  She does wear a pad.  She denies any numbness or weakness in her hands.  She states that she has lightheadedness and a feeling of off balance but not actual dizziness.  She denies any triggers such as lying down, rolling over, standing up or bending.  [facility]     Patient states that she has had 3 falls this year.  One of her falls was backwards and one of them she fell forward when she was walking with her  "walker.  She states that she does not realize that she is leaning backwards until it is too late and she loses her balance and falls.  She denies any dizziness  She states she feels like her \"eyes feel loose in head\".  She states that they do not rotate or the world is not spinning but it is just a weird sensation that she feels.  She describes these episodes as being daily and vary in severity.  She denies any double vision, dysphagia, aphasia, or confusion.  She continues physical therapy for balance control.  She states that she does have a walk-in shower with grab bars.  She states that she does have to hold onto the grab bars when she closes her eyes to wash her hair or she will fall backwards.     She states that she has a burning sensation in the soles of her feet.  It is worse when she is walking and she does not notice it at night or when she is not weightbearing.  She states that it feels good when she takes her shoes off and walks on the cold floor.  She has seen a podiatrist in the past and had custom made orthotics for her shoes .  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 Caruso.  She reports some low back pain which is relieved with a heating pad or Tylenol.   In March she went to the ER for worsening of her balance and was diagnosed with a UTI and sent her home on antibiotics.  Her balance did not improve after she completed the antibiotics and an MRI of the brain showed:     Impression:  1. No change when compared to the prior MRI of the brain from [facility] on 01/07/2022 with no acute intracranial abnormality  identified.  2. 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   3. There is diffuse cerebral atrophy and the lateral and third  ventricles are prominent in size. I think " this is most probably on the  basis of central volume loss or atrophy rather than NPH and correlate  clinically. The remainder of the MRI of the brain is normal.  Specifically, no acute infarct is identified and the etiology of  patient's dizziness is not established on this exam.        Past Medical History:   Diagnosis Date   • 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   • Arthritis of neck ?   • Atheroembolism of right lower extremity    • Baker's cyst    • Colon polyp    • CREST syndrome    • Difficulty walking About 5 yrs ago    I  use a cane when I leave my house   • Fracture of wrist     Surgery about 19 years ago on ribght wrist   • Fractures    • GERD (gastroesophageal reflux disease)    • Hip arthrosis ? Maybe 7 or 8 years ago   • History of CT scan of abdomen 03/11/2011    constipation, sig tics, 6 mm hepatic cyst   • History of rheumatoid arthritis    • HL (hearing loss) About 1995   • Hyperlipidemia    • Hypertension    • Irritable bowel syndrome 1990s   • Knee swelling 5 or 6 years ago    Right knee replacement 4 years [facility]   • Low back pain    • Normocytic anemia 08/26/2020   • Osteoarthritis    • Peripheral neuropathy Cannot remember    I have Raynaudsin my hands and feet   • Raynaud's disease    • Rheumatoid arthritis    • Scleroderma    • Shingles     I have had both shingles vaccines   • Sjogren's syndrome    • Tear of meniscus of knee     Torn meniscus surgery   • Ulcerative colitis          Past Surgical History:   Procedure Laterality Date   • CATARACT EXTRACTION, BILATERAL     • COLONOSCOPY  02/26/2016    tics, microscopic colitis,    • COLONOSCOPY  07/01/2011    sig tics, inflammation, polyp   • DILATATION AND CURETTAGE  1980   • EKOS CATHETER PLACEMENT N/A 03/23/2021    Procedure: AIF WITH RUN OFF OF RT. LEG, ROTATIONAL ATHERECTOMY OF RIGHT POPLITEAL ARTERY;  Surgeon: Gato Contreras MD;  Location: [facility] OR 18/19;  Service: Vascular;  Laterality: N/A;   • ENDOSCOPY N/A 12/03/2018     erythematous mucosa in stomach, path benign   • EYE SURGERY     • FLEXIBLE SIGMOIDOSCOPY     • FRACTURE SURGERY     • HAND SURGERY     • JOINT REPLACEMENT     • KNEE ARTHROSCOPY Right     w/meniscal repair   • KNEE SURGERY Right    • TEETH EXTRACTION     • TOTAL KNEE ARTHROPLASTY Right 05/30/2018    Procedure: TOTAL KNEE ARTHROPLASTY;  Surgeon: Georges Jon MD;  Location: [facility] MAIN OR;  Service: Orthopedics   • TRANSLUMINAL ATHERECTOMY POPLITEAL ARTERY     • TRIGGER POINT INJECTION     • UPPER GASTROINTESTINAL ENDOSCOPY  03/14/2008    erythema   • VASCULAR SURGERY  Spring of 2020    Blood clot in an artery behind my right knee   • WRIST FRACTURE SURGERY Right    • WRIST SURGERY Right 2008    orif           Current Outpatient Medications:   •  Acetaminophen (TYLENOL EXTRA STRENGTH PO), Take 2 tablets by mouth Daily As Needed., Disp: , Rfl:   •  amLODIPine (NORVASC) 2.5 MG tablet, , Disp: , Rfl:   •  aspirin 81 MG chewable tablet, Chew 1 tablet Daily., Disp: , Rfl:   •  atorvastatin (LIPITOR) 20 MG tablet, Take 1 tablet by mouth Daily., Disp: , Rfl:   •  Budesonide (ENTOCORT EC) 3 MG 24 hr capsule, Take 3 capsules by mouth Daily., Disp: 90 capsule, Rfl: 11  •  calcium carbonate (OS-DWIGHT) 600 MG tablet, 1 P.O. daily, Disp: , Rfl:   •  Carboxymethylcell-Hypromellose (GENTEAL OP), Apply 1 application to eye As Needed., Disp: , Rfl:   •  Cholecalciferol (VITAMIN D PO), Take 2,000 mg by mouth every night at bedtime., Disp: , Rfl:   •  escitalopram (LEXAPRO) 20 MG tablet, Take 1 tablet by mouth Every Morning., Disp: , Rfl:   •  fluticasone (FLONASE) 50 MCG/ACT nasal spray, 1-2 sprays into the nostril(s) as directed by provider., Disp: , Rfl:   •  folic acid (FOLVITE) 1 MG tablet, Take 2 tablets by mouth Daily., Disp: , Rfl:   •  Methotrexate Sodium (METHOTREXATE PF) 50 MG/2ML chemo syringe, Infuse  into a venous catheter 1 (One) Time., Disp: , Rfl:   •  Multiple Vitamins-Minerals (CENTRUM SILVER ULTRA WOMENS PO), 1 P.O.  "daily, Disp: , Rfl:   •  omeprazole (priLOSEC) 20 MG capsule, 1 capsule. Monday Wednesday friday, Disp: , Rfl:   •  polyethyl glycol-propyl glycol (Systane) 0.4-0.3 % solution ophthalmic solution (artificial tears), , Disp: , Rfl:   •  traZODone (DESYREL) 50 MG tablet, Take 0.5-1 tablets by mouth Every Night., Disp: , Rfl:       Family History   Problem Relation Age of Onset   • Ulcerative colitis Mother    • Migraines Mother    • Stroke Mother    • Hypertension Mother    • Thyroid disease Mother    • Arthritis Father    • Heart attack Father    • Migraines Sister         Sister   • Skin cancer Sister    • Diabetes Brother    • Skin cancer Brother    • Breast cancer Maternal Aunt    • Migraines Daughter    • Malig Hyperthermia Neg Hx          Social History     Socioeconomic History   • Marital status: Single   • Number of children: 3   Tobacco Use   • Smoking status: Never   • Smokeless tobacco: Never   Vaping Use   • Vaping Use: Never used   Substance and Sexual Activity   • Alcohol use: Yes     Comment: I rarely have a glass of wine.   • Drug use: Never   • Sexual activity: Not Currently     Partners: Male     Comment: Not sexual active         Allergies   Allergen Reactions   • Codeine Diarrhea and Nausea Only   • Penicillin G Rash   • Sulfa Antibiotics Hives         Pain Scale: 4/10        ROS:  Review of Systems   Musculoskeletal:  Positive for gait problem (balance worsening, > 2 falls since LOV).         I have reviewed and agree with the above ROS completed by the medical assistant.      Physical Exam:  Vitals:    11/06/23 1601   BP: 112/70   Pulse: 74   SpO2: 96%   Weight: 49 kg (108 lb)   Height: 152.4 cm (60\")     Orthostatic BP:    Body mass index is 21.09 kg/m².    Physical Exam  General: Slender elderly white female no acute distress  HEENT: Normocephalic no evidence of trauma  Neck: Supple  Heart: Regular rate and rhythm  Extremities: No pedal edema      Neurological Exam:   Mental Status: Awake, " alert, oriented to person, place and time.  Conversant without evidence of an affective disorder, thought disorder, delusions or hallucinations.  Attention span and concentration are normal.  HCF: No aphasia, apraxia or dysarthria.  Recent and remote memory intact.  Knowledge of recent events intact.  CN: I:   II: Visual fields full without left inattention   III, IV, VI: Eye movements intact without nystagmus or ptosis.  Pupils equal   round and reactive to light.   V,VII: Light touch and pinprick intact all 3 divisions of V.  Facial muscles symmetrical.   VIII: Hearing intact to finger rub   IX,X: Soft palate elevates symmetrically   XI: Sternomastoid and trapezius are strong.   XII: Tongue midline without atrophy or fasciculations  Motor: Normal tone and bulk in the upper and lower extremities   Power testing: Good power in all muscles tested in the upper extremities.  There is toe extensor weakness bilaterally  Reflexes: Upper extremities:        Lower extremities:        Toe signs:  Sensory: Light touch: Reduced in the feet but intact on the toes        Pinprick: Reduced in the small toes        Vibration: Reduced at the ankles        Position: Intact at the great toes    Cerebellar: Finger-to-nose: Intact           Rapid movement: Intact           Heel-to-shin: Intact  Gait and Station: Mildly broad-based and unsteady.  Uses a rolling walker    Results:      Lab Results   Component Value Date    GLUCOSE 96 09/27/2023    BUN 17 09/27/2023    CREATININE 0.68 09/27/2023    EGFRIFNONA 95 09/02/2021    EGFRIFAFRI 102 10/15/2018    BCR 25.0 09/27/2023    CO2 24.3 09/27/2023    CALCIUM 8.7 09/27/2023    PROTENTOTREF 6.8 09/27/2023    ALBUMIN 4.0 09/27/2023    ALBUMIN 3.9 09/27/2023    LABIL2 1.4 09/27/2023    AST 22 09/27/2023    ALT 21 09/27/2023       Lab Results   Component Value Date    WBC 6.87 09/27/2023    HGB 13.1 09/27/2023    HCT 41.1 09/27/2023    .5 (H) 09/27/2023     09/27/2023         .No  "results found for: \"RPR\"      Lab Results   Component Value Date    TSH 4.540 (H) 07/13/2023         Lab Results   Component Value Date    [id number] 593 09/08/2022         Lab Results   Component Value Date    FOLATE >20.00 09/08/2022         No results found for: \"HGBA1C\"      Lab Results   Component Value Date    GLUCOSE 96 09/27/2023    BUN 17 09/27/2023    CREATININE 0.68 09/27/2023    EGFRIFNONA 95 09/02/2021    EGFRIFAFRI 102 10/15/2018    BCR 25.0 09/27/2023    K 3.5 09/27/2023    CO2 24.3 09/27/2023    CALCIUM 8.7 09/27/2023    PROTENTOTREF 6.8 09/27/2023    ALBUMIN 4.0 09/27/2023    ALBUMIN 3.9 09/27/2023    LABIL2 1.4 09/27/2023    AST 22 09/27/2023    ALT 21 09/27/2023         Lab Results   Component Value Date    WBC 6.87 09/27/2023    HGB 13.1 09/27/2023    HCT 41.1 09/27/2023    .5 (H) 09/27/2023     09/27/2023             Assessment:   1.  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  Mild sensory peripheral neuropathy  3.  Burning on the balls of the feet with weightbearing        Plan:  1.  Trial of neuropathy cream from [facility]  2.  Continue use of rolling walker for gait stabilization  3.  Follow-up 6 months Stephanie Lam        Time: 30 minutes                  "

## 2024-01-16 ENCOUNTER — TELEPHONE (OUTPATIENT)
Dept: CARDIOLOGY | Facility: CLINIC | Age: 89
End: 2024-01-16
Payer: MEDICARE

## 2024-01-16 NOTE — TELEPHONE ENCOUNTER
Caller: Arlin Hutson    Relationship to patient: Self    Best call back number: [phone]      Type of visit: FOLLOW UP     Requested date: PATIENT WANTED TO RESCHEDULE IN MARCH DUE TO WEATHER AND STATES SHE ISN'T HAVING ANY PROBLEMS.     If rescheduling, when is the original appointment: 1/17/24     Additional notes:PATIENT WAS RESCHEDULED TO 3/18/24.PLEASE CALL PATIENT TO ADVISE IF APPOINTMENT EXTENSION IS OK.

## 2024-03-22 DIAGNOSIS — I73.9 PERIPHERAL VASCULAR DISEASE, UNSPECIFIED: Primary | ICD-10-CM

## 2024-03-22 DIAGNOSIS — I70.212 ATHEROSCLEROSIS OF NATIVE ARTERY OF LEFT LOWER EXTREMITY WITH INTERMITTENT CLAUDICATION: ICD-10-CM

## 2024-04-15 ENCOUNTER — OFFICE VISIT (OUTPATIENT)
Dept: CARDIOLOGY | Facility: CLINIC | Age: 89
End: 2024-04-15
Payer: MEDICARE

## 2024-04-15 VITALS
DIASTOLIC BLOOD PRESSURE: 80 MMHG | HEART RATE: 75 BPM | HEIGHT: 55 IN | BODY MASS INDEX: 24.99 KG/M2 | OXYGEN SATURATION: 95 % | SYSTOLIC BLOOD PRESSURE: 120 MMHG | WEIGHT: 108 LBS

## 2024-04-15 DIAGNOSIS — Z91.81 AT HIGH RISK FOR FALLS: ICD-10-CM

## 2024-04-15 DIAGNOSIS — I10 ESSENTIAL HYPERTENSION: Primary | ICD-10-CM

## 2024-04-15 PROCEDURE — 1159F MED LIST DOCD IN RCRD: CPT | Performed by: NURSE PRACTITIONER

## 2024-04-15 PROCEDURE — 99213 OFFICE O/P EST LOW 20 MIN: CPT | Performed by: NURSE PRACTITIONER

## 2024-04-15 PROCEDURE — 1160F RVW MEDS BY RX/DR IN RCRD: CPT | Performed by: NURSE PRACTITIONER

## 2024-04-15 PROCEDURE — G2211 COMPLEX E/M VISIT ADD ON: HCPCS | Performed by: NURSE PRACTITIONER

## 2024-04-15 NOTE — PROGRESS NOTES
"    CARDIOLOGY        Patient Name: Arlin Hutson  :1935  Age: 88 y.o.  Primary Cardiologist: Castillo Abdul MD  Encounter Provider:  CHIRAG Santo    Date of Service: 04/15/24    CHIEF COMPLAINT / REASON FOR OFFICE VISIT     Follow-Up and Fall      HISTORY OF PRESENT ILLNESS       HPI  Arlin Hutson is a 88 y.o. female who presents today for semi-annual follow up.     Pt has a  history significant for recurrent falls.    Patient reports that she has done well since the last assessment.  She is ambulating with a cane or a walker, she has not had any falls for the past 6-7 months. She reports that she feels that her balance is improving.  She is asymptomatic from a cardiac standpoint, she denies any episodes of chest pain, shortness of breath, palpitations, lightheadedness, swelling or fatigue.  She is compliant with all medications.     The following portions of the patient's history were reviewed and updated as appropriate: allergies, current medications, past family history, past medical history, past social history, past surgical history and problem list.      VITAL SIGNS     Visit Vitals  /80 (BP Location: Left arm, Patient Position: Sitting)   Pulse 75   Ht 60 cm (23.62\")   Wt 49 kg (108 lb)   LMP  (LMP Unknown)   SpO2 95%   .08 kg/m²         Wt Readings from Last 3 Encounters:   04/15/24 49 kg (108 lb)   23 49 kg (108 lb)   23 48.9 kg (107 lb 12.8 oz)     Body mass index is 136.08 kg/m².      REVIEW OF SYSTEMS     Review of Systems   Constitutional: Negative for chills, fever, weight gain and weight loss.   Cardiovascular:  Negative for leg swelling.   Respiratory:  Negative for cough, snoring and wheezing.    Hematologic/Lymphatic: Negative for bleeding problem. Does not bruise/bleed easily.   Skin:  Negative for color change.   Musculoskeletal:  Negative for falls, joint pain and myalgias.   Gastrointestinal:  Negative for melena.   Genitourinary:  Negative for " hematuria.   Neurological:  Negative for excessive daytime sleepiness.   Psychiatric/Behavioral:  Negative for depression. The patient is not nervous/anxious.            PHYSICAL EXAMINATION     Vitals and nursing note reviewed.   Constitutional:       Appearance: Normal appearance. Well-developed.   Eyes:      Conjunctiva/sclera: Conjunctivae normal.   Neck:      Vascular: No carotid bruit.   Pulmonary:      Breath sounds: Normal breath sounds.   Cardiovascular:      Normal rate. Regular rhythm. Normal S1 with normal intensity. Normal S2 with normal intensity.       Murmurs: There is no murmur.      No gallop.  No click. No rub.   Edema:     Peripheral edema absent.   Musculoskeletal: Normal range of motion. Skin:     General: Skin is warm and dry.   Neurological:      Mental Status: Alert and oriented to person, place, and time.      GCS: GCS eye subscore is 4. GCS verbal subscore is 5. GCS motor subscore is 6.   Psychiatric:         Speech: Speech normal.         Behavior: Behavior normal.         Thought Content: Thought content normal.         Judgment: Judgment normal.           REVIEWED DATA     Procedures    Cardiac Procedures:  Echocardiogram 1/13/2021.  LVEF 64%.  Normal LV cavity size noted.  Mild LVH.  Grade 1 diastolic dysfunction.  Left atrial cavity is mildly dilated.  Mild to moderate mitral regurgitation.  Calculated RVSP 23 mmHg.  24-hour monitor 7/28/2023.  Relatively benign monitor study.        Lab Results   Component Value Date     09/27/2023     (H) 07/13/2023    K 3.5 09/27/2023    K 3.9 07/13/2023     09/27/2023     07/13/2023    CO2 24.3 09/27/2023    CO2 26 07/13/2023    BUN 17 09/27/2023    BUN 16 07/13/2023    CREATININE 0.68 09/27/2023    CREATININE 0.66 07/13/2023    EGFRIFNONA 95 09/02/2021    EGFRIFNONA 115 03/24/2021    EGFRIFAFRI 102 10/15/2018    EGFRIFAFRI 126 02/22/2018    GLUCOSE 96 09/27/2023    GLUCOSE 65 (L) 07/13/2023    CALCIUM 8.7 09/27/2023     "CALCIUM 9.4 07/13/2023    PROTENTOTREF 6.8 09/27/2023    PROTENTOTREF 6.4 07/13/2023    ALBUMIN 4.0 09/27/2023    ALBUMIN 3.9 09/27/2023    BILITOT 0.2 09/27/2023    BILITOT 0.2 07/13/2023    AST 22 09/27/2023    AST 19 07/13/2023    ALT 21 09/27/2023    ALT 19 07/13/2023     Lab Results   Component Value Date    WBC 6.87 09/27/2023    WBC 6.8 07/13/2023    HGB 13.1 09/27/2023    HGB 12.7 07/13/2023    HCT 41.1 09/27/2023    HCT 39.1 07/13/2023    .5 (H) 09/27/2023    MCV 96 07/13/2023     09/27/2023     07/13/2023     No results found for: \"PROBNP\", \"BNP\"  Lab Results   Component Value Date    TROPONINT 13 (H) 03/27/2023     Lab Results   Component Value Date    TSH 4.540 (H) 07/13/2023    TSH 4.300 (H) 01/05/2023             ASSESSMENT & PLAN     Diagnoses and all orders for this visit:    1. Essential hypertension (Primary)  Assessment & Plan:  BP controlled at 120/80  Continue current regimen:  Amlodipine 2.5 mg per day        2. At high risk for falls  Assessment & Plan:  History of frequent falls  Last falls > 7 months ago  Uses a cane or walker          Return in about 1 year (around 4/15/2025) for Dr. Abdul- Routine.    Future Appointments         Provider Department Center    4/19/2024 12:00 PM LAB CHAIR 6 [facility] ONCOLOGY [facility] [place]    4/26/2024 1:20 PM VITALS ONLY [facility] ONCOLOGY [facility] [place]    4/26/2024 1:40 PM Hayder Caruso Jr., MD [facility] HEMATOLOGY & ONCOLOGY [place]    5/6/2024 1:00 PM Stephanie Lam APRN [facility] NEUROLOGY [facility]    10/16/2024 11:00 AM [facility] OP VAS RM 2 [facility] NON INVASIVE VASCULAR LABORATORY [facility]    10/16/2024 11:30 AM [facility] OP VAS RM 2 [facility] NON INVASIVE VASCULAR LABORATORY [facility]    10/16/2024 12:00 PM Benjamin Collier Jr., MD [facility] VASCULAR SURGERY [facility]    4/28/2025 11:00 AM Castillo Abdul MD [facility] " CARDIOLOGY [facility]              MEDICATIONS         Discharge Medications            Accurate as of April 15, 2024  3:39 PM. If you have any questions, ask your nurse or doctor.                Continue These Medications        Instructions Start Date   amLODIPine 2.5 MG tablet  Commonly known as: NORVASC   No dose, route, or frequency recorded.      aspirin 81 MG chewable tablet   81 mg, Oral, Daily      atorvastatin 20 MG tablet  Commonly known as: LIPITOR   20 mg, Oral, Daily      Budesonide 3 MG 24 hr capsule  Commonly known as: ENTOCORT EC   9 mg, Oral, Daily      calcium carbonate 600 MG tablet  Commonly known as: OS-DWIGHT   1 P.O. daily      escitalopram 20 MG tablet  Commonly known as: LEXAPRO   20 mg, Oral, Every Morning      fluticasone 50 MCG/ACT nasal spray  Commonly known as: FLONASE   1-2 sprays, Nasal      folic acid 1 MG tablet  Commonly known as: FOLVITE   2 mg, Oral, Daily      GENTEAL OP   1 application , Ophthalmic, As Needed      methotrexate PF 50 MG/2ML chemo syringe   Intravenous, Once      multivitamin with minerals tablet tablet   1 P.O. daily      omeprazole 20 MG capsule  Commonly known as: priLOSEC   20 mg, Monday Wednesday friday      Systane 0.4-0.3 % solution ophthalmic solution (artificial tears)  Generic drug: polyethyl glycol-propyl glycol   No dose, route, or frequency recorded.      traZODone 50 MG tablet  Commonly known as: DESYREL   25-50 mg, Oral, Nightly      TYLENOL EXTRA STRENGTH PO   2 tablets, Oral, Daily PRN      VITAMIN D PO   2,000 mg, Oral, Every Night at Bedtime                   **Dragon Disclaimer:   Much of this encounter note is an electronic transcription/translation of spoken language to printed text. The electronic translation of spoken language may permit erroneous, or at times, nonsensical words or phrases to be inadvertently transcribed. Although I have reviewed the note for such errors, some may still exist.

## 2024-04-19 ENCOUNTER — TELEPHONE (OUTPATIENT)
Dept: ONCOLOGY | Facility: CLINIC | Age: 89
End: 2024-04-19
Payer: MEDICARE

## 2024-04-19 NOTE — TELEPHONE ENCOUNTER
Relay     LM for patient to call me at [phone], she missed lab appt for today and will have to move appt for Dr Caruso out. Give call to paula

## 2024-04-22 ENCOUNTER — LAB (OUTPATIENT)
Dept: LAB | Facility: HOSPITAL | Age: 89
End: 2024-04-22
Payer: MEDICARE

## 2024-04-22 DIAGNOSIS — D47.2 MGUS (MONOCLONAL GAMMOPATHY OF UNKNOWN SIGNIFICANCE): ICD-10-CM

## 2024-04-22 DIAGNOSIS — D50.8 OTHER IRON DEFICIENCY ANEMIA: ICD-10-CM

## 2024-04-22 LAB
ALBUMIN SERPL-MCNC: 4 G/DL (ref 3.5–5.2)
ALBUMIN/GLOB SERPL: 1.8 G/DL
ALP SERPL-CCNC: 91 U/L (ref 39–117)
ALT SERPL W P-5'-P-CCNC: 20 U/L (ref 1–33)
ANION GAP SERPL CALCULATED.3IONS-SCNC: 13.1 MMOL/L (ref 5–15)
AST SERPL-CCNC: 18 U/L (ref 1–32)
BASOPHILS # BLD AUTO: 0.04 10*3/MM3 (ref 0–0.2)
BASOPHILS NFR BLD AUTO: 0.4 % (ref 0–1.5)
BILIRUB SERPL-MCNC: 0.3 MG/DL (ref 0–1.2)
BUN SERPL-MCNC: 20 MG/DL (ref 8–23)
BUN/CREAT SERPL: 32.3 (ref 7–25)
CALCIUM SPEC-SCNC: 8.7 MG/DL (ref 8.6–10.5)
CHLORIDE SERPL-SCNC: 104 MMOL/L (ref 98–107)
CO2 SERPL-SCNC: 25.9 MMOL/L (ref 22–29)
CREAT SERPL-MCNC: 0.62 MG/DL (ref 0.57–1)
DEPRECATED RDW RBC AUTO: 50.5 FL (ref 37–54)
EGFRCR SERPLBLD CKD-EPI 2021: 85.8 ML/MIN/1.73
EOSINOPHIL # BLD AUTO: 0.2 10*3/MM3 (ref 0–0.4)
EOSINOPHIL NFR BLD AUTO: 2.2 % (ref 0.3–6.2)
ERYTHROCYTE [DISTWIDTH] IN BLOOD BY AUTOMATED COUNT: 14.3 % (ref 12.3–15.4)
FERRITIN SERPL-MCNC: 185 NG/ML (ref 13–150)
GLOBULIN UR ELPH-MCNC: 2.2 GM/DL
GLUCOSE SERPL-MCNC: 84 MG/DL (ref 65–99)
HCT VFR BLD AUTO: 37.9 % (ref 34–46.6)
HGB BLD-MCNC: 12.3 G/DL (ref 12–15.9)
IMM GRANULOCYTES # BLD AUTO: 0.05 10*3/MM3 (ref 0–0.05)
IMM GRANULOCYTES NFR BLD AUTO: 0.6 % (ref 0–0.5)
IRON 24H UR-MRATE: 81 MCG/DL (ref 37–145)
IRON SATN MFR SERPL: 27 % (ref 20–50)
LYMPHOCYTES # BLD AUTO: 1.79 10*3/MM3 (ref 0.7–3.1)
LYMPHOCYTES NFR BLD AUTO: 19.8 % (ref 19.6–45.3)
MCH RBC QN AUTO: 31.2 PG (ref 26.6–33)
MCHC RBC AUTO-ENTMCNC: 32.5 G/DL (ref 31.5–35.7)
MCV RBC AUTO: 96.2 FL (ref 79–97)
MONOCYTES # BLD AUTO: 0.81 10*3/MM3 (ref 0.1–0.9)
MONOCYTES NFR BLD AUTO: 9 % (ref 5–12)
NEUTROPHILS NFR BLD AUTO: 6.16 10*3/MM3 (ref 1.7–7)
NEUTROPHILS NFR BLD AUTO: 68 % (ref 42.7–76)
NRBC BLD AUTO-RTO: 0 /100 WBC (ref 0–0.2)
PLATELET # BLD AUTO: 242 10*3/MM3 (ref 140–450)
PMV BLD AUTO: 9.7 FL (ref 6–12)
POTASSIUM SERPL-SCNC: 3.3 MMOL/L (ref 3.5–5.2)
PROT SERPL-MCNC: 6.2 G/DL (ref 6–8.5)
RBC # BLD AUTO: 3.94 10*6/MM3 (ref 3.77–5.28)
SODIUM SERPL-SCNC: 143 MMOL/L (ref 136–145)
TIBC SERPL-MCNC: 298 MCG/DL (ref 298–536)
TRANSFERRIN SERPL-MCNC: 200 MG/DL (ref 200–360)
WBC NRBC COR # BLD AUTO: 9.05 10*3/MM3 (ref 3.4–10.8)

## 2024-04-22 PROCEDURE — 83540 ASSAY OF IRON: CPT

## 2024-04-22 PROCEDURE — 80053 COMPREHEN METABOLIC PANEL: CPT

## 2024-04-22 PROCEDURE — 82728 ASSAY OF FERRITIN: CPT

## 2024-04-22 PROCEDURE — 36415 COLL VENOUS BLD VENIPUNCTURE: CPT

## 2024-04-22 PROCEDURE — 85025 COMPLETE CBC W/AUTO DIFF WBC: CPT

## 2024-04-22 PROCEDURE — 84466 ASSAY OF TRANSFERRIN: CPT

## 2024-04-24 LAB
ALBUMIN SERPL ELPH-MCNC: 3.8 G/DL (ref 2.9–4.4)
ALBUMIN/GLOB SERPL: 1.5 {RATIO} (ref 0.7–1.7)
ALPHA1 GLOB SERPL ELPH-MCNC: 0.2 G/DL (ref 0–0.4)
ALPHA2 GLOB SERPL ELPH-MCNC: 0.7 G/DL (ref 0.4–1)
B-GLOBULIN SERPL ELPH-MCNC: 1.1 G/DL (ref 0.7–1.3)
GAMMA GLOB SERPL ELPH-MCNC: 0.6 G/DL (ref 0.4–1.8)
GLOBULIN SER-MCNC: 2.7 G/DL (ref 2.2–3.9)
IGA SERPL-MCNC: 508 MG/DL (ref 64–422)
IGG SERPL-MCNC: 652 MG/DL (ref 586–1602)
IGM SERPL-MCNC: 107 MG/DL (ref 26–217)
INTERPRETATION SERPL IEP-IMP: ABNORMAL
KAPPA LC FREE SER-MCNC: 44.7 MG/L (ref 3.3–19.4)
KAPPA LC FREE/LAMBDA FREE SER: 3.89 {RATIO} (ref 0.26–1.65)
LABORATORY COMMENT REPORT: ABNORMAL
LAMBDA LC FREE SERPL-MCNC: 11.5 MG/L (ref 5.7–26.3)
M PROTEIN SERPL ELPH-MCNC: ABNORMAL G/DL
PROT SERPL-MCNC: 6.5 G/DL (ref 6–8.5)

## 2024-04-25 ENCOUNTER — TELEPHONE (OUTPATIENT)
Dept: ONCOLOGY | Facility: CLINIC | Age: 89
End: 2024-04-25
Payer: MEDICARE

## 2024-04-25 NOTE — TELEPHONE ENCOUNTER
Left message for patient informing her that her lab studies were stable to improved, and per Dr. Caruso we can push her appointment out about 1 month. Advised her that scheduling would be contacting her. Callback number provided in case of questions.

## 2024-04-25 NOTE — TELEPHONE ENCOUNTER
Name: Arlin Hutson    Relationship: Self        HUB PROVIDED THE RELAY MESSAGE FROM THE OFFICE   PATIENT VOICED UNDERSTANDING AND HAS NO FURTHER QUESTIONS AT THIS TIME

## 2024-05-06 ENCOUNTER — OFFICE VISIT (OUTPATIENT)
Dept: NEUROLOGY | Facility: CLINIC | Age: 89
End: 2024-05-06
Payer: MEDICARE

## 2024-05-06 VITALS
WEIGHT: 101 LBS | BODY MASS INDEX: 23.37 KG/M2 | HEIGHT: 55 IN | OXYGEN SATURATION: 96 % | SYSTOLIC BLOOD PRESSURE: 124 MMHG | DIASTOLIC BLOOD PRESSURE: 74 MMHG | HEART RATE: 83 BPM

## 2024-05-06 DIAGNOSIS — R27.0 ATAXIA: Primary | ICD-10-CM

## 2024-05-06 DIAGNOSIS — Z91.81 AT HIGH RISK FOR FALLS: ICD-10-CM

## 2024-05-06 DIAGNOSIS — R42 LIGHTHEADEDNESS: ICD-10-CM

## 2024-05-06 DIAGNOSIS — G60.9 IDIOPATHIC PERIPHERAL NEUROPATHY: ICD-10-CM

## 2024-05-06 PROCEDURE — 1160F RVW MEDS BY RX/DR IN RCRD: CPT

## 2024-05-06 PROCEDURE — 1159F MED LIST DOCD IN RCRD: CPT

## 2024-05-06 PROCEDURE — 99215 OFFICE O/P EST HI 40 MIN: CPT

## 2024-05-06 RX ORDER — CALCIUM POLYCARBOPHIL 625 MG
TABLET ORAL DAILY
COMMUNITY

## 2024-05-28 NOTE — PROGRESS NOTES
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  Her visit had to be rescheduled, therefore her serum studies were performed on 4/22/2024 and she did return her 24-hour urine today, results still pending.  We did repeat CBC and CMP today as well.  Since her last visit, the patient reports that she has overall done reasonably well.  She did undergo a full upper dental extraction in January and now has dentures in place.  She is adjusting to this.  She is now living at [facility] and appears to be doing reasonably well there.  She does continue follow-up with rheumatology, continues on weekly methotrexate injection.  Symptoms are relatively stable by her report in regards to her crest/rheumatoid arthritis and Sjogren's.  She continues follow-up with neurology regarding her peripheral neuropathy and question of spinocerebellar atrophy.  This does affect her balance significantly however she has not experienced any recent falls.  She does note some chronic alternating diarrhea and constipation but has had this for quite some time and it is unchanged.      Objective   Vital Signs:   /78   Pulse 77   Temp 98.3 °F (36.8 °C) (Oral)   Resp 16   Ht 152.4 cm (60\")   Wt 46.1 kg (101 lb 11.2 oz)   SpO2 95%   BMI 19.86 kg/m²     Physical Exam  Constitutional:       Appearance: She is well-developed.      Comments: Patient ambulating with use of a cane   Eyes:      Conjunctiva/sclera: Conjunctivae normal.   Neck:      Thyroid: No thyromegaly.   Cardiovascular:      Rate and Rhythm: Normal rate and regular rhythm.      Heart sounds: No murmur heard.     No friction rub. No gallop.   Pulmonary:      Effort: No respiratory distress.      Breath sounds: Normal breath sounds. "   Abdominal:      General: Bowel sounds are normal. There is no distension.      Palpations: Abdomen is soft.      Tenderness: There is no abdominal tenderness.   Musculoskeletal:      Comments: Sclerodactyly involving the hands   Lymphadenopathy:      Head:      Right side of head: No submandibular adenopathy.      Cervical: No cervical adenopathy.      Upper Body:      Right upper body: No supraclavicular adenopathy.      Left upper body: No supraclavicular adenopathy.   Skin:     General: Skin is warm and dry.      Findings: No rash.   Neurological:      Mental Status: She is alert and oriented to person, place, and time.      Cranial Nerves: No cranial nerve deficit.      Motor: No abnormal muscle tone.      Deep Tendon Reflexes: Reflexes normal.   Psychiatric:         Behavior: Behavior normal.     Patient was examined today, unchanged from above    Result Review : Reviewed labs from 4/22/2024 with CBC, CMP, serum protein electrophoresis, immunoelectrophoresis, quantitative immunoglobulins, free serum light chains, iron panel, ferritin.  Reviewed records from neurology.       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  Laboratory studies 2/12/2020 showed a creatinine of 0.61, calcium 9.2, globulin 2.5, albumin 4.3, serum protein electrophoresis with a prominent protein band in the beta region, could not rule out monoclonal protein.  CBC on 4/14/2020 with WBC 6.0 with normal differential, hemoglobin 12.0, platelet count 255,000.  Subsequent labs with Dr. Rust on 7/1/2020 showed an immunoelectrophoresis confirming an IgA kappa monoclonal protein with IgA 658, IgG 7 or 24, IgM 125.  Creatinine was normal at 0.61 with calcium 9.5.    Patient was referred to our office for further evaluation.  At time of initial visit 8/12/2020,  hemoglobin 13.1, platelet count 200,000, creatinine 0.54 with calcium of 9.5.  Additional labs 8/12/2020 with dual IgA kappa M spike (0.6 g and secondary M spike too small to quantify), IgA 652, IgG 743, IgM 126, free kappa light chain 87.1, free lambda light chain 12.1, free light chain ratio 7.0.  24-hour urine 8/17/2020 with 410 mg total protein, 40.6% kappa light chain (166 mg).  Skeletal survey 8/13/2020 with no evidence of lytic lesions.  Previous pathology specimens from colonoscopy 2/26/2016 and EGD 12/10/2018 were sent for Congo red stain, both negative.  Patient returns today for 6-month interval follow-up visit.  We are reviewing labs that were drawn on 4/22/2024 in relation to her MGUS which showed a stable dual IgA kappa M spike (0.4 g major component and secondary M spike too small to quantify), IgA 508, IgG 652, IgM 107, free kappa light chain 44.7, free lambda light chain 11.5, free light chain ratio 3.89.  24-hour urine was returned today and we will notify patient when results are available within the next week.  There is no evidence of anemia nor thrombocytopenia, no evidence of hypercalcemia.  Renal function is normal.  Due to the high risk nature of her IgA MGUS and urine involvement I did recommend for now staying on a 6-month basis and patient is in agreement    *Anemia:  Hemoglobin on 8/12/2020 was normal at 13.1 with MCV 95.4  Hemoglobin declined on 8/26/2020 down to 11.3 with MCV 98.1.  Additional evaluation at that time with iron 52, ferritin 66.3, iron saturation 16%, TIBC 322, folate greater than 20, B12 552.  Suspect that patient has anemia secondary to chronic disease with underlying chronic autoimmune disorder.  She is also receiving weekly methotrexate which may be a contributing factor.    Hemoglobin on 3/18/2022 declined to 10.4.  Additional labs with evidence of iron deficiency with iron 43, ferritin 8.0, iron saturation 10%, TIBC 442, folate greater than 20, B12 602, CRP less  than 0.3, ESR 18  Concern regarding of patient's ability to tolerate oral iron with underlying colitis with alternating constipation and diarrhea.  Therefore proceeded with IV iron in the form of Injectafer, received 727 mg on 4/4 and 750 mg on 4/11/2022.  Labs on 6/17/2022 with significant response, hemoglobin up to 13.1, ferritin 591, iron saturation 35%  Patient was seen by GI on 7/11/2022, no clinical evidence to suggest GI blood loss.  Patient opted to forego colonoscopy given her age and potential risks.  CT abdomen and pelvis 8/15/2022 with suggestion of long segment distal small bowel wall thickening possibly related to peristalsis, recommended CT enterography.  CT enterography performed 9/7/2022 showed multifocal areas of prominent bowel wall thickening particularly involving the jejunum, distal ileum that were nonspecific and possibly indicative of enteritis.  Distention of stomach and proximal duodenum secondary to ingestion of contrast.  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  Mild persistent anemia may be related to ongoing methotrexate use, particularly with macrocytic indices.  Unclear whether she has a component of anemia of chronic disease related to her underlying autoimmune issues (although labs on 9/8/2022 with ESR normal at 13, CRP less than 0.3).    Hemoglobin today remains normal at 12.2, iron studies obtained recently on 4/22/2024 with iron 81, ferritin 185, iron saturation 27%, TIBC 298.  Patient remains iron replete.  Recheck labs in 6 months.    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 Martell in rheumatology.    She had been treated previously with Arava, subsequently changed to methotrexate weekly injections.    She has low disease activity and her disease has been under good control.    She does have associated Sjogren's syndrome with dry  eyes and dry mouth and subsequent dental issues.  She has chronic pain in her fingers with some degree of sclerodactyly and receives intermittent injections by hand surgery every 4 to 6 months.  Suspect that patient's monoclonal gammopathy is associated with her underlying rheumatologic disorder.  There has been discussion regarding potential addition of Orencia to her weekly methotrexate injections.  Labs on 9/8/2022 with ESR 13, CRP less than 0.3  Patient returns today continuing on weekly methotrexate injections.  She continues follow-up with rheumatology and reports stable symptoms    *Lumbar spine stenosis and spondylolisthesis:  Chronic back pain  Patient followed by Dr. Licea in orthopedics  Patient received a course of epidural injections    *Microscopic colitis/ulcerative colitis:  Patient is followed by Dr. Shook  Previously treated with Entocort, discontinued around 2018  Patient did follow-up with GI in January.  She underwent CT abdomen and pelvis on 1/27/2021 which was unrevealing.      Previous pathology specimens from colonoscopy 2/26/2016 and EGD 12/10/2018 were sent for Congo red stain and were negative.  Patient recently was placed on budesonide by GI with improvement in symptoms.   Patient continuing on budesonide 6 mg daily per GI.  Patient continues with alternating constipation and diarrhea.    *Peripheral neuropathy  Patient has had some longstanding intermittent symptoms in her feet with numbness that does wax and wane.  It sounds as if her symptoms are associated with Raynaud's type changes, unclear whether this represents a true peripheral neuropathy.  It is not a consistent finding.  Previous B12 level normal at 552 on 8/26/2020.  Unclear whether symptoms could be in part related to her underlying MGUS.  Patient continues follow-up with neurology    *Peripheral vascular disease  Patient developed acute ischemia right foot and was hospitalized 3/22-3/25/2021.  On 3/22/2021 patient  underwent arthrectomy right popliteal artery.  She was subsequently placed on Xarelto 20 mg daily.  Patient was subsequently transitioned from Xarelto to Plavix 75 mg daily by vascular surgery in December 2021.     Plan:     Patient continues on a course of observation/surveillance regarding her IgA kappa MGUS with no evidence of change on current labs.  Results pending from 24-hour urine protein electrophoresis and immunoelectrophoresis from today and we will notify patient when results are available within the next week  Patient continues on weekly methotrexate injections per rheumatology.    Patient will continue follow-up with Dr. Rust, rheumatology, GI.  MD visit in 6 months, labs 1 week prior with CBC, CMP, iron panel, ferritin, serum protein electrophoresis, immunoelectrophoresis, quantitative immunoglobulins, free serum light chains and 24-hour urine protein electrophoresis and immunoelectrophoresis.

## 2024-05-29 ENCOUNTER — OFFICE VISIT (OUTPATIENT)
Dept: ONCOLOGY | Facility: CLINIC | Age: 89
End: 2024-05-29
Payer: MEDICARE

## 2024-05-29 ENCOUNTER — LAB (OUTPATIENT)
Dept: LAB | Facility: HOSPITAL | Age: 89
End: 2024-05-29
Payer: MEDICARE

## 2024-05-29 VITALS
SYSTOLIC BLOOD PRESSURE: 130 MMHG | DIASTOLIC BLOOD PRESSURE: 78 MMHG | HEART RATE: 77 BPM | WEIGHT: 101.7 LBS | HEIGHT: 60 IN | OXYGEN SATURATION: 95 % | RESPIRATION RATE: 16 BRPM | BODY MASS INDEX: 19.97 KG/M2 | TEMPERATURE: 98.3 F

## 2024-05-29 DIAGNOSIS — D47.2 MGUS (MONOCLONAL GAMMOPATHY OF UNKNOWN SIGNIFICANCE): ICD-10-CM

## 2024-05-29 DIAGNOSIS — D47.2 MGUS (MONOCLONAL GAMMOPATHY OF UNKNOWN SIGNIFICANCE): Primary | ICD-10-CM

## 2024-05-29 DIAGNOSIS — D50.8 OTHER IRON DEFICIENCY ANEMIA: ICD-10-CM

## 2024-05-29 LAB
ALBUMIN SERPL-MCNC: 4.1 G/DL (ref 3.5–5.2)
ALBUMIN/GLOB SERPL: 1.6 G/DL
ALP SERPL-CCNC: 84 U/L (ref 39–117)
ALT SERPL W P-5'-P-CCNC: 22 U/L (ref 1–33)
ANION GAP SERPL CALCULATED.3IONS-SCNC: 13.1 MMOL/L (ref 5–15)
AST SERPL-CCNC: 18 U/L (ref 1–32)
BASOPHILS # BLD AUTO: 0.04 10*3/MM3 (ref 0–0.2)
BASOPHILS NFR BLD AUTO: 0.4 % (ref 0–1.5)
BILIRUB SERPL-MCNC: 0.2 MG/DL (ref 0–1.2)
BUN SERPL-MCNC: 15 MG/DL (ref 8–23)
BUN/CREAT SERPL: 21.7 (ref 7–25)
CALCIUM SPEC-SCNC: 9.1 MG/DL (ref 8.6–10.5)
CHLORIDE SERPL-SCNC: 104 MMOL/L (ref 98–107)
CO2 SERPL-SCNC: 24.9 MMOL/L (ref 22–29)
CREAT SERPL-MCNC: 0.69 MG/DL (ref 0.57–1)
DEPRECATED RDW RBC AUTO: 51.7 FL (ref 37–54)
EGFRCR SERPLBLD CKD-EPI 2021: 83.6 ML/MIN/1.73
EOSINOPHIL # BLD AUTO: 0.05 10*3/MM3 (ref 0–0.4)
EOSINOPHIL NFR BLD AUTO: 0.5 % (ref 0.3–6.2)
ERYTHROCYTE [DISTWIDTH] IN BLOOD BY AUTOMATED COUNT: 14.6 % (ref 12.3–15.4)
GLOBULIN UR ELPH-MCNC: 2.5 GM/DL
GLUCOSE SERPL-MCNC: 113 MG/DL (ref 65–99)
HCT VFR BLD AUTO: 37.7 % (ref 34–46.6)
HGB BLD-MCNC: 12.2 G/DL (ref 12–15.9)
IMM GRANULOCYTES # BLD AUTO: 0.07 10*3/MM3 (ref 0–0.05)
IMM GRANULOCYTES NFR BLD AUTO: 0.6 % (ref 0–0.5)
LYMPHOCYTES # BLD AUTO: 0.95 10*3/MM3 (ref 0.7–3.1)
LYMPHOCYTES NFR BLD AUTO: 8.6 % (ref 19.6–45.3)
MCH RBC QN AUTO: 31.2 PG (ref 26.6–33)
MCHC RBC AUTO-ENTMCNC: 32.4 G/DL (ref 31.5–35.7)
MCV RBC AUTO: 96.4 FL (ref 79–97)
MONOCYTES # BLD AUTO: 0.44 10*3/MM3 (ref 0.1–0.9)
MONOCYTES NFR BLD AUTO: 4 % (ref 5–12)
NEUTROPHILS NFR BLD AUTO: 85.9 % (ref 42.7–76)
NEUTROPHILS NFR BLD AUTO: 9.46 10*3/MM3 (ref 1.7–7)
NRBC BLD AUTO-RTO: 0 /100 WBC (ref 0–0.2)
PLATELET # BLD AUTO: 229 10*3/MM3 (ref 140–450)
PMV BLD AUTO: 9.3 FL (ref 6–12)
POTASSIUM SERPL-SCNC: 3.9 MMOL/L (ref 3.5–5.2)
PROT SERPL-MCNC: 6.6 G/DL (ref 6–8.5)
RBC # BLD AUTO: 3.91 10*6/MM3 (ref 3.77–5.28)
SODIUM SERPL-SCNC: 142 MMOL/L (ref 136–145)
WBC NRBC COR # BLD AUTO: 11.01 10*3/MM3 (ref 3.4–10.8)

## 2024-05-29 PROCEDURE — 1126F AMNT PAIN NOTED NONE PRSNT: CPT | Performed by: INTERNAL MEDICINE

## 2024-05-29 PROCEDURE — 85025 COMPLETE CBC W/AUTO DIFF WBC: CPT

## 2024-05-29 PROCEDURE — 80053 COMPREHEN METABOLIC PANEL: CPT

## 2024-05-29 PROCEDURE — 1159F MED LIST DOCD IN RCRD: CPT | Performed by: INTERNAL MEDICINE

## 2024-05-29 PROCEDURE — 99214 OFFICE O/P EST MOD 30 MIN: CPT | Performed by: INTERNAL MEDICINE

## 2024-05-29 PROCEDURE — 36415 COLL VENOUS BLD VENIPUNCTURE: CPT

## 2024-05-29 PROCEDURE — 1160F RVW MEDS BY RX/DR IN RCRD: CPT | Performed by: INTERNAL MEDICINE

## 2024-05-29 NOTE — LETTER
May 29, 2024       No Recipients    Patient: Arlin Hutson   YOB: 1935   Date of Visit: 5/29/2024     Dear Oren Rust Jr., MD:       Thank you for referring Arlin Hutson to me for evaluation. Below are the relevant portions of my assessment and plan of care.    If you have questions, please do not hesitate to call me. I look forward to following Arlin along with you.         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  Her visit had to be rescheduled, therefore her serum studies were performed on 4/22/2024 and she did return her 24-hour urine today, results still pending.  We did repeat CBC and CMP today as well.  Since her last visit, the patient reports that she has overall done reasonably well.  She did undergo a full upper dental extraction in January and now has dentures in place.  She is adjusting to this.  She is now living at [facility] and appears to be doing reasonably well there.  She does continue follow-up with rheumatology, continues on weekly methotrexate injection.  Symptoms are relatively stable by her report in regards to her crest/rheumatoid arthritis and Sjogren's.  She continues follow-up with neurology regarding her peripheral neuropathy and question of spinocerebellar atrophy.  This does affect her balance significantly however she has not experienced any recent falls.  She does note some chronic alternating diarrhea and constipation but has had this for quite some time and it is unchanged.      Objective  Vital Signs:   /78   Pulse 77   Temp 98.3 °F (36.8 °C) (Oral)   Resp 16   Ht 152.4  "cm (60\")   Wt 46.1 kg (101 lb 11.2 oz)   SpO2 95%   BMI 19.86 kg/m²     Physical Exam  Constitutional:       Appearance: She is well-developed.      Comments: Patient ambulating with use of a cane   Eyes:      Conjunctiva/sclera: Conjunctivae normal.   Neck:      Thyroid: No thyromegaly.   Cardiovascular:      Rate and Rhythm: Normal rate and regular rhythm.      Heart sounds: No murmur heard.     No friction rub. No gallop.   Pulmonary:      Effort: No respiratory distress.      Breath sounds: Normal breath sounds.   Abdominal:      General: Bowel sounds are normal. There is no distension.      Palpations: Abdomen is soft.      Tenderness: There is no abdominal tenderness.   Musculoskeletal:      Comments: Sclerodactyly involving the hands   Lymphadenopathy:      Head:      Right side of head: No submandibular adenopathy.      Cervical: No cervical adenopathy.      Upper Body:      Right upper body: No supraclavicular adenopathy.      Left upper body: No supraclavicular adenopathy.   Skin:     General: Skin is warm and dry.      Findings: No rash.   Neurological:      Mental Status: She is alert and oriented to person, place, and time.      Cranial Nerves: No cranial nerve deficit.      Motor: No abnormal muscle tone.      Deep Tendon Reflexes: Reflexes normal.   Psychiatric:         Behavior: Behavior normal.     Patient was examined today, unchanged from above    Result Review: Reviewed labs from 4/22/2024 with CBC, CMP, serum protein electrophoresis, immunoelectrophoresis, quantitative immunoglobulins, free serum light chains, iron panel, ferritin.  Reviewed records from neurology.       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  Laboratory studies 2/12/2020 showed a creatinine of 0.61, calcium 9.2, globulin 2.5, albumin 4.3, " serum protein electrophoresis with a prominent protein band in the beta region, could not rule out monoclonal protein.  CBC on 4/14/2020 with WBC 6.0 with normal differential, hemoglobin 12.0, platelet count 255,000.  Subsequent labs with Dr. Rust on 7/1/2020 showed an immunoelectrophoresis confirming an IgA kappa monoclonal protein with IgA 658, IgG 7 or 24, IgM 125.  Creatinine was normal at 0.61 with calcium 9.5.    Patient was referred to our office for further evaluation.  At time of initial visit 8/12/2020, hemoglobin 13.1, platelet count 200,000, creatinine 0.54 with calcium of 9.5.  Additional labs 8/12/2020 with dual IgA kappa M spike (0.6 g and secondary M spike too small to quantify), IgA 652, IgG 743, IgM 126, free kappa light chain 87.1, free lambda light chain 12.1, free light chain ratio 7.0.  24-hour urine 8/17/2020 with 410 mg total protein, 40.6% kappa light chain (166 mg).  Skeletal survey 8/13/2020 with no evidence of lytic lesions.  Previous pathology specimens from colonoscopy 2/26/2016 and EGD 12/10/2018 were sent for Congo red stain, both negative.  Patient returns today for 6-month interval follow-up visit.  We are reviewing labs that were drawn on 4/22/2024 in relation to her MGUS which showed a stable dual IgA kappa M spike (0.4 g major component and secondary M spike too small to quantify), IgA 508, IgG 652, IgM 107, free kappa light chain 44.7, free lambda light chain 11.5, free light chain ratio 3.89.  24-hour urine was returned today and we will notify patient when results are available within the next week.  There is no evidence of anemia nor thrombocytopenia, no evidence of hypercalcemia.  Renal function is normal.  Due to the high risk nature of her IgA MGUS and urine involvement I did recommend for now staying on a 6-month basis and patient is in agreement    *Anemia:  Hemoglobin on 8/12/2020 was normal at 13.1 with MCV 95.4  Hemoglobin declined on 8/26/2020 down to 11.3 with  MCV 98.1.  Additional evaluation at that time with iron 52, ferritin 66.3, iron saturation 16%, TIBC 322, folate greater than 20, B12 552.  Suspect that patient has anemia secondary to chronic disease with underlying chronic autoimmune disorder.  She is also receiving weekly methotrexate which may be a contributing factor.    Hemoglobin on 3/18/2022 declined to 10.4.  Additional labs with evidence of iron deficiency with iron 43, ferritin 8.0, iron saturation 10%, TIBC 442, folate greater than 20, B12 602, CRP less than 0.3, ESR 18  Concern regarding of patient's ability to tolerate oral iron with underlying colitis with alternating constipation and diarrhea.  Therefore proceeded with IV iron in the form of Injectafer, received 727 mg on 4/4 and 750 mg on 4/11/2022.  Labs on 6/17/2022 with significant response, hemoglobin up to 13.1, ferritin 591, iron saturation 35%  Patient was seen by GI on 7/11/2022, no clinical evidence to suggest GI blood loss.  Patient opted to forego colonoscopy given her age and potential risks.  CT abdomen and pelvis 8/15/2022 with suggestion of long segment distal small bowel wall thickening possibly related to peristalsis, recommended CT enterography.  CT enterography performed 9/7/2022 showed multifocal areas of prominent bowel wall thickening particularly involving the jejunum, distal ileum that were nonspecific and possibly indicative of enteritis.  Distention of stomach and proximal duodenum secondary to ingestion of contrast.  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  Mild persistent anemia may be related to ongoing methotrexate use, particularly with macrocytic indices.  Unclear whether she has a component of anemia of chronic disease related to her underlying autoimmune issues (although labs on 9/8/2022 with ESR normal at 13, CRP less than 0.3).    Hemoglobin today remains normal at 12.2, iron studies obtained recently on 4/22/2024 with iron 81, ferritin 185,  iron saturation 27%, TIBC 298.  Patient remains iron replete.  Recheck labs in 6 months.    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 Martell in rheumatology.    She had been treated previously with Arava, subsequently changed to methotrexate weekly injections.    She has low disease activity and her disease has been under good control.    She does have associated Sjogren's syndrome with dry eyes and dry mouth and subsequent dental issues.  She has chronic pain in her fingers with some degree of sclerodactyly and receives intermittent injections by hand surgery every 4 to 6 months.  Suspect that patient's monoclonal gammopathy is associated with her underlying rheumatologic disorder.  There has been discussion regarding potential addition of Orencia to her weekly methotrexate injections.  Labs on 9/8/2022 with ESR 13, CRP less than 0.3  Patient returns today continuing on weekly methotrexate injections.  She continues follow-up with rheumatology and reports stable symptoms    *Lumbar spine stenosis and spondylolisthesis:  Chronic back pain  Patient followed by Dr. Licea in orthopedics  Patient received a course of epidural injections    *Microscopic colitis/ulcerative colitis:  Patient is followed by Dr. Shook  Previously treated with Entocort, discontinued around 2018  Patient did follow-up with GI in January.  She underwent CT abdomen and pelvis on 1/27/2021 which was unrevealing.      Previous pathology specimens from colonoscopy 2/26/2016 and EGD 12/10/2018 were sent for Congo red stain and were negative.  Patient recently was placed on budesonide by GI with improvement in symptoms.   Patient continuing on budesonide 6 mg daily per GI.  Patient continues with alternating constipation and diarrhea.    *Peripheral neuropathy  Patient has had some longstanding intermittent symptoms in her  feet with numbness that does wax and wane.  It sounds as if her symptoms are associated with Raynaud's type changes, unclear whether this represents a true peripheral neuropathy.  It is not a consistent finding.  Previous B12 level normal at 552 on 8/26/2020.  Unclear whether symptoms could be in part related to her underlying MGUS.  Patient continues follow-up with neurology    *Peripheral vascular disease  Patient developed acute ischemia right foot and was hospitalized 3/22-3/25/2021.  On 3/22/2021 patient underwent arthrectomy right popliteal artery.  She was subsequently placed on Xarelto 20 mg daily.  Patient was subsequently transitioned from Xarelto to Plavix 75 mg daily by vascular surgery in December 2021.     Plan:     Patient continues on a course of observation/surveillance regarding her IgA kappa MGUS with no evidence of change on current labs.  Results pending from 24-hour urine protein electrophoresis and immunoelectrophoresis from today and we will notify patient when results are available within the next week  Patient continues on weekly methotrexate injections per rheumatology.    Patient will continue follow-up with Dr. Rust, rheumatology, GI.  MD visit in 6 months, labs 1 week prior with CBC, CMP, iron panel, ferritin, serum protein electrophoresis, immunoelectrophoresis, quantitative immunoglobulins, free serum light chains and 24-hour urine protein electrophoresis and immunoelectrophoresis.

## 2024-05-31 ENCOUNTER — TELEPHONE (OUTPATIENT)
Dept: ONCOLOGY | Facility: CLINIC | Age: 89
End: 2024-05-31
Payer: MEDICARE

## 2024-05-31 LAB
ALBUMIN 24H MFR UR ELPH: 16.7 %
ALPHA1 GLOB 24H MFR UR ELPH: 4.5 %
ALPHA2 GLOB 24H MFR UR ELPH: 12.9 %
B-GLOBULIN MFR UR ELPH: 24.6 %
GAMMA GLOB 24H MFR UR ELPH: 41.3 %
INTERPRETATION UR IFE-IMP: ABNORMAL
Lab: ABNORMAL
M PROTEIN 24H MFR UR ELPH: 27.8 %
M PROTEIN 24H UR ELPH-MRATE: 117 MG/24 HR
PROT 24H UR-MRATE: 421 MG/24 HR (ref 30–150)
PROT UR-MCNC: 16.2 MG/DL

## 2024-05-31 NOTE — TELEPHONE ENCOUNTER
Patient informed that 24 hour urine results were stable from last time.  Patient verbalized understanding.

## 2024-06-02 ENCOUNTER — HOSPITAL ENCOUNTER (EMERGENCY)
Facility: HOSPITAL | Age: 89
Discharge: NURSING, REHAB OR LONG-TERM CARE | End: 2024-06-02
Attending: EMERGENCY MEDICINE | Admitting: EMERGENCY MEDICINE
Payer: MEDICARE

## 2024-06-02 ENCOUNTER — APPOINTMENT (OUTPATIENT)
Dept: GENERAL RADIOLOGY | Facility: HOSPITAL | Age: 89
End: 2024-06-02
Payer: MEDICARE

## 2024-06-02 VITALS
SYSTOLIC BLOOD PRESSURE: 156 MMHG | RESPIRATION RATE: 16 BRPM | BODY MASS INDEX: 19.95 KG/M2 | DIASTOLIC BLOOD PRESSURE: 80 MMHG | OXYGEN SATURATION: 93 % | HEIGHT: 60 IN | TEMPERATURE: 98.4 F | HEART RATE: 70 BPM | WEIGHT: 101.63 LBS

## 2024-06-02 DIAGNOSIS — G62.9 PERIPHERAL POLYNEUROPATHY: ICD-10-CM

## 2024-06-02 DIAGNOSIS — R27.0 ATAXIA: ICD-10-CM

## 2024-06-02 DIAGNOSIS — R42 LIGHTHEADEDNESS: Primary | ICD-10-CM

## 2024-06-02 LAB
ALBUMIN SERPL-MCNC: 4.3 G/DL (ref 3.5–5.2)
ALBUMIN/GLOB SERPL: 2 G/DL
ALP SERPL-CCNC: 81 U/L (ref 39–117)
ALT SERPL W P-5'-P-CCNC: 15 U/L (ref 1–33)
ANION GAP SERPL CALCULATED.3IONS-SCNC: 11.7 MMOL/L (ref 5–15)
AST SERPL-CCNC: 12 U/L (ref 1–32)
BACTERIA UR QL AUTO: ABNORMAL /HPF
BASOPHILS # BLD AUTO: 0.03 10*3/MM3 (ref 0–0.2)
BASOPHILS NFR BLD AUTO: 0.4 % (ref 0–1.5)
BILIRUB SERPL-MCNC: 0.4 MG/DL (ref 0–1.2)
BILIRUB UR QL STRIP: NEGATIVE
BUN SERPL-MCNC: 16 MG/DL (ref 8–23)
BUN/CREAT SERPL: 27.1 (ref 7–25)
CALCIUM SPEC-SCNC: 9.2 MG/DL (ref 8.6–10.5)
CHLORIDE SERPL-SCNC: 103 MMOL/L (ref 98–107)
CLARITY UR: ABNORMAL
CO2 SERPL-SCNC: 26.3 MMOL/L (ref 22–29)
COLOR UR: YELLOW
CREAT SERPL-MCNC: 0.59 MG/DL (ref 0.57–1)
DEPRECATED RDW RBC AUTO: 46.1 FL (ref 37–54)
EGFRCR SERPLBLD CKD-EPI 2021: 86.8 ML/MIN/1.73
EOSINOPHIL # BLD AUTO: 0.05 10*3/MM3 (ref 0–0.4)
EOSINOPHIL NFR BLD AUTO: 0.6 % (ref 0.3–6.2)
ERYTHROCYTE [DISTWIDTH] IN BLOOD BY AUTOMATED COUNT: 13.2 % (ref 12.3–15.4)
GLOBULIN UR ELPH-MCNC: 2.2 GM/DL
GLUCOSE SERPL-MCNC: 101 MG/DL (ref 65–99)
GLUCOSE UR STRIP-MCNC: NEGATIVE MG/DL
HCT VFR BLD AUTO: 36.7 % (ref 34–46.6)
HGB BLD-MCNC: 11.9 G/DL (ref 12–15.9)
HGB UR QL STRIP.AUTO: NEGATIVE
HOLD SPECIMEN: NORMAL
HOLD SPECIMEN: NORMAL
HYALINE CASTS UR QL AUTO: ABNORMAL /LPF
IMM GRANULOCYTES # BLD AUTO: 0.03 10*3/MM3 (ref 0–0.05)
IMM GRANULOCYTES NFR BLD AUTO: 0.4 % (ref 0–0.5)
KETONES UR QL STRIP: ABNORMAL
LEUKOCYTE ESTERASE UR QL STRIP.AUTO: ABNORMAL
LYMPHOCYTES # BLD AUTO: 1.33 10*3/MM3 (ref 0.7–3.1)
LYMPHOCYTES NFR BLD AUTO: 15.7 % (ref 19.6–45.3)
MAGNESIUM SERPL-MCNC: 2.3 MG/DL (ref 1.6–2.4)
MCH RBC QN AUTO: 30.8 PG (ref 26.6–33)
MCHC RBC AUTO-ENTMCNC: 32.4 G/DL (ref 31.5–35.7)
MCV RBC AUTO: 95.1 FL (ref 79–97)
MONOCYTES # BLD AUTO: 0.46 10*3/MM3 (ref 0.1–0.9)
MONOCYTES NFR BLD AUTO: 5.4 % (ref 5–12)
NEUTROPHILS NFR BLD AUTO: 6.56 10*3/MM3 (ref 1.7–7)
NEUTROPHILS NFR BLD AUTO: 77.5 % (ref 42.7–76)
NITRITE UR QL STRIP: POSITIVE
NRBC BLD AUTO-RTO: 0 /100 WBC (ref 0–0.2)
PH UR STRIP.AUTO: 6.5 [PH] (ref 5–8)
PLATELET # BLD AUTO: 236 10*3/MM3 (ref 140–450)
PMV BLD AUTO: 9.8 FL (ref 6–12)
POTASSIUM SERPL-SCNC: 3.9 MMOL/L (ref 3.5–5.2)
PROT SERPL-MCNC: 6.5 G/DL (ref 6–8.5)
PROT UR QL STRIP: NEGATIVE
RBC # BLD AUTO: 3.86 10*6/MM3 (ref 3.77–5.28)
RBC # UR STRIP: ABNORMAL /HPF
REF LAB TEST METHOD: ABNORMAL
SODIUM SERPL-SCNC: 141 MMOL/L (ref 136–145)
SP GR UR STRIP: 1.01 (ref 1–1.03)
SQUAMOUS #/AREA URNS HPF: ABNORMAL /HPF
TROPONIN T SERPL HS-MCNC: 13 NG/L
UROBILINOGEN UR QL STRIP: ABNORMAL
WBC # UR STRIP: ABNORMAL /HPF
WBC NRBC COR # BLD AUTO: 8.46 10*3/MM3 (ref 3.4–10.8)
WHOLE BLOOD HOLD COAG: NORMAL
WHOLE BLOOD HOLD SPECIMEN: NORMAL

## 2024-06-02 PROCEDURE — 87086 URINE CULTURE/COLONY COUNT: CPT | Performed by: EMERGENCY MEDICINE

## 2024-06-02 PROCEDURE — 81001 URINALYSIS AUTO W/SCOPE: CPT | Performed by: EMERGENCY MEDICINE

## 2024-06-02 PROCEDURE — 87186 SC STD MICRODIL/AGAR DIL: CPT | Performed by: EMERGENCY MEDICINE

## 2024-06-02 PROCEDURE — 71045 X-RAY EXAM CHEST 1 VIEW: CPT

## 2024-06-02 PROCEDURE — 83735 ASSAY OF MAGNESIUM: CPT

## 2024-06-02 PROCEDURE — 99284 EMERGENCY DEPT VISIT MOD MDM: CPT

## 2024-06-02 PROCEDURE — 84484 ASSAY OF TROPONIN QUANT: CPT

## 2024-06-02 PROCEDURE — 80053 COMPREHEN METABOLIC PANEL: CPT

## 2024-06-02 PROCEDURE — 85025 COMPLETE CBC W/AUTO DIFF WBC: CPT

## 2024-06-02 PROCEDURE — 93005 ELECTROCARDIOGRAM TRACING: CPT

## 2024-06-02 RX ORDER — SODIUM CHLORIDE 0.9 % (FLUSH) 0.9 %
10 SYRINGE (ML) INJECTION AS NEEDED
Status: DISCONTINUED | OUTPATIENT
Start: 2024-06-02 | End: 2024-06-03 | Stop reason: HOSPADM

## 2024-06-02 NOTE — ED TRIAGE NOTES
Patient to ED via [facility] from home. Patient reports hypotension. Patient denies symptoms at this time. Patient reports she takes amlodipine for high BP and did take it this morning. BP at time of triage is 142/71. Patient reports that she does feel more light headed than normal.

## 2024-06-03 LAB
QT INTERVAL: 416 MS
QTC INTERVAL: 453 MS

## 2024-06-03 NOTE — DISCHARGE INSTRUCTIONS
As we discussed, when you go from a lying to a sitting or from sitting to standing position make sure that you stay still for a couple minutes before you proceed and start walking.  Make sure you are steady.  Urine culture is pending.  Return if you develop any abdominal pain, chest pain, shortness of breath, fever, back pain, or any other concerns.

## 2024-06-03 NOTE — ED PROVIDER NOTES
" EMERGENCY DEPARTMENT ENCOUNTER    Room Number:  15/15  Date of encounter:  6/2/2024  PCP: Oren Rust Jr., MD  Historian: Patient, granddaughter and son  Relevant information and history provided by sources other than the patient will be included below and in the ED Course.  Review of pertinent past medical records may also be included in record below and ED Course.    HPI:  Chief Complaint: Feeling a little lightheaded.  Low blood pressure  A complete HPI/ROS/PMH/PSH/SH/FH are unobtainable due to: Not applicable  Context: Arlin Hutson is a 88 y.o. female who presents to the ED c/o this patient this morning felt a little lightheaded.  She checked her blood pressure and it was 87/53 and then rechecked it again it was 97/58.  This was present when she was sitting.  Those symptoms have 100% resolved.  She just stated that she felt \"not completely clear headed\".  She denied any visual change or speech change.  Denied any pain in her head or neck.  Denied chest pain or shortness of breath.  Denies any nausea or vomiting.  She does suffer from ulcerative colitis and does have frequent loose stool.  Yesterday she had about 4-5 episodes of loose stool which is not uncommon for her.  No any blood in the stool.  No abdominal pain.  No fevers or chills.  She did eat a bowl of cheddar soup prior to arrival here.  She currently is asymptomatic.  She does have chronic neuropathy that is at baseline.  She does have chronic problems with her gait which is at baseline and has not changed.  She uses a cane and a rollator.        Previous Episodes: She is felt lightheaded before.  Current Symptoms: Currently asymptomatic    MEDICAL HISTORY REVIEWED  I have reviewed some old records on this patient.  Is a patient of Dr. Llanes.  She has a history of monoclonal valvulopathy of unknown significance.  See that she has had a history of paresthesias as well as a history of ataxia.  I reviewed the note from neurology " evaluation from May 6, 2024.  In reviewing that note she continues to have numbness and tingling in her fingers and her feet and reports burning in the soles of her feet that is worse at night she uses a cane at home and a rollator when she is out of the house.  She reported some lightheadedness that was present with turning her head side-to-side is making it worse and again had history of some gait instability they recommended continue use assistive device for stabilization of gait.  Neurology also recommended continue with compound cream for her neuropathy.  I can see in looking old records that she had an MRI of the brain with and without contrast done in April 4, 2023 for dizziness and balance issues on this MRI they showed no change from her MRI that was done in January 2022 there was prominent patchy nodularity and confluence of T2 high signal throughout the cerebral white matter consistent with moderate small vessel disease and there was an old lacunar infarct in the right corona radiata there is diffuse cerebral atrophy with prominent ventricles of the lateral and third.  This is likely due to the atrophy rather than due to NPH.      PAST MEDICAL HISTORY  Active Ambulatory Problems     Diagnosis Date Noted    Gastroesophageal reflux disease 02/13/2018    Osteopenia of multiple sites 02/13/2018    Essential hypertension 02/13/2018    Colitis 02/13/2018    Primary osteoarthritis of right knee 05/04/2018    Primary osteoarthritis of left knee 05/30/2018    Pneumonia of left lower lobe due to infectious organism 06/02/2018    History of total right knee replacement 06/02/2018    Abnormal CT of the abdomen 10/24/2018    Rheumatoid arthritis involving multiple sites 07/08/2011    MGUS (monoclonal gammopathy of unknown significance) 08/12/2020    Normocytic anemia 08/26/2020    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    Iron deficiency anemia 03/25/2022    Adverse effect of iron 03/28/2022    Paronychia 10/27/2022     Paresthesia 10/27/2022    Pain in right foot 10/27/2022    Pain in limb 10/27/2022    Onychomycosis due to dermatophyte 10/27/2022    Onychocryptosis 10/27/2022    Metatarsalgia of left foot 10/27/2022    At high risk for falls 04/15/2024     Resolved Ambulatory Problems     Diagnosis Date Noted    No Resolved Ambulatory Problems     Past Medical History:   Diagnosis Date    Arthritis of neck ?    Atheroembolism of right lower extremity     Baker's cyst     Colon polyp     CREST syndrome     Difficulty walking About 5 yrs ago    Fracture of wrist     Fractures     GERD (gastroesophageal reflux disease)     Hip arthrosis ? Maybe 7 or 8 years ago    History of CT scan of abdomen 03/11/2011    History of rheumatoid arthritis     HL (hearing loss) About 1995    Hyperlipidemia     Hypertension     Irritable bowel syndrome 1990s    Knee swelling 5 or 6 years ago    Low back pain     Osteoarthritis     Peripheral neuropathy Cannot remember    Raynaud's disease     Rheumatoid arthritis     Scleroderma     Shingles     Sjogren's syndrome     Tear of meniscus of knee     Ulcerative colitis          PAST SURGICAL HISTORY  Past Surgical History:   Procedure Laterality Date    CATARACT EXTRACTION, BILATERAL      COLONOSCOPY  02/26/2016    tics, microscopic colitis,     COLONOSCOPY  07/01/2011    sig tics, inflammation, polyp    DILATATION AND CURETTAGE  1980    EKOS CATHETER PLACEMENT N/A 03/23/2021    Procedure: AIF WITH RUN OFF OF RT. LEG, ROTATIONAL ATHERECTOMY OF RIGHT POPLITEAL ARTERY;  Surgeon: Gato Contreras MD;  Location: [facility] 18/19;  Service: Vascular;  Laterality: N/A;    ENDOSCOPY N/A 12/03/2018    erythematous mucosa in stomach, path benign    EYE SURGERY      FLEXIBLE SIGMOIDOSCOPY      FRACTURE SURGERY      HAND SURGERY      JOINT REPLACEMENT      KNEE ARTHROSCOPY Right     w/meniscal repair    KNEE SURGERY Right     TEETH EXTRACTION      TOTAL KNEE ARTHROPLASTY Right 05/30/2018    Procedure:  TOTAL KNEE ARTHROPLASTY;  Surgeon: Georges Jon MD;  Location: [facility] OR;  Service: Orthopedics    TRANSLUMINAL ATHERECTOMY POPLITEAL ARTERY      TRIGGER POINT INJECTION      UPPER GASTROINTESTINAL ENDOSCOPY  03/14/2008    erythema    VASCULAR SURGERY  Spring of 2020    Blood clot in an artery behind my right knee    WRIST FRACTURE SURGERY Right     WRIST SURGERY Right 2008    orif         FAMILY HISTORY  Family History   Problem Relation Age of Onset    Ulcerative colitis Mother     Migraines Mother     Stroke Mother     Hypertension Mother     Thyroid disease Mother     Arthritis Father     Heart attack Father     Migraines Sister         Sister    Skin cancer Sister     Diabetes Brother     Skin cancer Brother     Breast cancer Maternal Aunt     Migraines Daughter     Malig Hyperthermia Neg Hx          SOCIAL HISTORY  Social History     Socioeconomic History    Marital status: Single    Number of children: 3   Tobacco Use    Smoking status: Never    Smokeless tobacco: Never   Vaping Use    Vaping status: Never Used   Substance and Sexual Activity    Alcohol use: Yes     Comment: I rarely have a glass of wine.    Drug use: Never    Sexual activity: Not Currently     Partners: Male     Comment: Not sexual active         ALLERGIES  Codeine, Penicillin g, and Sulfa antibiotics        REVIEW OF SYSTEMS  Review of Systems     All systems reviewed and negative except for those discussed in HPI.       PHYSICAL EXAM    I have reviewed the triage vital signs and nursing notes.    ED Triage Vitals   Temp Heart Rate Resp BP SpO2   06/02/24 1850 06/02/24 1850 06/02/24 1850 06/02/24 1852 06/02/24 1850   98.4 °F (36.9 °C) 83 16 142/71 96 %      Temp src Heart Rate Source Patient Position BP Location FiO2 (%)   -- -- -- -- --              GENERAL: Very friendly pleasant elderly female.  Appears frail and chronically ill, but no acute respiratory or cardiovascular distress.  Distress.Vital signs on my initial evaluation  vitals have been reviewed.  Multiple blood pressures checked here and it is normal to elevated slightly.  She is afebrile with a normal heart rate.  Normal oxygen saturation.  HENT: nares patent  Head/neck/ face are symmetric without gross deformity, signs of trauma, or swelling  EYES: no scleral icterus, no conjunctival pallor.  Pupils equal round reactive to light extract muscles are intact.  NECK: Supple, no meningismus  CV: regular rhythm, regular rate with intact distal pulses.  RESPIRATORY: normal effort and no respiratory distress.  Clear to auscultation bilaterally  ABDOMEN: soft and nontender.  MUSCULOSKELETAL: no deformity.  Intact distal pulses that are equal strong and symmetric.  No edema.  NEURO: alert and appropriate, moves all extremities, follows commands.  Cranial nerves II through XII are intact.  No visual change or speech change.  No facial droop.  She has no drift upper or lower extremities bilaterally.  Patient's finger-to-nose is equal and symmetric and normal bilaterally.  Patient is able to get up and ambulate very effectively with a rollator there is no gross ataxia she does not have any symptoms of weakness or lightheadedness.  Her ambulatory status is at baseline.  SKIN: warm, dry    Vital signs and nursing notes reviewed.        LAB RESULTS  Recent Results (from the past 24 hour(s))   ECG 12 Lead Other; dizziness    Collection Time: 06/02/24  6:55 PM   Result Value Ref Range    QT Interval 416 ms    QTC Interval 453 ms   Comprehensive Metabolic Panel    Collection Time: 06/02/24  7:52 PM    Specimen: Arm, Left; Blood   Result Value Ref Range    Glucose 101 (H) 65 - 99 mg/dL    BUN 16 8 - 23 mg/dL    Creatinine 0.59 0.57 - 1.00 mg/dL    Sodium 141 136 - 145 mmol/L    Potassium 3.9 3.5 - 5.2 mmol/L    Chloride 103 98 - 107 mmol/L    CO2 26.3 22.0 - 29.0 mmol/L    Calcium 9.2 8.6 - 10.5 mg/dL    Total Protein 6.5 6.0 - 8.5 g/dL    Albumin 4.3 3.5 - 5.2 g/dL    ALT (SGPT) 15 1 - 33 U/L     AST (SGOT) 12 1 - 32 U/L    Alkaline Phosphatase 81 39 - 117 U/L    Total Bilirubin 0.4 0.0 - 1.2 mg/dL    Globulin 2.2 gm/dL    A/G Ratio 2.0 g/dL    BUN/Creatinine Ratio 27.1 (H) 7.0 - 25.0    Anion Gap 11.7 5.0 - 15.0 mmol/L    eGFR 86.8 >60.0 mL/min/1.73   Single High Sensitivity Troponin T    Collection Time: 06/02/24  7:52 PM    Specimen: Arm, Left; Blood   Result Value Ref Range    HS Troponin T 13 <14 ng/L   Magnesium    Collection Time: 06/02/24  7:52 PM    Specimen: Arm, Left; Blood   Result Value Ref Range    Magnesium 2.3 1.6 - 2.4 mg/dL   Green Top (Gel)    Collection Time: 06/02/24  7:52 PM   Result Value Ref Range    Extra Tube Hold for add-ons.    Lavender Top    Collection Time: 06/02/24  7:52 PM   Result Value Ref Range    Extra Tube hold for add-on    Gold Top - SST    Collection Time: 06/02/24  7:52 PM   Result Value Ref Range    Extra Tube Hold for add-ons.    Light Blue Top    Collection Time: 06/02/24  7:52 PM   Result Value Ref Range    Extra Tube Hold for add-ons.    CBC Auto Differential    Collection Time: 06/02/24  7:52 PM    Specimen: Arm, Left; Blood   Result Value Ref Range    WBC 8.46 3.40 - 10.80 10*3/mm3    RBC 3.86 3.77 - 5.28 10*6/mm3    Hemoglobin 11.9 (L) 12.0 - 15.9 g/dL    Hematocrit 36.7 34.0 - 46.6 %    MCV 95.1 79.0 - 97.0 fL    MCH 30.8 26.6 - 33.0 pg    MCHC 32.4 31.5 - 35.7 g/dL    RDW 13.2 12.3 - 15.4 %    RDW-SD 46.1 37.0 - 54.0 fl    MPV 9.8 6.0 - 12.0 fL    Platelets 236 140 - 450 10*3/mm3    Neutrophil % 77.5 (H) 42.7 - 76.0 %    Lymphocyte % 15.7 (L) 19.6 - 45.3 %    Monocyte % 5.4 5.0 - 12.0 %    Eosinophil % 0.6 0.3 - 6.2 %    Basophil % 0.4 0.0 - 1.5 %    Immature Grans % 0.4 0.0 - 0.5 %    Neutrophils, Absolute 6.56 1.70 - 7.00 10*3/mm3    Lymphocytes, Absolute 1.33 0.70 - 3.10 10*3/mm3    Monocytes, Absolute 0.46 0.10 - 0.90 10*3/mm3    Eosinophils, Absolute 0.05 0.00 - 0.40 10*3/mm3    Basophils, Absolute 0.03 0.00 - 0.20 10*3/mm3    Immature Grans, Absolute  0.03 0.00 - 0.05 10*3/mm3    nRBC 0.0 0.0 - 0.2 /100 WBC   Urinalysis With Microscopic If Indicated (No Culture) - Urine, Clean Catch    Collection Time: 06/02/24  9:44 PM    Specimen: Urine, Clean Catch   Result Value Ref Range    Color, UA Yellow Yellow, Straw    Appearance, UA Cloudy (A) Clear    pH, UA 6.5 5.0 - 8.0    Specific Gravity, UA 1.010 1.005 - 1.030    Glucose, UA Negative Negative    Ketones, UA Trace (A) Negative    Bilirubin, UA Negative Negative    Blood, UA Negative Negative    Protein, UA Negative Negative    Leuk Esterase, UA Large (3+) (A) Negative    Nitrite, UA Positive (A) Negative    Urobilinogen, UA 1.0 E.U./dL 0.2 - 1.0 E.U./dL   Urinalysis, Microscopic Only - Urine, Clean Catch    Collection Time: 06/02/24  9:44 PM    Specimen: Urine, Clean Catch   Result Value Ref Range    RBC, UA 0-2 None Seen, 0-2 /HPF    WBC, UA 11-20 (A) None Seen, 0-2 /HPF    Bacteria, UA 3+ (A) None Seen /HPF    Squamous Epithelial Cells, UA 0-2 None Seen, 0-2 /HPF    Hyaline Casts, UA 0-2 None Seen /LPF    Methodology Manual Light Microscopy        Ordered the above labs and independently reviewed the results.        RADIOLOGY  XR Chest 1 View    Result Date: 6/2/2024  CHEST SINGLE VIEW  HISTORY: Weakness and dizziness.  COMPARISON: AP chest 03/27/2023  FINDINGS: Cardiomediastinal silhouette within normal limits. Aortic vascular calcifications are present. Lungs appear clear and there is no evidence for pulmonary edema or pleural effusion or infiltrate. Mild elevation of right hemidiaphragm.      No evidence for active disease in the chest.  This report was finalized on 6/2/2024 8:14 PM by Dr. Guerrero Myles M.D on Workstation: [id number]       I ordered the above noted radiological studies. Reviewed by me and discussed with radiologist.  See dictation for official radiology interpretation.      PROCEDURES    Procedures      MEDICATIONS GIVEN IN ER    Medications   sodium chloride 0.9 % flush 10 mL (has no  administration in time range)         All labs have been independently reviewed by me.  All radiology studies have been reviewed by me and I discussed with radiologist dictating the report when indicated below.  All EKG's independently viewed and interpreted by me.  Discussion below represents my analysis of pertinent findings related to patient's condition, differential diagnosis, treatment plan and final disposition.        PROGRESS, DATA ANALYSIS, CONSULTS, AND MEDICAL DECISION MAKING    This is a very pleasant elderly female who had episodes today of low blood pressure.  Very nonspecific why it is low.  She does have a history of ulcerative colitis and frequently has diarrhea.  Had a little bit more than typical yesterday with 4-5 episodes.  No blood loss.  Reports no fever.  She states that she does not eat and drink as much is normal.  There is been no change in her blood pressure medicine.  It is very well that this could be a little volume contraction that she has as a contributing factor from the diarrhea and her lack of p.o. intake.  I do not believe this is any febrile illness.  Here her blood pressure has been slightly elevated she has a normal neurologic exam she is able to stand and ambulate without any difficulty.  Patient's EKG is unremarkable with a normal troponin.  Chest x-ray is unremarkable.  I do not think she needs a CT of the head.  She is not having any pain and she has no new neurologic change or deficit.  I talked at length with the patient and family at bedside.  All questions answered      ED Course as of 06/02/24 2254   Sun Jun 02, 2024 2100 Hemoglobin(!): 11.9  No change in previous hemoglobin.  Hemoglobin is usually in the low 12 region. [MM]   2100 HS Troponin T: 13 [MM]   2100 Magnesium: 2.3 [MM]   2101 Chest x-ray is unremarkable with no acute active disease seen.  I looked the chest x-ray also reviewed the radiologist report. [MM]   2129 My own independent interpretation of the  EKG that was done at 6:55 PM reveals a rate of 71 it is sinus rhythm there is mild intravelar conduction delay with normal axis.  There are some nonspecific Q waves in V1 V2 V3.  I do not appreciate any acute injury pattern  QT looks normal.  I did compare this to an EKG that was done on March 27, 2023 and it looks very similar [MM]   2203 Patient is not orthostatic.  Actually has elevation in blood pressure. [MM]   2204 Nitrite, UA(!): Positive  Patient does not have any burning with urination or frequent urination.  She also sides that she never gets a urinary tract infection.  She was treated in the past with a UTI and culture did not grow out any pathogen.  I will go ahead and check a culture of this urine. [MM]   2236 I have spoke with this patient at length.  Son was present as well.  She has no urinary symptoms at all.  She does have some frequent urination that is not changed from baseline.  She has not had any UTI in the past.  She was initially diagnosed with a UTI and placed upon antibiotics and then she had a culture that did not grow anything significantly and the antibiotic was stopped by her primary care physician. [MM]   2238 They feel very comfortable holding off on antibiotics.  We will culture her urine.  I given strict instructions to return if she develops any bad pelvic pain, urinary symptoms, fever or chills, or any other concerns.  All questions answered [MM]      ED Course User Index  [MM] Oni Adkins MD       AS OF 22:54 EDT VITALS:    BP - 156/80  HR - 70  TEMP - 98.4 °F (36.9 °C)  02 SATS - 93%    SOCIAL DETERMINANTS OF HEALTH THAT IMPACT OR LIMIT CARE (For example..Homelessness,safe discharge, inability to obtain care, follow up, or prescriptions):      DIAGNOSIS  Final diagnoses:   Lightheadedness, transient and resolved   Peripheral polyneuropathy: Chronic   Ataxia: Chronic         DISPOSITION  DISCHARGE    Patient discharged in stable condition.    Reviewed implications of  results, diagnosis, meds, responsibility to follow up, warning signs and symptoms of possible worsening, potential complications and reasons to return to ER, including worsening of symptoms, any worsening of symptoms, any new pain, fever, chills, or any other concerns.  Make sure when you stand up and go from sitting to standing or from lying to sitting that you wait several minutes to make sure you do not feel lightheaded or feel weak..    Patient/Family voiced understanding of above instructions.    Discussed plan for discharge, as there is no emergent indication for admission. Pt/family is agreeable and understands need for follow up and repeat testing.  Pt is aware that discharge does not mean that nothing is wrong but it indicates no emergency is present that requires admission and they must continue care with follow-up as given below or physician of their choice.     FOLLOW-UP  Oren Rust Jr., MD  [facility] [place] [id number] [phone]      Call on tomorrow to arrange follow-up in the next week.  Again return immediately for develop any fever, chills, pelvic pain, back pain, chest pain, shortness of breath, any concerns.         Medication List      No changes were made to your prescriptions during this visit.                 DICTATED UTILIZING DRAGON DICTATION    Note Disclaimer: At [facility], we believe that sharing information builds trust and better relationships. You are receiving this note because you recently visited [facility]. It is possible you will see health information before a provider has talked with you about it. This kind of information can be easy to misunderstand. To help you fully understand what it means for your health, we urge you to discuss this note with your provider.       Oni Adkins MD  06/02/24 [id number]

## 2024-06-05 LAB — BACTERIA SPEC AEROBE CULT: ABNORMAL

## 2024-10-11 NOTE — PROGRESS NOTES
Chief Complaint  Peripheral Vascular Disease  Evaluate peripheral artery disease    Subjective        Arlin Hutson presents to [facility] VASCULAR SURGERY  History of Present Illness  The patient is an 88-year-old female who had right popliteal artery atherectomy with angioplasty on March 23, 2021 for acute/subacute ischemia of the right leg.  She has been treated with dual antiplatelet therapy and Pletal.  She has done satisfactorily following this with no critical limb ischemic symptoms.  Duplex scan of the right lower extremity arteries on February 21, 2024 demonstrated recurrent high-grade stenosis in the mid popliteal artery.  ABIs remain good at 0.88 and unchanged from previous.  She returned on October 16, 2024 with repeat studies.  This demonstrated a high-grade stenosis in the mid popliteal artery, unchanged.  ABIs are 0.93 on the right and 1.0 on the left.    Past History:  Medical History: has a past medical history of Arterial occlusion (03/22/2021), Arthritis of neck (?), Atheroembolism of right lower extremity, Baker's cyst, Colon polyp, CREST syndrome, Difficulty walking (About 5 yrs ago), Fracture of wrist, Fractures, GERD (gastroesophageal reflux disease), Hip arthrosis (? Maybe 7 or 8 years ago), History of CT scan of abdomen (03/11/2011), History of rheumatoid arthritis, HL (hearing loss) (About 1995), Hyperlipidemia, Hypertension, Irritable bowel syndrome (1990s), Knee swelling (5 or 6 years ago), Low back pain, Normocytic anemia (08/26/2020), Osteoarthritis, Peripheral neuropathy (Cannot remember), Raynaud's disease, Rheumatoid arthritis, Scleroderma, Shingles, Sjogren's syndrome, Tear of meniscus of knee, and Ulcerative colitis.   Surgical History: has a past surgical history that includes Wrist surgery (Right, 2008); Dilation and curettage of uterus (1980); Knee surgery (Right); Flexible sigmoidoscopy; Upper gastrointestinal endoscopy (03/14/2008); Cataract extraction,  "bilateral; Hand surgery; Wrist fracture surgery (Right); Total knee arthroplasty (Right, 05/30/2018); Joint replacement; Fracture surgery; Eye surgery; Esophagogastroduodenoscopy (N/A, 12/03/2018); Knee arthroscopy (Right); Teeth Extraction; EKOS Catheter Placement (N/A, 03/23/2021); Transluminal atherectomy popliteal artery; Colonoscopy (02/26/2016); Colonoscopy (07/01/2011); Vascular surgery (Spring of 2020); and Trigger point injection.   Family History: family history includes Arthritis in her father; Breast cancer in her maternal aunt; Diabetes in her brother; Heart attack in her father; Hypertension in her mother; Migraines in her daughter, mother, and sister; Skin cancer in her brother and sister; Stroke in her mother; Thyroid disease in her mother; Ulcerative colitis in her mother.   Social History: reports that she has never smoked. She has never used smokeless tobacco. She reports current alcohol use. She reports that she does not use drugs.    (Not in a hospital admission)     Allergies: Codeine, Penicillin g, and Sulfa antibiotics   Objective   Vital Signs:  /64   Ht 152.4 cm (60\")   Wt 45.8 kg (101 lb)   BMI 19.73 kg/m²   Estimated body mass index is 19.73 kg/m² as calculated from the following:    Height as of this encounter: 152.4 cm (60\").    Weight as of this encounter: 45.8 kg (101 lb).     BMI is within normal parameters. No other follow-up for BMI required.    Arlin A Caryl  reports that she has never smoked. She has never used smokeless tobacco.       Physical Exam easily palpable pedal pulses noted on the left and only  trace palpable on the right.  Result Review :        Data reviewed : Right lower extremity duplex scan and ABIs from February 21, 2024 and October 16, 2024 , described above             Assessment and Plan     Diagnoses and all orders for this visit:    1. Peripheral vascular disease (Primary)  -     Doppler Ankle Brachial Index Single Level CAR; Future    2. Raynaud's " syndrome without gangrene    Had a lengthy discussion with the patient and her son in regards to the high-grade stenosis.  Very likely she has developed collaterals which are keeping her ABIs in the normal range.  At this point they would rather not pursue intervention for this.  Because of likely collateral formation even if it were to occlude she would probably not be in a critical limb ischemic situation.  They would rather observe only.  I will reassess her in follow-up in 6 months with ABIs.         Follow Up     Return in about 6 months (around 4/16/2025) for With ABIs.  Patient was given instructions and counseling regarding her condition or for health maintenance advice. Please see specific information pulled into the AVS if appropriate.

## 2024-10-16 ENCOUNTER — OFFICE VISIT (OUTPATIENT)
Age: 89
End: 2024-10-16
Payer: MEDICARE

## 2024-10-16 ENCOUNTER — HOSPITAL ENCOUNTER (OUTPATIENT)
Facility: HOSPITAL | Age: 89
Discharge: HOME | End: 2024-10-16
Payer: MEDICARE

## 2024-10-16 VITALS
HEIGHT: 60 IN | DIASTOLIC BLOOD PRESSURE: 64 MMHG | BODY MASS INDEX: 19.83 KG/M2 | SYSTOLIC BLOOD PRESSURE: 142 MMHG | WEIGHT: 101 LBS

## 2024-10-16 DIAGNOSIS — I73.00 RAYNAUD'S SYNDROME WITHOUT GANGRENE: ICD-10-CM

## 2024-10-16 DIAGNOSIS — I73.9 PERIPHERAL VASCULAR DISEASE: Primary | ICD-10-CM

## 2024-10-16 DIAGNOSIS — I70.212 ATHEROSCLEROSIS OF NATIVE ARTERY OF LEFT LOWER EXTREMITY WITH INTERMITTENT CLAUDICATION: ICD-10-CM

## 2024-10-16 DIAGNOSIS — I73.9 PERIPHERAL ARTERIAL DISEASE: ICD-10-CM

## 2024-10-16 LAB
BH CV LEA RIGHT CFA DISTAL PSV: 79.2 CM/S
BH CV LEA RIGHT CFA PROX PSV: 89 CM/S
BH CV LEA RIGHT DFA PROX PSV: 76.4 CM/S
BH CV LEA RIGHT POPITEAL A  DISTAL PSV: -60.9 CM/S
BH CV LEA RIGHT POPITEAL A  MID PSV: 562.7 CM/S
BH CV LEA RIGHT POPITEAL A  PROX PSV: 100.2 CM/S
BH CV LEA RIGHT PTA PROX PSV: -78.3 CM/S
BH CV LEA RIGHT SFA DISTAL PSV: -87.6 CM/S
BH CV LEA RIGHT SFA MID PSV: -67.5 CM/S
BH CV LEA RIGHT SFA PROX PSV: 72.1 CM/S
BH CV LEA RIGHT TIBEOPERONEAL PSV: 59.5 CM/S
BH CV LOWER ARTERIAL LEFT ABI RATIO: 1.11
BH CV LOWER ARTERIAL LEFT DORSALIS PEDIS SYS MAX: 152
BH CV LOWER ARTERIAL LEFT POST TIBIAL SYS MAX: 160
BH CV LOWER ARTERIAL RIGHT ABI RATIO: 0.93
BH CV LOWER ARTERIAL RIGHT DORSALIS PEDIS SYS MAX: 130
BH CV LOWER ARTERIAL RIGHT POST TIBIAL SYS MAX: 134
RIGHT GROIN CFA SYS: 89 CM/SEC
UPPER ARTERIAL LEFT ARM BRACHIAL SYS MAX: NORMAL
UPPER ARTERIAL RIGHT ARM BRACHIAL SYS MAX: NORMAL

## 2024-10-16 PROCEDURE — 1159F MED LIST DOCD IN RCRD: CPT | Performed by: SURGERY

## 2024-10-16 PROCEDURE — 93926 LOWER EXTREMITY STUDY: CPT

## 2024-10-16 PROCEDURE — 93922 UPR/L XTREMITY ART 2 LEVELS: CPT

## 2024-10-16 PROCEDURE — 1160F RVW MEDS BY RX/DR IN RCRD: CPT | Performed by: SURGERY

## 2024-10-16 PROCEDURE — 99213 OFFICE O/P EST LOW 20 MIN: CPT | Performed by: SURGERY

## 2024-10-16 PROCEDURE — 93922 UPR/L XTREMITY ART 2 LEVELS: CPT | Performed by: SURGERY

## 2024-10-16 PROCEDURE — 93926 LOWER EXTREMITY STUDY: CPT | Performed by: SURGERY

## 2024-11-14 DIAGNOSIS — K52.839 MICROSCOPIC COLITIS, UNSPECIFIED MICROSCOPIC COLITIS TYPE: ICD-10-CM

## 2024-11-15 NOTE — TELEPHONE ENCOUNTER
She wants a refill on the Budesonide 3 mg take 3 po daily. #90.  I last saw her a year ago though I had said that I wanted her to follow-up with me in 3 months.  Please give her enough of the budesonide pills to last her until she sees me or one of the PA/NP's in the office. Thx.kjh

## 2024-11-15 NOTE — TELEPHONE ENCOUNTER
Hub staff attempted to follow warm transfer process and was unsuccessful     Caller: Arlin Hutson    Relationship to patient: Self    Best call back number: [phone]     Patient is needing: PT RETURNING CALL BUT OFFICE WOULD NOT ANSWER THEIR PHONES.

## 2024-11-18 RX ORDER — BUDESONIDE 3 MG/1
9 CAPSULE, COATED PELLETS ORAL DAILY
Qty: 90 CAPSULE | Refills: 4 | Status: SHIPPED | OUTPATIENT
Start: 2024-11-18

## 2024-11-18 NOTE — TELEPHONE ENCOUNTER
Called pt and appt made for 01/22 at 11a with Michelle BORRERO.  Budesonide 3mg take 3 pills daily #90 with 4 refills escribed to pt's [facility] pharmacy.

## 2024-11-20 ENCOUNTER — LAB (OUTPATIENT)
Dept: LAB | Facility: HOSPITAL | Age: 89
End: 2024-11-20
Payer: MEDICARE

## 2024-11-20 DIAGNOSIS — D47.2 MGUS (MONOCLONAL GAMMOPATHY OF UNKNOWN SIGNIFICANCE): ICD-10-CM

## 2024-11-20 DIAGNOSIS — D50.8 OTHER IRON DEFICIENCY ANEMIA: ICD-10-CM

## 2024-11-20 LAB
ALBUMIN SERPL-MCNC: 4.1 G/DL (ref 3.5–5.2)
ALBUMIN/GLOB SERPL: 1.6 G/DL
ALP SERPL-CCNC: 92 U/L (ref 39–117)
ALT SERPL W P-5'-P-CCNC: 16 U/L (ref 1–33)
ANION GAP SERPL CALCULATED.3IONS-SCNC: 10.7 MMOL/L (ref 5–15)
AST SERPL-CCNC: 18 U/L (ref 1–32)
BASOPHILS # BLD AUTO: 0.06 10*3/MM3 (ref 0–0.2)
BASOPHILS NFR BLD AUTO: 0.6 % (ref 0–1.5)
BILIRUB SERPL-MCNC: 0.4 MG/DL (ref 0–1.2)
BUN SERPL-MCNC: 14 MG/DL (ref 8–23)
BUN/CREAT SERPL: 20.3 (ref 7–25)
CALCIUM SPEC-SCNC: 9.2 MG/DL (ref 8.6–10.5)
CHLORIDE SERPL-SCNC: 101 MMOL/L (ref 98–107)
CO2 SERPL-SCNC: 27.3 MMOL/L (ref 22–29)
CREAT SERPL-MCNC: 0.69 MG/DL (ref 0.57–1)
DEPRECATED RDW RBC AUTO: 50.2 FL (ref 37–54)
EGFRCR SERPLBLD CKD-EPI 2021: 83.1 ML/MIN/1.73
EOSINOPHIL # BLD AUTO: 0.2 10*3/MM3 (ref 0–0.4)
EOSINOPHIL NFR BLD AUTO: 1.9 % (ref 0.3–6.2)
ERYTHROCYTE [DISTWIDTH] IN BLOOD BY AUTOMATED COUNT: 14.2 % (ref 12.3–15.4)
FERRITIN SERPL-MCNC: 83.1 NG/ML (ref 13–150)
GLOBULIN UR ELPH-MCNC: 2.5 GM/DL
GLUCOSE SERPL-MCNC: 84 MG/DL (ref 65–99)
HCT VFR BLD AUTO: 38.3 % (ref 34–46.6)
HGB BLD-MCNC: 12.3 G/DL (ref 12–15.9)
IMM GRANULOCYTES # BLD AUTO: 0.08 10*3/MM3 (ref 0–0.05)
IMM GRANULOCYTES NFR BLD AUTO: 0.8 % (ref 0–0.5)
IRON 24H UR-MRATE: 86 MCG/DL (ref 37–145)
IRON SATN MFR SERPL: 26 % (ref 20–50)
LYMPHOCYTES # BLD AUTO: 2.04 10*3/MM3 (ref 0.7–3.1)
LYMPHOCYTES NFR BLD AUTO: 19.2 % (ref 19.6–45.3)
MCH RBC QN AUTO: 31 PG (ref 26.6–33)
MCHC RBC AUTO-ENTMCNC: 32.1 G/DL (ref 31.5–35.7)
MCV RBC AUTO: 96.5 FL (ref 79–97)
MONOCYTES # BLD AUTO: 0.96 10*3/MM3 (ref 0.1–0.9)
MONOCYTES NFR BLD AUTO: 9 % (ref 5–12)
NEUTROPHILS NFR BLD AUTO: 68.5 % (ref 42.7–76)
NEUTROPHILS NFR BLD AUTO: 7.29 10*3/MM3 (ref 1.7–7)
NRBC BLD AUTO-RTO: 0 /100 WBC (ref 0–0.2)
PLATELET # BLD AUTO: 241 10*3/MM3 (ref 140–450)
PMV BLD AUTO: 9.5 FL (ref 6–12)
POTASSIUM SERPL-SCNC: 3.4 MMOL/L (ref 3.5–5.2)
PROT SERPL-MCNC: 6.6 G/DL (ref 6–8.5)
RBC # BLD AUTO: 3.97 10*6/MM3 (ref 3.77–5.28)
SODIUM SERPL-SCNC: 139 MMOL/L (ref 136–145)
TIBC SERPL-MCNC: 337 MCG/DL (ref 298–536)
TRANSFERRIN SERPL-MCNC: 226 MG/DL (ref 200–360)
WBC NRBC COR # BLD AUTO: 10.63 10*3/MM3 (ref 3.4–10.8)

## 2024-11-20 PROCEDURE — 80053 COMPREHEN METABOLIC PANEL: CPT

## 2024-11-20 PROCEDURE — 85025 COMPLETE CBC W/AUTO DIFF WBC: CPT

## 2024-11-20 PROCEDURE — 84466 ASSAY OF TRANSFERRIN: CPT

## 2024-11-20 PROCEDURE — 36415 COLL VENOUS BLD VENIPUNCTURE: CPT

## 2024-11-20 PROCEDURE — 83540 ASSAY OF IRON: CPT

## 2024-11-20 PROCEDURE — 82728 ASSAY OF FERRITIN: CPT

## 2024-11-22 LAB
ALBUMIN SERPL ELPH-MCNC: 3.8 G/DL (ref 2.9–4.4)
ALBUMIN/GLOB SERPL: 1.5 {RATIO} (ref 0.7–1.7)
ALPHA1 GLOB SERPL ELPH-MCNC: 0.2 G/DL (ref 0–0.4)
ALPHA2 GLOB SERPL ELPH-MCNC: 0.7 G/DL (ref 0.4–1)
B-GLOBULIN SERPL ELPH-MCNC: 1.1 G/DL (ref 0.7–1.3)
GAMMA GLOB SERPL ELPH-MCNC: 0.6 G/DL (ref 0.4–1.8)
GLOBULIN SER-MCNC: 2.6 G/DL (ref 2.2–3.9)
IGA SERPL-MCNC: 478 MG/DL (ref 64–422)
IGG SERPL-MCNC: 574 MG/DL (ref 586–1602)
IGM SERPL-MCNC: 92 MG/DL (ref 26–217)
INTERPRETATION SERPL IEP-IMP: ABNORMAL
KAPPA LC FREE SER-MCNC: 31.1 MG/L (ref 3.3–19.4)
KAPPA LC FREE/LAMBDA FREE SER: 2.66 {RATIO} (ref 0.26–1.65)
LABORATORY COMMENT REPORT: ABNORMAL
LAMBDA LC FREE SERPL-MCNC: 11.7 MG/L (ref 5.7–26.3)
M PROTEIN SERPL ELPH-MCNC: ABNORMAL G/DL
PROT SERPL-MCNC: 6.4 G/DL (ref 6–8.5)

## 2024-11-26 NOTE — PROGRESS NOTES
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  Patient reports that she has overall been doing quite well.  She continues on methotrexate injections weekly for her rheumatoid arthritis, symptoms remain under fair control.  She does note some slight lightheadedness today.  She would like to consider stopping routine monitoring of her urine paraprotein studies as she does not like collecting the 24-hour urine specimens.    Objective   Vital Signs:   /72   Pulse 84   Temp 98.8 °F (37.1 °C) (Oral)   Resp 16   Ht 152.4 cm (60\")   Wt 47.4 kg (104 lb 9.6 oz)   SpO2 98%   BMI 20.43 kg/m²     Physical Exam  Constitutional:       Appearance: She is well-developed.      Comments: Patient ambulating with use of a cane   Eyes:      Conjunctiva/sclera: Conjunctivae normal.   Neck:      Thyroid: No thyromegaly.   Cardiovascular:      Rate and Rhythm: Normal rate and regular rhythm.      Heart sounds: No murmur heard.     No friction rub. No gallop.   Pulmonary:      Effort: No respiratory distress.      Breath sounds: Normal breath sounds.   Abdominal:      General: Bowel sounds are normal. There is no distension.      Palpations: Abdomen is soft.      Tenderness: There is no abdominal tenderness.   Musculoskeletal:      Comments: Sclerodactyly involving the hands.  Trace bilateral lower extremity edema   Lymphadenopathy:      Head:      Right side of head: No submandibular adenopathy.      Cervical: No cervical adenopathy.      Upper Body:      Right upper body: No supraclavicular adenopathy.      Left upper body: No supraclavicular adenopathy.   Skin:     General: Skin is warm and dry.      Findings: No rash.   Neurological:      Mental Status: She is alert and oriented to " person, place, and time.      Cranial Nerves: No cranial nerve deficit.      Motor: No abnormal muscle tone.      Deep Tendon Reflexes: Reflexes normal.   Psychiatric:         Behavior: Behavior normal.         Result Review : Reviewed labs from 11/20/2024 with CBC, CMP, serum protein electrophoresis, immunoelectrophoresis, quantitative immunoglobulins, free serum light chains, iron panel, ferritin.        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  Laboratory studies 2/12/2020 showed a creatinine of 0.61, calcium 9.2, globulin 2.5, albumin 4.3, serum protein electrophoresis with a prominent protein band in the beta region, could not rule out monoclonal protein.  CBC on 4/14/2020 with WBC 6.0 with normal differential, hemoglobin 12.0, platelet count 255,000.  Subsequent labs with Dr. Rust on 7/1/2020 showed an immunoelectrophoresis confirming an IgA kappa monoclonal protein with IgA 658, IgG 7 or 24, IgM 125.  Creatinine was normal at 0.61 with calcium 9.5.    Patient was referred to our office for further evaluation.  At time of initial visit 8/12/2020, hemoglobin 13.1, platelet count 200,000, creatinine 0.54 with calcium of 9.5.  Additional labs 8/12/2020 with dual IgA kappa M spike (0.6 g and secondary M spike too small to quantify), IgA 652, IgG 743, IgM 126, free kappa light chain 87.1, free lambda light chain 12.1, free light chain ratio 7.0.  24-hour urine 8/17/2020 with 410 mg total protein, 40.6% kappa light chain (166 mg).  Skeletal survey 8/13/2020 with no evidence of lytic lesions.  Previous pathology specimens from colonoscopy 2/26/2016 and EGD 12/10/2018 were sent for Congo red stain, both negative.  Patient returns today for 6-month interval follow-up visit.  We are reviewing labs that were drawn on 11/20/2024 in  relation to her MGUS which showed further decrease in her dominant IgA kappa M spike down to 0.3 g, secondary IgA kappa M spike remains too small to quantify.  Quantitative immunoglobulins with decrease in IgA to 478, IgG 574, IgM 92, free kappa light chain has decreased down to 31.1 with free lambda light chain 11.7 and free light chain ratio 2.66.  24-hour urine was returned today and we will notify patient when results are available within the next week.  There is no evidence of anemia nor thrombocytopenia, no evidence of hypercalcemia.  Renal function is normal.  The patient's M spike is continuing to decline over time.  She does not like collecting the 24 urine studies and we will discontinue further routine monitoring of her urine at this point.  She will return in 6 months for repeat labs and if her M spike remains stable on the current range we will discuss at that point possibly transitioning to annual follow-up.    *Anemia:  Hemoglobin on 8/12/2020 was normal at 13.1 with MCV 95.4  Hemoglobin declined on 8/26/2020 down to 11.3 with MCV 98.1.  Additional evaluation at that time with iron 52, ferritin 66.3, iron saturation 16%, TIBC 322, folate greater than 20, B12 552.  Suspect that patient has anemia secondary to chronic disease with underlying chronic autoimmune disorder.  She is also receiving weekly methotrexate which may be a contributing factor.    Hemoglobin on 3/18/2022 declined to 10.4.  Additional labs with evidence of iron deficiency with iron 43, ferritin 8.0, iron saturation 10%, TIBC 442, folate greater than 20, B12 602, CRP less than 0.3, ESR 18  Concern regarding of patient's ability to tolerate oral iron with underlying colitis with alternating constipation and diarrhea.  Therefore proceeded with IV iron in the form of Injectafer, received 727 mg on 4/4 and 750 mg on 4/11/2022.  Labs on 6/17/2022 with significant response, hemoglobin up to 13.1, ferritin 591, iron saturation 35%  Patient  was seen by GI on 7/11/2022, no clinical evidence to suggest GI blood loss.  Patient opted to forego colonoscopy given her age and potential risks.  CT abdomen and pelvis 8/15/2022 with suggestion of long segment distal small bowel wall thickening possibly related to peristalsis, recommended CT enterography.  CT enterography performed 9/7/2022 showed multifocal areas of prominent bowel wall thickening particularly involving the jejunum, distal ileum that were nonspecific and possibly indicative of enteritis.  Distention of stomach and proximal duodenum secondary to ingestion of contrast.  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  Mild persistent anemia may be related to ongoing methotrexate use, particularly with macrocytic indices.  Unclear whether she has a component of anemia of chronic disease related to her underlying autoimmune issues (although labs on 9/8/2022 with ESR normal at 13, CRP less than 0.3).    Hemoglobin currently normal at 12.3, iron studies obtained on 11/20/2024 with iron 86, ferritin 83.1, iron saturation 26%, TIBC 337.  We will recheck iron studies if patient becomes anemic in the future.    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 Martell in rheumatology.    She had been treated previously with Arava, subsequently changed to methotrexate weekly injections.    She has low disease activity and her disease has been under good control.    She does have associated Sjogren's syndrome with dry eyes and dry mouth and subsequent dental issues.  She has chronic pain in her fingers with some degree of sclerodactyly and receives intermittent injections by hand surgery every 4 to 6 months.  Suspect that patient's monoclonal gammopathy is associated with her underlying rheumatologic disorder.  There has been discussion regarding potential addition of Orencia to  her weekly methotrexate injections.  Labs on 9/8/2022 with ESR 13, CRP less than 0.3  Patient returns today continuing on weekly methotrexate injections.  She continues follow-up with rheumatology and reports stable symptoms    *Lumbar spine stenosis and spondylolisthesis:  Chronic back pain  Patient followed by Dr. Licea in orthopedics  Patient received a course of epidural injections    *Microscopic colitis/ulcerative colitis:  Patient is followed by Dr. Shook  Previously treated with Entocort, discontinued around 2018  Patient did follow-up with GI in January.  She underwent CT abdomen and pelvis on 1/27/2021 which was unrevealing.      Previous pathology specimens from colonoscopy 2/26/2016 and EGD 12/10/2018 were sent for Congo red stain and were negative.  Patient recently was placed on budesonide by GI with improvement in symptoms.   Patient continuing on budesonide 6 mg daily per GI.  Patient continues with alternating constipation and diarrhea.    *Peripheral neuropathy  Patient has had some longstanding intermittent symptoms in her feet with numbness that does wax and wane.  It sounds as if her symptoms are associated with Raynaud's type changes, unclear whether this represents a true peripheral neuropathy.  It is not a consistent finding.  Previous B12 level normal at 552 on 8/26/2020.  Unclear whether symptoms could be in part related to her underlying MGUS.  Patient continues follow-up with neurology    *Peripheral vascular disease  Patient developed acute ischemia right foot and was hospitalized 3/22-3/25/2021.  On 3/22/2021 patient underwent arthrectomy right popliteal artery.  She was subsequently placed on Xarelto 20 mg daily.  Patient was subsequently transitioned from Xarelto to Plavix 75 mg daily by vascular surgery in December 2021.     Plan:     Patient continues on a course of observation/surveillance regarding her IgA kappa MGUS with no evidence of change on current labs.  Results pending  from 24-hour urine protein electrophoresis and immunoelectrophoresis from today and we will notify patient when results are available within the next week  Patient continues on weekly methotrexate injections per rheumatology.    Patient will continue follow-up with Dr. Rust, rheumatology, GI.  We will stop routine monitoring of 24-hour urine studies  MD visit in 6 months, labs 1 week prior with CBC, CMP, iron panel, ferritin, serum protein electrophoresis, immunoelectrophoresis, quantitative immunoglobulins, free serum light chains.

## 2024-11-27 ENCOUNTER — OFFICE VISIT (OUTPATIENT)
Dept: ONCOLOGY | Facility: CLINIC | Age: 89
End: 2024-11-27
Payer: MEDICARE

## 2024-11-27 VITALS
OXYGEN SATURATION: 98 % | RESPIRATION RATE: 16 BRPM | HEIGHT: 60 IN | DIASTOLIC BLOOD PRESSURE: 72 MMHG | SYSTOLIC BLOOD PRESSURE: 156 MMHG | TEMPERATURE: 98.8 F | HEART RATE: 84 BPM | WEIGHT: 104.6 LBS | BODY MASS INDEX: 20.54 KG/M2

## 2024-11-27 DIAGNOSIS — D47.2 MGUS (MONOCLONAL GAMMOPATHY OF UNKNOWN SIGNIFICANCE): Primary | ICD-10-CM

## 2024-11-27 PROCEDURE — 1160F RVW MEDS BY RX/DR IN RCRD: CPT | Performed by: INTERNAL MEDICINE

## 2024-11-27 PROCEDURE — 1159F MED LIST DOCD IN RCRD: CPT | Performed by: INTERNAL MEDICINE

## 2024-11-27 PROCEDURE — 99214 OFFICE O/P EST MOD 30 MIN: CPT | Performed by: INTERNAL MEDICINE

## 2024-11-27 PROCEDURE — 1126F AMNT PAIN NOTED NONE PRSNT: CPT | Performed by: INTERNAL MEDICINE

## 2025-01-22 ENCOUNTER — OFFICE VISIT (OUTPATIENT)
Dept: GASTROENTEROLOGY | Facility: CLINIC | Age: OVER 89
End: 2025-01-22
Payer: MEDICARE

## 2025-01-22 VITALS
TEMPERATURE: 97.3 F | DIASTOLIC BLOOD PRESSURE: 76 MMHG | HEART RATE: 76 BPM | WEIGHT: 104.8 LBS | SYSTOLIC BLOOD PRESSURE: 115 MMHG | HEIGHT: 60 IN | BODY MASS INDEX: 20.58 KG/M2

## 2025-01-22 DIAGNOSIS — Z86.0100 HISTORY OF COLON POLYPS: ICD-10-CM

## 2025-01-22 DIAGNOSIS — K21.9 GASTROESOPHAGEAL REFLUX DISEASE WITHOUT ESOPHAGITIS: ICD-10-CM

## 2025-01-22 DIAGNOSIS — K52.839 MICROSCOPIC COLITIS, UNSPECIFIED MICROSCOPIC COLITIS TYPE: Primary | ICD-10-CM

## 2025-01-22 DIAGNOSIS — R19.8 ALTERNATING CONSTIPATION AND DIARRHEA: ICD-10-CM

## 2025-01-22 NOTE — PROGRESS NOTES
"Chief Complaint  Microscopic colitis, unspecified microscopic colitis type    Subjective          History of Present Illness    Arlin Hutson is a  89 y.o. female presents for follow-up of microscopic colitis and alternating constipation and diarrhea.  He is a patient of Dr. Shook last seen on 11/2/2023.  She is new to me.    She has a history of microscopic colitis and takes budesonide 3 mg 1/day.  Also history of alternating constipation and explosive diarrhea-takes 1 fiber capsule daily.    History of GERD and takes omeprazole 20 mg every other day. Works well for her.  11/20/2024 CMP normal creatinine.    Follows with hematology, Dr. 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  11/20/2024 CBC with normal hemoglobin at 12.3, normal iron panel and ferritin.    Last colonoscopy in 2016. Last EGD in 12/18.      Objective   Vital Signs:   /76   Pulse 76   Temp 97.3 °F (36.3 °C)   Ht 152.4 cm (60\")   Wt 47.5 kg (104 lb 12.8 oz)   BMI 20.47 kg/m²       Physical Exam  Vitals reviewed.   Constitutional:       General: She is awake. She is not in acute distress.     Appearance: Normal appearance. She is well-developed and well-groomed.   HENT:      Head: Normocephalic.   Pulmonary:      Effort: Pulmonary effort is normal. No respiratory distress.   Skin:     Coloration: Skin is not pale.   Neurological:      Mental Status: She is alert and oriented to person, place, and time.      Gait: Gait is intact.   Psychiatric:         Mood and Affect: Mood and affect normal.         Speech: Speech normal.         Behavior: Behavior is cooperative.         Judgment: Judgment normal.          Result Review :             Assessment and Plan    Diagnoses and all orders for this visit:    1. Microscopic colitis, unspecified microscopic colitis type (Primary)    2. Alternating constipation and diarrhea    3. Gastroesophageal reflux disease " without esophagitis    4. History of colon polyps    Increase fiber capsule to 2/day to see if this better regulates bowel movements.  Overall her symptoms are stable.  We did discuss risk of osteoporosis with both budesonide and her omeprazole.  She takes the omeprazole 20 mg 3 days/week and the side effect is dose-dependent.  Encouraged her to continue every other day as opposed to daily dosing as long as this works for her.    She is also on low-dose budesonide 3 mg daily for her microscopic colitis.  Explained that about 20% of the budesonide is absorbed so she is receiving a very low dose.  She has been unsuccessful coming off of budesonide in the past with a recurrence in her diarrhea.  We discussed the difference between microscopic colitis and ulcerative colitis which her daughter has.  Her daughter is on Rinvoq.    Follow Up   Return in about 1 year (around 1/22/2026).    Dragon dictation used throughout this note.     Michelle Strauss PA-C

## 2025-01-23 ENCOUNTER — TELEPHONE (OUTPATIENT)
Dept: GASTROENTEROLOGY | Facility: CLINIC | Age: OVER 89
End: 2025-01-23

## 2025-01-23 NOTE — TELEPHONE ENCOUNTER
"Hub staff attempted to follow warm transfer process and was unsuccessful     Caller: Mumtaz Hutson    Relationship to patient: Emergency Contact    Best call back number: [phone]    Patient is needing: PATIENT SON (ON VERBAL) IS NEEDING TO SPEAK TO SOMEONE REGARDING THE MEDICATION BUDESONIDE. HE STATES THE INSURANCE COMPANY CLAIMS THE PATIENT DOES NOT NEED THIS MEDICATION DUE TO IT NOT BEING \"MEDICALLY NECESSARY\". [facility] STATES THEY HAD SENT A FORM TO THE OFFICE TO DEEM THAT THIS MEDICATION IS NECESSARY. PATIENT HAS BEEN OUT OF THIS MEDICATION FOR 3 WEEKS AND THEY ARE GOING TO PAY THE FULL PRICE THIS MONTH W/OUT INSURANCE BUT IS WANTING THIS ISSUE FIXED FOR THE NEXT MONTH. PLEASE ADVISE.     "

## 2025-01-23 NOTE — TELEPHONE ENCOUNTER
Called pt's [facility] pharmacy and spoke with pharmacist Kinza and she advised that the pt can get the budesonide for $76  using discount card.  Advised we will call the pt and see if this is ok.       Called pt's son and advised of the above. He verb understanding and states this is fine. Per his request , we called [facility] again and spoke with homa Echols and advised to go ahead and fill med with the discount card.

## 2025-02-14 ENCOUNTER — HOSPITAL ENCOUNTER (EMERGENCY)
Facility: HOSPITAL | Age: OVER 89
Discharge: HOME | End: 2025-02-14
Attending: EMERGENCY MEDICINE
Payer: MEDICARE

## 2025-02-14 ENCOUNTER — APPOINTMENT (OUTPATIENT)
Dept: GENERAL RADIOLOGY | Facility: HOSPITAL | Age: OVER 89
End: 2025-02-14
Payer: MEDICARE

## 2025-02-14 VITALS
SYSTOLIC BLOOD PRESSURE: 168 MMHG | HEART RATE: 66 BPM | DIASTOLIC BLOOD PRESSURE: 81 MMHG | OXYGEN SATURATION: 97 % | TEMPERATURE: 97 F | RESPIRATION RATE: 14 BRPM

## 2025-02-14 DIAGNOSIS — M25.552 LEFT HIP PAIN: Primary | ICD-10-CM

## 2025-02-14 PROCEDURE — 73502 X-RAY EXAM HIP UNI 2-3 VIEWS: CPT

## 2025-02-14 PROCEDURE — 99283 EMERGENCY DEPT VISIT LOW MDM: CPT

## 2025-02-14 RX ORDER — HYDROCODONE BITARTRATE AND ACETAMINOPHEN 5; 325 MG/1; MG/1
1 TABLET ORAL ONCE
Status: COMPLETED | OUTPATIENT
Start: 2025-02-14 | End: 2025-02-14

## 2025-02-14 RX ORDER — LIDOCAINE 50 MG/G
1 PATCH TOPICAL EVERY 24 HOURS
Qty: 30 PATCH | Refills: 0 | Status: SHIPPED | OUTPATIENT
Start: 2025-02-14

## 2025-02-14 RX ADMIN — HYDROCODONE BITARTRATE AND ACETAMINOPHEN 1 TABLET: 5; 325 TABLET ORAL at 11:38

## 2025-02-14 NOTE — ED PROVIDER NOTES
EMERGENCY DEPARTMENT ENCOUNTER  Room Number:  44/44  Date of encounter:  2/14/2025  PCP: Oren Rust Jr., MD  Patient Care Team:  Oren uRst Jr., MD as PCP - General  Oren Rust Jr., MD as PCP - Family Medicine  Oren Rust Jr., MD as Referring Physician (Internal Medicine)  Hayder Caruos Jr., MD as Consulting Physician (Hematology and Oncology)     HPI:  Context: Arlin Hutson is a 89 y.o. female who presents to the ED c/o chief complaint of left hip pain.  Patient reports that she bumped into a piece of furniture approximately a week ago, has had pain in her posterior left hip since.  Pain is sharp, worsened with standing and movement.  Patient reports that she has been taking Tylenol for the pain as well as has taken a dose of ibuprofen.  Patient reports that she spoke with her rheumatologist and was placed on steroids.  Patient denies any radicular pain, no weakness or numbness.  Patient denies any back pain.    MEDICAL HISTORY REVIEW  Reviewed in EPIC    PAST MEDICAL HISTORY  Active Ambulatory Problems     Diagnosis Date Noted    Gastroesophageal reflux disease 02/13/2018    Osteopenia of multiple sites 02/13/2018    Essential hypertension 02/13/2018    Colitis 02/13/2018    Primary osteoarthritis of right knee 05/04/2018    Primary osteoarthritis of left knee 05/30/2018    Pneumonia of left lower lobe due to infectious organism 06/02/2018    History of total right knee replacement 06/02/2018    Abnormal CT of the abdomen 10/24/2018    Rheumatoid arthritis involving multiple sites 07/08/2011    MGUS (monoclonal gammopathy of unknown significance) 08/12/2020    Normocytic anemia 08/26/2020    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    Iron deficiency anemia 03/25/2022    Adverse effect of iron 03/28/2022    Paronychia 10/27/2022    Paresthesia 10/27/2022    Pain in right foot 10/27/2022    Pain in limb 10/27/2022    Onychomycosis due to dermatophyte 10/27/2022    Onychocryptosis  10/27/2022    Metatarsalgia of left foot 10/27/2022    At high risk for falls 04/15/2024     Resolved Ambulatory Problems     Diagnosis Date Noted    No Resolved Ambulatory Problems     Past Medical History:   Diagnosis Date    Arthritis of neck ?    Atheroembolism of right lower extremity     Baker's cyst     Colon polyp     CREST syndrome     Difficulty walking About 5 yrs ago    Fracture of wrist     Fractures     GERD (gastroesophageal reflux disease)     Hip arthrosis ? Maybe 7 or 8 years ago    History of CT scan of abdomen 03/11/2011    History of rheumatoid arthritis     HL (hearing loss) About 1995    Hyperlipidemia     Hypertension     Irritable bowel syndrome 1990s    Knee swelling 5 or 6 years ago    Low back pain     Osteoarthritis     Peripheral neuropathy Cannot remember    Raynaud's disease     Rheumatoid arthritis     Scleroderma     Shingles     Sjogren's syndrome     Tear of meniscus of knee     Ulcerative colitis        PAST SURGICAL HISTORY  Past Surgical History:   Procedure Laterality Date    CATARACT EXTRACTION, BILATERAL      COLONOSCOPY  02/26/2016    tics, microscopic colitis,     COLONOSCOPY  07/01/2011    sig tics, inflammation, polyp    DILATATION AND CURETTAGE  1980    EKOS CATHETER PLACEMENT N/A 03/23/2021    Procedure: AIF WITH RUN OFF OF RT. LEG, ROTATIONAL ATHERECTOMY OF RIGHT POPLITEAL ARTERY;  Surgeon: Gato Contreras MD;  Location: [facility] OR 18/19;  Service: Vascular;  Laterality: N/A;    ENDOSCOPY N/A 12/03/2018    erythematous mucosa in stomach, path benign    EYE SURGERY      FLEXIBLE SIGMOIDOSCOPY      FRACTURE SURGERY      HAND SURGERY      JOINT REPLACEMENT      KNEE ARTHROSCOPY Right     w/meniscal repair    KNEE SURGERY Right     TEETH EXTRACTION      TOTAL KNEE ARTHROPLASTY Right 05/30/2018    Procedure: TOTAL KNEE ARTHROPLASTY;  Surgeon: Georges Jon MD;  Location: [facility] OR;  Service: Orthopedics    TRANSLUMINAL ATHERECTOMY POPLITEAL ARTERY       TRIGGER POINT INJECTION      UPPER GASTROINTESTINAL ENDOSCOPY  03/14/2008    erythema    VASCULAR SURGERY  Spring of 2020    Blood clot in an artery behind my right knee    WRIST FRACTURE SURGERY Right     WRIST SURGERY Right 2008    orif       FAMILY HISTORY  Family History   Problem Relation Age of Onset    Ulcerative colitis Mother     Migraines Mother     Stroke Mother     Hypertension Mother     Thyroid disease Mother     Arthritis Father     Heart attack Father     Migraines Sister         Sister    Skin cancer Sister     Diabetes Brother     Skin cancer Brother     Breast cancer Maternal Aunt     Migraines Daughter     Malricardo Hyperthermia Neg Hx        SOCIAL HISTORY  Social History     Socioeconomic History    Marital status: Single    Number of children: 3   Tobacco Use    Smoking status: Never    Smokeless tobacco: Never   Vaping Use    Vaping status: Never Used   Substance and Sexual Activity    Alcohol use: Yes     Comment: I rarely have a glass of wine.    Drug use: Never    Sexual activity: Not Currently     Partners: Male     Comment: Not sexual active       ALLERGIES  Codeine, Penicillin g, and Sulfa antibiotics    The patient's allergies have been reviewed    REVIEW OF SYSTEMS  All systems reviewed and negative except for those discussed in HPI.     PHYSICAL EXAM  I have reviewed the triage vital signs and nursing notes.  ED Triage Vitals [02/14/25 0918]   Temp Heart Rate Resp BP SpO2   97 °F (36.1 °C) 75 14 -- 100 %      Temp src Heart Rate Source Patient Position BP Location FiO2 (%)   Tympanic Monitor -- -- --       General: No acute distress.  HENT: NCAT, PERRL, Nares patent.  Eyes: no scleral icterus.  Neck: trachea midline, no ROM limitations.  CV: regular rhythm, regular rate.  Respiratory: normal effort, CTAB.  Abdomen: soft, nondistended, NTTP, no rebound tenderness, no guarding or rigidity.  Musculoskeletal: no deformity.  Stable, patient has no tenderness at left lateral hip, does have  tenderness at posterior hip as well as point tenderness in the SI joint on the left.  Thigh nontender, knee has full range of motion, neurovascularly intact distally.  Neuro: alert, moves all extremities, follows commands.  Skin: warm, dry.    LAB RESULTS  No results found for this or any previous visit (from the past 24 hours).    I ordered the above labs and reviewed the results.    RADIOLOGY  XR Hip With or Without Pelvis 2 - 3 View Left    Result Date: 2/14/2025  XR HIP W OR WO PELVIS 2-3 VIEW LEFT-  INDICATIONS: Trauma  TECHNIQUE: Frontal view of the pelvis, 2 views and left hip  COMPARISON: 2/12/2020  FINDINGS:  No acute fracture, erosion, or dislocation is identified. Moderate degenerative spurring is seen at the left hip. Moderate to prominent left hip joint space narrowing is noted. Mild right hip joint space narrowing is seen. Degenerative changes are seen at the symphysis pubis and partly included lumbar spine. Moderate colonic fecal retention is apparent. Follow-up/further evaluation can be obtained as indications persist.       As described.    This report was finalized on 2/14/2025 12:00 PM by Dr. Nando Rossi M.D on Workstation: [id number]       I ordered the above noted radiological studies. I reviewed the images and results. I agree with the radiologist interpretation.    PROCEDURES  Procedures    MEDICATIONS GIVEN IN ER  Medications   HYDROcodone-acetaminophen (NORCO) 5-325 MG per tablet 1 tablet (1 tablet Oral Given 2/14/25 0058)       PROGRESS, DATA ANALYSIS, CONSULTS, AND MEDICAL DECISION MAKING  A complete history and physical exam have been performed.  All available laboratory and imaging results have been reviewed by myself prior to disposition.    MDM    After the initial H&P, I discussed pertinent information from history and physical exam with patient/family.  Discussed differential diagnosis.  Discussed plan for ED evaluation/workup/treatment.  All questions answered.  Patient/family  is agreeable with plan.  ED Course as of 02/14/25 1304   Fri Feb 14, 2025   1234 I reviewed left hip x-rays in PACS, no fracture per my read. [JG]   1302 X-ray imaging negative for fracture, significant degenerative changes seen.  Patient reassessed, discussed ED workup and results, discussed plan for discharge with prescriptions for Lidoderm and Voltaren.  Discussed need for follow-up with primary care, discussed referral to orthopedics.  Given extensive discussion return precautions, discharging. [JG]      ED Course User Index  [JG] Ken Voss MD       AS OF 13:04 EST VITALS:    BP - 172/85  HR - 65  TEMP - 97 °F (36.1 °C) (Tympanic)  O2 SATS - 98%    DIAGNOSIS  Final diagnoses:   Left hip pain         DISPOSITION  DISCHARGE    Patient discharged in stable condition.    Reviewed implications of results, diagnosis, meds, responsibility to follow up, warning signs and symptoms of possible worsening, potential complications and reasons to return to ER.    Patient/Family voiced understanding of above instructions.    Discussed plan for discharge, as there is no emergent indication for admission. Patient referred to primary care provider for BP management due to today's BP. Pt/family is agreeable and understands need for follow up and repeat testing.  Pt is aware that discharge does not mean that nothing is wrong but it indicates no emergency is present that requires admission and they must continue care with follow-up as given below or physician of their choice.     FOLLOW-UP  Oren Rust Jr., MD  [address] [facility] [address]  [place] [address]  [phone]    Schedule an appointment as soon as possible for a visit in 2 days  even if well    Delonte Morales MD  [address] [facility] [address]  [place] [address]  [phone]    Schedule an appointment as soon as possible for a visit in 2 days           Medication List        New Prescriptions      Diclofenac Sodium 1 % gel gel  Commonly known as:  VOLTAREN  Apply 4 g topically to the appropriate area as directed 4 (Four) Times a Day As Needed (pain).     lidocaine 5 %  Commonly known as: Lidoderm  Place 1 patch on the skin as directed by provider Daily. Remove & Discard patch within 12 hours or as directed by MD               Where to Get Your Medications        These medications were sent to [facility] [id number] - [place], [place] - [facility] [address] AT [facility] & [address] - [phone]  - [phone] [facility] [address] [place] [address]      Phone: [phone]   Diclofenac Sodium 1 % gel gel  lidocaine 5 %            Ken Voss MD  02/14/25 2876

## 2025-02-14 NOTE — ED NOTES
Patient c/o left hip pain for 1 week after running into a piece of furniture. Her pain is radiating to her back.

## 2025-02-18 ENCOUNTER — TELEPHONE (OUTPATIENT)
Dept: CARDIOLOGY | Facility: CLINIC | Age: OVER 89
End: 2025-02-18
Payer: MEDICARE

## 2025-02-18 NOTE — TELEPHONE ENCOUNTER
02.18.2025 I called to reschedule the appointment due to Franko's CEC day. Patient states she doesn't need to see the cardiologist anymore.  Thanks,  Naila   Vaccine status unknown

## 2025-03-05 ENCOUNTER — TRANSCRIBE ORDERS (OUTPATIENT)
Dept: ADMINISTRATIVE | Facility: HOSPITAL | Age: OVER 89
End: 2025-03-05
Payer: MEDICARE

## 2025-03-05 ENCOUNTER — LAB (OUTPATIENT)
Dept: LAB | Facility: HOSPITAL | Age: OVER 89
End: 2025-03-05
Payer: MEDICARE

## 2025-03-05 DIAGNOSIS — R30.0 DYSURIA: ICD-10-CM

## 2025-03-05 DIAGNOSIS — R30.0 DYSURIA: Primary | ICD-10-CM

## 2025-03-05 LAB
BACTERIA UR QL AUTO: ABNORMAL /HPF
BILIRUB UR QL STRIP: NEGATIVE
CLARITY UR: ABNORMAL
COLOR UR: YELLOW
GLUCOSE UR STRIP-MCNC: NEGATIVE MG/DL
HGB UR QL STRIP.AUTO: NEGATIVE
HYALINE CASTS UR QL AUTO: ABNORMAL /LPF
KETONES UR QL STRIP: NEGATIVE
LEUKOCYTE ESTERASE UR QL STRIP.AUTO: ABNORMAL
NITRITE UR QL STRIP: POSITIVE
PH UR STRIP.AUTO: 6.5 [PH] (ref 5–8)
PROT UR QL STRIP: NEGATIVE
RBC # UR STRIP: ABNORMAL /HPF
REF LAB TEST METHOD: ABNORMAL
SP GR UR STRIP: 1.01 (ref 1–1.03)
SQUAMOUS #/AREA URNS HPF: ABNORMAL /HPF
UROBILINOGEN UR QL STRIP: ABNORMAL
WBC # UR STRIP: ABNORMAL /HPF

## 2025-03-05 PROCEDURE — 87086 URINE CULTURE/COLONY COUNT: CPT

## 2025-03-05 PROCEDURE — 87088 URINE BACTERIA CULTURE: CPT

## 2025-03-05 PROCEDURE — 87186 SC STD MICRODIL/AGAR DIL: CPT

## 2025-03-05 PROCEDURE — 81001 URINALYSIS AUTO W/SCOPE: CPT

## 2025-03-07 LAB — BACTERIA SPEC AEROBE CULT: ABNORMAL

## 2025-03-16 ENCOUNTER — APPOINTMENT (OUTPATIENT)
Dept: GENERAL RADIOLOGY | Facility: HOSPITAL | Age: OVER 89
DRG: 560 | End: 2025-03-16
Payer: MEDICARE

## 2025-03-16 ENCOUNTER — HOSPITAL ENCOUNTER (INPATIENT)
Facility: HOSPITAL | Age: OVER 89
LOS: 3 days | Discharge: HOME | DRG: 560 | End: 2025-03-21
Attending: STUDENT IN AN ORGANIZED HEALTH CARE EDUCATION/TRAINING PROGRAM | Admitting: STUDENT IN AN ORGANIZED HEALTH CARE EDUCATION/TRAINING PROGRAM
Payer: MEDICARE

## 2025-03-16 DIAGNOSIS — S89.91XA INJURY OF RIGHT KNEE, INITIAL ENCOUNTER: Primary | ICD-10-CM

## 2025-03-16 PROBLEM — M25.569 KNEE PAIN: Status: ACTIVE | Noted: 2025-03-16

## 2025-03-16 LAB
ALBUMIN SERPL-MCNC: 4 G/DL (ref 3.5–5.2)
ALBUMIN/GLOB SERPL: 1.6 G/DL
ALP SERPL-CCNC: 89 U/L (ref 39–117)
ALT SERPL W P-5'-P-CCNC: 18 U/L (ref 1–33)
ANION GAP SERPL CALCULATED.3IONS-SCNC: 14.2 MMOL/L (ref 5–15)
AST SERPL-CCNC: 17 U/L (ref 1–32)
BASOPHILS # BLD AUTO: 0.03 10*3/MM3 (ref 0–0.2)
BASOPHILS NFR BLD AUTO: 0.3 % (ref 0–1.5)
BILIRUB SERPL-MCNC: 0.3 MG/DL (ref 0–1.2)
BUN SERPL-MCNC: 15 MG/DL (ref 8–23)
BUN/CREAT SERPL: 28.3 (ref 7–25)
CALCIUM SPEC-SCNC: 8.6 MG/DL (ref 8.6–10.5)
CHLORIDE SERPL-SCNC: 102 MMOL/L (ref 98–107)
CO2 SERPL-SCNC: 20.8 MMOL/L (ref 22–29)
CREAT SERPL-MCNC: 0.53 MG/DL (ref 0.57–1)
DEPRECATED RDW RBC AUTO: 48.2 FL (ref 37–54)
EGFRCR SERPLBLD CKD-EPI 2021: 88.5 ML/MIN/1.73
EOSINOPHIL # BLD AUTO: 0.02 10*3/MM3 (ref 0–0.4)
EOSINOPHIL NFR BLD AUTO: 0.2 % (ref 0.3–6.2)
ERYTHROCYTE [DISTWIDTH] IN BLOOD BY AUTOMATED COUNT: 13.9 % (ref 12.3–15.4)
GLOBULIN UR ELPH-MCNC: 2.5 GM/DL
GLUCOSE SERPL-MCNC: 97 MG/DL (ref 65–99)
HCT VFR BLD AUTO: 37 % (ref 34–46.6)
HGB BLD-MCNC: 11.9 G/DL (ref 12–15.9)
IMM GRANULOCYTES # BLD AUTO: 0.13 10*3/MM3 (ref 0–0.05)
IMM GRANULOCYTES NFR BLD AUTO: 1.2 % (ref 0–0.5)
LYMPHOCYTES # BLD AUTO: 1.02 10*3/MM3 (ref 0.7–3.1)
LYMPHOCYTES NFR BLD AUTO: 9.6 % (ref 19.6–45.3)
MCH RBC QN AUTO: 30.4 PG (ref 26.6–33)
MCHC RBC AUTO-ENTMCNC: 32.2 G/DL (ref 31.5–35.7)
MCV RBC AUTO: 94.4 FL (ref 79–97)
MONOCYTES # BLD AUTO: 0.6 10*3/MM3 (ref 0.1–0.9)
MONOCYTES NFR BLD AUTO: 5.6 % (ref 5–12)
NEUTROPHILS NFR BLD AUTO: 8.88 10*3/MM3 (ref 1.7–7)
NEUTROPHILS NFR BLD AUTO: 83.1 % (ref 42.7–76)
NRBC BLD AUTO-RTO: 0 /100 WBC (ref 0–0.2)
PLATELET # BLD AUTO: 270 10*3/MM3 (ref 140–450)
PMV BLD AUTO: 9.8 FL (ref 6–12)
POTASSIUM SERPL-SCNC: 4 MMOL/L (ref 3.5–5.2)
PROT SERPL-MCNC: 6.5 G/DL (ref 6–8.5)
RBC # BLD AUTO: 3.92 10*6/MM3 (ref 3.77–5.28)
SODIUM SERPL-SCNC: 137 MMOL/L (ref 136–145)
WBC NRBC COR # BLD AUTO: 10.68 10*3/MM3 (ref 3.4–10.8)

## 2025-03-16 PROCEDURE — 85025 COMPLETE CBC W/AUTO DIFF WBC: CPT | Performed by: STUDENT IN AN ORGANIZED HEALTH CARE EDUCATION/TRAINING PROGRAM

## 2025-03-16 PROCEDURE — 99285 EMERGENCY DEPT VISIT HI MDM: CPT

## 2025-03-16 PROCEDURE — 73560 X-RAY EXAM OF KNEE 1 OR 2: CPT

## 2025-03-16 PROCEDURE — 25010000002 MORPHINE PER 10 MG: Performed by: STUDENT IN AN ORGANIZED HEALTH CARE EDUCATION/TRAINING PROGRAM

## 2025-03-16 PROCEDURE — G0378 HOSPITAL OBSERVATION PER HR: HCPCS

## 2025-03-16 PROCEDURE — 80053 COMPREHEN METABOLIC PANEL: CPT | Performed by: STUDENT IN AN ORGANIZED HEALTH CARE EDUCATION/TRAINING PROGRAM

## 2025-03-16 RX ORDER — BUDESONIDE 3 MG/1
9 CAPSULE, COATED PELLETS ORAL DAILY
Status: DISCONTINUED | OUTPATIENT
Start: 2025-03-17 | End: 2025-03-21 | Stop reason: HOSPADM

## 2025-03-16 RX ORDER — ACETAMINOPHEN 500 MG
1000 TABLET ORAL EVERY 8 HOURS
Status: DISCONTINUED | OUTPATIENT
Start: 2025-03-16 | End: 2025-03-21 | Stop reason: HOSPADM

## 2025-03-16 RX ORDER — ESCITALOPRAM OXALATE 20 MG/1
20 TABLET ORAL EVERY MORNING
Status: DISCONTINUED | OUTPATIENT
Start: 2025-03-17 | End: 2025-03-21 | Stop reason: HOSPADM

## 2025-03-16 RX ORDER — TRAMADOL HYDROCHLORIDE 50 MG/1
50 TABLET ORAL EVERY 6 HOURS PRN
COMMUNITY
End: 2025-03-21

## 2025-03-16 RX ORDER — SODIUM CHLORIDE 9 MG/ML
40 INJECTION, SOLUTION INTRAVENOUS AS NEEDED
Status: DISCONTINUED | OUTPATIENT
Start: 2025-03-16 | End: 2025-03-21 | Stop reason: HOSPADM

## 2025-03-16 RX ORDER — BISACODYL 5 MG/1
5 TABLET, DELAYED RELEASE ORAL DAILY PRN
Status: DISCONTINUED | OUTPATIENT
Start: 2025-03-16 | End: 2025-03-21 | Stop reason: HOSPADM

## 2025-03-16 RX ORDER — POLYETHYLENE GLYCOL 3350 17 G/17G
17 POWDER, FOR SOLUTION ORAL DAILY PRN
Status: DISCONTINUED | OUTPATIENT
Start: 2025-03-16 | End: 2025-03-21 | Stop reason: HOSPADM

## 2025-03-16 RX ORDER — AMLODIPINE BESYLATE 5 MG/1
5 TABLET ORAL DAILY
Status: DISCONTINUED | OUTPATIENT
Start: 2025-03-17 | End: 2025-03-16

## 2025-03-16 RX ORDER — TRAZODONE HYDROCHLORIDE 50 MG/1
50 TABLET ORAL NIGHTLY
Status: DISCONTINUED | OUTPATIENT
Start: 2025-03-16 | End: 2025-03-21 | Stop reason: HOSPADM

## 2025-03-16 RX ORDER — AMLODIPINE BESYLATE 2.5 MG/1
2.5 TABLET ORAL DAILY
Status: DISCONTINUED | OUTPATIENT
Start: 2025-03-17 | End: 2025-03-21 | Stop reason: HOSPADM

## 2025-03-16 RX ORDER — SODIUM CHLORIDE 0.9 % (FLUSH) 0.9 %
10 SYRINGE (ML) INJECTION AS NEEDED
Status: DISCONTINUED | OUTPATIENT
Start: 2025-03-16 | End: 2025-03-21 | Stop reason: HOSPADM

## 2025-03-16 RX ORDER — PANTOPRAZOLE SODIUM 40 MG/1
40 TABLET, DELAYED RELEASE ORAL
Status: DISCONTINUED | OUTPATIENT
Start: 2025-03-17 | End: 2025-03-21 | Stop reason: HOSPADM

## 2025-03-16 RX ORDER — SODIUM CHLORIDE 0.9 % (FLUSH) 0.9 %
10 SYRINGE (ML) INJECTION EVERY 12 HOURS SCHEDULED
Status: DISCONTINUED | OUTPATIENT
Start: 2025-03-16 | End: 2025-03-21 | Stop reason: HOSPADM

## 2025-03-16 RX ORDER — ONDANSETRON 2 MG/ML
4 INJECTION INTRAMUSCULAR; INTRAVENOUS EVERY 6 HOURS PRN
Status: DISCONTINUED | OUTPATIENT
Start: 2025-03-16 | End: 2025-03-21 | Stop reason: HOSPADM

## 2025-03-16 RX ORDER — TRAMADOL HYDROCHLORIDE 50 MG/1
50 TABLET ORAL EVERY 6 HOURS PRN
Status: DISCONTINUED | OUTPATIENT
Start: 2025-03-16 | End: 2025-03-21 | Stop reason: HOSPADM

## 2025-03-16 RX ORDER — BISACODYL 10 MG
10 SUPPOSITORY, RECTAL RECTAL DAILY PRN
Status: DISCONTINUED | OUTPATIENT
Start: 2025-03-16 | End: 2025-03-21 | Stop reason: HOSPADM

## 2025-03-16 RX ORDER — LIDOCAINE 4 G/G
1 PATCH TOPICAL
Status: DISCONTINUED | OUTPATIENT
Start: 2025-03-17 | End: 2025-03-21 | Stop reason: HOSPADM

## 2025-03-16 RX ORDER — AMOXICILLIN 250 MG
2 CAPSULE ORAL 2 TIMES DAILY PRN
Status: DISCONTINUED | OUTPATIENT
Start: 2025-03-16 | End: 2025-03-21 | Stop reason: HOSPADM

## 2025-03-16 RX ORDER — MORPHINE SULFATE 2 MG/ML
1 INJECTION, SOLUTION INTRAMUSCULAR; INTRAVENOUS EVERY 4 HOURS PRN
Status: DISCONTINUED | OUTPATIENT
Start: 2025-03-16 | End: 2025-03-21 | Stop reason: HOSPADM

## 2025-03-16 RX ORDER — ATORVASTATIN CALCIUM 20 MG/1
20 TABLET, FILM COATED ORAL DAILY
Status: DISCONTINUED | OUTPATIENT
Start: 2025-03-17 | End: 2025-03-21 | Stop reason: HOSPADM

## 2025-03-16 RX ORDER — CALCIUM POLYCARBOPHIL 625 MG
625 TABLET ORAL DAILY
Status: DISCONTINUED | OUTPATIENT
Start: 2025-03-17 | End: 2025-03-21 | Stop reason: HOSPADM

## 2025-03-16 RX ADMIN — MORPHINE SULFATE 1 MG: 2 INJECTION, SOLUTION INTRAMUSCULAR; INTRAVENOUS at 22:33

## 2025-03-16 RX ADMIN — TRAZODONE HYDROCHLORIDE 50 MG: 50 TABLET ORAL at 23:36

## 2025-03-16 RX ADMIN — Medication 10 ML: at 23:03

## 2025-03-16 RX ADMIN — ACETAMINOPHEN 1000 MG: 500 TABLET, FILM COATED ORAL at 23:36

## 2025-03-16 NOTE — ED PROVIDER NOTES
EMERGENCY DEPARTMENT ENCOUNTER  Room Number:  12/12  PCP: Oren Rust Jr., MD  Independent Historians: Patient and Family      HPI:  Chief Complaint: had concerns including Knee Pain (right).       Context: Arlin Hutson is a 89 y.o. female with a medical history of GERD, osteopenia, hypertension, MGUS who presents to the ED c/o acute right knee pain.  Patient was ambulating using her rollator and when she attempted to go up a step her right knee gave out and she collapsed to ground.  Family member was behind her and caught her and lowered her to the ground.  Patient did not sustain any injuries.  Patient has been unable to bear weight due to pain in the right knee since this occurred.  Patient has a history of right knee replacement that happened many years ago and her surgeon is no longer in practice.    Prescription drug monitoring program review:         PAST MEDICAL HISTORY  Active Ambulatory Problems     Diagnosis Date Noted    Gastroesophageal reflux disease 02/13/2018    Osteopenia of multiple sites 02/13/2018    Essential hypertension 02/13/2018    Colitis 02/13/2018    Primary osteoarthritis of right knee 05/04/2018    Primary osteoarthritis of left knee 05/30/2018    Pneumonia of left lower lobe due to infectious organism 06/02/2018    History of total right knee replacement 06/02/2018    Abnormal CT of the abdomen 10/24/2018    Rheumatoid arthritis involving multiple sites 07/08/2011    MGUS (monoclonal gammopathy of unknown significance) 08/12/2020    Normocytic anemia 08/26/2020    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    Iron deficiency anemia 03/25/2022    Adverse effect of iron 03/28/2022    Paronychia 10/27/2022    Paresthesia 10/27/2022    Pain in right foot 10/27/2022    Pain in limb 10/27/2022    Onychomycosis due to dermatophyte 10/27/2022    Onychocryptosis 10/27/2022    Metatarsalgia of left foot 10/27/2022    At high risk for falls 04/15/2024     Resolved Ambulatory Problems     Diagnosis  Date Noted    No Resolved Ambulatory Problems     Past Medical History:   Diagnosis Date    Arthritis of neck ?    Atheroembolism of right lower extremity     Baker's cyst     Colon polyp     CREST syndrome     Difficulty walking About 5 yrs ago    Fracture of wrist     Fractures     GERD (gastroesophageal reflux disease)     Hip arthrosis ? Maybe 7 or 8 years ago    History of CT scan of abdomen 03/11/2011    History of rheumatoid arthritis     HL (hearing loss) About 1995    Hyperlipidemia     Hypertension     Irritable bowel syndrome 1990s    Knee swelling 5 or 6 years ago    Low back pain     Osteoarthritis     Peripheral neuropathy Cannot remember    Raynaud's disease     Rheumatoid arthritis     Scleroderma     Shingles     Sjogren's syndrome     Tear of meniscus of knee     Ulcerative colitis          PAST SURGICAL HISTORY  Past Surgical History:   Procedure Laterality Date    CATARACT EXTRACTION, BILATERAL      COLONOSCOPY  02/26/2016    tics, microscopic colitis,     COLONOSCOPY  07/01/2011    sig tics, inflammation, polyp    DILATATION AND CURETTAGE  1980    EKOS CATHETER PLACEMENT N/A 03/23/2021    Procedure: AIF WITH RUN OFF OF RT. LEG, ROTATIONAL ATHERECTOMY OF RIGHT POPLITEAL ARTERY;  Surgeon: Gato Contreras MD;  Location: [facility] OR 18/19;  Service: Vascular;  Laterality: N/A;    ENDOSCOPY N/A 12/03/2018    erythematous mucosa in stomach, path benign    EYE SURGERY      FLEXIBLE SIGMOIDOSCOPY      FRACTURE SURGERY      HAND SURGERY      JOINT REPLACEMENT      KNEE ARTHROSCOPY Right     w/meniscal repair    KNEE SURGERY Right     TEETH EXTRACTION      TOTAL KNEE ARTHROPLASTY Right 05/30/2018    Procedure: TOTAL KNEE ARTHROPLASTY;  Surgeon: Georges Jon MD;  Location: [facility] OR;  Service: Orthopedics    TRANSLUMINAL ATHERECTOMY POPLITEAL ARTERY      TRIGGER POINT INJECTION      UPPER GASTROINTESTINAL ENDOSCOPY  03/14/2008    erythema    VASCULAR SURGERY  Spring of 2020    Blood clot  in an artery behind my right knee    WRIST FRACTURE SURGERY Right     WRIST SURGERY Right 2008    orif         FAMILY HISTORY  Family History   Problem Relation Age of Onset    Ulcerative colitis Mother     Migraines Mother     Stroke Mother     Hypertension Mother     Thyroid disease Mother     Arthritis Father     Heart attack Father     Migraines Sister         Sister    Skin cancer Sister     Diabetes Brother     Skin cancer Brother     Breast cancer Maternal Aunt     Migraines Daughter     Malig Hyperthermia Neg Hx          SOCIAL HISTORY  Social History     Socioeconomic History    Marital status: Single    Number of children: 3   Tobacco Use    Smoking status: Never    Smokeless tobacco: Never   Vaping Use    Vaping status: Never Used   Substance and Sexual Activity    Alcohol use: Yes     Comment: I rarely have a glass of wine.    Drug use: Never    Sexual activity: Not Currently     Partners: Male     Comment: Not sexual active         ALLERGIES  Codeine, Penicillin g, and Sulfa antibiotics      REVIEW OF SYSTEMS  Review of Systems  Included in HPI  All systems reviewed and negative except for those discussed in HPI.      PHYSICAL EXAM    I have reviewed the triage vital signs and nursing notes.    ED Triage Vitals   Temp Heart Rate Resp BP SpO2   03/16/25 1833 03/16/25 1833 03/16/25 1833 03/16/25 1840 03/16/25 1833   97.9 °F (36.6 °C) 84 12 168/79 94 %      Temp src Heart Rate Source Patient Position BP Location FiO2 (%)   03/16/25 1833 03/16/25 1833 -- -- --   Tympanic Monitor          Physical Exam  GENERAL: alert, no acute distress  SKIN: Warm, dry  HENT: Normocephalic, atraumatic  EYES: no scleral icterus  CV: regular rhythm, regular rate  RESPIRATORY: normal effort, lungs clear  ABDOMEN: soft, nontender, nondistended  MUSCULOSKELETAL: no deformity mild tenderness to the inferior aspect of the right knee with no associated deformity or swelling  NEURO: alert, moves all extremities, follows  commands            LAB RESULTS  No results found for this or any previous visit (from the past 24 hours).      RADIOLOGY  XR Knee 1 or 2 View Right  Result Date: 3/16/2025  2 VIEWS RIGHT KNEE  HISTORY: Right knee pain  COMPARISON: None available.  FINDINGS: The patient is status post right knee arthroplasty. Hardware appears to project in expected position. No acute fracture or subluxation is seen. There does appear to be a suprapatellar effusion. Dense vascular calcifications are noted.      No acute fracture or subluxation identified.  This report was finalized on 3/16/2025 7:28 PM by Dr. Violette Hendricks M.D on Workstation: [id number]          MEDICATIONS GIVEN IN ER  Medications - No data to display      ORDERS PLACED DURING THIS VISIT:  Orders Placed This Encounter   Procedures    XR Knee 1 or 2 View Right    Comprehensive Metabolic Panel    CBC Auto Differential    LHA (on-call MD unless specified) Details    Initiate Observation Status    CBC & Differential         OUTPATIENT MEDICATION MANAGEMENT:  No current Epic-ordered facility-administered medications on file.     Current Outpatient Medications Ordered in Epic   Medication Sig Dispense Refill    Acetaminophen (TYLENOL EXTRA STRENGTH PO) Take 2 tablets by mouth Daily As Needed.      amLODIPine (NORVASC) 2.5 MG tablet Take 2 tablets by mouth Daily.      atorvastatin (LIPITOR) 20 MG tablet Take 1 tablet by mouth Daily.      calcium carbonate (OS-DWIGHT) 600 MG tablet 1 P.O. daily      Carboxymethylcell-Hypromellose (GENTEAL OP) Apply 1 application to eye As Needed.      Cholecalciferol (VITAMIN D PO) Take 2,000 mg by mouth every night at bedtime.      Diclofenac Sodium (VOLTAREN) 1 % gel gel Apply 4 g topically to the appropriate area as directed 4 (Four) Times a Day As Needed (pain). 100 g 0    escitalopram (LEXAPRO) 20 MG tablet Take 1 tablet by mouth Every Morning.      fluticasone (FLONASE) 50 MCG/ACT nasal spray Administer 1-2 sprays into the  nostril(s) as directed by provider.      lidocaine (Lidoderm) 5 % Place 1 patch on the skin as directed by provider Daily. Remove & Discard patch within 12 hours or as directed by MD 30 patch 0    Multiple Vitamins-Minerals (CENTRUM SILVER ULTRA WOMENS PO) 1 P.O. daily      omeprazole (priLOSEC) 20 MG capsule 1 capsule. Monday Wednesday friday      polycarbophil (calcium polycarbophil) 625 MG tablet tablet Take  by mouth Daily.      polyethyl glycol-propyl glycol (Systane) 0.4-0.3 % solution ophthalmic solution (artificial tears)       traMADol (ULTRAM) 50 MG tablet Take 1 tablet by mouth Every 6 (Six) Hours As Needed for Moderate Pain.      traZODone (DESYREL) 50 MG tablet Take 0.5-1 tablets by mouth Every Night.      aspirin 81 MG chewable tablet Chew 1 tablet Daily.      Budesonide (ENTOCORT EC) 3 MG 24 hr capsule TAKE THREE CAPSULES BY MOUTH DAILY 90 capsule 4         PROCEDURES  Procedures            PROGRESS, DATA ANALYSIS, CONSULTS, AND MEDICAL DECISION MAKING  All labs have been independently interpreted by me.  All radiology studies have been reviewed by me. All EKG's have been independently viewed and interpreted by me.  Discussion below represents my analysis of pertinent findings related to patient's condition, differential diagnosis, treatment plan and final disposition.    Differential diagnosis includes but is not limited to ligament injury, knee dislocation, periprosthetic fracture, contusion.    Clinical Scores:                                       ED Course as of 03/16/25 2014   Sun Mar 16, 2025   1934 X-ray interpreted by me and demonstrates no evidence of fracture or malalignment [MW]   2010 Patient is unable to ambulate or bear any weight on her right knee.  Patient typically uses rollator for assistance.  Given she is 89 and unable to ambulate successfully we will plan on admission for physical therapy and evaluation of the level of care needs.  Patient does currently live at [facility].  [MW]   2012 Discussed with Dr. Brown who agrees to admit [MW]      ED Course User Index  [MW] Mehdi Valencia MD             AS OF 20:14 EDT VITALS:    BP - 158/76  HR - 69  TEMP - 97.9 °F (36.6 °C) (Tympanic)  O2 SATS - 94%    COMPLEXITY OF CARE  The patient requires admission.      DIAGNOSIS  Final diagnoses:   Injury of right knee, initial encounter         DISPOSITION  ED Disposition       ED Disposition   Decision to Admit    Condition   --    Comment   Level of Care: Med/Surg [1]   Diagnosis: Knee pain [838835]   Admitting Physician: HUNG BROWN [[id number]]   Attending Physician: HUNG BROWN [[id number]]   Is patient appropriate for Inpatient Observation Unit?: No [0]                  Please note that portions of this document were completed with a voice recognition program.    Note Disclaimer: At [facility], we believe that sharing information builds trust and better relationships. You are receiving this note because you recently visited [facility]. It is possible you will see health information before a provider has talked with you about it. This kind of information can be easy to misunderstand. To help you fully understand what it means for your health, we urge you to discuss this note with your provider.         Mehdi Valencia MD  03/16/25 2014

## 2025-03-16 NOTE — LETTER
EMS Transport Request  For use at [facility], [place], [place], [place], and [place] only   Patient Name: Arlin Huston : 1935   Weight:49.9 kg (110 lb 0.2 oz) Pick-up Location: P789-1 BLS/ALS: BLS/ALS: BLS   Insurance: MEDICARE Auth End Date:    Pre-Cert #: D/C Summary complete:    Destination: Other [facility]   Contact Precautions: None   Equipment (O2, Fluids, etc.): None   Arrive By Date/Time: 25 after 1pm Stretcher/WC: Wheelchair   CM Requesting: Clarice Cabrera RN Ext: [phone]   Notes/Medical Necessity: post op hip     ______________________________________________________________________    *Only 2 patient bags OR 1 carry-on size bag are permitted.  Wheelchairs and walkers CANNOT transported with the patient. Acknowledge: Yes     Please assist

## 2025-03-17 ENCOUNTER — APPOINTMENT (OUTPATIENT)
Dept: CT IMAGING | Facility: HOSPITAL | Age: OVER 89
DRG: 560 | End: 2025-03-17
Payer: MEDICARE

## 2025-03-17 LAB
ANION GAP SERPL CALCULATED.3IONS-SCNC: 9.4 MMOL/L (ref 5–15)
BASOPHILS # BLD AUTO: 0.02 10*3/MM3 (ref 0–0.2)
BASOPHILS NFR BLD AUTO: 0.2 % (ref 0–1.5)
BUN SERPL-MCNC: 13 MG/DL (ref 8–23)
BUN/CREAT SERPL: 26.5 (ref 7–25)
CALCIUM SPEC-SCNC: 8.6 MG/DL (ref 8.6–10.5)
CHLORIDE SERPL-SCNC: 105 MMOL/L (ref 98–107)
CO2 SERPL-SCNC: 25.6 MMOL/L (ref 22–29)
CREAT SERPL-MCNC: 0.49 MG/DL (ref 0.57–1)
DEPRECATED RDW RBC AUTO: 44.1 FL (ref 37–54)
EGFRCR SERPLBLD CKD-EPI 2021: 90.2 ML/MIN/1.73
EOSINOPHIL # BLD AUTO: 0.07 10*3/MM3 (ref 0–0.4)
EOSINOPHIL NFR BLD AUTO: 0.6 % (ref 0.3–6.2)
ERYTHROCYTE [DISTWIDTH] IN BLOOD BY AUTOMATED COUNT: 13.3 % (ref 12.3–15.4)
GLUCOSE SERPL-MCNC: 95 MG/DL (ref 65–99)
HCT VFR BLD AUTO: 33 % (ref 34–46.6)
HGB BLD-MCNC: 11.2 G/DL (ref 12–15.9)
IMM GRANULOCYTES # BLD AUTO: 0.08 10*3/MM3 (ref 0–0.05)
IMM GRANULOCYTES NFR BLD AUTO: 0.7 % (ref 0–0.5)
LYMPHOCYTES # BLD AUTO: 1.31 10*3/MM3 (ref 0.7–3.1)
LYMPHOCYTES NFR BLD AUTO: 11.8 % (ref 19.6–45.3)
MCH RBC QN AUTO: 30.8 PG (ref 26.6–33)
MCHC RBC AUTO-ENTMCNC: 33.9 G/DL (ref 31.5–35.7)
MCV RBC AUTO: 90.7 FL (ref 79–97)
MONOCYTES # BLD AUTO: 0.96 10*3/MM3 (ref 0.1–0.9)
MONOCYTES NFR BLD AUTO: 8.7 % (ref 5–12)
NEUTROPHILS NFR BLD AUTO: 78 % (ref 42.7–76)
NEUTROPHILS NFR BLD AUTO: 8.64 10*3/MM3 (ref 1.7–7)
NRBC BLD AUTO-RTO: 0 /100 WBC (ref 0–0.2)
PLATELET # BLD AUTO: 250 10*3/MM3 (ref 140–450)
PMV BLD AUTO: 9.5 FL (ref 6–12)
POTASSIUM SERPL-SCNC: 3.8 MMOL/L (ref 3.5–5.2)
RBC # BLD AUTO: 3.64 10*6/MM3 (ref 3.77–5.28)
SODIUM SERPL-SCNC: 140 MMOL/L (ref 136–145)
WBC NRBC COR # BLD AUTO: 11.08 10*3/MM3 (ref 3.4–10.8)

## 2025-03-17 PROCEDURE — 97530 THERAPEUTIC ACTIVITIES: CPT

## 2025-03-17 PROCEDURE — G0378 HOSPITAL OBSERVATION PER HR: HCPCS

## 2025-03-17 PROCEDURE — 97166 OT EVAL MOD COMPLEX 45 MIN: CPT

## 2025-03-17 PROCEDURE — 73700 CT LOWER EXTREMITY W/O DYE: CPT

## 2025-03-17 PROCEDURE — 97162 PT EVAL MOD COMPLEX 30 MIN: CPT

## 2025-03-17 PROCEDURE — 85025 COMPLETE CBC W/AUTO DIFF WBC: CPT | Performed by: STUDENT IN AN ORGANIZED HEALTH CARE EDUCATION/TRAINING PROGRAM

## 2025-03-17 PROCEDURE — 36415 COLL VENOUS BLD VENIPUNCTURE: CPT | Performed by: STUDENT IN AN ORGANIZED HEALTH CARE EDUCATION/TRAINING PROGRAM

## 2025-03-17 PROCEDURE — 80048 BASIC METABOLIC PNL TOTAL CA: CPT | Performed by: STUDENT IN AN ORGANIZED HEALTH CARE EDUCATION/TRAINING PROGRAM

## 2025-03-17 RX ADMIN — BUDESONIDE 9 MG: 3 CAPSULE ORAL at 08:13

## 2025-03-17 RX ADMIN — AMLODIPINE BESYLATE 2.5 MG: 2.5 TABLET ORAL at 08:13

## 2025-03-17 RX ADMIN — ESCITALOPRAM 20 MG: 20 TABLET, FILM COATED ORAL at 06:22

## 2025-03-17 RX ADMIN — ACETAMINOPHEN 1000 MG: 500 TABLET, FILM COATED ORAL at 22:50

## 2025-03-17 RX ADMIN — TRAZODONE HYDROCHLORIDE 50 MG: 50 TABLET ORAL at 20:30

## 2025-03-17 RX ADMIN — ACETAMINOPHEN 1000 MG: 500 TABLET, FILM COATED ORAL at 06:22

## 2025-03-17 RX ADMIN — CALCIUM POLYCARBOPHIL 625 MG: 625 TABLET, FILM COATED ORAL at 08:13

## 2025-03-17 RX ADMIN — TRAMADOL HYDROCHLORIDE 50 MG: 50 TABLET, COATED ORAL at 10:31

## 2025-03-17 RX ADMIN — TRAMADOL HYDROCHLORIDE 50 MG: 50 TABLET, COATED ORAL at 18:23

## 2025-03-17 RX ADMIN — TRAMADOL HYDROCHLORIDE 50 MG: 50 TABLET, COATED ORAL at 04:29

## 2025-03-17 RX ADMIN — ATORVASTATIN CALCIUM 20 MG: 20 TABLET, FILM COATED ORAL at 08:13

## 2025-03-17 RX ADMIN — ACETAMINOPHEN 1000 MG: 500 TABLET, FILM COATED ORAL at 14:18

## 2025-03-17 RX ADMIN — Medication 10 ML: at 20:46

## 2025-03-17 RX ADMIN — Medication 10 ML: at 08:13

## 2025-03-17 RX ADMIN — LIDOCAINE 1 PATCH: 4 PATCH TOPICAL at 08:13

## 2025-03-17 RX ADMIN — PANTOPRAZOLE SODIUM 40 MG: 40 TABLET, DELAYED RELEASE ORAL at 08:13

## 2025-03-17 NOTE — THERAPY EVALUATION
Patient Name: Arlin Hutson  : 1935    MRN: [id number]                              Today's Date: 3/17/2025       Admit Date: 3/16/2025    Visit Dx:     ICD-10-CM ICD-9-CM   1. Injury of right knee, initial encounter  S89.91XA 959.7     Patient Active Problem List   Diagnosis    Gastroesophageal reflux disease    Osteopenia of multiple sites    Essential hypertension    Colitis    Primary osteoarthritis of right knee    Primary osteoarthritis of left knee    Pneumonia of left lower lobe due to infectious organism    History of total right knee replacement    Abnormal CT of the abdomen    Rheumatoid arthritis involving multiple sites    MGUS (monoclonal gammopathy of unknown significance)    Normocytic anemia    Arterial occlusion    Iron deficiency anemia    Adverse effect of iron    Paronychia    Paresthesia    Pain in right foot    Pain in limb    Onychomycosis due to dermatophyte    Onychocryptosis    Metatarsalgia of left foot    At high risk for falls    Knee pain     Past Medical History:   Diagnosis Date    Arterial occlusion 2021    Arthritis of neck ?    Atheroembolism of right lower extremity     Baker's cyst     Colon polyp     CREST syndrome     Difficulty walking About 5 yrs ago    I  use a cane when I leave my house    Fracture of wrist     Surgery about 19 years ago on ribght wrist    Fractures     GERD (gastroesophageal reflux disease)     Hip arthrosis ? Maybe 7 or 8 years ago    History of CT scan of abdomen 2011    constipation, sig tics, 6 mm hepatic cyst    History of rheumatoid arthritis     HL (hearing loss) About     Hyperlipidemia     Hypertension     Irritable bowel syndrome 1990s    Knee swelling 5 or 6 years ago    Right knee replacement 4 years [facility]    Low back pain     Normocytic anemia 2020    Osteoarthritis     Peripheral neuropathy Cannot remember    I have Raynaudsin my hands and feet    Raynaud's disease     Rheumatoid arthritis     Scleroderma      Shingles     I have had both shingles vaccines    Sjogren's syndrome     Tear of meniscus of knee     Torn meniscus surgery    Ulcerative colitis      Past Surgical History:   Procedure Laterality Date    CATARACT EXTRACTION, BILATERAL      COLONOSCOPY  02/26/2016    tics, microscopic colitis,     COLONOSCOPY  07/01/2011    sig tics, inflammation, polyp    DILATATION AND CURETTAGE  1980    EKOS CATHETER PLACEMENT N/A 03/23/2021    Procedure: AIF WITH RUN OFF OF RT. LEG, ROTATIONAL ATHERECTOMY OF RIGHT POPLITEAL ARTERY;  Surgeon: Gato Contreras MD;  Location: [facility] OR 18/19;  Service: Vascular;  Laterality: N/A;    ENDOSCOPY N/A 12/03/2018    erythematous mucosa in stomach, path benign    EYE SURGERY      FLEXIBLE SIGMOIDOSCOPY      FRACTURE SURGERY      HAND SURGERY      JOINT REPLACEMENT      KNEE ARTHROSCOPY Right     w/meniscal repair    KNEE SURGERY Right     TEETH EXTRACTION      TOTAL KNEE ARTHROPLASTY Right 05/30/2018    Procedure: TOTAL KNEE ARTHROPLASTY;  Surgeon: Georges Jon MD;  Location: [facility] OR;  Service: Orthopedics    TRANSLUMINAL ATHERECTOMY POPLITEAL ARTERY      TRIGGER POINT INJECTION      UPPER GASTROINTESTINAL ENDOSCOPY  03/14/2008    erythema    VASCULAR SURGERY  Spring of 2020    Blood clot in an artery behind my right knee    WRIST FRACTURE SURGERY Right     WRIST SURGERY Right 2008    orif      General Information       Row Name 03/17/25 1052          Physical Therapy Time and Intention    Document Type evaluation  -SM     Mode of Treatment co-treatment;physical therapy  -       Row Name 03/17/25 1052          General Information    Patient Profile Reviewed yes  -SM     Prior Level of Function independent:  -SM     Existing Precautions/Restrictions fall  -SM       Row Name 03/17/25 1053          Living Environment    Current Living Arrangements independent living facility  -SM     People in Home alone  -SM       Row Name 03/17/25 1050          Home Main Entrance     Number of Stairs, Main Entrance none  -       Row Name 03/17/25 1059          Cognition    Orientation Status (Cognition) oriented x 4  -       Row Name 03/17/25 1059          Safety Issues/Impairments Affecting Functional Mobility    Impairments Affecting Function (Mobility) balance;endurance/activity tolerance;strength;range of motion (ROM);pain  -SM     Comment, Safety Issues/Impairments (Mobility) Co treatment medically appropriate and necessary due to patient acuity level, activity tolerance and safety of patient and staff. Evaluation established to achieve all goals in POC.  -               User Key  (r) = Recorded By, (t) = Taken By, (c) = Cosigned By      Initials Name Provider Type     Andressa Torres, PT Physical Therapist                   Mobility       Row Name 03/17/25 1059          Bed Mobility    Bed Mobility supine-sit  -     Supine-Sit Adams (Bed Mobility) minimum assist (75% patient effort);verbal cues  -     Assistive Device (Bed Mobility) head of bed elevated  -       Row Name 03/17/25 1059          Bed-Chair Transfer    Bed-Chair Adams (Transfers) moderate assist (50% patient effort);2 person assist;verbal cues;nonverbal cues (demo/gesture)  -     Assistive Device (Bed-Chair Transfers) walker, front-wheeled  -       Row Name 03/17/25 1059          Sit-Stand Transfer    Sit-Stand Adams (Transfers) minimum assist (75% patient effort);2 person assist;verbal cues;nonverbal cues (demo/gesture)  -     Assistive Device (Sit-Stand Transfers) walker, front-wheeled  -       Row Name 03/17/25 1059          Gait/Stairs (Locomotion)    Adams Level (Gait) moderate assist (50% patient effort);2 person assist;verbal cues  -     Assistive Device (Gait) walker, front-wheeled  -     Distance in Feet (Gait) 3  -SM     Deviations/Abnormal Patterns (Gait) antalgic;gait speed decreased;stride length decreased;benson decreased  -     Bilateral Gait Deviations  forward flexed posture  -     Right Sided Gait Deviations knee buckling, right side  -     Comment, (Gait/Stairs) Steps antalgic with patient having difficulty putting weight through R LE. R knee buckling when attempting to shift weight.  -               User Key  (r) = Recorded By, (t) = Taken By, (c) = Cosigned By      Initials Name Provider Type     Andressa Torres PT Physical Therapist                   Obj/Interventions       Row Name 03/17/25 1100          Range of Motion Comprehensive    Comment, General Range of Motion R knee flexion/extension limited by pain  -       Row Name 03/17/25 1100          Strength Comprehensive (MMT)    General Manual Muscle Testing (MMT) Assessment lower extremity strength deficits identified  -     Comment, General Manual Muscle Testing (MMT) Assessment R LE grossly 3/5. L LE grossly 4/5  -       Row Name 03/17/25 1100          Motor Skills    Therapeutic Exercise other (see comments)  AP, LAQs  -[facility] Name 03/17/25 1100          Balance    Balance Assessment sitting static balance;sitting dynamic balance;standing static balance;standing dynamic balance  -     Static Sitting Balance independent  -     Dynamic Sitting Balance modified independence  -     Position, Sitting Balance sitting edge of bed  -     Static Standing Balance minimal assist;2-person assist;verbal cues;non-verbal cues (demo/gesture)  -     Dynamic Standing Balance moderate assist;2-person assist;verbal cues;non-verbal cues (demo/gesture)  -     Position/Device Used, Standing Balance supported;walker, front-wheeled  -     Balance Interventions sitting;standing;sit to stand;dynamic;static;supported  -               User Key  (r) = Recorded By, (t) = Taken By, (c) = Cosigned By      Initials Name Provider Type     Andressa Torres PT Physical Therapist                   Goals/Plan       Row Name 03/17/25 1105          Bed Mobility Goal 1 (PT)    Activity/Assistive  Device (Bed Mobility Goal 1, PT) bed mobility activities, all  -SM     [place] Level/Cues Needed (Bed Mobility Goal 1, PT) standby assist  -SM     Time Frame (Bed Mobility Goal 1, PT) 2 weeks  -SM       Row Name 03/17/25 1105          Transfer Goal 1 (PT)    Activity/Assistive Device (Transfer Goal 1, PT) sit-to-stand/stand-to-sit;bed-to-chair/chair-to-bed  -SM     [place] Level/Cues Needed (Transfer Goal 1, PT) standby assist  -SM     Time Frame (Transfer Goal 1, PT) 2 weeks  -SM       Row Name 03/17/25 1105          Gait Training Goal 1 (PT)    Activity/Assistive Device (Gait Training Goal 1, PT) gait (walking locomotion);walker, rolling  -SM     [place] Level (Gait Training Goal 1, PT) standby assist  -SM     Distance (Gait Training Goal 1, PT) 100ft  -SM     Time Frame (Gait Training Goal 1, PT) 2 weeks  -SM               User Key  (r) = Recorded By, (t) = Taken By, (c) = Cosigned By      Initials Name Provider Type     Andressa Torres, PT Physical Therapist                   Clinical Impression       Row Name 03/17/25 1101          Pain    Pain Location knee  -SM     Pain Side/Orientation right  -SM     Pain Management Interventions exercise or physical activity utilized  -SM     Response to Pain Interventions activity participation with tolerable pain  -SM     Pre/Posttreatment Pain Comment Patient did not rate pain  -SM       Row Name 03/17/25 1101          Plan of Care Review    Plan of Care Reviewed With patient  -SM     Outcome Evaluation Patient is an 89 y.o female who presented to [facility] with R knee pain. Imaging negative for any acute findings. Patient AOx4 supine in bed upon arrival. Son at bedside. Patient lives in independent living at [facility]. Patient is independent at baseline and ambulates using a rollator. Today patient sat up to EOB with Vannesa. Patient required minAx2 to stand from EOB. Patient took a few steps from EOB to chair with rwx and modAx2. Steps antalgic with patient  having difficulty putting weight through R LE. R knee buckling when attempting to shift weight. ModA-maxAx2 to safely sit in chair. Patient demonstrates significant deficits in functional mobility from baseline. Would recommend SNFat d/c. Acute PT will continue to monitor.  -       Row Name 03/17/25 1101          Therapy Assessment/Plan (PT)    Rehab Potential (PT) good  -SM     Criteria for Skilled Interventions Met (PT) yes  -SM     Therapy Frequency (PT) 5 times/wk  -       Row Name 03/17/25 1101          Vital Signs    Pre Patient Position Supine  -     Intra Patient Position Standing  -SM     Post Patient Position Sitting  -       Row Name 03/17/25 1101          Positioning and Restraints    Pre-Treatment Position in bed  -SM     Post Treatment Position chair  -SM     In Chair notified nsg;reclined;call light within reach;encouraged to call for assist;exit alarm on;with family/caregiver  -               User Key  (r) = Recorded By, (t) = Taken By, (c) = Cosigned By      Initials Name Provider Type    Andressa Hall, CHER Physical Therapist                   Outcome Measures       Row Name 03/17/25 1105 03/17/25 0813       How much help from another person do you currently need...    Turning from your back to your side while in flat bed without using bedrails? 3  -SM 3  -ST    Moving from lying on back to sitting on the side of a flat bed without bedrails? 3  -SM 2  -ST    Moving to and from a bed to a chair (including a wheelchair)? 2  -SM 2  -ST    Standing up from a chair using your arms (e.g., wheelchair, bedside chair)? 2  -SM 2  -ST    Climbing 3-5 steps with a railing? 1  -SM 1  -ST    To walk in hospital room? 2  -SM 2  -ST    AM-PAC 6 Clicks Score (PT) 13  -SM 12  -ST              User Key  (r) = Recorded By, (t) = Taken By, (c) = Cosigned By      Initials Name Provider Type    Ambar Cross, RN Registered Nurse    Andressa Hall, CHER Physical Therapist                                  Physical Therapy Education       Title: PT OT SLP Therapies (Done)       Topic: Physical Therapy (Done)       Point: Mobility training (Done)       Learning Progress Summary            Patient Acceptance, E, VU by  at 3/17/2025 1106                      Point: Home exercise program (Done)       Learning Progress Summary            Patient Acceptance, E, VU by  at 3/17/2025 1106                      Point: Body mechanics (Done)       Learning Progress Summary            Patient Acceptance, E, VU by  at 3/17/2025 1106                      Point: Precautions (Done)       Learning Progress Summary            Patient Acceptance, E, VU by  at 3/17/2025 1106                                      User Key       Initials Effective Dates Name Provider Type Discipline     05/02/22 -  Andressa Torres, CHER Physical Therapist PT                  PT Recommendation and Plan     Outcome Evaluation: Patient is an 89 y.o female who presented to [facility] with R knee pain. Imaging negative for any acute findings. Patient AOx4 supine in bed upon arrival. Son at bedside. Patient lives in independent living at [facility]. Patient is independent at baseline and ambulates using a rollator. Today patient sat up to EOB with Vannesa. Patient required minAx2 to stand from EOB. Patient took a few steps from EOB to chair with rwx and modAx2. Steps antalgic with patient having difficulty putting weight through R LE. R knee buckling when attempting to shift weight. ModA-maxAx2 to safely sit in chair. Patient demonstrates significant deficits in functional mobility from baseline. Would recommend SNFat d/c. Acute PT will continue to monitor.     Time Calculation:         PT Charges       Row Name 03/17/25 1106             Time Calculation    Start Time 0948  -      Stop Time 1014  -      Time Calculation (min) 26 min  -      PT Received On 03/17/25  -      PT - Next Appointment 03/18/25  -      PT Goal Re-Cert Due Date 03/31/25  -          Time Calculation- PT    Total Timed Code Minutes- PT 12 minute(s)  -SM         Timed Charges    73490 - PT Therapeutic Activity Minutes 12  -SM         Total Minutes    Timed Charges Total Minutes 12  -SM       Total Minutes 12  -SM                User Key  (r) = Recorded By, (t) = Taken By, (c) = Cosigned By      Initials Name Provider Type     Andressa Torres, PT Physical Therapist                  Therapy Charges for Today       Code Description Service Date Service Provider Modifiers Qty    40222743711 HC PT THERAPEUTIC ACT EA 15 MIN 3/17/2025 Andressa Torres, PT GP 1    82022563551 HC PT EVAL MOD COMPLEXITY 3 3/17/2025 Andressa Torres, PT GP 1            PT G-Codes  AM-PAC 6 Clicks Score (PT): 13  PT Discharge Summary  Anticipated Discharge Disposition (PT): skilled nursing facility    Andressa Torres PT  3/17/2025

## 2025-03-17 NOTE — PROGRESS NOTES
Name: Arlin Hutson ADMIT: 3/16/2025   : 1935  PCP: Oren Rust Jr., MD    MRN: [id number] LOS: 0 days   AGE/SEX: 89 y.o. female  ROOM: [facility]     Subjective       Objective   Objective   Vital Signs  Temp:  [97.9 °F (36.6 °C)-98.6 °F (37 °C)] 98.6 °F (37 °C)  Heart Rate:  [63-84] 70  Resp:  [12-18] 18  BP: (153-172)/(66-79) 153/66  SpO2:  [93 %-97 %] 95 %  on   ;   Device (Oxygen Therapy): room air  Body mass index is 21.48 kg/m².  Physical Exam  Constitutional:       General: She is not in acute distress.  HENT:      Head: Normocephalic.   Cardiovascular:      Rate and Rhythm: Normal rate and regular rhythm.   Pulmonary:      Effort: Pulmonary effort is normal. No respiratory distress.   Musculoskeletal:      Comments: Right knee ttp and tender with movement   Neurological:      General: No focal deficit present.      Mental Status: She is alert and oriented to person, place, and time.         Results Review     I reviewed the patient's new clinical results.  Results from last 7 days   Lab Units 25   WBC 10*3/mm3 11.08* 10.68   HEMOGLOBIN g/dL 11.2* 11.9*   PLATELETS 10*3/mm3 250 270     Results from last 7 days   Lab Units 25   SODIUM mmol/L 140 137   POTASSIUM mmol/L 3.8 4.0   CHLORIDE mmol/L 105 102   CO2 mmol/L 25.6 20.8*   BUN mg/dL 13 15   CREATININE mg/dL 0.49* 0.53*   GLUCOSE mg/dL 95 97   Estimated Creatinine Clearance: 61.3 mL/min (A) (by C-G formula based on SCr of 0.49 mg/dL (L)).  Results from last 7 days   Lab Units 25   ALBUMIN g/dL 4.0   BILIRUBIN mg/dL 0.3   ALK PHOS U/L 89   AST (SGOT) U/L 17   ALT (SGPT) U/L 18     Results from last 7 days   Lab Units 25   CALCIUM mg/dL 8.6 8.6   ALBUMIN g/dL  --  4.0       COVID19   Date Value Ref Range Status   2021 Not Detected Not Detected - Ref. Range Final     SARS-CoV-2 PCR   Date Value Ref Range Status   2021 Not Detected Not  "Detected Final     Comment:     Nucleic acid specific to SARS-CoV-2 (COVID-19) virus was not detected in  this sample by the TaqPath (TM) COVID-19 Combo Kit.          SARS-CoV-2 (COVID-19) nucleic acid testing performed using [employer] Aptima (R) SARS-CoV-2 Assay or [employer] TaqPath (TM)  COVID-19 Combo Kit as indicated above under Emergency Use Authorization (EUA) from the FDA. Aptima (R) and TaqPath (TM) test performance  characteristics verified by [facility] in accordance with the FDAs Guidance Document (Policy for Diagnostic Tests for Coronavirus Disease-2019  during the Public Health Emergency) issued on March 16, 2020. The laboratory is regulated under CLIA as qualified to perform high-complexity testing  Unless otherwise noted, all testing was performed at [facility], CLIA No. [id number], [place] State Licensee No. [id number]     No results found for: \"HGBA1C\", \"POCGLU\"  Results for orders placed or performed during the hospital encounter of 06/02/18   Blood Culture - Blood,    Specimen: Arm, Left; Blood   Result Value Ref Range    Blood Culture No growth at 5 days          CT Lower Extremity Right Without Contrast  Narrative: CT RIGHT KNEE WITHOUT CONTRAST     HISTORY: Unable to bear weight. Knee pain.      TECHNIQUE: CT includes axial images of the right knee and data  reconstructed in coronal and sagittal planes.     COMPARISON: Right knee x-rays 03/16/2025.     FINDINGS: There is a large lipohemarthrosis with fat fluid level and  hematocrit level within the suprapatellar recess. This indicates a  fracture though clear fracture plane is not visualized. There is subtle  cortical offset at the far posterior superior aspect of the medial  femoral condyle just above the posterior superior femoral component as  demonstrated on sagittal series 5 image 37. There is no evidence for  displacement. Atherosclerotic calcifications are present.     Impression: Large lipohemarthrosis indicates a fracture " though fracture  plane is not well visualized. There is subtle cortical offset involving  the far posterior superior aspect of the medial femoral condyle  potentially represents the site of fracture.     Radiation dose reduction techniques were utilized, including automated  exposure control and exposure modulation based on body size.          Scheduled Medications  acetaminophen, 1,000 mg, Oral, Q8H  amLODIPine, 2.5 mg, Oral, Daily  atorvastatin, 20 mg, Oral, Daily  Budesonide, 9 mg, Oral, Daily  escitalopram, 20 mg, Oral, QAM  Lidocaine, 1 patch, Transdermal, Q24H  pantoprazole, 40 mg, Oral, Once per day on Monday Wednesday Friday  polycarbophil, 625 mg, Oral, Daily  sodium chloride, 10 mL, Intravenous, Q12H  traZODone, 50 mg, Oral, Nightly    Infusions   Diet  Diet: Regular/House; Fluid Consistency: Thin (IDDSI 0)       Assessment/Plan     Active Hospital Problems    Diagnosis  POA    **Knee pain [M25.569]  Yes    MGUS (monoclonal gammopathy of unknown significance) [D47.2]  Yes    History of total right knee replacement [Z96.651]  Not Applicable    Primary osteoarthritis of right knee [M17.11]  Yes    Gastroesophageal reflux disease [K21.9]  Yes    Essential hypertension [I10]  Yes    Colitis [K52.9]  Yes    Rheumatoid arthritis involving multiple sites [M06.9]  Yes      Resolved Hospital Problems   No resolved problems to display.       89 y.o. female admitted with Knee pain.      Right knee pain  S/p right knee arthroplasty  Mechanical fall  -XR right knee with no subluxation or fractures  -CT RLE noted- concern for occult RLE fracture within suprapetellar recess - ortho consult placed    -PT/OT consult  -Scheduled Tylenol tramadol as needed for breakthrough pain  -Bowel regimen as needed     Hypertension  -Home meds: Amlodipine  -BP mildly elevated at this time, likely secondary to pain  -Restart home amlodipine, titrate meds as needed based on BP trends     Rheumatoid arthritis  CREST syndrome, Sjogren's  syndrome  -Follows with outside rheumatologist  -Weekly methotrexate injections     Microscopic colitis  GERD  -Stable, no change in symptoms currently  -Continue home budesonide and PPI     MGUS  Anemia  -Follows with CBC  -Undergoing observation/surveillance at this time  -Hgb mildly decreased at 11.9, no signs of active bleeding  -Repeat CBC with morning labs, transfuse for Hgb less than 7     Peripheral vascular disease  -Follows with Dr. Collier  -ABIs have been normal, thought to be secondary to collaterals.  He is monitoring clinically with routine ABIs at this time.  -Continue statin    SCD for DVT ppx  Full code  Discussed with patient and family        Con Santana MD  [facility]  03/17/25  14:46 EDT

## 2025-03-17 NOTE — PLAN OF CARE
Goal Outcome Evaluation:  Plan of Care Reviewed With: patient, son        Progress: no change  Outcome Evaluation: 89 y.o. female admitted to [facility] with Right knee pain.  No acute fxs noted from imaging.  At baseline, Patient lives alone at [facility], performs ADLs with Ray and functional mobility (rollator)  A&Ox4, BUE WFL 3/5.  Patient presents to OT with decreased strength, activity tolerance, coordination and balance. Patient resting in bed with son present upon arrival.  Patient able to transition to EOB with Min A.  STS from EOB with Min A x2 and Rw.  Patient able to take short shuffling steps toward recliner chair with Mod A x2.  Patient right knee buckeled a few times throughout transfer.  Patient required Mod/Max to safely sit in chair.  OT would be beneficial to address underlying physical deficits and increase ADLs.  Anticipate patient d/c to SNF for continued progress.    Anticipated Discharge Disposition (OT): skilled nursing facility

## 2025-03-17 NOTE — THERAPY EVALUATION
Patient Name: Arlin Hutson  : 1935    MRN: [id number]                              Today's Date: 3/17/2025       Admit Date: 3/16/2025    Visit Dx:     ICD-10-CM ICD-9-CM   1. Injury of right knee, initial encounter  S89.91XA 959.7     Patient Active Problem List   Diagnosis    Gastroesophageal reflux disease    Osteopenia of multiple sites    Essential hypertension    Colitis    Primary osteoarthritis of right knee    Primary osteoarthritis of left knee    Pneumonia of left lower lobe due to infectious organism    History of total right knee replacement    Abnormal CT of the abdomen    Rheumatoid arthritis involving multiple sites    MGUS (monoclonal gammopathy of unknown significance)    Normocytic anemia    Arterial occlusion    Iron deficiency anemia    Adverse effect of iron    Paronychia    Paresthesia    Pain in right foot    Pain in limb    Onychomycosis due to dermatophyte    Onychocryptosis    Metatarsalgia of left foot    At high risk for falls    Knee pain     Past Medical History:   Diagnosis Date    Arterial occlusion 2021    Arthritis of neck ?    Atheroembolism of right lower extremity     Baker's cyst     Colon polyp     CREST syndrome     Difficulty walking About 5 yrs ago    I  use a cane when I leave my house    Fracture of wrist     Surgery about 19 years ago on ribght wrist    Fractures     GERD (gastroesophageal reflux disease)     Hip arthrosis ? Maybe 7 or 8 years ago    History of CT scan of abdomen 2011    constipation, sig tics, 6 mm hepatic cyst    History of rheumatoid arthritis     HL (hearing loss) About     Hyperlipidemia     Hypertension     Irritable bowel syndrome 1990s    Knee swelling 5 or 6 years ago    Right knee replacement 4 years [facility]    Low back pain     Normocytic anemia 2020    Osteoarthritis     Peripheral neuropathy Cannot remember    I have Raynaudsin my hands and feet    Raynaud's disease     Rheumatoid arthritis     Scleroderma      Shingles     I have had both shingles vaccines    Sjogren's syndrome     Tear of meniscus of knee     Torn meniscus surgery    Ulcerative colitis      Past Surgical History:   Procedure Laterality Date    CATARACT EXTRACTION, BILATERAL      COLONOSCOPY  02/26/2016    tics, microscopic colitis,     COLONOSCOPY  07/01/2011    sig tics, inflammation, polyp    DILATATION AND CURETTAGE  1980    EKOS CATHETER PLACEMENT N/A 03/23/2021    Procedure: AIF WITH RUN OFF OF RT. LEG, ROTATIONAL ATHERECTOMY OF RIGHT POPLITEAL ARTERY;  Surgeon: Gato Contreras MD;  Location: [facility] OR 18/19;  Service: Vascular;  Laterality: N/A;    ENDOSCOPY N/A 12/03/2018    erythematous mucosa in stomach, path benign    EYE SURGERY      FLEXIBLE SIGMOIDOSCOPY      FRACTURE SURGERY      HAND SURGERY      JOINT REPLACEMENT      KNEE ARTHROSCOPY Right     w/meniscal repair    KNEE SURGERY Right     TEETH EXTRACTION      TOTAL KNEE ARTHROPLASTY Right 05/30/2018    Procedure: TOTAL KNEE ARTHROPLASTY;  Surgeon: Georges Jon MD;  Location: [facility] OR;  Service: Orthopedics    TRANSLUMINAL ATHERECTOMY POPLITEAL ARTERY      TRIGGER POINT INJECTION      UPPER GASTROINTESTINAL ENDOSCOPY  03/14/2008    erythema    VASCULAR SURGERY  Spring of 2020    Blood clot in an artery behind my right knee    WRIST FRACTURE SURGERY Right     WRIST SURGERY Right 2008    orif      General Information       Row Name 03/17/25 1317          OT Time and Intention    Document Type evaluation  -JR     Mode of Treatment occupational therapy;physical therapy;co-treatment  -JR       Row Name 03/17/25 1317          General Information    Patient Profile Reviewed yes  -JR     Existing Precautions/Restrictions fall  -JR       Row Name 03/17/25 1317          Living Environment    Current Living Arrangements independent living facility  -JR     People in Home alone  -JR       Row Name 03/17/25 1317          Home Main Entrance    Number of Stairs, Main Entrance none   -JR     Stair Railings, Main Entrance none  -JR       Row Name 03/17/25 1317          Stairs Within Home, Primary    Number of Stairs, Within Home, Primary none  -JR     Stair Railings, Within Home, Primary none  -JR       Row Name 03/17/25 1317          Cognition    Orientation Status (Cognition) oriented x 4  -JR       Row Name 03/17/25 1317          Safety Issues/Impairments Affecting Functional Mobility    Impairments Affecting Function (Mobility) balance;endurance/activity tolerance;strength;range of motion (ROM);pain  -JR     Comment, Safety Issues/Impairments (Mobility) PT/OT cotreatment medically appropriate and necessary due to patient acuity level, to maximize therapeutic benefit due to impaired act tolerance, and for safety of patient and staff. Treatment focused on progression of care and goals established in POC.  -               User Key  (r) = Recorded By, (t) = Taken By, (c) = Cosigned By      Initials Name Provider Type    Hayder Valle OT Occupational Therapist                     Mobility/ADL's       Row Name 03/17/25 1318          Bed Mobility    Bed Mobility supine-sit  -     Supine-Sit [place] (Bed Mobility) minimum assist (75% patient effort);verbal cues  -     Assistive Device (Bed Mobility) head of bed elevated  -       Row Name 03/17/25 1318          Transfers    Transfers sit-stand transfer  -       Row Name 03/17/25 1318          Bed-Chair Transfer    Bed-Chair [place] (Transfers) moderate assist (50% patient effort);2 person assist;verbal cues;nonverbal cues (demo/gesture)  -     Assistive Device (Bed-Chair Transfers) walker, front-wheeled  -       Row Name 03/17/25 1318          Sit-Stand Transfer    Assistive Device (Sit-Stand Transfers) walker, front-wheeled  -               User Key  (r) = Recorded By, (t) = Taken By, (c) = Cosigned By      Initials Name Provider Type    Hayder Valle OT Occupational Therapist                   Obj/Interventions       Row  Name 03/17/25 1318          Sensory Assessment (Somatosensory)    Sensory Assessment (Somatosensory) sensation intact  -       Row Name 03/17/25 1318          Vision Assessment/Intervention    Visual Impairment/Limitations WFL  -JR       Row Name 03/17/25 1318          Range of Motion Comprehensive    General Range of Motion bilateral upper extremity ROM WFL  -JR     Comment, General Range of Motion BUE WFL  -JR       Row Name 03/17/25 1318          Strength Comprehensive (MMT)    General Manual Muscle Testing (MMT) Assessment upper extremity strength deficits identified  -JR     Comment, General Manual Muscle Testing (MMT) Assessment BUE 3/5  -JR       Row Name 03/17/25 1318          Balance    Balance Assessment sitting static balance;sitting dynamic balance;standing static balance;standing dynamic balance  -     Static Sitting Balance independent  -JR     Dynamic Sitting Balance modified independence  -JR     Position, Sitting Balance sitting edge of bed  -     Static Standing Balance minimal assist;2-person assist;verbal cues  -JR     Dynamic Standing Balance moderate assist;2-person assist;verbal cues  -     Position/Device Used, Standing Balance supported;walker, front-wheeled  -               User Key  (r) = Recorded By, (t) = Taken By, (c) = Cosigned By      Initials Name Provider Type    Hayder Valle, OT Occupational Therapist                   Goals/Plan       Row Name 03/17/25 1326          Bed Mobility Goal 1 (OT)    Activity/Assistive Device (Bed Mobility Goal 1, OT) bed mobility activities, all  -JR     [place] Level/Cues Needed (Bed Mobility Goal 1, OT) supervision required  -     Time Frame (Bed Mobility Goal 1, OT) 2 weeks  -JR     Progress/Outcomes (Bed Mobility Goal 1, OT) new goal  -       Row Name 03/17/25 1326          Transfer Goal 1 (OT)    Activity/Assistive Device (Transfer Goal 1, OT) transfers, all  -JR     [place] Level/Cues Needed (Transfer Goal 1, OT)  supervision required  -JR     Time Frame (Transfer Goal 1, OT) 2 weeks  -JR     Progress/Outcome (Transfer Goal 1, OT) new goal  -JR       Row Name 03/17/25 1326          Bathing Goal 1 (OT)    Activity/Device (Bathing Goal 1, OT) bathing skills, all  -JR     [place] Level/Cues Needed (Bathing Goal 1, OT) supervision required  -JR     Time Frame (Bathing Goal 1, OT) 2 weeks  -JR     Progress/Outcomes (Bathing Goal 1, OT) new goal  -       Row Name 03/17/25 1326          Dressing Goal 1 (OT)    Activity/Device (Dressing Goal 1, OT) dressing skills, all  -JR     [place]/Cues Needed (Dressing Goal 1, OT) supervision required  -JR     Time Frame (Dressing Goal 1, OT) 2 weeks  -JR     Progress/Outcome (Dressing Goal 1, OT) new goal  -       Row Name 03/17/25 1326          Toileting Goal 1 (OT)    Activity/Device (Toileting Goal 1, OT) toileting skills, all  -JR     [place] Level/Cues Needed (Toileting Goal 1, OT) supervision required  -JR     Time Frame (Toileting Goal 1, OT) 2 weeks  -JR     Progress/Outcome (Toileting Goal 1, OT) new goal  -       Row Name 03/17/25 1326          Grooming Goal 1 (OT)    Activity/Device (Grooming Goal 1, OT) grooming skills, all  -JR     [place] (Grooming Goal 1, OT) set-up required  -JR     Time Frame (Grooming Goal 1, OT) 2 weeks  -JR     Progress/Outcome (Grooming Goal 1, OT) new goal  -       Row Name 03/17/25 1326          Strength Goal 1 (OT)    Strength Goal 1 (OT) BUE 4/5  -JR     Time Frame (Strength Goal 1, OT) 2 weeks  -JR     Strategies/Barriers (Strength Goal 1, OT) Current 3/5  -JR     Progress/Outcome (Strength Goal 1, OT) new goal  -       Row Name 03/17/25 1326          Therapy Assessment/Plan (OT)    Planned Therapy Interventions (OT) activity tolerance training;adaptive equipment training;BADL retraining;neuromuscular control/coordination retraining;functional balance retraining;occupation/activity based interventions;passive  ROM/stretching;transfer/mobility retraining;strengthening exercise;ROM/therapeutic exercise  -JR               User Key  (r) = Recorded By, (t) = Taken By, (c) = Cosigned By      Initials Name Provider Type    Hayder Valle, MARIA L Occupational Therapist                   Clinical Impression       Row Name 03/17/25 1312          Pain Assessment    Pain Location knee  -JR     Pain Side/Orientation right  -JR     Pain Management Interventions exercise or physical activity utilized  -JR     Response to Pain Interventions activity participation with tolerable pain  -JR       Row Name 03/17/25 8105          Plan of Care Review    Plan of Care Reviewed With patient;son  -JR     Progress no change  -JR     Outcome Evaluation 89 y.o. female admitted to [facility] with Right knee pain.  No acute fxs noted from imaging.  At baseline, Patient lives alone at [facility]", performs ADLs with [place] and functional mobility (rollator)  A&Ox4, BUE WFL 3/5.  Patient presents to OT with decreased strength, activity tolerance, coordination and balance. Patient resting in bed with son present upon arrival.  Patient able to transition to EOB with Min A.  STS from EOB with Min A x2 and Rw.  Patient able to take short shuffling steps toward recliner chair with Mod A x2.  Patient right knee buckeled a few times throughout transfer.  Patient required Mod/Max to safely sit in chair.  OT would be beneficial to address underlying physical deficits and increase ADLs.  Anticipate patient d/c to SNF for continued progress.  -       Row Name 03/17/25 1316          Therapy Assessment/Plan (OT)    Rehab Potential (OT) good  -     Criteria for Skilled Therapeutic Interventions Met (OT) yes;skilled treatment is necessary  -     Therapy Frequency (OT) 5 times/wk  -       Row Name 03/17/25 3969          Therapy Plan Review/Discharge Plan (OT)    Anticipated Discharge Disposition (OT) skilled nursing facility  -       Row Name 03/17/25 8268          Vital  Signs    O2 Delivery Pre Treatment room air  -JR     O2 Delivery Intra Treatment room air  -JR     O2 Delivery Post Treatment room air  -JR     Pre Patient Position Supine  -JR     Intra Patient Position Standing  -JR     Post Patient Position Sitting  -JR       Row Name 03/17/25 1319          Positioning and Restraints    Pre-Treatment Position in bed  -JR     Post Treatment Position chair  -JR     In Chair notified nsg;reclined;call light within reach;encouraged to call for assist;exit alarm on;with family/caregiver  -JR               User Key  (r) = Recorded By, (t) = Taken By, (c) = Cosigned By      Initials Name Provider Type    Hayder Valle OT Occupational Therapist                   Outcome Measures       Row Name 03/17/25 1328          How much help from another is currently needed...    Putting on and taking off regular lower body clothing? 2  -JR     Bathing (including washing, rinsing, and drying) 2  -JR     Toileting (which includes using toilet bed pan or urinal) 2  -JR     Putting on and taking off regular upper body clothing 3  -JR     Taking care of personal grooming (such as brushing teeth) 3  -JR     Eating meals 3  -JR     AM-PAC 6 Clicks Score (OT) 15  -JR       Row Name 03/17/25 1105 03/17/25 0813       How much help from another person do you currently need...    Turning from your back to your side while in flat bed without using bedrails? 3  -SM 3  -ST    Moving from lying on back to sitting on the side of a flat bed without bedrails? 3  -SM 2  -ST    Moving to and from a bed to a chair (including a wheelchair)? 2  -SM 2  -ST    Standing up from a chair using your arms (e.g., wheelchair, bedside chair)? 2  -SM 2  -ST    Climbing 3-5 steps with a railing? 1  -SM 1  -ST    To walk in hospital room? 2  -SM 2  -ST    AM-PAC 6 Clicks Score (PT) 13  -SM 12  -ST      Row Name 03/17/25 1328          Modified [place] Scale    Modified [place] Scale 4 - Moderately severe disability.  Unable to walk  without assistance, and unable to attend to own bodily needs without assistance.  -       Row Name 03/17/25 1328          Functional Assessment    Outcome Measure Options AM-PAC 6 Clicks Daily Activity (OT);Modified [place]  -               User Key  (r) = Recorded By, (t) = Taken By, (c) = Cosigned By      Initials Name Provider Type    Ambar Cross, RN Registered Nurse    Andressa Hall, PT Physical Therapist    Hayder Valle OT Occupational Therapist                    Occupational Therapy Education       Title: PT OT SLP Therapies (Done)       Topic: Occupational Therapy (Done)       Point: ADL training (Done)       Learning Progress Summary            Patient Leoer, E, VU by  at 3/17/2025 1328    Comment: Role of OT                      Point: Home exercise program (Done)       Learning Progress Summary            Patient Eager, E, VU by  at 3/17/2025 1328    Comment: Role of OT                      Point: Precautions (Done)       Learning Progress Summary            Patient Akil, E, VU by  at 3/17/2025 1328    Comment: Role of OT                      Point: Body mechanics (Done)       Learning Progress Summary            Patient Akil, E, VU by  at 3/17/2025 1328    Comment: Role of OT                                      User Key       Initials Effective Dates Name Provider Type Discipline     07/24/24 -  Hayder Godinez OT Occupational Therapist OT                  OT Recommendation and Plan  Planned Therapy Interventions (OT): activity tolerance training, adaptive equipment training, BADL retraining, neuromuscular control/coordination retraining, functional balance retraining, occupation/activity based interventions, passive ROM/stretching, transfer/mobility retraining, strengthening exercise, ROM/therapeutic exercise  Therapy Frequency (OT): 5 times/wk  Plan of Care Review  Plan of Care Reviewed With: patient, son  Progress: no change  Outcome Evaluation: 89 y.o. female admitted to [facility]  with Right knee pain.  No acute fxs noted from imaging.  At baseline, Patient lives alone at [facility], performs ADLs with [place] and functional mobility (rollator)  A&Ox4, BUE WFL 3/5.  Patient presents to OT with decreased strength, activity tolerance, coordination and balance. Patient resting in bed with son present upon arrival.  Patient able to transition to EOB with Min A.  STS from EOB with Min A x2 and Rw.  Patient able to take short shuffling steps toward recliner chair with Mod A x2.  Patient right knee buckeled a few times throughout transfer.  Patient required Mod/Max to safely sit in chair.  OT would be beneficial to address underlying physical deficits and increase ADLs.  Anticipate patient d/c to SNF for continued progress.     Time Calculation:   Evaluation Complexity (OT)  Review Occupational Profile/Medical/Therapy History Complexity: expanded/moderate complexity  Assessment, Occupational Performance/Identification of Deficit Complexity: 3-5 performance deficits  Clinical Decision Making Complexity (OT): detailed assessment/moderate complexity  Overall Complexity of Evaluation (OT): moderate complexity     Time Calculation- OT       Row Name 03/17/25 1329             Time Calculation- OT    OT Start Time 1019  -JR      OT Stop Time 1035  -JR      OT Time Calculation (min) 16 min  -JR      Total Timed Code Minutes- OT 8 minute(s)  -JR      OT Received On 03/17/25  -JR      OT - Next Appointment 03/18/25  -JR      OT Goal Re-Cert Due Date 03/31/25  -JR         Timed Charges    20711 - OT Therapeutic Activity Minutes 8  -JR         Untimed Charges    OT Eval/Re-eval Minutes 8  -JR         Total Minutes    Timed Charges Total Minutes 8  -JR      Untimed Charges Total Minutes 8  -JR       Total Minutes 16  -JR                User Key  (r) = Recorded By, (t) = Taken By, (c) = Cosigned By      Initials Name Provider Type    Hayder Valle, OT Occupational Therapist                  Therapy Charges for  Today       Code Description Service Date Service Provider Modifiers Qty    86420954771 HC OT THERAPEUTIC ACT EA 15 MIN 3/17/2025 Hayder Godinez OT GO 1    40997584524 HC OT EVAL MOD COMPLEXITY 3 3/17/2025 Hayder Godinez OT GO 1                 Hayder Godinez OT  3/17/2025

## 2025-03-17 NOTE — PLAN OF CARE
Goal Outcome Evaluation:           Progress: improving  Outcome Evaluation: Patient A&Ox4. R knee pain, CT complete today, ortho consulted. Voiding via purewick. Pain managed with PRN PO medications. Boost added today with meals.

## 2025-03-17 NOTE — H&P
Patient Name:  Arlin Hutson  YOB: 1935  MRN:  [id number]  Admit Date:  3/16/2025  Patient Care Team:  Oren Rust Jr., MD as PCP - General  Oren Rust Jr., MD as PCP - Family Medicine  rOen Rust Jr., MD as Referring Physician (Internal Medicine)  Hayder Caruso Jr., MD as Consulting Physician (Hematology and Oncology)      Subjective   History Present Illness     Chief Complaint   Patient presents with    Knee Pain     right     Ms. Hutson is an extremely pleasant 89 y.o. female with a history of GERD, hypertension, MGUS, and osteopenia that presents to [facility] complaining of right knee pain.  Patient was ambulating today using her rollator walker when her right knee gave out on her.  She fell to the ground, but a family member was standing behind her and was able to catch her.  She was lowered to the ground and did not hit her head. There was no dizziness or loss of consciousness.  After the fall, the patient complained of right knee pain.  Also has had some swelling.  She has not been able to bear any weight.  She does have a history of right knee replacement many years ago.  The ER, workup was relatively unremarkable.  XR right knee revealed no fracture or subluxation.  Given her continued inability to bear weight, the patient is being admitted to [facility] for further management.  Son at bedside at time of interview.    Personal History     Past Medical History:   Diagnosis Date    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    Arthritis of neck ?    Atheroembolism of right lower extremity     Baker's cyst     Colon polyp     CREST syndrome     Difficulty walking About 5 yrs ago    I  use a cane when I leave my house    Fracture of wrist     Surgery about 19 years ago on ribght wrist    Fractures     GERD (gastroesophageal reflux disease)     Hip arthrosis ? Maybe 7 or 8 years ago    History of CT scan of abdomen 03/11/2011    constipation, sig tics, 6 mm hepatic  cyst    History of rheumatoid arthritis     HL (hearing loss) About 1995    Hyperlipidemia     Hypertension     Irritable bowel syndrome 1990s    Knee swelling 5 or 6 years ago    Right knee replacement 4 years [facility]    Low back pain     Normocytic anemia 08/26/2020    Osteoarthritis     Peripheral neuropathy Cannot remember    I have Raynaudsin my hands and feet    Raynaud's disease     Rheumatoid arthritis     Scleroderma     Shingles     I have had both shingles vaccines    Sjogren's syndrome     Tear of meniscus of knee     Torn meniscus surgery    Ulcerative colitis      Past Surgical History:   Procedure Laterality Date    CATARACT EXTRACTION, BILATERAL      COLONOSCOPY  02/26/2016    tics, microscopic colitis,     COLONOSCOPY  07/01/2011    sig tics, inflammation, polyp    DILATATION AND CURETTAGE  1980    EKOS CATHETER PLACEMENT N/A 03/23/2021    Procedure: AIF WITH RUN OFF OF RT. LEG, ROTATIONAL ATHERECTOMY OF RIGHT POPLITEAL ARTERY;  Surgeon: Gato Contreras MD;  Location: [facility] OR 18/19;  Service: Vascular;  Laterality: N/A;    ENDOSCOPY N/A 12/03/2018    erythematous mucosa in stomach, path benign    EYE SURGERY      FLEXIBLE SIGMOIDOSCOPY      FRACTURE SURGERY      HAND SURGERY      JOINT REPLACEMENT      KNEE ARTHROSCOPY Right     w/meniscal repair    KNEE SURGERY Right     TEETH EXTRACTION      TOTAL KNEE ARTHROPLASTY Right 05/30/2018    Procedure: TOTAL KNEE ARTHROPLASTY;  Surgeon: Georges Jon MD;  Location: [facility] OR;  Service: Orthopedics    TRANSLUMINAL ATHERECTOMY POPLITEAL ARTERY      TRIGGER POINT INJECTION      UPPER GASTROINTESTINAL ENDOSCOPY  03/14/2008    erythema    VASCULAR SURGERY  Spring of 2020    Blood clot in an artery behind my right knee    WRIST FRACTURE SURGERY Right     WRIST SURGERY Right 2008    orif     Family History   Problem Relation Age of Onset    Ulcerative colitis Mother     Migraines Mother     Stroke Mother     Hypertension Mother     Thyroid  disease Mother     Arthritis Father     Heart attack Father     Migraines Sister         Sister    Skin cancer Sister     Diabetes Brother     Skin cancer Brother     Breast cancer Maternal Aunt     Migraines Daughter     Malig Hyperthermia Neg Hx      Social History     Tobacco Use    Smoking status: Never    Smokeless tobacco: Never   Vaping Use    Vaping status: Never Used   Substance Use Topics    Alcohol use: Yes     Comment: I rarely have a glass of wine.    Drug use: Never     No current facility-administered medications on file prior to encounter.     Current Outpatient Medications on File Prior to Encounter   Medication Sig Dispense Refill    Acetaminophen (TYLENOL EXTRA STRENGTH PO) Take 2 tablets by mouth Daily As Needed.      amLODIPine (NORVASC) 2.5 MG tablet Take 2 tablets by mouth Daily.      atorvastatin (LIPITOR) 20 MG tablet Take 1 tablet by mouth Daily.      Budesonide (ENTOCORT EC) 3 MG 24 hr capsule TAKE THREE CAPSULES BY MOUTH DAILY 90 capsule 4    calcium carbonate (OS-DWIGHT) 600 MG tablet 1 P.O. daily      Carboxymethylcell-Hypromellose (GENTEAL OP) Apply 1 application to eye As Needed.      Cholecalciferol (VITAMIN D PO) Take 2,000 mg by mouth every night at bedtime.      Diclofenac Sodium (VOLTAREN) 1 % gel gel Apply 4 g topically to the appropriate area as directed 4 (Four) Times a Day As Needed (pain). 100 g 0    escitalopram (LEXAPRO) 20 MG tablet Take 1 tablet by mouth Every Morning.      fluticasone (FLONASE) 50 MCG/ACT nasal spray Administer 1-2 sprays into the nostril(s) as directed by provider.      Multiple Vitamins-Minerals (CENTRUM SILVER ULTRA WOMENS PO) 1 P.O. daily      omeprazole (priLOSEC) 20 MG capsule 1 capsule. Monday Wednesday friday      polycarbophil (calcium polycarbophil) 625 MG tablet tablet Take  by mouth Daily.      polyethyl glycol-propyl glycol (Systane) 0.4-0.3 % solution ophthalmic solution (artificial tears)       traMADol (ULTRAM) 50 MG tablet Take 1 tablet by  mouth Every 6 (Six) Hours As Needed for Moderate Pain.      traZODone (DESYREL) 50 MG tablet Take 0.5-1 tablets by mouth Every Night.      [DISCONTINUED] aspirin 81 MG chewable tablet Chew 1 tablet Daily.      [DISCONTINUED] lidocaine (Lidoderm) 5 % Place 1 patch on the skin as directed by provider Daily. Remove & Discard patch within 12 hours or as directed by MD 30 patch 0     Allergies   Allergen Reactions    Codeine Diarrhea and Nausea Only    Penicillin G Rash    Sulfa Antibiotics Hives       Objective    Objective     Vital Signs  Temp:  [97.9 °F (36.6 °C)] 97.9 °F (36.6 °C)  Heart Rate:  [69-84] 73  Resp:  [12] 12  BP: (157-168)/(70-79) 163/77  SpO2:  [93 %-97 %] 93 %  on   ;   Device (Oxygen Therapy): room air  Body mass index is 20.45 kg/m².    Physical Exam  General: Alert, no acute distress.  Sitting up in bed.  Very pleasant.  ENT: No conjunctival injection or scleral icterus. Moist mucous membranes.  Neuro: Eyes open and moving in all directions, strength exam deferred due to injury, face symmetric, answering all questions appropriately, no focal deficits.   Lungs: Clear to auscultation bilaterally. No wheeze or crackles. No distress.   Heart: RRR, no murmurs.   Abdomen: Soft, non-tender, non-distended. Normal bowel sounds.  Ext: Right knee with swelling, tenderness to palpation most pronounced on the posterior part of the patella, limited range of motion secondary to pain.  Skin: No rashes or lesions. IV site without swelling or erythema.     Lab Results (last 24 hours)       Procedure Component Value Units Date/Time    CBC & Differential [[id number]]  (Abnormal) Collected: 03/16/25 2002    Specimen: Blood from Arm, Right Updated: 03/16/25 2020    Narrative:      The following orders were created for panel order CBC & Differential.  Procedure                               Abnormality         Status                     ---------                               -----------         ------                      CBC Auto Differential[[id number]]        Abnormal            Final result                 Please view results for these tests on the individual orders.    Comprehensive Metabolic Panel [[id number]]  (Abnormal) Collected: 03/16/25 2002    Specimen: Blood from Arm, Right Updated: 03/16/25 2041     Glucose 97 mg/dL      BUN 15 mg/dL      Creatinine 0.53 mg/dL      Sodium 137 mmol/L      Potassium 4.0 mmol/L      Comment: Slight hemolysis detected by analyzer. Result may be falsely elevated.        Chloride 102 mmol/L      CO2 20.8 mmol/L      Calcium 8.6 mg/dL      Total Protein 6.5 g/dL      Albumin 4.0 g/dL      ALT (SGPT) 18 U/L      AST (SGOT) 17 U/L      Alkaline Phosphatase 89 U/L      Total Bilirubin 0.3 mg/dL      Globulin 2.5 gm/dL      A/G Ratio 1.6 g/dL      BUN/Creatinine Ratio 28.3     Anion Gap 14.2 mmol/L      eGFR 88.5 mL/min/1.73     Narrative:      GFR Categories in Chronic Kidney Disease (CKD)      GFR Category          GFR (mL/min/1.73)    Interpretation  G1                     90 or greater         Normal or high (1)  G2                      60-89                Mild decrease (1)  G3a                   45-59                Mild to moderate decrease  G3b                   30-44                Moderate to severe decrease  G4                    15-29                Severe decrease  G5                    14 or less           Kidney failure          (1)In the absence of evidence of kidney disease, neither GFR category G1 or G2 fulfill the criteria for CKD.    eGFR calculation 2021 CKD-EPI creatinine equation, which does not include race as a factor    CBC Auto Differential [[id number]]  (Abnormal) Collected: 03/16/25 2002    Specimen: Blood from Arm, Right Updated: 03/16/25 2020     WBC 10.68 10*3/mm3      RBC 3.92 10*6/mm3      Hemoglobin 11.9 g/dL      Hematocrit 37.0 %      MCV 94.4 fL      MCH 30.4 pg      MCHC 32.2 g/dL      RDW 13.9 %      RDW-SD 48.2 fl      MPV 9.8 fL      Platelets 270 10*3/mm3       Neutrophil % 83.1 %      Lymphocyte % 9.6 %      Monocyte % 5.6 %      Eosinophil % 0.2 %      Basophil % 0.3 %      Immature Grans % 1.2 %      Neutrophils, Absolute 8.88 10*3/mm3      Lymphocytes, Absolute 1.02 10*3/mm3      Monocytes, Absolute 0.60 10*3/mm3      Eosinophils, Absolute 0.02 10*3/mm3      Basophils, Absolute 0.03 10*3/mm3      Immature Grans, Absolute 0.13 10*3/mm3      nRBC 0.0 /100 WBC             Imaging Results (Last 24 Hours)       Procedure Component Value Units Date/Time    XR Knee 1 or 2 View Right [[id number]] Collected: 03/16/25 1924     Updated: 03/16/25 1931    Narrative:      2 VIEWS RIGHT KNEE     HISTORY: Right knee pain     COMPARISON: None available.     FINDINGS:  The patient is status post right knee arthroplasty. Hardware appears to  project in expected position. No acute fracture or subluxation is seen.  There does appear to be a suprapatellar effusion. Dense vascular  calcifications are noted.       Impression:      No acute fracture or subluxation identified.     This report was finalized on 3/16/2025 7:28 PM by Dr. Violette Hendricks M.D on Workstation: [id number]               Results for orders placed during the hospital encounter of 01/13/21    Adult Transthoracic Echo Complete W/ Cont if Necessary Per Protocol    Interpretation Summary  · Calculated left ventricular EF = 64% Estimated left ventricular EF was in agreement with the calculated left ventricular EF. Left ventricular systolic function is normal. Normal left ventricular cavity size noted. Left ventricular wall thickness is consistent with mild concentric hypertrophy. All left ventricular wall segments contract normally. Left ventricular diastolic function is consistent with (grade Ia w/high LAP) impaired relaxation.  · The left atrial cavity is mildly dilated.  · Mild to moderate mitral valve regurgitation is present.  · Mild to moderate tricuspid valve regurgitation is present. Estimated right ventricular  systolic pressure from tricuspid regurgitation is normal (<35 mmHg). Calculated right ventricular systolic pressure from tricuspid regurgitation is 23 mmHg.    No orders to display     Assessment/Plan   Assessment & Plan   Active Hospital Problems    Diagnosis  POA    **Knee pain [M25.569]  Yes    MGUS (monoclonal gammopathy of unknown significance) [D47.2]  Yes    History of total right knee replacement [Z96.651]  Not Applicable    Primary osteoarthritis of right knee [M17.11]  Yes    Gastroesophageal reflux disease [K21.9]  Yes    Essential hypertension [I10]  Yes    Colitis [K52.9]  Yes    Rheumatoid arthritis involving multiple sites [M06.9]  Yes      Resolved Hospital Problems   No resolved problems to display.       89 y.o. female admitted with Knee pain.    Right knee pain  S/p right knee arthroplasty  Mechanical fall  -XR right knee with no subluxation or fractures  -Given swelling and significant pain on exam, will get a CT right lower extremity for further evaluation  -Consider Ortho consult pending CT results  -PT/OT consult  -Scheduled Tylenol tramadol as needed for breakthrough pain  -Bowel regimen as needed    Hypertension  -Home meds: Amlodipine  -BP mildly elevated at this time, likely secondary to pain  -Restart home amlodipine, titrate meds as needed based on BP trends    Rheumatoid arthritis  CREST syndrome, Sjogren's syndrome  -Follows with outside rheumatologist  -Weekly methotrexate injections    Microscopic colitis  GERD  -Stable, no change in symptoms currently  -Continue home budesonide and PPI    MGUS  Anemia  -Follows with CBC  -Undergoing observation/surveillance at this time  -Hgb mildly decreased at 11.9, no signs of active bleeding  -Repeat CBC with morning labs, transfuse for Hgb less than 7    Peripheral vascular disease  -Follows with Dr. Collier  -ABIs have been normal, thought to be secondary to collaterals.  He is monitoring clinically with routine ABIs at this time.  -Continue  statin    SCDs for DVT prophylaxis.  DNR.  Discussed with patient and son at bedside.  Living will reviewed.  Discussed with patient and ED provider.  Home and current medication list reviewed.    Rosanne Brown MD  [facility]  03/16/25  22:36 EDT

## 2025-03-17 NOTE — PLAN OF CARE
Goal Outcome Evaluation:  Plan of Care Reviewed With: patient           Outcome Evaluation: Patient is an 89 y.o female who presented to [facility] with R knee pain. Imaging negative for any acute findings. Patient AOx4 supine in bed upon arrival. Son at bedside. Patient lives in independent living at [facility]. Patient is independent at baseline and ambulates using a rollator. Today patient sat up to EOB with Vannesa. Patient required minAx2 to stand from EOB. Patient took a few steps from EOB to chair with rwx and modAx2. Steps antalgic with patient having difficulty putting weight through R LE. R knee buckling when attempting to shift weight. ModA-maxAx2 to safely sit in chair. Patient demonstrates significant deficits in functional mobility from baseline. Would recommend SNFat d/c. Acute PT will continue to monitor.    Anticipated Discharge Disposition (PT): skilled nursing facility

## 2025-03-17 NOTE — ED NOTES
"Nursing report ED to floor  Arlin Hutson  89 y.o.  female    HPI :  HPI  Stated Reason for Visit: Right knee pain  History Obtained From: patient  Precipitating Event(s): none  Onset of Symptoms: sudden    Chief Complaint  Chief Complaint   Patient presents with    Knee Pain     right       Admitting doctor:   Rosanne Brown MD    Admitting diagnosis:   The encounter diagnosis was Injury of right knee, initial encounter.    Code status:   Current Code Status       Date Active Code Status Order ID Comments User Context       Prior            Allergies:   Codeine, Penicillin g, and Sulfa antibiotics    Isolation:   No active isolations    Intake and Output  No intake or output data in the 24 hours ending 03/16/25 2046    Weight:       03/16/25 1901   Weight: 47.5 kg (104 lb 11.5 oz)       Most recent vitals:   Vitals:    03/16/25 1843 03/16/25 1901 03/16/25 1926 03/16/25 1931   BP:  157/70  158/76   Pulse: 76 72 69    Resp:       Temp:       TempSrc:       SpO2: 97% 94% 94%    Weight:  47.5 kg (104 lb 11.5 oz)     Height:  152.4 cm (60\")         Active LDAs/IV Access:   Lines, Drains & Airways       Active LDAs       Name Placement date Placement time Site Days    Peripheral IV 03/16/25 2002 Right Antecubital 03/16/25 2002  Antecubital  less than 1                    Labs (abnormal labs have a star):   Labs Reviewed   COMPREHENSIVE METABOLIC PANEL - Abnormal; Notable for the following components:       Result Value    Creatinine 0.53 (*)     CO2 20.8 (*)     BUN/Creatinine Ratio 28.3 (*)     All other components within normal limits    Narrative:     GFR Categories in Chronic Kidney Disease (CKD)      GFR Category          GFR (mL/min/1.73)    Interpretation  G1                     90 or greater         Normal or high (1)  G2                      60-89                Mild decrease (1)  G3a                   45-59                Mild to moderate decrease  G3b                   30-44                Moderate to " severe decrease  G4                    15-29                Severe decrease  G5                    14 or less           Kidney failure          (1)In the absence of evidence of kidney disease, neither GFR category G1 or G2 fulfill the criteria for CKD.    eGFR calculation 2021 CKD-EPI creatinine equation, which does not include race as a factor   CBC WITH AUTO DIFFERENTIAL - Abnormal; Notable for the following components:    Hemoglobin 11.9 (*)     Neutrophil % 83.1 (*)     Lymphocyte % 9.6 (*)     Eosinophil % 0.2 (*)     Immature Grans % 1.2 (*)     Neutrophils, Absolute 8.88 (*)     Immature Grans, Absolute 0.13 (*)     All other components within normal limits   CBC AND DIFFERENTIAL    Narrative:     The following orders were created for panel order CBC & Differential.  Procedure                               Abnormality         Status                     ---------                               -----------         ------                     CBC Auto Differential[[id number]]        Abnormal            Final result                 Please view results for these tests on the individual orders.       EKG:   No orders to display       Meds given in ED:   Medications   sodium chloride 0.9 % flush 10 mL (has no administration in time range)   sodium chloride 0.9 % flush 10 mL (has no administration in time range)   sodium chloride 0.9 % infusion 40 mL (has no administration in time range)   ondansetron (ZOFRAN) injection 4 mg (has no administration in time range)   sennosides-docusate (PERICOLACE) 8.6-50 MG per tablet 2 tablet (has no administration in time range)     And   polyethylene glycol (MIRALAX) packet 17 g (has no administration in time range)     And   bisacodyl (DULCOLAX) EC tablet 5 mg (has no administration in time range)     And   bisacodyl (DULCOLAX) suppository 10 mg (has no administration in time range)       Imaging results:  XR Knee 1 or 2 View Right  Result Date: 3/16/2025  No acute fracture or  subluxation identified.  This report was finalized on 3/16/2025 7:28 PM by Dr. Violette Hendricks M.D on Workstation: [id number]        Ambulatory status:   - asst 1    Social issues:   Social History     Socioeconomic History    Marital status: Single    Number of children: 3   Tobacco Use    Smoking status: Never    Smokeless tobacco: Never   Vaping Use    Vaping status: Never Used   Substance and Sexual Activity    Alcohol use: Yes     Comment: I rarely have a glass of wine.    Drug use: Never    Sexual activity: Not Currently     Partners: Male     Comment: Not sexual active       Peripheral Neurovascular  Peripheral Neurovascular (Adult)  Peripheral Neurovascular WDL: WDL    Neuro Cognitive  Neuro Cognitive (Adult)  Cognitive/Neuro/Behavioral WDL: WDL, all  Level of Consciousness: Alert  Arousal Level: opens eyes spontaneously  Orientation: oriented x 4  Speech: clear, spontaneous  Mood/Behavior: calm, cooperative    Learning  Learning Assessment  Learning Readiness and Ability: no barriers identified    Respiratory  Respiratory WDL  Respiratory WDL: WDL    Abdominal Pain       Pain Assessments  Pain (Adult)  (0-10) Pain Rating: Rest: 0  (0-10) Pain Rating: Activity: 0    NIH Stroke Scale       Naomi Saunders RN  03/16/25 20:46 EDT

## 2025-03-18 PROBLEM — M97.11XA PERIPROSTHETIC FRACTURE AROUND INTERNAL PROSTHETIC RIGHT KNEE JOINT: Status: ACTIVE | Noted: 2025-03-18

## 2025-03-18 LAB
ANION GAP SERPL CALCULATED.3IONS-SCNC: 9 MMOL/L (ref 5–15)
BASOPHILS # BLD AUTO: 0.02 10*3/MM3 (ref 0–0.2)
BASOPHILS NFR BLD AUTO: 0.2 % (ref 0–1.5)
BUN SERPL-MCNC: 15 MG/DL (ref 8–23)
BUN/CREAT SERPL: 25 (ref 7–25)
CALCIUM SPEC-SCNC: 8.4 MG/DL (ref 8.6–10.5)
CHLORIDE SERPL-SCNC: 100 MMOL/L (ref 98–107)
CO2 SERPL-SCNC: 24 MMOL/L (ref 22–29)
CREAT SERPL-MCNC: 0.6 MG/DL (ref 0.57–1)
DEPRECATED RDW RBC AUTO: 46.3 FL (ref 37–54)
EGFRCR SERPLBLD CKD-EPI 2021: 85.9 ML/MIN/1.73
EOSINOPHIL # BLD AUTO: 0.19 10*3/MM3 (ref 0–0.4)
EOSINOPHIL NFR BLD AUTO: 2 % (ref 0.3–6.2)
ERYTHROCYTE [DISTWIDTH] IN BLOOD BY AUTOMATED COUNT: 13.5 % (ref 12.3–15.4)
GLUCOSE SERPL-MCNC: 107 MG/DL (ref 65–99)
HCT VFR BLD AUTO: 32.6 % (ref 34–46.6)
HGB BLD-MCNC: 10.5 G/DL (ref 12–15.9)
IMM GRANULOCYTES # BLD AUTO: 0.08 10*3/MM3 (ref 0–0.05)
IMM GRANULOCYTES NFR BLD AUTO: 0.8 % (ref 0–0.5)
LYMPHOCYTES # BLD AUTO: 1.08 10*3/MM3 (ref 0.7–3.1)
LYMPHOCYTES NFR BLD AUTO: 11.1 % (ref 19.6–45.3)
MCH RBC QN AUTO: 30 PG (ref 26.6–33)
MCHC RBC AUTO-ENTMCNC: 32.2 G/DL (ref 31.5–35.7)
MCV RBC AUTO: 93.1 FL (ref 79–97)
MONOCYTES # BLD AUTO: 0.77 10*3/MM3 (ref 0.1–0.9)
MONOCYTES NFR BLD AUTO: 7.9 % (ref 5–12)
NEUTROPHILS NFR BLD AUTO: 7.58 10*3/MM3 (ref 1.7–7)
NEUTROPHILS NFR BLD AUTO: 78 % (ref 42.7–76)
NRBC BLD AUTO-RTO: 0 /100 WBC (ref 0–0.2)
PLATELET # BLD AUTO: 233 10*3/MM3 (ref 140–450)
PMV BLD AUTO: 9.7 FL (ref 6–12)
POTASSIUM SERPL-SCNC: 4 MMOL/L (ref 3.5–5.2)
RBC # BLD AUTO: 3.5 10*6/MM3 (ref 3.77–5.28)
SODIUM SERPL-SCNC: 133 MMOL/L (ref 136–145)
WBC NRBC COR # BLD AUTO: 9.72 10*3/MM3 (ref 3.4–10.8)

## 2025-03-18 PROCEDURE — 90791 PSYCH DIAGNOSTIC EVALUATION: CPT

## 2025-03-18 PROCEDURE — 36415 COLL VENOUS BLD VENIPUNCTURE: CPT | Performed by: STUDENT IN AN ORGANIZED HEALTH CARE EDUCATION/TRAINING PROGRAM

## 2025-03-18 PROCEDURE — 85025 COMPLETE CBC W/AUTO DIFF WBC: CPT | Performed by: STUDENT IN AN ORGANIZED HEALTH CARE EDUCATION/TRAINING PROGRAM

## 2025-03-18 PROCEDURE — 80048 BASIC METABOLIC PNL TOTAL CA: CPT | Performed by: STUDENT IN AN ORGANIZED HEALTH CARE EDUCATION/TRAINING PROGRAM

## 2025-03-18 PROCEDURE — 97110 THERAPEUTIC EXERCISES: CPT

## 2025-03-18 RX ORDER — LISINOPRIL 10 MG/1
10 TABLET ORAL
Status: DISCONTINUED | OUTPATIENT
Start: 2025-03-18 | End: 2025-03-21 | Stop reason: HOSPADM

## 2025-03-18 RX ADMIN — CALCIUM POLYCARBOPHIL 625 MG: 625 TABLET, FILM COATED ORAL at 08:23

## 2025-03-18 RX ADMIN — BUDESONIDE 9 MG: 3 CAPSULE ORAL at 08:23

## 2025-03-18 RX ADMIN — LISINOPRIL 10 MG: 10 TABLET ORAL at 20:12

## 2025-03-18 RX ADMIN — Medication 10 ML: at 20:58

## 2025-03-18 RX ADMIN — SENNOSIDES AND DOCUSATE SODIUM 2 TABLET: 50; 8.6 TABLET ORAL at 12:45

## 2025-03-18 RX ADMIN — ACETAMINOPHEN 1000 MG: 500 TABLET, FILM COATED ORAL at 22:26

## 2025-03-18 RX ADMIN — ACETAMINOPHEN 1000 MG: 500 TABLET, FILM COATED ORAL at 05:41

## 2025-03-18 RX ADMIN — ATORVASTATIN CALCIUM 20 MG: 20 TABLET, FILM COATED ORAL at 08:23

## 2025-03-18 RX ADMIN — AMLODIPINE BESYLATE 2.5 MG: 2.5 TABLET ORAL at 08:23

## 2025-03-18 RX ADMIN — TRAZODONE HYDROCHLORIDE 50 MG: 50 TABLET ORAL at 20:12

## 2025-03-18 RX ADMIN — Medication 10 ML: at 08:23

## 2025-03-18 RX ADMIN — TRAMADOL HYDROCHLORIDE 50 MG: 50 TABLET, COATED ORAL at 08:23

## 2025-03-18 RX ADMIN — ESCITALOPRAM 20 MG: 20 TABLET, FILM COATED ORAL at 05:41

## 2025-03-18 RX ADMIN — TRAMADOL HYDROCHLORIDE 50 MG: 50 TABLET, COATED ORAL at 20:18

## 2025-03-18 RX ADMIN — LIDOCAINE 1 PATCH: 4 PATCH TOPICAL at 08:23

## 2025-03-18 RX ADMIN — ACETAMINOPHEN 1000 MG: 500 TABLET, FILM COATED ORAL at 13:48

## 2025-03-18 NOTE — PROGRESS NOTES
Continued Stay Note  [facility]     Patient Name: Arlin Hutson  MRN: [id number]  Today's Date: 3/18/2025    Admit Date: 3/16/2025    [facility]   Discharge Plan       Row Name 03/18/25 2012       Plan    Plan [facility], bed available 3/21    Patient/Family in Agreement with Plan yes    Plan Comments Patient is from [facility] and will need SNF at d/c per patient and son at bedside. Referral sent in [facility]. Per Korin Sorto patient is approved and will have bed available Friday 3/21. Plan will be to d/c to SNF at d/c.                   Discharge Codes    No documentation.                 Expected Discharge Date and Time       Expected Discharge Date Expected Discharge Time    Mar 19, 2025               Clarice Cabrera RN

## 2025-03-18 NOTE — CONSULTS
"[facility] consult regarding depression, grief and loss. Reviewed patients chart and spoke with primary RN Ambar about consult. Pt was present in room in chair with R leg elevated. Her son Mumtaz who is her primary support here in [place] was present, but left for interview. Pt agreed to interview and was alert and oriented to all spheres. Pt injured her R knee on a porch stoop on Sunday. Pt uses a rolator/walker at home.    Marital status: Single. Pt  from  of 43 years since 2001. She reported him having an affair on her for 13 years.  Housing: Pt has lived in a [facility] apartment for the prior 2 years. She had lived alone in the same condo for 22 years prior.  Scientology: [employer], long time member of [employer]. 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 Mumtaz 63, Ralph 61 lives in [place], Erin 57 lives in [place]  Support: 3 children and her sister Milena and brother Baldomero  Leisure: plays bridge, talks to friends on Zoom, friends take her places. History of lots of travel  Education: high school complete. Attended [employer]    Pt's son Mumtaz was encouraging of the patient speaking to someone about depression, and aging with loss of functions, such as hearing loss and ability to drive. Pt states she has been very independent for a long time and now is having to rely on her son Mumtaz for many things. Pt speaks with her other two children on the phone/zoom on a daily business. Pt denied significant depression stating she is \"in general a very positive person\". Pt denies SI, HI, and hallucinations stating she has never experience this. Pt reports her sleep as is good. She takes Trazadone at night and says it \"works well.\" Pt also takes Lexapro and feels it has also worked well for her. Pt reports her appetite as good, she eats at least three times a day. She says she weighed 120+ a year or so ago and now is down to 102. She cites this is related to \"aging I guess.\" Pt reports the most " "stressful time in her life was when her  and her split as a result of his having an affair on her for 13 years. She states she experienced a lot of anger, stress and anxiety and it caused a lot of trust issues for her children. She report though that they have all dealt with it well, with her kids reconnecting to him for some time before he  last year. Pt states she dealt with this through journaling, a Monday night support group that she attended for years, and a /therapy group. In addition, pt reports a strong Zoroastrianism community and associated groups/classes. Pt states she no longer harbors negative feelings towards this stage of her life.     Pt denies having a history of drugs and alcohol. She describes social drinking that is on average 1 or 2 drinks 2 to 3 times per week. She reports having a wine with dinner or a twila before bed. Pt has never experienced withdrawal or been told her use might be a problem.     Pt was educated on [facility] role and resources provided. Pt feels her medications are effective at this time. She states how strong her support system is and her intent to keep talking to her friends and family; \"that's my therapy.\" Pt encouraged to reach out to [facility] if wanting any resources. Access will follow briefly.   "

## 2025-03-18 NOTE — PROGRESS NOTES
Name: Arlin Hutson ADMIT: 3/16/2025   : 1935  PCP: Oren Rust Jr., MD    MRN: [id number] LOS: 0 days   AGE/SEX: 89 y.o. female  ROOM: [facility]     Subjective   Resting comfortably in bed this morning.  No new complaints.    Objective   Objective   Vital Signs  Temp:  [97.6 °F (36.4 °C)-98.8 °F (37.1 °C)] 97.6 °F (36.4 °C)  Heart Rate:  [59-84] 70  Resp:  [16-18] 16  BP: (134-159)/(56-95) 159/95  SpO2:  [93 %-95 %] 93 %  on   ;   Device (Oxygen Therapy): room air  Body mass index is 21.48 kg/m².  Physical Exam  Constitutional:       General: She is not in acute distress.  HENT:      Head: Normocephalic and atraumatic.   Cardiovascular:      Rate and Rhythm: Normal rate and regular rhythm.   Pulmonary:      Effort: Pulmonary effort is normal. No respiratory distress.   Abdominal:      General: There is no distension.      Palpations: Abdomen is soft.      Tenderness: There is no abdominal tenderness.   Neurological:      Mental Status: She is alert and oriented to person, place, and time.         Results Review     I reviewed the patient's new clinical results.  Results from last 7 days   Lab Units 25   WBC 10*3/mm3 9.72 11.08* 10.68   HEMOGLOBIN g/dL 10.5* 11.2* 11.9*   PLATELETS 10*3/mm3 233 250 270     Results from last 7 days   Lab Units 25   SODIUM mmol/L 133* 140 137   POTASSIUM mmol/L 4.0 3.8 4.0   CHLORIDE mmol/L 100 105 102   CO2 mmol/L 24.0 25.6 20.8*   BUN mg/dL 15 13 15   CREATININE mg/dL 0.60 0.49* 0.53*   GLUCOSE mg/dL 107* 95 97   Estimated Creatinine Clearance: 50.1 mL/min (by C-G formula based on SCr of 0.6 mg/dL).  Results from last 7 days   Lab Units 25   ALBUMIN g/dL 4.0   BILIRUBIN mg/dL 0.3   ALK PHOS U/L 89   AST (SGOT) U/L 17   ALT (SGPT) U/L 18     Results from last 7 days   Lab Units 25  0410 25  0526 25   CALCIUM mg/dL 8.4* 8.6 8.6   ALBUMIN g/dL  --    "--  4.0       COVID19   Date Value Ref Range Status   03/22/2021 Not Detected Not Detected - Ref. Range Final     SARS-CoV-2 PCR   Date Value Ref Range Status   01/11/2021 Not Detected Not Detected Final     Comment:     Nucleic acid specific to SARS-CoV-2 (COVID-19) virus was not detected in  this sample by the TaqPath (TM) COVID-19 Combo Kit.          SARS-CoV-2 (COVID-19) nucleic acid testing performed using [employer] Aptima (R) SARS-CoV-2 Assay or [place] TaqPath (TM)  COVID-19 Combo Kit as indicated above under Emergency Use Authorization (EUA) from the FDA. Aptima (R) and TaqPath (TM) test performance  characteristics verified by [facility] in accordance with the FDAs Guidance Document (Policy for Diagnostic Tests for Coronavirus Disease-2019  during the Public Health Emergency) issued on March 16, 2020. The laboratory is regulated under CLIA as qualified to perform high-complexity testing  Unless otherwise noted, all testing was performed at [facility], CLIA No. [id number], [place] State Licensee No. [id number]     No results found for: \"HGBA1C\", \"POCGLU\"  Results for orders placed or performed during the hospital encounter of 06/02/18   Blood Culture - Blood,    Specimen: Arm, Left; Blood   Result Value Ref Range    Blood Culture No growth at 5 days          CT Lower Extremity Right Without Contrast  Narrative: CT RIGHT KNEE WITHOUT CONTRAST     HISTORY: Unable to bear weight. Knee pain.      TECHNIQUE: CT includes axial images of the right knee and data  reconstructed in coronal and sagittal planes.     COMPARISON: Right knee x-rays 03/16/2025.     FINDINGS: There is a large lipohemarthrosis with fat fluid level and  hematocrit level within the suprapatellar recess. This indicates a  fracture though clear fracture plane is not visualized. There is subtle  cortical offset at the far posterior superior aspect of the medial  femoral condyle just above the posterior superior femoral component " as  demonstrated on sagittal series 5 image 37. There is no evidence for  displacement. Atherosclerotic calcifications are present.     Impression: Large lipohemarthrosis indicates a fracture though fracture  plane is not well visualized. There is subtle cortical offset involving  the far posterior superior aspect of the medial femoral condyle  potentially represents the site of fracture.     Radiation dose reduction techniques were utilized, including automated  exposure control and exposure modulation based on body size.        This report was finalized on 3/17/2025 4:54 PM by Guerrero Myles M.D  on Workstation: [id number]       Scheduled Medications  acetaminophen, 1,000 mg, Oral, Q8H  amLODIPine, 2.5 mg, Oral, Daily  atorvastatin, 20 mg, Oral, Daily  Budesonide, 9 mg, Oral, Daily  escitalopram, 20 mg, Oral, QAM  Lidocaine, 1 patch, Transdermal, Q24H  pantoprazole, 40 mg, Oral, Once per day on Monday Wednesday Friday  polycarbophil, 625 mg, Oral, Daily  sodium chloride, 10 mL, Intravenous, Q12H  traZODone, 50 mg, Oral, Nightly    Infusions   Diet  Diet: Regular/House; Fluid Consistency: Thin (IDDSI 0)       Assessment/Plan     Active Hospital Problems    Diagnosis  POA    **Knee pain [M25.569]  Yes    MGUS (monoclonal gammopathy of unknown significance) [D47.2]  Yes    History of total right knee replacement [Z96.651]  Not Applicable    Primary osteoarthritis of right knee [M17.11]  Yes    Gastroesophageal reflux disease [K21.9]  Yes    Essential hypertension [I10]  Yes    Colitis [K52.9]  Yes    Rheumatoid arthritis involving multiple sites [M06.9]  Yes      Resolved Hospital Problems   No resolved problems to display.       89 y.o. female admitted with Knee pain.      Right knee pain  S/p right knee arthroplasty  Mechanical fall  -XR right knee with no subluxation or fractures  -CT RLE noted- concern for occult RLE fracture within suprapetellar recess - ortho consult placed-no need for surgical  intervention at this time.  Plan for knee immobilizer and physical therapy/Occupational Therapy  -PT/OT consult  -Scheduled Tylenol tramadol as needed for breakthrough pain  -Bowel regimen as needed     Hypertension  -Home meds: Amlodipine  -BP mildly elevated at this time, likely secondary to pain  -Restart home amlodipine, titrate meds as needed based on BP trends     Rheumatoid arthritis  CREST syndrome, Sjogren's syndrome  -Follows with outside rheumatologist  -Weekly methotrexate injections     Microscopic colitis  GERD  -Stable, no change in symptoms currently  -Continue home budesonide and PPI     MGUS  Anemia  -Follows with CBC  -Undergoing observation/surveillance at this time  -Hgb mildly decreased at 11.9, no signs of active bleeding  -Repeat CBC with morning labs, transfuse for Hgb less than 7     Peripheral vascular disease  -Follows with Dr. Collier  -ABIs have been normal, thought to be secondary to collaterals.  He is monitoring clinically with routine ABIs at this time.  -Continue statin    SCD for DVT ppx  Full code  Discussed with patient and family  Anticipate discharge to skilled nursing facility        Con Santana MD  [facility]  03/18/25  12:10 EDT

## 2025-03-18 NOTE — CONSULTS
ORTHOPEDIC SURGERY CONSULT      Patient: Arlin Hutson  Date of Admission: 3/16/2025  6:34 PM  YOB: 1935  Medical Record Number: [id number]  Attending Physician: Con Santana MD  Consulting Physician: Dominic Calloway MD    CHIEF COMPLAINT: Right knee pain    HISTORY OF PRESENT ILLINESS: Patient is a 89 y.o. year old female presents to [facility] with above complaints.  I was consulted for further evaluation and treatment.  Sunday afternoon she was walking.  Her right leg gave out and she had immediate pain in the right knee.  She was caught before she fell, so she did not land but was placed down.  She was unable to put weight on the right knee.    ALLERGIES:   Allergies   Allergen Reactions    Codeine Diarrhea and Nausea Only    Penicillin G Rash    Sulfa Antibiotics Hives       HOME MEDICATIONS:  Medications Prior to Admission   Medication Sig Dispense Refill Last Dose/Taking    Acetaminophen (TYLENOL EXTRA STRENGTH PO) Take 2 tablets by mouth Daily As Needed.   Taking As Needed    amLODIPine (NORVASC) 2.5 MG tablet Take 2 tablets by mouth Daily.   Taking    atorvastatin (LIPITOR) 20 MG tablet Take 1 tablet by mouth Daily.   Taking    Budesonide (ENTOCORT EC) 3 MG 24 hr capsule TAKE THREE CAPSULES BY MOUTH DAILY 90 capsule 4 Taking    calcium carbonate (OS-DWIGHT) 600 MG tablet 1 P.O. daily   Taking    Carboxymethylcell-Hypromellose (GENTEAL OP) Apply 1 application to eye As Needed.   Taking As Needed    Cholecalciferol (VITAMIN D PO) Take 2,000 mg by mouth every night at bedtime.   Taking    Diclofenac Sodium (VOLTAREN) 1 % gel gel Apply 4 g topically to the appropriate area as directed 4 (Four) Times a Day As Needed (pain). 100 g 0 Taking As Needed    escitalopram (LEXAPRO) 20 MG tablet Take 1 tablet by mouth Every Morning.   Taking    fluticasone (FLONASE) 50 MCG/ACT nasal spray Administer 1-2 sprays into the nostril(s) as directed by provider.   Taking    Multiple  Vitamins-Minerals (CENTRUM SILVER ULTRA WOMENS PO) 1 P.O. daily   Taking    omeprazole (priLOSEC) 20 MG capsule 1 capsule. Monday Wednesday friday   Taking    polycarbophil (calcium polycarbophil) 625 MG tablet tablet Take  by mouth Daily.   Taking    polyethyl glycol-propyl glycol (Systane) 0.4-0.3 % solution ophthalmic solution (artificial tears)    Taking    traMADol (ULTRAM) 50 MG tablet Take 1 tablet by mouth Every 6 (Six) Hours As Needed for Moderate Pain.   Taking As Needed    traZODone (DESYREL) 50 MG tablet Take 0.5-1 tablets by mouth Every Night.   Taking       CURRENT MEDICATIONS:  Scheduled Meds:acetaminophen, 1,000 mg, Oral, Q8H  amLODIPine, 2.5 mg, Oral, Daily  atorvastatin, 20 mg, Oral, Daily  Budesonide, 9 mg, Oral, Daily  escitalopram, 20 mg, Oral, QAM  Lidocaine, 1 patch, Transdermal, Q24H  pantoprazole, 40 mg, Oral, Once per day on Monday Wednesday Friday  polycarbophil, 625 mg, Oral, Daily  sodium chloride, 10 mL, Intravenous, Q12H  traZODone, 50 mg, Oral, Nightly      Continuous Infusions:   PRN Meds:.  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    Atheroembolism of right lower extremity     Baker's cyst     Colon polyp     CREST syndrome     Difficulty walking About 5 yrs ago    I  use a cane when I leave my house    Fracture of wrist     Surgery about 19 years ago on ribght wrist    Fractures     GERD (gastroesophageal reflux disease)     Hip arthrosis ? Maybe 7 or 8 years ago    History of CT scan of abdomen 03/11/2011    constipation, sig tics, 6 mm hepatic cyst    History of rheumatoid arthritis     HL (hearing loss) About 1995    Hyperlipidemia     Hypertension     Irritable bowel syndrome 1990s    Knee swelling 5 or 6 years ago    Right knee replacement 4 years [facility]    Low  back pain     Normocytic anemia 08/26/2020    Osteoarthritis     Peripheral neuropathy Cannot remember    I have Raynaudsin my hands and feet    Raynaud's disease     Rheumatoid arthritis     Scleroderma     Shingles     I have had both shingles vaccines    Sjogren's syndrome     Tear of meniscus of knee     Torn meniscus surgery    Ulcerative colitis      Past Surgical History:   Procedure Laterality Date    CATARACT EXTRACTION, BILATERAL      COLONOSCOPY  02/26/2016    tics, microscopic colitis,     COLONOSCOPY  07/01/2011    sig tics, inflammation, polyp    DILATATION AND CURETTAGE  1980    EKOS CATHETER PLACEMENT N/A 03/23/2021    Procedure: AIF WITH RUN OFF OF RT. LEG, ROTATIONAL ATHERECTOMY OF RIGHT POPLITEAL ARTERY;  Surgeon: Gato Contreras MD;  Location: [facility] OR 18/19;  Service: Vascular;  Laterality: N/A;    ENDOSCOPY N/A 12/03/2018    erythematous mucosa in stomach, path benign    EYE SURGERY      FLEXIBLE SIGMOIDOSCOPY      FRACTURE SURGERY      HAND SURGERY      JOINT REPLACEMENT      KNEE ARTHROSCOPY Right     w/meniscal repair    KNEE SURGERY Right     TEETH EXTRACTION      TOTAL KNEE ARTHROPLASTY Right 05/30/2018    Procedure: TOTAL KNEE ARTHROPLASTY;  Surgeon: Georges Jon MD;  Location: [facility] MAIN OR;  Service: Orthopedics    TRANSLUMINAL ATHERECTOMY POPLITEAL ARTERY      TRIGGER POINT INJECTION      UPPER GASTROINTESTINAL ENDOSCOPY  03/14/2008    erythema    VASCULAR SURGERY  Spring of 2020    Blood clot in an artery behind my right knee    WRIST FRACTURE SURGERY Right     WRIST SURGERY Right 2008    orif     Social History     Occupational History     Employer: RETIRED   Tobacco Use    Smoking status: Never    Smokeless tobacco: Never   Vaping Use    Vaping status: Never Used   Substance and Sexual Activity    Alcohol use: Yes     Comment: I rarely have a glass of wine.    Drug use: Never    Sexual activity: Not Currently     Partners: Male     Comment: Not sexual active       Social History     Social History Narrative    Not on file     Family History   Problem Relation Age of Onset    Ulcerative colitis Mother     Migraines Mother     Stroke Mother     Hypertension Mother     Thyroid disease Mother     Arthritis Father     Heart attack Father     Migraines Sister         Sister    Skin cancer Sister     Diabetes Brother     Skin cancer Brother     Breast cancer Maternal Aunt     Migraines Daughter     Malig Hyperthermia Neg Hx        REVIEW OF SYSTEMS:   12 point ROS is negative except as above     PHYSICAL EXAM:   Vitals:  Vitals:    03/17/25 0426 03/17/25 0732 03/17/25 1736 03/18/25 0510   BP: 153/66 153/66 134/68 150/56   BP Location: Left arm Right arm Right arm Right arm   Patient Position: Lying Lying Sitting Lying   Pulse: 63 70 84 59   Resp: 18 18 18 17   Temp: 98.1 °F (36.7 °C) 98.6 °F (37 °C) 98 °F (36.7 °C) 98.8 °F (37.1 °C)   TempSrc: Oral Oral Oral Oral   SpO2: 93% 95% 95% 93%   Weight:       Height:            GENERAL: no distress   HEENT: normocephalic   CV: No audible murmur   Resp: no audible wheezes   RUE: No deformity.  No tenderness   LUE: No deformity.  No tenderness   RLE: Effusion to the knee.  Tenderness to the knee.  She is able to do a straight leg raise.  Her quad tendon and patellar tendon are grossly intact.  Sensation to light touch intact.  She is able to move her foot.   LLE: No deformity.  No tenderness      DIAGNOSTIC TEST:  Admission on 03/16/2025   Component Date Value Ref Range Status    Glucose 03/16/2025 97  65 - 99 mg/dL Final    BUN 03/16/2025 15  8 - 23 mg/dL Final    Creatinine 03/16/2025 0.53 (L)  0.57 - 1.00 mg/dL Final    Sodium 03/16/2025 137  136 - 145 mmol/L Final    Potassium 03/16/2025 4.0  3.5 - 5.2 mmol/L Final    Slight hemolysis detected by analyzer. Result may be falsely elevated.    Chloride 03/16/2025 102  98 - 107 mmol/L Final    CO2 03/16/2025 20.8 (L)  22.0 - 29.0 mmol/L Final    Calcium 03/16/2025 8.6  8.6 - 10.5 mg/dL  Final    Total Protein 03/16/2025 6.5  6.0 - 8.5 g/dL Final    Albumin 03/16/2025 4.0  3.5 - 5.2 g/dL Final    ALT (SGPT) 03/16/2025 18  1 - 33 U/L Final    AST (SGOT) 03/16/2025 17  1 - 32 U/L Final    Alkaline Phosphatase 03/16/2025 89  39 - 117 U/L Final    Total Bilirubin 03/16/2025 0.3  0.0 - 1.2 mg/dL Final    Globulin 03/16/2025 2.5  gm/dL Final    A/G Ratio 03/16/2025 1.6  g/dL Final    BUN/Creatinine Ratio 03/16/2025 28.3 (H)  7.0 - 25.0 Final    Anion Gap 03/16/2025 14.2  5.0 - 15.0 mmol/L Final    eGFR 03/16/2025 88.5  >60.0 mL/min/1.73 Final    WBC 03/16/2025 10.68  3.40 - 10.80 10*3/mm3 Final    RBC 03/16/2025 3.92  3.77 - 5.28 10*6/mm3 Final    Hemoglobin 03/16/2025 11.9 (L)  12.0 - 15.9 g/dL Final    Hematocrit 03/16/2025 37.0  34.0 - 46.6 % Final    MCV 03/16/2025 94.4  79.0 - 97.0 fL Final    MCH 03/16/2025 30.4  26.6 - 33.0 pg Final    MCHC 03/16/2025 32.2  31.5 - 35.7 g/dL Final    RDW 03/16/2025 13.9  12.3 - 15.4 % Final    RDW-SD 03/16/2025 48.2  37.0 - 54.0 fl Final    MPV 03/16/2025 9.8  6.0 - 12.0 fL Final    Platelets 03/16/2025 270  140 - 450 10*3/mm3 Final    Neutrophil % 03/16/2025 83.1 (H)  42.7 - 76.0 % Final    Lymphocyte % 03/16/2025 9.6 (L)  19.6 - 45.3 % Final    Monocyte % 03/16/2025 5.6  5.0 - 12.0 % Final    Eosinophil % 03/16/2025 0.2 (L)  0.3 - 6.2 % Final    Basophil % 03/16/2025 0.3  0.0 - 1.5 % Final    Immature Grans % 03/16/2025 1.2 (H)  0.0 - 0.5 % Final    Neutrophils, Absolute 03/16/2025 8.88 (H)  1.70 - 7.00 10*3/mm3 Final    Lymphocytes, Absolute 03/16/2025 1.02  0.70 - 3.10 10*3/mm3 Final    Monocytes, Absolute 03/16/2025 0.60  0.10 - 0.90 10*3/mm3 Final    Eosinophils, Absolute 03/16/2025 0.02  0.00 - 0.40 10*3/mm3 Final    Basophils, Absolute 03/16/2025 0.03  0.00 - 0.20 10*3/mm3 Final    Immature Grans, Absolute 03/16/2025 0.13 (H)  0.00 - 0.05 10*3/mm3 Final    nRBC 03/16/2025 0.0  0.0 - 0.2 /100 WBC Final    Glucose 03/17/2025 95  65 - 99 mg/dL Final    BUN  03/17/2025 13  8 - 23 mg/dL Final    Creatinine 03/17/2025 0.49 (L)  0.57 - 1.00 mg/dL Final    Sodium 03/17/2025 140  136 - 145 mmol/L Final    Potassium 03/17/2025 3.8  3.5 - 5.2 mmol/L Final    Chloride 03/17/2025 105  98 - 107 mmol/L Final    CO2 03/17/2025 25.6  22.0 - 29.0 mmol/L Final    Calcium 03/17/2025 8.6  8.6 - 10.5 mg/dL Final    BUN/Creatinine Ratio 03/17/2025 26.5 (H)  7.0 - 25.0 Final    Anion Gap 03/17/2025 9.4  5.0 - 15.0 mmol/L Final    eGFR 03/17/2025 90.2  >60.0 mL/min/1.73 Final    WBC 03/17/2025 11.08 (H)  3.40 - 10.80 10*3/mm3 Final    RBC 03/17/2025 3.64 (L)  3.77 - 5.28 10*6/mm3 Final    Hemoglobin 03/17/2025 11.2 (L)  12.0 - 15.9 g/dL Final    Hematocrit 03/17/2025 33.0 (L)  34.0 - 46.6 % Final    MCV 03/17/2025 90.7  79.0 - 97.0 fL Final    MCH 03/17/2025 30.8  26.6 - 33.0 pg Final    MCHC 03/17/2025 33.9  31.5 - 35.7 g/dL Final    RDW 03/17/2025 13.3  12.3 - 15.4 % Final    RDW-SD 03/17/2025 44.1  37.0 - 54.0 fl Final    MPV 03/17/2025 9.5  6.0 - 12.0 fL Final    Platelets 03/17/2025 250  140 - 450 10*3/mm3 Final    Neutrophil % 03/17/2025 78.0 (H)  42.7 - 76.0 % Final    Lymphocyte % 03/17/2025 11.8 (L)  19.6 - 45.3 % Final    Monocyte % 03/17/2025 8.7  5.0 - 12.0 % Final    Eosinophil % 03/17/2025 0.6  0.3 - 6.2 % Final    Basophil % 03/17/2025 0.2  0.0 - 1.5 % Final    Immature Grans % 03/17/2025 0.7 (H)  0.0 - 0.5 % Final    Neutrophils, Absolute 03/17/2025 8.64 (H)  1.70 - 7.00 10*3/mm3 Final    Lymphocytes, Absolute 03/17/2025 1.31  0.70 - 3.10 10*3/mm3 Final    Monocytes, Absolute 03/17/2025 0.96 (H)  0.10 - 0.90 10*3/mm3 Final    Eosinophils, Absolute 03/17/2025 0.07  0.00 - 0.40 10*3/mm3 Final    Basophils, Absolute 03/17/2025 0.02  0.00 - 0.20 10*3/mm3 Final    Immature Grans, Absolute 03/17/2025 0.08 (H)  0.00 - 0.05 10*3/mm3 Final    nRBC 03/17/2025 0.0  0.0 - 0.2 /100 WBC Final    Glucose 03/18/2025 107 (H)  65 - 99 mg/dL Final    BUN 03/18/2025 15  8 - 23 mg/dL Final     Creatinine 03/18/2025 0.60  0.57 - 1.00 mg/dL Final    Sodium 03/18/2025 133 (L)  136 - 145 mmol/L Final    Potassium 03/18/2025 4.0  3.5 - 5.2 mmol/L Final    Chloride 03/18/2025 100  98 - 107 mmol/L Final    CO2 03/18/2025 24.0  22.0 - 29.0 mmol/L Final    Calcium 03/18/2025 8.4 (L)  8.6 - 10.5 mg/dL Final    BUN/Creatinine Ratio 03/18/2025 25.0  7.0 - 25.0 Final    Anion Gap 03/18/2025 9.0  5.0 - 15.0 mmol/L Final    eGFR 03/18/2025 85.9  >60.0 mL/min/1.73 Final    WBC 03/18/2025 9.72  3.40 - 10.80 10*3/mm3 Final    RBC 03/18/2025 3.50 (L)  3.77 - 5.28 10*6/mm3 Final    Hemoglobin 03/18/2025 10.5 (L)  12.0 - 15.9 g/dL Final    Hematocrit 03/18/2025 32.6 (L)  34.0 - 46.6 % Final    MCV 03/18/2025 93.1  79.0 - 97.0 fL Final    MCH 03/18/2025 30.0  26.6 - 33.0 pg Final    MCHC 03/18/2025 32.2  31.5 - 35.7 g/dL Final    RDW 03/18/2025 13.5  12.3 - 15.4 % Final    RDW-SD 03/18/2025 46.3  37.0 - 54.0 fl Final    MPV 03/18/2025 9.7  6.0 - 12.0 fL Final    Platelets 03/18/2025 233  140 - 450 10*3/mm3 Final    Neutrophil % 03/18/2025 78.0 (H)  42.7 - 76.0 % Final    Lymphocyte % 03/18/2025 11.1 (L)  19.6 - 45.3 % Final    Monocyte % 03/18/2025 7.9  5.0 - 12.0 % Final    Eosinophil % 03/18/2025 2.0  0.3 - 6.2 % Final    Basophil % 03/18/2025 0.2  0.0 - 1.5 % Final    Immature Grans % 03/18/2025 0.8 (H)  0.0 - 0.5 % Final    Neutrophils, Absolute 03/18/2025 7.58 (H)  1.70 - 7.00 10*3/mm3 Final    Lymphocytes, Absolute 03/18/2025 1.08  0.70 - 3.10 10*3/mm3 Final    Monocytes, Absolute 03/18/2025 0.77  0.10 - 0.90 10*3/mm3 Final    Eosinophils, Absolute 03/18/2025 0.19  0.00 - 0.40 10*3/mm3 Final    Basophils, Absolute 03/18/2025 0.02  0.00 - 0.20 10*3/mm3 Final    Immature Grans, Absolute 03/18/2025 0.08 (H)  0.00 - 0.05 10*3/mm3 Final    nRBC 03/18/2025 0.0  0.0 - 0.2 /100 WBC Final       CT Lower Extremity Right Without Contrast  Result Date: 3/17/2025  Narrative: CT RIGHT KNEE WITHOUT CONTRAST  HISTORY: Unable to bear  weight. Knee pain.  TECHNIQUE: CT includes axial images of the right knee and data reconstructed in coronal and sagittal planes.  COMPARISON: Right knee x-rays 03/16/2025.  FINDINGS: There is a large lipohemarthrosis with fat fluid level and hematocrit level within the suprapatellar recess. This indicates a fracture though clear fracture plane is not visualized. There is subtle cortical offset at the far posterior superior aspect of the medial femoral condyle just above the posterior superior femoral component as demonstrated on sagittal series 5 image 37. There is no evidence for displacement. Atherosclerotic calcifications are present.      Impression: Large lipohemarthrosis indicates a fracture though fracture plane is not well visualized. There is subtle cortical offset involving the far posterior superior aspect of the medial femoral condyle potentially represents the site of fracture.  Radiation dose reduction techniques were utilized, including automated exposure control and exposure modulation based on body size.   This report was finalized on 3/17/2025 4:54 PM by Guerrero Myles M.D on Workstation: [id number]      XR Knee 1 or 2 View Right  Result Date: 3/16/2025  Narrative: 2 VIEWS RIGHT KNEE  HISTORY: Right knee pain  COMPARISON: None available.  FINDINGS: The patient is status post right knee arthroplasty. Hardware appears to project in expected position. No acute fracture or subluxation is seen. There does appear to be a suprapatellar effusion. Dense vascular calcifications are noted.      Impression: No acute fracture or subluxation identified.  This report was finalized on 3/16/2025 7:28 PM by Dr. Violette Hendricks M.D on Workstation: [id number]          ASSESSMENT:  Ms. Hutson is an 89-year-old female with right posterior femur cortical irregularity    Knee pain    Gastroesophageal reflux disease    Essential hypertension    Colitis    Primary osteoarthritis of right knee    History of total  right knee replacement    Rheumatoid arthritis involving multiple sites    MGUS (monoclonal gammopathy of unknown significance)      PLAN:    I discussed with the patient and her family that she may have a small fracture in the posterior medial femur from where she caught herself when her knee gave out.  We discussed I do not see a fracture line traversing the femur so I do not see an indication for operative fixation.  We can get her a knee immobilizer.  We can have her nonweightbearing on the right lower extremity.  We can follow this with x-rays in the office.    Will plan to see her back in the office in 2 weeks.  She can call [phone] for appointment.      The above diagnosis and treatment plan was discussed with the patient.  They were educated in treatment options for their condition.   They were given the opportunity to ask questions and were answered to their satisfaction.  They agreed to proceed with the above treatment plan.        Dominic Calloway MD  Date: 3/18/2025

## 2025-03-18 NOTE — PLAN OF CARE
Goal Outcome Evaluation:  Plan of Care Reviewed With: patient           Outcome Evaluation: Pt now NWB R LE with knee immobilizer on, okay to remove KI when resting but keep R knee straight per Dr. Calloway via epic chat.  Today pt able to stand w/ mod A x2, difficulty using RW and maintaining NWB R LE.  Able to pivot bed>recliner w/ mod A x2, UE support on chair armrest.  Recommend pivoting bed<>chair w/ nursing staff and DC to SNU.    Anticipated Discharge Disposition (PT): skilled nursing facility

## 2025-03-18 NOTE — THERAPY TREATMENT NOTE
Patient Name: Arlin Hutson  : 1935    MRN: [id number]                              Today's Date: 3/18/2025       Admit Date: 3/16/2025    Visit Dx:     ICD-10-CM ICD-9-CM   1. Injury of right knee, initial encounter  S89.91XA 959.7     Patient Active Problem List   Diagnosis    Gastroesophageal reflux disease    Osteopenia of multiple sites    Essential hypertension    Colitis    Primary osteoarthritis of right knee    Primary osteoarthritis of left knee    Pneumonia of left lower lobe due to infectious organism    History of total right knee replacement    Abnormal CT of the abdomen    Rheumatoid arthritis involving multiple sites    MGUS (monoclonal gammopathy of unknown significance)    Normocytic anemia    Arterial occlusion    Iron deficiency anemia    Adverse effect of iron    Paronychia    Paresthesia    Pain in right foot    Pain in limb    Onychomycosis due to dermatophyte    Onychocryptosis    Metatarsalgia of left foot    At high risk for falls    Knee pain    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    Atheroembolism of right lower extremity     Baker's cyst     Colon polyp     CREST syndrome     Difficulty walking About 5 yrs ago    I  use a cane when I leave my house    Fracture of wrist     Surgery about 19 years ago on ribght wrist    Fractures     GERD (gastroesophageal reflux disease)     Hip arthrosis ? Maybe 7 or 8 years ago    History of CT scan of abdomen 2011    constipation, sig tics, 6 mm hepatic cyst    History of rheumatoid arthritis     HL (hearing loss) About     Hyperlipidemia     Hypertension     Irritable bowel syndrome 1990s    Knee swelling 5 or 6 years ago    Right knee replacement 4 years [facility]    Low back pain     Normocytic anemia 2020    Osteoarthritis     Peripheral neuropathy Cannot remember    I have Raynaudsin my hands and  feet    Raynaud's disease     Rheumatoid arthritis     Scleroderma     Shingles     I have had both shingles vaccines    Sjogren's syndrome     Tear of meniscus of knee     Torn meniscus surgery    Ulcerative colitis      Past Surgical History:   Procedure Laterality Date    CATARACT EXTRACTION, BILATERAL      COLONOSCOPY  02/26/2016    tics, microscopic colitis,     COLONOSCOPY  07/01/2011    sig tics, inflammation, polyp    DILATATION AND CURETTAGE  1980    EKOS CATHETER PLACEMENT N/A 03/23/2021    Procedure: AIF WITH RUN OFF OF RT. LEG, ROTATIONAL ATHERECTOMY OF RIGHT POPLITEAL ARTERY;  Surgeon: Gato Contreras MD;  Location: [facility] OR 18/19;  Service: Vascular;  Laterality: N/A;    ENDOSCOPY N/A 12/03/2018    erythematous mucosa in stomach, path benign    EYE SURGERY      FLEXIBLE SIGMOIDOSCOPY      FRACTURE SURGERY      HAND SURGERY      JOINT REPLACEMENT      KNEE ARTHROSCOPY Right     w/meniscal repair    KNEE SURGERY Right     TEETH EXTRACTION      TOTAL KNEE ARTHROPLASTY Right 05/30/2018    Procedure: TOTAL KNEE ARTHROPLASTY;  Surgeon: Georges Jon MD;  Location: [facility] OR;  Service: Orthopedics    TRANSLUMINAL ATHERECTOMY POPLITEAL ARTERY      TRIGGER POINT INJECTION      UPPER GASTROINTESTINAL ENDOSCOPY  03/14/2008    erythema    VASCULAR SURGERY  Spring of 2020    Blood clot in an artery behind my right knee    WRIST FRACTURE SURGERY Right     WRIST SURGERY Right 2008    orif      General Information       Row Name 03/18/25 1548          Physical Therapy Time and Intention    Document Type therapy note (daily note)  -AR     Mode of Treatment physical therapy;individual therapy  -AR       Row Name 03/18/25 1548          General Information    Patient Profile Reviewed yes  -AR     Existing Precautions/Restrictions fall  NWB R LE w/ Knee immobilzer on.  Okay to remove KI when resting, but needs to keep R knee straight per Dr. Calloway via epic chat  -AR     Barriers to Rehab none  identified  -AR       Row Name 03/18/25 1548          Cognition    Orientation Status (Cognition) oriented x 4  -AR               User Key  (r) = Recorded By, (t) = Taken By, (c) = Cosigned By      Initials Name Provider Type    AR Justina Jamil, PT Physical Therapist                   Mobility       Row Name 03/18/25 1549          Bed Mobility    Supine-Sit Jack (Bed Mobility) contact guard;verbal cues  -AR     Assistive Device (Bed Mobility) head of bed elevated  -AR       Row Name 03/18/25 1549          Bed-Chair Transfer    Bed-Chair Jack (Transfers) moderate assist (50% patient effort);2 person assist  -AR     Assistive Device (Bed-Chair Transfers) --  reaching for armrest of chair  -AR     Comment, (Bed-Chair Transfer) pivot bed>chair towards L side  -AR       Row Name 03/18/25 1549          Sit-Stand Transfer    Sit-Stand Jack (Transfers) moderate assist (50% patient effort);minimum assist (75% patient effort);2 person assist  -AR     Assistive Device (Sit-Stand Transfers) walker, front-wheeled  -AR     Comment, (Sit-Stand Transfer) difficulty maintaining NWB R LE w/ RW  -AR       Row Name 03/18/25 1549          Gait/Stairs (Locomotion)    Jack Level (Gait) moderate assist (50% patient effort);2 person assist  -AR     Assistive Device (Gait) walker, front-wheeled  -AR     Distance in Feet (Gait) --  able to do 1 side steps towards HOB, diffiuclty maintaining NWB R LE  -AR       Row Name 03/18/25 1551          Mobility    Extremity Weight-bearing Status right lower extremity  -AR     Right Lower Extremity (Weight-bearing Status) non weight-bearing (NWB)  with knee immobilizer  -AR               User Key  (r) = Recorded By, (t) = Taken By, (c) = Cosigned By      Initials Name Provider Type    Justina Rocha, PT Physical Therapist                   Obj/Interventions       Row Name 03/18/25 1550          Balance    Static Standing Balance minimal assist;contact  guard;2-person assist  -AR     Position/Device Used, Standing Balance walker, front-wheeled  -AR               User Key  (r) = Recorded By, (t) = Taken By, (c) = Cosigned By      Initials Name Provider Type    AR Justina Jamil, PT Physical Therapist                   Goals/Plan       Row Name 03/18/25 7496          Bed Mobility Goal 1 (PT)    Activity/Assistive Device (Bed Mobility Goal 1, PT) bed mobility activities, all  -AR     [place] Level/Cues Needed (Bed Mobility Goal 1, PT) standby assist  -AR     Time Frame (Bed Mobility Goal 1, PT) 2 weeks  -AR     Progress/Outcomes (Bed Mobility Goal 1, PT) goal ongoing  -AR       Row Name 03/18/25 8006          Transfer Goal 1 (PT)    Activity/Assistive Device (Transfer Goal 1, PT) sit-to-stand/stand-to-sit;bed-to-chair/chair-to-bed;walker, rolling  -AR     [place] Level/Cues Needed (Transfer Goal 1, PT) contact guard required  -AR     Time Frame (Transfer Goal 1, PT) 2 weeks  -AR     Strategies/Barriers (Transfers Goal 1, PT) maintaining NWB R LE  -AR     Progress/Outcome (Transfer Goal 1, PT) goal revised this date  -AR       Row Name 03/18/25 4176          Gait Training Goal 1 (PT)    Activity/Assistive Device (Gait Training Goal 1, PT) gait (walking locomotion);walker, rolling  -AR     [place] Level (Gait Training Goal 1, PT) contact guard required  -AR     Distance (Gait Training Goal 1, PT) 5  -AR     Time Frame (Gait Training Goal 1, PT) 2 weeks  -AR     Strategies/Barriers (Gait Training Goal 1, PT) maintaining NWB  R LE  -AR     Progress/Outcome (Gait Training Goal 1, PT) goal revised this date  -AR       Row Name 03/18/25 3996          Therapy Assessment/Plan (PT)    Planned Therapy Interventions (PT) balance training;bed mobility training;gait training;home exercise program;joint mobilization;patient/family education;strengthening;transfer training  -AR               User Key  (r) = Recorded By, (t) = Taken By, (c) = Cosigned By      Initials  Name Provider Type    AR Justina Jamil, PT Physical Therapist                   Clinical Impression       Row Name 03/18/25 1552          Pain    Pretreatment Pain Rating 2/10  -AR     Posttreatment Pain Rating 4/10  -AR     Pain Location knee  -AR     Pain Side/Orientation right  -AR     Response to Pain Interventions activity participation with increased pain;activity participation with tolerable pain  -AR     Pre/Posttreatment Pain Comment improved pain control w/ knee immobilizer  -AR       Row Name 03/18/25 1552          Plan of Care Review    Plan of Care Reviewed With patient  -AR     Outcome Evaluation Pt now NWB R LE with knee immobilizer on, okay to remove KI when resting but keep R knee straight per Dr. Calloway via epic chat.  Today pt able to stand w/ mod A x2, difficulty using RW and maintaining NWB R LE.  Able to pivot bed>recliner w/ mod A x2, UE support on chair armrest.  Recommend pivoting bed<>chair w/ nursing staff and DC to SNU.  -AR       Row Name 03/18/25 1552          Therapy Assessment/Plan (PT)    Rehab Potential (PT) good  -AR     Criteria for Skilled Interventions Met (PT) yes  -AR     Therapy Frequency (PT) 5 times/wk  -AR       Row Name 03/18/25 1552          Vital Signs    O2 Delivery Pre Treatment room air  -AR       Row Name 03/18/25 1552          Positioning and Restraints    Pre-Treatment Position in bed  -AR     Post Treatment Position chair  -AR     In Chair notified nsg;reclined;sitting;call light within reach;encouraged to call for assist;exit alarm on;with family/caregiver  -AR               User Key  (r) = Recorded By, (t) = Taken By, (c) = Cosigned By      Initials Name Provider Type    AR Justina Jamil, PT Physical Therapist                   Outcome Measures       Row Name 03/18/25 1559 03/18/25 0823       How much help from another person do you currently need...    Turning from your back to your side while in flat bed without using bedrails? 4  -AR 3  -ST    Moving  from lying on back to sitting on the side of a flat bed without bedrails? 3  -AR 3  -ST    Moving to and from a bed to a chair (including a wheelchair)? 2  -AR 2  -ST    Standing up from a chair using your arms (e.g., wheelchair, bedside chair)? 2  -AR 2  -ST    Climbing 3-5 steps with a railing? 1  -AR 1  -ST    To walk in hospital room? 1  -AR 2  -ST    AM-PAC 6 Clicks Score (PT) 13  -AR 13  -ST    Highest Level of Mobility Goal 4 --> Transfer to chair/commode  -AR 4 --> Transfer to chair/commode  -ST      Row Name 03/18/25 1559          Functional Assessment    Outcome Measure Options AM-PAC 6 Clicks Basic Mobility (PT)  -AR               User Key  (r) = Recorded By, (t) = Taken By, (c) = Cosigned By      Initials Name Provider Type    Justina Rocha, PT Physical Therapist    Ambar Cross, RN Registered Nurse                                 Physical Therapy Education       Title: PT OT SLP Therapies (In Progress)       Topic: Physical Therapy (In Progress)       Point: Mobility training (In Progress)       Learning Progress Summary            Patient Acceptance, E, NR by AR at 3/18/2025 1559    Acceptance, E, VU by  at 3/17/2025 1106                      Point: Home exercise program (In Progress)       Learning Progress Summary            Patient Acceptance, E, NR by AR at 3/18/2025 1559    Acceptance, E, VU by SM at 3/17/2025 1106                      Point: Body mechanics (In Progress)       Learning Progress Summary            Patient Acceptance, E, NR by AR at 3/18/2025 1559    Acceptance, E, VU by  at 3/17/2025 1106                      Point: Precautions (In Progress)       Learning Progress Summary            Patient Acceptance, E, NR by AR at 3/18/2025 1559    Acceptance, E, VU by  at 3/17/2025 1106                                      User Key       Initials Effective Dates Name Provider Type Discipline    AR 06/16/21 -  Justina Jamil, PT Physical Therapist PT     05/02/22 -   Andressa Torres, PT Physical Therapist PT                  PT Recommendation and Plan  Planned Therapy Interventions (PT): balance training, bed mobility training, gait training, home exercise program, joint mobilization, patient/family education, strengthening, transfer training  Outcome Evaluation: Pt now NWB R LE with knee immobilizer on, okay to remove KI when resting but keep R knee straight per Dr. Calloway via epic chat.  Today pt able to stand w/ mod A x2, difficulty using RW and maintaining NWB R LE.  Able to pivot bed>recliner w/ mod A x2, UE support on chair armrest.  Recommend pivoting bed<>chair w/ nursing staff and DC to SNU.     Time Calculation:         PT Charges       Row Name 03/18/25 1547             Time Calculation    Start Time 1501  -AR      Stop Time 1535  -AR      Time Calculation (min) 34 min  -AR      PT Received On 03/18/25  -AR      PT - Next Appointment 03/19/25  -AR                User Key  (r) = Recorded By, (t) = Taken By, (c) = Cosigned By      Initials Name Provider Type    AR Justina Jamil PT Physical Therapist                  Therapy Charges for Today       Code Description Service Date Service Provider Modifiers Qty    59454378570 HC PT THER PROC EA 15 MIN 3/18/2025 Justina Jamil, PT GP 2    15237275834 HC PT THER SUPP EA 15 MIN 3/18/2025 Justina Jamil, PT GP 2            PT G-Codes  Outcome Measure Options: AM-PAC 6 Clicks Basic Mobility (PT)  AM-PAC 6 Clicks Score (PT): 13  AM-PAC 6 Clicks Score (OT): 15  Modified Jaclyn Scale: 4 - Moderately severe disability.  Unable to walk without assistance, and unable to attend to own bodily needs without assistance.  PT Discharge Summary  Anticipated Discharge Disposition (PT): skilled nursing facility    Justina Jamil PT  3/18/2025

## 2025-03-18 NOTE — PLAN OF CARE
Goal Outcome Evaluation:  Plan of Care Reviewed With: patient        Progress: no change  Outcome Evaluation: vss. nvi. pain with movement to R knee, unable to bear weight. PW and breif. x2 stand pivot chair. pain managed with PO meds. plan to d/c to rehab when stable.

## 2025-03-18 NOTE — DISCHARGE PLACEMENT REQUEST
"Arlin Hutson (89 y.o. Female)       Date of Birth   1935    Social Security Number       Address   [address] [facility] [place] [address]    Home Phone       MRN   [id number]       Confucianist   [facility]    Marital Status   Single                            Admission Date   3/16/2025    Admission Type   Emergency    Admitting Provider   Rosanne Brown MD    Attending Provider   Con Santana MD    Department, Room/Bed   [address], P789/1       Discharge Date       Discharge Disposition       Discharge Destination                                 Attending Provider: Con Santana MD    Allergies: Codeine, Penicillin G, Sulfa Antibiotics    Isolation: None   Infection: None   Code Status: No CPR    Ht: 152.4 cm (60\")   Wt: 49.9 kg (110 lb 0.2 oz)    Admission Cmt: None   Principal Problem: Knee pain [M25.569]                   Active Insurance as of 3/16/2025       Primary Coverage       Payor Plan Insurance Group Employer/Plan Group    MEDICARE MEDICARE A & B        Payor Plan Address Payor Plan Phone Number Payor Plan Fax Number Effective Dates    [address] [phone]  11/1/2000 - None Entered    [facility] [id number]         Subscriber Name Subscriber Birth Date Member ID       ARLIN HUTSON 1935 [id number]               Secondary Coverage       Payor Plan Insurance Group Employer/Plan Group    [facility] SUPP [id number]       Payor Plan Address Payor Plan Phone Number Payor Plan Fax Number Effective Dates    [address]   12/1/2016 - None Entered    [facility] [id number]         Subscriber Name Subscriber Birth Date Member ID       ARLIN HUTSON 1935 [id number]                     Emergency Contacts        (Rel.) Home Phone Work Phone Mobile Phone    Mumtaz Hutson (Son) -- -- [phone]    KwesiErin (Daughter) [phone] -- --            "

## 2025-03-18 NOTE — PLAN OF CARE
Goal Outcome Evaluation:           Progress: no change    Patient A&Ox4. Room air. Able to sit up in chair today. Assist x2 stand and pivot. NWB to RLE. BM today. Voiding via purewick. Access consulted today per patient and son request, see notes. Plans to discharge to SNF Friday.

## 2025-03-19 LAB
ANION GAP SERPL CALCULATED.3IONS-SCNC: 9 MMOL/L (ref 5–15)
BUN SERPL-MCNC: 15 MG/DL (ref 8–23)
BUN/CREAT SERPL: 25 (ref 7–25)
CALCIUM SPEC-SCNC: 8.5 MG/DL (ref 8.6–10.5)
CHLORIDE SERPL-SCNC: 103 MMOL/L (ref 98–107)
CO2 SERPL-SCNC: 25 MMOL/L (ref 22–29)
CREAT SERPL-MCNC: 0.6 MG/DL (ref 0.57–1)
EGFRCR SERPLBLD CKD-EPI 2021: 85.9 ML/MIN/1.73
GLUCOSE SERPL-MCNC: 105 MG/DL (ref 65–99)
POTASSIUM SERPL-SCNC: 3.9 MMOL/L (ref 3.5–5.2)
SODIUM SERPL-SCNC: 137 MMOL/L (ref 136–145)

## 2025-03-19 PROCEDURE — 80048 BASIC METABOLIC PNL TOTAL CA: CPT | Performed by: STUDENT IN AN ORGANIZED HEALTH CARE EDUCATION/TRAINING PROGRAM

## 2025-03-19 PROCEDURE — 25010000002 ENOXAPARIN PER 10 MG: Performed by: STUDENT IN AN ORGANIZED HEALTH CARE EDUCATION/TRAINING PROGRAM

## 2025-03-19 RX ORDER — ENOXAPARIN SODIUM 100 MG/ML
40 INJECTION SUBCUTANEOUS EVERY 24 HOURS
Status: DISCONTINUED | OUTPATIENT
Start: 2025-03-19 | End: 2025-03-21 | Stop reason: HOSPADM

## 2025-03-19 RX ADMIN — ACETAMINOPHEN 1000 MG: 500 TABLET, FILM COATED ORAL at 14:08

## 2025-03-19 RX ADMIN — TRAMADOL HYDROCHLORIDE 50 MG: 50 TABLET, COATED ORAL at 08:00

## 2025-03-19 RX ADMIN — CALCIUM POLYCARBOPHIL 625 MG: 625 TABLET, FILM COATED ORAL at 08:00

## 2025-03-19 RX ADMIN — Medication 10 ML: at 08:00

## 2025-03-19 RX ADMIN — ATORVASTATIN CALCIUM 20 MG: 20 TABLET, FILM COATED ORAL at 08:00

## 2025-03-19 RX ADMIN — BUDESONIDE 9 MG: 3 CAPSULE ORAL at 08:00

## 2025-03-19 RX ADMIN — ACETAMINOPHEN 1000 MG: 500 TABLET, FILM COATED ORAL at 22:15

## 2025-03-19 RX ADMIN — AMLODIPINE BESYLATE 2.5 MG: 2.5 TABLET ORAL at 08:00

## 2025-03-19 RX ADMIN — ENOXAPARIN SODIUM 40 MG: 100 INJECTION SUBCUTANEOUS at 14:08

## 2025-03-19 RX ADMIN — ACETAMINOPHEN 1000 MG: 500 TABLET, FILM COATED ORAL at 05:10

## 2025-03-19 RX ADMIN — LIDOCAINE 1 PATCH: 4 PATCH TOPICAL at 08:00

## 2025-03-19 RX ADMIN — PANTOPRAZOLE SODIUM 40 MG: 40 TABLET, DELAYED RELEASE ORAL at 08:00

## 2025-03-19 RX ADMIN — TRAZODONE HYDROCHLORIDE 50 MG: 50 TABLET ORAL at 21:44

## 2025-03-19 RX ADMIN — Medication 10 ML: at 21:53

## 2025-03-19 RX ADMIN — TRAMADOL HYDROCHLORIDE 50 MG: 50 TABLET, COATED ORAL at 21:44

## 2025-03-19 RX ADMIN — LISINOPRIL 10 MG: 10 TABLET ORAL at 08:00

## 2025-03-19 RX ADMIN — ESCITALOPRAM 20 MG: 20 TABLET, FILM COATED ORAL at 05:10

## 2025-03-19 NOTE — PLAN OF CARE
Goal Outcome Evaluation:  Plan of Care Reviewed With: patient        Progress: no change  Outcome Evaluation: vss. nvi. knee in immoblizer. x2 stand pivot. pain managed with PO meds. nonWB. plan to d/c to rehab possibly friday.

## 2025-03-19 NOTE — PROGRESS NOTES
Name: Arlin Hutson ADMIT: 3/16/2025   : 1935  PCP: Oren Rust Jr., MD    MRN: [id number] LOS: 1 days   AGE/SEX: 89 y.o. female  ROOM: [facility]     Subjective   Had a difficult time working with physical therapy yesterday. BP was also high yesterday     Objective   Objective   Vital Signs  Temp:  [97.5 °F (36.4 °C)-98.1 °F (36.7 °C)] 97.5 °F (36.4 °C)  Heart Rate:  [59-71] 65  Resp:  [16] 16  BP: (125-176)/(66-73) 125/70  SpO2:  [93 %-99 %] 99 %  on   ;   Device (Oxygen Therapy): room air  Body mass index is 21.48 kg/m².  Physical Exam  Constitutional:       General: She is not in acute distress.  HENT:      Head: Normocephalic and atraumatic.   Eyes:      Extraocular Movements: Extraocular movements intact.      Pupils: Pupils are equal, round, and reactive to light.   Cardiovascular:      Rate and Rhythm: Normal rate and regular rhythm.   Pulmonary:      Effort: Pulmonary effort is normal. No respiratory distress.   Abdominal:      General: There is no distension.      Palpations: Abdomen is soft.      Tenderness: There is no abdominal tenderness.   Musculoskeletal:      Comments: Right knee in immobilizer   Neurological:      Mental Status: She is alert and oriented to person, place, and time.         Results Review     I reviewed the patient's new clinical results.  Results from last 7 days   Lab Units 25   WBC 10*3/mm3 9.72 11.08* 10.68   HEMOGLOBIN g/dL 10.5* 11.2* 11.9*   PLATELETS 10*3/mm3 233 250 270     Results from last 7 days   Lab Units 25  0353 250 25   SODIUM mmol/L 137 133* 140 137   POTASSIUM mmol/L 3.9 4.0 3.8 4.0   CHLORIDE mmol/L 103 100 105 102   CO2 mmol/L 25.0 24.0 25.6 20.8*   BUN mg/dL 15 15 13 15   CREATININE mg/dL 0.60 0.60 0.49* 0.53*   GLUCOSE mg/dL 105* 107* 95 97   Estimated Creatinine Clearance: 50.1 mL/min (by C-G formula based on SCr of 0.6 mg/dL).  Results from last 7 days  "  Lab Units 03/16/25 2002   ALBUMIN g/dL 4.0   BILIRUBIN mg/dL 0.3   ALK PHOS U/L 89   AST (SGOT) U/L 17   ALT (SGPT) U/L 18     Results from last 7 days   Lab Units 03/19/25  0353 03/18/25  0410 03/17/25  0526 03/16/25 2002   CALCIUM mg/dL 8.5* 8.4* 8.6 8.6   ALBUMIN g/dL  --   --   --  4.0       COVID19   Date Value Ref Range Status   03/22/2021 Not Detected Not Detected - Ref. Range Final     SARS-CoV-2 PCR   Date Value Ref Range Status   01/11/2021 Not Detected Not Detected Final     Comment:     Nucleic acid specific to SARS-CoV-2 (COVID-19) virus was not detected in  this sample by the TaqPath (TM) COVID-19 Combo Kit.          SARS-CoV-2 (COVID-19) nucleic acid testing performed using [employer] Aptima (R) SARS-CoV-2 Assay or [employer] TaqPath (TM)  COVID-19 Combo Kit as indicated above under Emergency Use Authorization (EUA) from the FDA. Aptima (R) and TaqPath (TM) test performance  characteristics verified by [facility] in accordance with the FDAs Guidance Document (Policy for Diagnostic Tests for Coronavirus Disease-2019  during the Public Health Emergency) issued on March 16, 2020. The laboratory is regulated under CLIA as qualified to perform high-complexity testing  Unless otherwise noted, all testing was performed at [facility], CLIA No. [id number], [place] State Licensee No. [id number]     No results found for: \"HGBA1C\", \"POCGLU\"  Results for orders placed or performed during the hospital encounter of 06/02/18   Blood Culture - Blood,    Specimen: Arm, Left; Blood   Result Value Ref Range    Blood Culture No growth at 5 days          CT Lower Extremity Right Without Contrast  Narrative: CT RIGHT KNEE WITHOUT CONTRAST     HISTORY: Unable to bear weight. Knee pain.      TECHNIQUE: CT includes axial images of the right knee and data  reconstructed in coronal and sagittal planes.     COMPARISON: Right knee x-rays 03/16/2025.     FINDINGS: There is a large lipohemarthrosis with fat fluid level " and  hematocrit level within the suprapatellar recess. This indicates a  fracture though clear fracture plane is not visualized. There is subtle  cortical offset at the far posterior superior aspect of the medial  femoral condyle just above the posterior superior femoral component as  demonstrated on sagittal series 5 image 37. There is no evidence for  displacement. Atherosclerotic calcifications are present.     Impression: Large lipohemarthrosis indicates a fracture though fracture  plane is not well visualized. There is subtle cortical offset involving  the far posterior superior aspect of the medial femoral condyle  potentially represents the site of fracture.     Radiation dose reduction techniques were utilized, including automated  exposure control and exposure modulation based on body size.        This report was finalized on 3/17/2025 4:54 PM by Guerrero Myles M.D  on Workstation: [id number]       Scheduled Medications  acetaminophen, 1,000 mg, Oral, Q8H  amLODIPine, 2.5 mg, Oral, Daily  atorvastatin, 20 mg, Oral, Daily  Budesonide, 9 mg, Oral, Daily  escitalopram, 20 mg, Oral, QAM  Lidocaine, 1 patch, Transdermal, Q24H  lisinopril, 10 mg, Oral, Q24H  pantoprazole, 40 mg, Oral, Once per day on Monday Wednesday Friday  polycarbophil, 625 mg, Oral, Daily  sodium chloride, 10 mL, Intravenous, Q12H  traZODone, 50 mg, Oral, Nightly    Infusions   Diet  Diet: Regular/House; Fluid Consistency: Thin (IDDSI 0)       Assessment/Plan     Active Hospital Problems    Diagnosis  POA    **Knee pain [M25.569]  Yes    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       89 y.o. female admitted with Knee pain.      Right knee pain  S/p right knee arthroplasty  Mechanical fall  -XR right knee with no subluxation or fractures  -CT RLE noted- concern for occult RLE fracture within suprapetellar recess - ortho consult placed-no need for surgical intervention at this time.  Plan for knee immobilizer and physical therapy/Occupational Therapy  -PT/OT consulted- she will need SNF   -Scheduled Tylenol tramadol as needed for breakthrough pain  -Bowel regimen as needed     Hypertension  -Home meds: Amlodipine  -BP mildly elevated at this time, likely secondary to pain  -Restart home amlodipine, added lisinopril      Rheumatoid arthritis  CREST syndrome, Sjogren's syndrome  -Follows with outside rheumatologist  -Weekly methotrexate injections     Microscopic colitis  GERD  -Stable, no change in symptoms currently  -Continue home budesonide and PPI     MGUS  Anemia  -Follows with CBC  -Undergoing observation/surveillance at this time     Peripheral vascular disease  -Follows with Dr. Collier  -ABIs have been normal, thought to be secondary to collaterals.  He is monitoring clinically with routine ABIs at this time.  -Continue statin    Lovenox for DVT ppx  Full code  Discussed with patient and family  Anticipate discharge to skilled nursing facility        Con Santana MD  [facility]  03/19/25  13:13 EDT

## 2025-03-19 NOTE — PLAN OF CARE
Problem: Adult Inpatient Plan of Care  Goal: Plan of Care Review  Outcome: Progressing  Flowsheets (Taken 3/19/2025 1638)  Progress: improving  Outcome Evaluation: VSS, room air, sat in chair with encouragement, pain controlled, dc when rehab bed available.  Plan of Care Reviewed With: patient  Goal: Patient-Specific Goal (Individualized)  Outcome: Progressing  Goal: Absence of Hospital-Acquired Illness or Injury  Outcome: Progressing  Intervention: Identify and Manage Fall Risk  Recent Flowsheet Documentation  Taken 3/19/2025 1609 by Dariela Olvera RN  Safety Promotion/Fall Prevention:   activity supervised   assistive device/personal items within reach   clutter free environment maintained   fall prevention program maintained   nonskid shoes/slippers when out of bed   room organization consistent   safety round/check completed  Taken 3/19/2025 1400 by Dariela Olvera RN  Safety Promotion/Fall Prevention:   activity supervised   assistive device/personal items within reach   clutter free environment maintained   fall prevention program maintained   nonskid shoes/slippers when out of bed   room organization consistent   safety round/check completed  Taken 3/19/2025 1208 by Dariela Olvera RN  Safety Promotion/Fall Prevention:   activity supervised   assistive device/personal items within reach   clutter free environment maintained   fall prevention program maintained   nonskid shoes/slippers when out of bed   room organization consistent   safety round/check completed  Taken 3/19/2025 0950 by Dariela Olvera RN  Safety Promotion/Fall Prevention:   activity supervised   assistive device/personal items within reach   clutter free environment maintained   fall prevention program maintained   nonskid shoes/slippers when out of bed   room organization consistent   safety round/check completed  Taken 3/19/2025 0758 by Dariela Olvera RN  Safety Promotion/Fall Prevention:   activity supervised    assistive device/personal items within reach   clutter free environment maintained   fall prevention program maintained   nonskid shoes/slippers when out of bed   room organization consistent   safety round/check completed  Intervention: Prevent Skin Injury  Recent Flowsheet Documentation  Taken 3/19/2025 1609 by Dariela Olvera RN  Body Position: supine  Taken 3/19/2025 1400 by Dariela Olvera RN  Body Position: legs elevated  Taken 3/19/2025 1208 by Dariela Olvera RN  Body Position: legs elevated  Taken 3/19/2025 0950 by Dariela Olvera RN  Body Position: (educated on PI prevention)   patient/family refused   supine  Taken 3/19/2025 0758 by Dariela Olvera RN  Body Position:   supine   patient/family refused  Goal: Optimal Comfort and Wellbeing  Outcome: Progressing  Goal: Readiness for Transition of Care  Outcome: Progressing     Problem: Comorbidity Management  Goal: Blood Pressure in Desired Range  Outcome: Progressing  Goal: Maintenance of Osteoarthritis Symptom Control  Outcome: Progressing  Intervention: Maintain Osteoarthritis Symptom Control  Recent Flowsheet Documentation  Taken 3/19/2025 1609 by Dariela Olvera RN  Activity Management: back to bed  Taken 3/19/2025 1400 by Dariela Olvera RN  Activity Management: up in chair  Taken 3/19/2025 1208 by Dariela Olvera RN  Activity Management: up in chair  Taken 3/19/2025 0950 by Dariela Olvera RN  Activity Management: patient refuses activity  Taken 3/19/2025 0758 by Dariela Olvera RN  Activity Management: activity encouraged     Problem: Skin Injury Risk Increased  Goal: Skin Health and Integrity  Outcome: Progressing  Intervention: Optimize Skin Protection  Recent Flowsheet Documentation  Taken 3/19/2025 1609 by Dariela Olvera RN  Activity Management: back to bed  Head of Bed (HOB) Positioning: HOB at 30-45 degrees  Taken 3/19/2025 1400 by Dariela Olvera RN  Activity Management: up in chair  Taken  3/19/2025 1208 by Dariela Olvera RN  Activity Management: up in chair  Taken 3/19/2025 1000 by Dariela Olvera RN  Pressure Reduction Techniques:   heels elevated off bed   frequent weight shift encouraged   weight shift assistance provided  Pressure Reduction Devices: alternating pressure pump (STEPHON)  Taken 3/19/2025 0950 by Dariela Olvera RN  Activity Management: patient refuses activity  Head of Bed (HOB) Positioning: HOB at 30-45 degrees  Taken 3/19/2025 0758 by Dariela Olvera RN  Activity Management: activity encouraged  Head of Bed (HOB) Positioning: HOB at 30-45 degrees     Problem: Fall Injury Risk  Goal: Absence of Fall and Fall-Related Injury  Outcome: Progressing  Intervention: Promote Injury-Free Environment  Recent Flowsheet Documentation  Taken 3/19/2025 1609 by Dariela Olvera RN  Safety Promotion/Fall Prevention:   activity supervised   assistive device/personal items within reach   clutter free environment maintained   fall prevention program maintained   nonskid shoes/slippers when out of bed   room organization consistent   safety round/check completed  Taken 3/19/2025 1400 by Dariela Olvera RN  Safety Promotion/Fall Prevention:   activity supervised   assistive device/personal items within reach   clutter free environment maintained   fall prevention program maintained   nonskid shoes/slippers when out of bed   room organization consistent   safety round/check completed  Taken 3/19/2025 1208 by Dariela Olvera RN  Safety Promotion/Fall Prevention:   activity supervised   assistive device/personal items within reach   clutter free environment maintained   fall prevention program maintained   nonskid shoes/slippers when out of bed   room organization consistent   safety round/check completed  Taken 3/19/2025 0950 by Dariela Olvera RN  Safety Promotion/Fall Prevention:   activity supervised   assistive device/personal items within reach   clutter free environment  maintained   fall prevention program maintained   nonskid shoes/slippers when out of bed   room organization consistent   safety round/check completed  Taken 3/19/2025 0758 by Dariela Olvera RN  Safety Promotion/Fall Prevention:   activity supervised   assistive device/personal items within reach   clutter free environment maintained   fall prevention program maintained   nonskid shoes/slippers when out of bed   room organization consistent   safety round/check completed   Goal Outcome Evaluation:  Plan of Care Reviewed With: patient        Progress: improving  Outcome Evaluation: VSS, room air, sat in chair with encouragement, pain controlled, dc when rehab bed available.

## 2025-03-19 NOTE — PROGRESS NOTES
[facility] follow up d/t depression and grief/loss; this writer reviewed chart and spoke with DEVIN Warner. Per RN, patient is doing fine, no behavioral health concerns noted at present; she/pt slept overnight.  Patient reports feeling very supported by social support/system; she was encouraged to reach out to [facility] and/or nursing if she desires outpatient behavioral health resources.  Discharge plan [facility] possibly Friday; CCP involved and providing assistance. No further needs/concerns noted at this time per RN and/or medical team; [facility] to continue following.

## 2025-03-20 LAB
ANION GAP SERPL CALCULATED.3IONS-SCNC: 10 MMOL/L (ref 5–15)
BUN SERPL-MCNC: 15 MG/DL (ref 8–23)
BUN/CREAT SERPL: 27.3 (ref 7–25)
CALCIUM SPEC-SCNC: 8.5 MG/DL (ref 8.6–10.5)
CHLORIDE SERPL-SCNC: 105 MMOL/L (ref 98–107)
CO2 SERPL-SCNC: 20 MMOL/L (ref 22–29)
CREAT SERPL-MCNC: 0.55 MG/DL (ref 0.57–1)
DEPRECATED RDW RBC AUTO: 45 FL (ref 37–54)
EGFRCR SERPLBLD CKD-EPI 2021: 87.7 ML/MIN/1.73
ERYTHROCYTE [DISTWIDTH] IN BLOOD BY AUTOMATED COUNT: 13.4 % (ref 12.3–15.4)
GLUCOSE SERPL-MCNC: 84 MG/DL (ref 65–99)
HCT VFR BLD AUTO: 37.6 % (ref 34–46.6)
HGB BLD-MCNC: 12.6 G/DL (ref 12–15.9)
MCH RBC QN AUTO: 30.9 PG (ref 26.6–33)
MCHC RBC AUTO-ENTMCNC: 33.5 G/DL (ref 31.5–35.7)
MCV RBC AUTO: 92.2 FL (ref 79–97)
PLATELET # BLD AUTO: 259 10*3/MM3 (ref 140–450)
PMV BLD AUTO: 9.9 FL (ref 6–12)
POTASSIUM SERPL-SCNC: 4.3 MMOL/L (ref 3.5–5.2)
RBC # BLD AUTO: 4.08 10*6/MM3 (ref 3.77–5.28)
SODIUM SERPL-SCNC: 135 MMOL/L (ref 136–145)
WBC NRBC COR # BLD AUTO: 10.14 10*3/MM3 (ref 3.4–10.8)

## 2025-03-20 PROCEDURE — 85027 COMPLETE CBC AUTOMATED: CPT | Performed by: STUDENT IN AN ORGANIZED HEALTH CARE EDUCATION/TRAINING PROGRAM

## 2025-03-20 PROCEDURE — 36415 COLL VENOUS BLD VENIPUNCTURE: CPT | Performed by: STUDENT IN AN ORGANIZED HEALTH CARE EDUCATION/TRAINING PROGRAM

## 2025-03-20 PROCEDURE — 97530 THERAPEUTIC ACTIVITIES: CPT

## 2025-03-20 PROCEDURE — 25010000002 ENOXAPARIN PER 10 MG: Performed by: STUDENT IN AN ORGANIZED HEALTH CARE EDUCATION/TRAINING PROGRAM

## 2025-03-20 PROCEDURE — 94799 UNLISTED PULMONARY SVC/PX: CPT

## 2025-03-20 PROCEDURE — 80048 BASIC METABOLIC PNL TOTAL CA: CPT | Performed by: STUDENT IN AN ORGANIZED HEALTH CARE EDUCATION/TRAINING PROGRAM

## 2025-03-20 RX ORDER — GUAIFENESIN 600 MG/1
600 TABLET, EXTENDED RELEASE ORAL EVERY 12 HOURS SCHEDULED
Status: DISCONTINUED | OUTPATIENT
Start: 2025-03-20 | End: 2025-03-21 | Stop reason: HOSPADM

## 2025-03-20 RX ADMIN — CALCIUM POLYCARBOPHIL 625 MG: 625 TABLET, FILM COATED ORAL at 08:36

## 2025-03-20 RX ADMIN — ENOXAPARIN SODIUM 40 MG: 100 INJECTION SUBCUTANEOUS at 13:41

## 2025-03-20 RX ADMIN — BUDESONIDE 9 MG: 3 CAPSULE ORAL at 08:36

## 2025-03-20 RX ADMIN — Medication 10 ML: at 08:37

## 2025-03-20 RX ADMIN — TRAMADOL HYDROCHLORIDE 50 MG: 50 TABLET, COATED ORAL at 13:41

## 2025-03-20 RX ADMIN — GUAIFENESIN 600 MG: 600 TABLET, MULTILAYER, EXTENDED RELEASE ORAL at 08:59

## 2025-03-20 RX ADMIN — Medication 10 ML: at 20:01

## 2025-03-20 RX ADMIN — TRAZODONE HYDROCHLORIDE 50 MG: 50 TABLET ORAL at 19:58

## 2025-03-20 RX ADMIN — AMLODIPINE BESYLATE 2.5 MG: 2.5 TABLET ORAL at 08:36

## 2025-03-20 RX ADMIN — ATORVASTATIN CALCIUM 20 MG: 20 TABLET, FILM COATED ORAL at 08:36

## 2025-03-20 RX ADMIN — TRAMADOL HYDROCHLORIDE 50 MG: 50 TABLET, COATED ORAL at 19:55

## 2025-03-20 RX ADMIN — ACETAMINOPHEN 1000 MG: 500 TABLET, FILM COATED ORAL at 17:01

## 2025-03-20 RX ADMIN — TRAMADOL HYDROCHLORIDE 50 MG: 50 TABLET, COATED ORAL at 05:18

## 2025-03-20 RX ADMIN — ACETAMINOPHEN 1000 MG: 500 TABLET, FILM COATED ORAL at 06:16

## 2025-03-20 RX ADMIN — ESCITALOPRAM 20 MG: 20 TABLET, FILM COATED ORAL at 06:16

## 2025-03-20 RX ADMIN — GUAIFENESIN 600 MG: 600 TABLET, MULTILAYER, EXTENDED RELEASE ORAL at 19:59

## 2025-03-20 RX ADMIN — LISINOPRIL 10 MG: 10 TABLET ORAL at 08:36

## 2025-03-20 NOTE — PLAN OF CARE
Goal Outcome Evaluation:  Plan of Care Reviewed With: patient, family           Outcome Evaluation: Pt seen this morning for OT treatment. [facility] upon arrival. Max A provided to don shoes. Pt performed 3 STS transfers w/ Min<>Mod A + RW w/ ability to maintain NWB RLE. Performed 3 fwd steps w/ Mod A + RW, difficulty supporting weight through BUEs/LLE to maintain RLE NWB status. Pt unable to tolerate further activity, chair was brought to pt and returned to sitting. Pt continues to present w/ impaired strength, balance, and activity tolerance to perform near baseline. OT will f/u to address functional deficits.    Anticipated Discharge Disposition (OT): skilled nursing facility

## 2025-03-20 NOTE — PLAN OF CARE
Goal Outcome Evaluation:           Progress: improving  Outcome Evaluation: Patient A&Ox4. Pain managed with PRN PO medications. Knee immobilizer in place. NWB RLE. Up in chair most of day- reports it is more comfortable than the bed. BM this shfit. Voiding via purewick. Plans to d/c to SNF tomorrow- wheelchair van at 2pm.

## 2025-03-20 NOTE — PROGRESS NOTES
Continued Stay Note  [facility]     Patient Name: Arlin Hutson  MRN: [id number]  Today's Date: 3/20/2025    Admit Date: 3/16/2025    Plan: [facility], bed available 3/21   Discharge Plan       Row Name 03/20/25 1729       Plan    Plan Comments Per Korin/Noreen patient is approved with bed available 3/21 after 1pm. [facility] van is arranged to transport to [facility] at 2pm. Son has been notified. Patient and son agreeable with d/c plan. Pharmacy updated and notified A.                   Discharge Codes    No documentation.                 Expected Discharge Date and Time       Expected Discharge Date Expected Discharge Time    Mar 19, 2025               Clarice Cabrera RN

## 2025-03-20 NOTE — PROGRESS NOTES
Name: Arlin Hutson ADMIT: 3/16/2025   : 1935  PCP: Oren Rust Jr., MD    MRN: [id number] LOS: 2 days   AGE/SEX: 89 y.o. female  ROOM: [facility]     Subjective   Doing well today but has had increased coughing. Afebrile and not requiring supplemental O2     Objective   Objective   Vital Signs  Temp:  [97.7 °F (36.5 °C)-98.1 °F (36.7 °C)] 98.1 °F (36.7 °C)  Heart Rate:  [60-97] 63  Resp:  [16-18] 16  BP: (110-188)/(59-83) 138/63  SpO2:  [93 %-97 %] 97 %  on   ;   Device (Oxygen Therapy): room air  Body mass index is 21.48 kg/m².  Physical Exam  Constitutional:       General: She is not in acute distress.  HENT:      Head: Normocephalic and atraumatic.   Eyes:      Extraocular Movements: Extraocular movements intact.      Pupils: Pupils are equal, round, and reactive to light.   Cardiovascular:      Rate and Rhythm: Normal rate and regular rhythm.   Pulmonary:      Effort: Pulmonary effort is normal. No respiratory distress.      Comments: Wheezing at right lower lobe   Abdominal:      General: There is no distension.      Palpations: Abdomen is soft.      Tenderness: There is no abdominal tenderness.   Musculoskeletal:      Comments: Right knee in immobilizer   Neurological:      Mental Status: She is alert and oriented to person, place, and time.         Results Review     I reviewed the patient's new clinical results.  Results from last 7 days   Lab Units 25  0536 25   WBC 10*3/mm3 10.14 9.72 11.08* 10.68   HEMOGLOBIN g/dL 12.6 10.5* 11.2* 11.9*   PLATELETS 10*3/mm3 259 233 250 270     Results from last 7 days   Lab Units 25  0536 25  0353 25   SODIUM mmol/L 135* 137 133* 140   POTASSIUM mmol/L 4.3 3.9 4.0 3.8   CHLORIDE mmol/L 105 103 100 105   CO2 mmol/L 20.0* 25.0 24.0 25.6   BUN mg/dL 15 15 15 13   CREATININE mg/dL 0.55* 0.60 0.60 0.49*   GLUCOSE mg/dL 84 105* 107* 95   Estimated Creatinine Clearance: 54.6  "mL/min (A) (by C-G formula based on SCr of 0.55 mg/dL (L)).  Results from last 7 days   Lab Units 03/16/25 2002   ALBUMIN g/dL 4.0   BILIRUBIN mg/dL 0.3   ALK PHOS U/L 89   AST (SGOT) U/L 17   ALT (SGPT) U/L 18     Results from last 7 days   Lab Units 03/20/25  0536 03/19/25  0353 03/18/25  0410 03/17/25  0526 03/16/25 2002   CALCIUM mg/dL 8.5* 8.5* 8.4* 8.6 8.6   ALBUMIN g/dL  --   --   --   --  4.0       COVID19   Date Value Ref Range Status   03/22/2021 Not Detected Not Detected - Ref. Range Final     SARS-CoV-2 PCR   Date Value Ref Range Status   01/11/2021 Not Detected Not Detected Final     Comment:     Nucleic acid specific to SARS-CoV-2 (COVID-19) virus was not detected in  this sample by the TaqPath (TM) COVID-19 Combo Kit.          SARS-CoV-2 (COVID-19) nucleic acid testing performed using "[employer] Aptima (R) SARS-CoV-2 Assay or [employer] TaqPath (TM)  COVID-19 Combo Kit as indicated above under Emergency Use Authorization (EUA) from the FDA. Aptima (R) and TaqPath (TM) test performance  characteristics verified by [facility] in accordance with the FDAs Guidance Document (Policy for Diagnostic Tests for Coronavirus Disease-2019  during the Public Health Emergency) issued on March 16, 2020. The laboratory is regulated under CLIA as qualified to perform high-complexity testing  Unless otherwise noted, all testing was performed at [facility], CLIA No. [id number], [place] State Licensee No. [id number]     No results found for: \"HGBA1C\", \"POCGLU\"  Results for orders placed or performed during the hospital encounter of 06/02/18   Blood Culture - Blood,    Specimen: Arm, Left; Blood   Result Value Ref Range    Blood Culture No growth at 5 days          CT Lower Extremity Right Without Contrast  Narrative: CT RIGHT KNEE WITHOUT CONTRAST     HISTORY: Unable to bear weight. Knee pain.      TECHNIQUE: CT includes axial images of the right knee and data  reconstructed in coronal and sagittal planes.   "   COMPARISON: Right knee x-rays 03/16/2025.     FINDINGS: There is a large lipohemarthrosis with fat fluid level and  hematocrit level within the suprapatellar recess. This indicates a  fracture though clear fracture plane is not visualized. There is subtle  cortical offset at the far posterior superior aspect of the medial  femoral condyle just above the posterior superior femoral component as  demonstrated on sagittal series 5 image 37. There is no evidence for  displacement. Atherosclerotic calcifications are present.     Impression: Large lipohemarthrosis indicates a fracture though fracture  plane is not well visualized. There is subtle cortical offset involving  the far posterior superior aspect of the medial femoral condyle  potentially represents the site of fracture.     Radiation dose reduction techniques were utilized, including automated  exposure control and exposure modulation based on body size.        This report was finalized on 3/17/2025 4:54 PM by Guerrero Myles M.D  on Workstation: [id number]       Scheduled Medications  acetaminophen, 1,000 mg, Oral, Q8H  amLODIPine, 2.5 mg, Oral, Daily  atorvastatin, 20 mg, Oral, Daily  Budesonide, 9 mg, Oral, Daily  enoxaparin sodium, 40 mg, Subcutaneous, Q24H  escitalopram, 20 mg, Oral, QAM  guaiFENesin, 600 mg, Oral, Q12H  Lidocaine, 1 patch, Transdermal, Q24H  lisinopril, 10 mg, Oral, Q24H  pantoprazole, 40 mg, Oral, Once per day on Monday Wednesday Friday  polycarbophil, 625 mg, Oral, Daily  sodium chloride, 10 mL, Intravenous, Q12H  traZODone, 50 mg, Oral, Nightly    Infusions   Diet  Diet: Regular/House; Fluid Consistency: Thin (IDDSI 0)       Assessment/Plan     Active Hospital Problems    Diagnosis  POA    **Knee pain [M25.569]  Yes    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       89 y.o. female admitted with Knee pain.      Right knee pain  S/p right knee arthroplasty  Mechanical fall  -XR right knee with no subluxation or fractures  -CT RLE noted- concern for occult RLE fracture within suprapetellar recess - ortho consult placed-no need for surgical intervention at this time.  Plan for knee immobilizer and physical therapy/Occupational Therapy  -PT/OT consulted- she will need SNF   -Scheduled Tylenol tramadol as needed for breakthrough pain  -Bowel regimen as needed     Hypertension  -Home meds: Amlodipine  -BP mildly elevated at this time, likely secondary to pain  -Restart home amlodipine, added lisinopril      Rheumatoid arthritis  CREST syndrome, Sjogren's syndrome  -Follows with outside rheumatologist  -Weekly methotrexate injections     Microscopic colitis  GERD  -Stable, no change in symptoms currently  -Continue home budesonide and PPI     MGUS  Anemia  -Follows with CBC  -Undergoing observation/surveillance at this time     Peripheral vascular disease  -Follows with Dr. Collier  -ABIs have been normal, thought to be secondary to collaterals.  He is monitoring clinically with routine ABIs at this time.  -Continue statin    Lovenox for DVT ppx  Full code  Discussed with patient and family  Anticipate discharge to skilled nursing facility        Con Santana MD  [facility]  03/20/25  14:29 EDT

## 2025-03-20 NOTE — NURSING NOTE
[facility] follow-up d/t depression/grief/loss.    Patient sitting up in chair. The patient reported she is doing god and reported good sleep. The patient acknowledged receiving resources from [facility] and voiced her son has them. The patient denied any issues with her mental health and denied need for any further [facility] follow-up visits. [facility] will sign off.

## 2025-03-20 NOTE — THERAPY TREATMENT NOTE
Patient Name: Arlin Hutson  : 1935    MRN: [id number]                              Today's Date: 3/20/2025       Admit Date: 3/16/2025    Visit Dx:     ICD-10-CM ICD-9-CM   1. Injury of right knee, initial encounter  S89.91XA 959.7     Patient Active Problem List   Diagnosis    Gastroesophageal reflux disease    Osteopenia of multiple sites    Essential hypertension    Colitis    Primary osteoarthritis of right knee    Primary osteoarthritis of left knee    Pneumonia of left lower lobe due to infectious organism    History of total right knee replacement    Abnormal CT of the abdomen    Rheumatoid arthritis involving multiple sites    MGUS (monoclonal gammopathy of unknown significance)    Normocytic anemia    Arterial occlusion    Iron deficiency anemia    Adverse effect of iron    Paronychia    Paresthesia    Pain in right foot    Pain in limb    Onychomycosis due to dermatophyte    Onychocryptosis    Metatarsalgia of left foot    At high risk for falls    Knee pain    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    Atheroembolism of right lower extremity     Baker's cyst     Colon polyp     CREST syndrome     Difficulty walking About 5 yrs ago    I  use a cane when I leave my house    Fracture of wrist     Surgery about 19 years ago on ribght wrist    Fractures     GERD (gastroesophageal reflux disease)     Hip arthrosis ? Maybe 7 or 8 years ago    History of CT scan of abdomen 2011    constipation, sig tics, 6 mm hepatic cyst    History of rheumatoid arthritis     HL (hearing loss) About     Hyperlipidemia     Hypertension     Irritable bowel syndrome 1990s    Knee swelling 5 or 6 years ago    Right knee replacement 4 years [facility]    Low back pain     Normocytic anemia 2020    Osteoarthritis     Peripheral neuropathy Cannot remember    I have Raynaudsin my hands and  feet    Raynaud's disease     Rheumatoid arthritis     Scleroderma     Shingles     I have had both shingles vaccines    Sjogren's syndrome     Tear of meniscus of knee     Torn meniscus surgery    Ulcerative colitis      Past Surgical History:   Procedure Laterality Date    CATARACT EXTRACTION, BILATERAL      COLONOSCOPY  02/26/2016    tics, microscopic colitis,     COLONOSCOPY  07/01/2011    sig tics, inflammation, polyp    DILATATION AND CURETTAGE  1980    EKOS CATHETER PLACEMENT N/A 03/23/2021    Procedure: AIF WITH RUN OFF OF RT. LEG, ROTATIONAL ATHERECTOMY OF RIGHT POPLITEAL ARTERY;  Surgeon: Gato Contreras MD;  Location: [facility] OR 18/19;  Service: Vascular;  Laterality: N/A;    ENDOSCOPY N/A 12/03/2018    erythematous mucosa in stomach, path benign    EYE SURGERY      FLEXIBLE SIGMOIDOSCOPY      FRACTURE SURGERY      HAND SURGERY      JOINT REPLACEMENT      KNEE ARTHROSCOPY Right     w/meniscal repair    KNEE SURGERY Right     TEETH EXTRACTION      TOTAL KNEE ARTHROPLASTY Right 05/30/2018    Procedure: TOTAL KNEE ARTHROPLASTY;  Surgeon: Georges Jon MD;  Location: [facility] OR;  Service: Orthopedics    TRANSLUMINAL ATHERECTOMY POPLITEAL ARTERY      TRIGGER POINT INJECTION      UPPER GASTROINTESTINAL ENDOSCOPY  03/14/2008    erythema    VASCULAR SURGERY  Spring of 2020    Blood clot in an artery behind my right knee    WRIST FRACTURE SURGERY Right     WRIST SURGERY Right 2008    orif      General Information       Row Name 03/20/25 1254          OT Time and Intention    Subjective Information no complaints  -KG     Document Type therapy note (daily note)  -KG     Mode of Treatment individual therapy;occupational therapy  -KG     Patient Effort excellent  -KG       Row Name 03/20/25 1254          General Information    Patient Profile Reviewed yes  -KG     Existing Precautions/Restrictions fall  NWB RLE  -KG       Row Name 03/20/25 1250          Safety Issues/Impairments Affecting Functional  Mobility    Impairments Affecting Function (Mobility) balance;endurance/activity tolerance;pain;strength;range of motion (ROM)  -KG               User Key  (r) = Recorded By, (t) = Taken By, (c) = Cosigned By      Initials Name Provider Type    Jimmy Patten OT Occupational Therapist                     Mobility/ADL's       Row Name 03/20/25 1255          Transfers    Transfers sit-stand transfer;stand-sit transfer  -KG       Row Name 03/20/25 1255          Sit-Stand Transfer    Sit-Stand [place] (Transfers) minimum assist (75% patient effort);moderate assist (50% patient effort)  -KG     Assistive Device (Sit-Stand Transfers) walker, front-wheeled  -KG       Row Name 03/20/25 1255          Stand-Sit Transfer    Stand-Sit [place] (Transfers) minimum assist (75% patient effort);moderate assist (50% patient effort)  -KG     Assistive Device (Stand-Sit Transfers) walker, front-wheeled  -KG       Row Name 03/20/25 1255          Functional Mobility    Functional Mobility- Ind. Level moderate assist (50% patient effort)  3 fwd steps, difficulty supporting weight on LLE/BUEs to maintain NWB to RLE  -KG       Row Name 03/20/25 1255          Mobility    Extremity Weight-bearing Status right lower extremity  -KG     Right Lower Extremity (Weight-bearing Status) non weight-bearing (NWB)  -KG               User Key  (r) = Recorded By, (t) = Taken By, (c) = Cosigned By      Initials Name Provider Type    Jimmy Patten OT Occupational Therapist                   Obj/Interventions       Row Name 03/20/25 1256          Motor Skills    Motor Skills functional endurance  -KG     Functional Endurance mild impairment  -KG       Row Name 03/20/25 1256          Balance    Balance Assessment sitting static balance;sitting dynamic balance;sit to stand dynamic balance;standing static balance;standing dynamic balance  -KG     Static Sitting Balance standby assist  -KG     Dynamic Sitting Balance standby assist  -KG      Position, Sitting Balance sitting in chair;supported;unsupported  -KG     Sit to Stand Dynamic Balance minimal assist;moderate assist  -KG     Static Standing Balance minimal assist;moderate assist  -KG     Dynamic Standing Balance minimal assist;moderate assist  -KG     Position/Device Used, Standing Balance walker, front-wheeled  -KG     Balance Interventions sitting;standing;sit to stand;supported;static;dynamic  -KG               User Key  (r) = Recorded By, (t) = Taken By, (c) = Cosigned By      Initials Name Provider Type    KG Jimmy Oquendo OT Occupational Therapist                   Goals/Plan    No documentation.                  Clinical Impression       Row Name 03/20/25 1257          Pain Assessment    Pretreatment Pain Rating 0/10 - no pain  -KG     Posttreatment Pain Rating 4/10  -KG     Pain Location --  RLE  -KG     Pain Management Interventions positioning techniques utilized  -KG     Response to Pain Interventions intervention effective per patient report  -KG       Row Name 03/20/25 1257          Plan of Care Review    Plan of Care Reviewed With patient;family  -KG     Outcome Evaluation Pt seen this morning for OT treatment. [facility] upon arrival. Max A provided to don shoes. Pt performed 3 STS transfers w/ Min<>Mod A + RW w/ ability to maintain NWB RLE. Performed 3 fwd steps w/ Mod A + RW, difficulty supporting weight through BUEs/LLE to maintain RLE NWB status. Pt unable to tolerate further activity, chair was brought to pt and returned to sitting. Pt continues to present w/ impaired strength, balance, and activity tolerance to perform near baseline. OT will f/u to address functional deficits.  -KG       Row Name 03/20/25 1257          Therapy Plan Review/Discharge Plan (OT)    Anticipated Discharge Disposition (OT) skilled nursing facility  -KG       Row Name 03/20/25 1257          Vital Signs    Pre Patient Position Sitting  -KG     Intra Patient Position Standing  -KG     Post Patient Position  Sitting  -KG       Row Name 03/20/25 1257          Positioning and Restraints    Pre-Treatment Position sitting in chair/recliner  -KG     Post Treatment Position chair  -KG     In Chair notified nsg;reclined;call light within reach;encouraged to call for assist;exit alarm on;with family/caregiver  -KG               User Key  (r) = Recorded By, (t) = Taken By, (c) = Cosigned By      Initials Name Provider Type    JOHN Jimmy Oquendo OT Occupational Therapist                   Outcome Measures       Row Name 03/20/25 0836          How much help from another person do you currently need...    Turning from your back to your side while in flat bed without using bedrails? 4  -ST     Moving from lying on back to sitting on the side of a flat bed without bedrails? 3  -ST     Moving to and from a bed to a chair (including a wheelchair)? 2  -ST     Standing up from a chair using your arms (e.g., wheelchair, bedside chair)? 2  -ST     Climbing 3-5 steps with a railing? 2  -ST     To walk in hospital room? 2  -ST     AM-PAC 6 Clicks Score (PT) 15  -ST     Highest Level of Mobility Goal 4 --> Transfer to chair/commode  -ST               User Key  (r) = Recorded By, (t) = Taken By, (c) = Cosigned By      Initials Name Provider Type    Ambar Cross, RN Registered Nurse                    Occupational Therapy Education       Title: PT OT SLP Therapies (In Progress)       Topic: Occupational Therapy (Done)       Point: ADL training (Done)       Learning Progress Summary            Patient MOSHE Barnard VU by  at 3/17/2025 1328    Comment: Role of OT                      Point: Home exercise program (Done)       Learning Progress Summary            MOSHE Dominique VU by  at 3/17/2025 1328    Comment: Role of OT                      Point: Precautions (Done)       Learning Progress Summary            MOSHE Dominique VU by  at 3/17/2025 1328    Comment: Role of OT                      Point: Body mechanics (Done)        Learning Progress Summary            Patient MOSHE Barnard VU by  at 3/17/2025 1328    Comment: Role of OT                                      User Key       Initials Effective Dates Name Provider Type Discipline     07/24/24 -  Hayder Godinez OT Occupational Therapist OT                  OT Recommendation and Plan     Plan of Care Review  Plan of Care Reviewed With: patient, family  Outcome Evaluation: Pt seen this morning for OT treatment. [facility] upon arrival. Max A provided to don shoes. Pt performed 3 STS transfers w/ Min<>Mod A + RW w/ ability to maintain NWB RLE. Performed 3 fwd steps w/ Mod A + RW, difficulty supporting weight through BUEs/LLE to maintain RLE NWB status. Pt unable to tolerate further activity, chair was brought to pt and returned to sitting. Pt continues to present w/ impaired strength, balance, and activity tolerance to perform near baseline. OT will f/u to address functional deficits.     Time Calculation:         Time Calculation- OT       Row Name 03/20/25 1300             Time Calculation- OT    OT Start Time 1005  -KG      OT Stop Time 1023  -KG      OT Time Calculation (min) 18 min  -KG      Total Timed Code Minutes- OT 18 minute(s)  -KG      OT Received On 03/20/25  -KG      OT - Next Appointment 03/21/25  -KG         Timed Charges    65064 - OT Therapeutic Activity Minutes 18  -KG         Total Minutes    Timed Charges Total Minutes 18  -KG       Total Minutes 18  -KG                User Key  (r) = Recorded By, (t) = Taken By, (c) = Cosigned By      Initials Name Provider Type    KG Jimmy Oquendo OT Occupational Therapist                  Therapy Charges for Today       Code Description Service Date Service Provider Modifiers Qty    [id number]  OT THERAPEUTIC ACT EA 15 MIN 3/20/2025 Jimmy Oquendo OT GO 1                 Jimmy Oquendo OT  3/20/2025

## 2025-03-20 NOTE — PLAN OF CARE
Goal Outcome Evaluation:               Pt admitted with right knee pain, immobilizer in place, NWB to right leg.  Up to chair with assist x2, purewick in place.  Plan to d/c to [place] Friday.

## 2025-03-21 ENCOUNTER — READMISSION MANAGEMENT (OUTPATIENT)
Dept: CALL CENTER | Facility: HOSPITAL | Age: OVER 89
End: 2025-03-21
Payer: MEDICARE

## 2025-03-21 VITALS
HEART RATE: 70 BPM | HEIGHT: 60 IN | BODY MASS INDEX: 21.6 KG/M2 | TEMPERATURE: 98.8 F | OXYGEN SATURATION: 96 % | WEIGHT: 110.01 LBS | RESPIRATION RATE: 18 BRPM | DIASTOLIC BLOOD PRESSURE: 74 MMHG | SYSTOLIC BLOOD PRESSURE: 144 MMHG

## 2025-03-21 LAB
ANION GAP SERPL CALCULATED.3IONS-SCNC: 8 MMOL/L (ref 5–15)
BUN SERPL-MCNC: 14 MG/DL (ref 8–23)
BUN/CREAT SERPL: 26.9 (ref 7–25)
CALCIUM SPEC-SCNC: 8.6 MG/DL (ref 8.6–10.5)
CHLORIDE SERPL-SCNC: 106 MMOL/L (ref 98–107)
CO2 SERPL-SCNC: 26 MMOL/L (ref 22–29)
CREAT SERPL-MCNC: 0.52 MG/DL (ref 0.57–1)
EGFRCR SERPLBLD CKD-EPI 2021: 88.9 ML/MIN/1.73
GLUCOSE SERPL-MCNC: 92 MG/DL (ref 65–99)
POTASSIUM SERPL-SCNC: 4.4 MMOL/L (ref 3.5–5.2)
SARS-COV-2 RNA RESP QL NAA+PROBE: NOT DETECTED
SODIUM SERPL-SCNC: 140 MMOL/L (ref 136–145)

## 2025-03-21 PROCEDURE — 80048 BASIC METABOLIC PNL TOTAL CA: CPT | Performed by: STUDENT IN AN ORGANIZED HEALTH CARE EDUCATION/TRAINING PROGRAM

## 2025-03-21 PROCEDURE — 87635 SARS-COV-2 COVID-19 AMP PRB: CPT | Performed by: STUDENT IN AN ORGANIZED HEALTH CARE EDUCATION/TRAINING PROGRAM

## 2025-03-21 RX ORDER — AMLODIPINE BESYLATE 2.5 MG/1
5 TABLET ORAL DAILY
Qty: 30 TABLET | Refills: 0 | Status: SHIPPED | OUTPATIENT
Start: 2025-03-21

## 2025-03-21 RX ORDER — LISINOPRIL 10 MG/1
10 TABLET ORAL NIGHTLY
Qty: 30 TABLET | Refills: 0 | Status: CANCELLED | OUTPATIENT
Start: 2025-03-21

## 2025-03-21 RX ORDER — AMLODIPINE BESYLATE 2.5 MG/1
5 TABLET ORAL DAILY
Qty: 30 TABLET | Refills: 0 | Status: CANCELLED | OUTPATIENT
Start: 2025-03-22

## 2025-03-21 RX ORDER — TRAMADOL HYDROCHLORIDE 50 MG/1
50 TABLET ORAL EVERY 6 HOURS PRN
Qty: 12 TABLET | Refills: 0 | Status: SHIPPED | OUTPATIENT
Start: 2025-03-21

## 2025-03-21 RX ORDER — LISINOPRIL 10 MG/1
10 TABLET ORAL NIGHTLY
Qty: 30 TABLET | Refills: 0 | Status: SHIPPED | OUTPATIENT
Start: 2025-03-21

## 2025-03-21 RX ADMIN — CALCIUM POLYCARBOPHIL 625 MG: 625 TABLET, FILM COATED ORAL at 08:42

## 2025-03-21 RX ADMIN — ACETAMINOPHEN 1000 MG: 500 TABLET, FILM COATED ORAL at 08:42

## 2025-03-21 RX ADMIN — LIDOCAINE 1 PATCH: 4 PATCH TOPICAL at 08:42

## 2025-03-21 RX ADMIN — ATORVASTATIN CALCIUM 20 MG: 20 TABLET, FILM COATED ORAL at 08:42

## 2025-03-21 RX ADMIN — LISINOPRIL 10 MG: 10 TABLET ORAL at 08:42

## 2025-03-21 RX ADMIN — ESCITALOPRAM 20 MG: 20 TABLET, FILM COATED ORAL at 08:42

## 2025-03-21 RX ADMIN — Medication 10 ML: at 08:42

## 2025-03-21 RX ADMIN — TRAMADOL HYDROCHLORIDE 50 MG: 50 TABLET, COATED ORAL at 13:38

## 2025-03-21 RX ADMIN — GUAIFENESIN 600 MG: 600 TABLET, MULTILAYER, EXTENDED RELEASE ORAL at 08:42

## 2025-03-21 RX ADMIN — PANTOPRAZOLE SODIUM 40 MG: 40 TABLET, DELAYED RELEASE ORAL at 08:42

## 2025-03-21 RX ADMIN — AMLODIPINE BESYLATE 2.5 MG: 2.5 TABLET ORAL at 08:42

## 2025-03-21 RX ADMIN — BUDESONIDE 9 MG: 3 CAPSULE ORAL at 08:42

## 2025-03-21 NOTE — DISCHARGE SUMMARY
Patient Name: Arlin Hutson  : 1935  MRN: [id number]    Date of Admission: 3/16/2025  Date of Discharge:  3/21/2025  Primary Care Physician: Oren Rust Jr., MD      Chief Complaint:   Knee Pain (right)      Discharge Diagnoses     Active Hospital Problems    Diagnosis  POA    **Knee pain [M25.569]  Yes    Periprosthetic fracture around internal prosthetic right knee joint [M97.11XA]  Not Applicable    MGUS (monoclonal gammopathy of unknown significance) [D47.2]  Yes    History of total right knee replacement [Z96.651]  Not Applicable    Primary osteoarthritis of right knee [M17.11]  Yes    Gastroesophageal reflux disease [K21.9]  Yes    Essential hypertension [I10]  Yes    Colitis [K52.9]  Yes    Rheumatoid arthritis involving multiple sites [M06.9]  Yes      Resolved Hospital Problems   No resolved problems to display.        Hospital Course     This is an 89-year-old woman with past medical history of GERD, hypertension, osteopenia who presented to hospital for spacing right knee pain.  She started experiencing knee pain after getting out of a car and stating that her right knee gave out on her.  She did not actually have a fall as a family member was behind her and gently laid her down.  Then she came to the emergency department where she underwent an x-ray that revealed no fracture or abnormality of the right knee.  She underwent a CT scan that was concerning for large lipohemarthrosis which would typically indicate a fracture however fracture pain was not well-visualized.  She was seen in consultation by orthopedic surgery and conservative management was recommended.  Her blood pressure medications were adjusted during her hospital stay and she is being discharged to a skilled nursing facility for physical therapy.    physical Exam:  Temp:  [97.8 °F (36.6 °C)-98.4 °F (36.9 °C)] 98.1 °F (36.7 °C)  Heart Rate:  [64-80] 80  Resp:  [16-18] 16  BP: (116-164)/(60-82) 145/65  Body mass index is  21.48 kg/m².  Physical Exam  Constitutional:       General: She is not in acute distress.  HENT:      Head: Normocephalic and atraumatic.   Cardiovascular:      Rate and Rhythm: Normal rate and regular rhythm.   Pulmonary:      Effort: Pulmonary effort is normal. No respiratory distress.   Abdominal:      General: There is no distension.      Palpations: Abdomen is soft.      Tenderness: There is no abdominal tenderness. There is no guarding.   Musculoskeletal:      Comments: Right knee in immobilizer   Neurological:      Mental Status: She is alert and oriented to person, place, and time.         Consultants     Consult Orders (all) (From admission, onward)       Start     Ordered    03/18/25 1347  Inpatient Access Center Consult  Once        Provider:  (Not yet assigned)    03/18/25 1346    03/18/25 1137  Inpatient Spiritual Care Consult  Once        Provider:  (Not yet assigned)    03/18/25 1136    03/18/25 0421  Inpatient Case Management  Consult  Once        Provider:  (Not yet assigned)    03/18/25 0420    03/17/25 1505  Inpatient Orthopedic Surgery Consult  Once        Specialty:  Orthopedic Surgery  Provider:  Dominic Calloway MD    03/17/25 1504    03/16/25 1949  [facility] (on-call MD unless specified) Details  Once        Specialty:  Hospitalist  Provider:  (Not yet assigned)    03/16/25 1948                  Procedures     Imaging Results (All)       Procedure Component Value Units Date/Time    CT Lower Extremity Right Without Contrast [[id number]] Collected: 03/17/25 1352     Updated: 03/17/25 1657    Narrative:      CT RIGHT KNEE WITHOUT CONTRAST     HISTORY: Unable to bear weight. Knee pain.      TECHNIQUE: CT includes axial images of the right knee and data  reconstructed in coronal and sagittal planes.     COMPARISON: Right knee x-rays 03/16/2025.     FINDINGS: There is a large lipohemarthrosis with fat fluid level and  hematocrit level within the suprapatellar recess. This indicates  a  fracture though clear fracture plane is not visualized. There is subtle  cortical offset at the far posterior superior aspect of the medial  femoral condyle just above the posterior superior femoral component as  demonstrated on sagittal series 5 image 37. There is no evidence for  displacement. Atherosclerotic calcifications are present.       Impression:      Large lipohemarthrosis indicates a fracture though fracture  plane is not well visualized. There is subtle cortical offset involving  the far posterior superior aspect of the medial femoral condyle  potentially represents the site of fracture.     Radiation dose reduction techniques were utilized, including automated  exposure control and exposure modulation based on body size.        This report was finalized on 3/17/2025 4:54 PM by Guerrero Myles M.D  on Workstation: [id number]       XR Knee 1 or 2 View Right [[id number]] Collected: 03/16/25 1924     Updated: 03/16/25 1931    Narrative:      2 VIEWS RIGHT KNEE     HISTORY: Right knee pain     COMPARISON: None available.     FINDINGS:  The patient is status post right knee arthroplasty. Hardware appears to  project in expected position. No acute fracture or subluxation is seen.  There does appear to be a suprapatellar effusion. Dense vascular  calcifications are noted.       Impression:      No acute fracture or subluxation identified.     This report was finalized on 3/16/2025 7:28 PM by Dr. Violette Hendricks M.D on Workstation: [id number]               Pertinent Labs     Results from last 7 days   Lab Units 03/20/25  0536 03/18/25  0410 03/17/25  0526 03/16/25 2002   WBC 10*3/mm3 10.14 9.72 11.08* 10.68   HEMOGLOBIN g/dL 12.6 10.5* 11.2* 11.9*   PLATELETS 10*3/mm3 259 233 250 270     Results from last 7 days   Lab Units 03/21/25  0514 03/20/25  0536 03/19/25  0353 03/18/25  0410   SODIUM mmol/L 140 135* 137 133*   POTASSIUM mmol/L 4.4 4.3 3.9 4.0   CHLORIDE mmol/L 106 105 103 100   CO2 mmol/L  "26.0 20.0* 25.0 24.0   BUN mg/dL 14 15 15 15   CREATININE mg/dL 0.52* 0.55* 0.60 0.60   GLUCOSE mg/dL 92 84 105* 107*   Estimated Creatinine Clearance: 57.8 mL/min (A) (by C-G formula based on SCr of 0.52 mg/dL (L)).  Results from last 7 days   Lab Units 03/16/25 2002   ALBUMIN g/dL 4.0   BILIRUBIN mg/dL 0.3   ALK PHOS U/L 89   AST (SGOT) U/L 17   ALT (SGPT) U/L 18     Results from last 7 days   Lab Units 03/21/25  0514 03/20/25  0536 03/19/25  0353 03/18/25  0410 03/17/25  0526 03/16/25 2002   CALCIUM mg/dL 8.6 8.5* 8.5* 8.4*   < > 8.6   ALBUMIN g/dL  --   --   --   --   --  4.0    < > = values in this interval not displayed.               Invalid input(s): \"LDLCALC\"        Test Results Pending at Discharge     Pending Labs       Order Current Status    COVID PRE-OP / PRE-PROCEDURE SCREENING ORDER (NO ISOLATION) - Swab, Nasopharynx In process    COVID-19, [facility] IN-HOUSE CEPHEID/AMRITA NP SWAB IN TRANSPORT MEDIA 1 HR TAT - Swab, Nasopharynx In process            Discharge Details        Discharge Medications        New Medications        Instructions Start Date   lisinopril 10 MG tablet  Commonly known as: PRINIVIL,ZESTRIL   10 mg, Oral, Nightly             Continue These Medications        Instructions Start Date   amLODIPine 2.5 MG tablet  Commonly known as: NORVASC   5 mg, Oral, Daily      atorvastatin 20 MG tablet  Commonly known as: LIPITOR   20 mg, Daily      Budesonide 3 MG 24 hr capsule  Commonly known as: ENTOCORT EC   9 mg, Oral, Daily      calcium carbonate 600 MG tablet  Commonly known as: OS-DWIGHT   1 P.O. daily      Diclofenac Sodium 1 % gel gel  Commonly known as: VOLTAREN   4 g, Topical, 4 Times Daily PRN      escitalopram 20 MG tablet  Commonly known as: LEXAPRO   20 mg, Every Morning      fluticasone 50 MCG/ACT nasal spray  Commonly known as: FLONASE   1-2 sprays      GENTEAL OP   As Needed      multivitamin with minerals tablet tablet   1 P.O. daily      omeprazole 20 MG capsule  Commonly known as: " priLOSEC   20 mg      polycarbophil 625 MG tablet tablet   Daily      Systane 0.4-0.3 % solution ophthalmic solution (artificial tears)  Generic drug: polyethyl glycol-propyl glycol   No dose, route, or frequency recorded.      traMADol 50 MG tablet  Commonly known as: ULTRAM   50 mg, Every 6 Hours PRN      traZODone 50 MG tablet  Commonly known as: DESYREL   25-50 mg, Nightly      TYLENOL EXTRA STRENGTH PO   2 tablets, Daily PRN      VITAMIN D PO   2,000 mg, Every Night at Bedtime               Allergies   Allergen Reactions    Codeine Diarrhea and Nausea Only    Penicillin G Rash    Sulfa Antibiotics Hives         Discharge Disposition:  Home or Self Care    Discharge Diet:  Diet Order   Procedures    Diet: Regular/House; Fluid Consistency: Thin (IDDSI 0)       Discharge Activity:       CODE STATUS:    Code Status and Medical Interventions: No CPR (Do Not Attempt to Resuscitate); Limited Support; No intubation (DNI)   Ordered at: 03/16/25 2222     Code Status (Patient has no pulse and is not breathing):    No CPR (Do Not Attempt to Resuscitate)     Medical Interventions (Patient has pulse or is breathing):    Limited Support     Medical Intervention Limits:    No intubation (DNI)       Future Appointments   Date Time Provider Department Center   4/23/2025 11:00 AM [facility] OP VAS RM 1 BH [facility]   4/23/2025 11:30 AM Benjamin Collier Jr., MD [facility] VS [facility]   5/5/2025  1:00 PM Stephanie Lam APRN [facility] N [facility]   5/21/2025  1:00 PM LAB CHAIR 6 CBC [facility]  LAB [facility]   5/28/2025 12:40 PM VITALS ONLY CBC [facility]  LAB [facility]   5/28/2025  1:00 PM Hayder Caruso Jr., MD [facility] CBC [facility]      Contact information for follow-up providers       Oren Rust Jr., MD .    Specialty: Internal Medicine  Contact information:  [facility] [place] [address]  [phone]                       Contact information for after-discharge care       Destination       [facility]  .    Service: Skilled Nursing  Contact information:  [address]  [facility] [address]  [phone]                                   Time Spent on Discharge:  Greater than 30 minutes      Con Santana MD  [facility]  03/21/25  11:51 EDT

## 2025-03-21 NOTE — PLAN OF CARE
Goal Outcome Evaluation:              Outcome Evaluation: vss, a/o x4, discharge to rehab today with [employer] wheelchair van.

## 2025-03-21 NOTE — PROGRESS NOTES
Continued Stay Note  [facility]     Patient Name: Arlin Hutson  MRN: [id number]  Today's Date: 3/21/2025    Admit Date: 3/16/2025    Plan: [facility], bed available 3/21   Discharge Plan       Row Name 03/21/25 1507       Plan    Final Discharge Disposition Code 03 - skilled nursing facility (SNF)    Final Note [facility]. WC van at 1400.                   Discharge Codes    No documentation.                 Expected Discharge Date and Time       Expected Discharge Date Expected Discharge Time    Mar 21, 2025               Clarice Cabrera RN

## 2025-03-21 NOTE — OUTREACH NOTE
Prep Survey      Flowsheet Row Responses   [facility] facility patient discharged from? [facility]   Is LACE score < 7 ? No   Eligibility Not Eligible   What are the reasons patient is not eligible? [facility]   Does the patient have one of the following disease processes/diagnoses(primary or secondary)? Other   Prep survey completed? Yes            Elena FOLEY - Registered Nurse

## 2025-03-21 NOTE — PLAN OF CARE
Problem: Adult Inpatient Plan of Care  Goal: Plan of Care Review  3/21/2025 1115 by Shirin Ballard RN  Outcome: Met  3/21/2025 1115 by Shirin Ballard RN  Outcome: Progressing  Flowsheets (Taken 3/21/2025 1115)  Outcome Evaluation: vss, a/o x4, discharge to rehab today with [employer] wheelchair van.  Goal: Patient-Specific Goal (Individualized)  3/21/2025 1115 by Shirin Ballard RN  Outcome: Met  3/21/2025 1115 by Shirin Ballard RN  Outcome: Progressing  Goal: Absence of Hospital-Acquired Illness or Injury  3/21/2025 1115 by Shirin Ballard RN  Outcome: Met  3/21/2025 1115 by Shirin Ballard RN  Outcome: Progressing  Intervention: Identify and Manage Fall Risk  Recent Flowsheet Documentation  Taken 3/21/2025 1000 by Shirin Ballard RN  Safety Promotion/Fall Prevention:   safety round/check completed   nonskid shoes/slippers when out of bed  Taken 3/21/2025 0838 by Shirin Ballard RN  Safety Promotion/Fall Prevention:   safety round/check completed   nonskid shoes/slippers when out of bed  Intervention: Prevent Skin Injury  Recent Flowsheet Documentation  Taken 3/21/2025 1000 by Shirin Ballard RN  Body Position: position changed independently  Taken 3/21/2025 0838 by Shirin Ballard RN  Body Position:   position changed independently   sitting up in bed  Intervention: Prevent and Manage VTE (Venous Thromboembolism) Risk  Recent Flowsheet Documentation  Taken 3/21/2025 0838 by Shirin Ballard RN  VTE Prevention/Management: (brace in place)   other (see comments)   SCDs (sequential compression devices) off  Goal: Optimal Comfort and Wellbeing  3/21/2025 1115 by Shirin Ballard RN  Outcome: Met  3/21/2025 1115 by Shirin Ballard RN  Outcome: Progressing  Intervention: Provide Person-Centered Care  Recent Flowsheet Documentation  Taken 3/21/2025 0838 by Shirin Ballard RN  Trust Relationship/Rapport: care explained  Goal: Readiness for Transition of Care  3/21/2025 1115 by  Shirin Ballard RN  Outcome: Met  3/21/2025 1115 by Shirin Ballard RN  Outcome: Progressing     Problem: Comorbidity Management  Goal: Blood Pressure in Desired Range  3/21/2025 1115 by Shirin Ballard RN  Outcome: Met  3/21/2025 1115 by Shirin Ballard RN  Outcome: Progressing  Goal: Maintenance of Osteoarthritis Symptom Control  3/21/2025 1115 by Shirin Ballard RN  Outcome: Met  3/21/2025 1115 by Shirin Ballard RN  Outcome: Progressing  Intervention: Maintain Osteoarthritis Symptom Control  Recent Flowsheet Documentation  Taken 3/21/2025 1000 by Shirin Ballard RN  Activity Management: activity encouraged  Taken 3/21/2025 0838 by Shirin Ballard RN  Activity Management: activity encouraged     Problem: Skin Injury Risk Increased  Goal: Skin Health and Integrity  3/21/2025 1115 by Shirin Ballard RN  Outcome: Met  3/21/2025 1115 by Shirin Ballard RN  Outcome: Progressing  Intervention: Optimize Skin Protection  Recent Flowsheet Documentation  Taken 3/21/2025 1000 by Shirin Ballard RN  Activity Management: activity encouraged  Taken 3/21/2025 0838 by Shirin Ballard RN  Activity Management: activity encouraged  Head of Bed (HOB) Positioning: HOB at 20-30 degrees     Problem: Fall Injury Risk  Goal: Absence of Fall and Fall-Related Injury  3/21/2025 1115 by Shirin Ballard RN  Outcome: Met  3/21/2025 1115 by Shirin Ballard RN  Outcome: Progressing  Intervention: Promote Injury-Free Environment  Recent Flowsheet Documentation  Taken 3/21/2025 1000 by Shirin Ballard RN  Safety Promotion/Fall Prevention:   safety round/check completed   nonskid shoes/slippers when out of bed  Taken 3/21/2025 0838 by Shirin Ballard RN  Safety Promotion/Fall Prevention:   safety round/check completed   nonskid shoes/slippers when out of bed   Goal Outcome Evaluation:              Outcome Evaluation: vss, a/o x4, discharge to rehab today with [employer] wheelchair van.

## 2025-04-29 ENCOUNTER — HOSPITAL ENCOUNTER (OUTPATIENT)
Dept: GENERAL RADIOLOGY | Facility: HOSPITAL | Age: OVER 89
Discharge: HOME | End: 2025-04-29
Payer: MEDICARE

## 2025-04-29 ENCOUNTER — TRANSCRIBE ORDERS (OUTPATIENT)
Dept: LAB | Facility: HOSPITAL | Age: OVER 89
End: 2025-04-29
Payer: MEDICARE

## 2025-04-29 ENCOUNTER — LAB (OUTPATIENT)
Dept: LAB | Facility: HOSPITAL | Age: OVER 89
End: 2025-04-29
Payer: MEDICARE

## 2025-04-29 DIAGNOSIS — J40 BRONCHITIS: ICD-10-CM

## 2025-04-29 DIAGNOSIS — J40 BRONCHITIS, NOT SPECIFIED AS ACUTE OR CHRONIC: Primary | ICD-10-CM

## 2025-04-29 DIAGNOSIS — J40 BRONCHITIS, NOT SPECIFIED AS ACUTE OR CHRONIC: ICD-10-CM

## 2025-04-29 LAB
ALBUMIN SERPL-MCNC: 3.6 G/DL (ref 3.5–5.2)
ALBUMIN/GLOB SERPL: 1.4 G/DL
ALP SERPL-CCNC: 94 U/L (ref 39–117)
ALT SERPL W P-5'-P-CCNC: 13 U/L (ref 1–33)
ANION GAP SERPL CALCULATED.3IONS-SCNC: 12 MMOL/L (ref 5–15)
AST SERPL-CCNC: 37 U/L (ref 1–32)
BASOPHILS # BLD AUTO: 0.04 10*3/MM3 (ref 0–0.2)
BASOPHILS NFR BLD AUTO: 0.5 % (ref 0–1.5)
BILIRUB SERPL-MCNC: 0.4 MG/DL (ref 0–1.2)
BUN SERPL-MCNC: 9 MG/DL (ref 8–23)
BUN/CREAT SERPL: 17.6 (ref 7–25)
CALCIUM SPEC-SCNC: 8.7 MG/DL (ref 8.6–10.5)
CHLORIDE SERPL-SCNC: 100 MMOL/L (ref 98–107)
CO2 SERPL-SCNC: 25 MMOL/L (ref 22–29)
CREAT SERPL-MCNC: 0.51 MG/DL (ref 0.57–1)
DEPRECATED RDW RBC AUTO: 45.6 FL (ref 37–54)
EGFRCR SERPLBLD CKD-EPI 2021: 89.4 ML/MIN/1.73
EOSINOPHIL # BLD AUTO: 0.17 10*3/MM3 (ref 0–0.4)
EOSINOPHIL NFR BLD AUTO: 2.1 % (ref 0.3–6.2)
ERYTHROCYTE [DISTWIDTH] IN BLOOD BY AUTOMATED COUNT: 13.6 % (ref 12.3–15.4)
GLOBULIN UR ELPH-MCNC: 2.6 GM/DL
GLUCOSE SERPL-MCNC: 88 MG/DL (ref 65–99)
HCT VFR BLD AUTO: 34.8 % (ref 34–46.6)
HGB BLD-MCNC: 11.8 G/DL (ref 12–15.9)
IMM GRANULOCYTES # BLD AUTO: 0.12 10*3/MM3 (ref 0–0.05)
IMM GRANULOCYTES NFR BLD AUTO: 1.5 % (ref 0–0.5)
LYMPHOCYTES # BLD AUTO: 1.25 10*3/MM3 (ref 0.7–3.1)
LYMPHOCYTES NFR BLD AUTO: 15.5 % (ref 19.6–45.3)
MCH RBC QN AUTO: 31 PG (ref 26.6–33)
MCHC RBC AUTO-ENTMCNC: 33.9 G/DL (ref 31.5–35.7)
MCV RBC AUTO: 91.3 FL (ref 79–97)
MONOCYTES # BLD AUTO: 0.82 10*3/MM3 (ref 0.1–0.9)
MONOCYTES NFR BLD AUTO: 10.2 % (ref 5–12)
NEUTROPHILS NFR BLD AUTO: 5.65 10*3/MM3 (ref 1.7–7)
NEUTROPHILS NFR BLD AUTO: 70.2 % (ref 42.7–76)
NRBC BLD AUTO-RTO: 0 /100 WBC (ref 0–0.2)
PLATELET # BLD AUTO: 255 10*3/MM3 (ref 140–450)
PMV BLD AUTO: 9.2 FL (ref 6–12)
POTASSIUM SERPL-SCNC: 3.4 MMOL/L (ref 3.5–5.2)
PROT SERPL-MCNC: 6.2 G/DL (ref 6–8.5)
RBC # BLD AUTO: 3.81 10*6/MM3 (ref 3.77–5.28)
SODIUM SERPL-SCNC: 137 MMOL/L (ref 136–145)
WBC NRBC COR # BLD AUTO: 8.05 10*3/MM3 (ref 3.4–10.8)

## 2025-04-29 PROCEDURE — 80053 COMPREHEN METABOLIC PANEL: CPT

## 2025-04-29 PROCEDURE — 36415 COLL VENOUS BLD VENIPUNCTURE: CPT

## 2025-04-29 PROCEDURE — 85025 COMPLETE CBC W/AUTO DIFF WBC: CPT

## 2025-04-29 PROCEDURE — 71046 X-RAY EXAM CHEST 2 VIEWS: CPT

## 2025-05-07 ENCOUNTER — APPOINTMENT (OUTPATIENT)
Dept: GENERAL RADIOLOGY | Facility: HOSPITAL | Age: OVER 89
End: 2025-05-07
Payer: MEDICARE

## 2025-05-07 ENCOUNTER — HOSPITAL ENCOUNTER (EMERGENCY)
Facility: HOSPITAL | Age: OVER 89
Discharge: HOME | End: 2025-05-07
Attending: EMERGENCY MEDICINE
Payer: MEDICARE

## 2025-05-07 VITALS
RESPIRATION RATE: 18 BRPM | HEART RATE: 82 BPM | TEMPERATURE: 98.8 F | DIASTOLIC BLOOD PRESSURE: 83 MMHG | OXYGEN SATURATION: 95 % | SYSTOLIC BLOOD PRESSURE: 166 MMHG

## 2025-05-07 DIAGNOSIS — R82.81 PYURIA: ICD-10-CM

## 2025-05-07 DIAGNOSIS — R63.0 LACK OF APPETITE: ICD-10-CM

## 2025-05-07 DIAGNOSIS — R53.1 GENERAL WEAKNESS: Primary | ICD-10-CM

## 2025-05-07 LAB
ALBUMIN SERPL-MCNC: 3.5 G/DL (ref 3.5–5.2)
ALBUMIN/GLOB SERPL: 1.4 G/DL
ALP SERPL-CCNC: 91 U/L (ref 39–117)
ALT SERPL W P-5'-P-CCNC: 13 U/L (ref 1–33)
ANION GAP SERPL CALCULATED.3IONS-SCNC: 10.5 MMOL/L (ref 5–15)
AST SERPL-CCNC: 16 U/L (ref 1–32)
BACTERIA UR QL AUTO: ABNORMAL /HPF
BASOPHILS # BLD AUTO: 0.05 10*3/MM3 (ref 0–0.2)
BASOPHILS NFR BLD AUTO: 0.6 % (ref 0–1.5)
BILIRUB SERPL-MCNC: 0.3 MG/DL (ref 0–1.2)
BILIRUB UR QL STRIP: NEGATIVE
BUN SERPL-MCNC: 12 MG/DL (ref 8–23)
BUN/CREAT SERPL: 20 (ref 7–25)
CALCIUM SPEC-SCNC: 9 MG/DL (ref 8.6–10.5)
CHLORIDE SERPL-SCNC: 100 MMOL/L (ref 98–107)
CLARITY UR: CLEAR
CO2 SERPL-SCNC: 27.5 MMOL/L (ref 22–29)
COLOR UR: YELLOW
CREAT SERPL-MCNC: 0.6 MG/DL (ref 0.57–1)
DEPRECATED RDW RBC AUTO: 42.8 FL (ref 37–54)
EGFRCR SERPLBLD CKD-EPI 2021: 85.9 ML/MIN/1.73
EOSINOPHIL # BLD AUTO: 0.17 10*3/MM3 (ref 0–0.4)
EOSINOPHIL NFR BLD AUTO: 1.9 % (ref 0.3–6.2)
ERYTHROCYTE [DISTWIDTH] IN BLOOD BY AUTOMATED COUNT: 13.1 % (ref 12.3–15.4)
GEN 5 1HR TROPONIN T REFLEX: 21 NG/L
GLOBULIN UR ELPH-MCNC: 2.5 GM/DL
GLUCOSE SERPL-MCNC: 121 MG/DL (ref 65–99)
GLUCOSE UR STRIP-MCNC: NEGATIVE MG/DL
HCT VFR BLD AUTO: 37.1 % (ref 34–46.6)
HGB BLD-MCNC: 12.3 G/DL (ref 12–15.9)
HGB UR QL STRIP.AUTO: NEGATIVE
HYALINE CASTS UR QL AUTO: ABNORMAL /LPF
IMM GRANULOCYTES # BLD AUTO: 0.14 10*3/MM3 (ref 0–0.05)
IMM GRANULOCYTES NFR BLD AUTO: 1.6 % (ref 0–0.5)
KETONES UR QL STRIP: NEGATIVE
LEUKOCYTE ESTERASE UR QL STRIP.AUTO: ABNORMAL
LYMPHOCYTES # BLD AUTO: 1.42 10*3/MM3 (ref 0.7–3.1)
LYMPHOCYTES NFR BLD AUTO: 16.3 % (ref 19.6–45.3)
MCH RBC QN AUTO: 30 PG (ref 26.6–33)
MCHC RBC AUTO-ENTMCNC: 33.2 G/DL (ref 31.5–35.7)
MCV RBC AUTO: 90.5 FL (ref 79–97)
MONOCYTES # BLD AUTO: 0.76 10*3/MM3 (ref 0.1–0.9)
MONOCYTES NFR BLD AUTO: 8.7 % (ref 5–12)
NEUTROPHILS NFR BLD AUTO: 6.18 10*3/MM3 (ref 1.7–7)
NEUTROPHILS NFR BLD AUTO: 70.9 % (ref 42.7–76)
NITRITE UR QL STRIP: POSITIVE
NRBC BLD AUTO-RTO: 0 /100 WBC (ref 0–0.2)
PH UR STRIP.AUTO: 7.5 [PH] (ref 5–8)
PLATELET # BLD AUTO: 352 10*3/MM3 (ref 140–450)
PMV BLD AUTO: 9.1 FL (ref 6–12)
POTASSIUM SERPL-SCNC: 3.4 MMOL/L (ref 3.5–5.2)
PROT SERPL-MCNC: 6 G/DL (ref 6–8.5)
PROT UR QL STRIP: NEGATIVE
RBC # BLD AUTO: 4.1 10*6/MM3 (ref 3.77–5.28)
RBC # UR STRIP: ABNORMAL /HPF
REF LAB TEST METHOD: ABNORMAL
SODIUM SERPL-SCNC: 138 MMOL/L (ref 136–145)
SP GR UR STRIP: <=1.005 (ref 1–1.03)
SQUAMOUS #/AREA URNS HPF: ABNORMAL /HPF
TROPONIN T % DELTA: -5
TROPONIN T NUMERIC DELTA: -1 NG/L
TROPONIN T SERPL HS-MCNC: 22 NG/L
UROBILINOGEN UR QL STRIP: ABNORMAL
WBC # UR STRIP: ABNORMAL /HPF
WBC NRBC COR # BLD AUTO: 8.72 10*3/MM3 (ref 3.4–10.8)

## 2025-05-07 PROCEDURE — 80053 COMPREHEN METABOLIC PANEL: CPT | Performed by: EMERGENCY MEDICINE

## 2025-05-07 PROCEDURE — 93010 ELECTROCARDIOGRAM REPORT: CPT | Performed by: INTERNAL MEDICINE

## 2025-05-07 PROCEDURE — 81001 URINALYSIS AUTO W/SCOPE: CPT | Performed by: EMERGENCY MEDICINE

## 2025-05-07 PROCEDURE — 25810000003 SODIUM CHLORIDE 0.9 % SOLUTION: Performed by: EMERGENCY MEDICINE

## 2025-05-07 PROCEDURE — 87086 URINE CULTURE/COLONY COUNT: CPT | Performed by: EMERGENCY MEDICINE

## 2025-05-07 PROCEDURE — 71045 X-RAY EXAM CHEST 1 VIEW: CPT

## 2025-05-07 PROCEDURE — 93005 ELECTROCARDIOGRAM TRACING: CPT | Performed by: EMERGENCY MEDICINE

## 2025-05-07 PROCEDURE — 99284 EMERGENCY DEPT VISIT MOD MDM: CPT

## 2025-05-07 PROCEDURE — 85025 COMPLETE CBC W/AUTO DIFF WBC: CPT | Performed by: EMERGENCY MEDICINE

## 2025-05-07 PROCEDURE — 84484 ASSAY OF TROPONIN QUANT: CPT | Performed by: EMERGENCY MEDICINE

## 2025-05-07 PROCEDURE — 36415 COLL VENOUS BLD VENIPUNCTURE: CPT

## 2025-05-07 RX ORDER — CEPHALEXIN 500 MG/1
500 CAPSULE ORAL 2 TIMES DAILY
Qty: 14 CAPSULE | Refills: 0 | Status: SHIPPED | OUTPATIENT
Start: 2025-05-07 | End: 2025-05-14

## 2025-05-07 RX ORDER — CEPHALEXIN 500 MG/1
500 CAPSULE ORAL ONCE
Status: COMPLETED | OUTPATIENT
Start: 2025-05-07 | End: 2025-05-07

## 2025-05-07 RX ORDER — SODIUM CHLORIDE 0.9 % (FLUSH) 0.9 %
10 SYRINGE (ML) INJECTION AS NEEDED
Status: DISCONTINUED | OUTPATIENT
Start: 2025-05-07 | End: 2025-05-07 | Stop reason: HOSPADM

## 2025-05-07 RX ADMIN — CEPHALEXIN 500 MG: 500 CAPSULE ORAL at 20:12

## 2025-05-07 RX ADMIN — SODIUM CHLORIDE 500 ML: 9 INJECTION, SOLUTION INTRAVENOUS at 17:36

## 2025-05-07 NOTE — ED PROVIDER NOTES
EMERGENCY DEPARTMENT ENCOUNTER  Room Number:  31/31  PCP: Megha De Leon MD  Independent Historians: Patient, Friend, and EMS      HPI:  Chief Complaint: had concerns including Weakness - Generalized.     A complete HPI/ROS/PMH/PSH/SH/FH are unobtainable due to: None    Chronic or social conditions impacting patient care (Social Determinants of Health): None      Context: Arlin Hutson is a 89 y.o. female with a medical history of colitis, hypertension, GERD who presents to the ED c/o acute generalized weakness, lack of appetite.  The patient reports for the last few days she has had loss of appetite.  She has not been eating or drinking.  She has had a 4 pound weight loss in the last few days.  She reports 1 loose stool last night.  She states today she felt generally weak.  She denies any fall.  She denies any chest pain or shortness of breath.  She denies cough.  She denies abdominal pain.  She denies dysuria.      Review of prior external notes (non-ED) -and- Review of prior external test results outside of this encounter:  Laboratory evaluation 4/29/2025 shows normal CMP    Prescription drug monitoring program review:         PAST MEDICAL HISTORY  Active Ambulatory Problems     Diagnosis Date Noted    Gastroesophageal reflux disease 02/13/2018    Osteopenia of multiple sites 02/13/2018    Essential hypertension 02/13/2018    Colitis 02/13/2018    Primary osteoarthritis of right knee 05/04/2018    Primary osteoarthritis of left knee 05/30/2018    Pneumonia of left lower lobe due to infectious organism 06/02/2018    History of total right knee replacement 06/02/2018    Abnormal CT of the abdomen 10/24/2018    Rheumatoid arthritis involving multiple sites 07/08/2011    MGUS (monoclonal gammopathy of unknown significance) 08/12/2020    Normocytic anemia 08/26/2020    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    Iron deficiency anemia 03/25/2022    Adverse effect of iron 03/28/2022    Paronychia 10/27/2022    Paresthesia  10/27/2022    Pain in right foot 10/27/2022    Pain in limb 10/27/2022    Onychomycosis due to dermatophyte 10/27/2022    Onychocryptosis 10/27/2022    Metatarsalgia of left foot 10/27/2022    At high risk for falls 04/15/2024    Knee pain 03/16/2025    Periprosthetic fracture around internal prosthetic right knee joint 03/18/2025     Resolved Ambulatory Problems     Diagnosis Date Noted    No Resolved Ambulatory Problems     Past Medical History:   Diagnosis Date    Arthritis of neck ?    Atheroembolism of right lower extremity     Baker's cyst     Colon polyp     CREST syndrome     Difficulty walking About 5 yrs ago    Fracture of wrist     Fractures     GERD (gastroesophageal reflux disease)     Hip arthrosis ? Maybe 7 or 8 years ago    History of CT scan of abdomen 03/11/2011    History of rheumatoid arthritis     HL (hearing loss) About 1995    Hyperlipidemia     Hypertension     Irritable bowel syndrome 1990s    Knee swelling 5 or 6 years ago    Low back pain     Osteoarthritis     Peripheral neuropathy Cannot remember    Raynaud's disease     Rheumatoid arthritis     Scleroderma     Shingles     Sjogren's syndrome     Tear of meniscus of knee     Ulcerative colitis          PAST SURGICAL HISTORY  Past Surgical History:   Procedure Laterality Date    CATARACT EXTRACTION, BILATERAL      COLONOSCOPY  02/26/2016    tics, microscopic colitis,     COLONOSCOPY  07/01/2011    sig tics, inflammation, polyp    DILATATION AND CURETTAGE  1980    EKOS CATHETER PLACEMENT N/A 03/23/2021    Procedure: AIF WITH RUN OFF OF RT. LEG, ROTATIONAL ATHERECTOMY OF RIGHT POPLITEAL ARTERY;  Surgeon: Gato Contreras MD;  Location: [facility] 18/19;  Service: Vascular;  Laterality: N/A;    ENDOSCOPY N/A 12/03/2018    erythematous mucosa in stomach, path benign    EYE SURGERY      FLEXIBLE SIGMOIDOSCOPY      FRACTURE SURGERY      HAND SURGERY      JOINT REPLACEMENT      KNEE ARTHROSCOPY Right     w/meniscal repair    KNEE  SURGERY Right     TEETH EXTRACTION      TOTAL KNEE ARTHROPLASTY Right 05/30/2018    Procedure: TOTAL KNEE ARTHROPLASTY;  Surgeon: Georges Jon MD;  Location: [facility] OR;  Service: Orthopedics    TRANSLUMINAL ATHERECTOMY POPLITEAL ARTERY      TRIGGER POINT INJECTION      UPPER GASTROINTESTINAL ENDOSCOPY  03/14/2008    erythema    VASCULAR SURGERY  Spring of 2020    Blood clot in an artery behind my right knee    WRIST FRACTURE SURGERY Right     WRIST SURGERY Right 2008    orif         FAMILY HISTORY  Family History   Problem Relation Age of Onset    Ulcerative colitis Mother     Migraines Mother     Stroke Mother     Hypertension Mother     Thyroid disease Mother     Arthritis Father     Heart attack Father     Migraines Sister         Sister    Skin cancer Sister     Diabetes Brother     Skin cancer Brother     Breast cancer Maternal Aunt     Migraines Daughter     Malig Hyperthermia Neg Hx          SOCIAL HISTORY  Social History     Socioeconomic History    Marital status: Single    Number of children: 3   Tobacco Use    Smoking status: Never    Smokeless tobacco: Never   Vaping Use    Vaping status: Never Used   Substance and Sexual Activity    Alcohol use: Yes     Comment: I rarely have a glass of wine.    Drug use: Never    Sexual activity: Not Currently     Partners: Male     Comment: Not sexual active         ALLERGIES  Codeine, Penicillin g, and Sulfa antibiotics      REVIEW OF SYSTEMS  Review of Systems  Included in HPI  All systems reviewed and negative except for those discussed in HPI.      PHYSICAL EXAM    I have reviewed the triage vital signs and nursing notes.    ED Triage Vitals [05/07/25 1649]   Temp Heart Rate Resp BP SpO2   98.3 °F (36.8 °C) 95 16 155/75 96 %      Temp src Heart Rate Source Patient Position BP Location FiO2 (%)   Oral Monitor Lying -- --       Physical Exam  GENERAL: Awake, alert, no acute distress  SKIN: Warm, dry  HENT: Normocephalic, atraumatic  EYES: no scleral  icterus  CV: regular rhythm, regular rate  RESPIRATORY: normal effort, lungs clear  ABDOMEN: soft, nontender, nondistended  MUSCULOSKELETAL: no deformity  NEURO: alert, moves all extremities, follows commands            LAB RESULTS  Recent Results (from the past 24 hours)   Comprehensive Metabolic Panel    Collection Time: 05/07/25  5:35 PM    Specimen: Blood   Result Value Ref Range    Glucose 121 (H) 65 - 99 mg/dL    BUN 12 8 - 23 mg/dL    Creatinine 0.60 0.57 - 1.00 mg/dL    Sodium 138 136 - 145 mmol/L    Potassium 3.4 (L) 3.5 - 5.2 mmol/L    Chloride 100 98 - 107 mmol/L    CO2 27.5 22.0 - 29.0 mmol/L    Calcium 9.0 8.6 - 10.5 mg/dL    Total Protein 6.0 6.0 - 8.5 g/dL    Albumin 3.5 3.5 - 5.2 g/dL    ALT (SGPT) 13 1 - 33 U/L    AST (SGOT) 16 1 - 32 U/L    Alkaline Phosphatase 91 39 - 117 U/L    Total Bilirubin 0.3 0.0 - 1.2 mg/dL    Globulin 2.5 gm/dL    A/G Ratio 1.4 g/dL    BUN/Creatinine Ratio 20.0 7.0 - 25.0    Anion Gap 10.5 5.0 - 15.0 mmol/L    eGFR 85.9 >60.0 mL/min/1.73   CBC Auto Differential    Collection Time: 05/07/25  5:35 PM    Specimen: Blood   Result Value Ref Range    WBC 8.72 3.40 - 10.80 10*3/mm3    RBC 4.10 3.77 - 5.28 10*6/mm3    Hemoglobin 12.3 12.0 - 15.9 g/dL    Hematocrit 37.1 34.0 - 46.6 %    MCV 90.5 79.0 - 97.0 fL    MCH 30.0 26.6 - 33.0 pg    MCHC 33.2 31.5 - 35.7 g/dL    RDW 13.1 12.3 - 15.4 %    RDW-SD 42.8 37.0 - 54.0 fl    MPV 9.1 6.0 - 12.0 fL    Platelets 352 140 - 450 10*3/mm3    Neutrophil % 70.9 42.7 - 76.0 %    Lymphocyte % 16.3 (L) 19.6 - 45.3 %    Monocyte % 8.7 5.0 - 12.0 %    Eosinophil % 1.9 0.3 - 6.2 %    Basophil % 0.6 0.0 - 1.5 %    Immature Grans % 1.6 (H) 0.0 - 0.5 %    Neutrophils, Absolute 6.18 1.70 - 7.00 10*3/mm3    Lymphocytes, Absolute 1.42 0.70 - 3.10 10*3/mm3    Monocytes, Absolute 0.76 0.10 - 0.90 10*3/mm3    Eosinophils, Absolute 0.17 0.00 - 0.40 10*3/mm3    Basophils, Absolute 0.05 0.00 - 0.20 10*3/mm3    Immature Grans, Absolute 0.14 (H) 0.00 - 0.05  10*3/mm3    nRBC 0.0 0.0 - 0.2 /100 WBC   High Sensitivity Troponin T    Collection Time: 05/07/25  5:35 PM    Specimen: Blood   Result Value Ref Range    HS Troponin T 22 (H) <14 ng/L   ECG 12 Lead Dyspnea    Collection Time: 05/07/25  6:07 PM   Result Value Ref Range    QT Interval 384 ms    QTC Interval 437 ms   High Sensitivity Troponin T 1Hr    Collection Time: 05/07/25  6:47 PM    Specimen: Blood   Result Value Ref Range    HS Troponin T 21 (H) <14 ng/L    Troponin T Numeric Delta -1 ng/L    Troponin T % Delta -5 Abnormal if >/= 20%   Urinalysis With Microscopic If Indicated (No Culture) - Urine, Clean Catch    Collection Time: 05/07/25  7:36 PM    Specimen: Urine, Clean Catch   Result Value Ref Range    Color, UA Yellow Yellow, Straw    Appearance, UA Clear Clear    pH, UA 7.5 5.0 - 8.0    Specific Gravity, UA <=1.005 1.005 - 1.030    Glucose, UA Negative Negative    Ketones, UA Negative Negative    Bilirubin, UA Negative Negative    Blood, UA Negative Negative    Protein, UA Negative Negative    Leuk Esterase, UA Large (3+) (A) Negative    Nitrite, UA Positive (A) Negative    Urobilinogen, UA 0.2 E.U./dL 0.2 - 1.0 E.U./dL   Urinalysis, Microscopic Only - Urine, Clean Catch    Collection Time: 05/07/25  7:36 PM    Specimen: Urine, Clean Catch   Result Value Ref Range    RBC, UA 0-2 None Seen, 0-2 /HPF    WBC, UA 21-50 (A) None Seen, 0-2 /HPF    Bacteria, UA None Seen None Seen /HPF    Squamous Epithelial Cells, UA 0-2 None Seen, 0-2 /HPF    Hyaline Casts, UA 0-2 None Seen /LPF    Methodology Automated Microscopy          RADIOLOGY  XR Chest 1 View  Result Date: 5/7/2025  XR CHEST 1 VW-  HISTORY: 89-year-old female with generalized weakness.  FINDINGS: There is faint increased density and markings at the left lateral costophrenic angle, possibly atelectasis, but early pneumonia can't be excluded. Please correlate clinically. There are no effusions or evidence for CHF. Follow-up is recommended with 2 views of  the chest in 2 weeks.          MEDICATIONS GIVEN IN ER  Medications   sodium chloride 0.9 % flush 10 mL (has no administration in time range)   cephalexin (KEFLEX) capsule 500 mg (has no administration in time range)   sodium chloride 0.9 % bolus 500 mL (0 mL Intravenous Stopped 5/7/25 5414)         ORDERS PLACED DURING THIS VISIT:  Orders Placed This Encounter   Procedures    XR Chest 1 View    Comprehensive Metabolic Panel    Urinalysis With Microscopic If Indicated (No Culture) - Urine, Clean Catch    CBC Auto Differential    High Sensitivity Troponin T    High Sensitivity Troponin T 1Hr    Urinalysis, Microscopic Only - Urine, Clean Catch    Urinalysis With Culture If Indicated -    ECG 12 Lead Dyspnea    Insert Peripheral IV    CBC & Differential         OUTPATIENT MEDICATION MANAGEMENT:  Current Facility-Administered Medications Ordered in Epic   Medication Dose Route Frequency Provider Last Rate Last Admin    cephalexin (KEFLEX) capsule 500 mg  500 mg Oral Once Catracho Waldron MD        sodium chloride 0.9 % flush 10 mL  10 mL Intravenous PRN Catracho Waldron MD         Current Outpatient Medications Ordered in Epic   Medication Sig Dispense Refill    Acetaminophen (TYLENOL EXTRA STRENGTH PO) Take 2 tablets by mouth Daily As Needed.      amLODIPine (NORVASC) 2.5 MG tablet Take 2 tablets by mouth Daily. 30 tablet 0    atorvastatin (LIPITOR) 20 MG tablet Take 1 tablet by mouth Daily.      Budesonide (ENTOCORT EC) 3 MG 24 hr capsule TAKE THREE CAPSULES BY MOUTH DAILY 90 capsule 4    calcium carbonate (OS-DWIGHT) 600 MG tablet 1 P.O. daily      Carboxymethylcell-Hypromellose (GENTEAL OP) Apply 1 application to eye As Needed.      cephalexin (KEFLEX) 500 MG capsule Take 1 capsule by mouth 2 (Two) Times a Day for 7 days. 14 capsule 0    Cholecalciferol (VITAMIN D PO) Take 2,000 mg by mouth every night at bedtime.      Diclofenac Sodium (VOLTAREN) 1 % gel gel Apply 4 g topically to the appropriate area as directed 4  (Four) Times a Day As Needed (pain). 100 g 0    escitalopram (LEXAPRO) 20 MG tablet Take 1 tablet by mouth Every Morning.      fluticasone (FLONASE) 50 MCG/ACT nasal spray Administer 1-2 sprays into the nostril(s) as directed by provider.      lisinopril (PRINIVIL,ZESTRIL) 10 MG tablet Take 1 tablet by mouth Every Night. 30 tablet 0    Multiple Vitamins-Minerals (CENTRUM SILVER ULTRA WOMENS PO) 1 P.O. daily      naloxone (NARCAN) 4 MG/0.1ML nasal spray Call 911. Don't prime. [place] in 1 nostril for overdose. Repeat in 2-3 minutes in other nostril if no or minimal breathing/responsiveness. 2 each 0    omeprazole (priLOSEC) 20 MG capsule 1 capsule. Monday Wednesday friday      polycarbophil (calcium polycarbophil) 625 MG tablet tablet Take  by mouth Daily.      polyethyl glycol-propyl glycol (Systane) 0.4-0.3 % solution ophthalmic solution (artificial tears)       traMADol (ULTRAM) 50 MG tablet Take 1 tablet by mouth Every 6 (Six) Hours As Needed for Moderate Pain. 12 tablet 0    traZODone (DESYREL) 50 MG tablet Take 0.5-1 tablets by mouth Every Night.           PROCEDURES  Procedures            PROGRESS, DATA ANALYSIS, CONSULTS, AND MEDICAL DECISION MAKING  All labs have been independently interpreted by me.  All radiology studies have been reviewed by me. All EKG's have been independently viewed and interpreted by me.  Discussion below represents my analysis of pertinent findings related to patient's condition, differential diagnosis, treatment plan and final disposition.    Differential diagnosis includes but is not limited to UTI, pneumonia, anemia, renal failure, dehydration.    Clinical Scores:                                       ED Course as of 05/07/25 2006   Wed May 07, 2025   1810 Hemoglobin: 12.3 [TR]   1810 XR Chest 1 View  My independent interpretation of the imaging study is no dense consolidation [TR]   1816 HS Troponin T(!): 22 [TR]   1816 Creatinine: 0.60  EKG PROCEDURE    EKG time:  1807  Rhythm/Rate: Sinus, rate 78  P waves and NM: Normal P, normal NM  QRS, axis: Narrow QRS, left axis.  Inferior Q waves.  ST and T waves: No acute    Independently Interpreted by me  New inferior Q waves changed compared to prior 6/2/2024   [TR]   1955 Nitrite, UA(!): Positive [TR]   1955 Leukocytes, UA(!): Large (3+) [TR]   1955 WBC, UA(!): 21-50 [TR]   1955 Bacteria, UA: None Seen [TR]   2002 Reviewed the workup and findings with the patient and her son at the bedside.  She is nontoxic-appearing.  She has normal CBC.  She does have nitrite positive urine with 21-50 white cells but no bacteria.  Son reports that a few months ago she had a UTI and started acting similarly.  Her urine culture grew out E. coli that was sensitive to Keflex.  He reports that she moved into a new living facility about 4 days ago when her generalized weakness and appetite change started.  He was out of town but now is going to be in town for the next 5 days.  Plan to discharge her back to the facility and son is going to check on her daily to ensure she is eating and not having any further problems.  He agrees to return if she has worsening symptoms.  Plan to send her home on Keflex given her presentation.  They are agreeable.  She is at her baseline mental status. [TR]      ED Course User Index  [TR] Catracho Waldron MD             AS OF 20:06 EDT VITALS:    BP - 166/83  HR - 82  TEMP - 98.8 °F (37.1 °C) (Tympanic)  O2 SATS - 95%    COMPLEXITY OF CARE  Admission was considered but after careful review of the patient's presentation, physical examination, diagnostic results, and response to treatment the patient may be safely discharged with outpatient follow-up.      DIAGNOSIS  Final diagnoses:   General weakness   Lack of appetite   Pyuria         DISPOSITION  ED Disposition       ED Disposition   Discharge    Condition   Stable    Comment   --                Please note that portions of this document were completed with a voice  recognition program.    Note Disclaimer: At [facility], we believe that sharing information builds trust and better relationships. You are receiving this note because you recently visited [facility]. It is possible you will see health information before a provider has talked with you about it. This kind of information can be easy to misunderstand. To help you fully understand what it means for your health, we urge you to discuss this note with your provider.         Catracho Waldron MD  05/07/25 2006

## 2025-05-07 NOTE — ED NOTES
Pt arrives via EMS from [facility]. Reports generalized weakness and diarrhea that started today. Complains of decreased PO intake.

## 2025-05-08 LAB
QT INTERVAL: 384 MS
QTC INTERVAL: 437 MS

## 2025-05-08 NOTE — DISCHARGE INSTRUCTIONS
Take antibiotics as prescribed until they are gone.  Follow-up with your primary care doctor for further evaluation.  Return immediately for any fevers, falls, passing out.

## 2025-05-09 LAB — BACTERIA SPEC AEROBE CULT: ABNORMAL

## 2025-05-21 ENCOUNTER — LAB (OUTPATIENT)
Dept: LAB | Facility: HOSPITAL | Age: OVER 89
End: 2025-05-21
Payer: MEDICARE

## 2025-05-21 DIAGNOSIS — D47.2 MGUS (MONOCLONAL GAMMOPATHY OF UNKNOWN SIGNIFICANCE): ICD-10-CM

## 2025-05-21 LAB
ALBUMIN SERPL-MCNC: 3.8 G/DL (ref 3.5–5.2)
ALBUMIN/GLOB SERPL: 1.7 G/DL
ALP SERPL-CCNC: 96 U/L (ref 39–117)
ALT SERPL W P-5'-P-CCNC: 25 U/L (ref 1–33)
ANION GAP SERPL CALCULATED.3IONS-SCNC: 11.1 MMOL/L (ref 5–15)
AST SERPL-CCNC: 19 U/L (ref 1–32)
BASOPHILS # BLD AUTO: 0.06 10*3/MM3 (ref 0–0.2)
BASOPHILS NFR BLD AUTO: 0.5 % (ref 0–1.5)
BILIRUB SERPL-MCNC: 0.4 MG/DL (ref 0–1.2)
BUN SERPL-MCNC: 8 MG/DL (ref 8–23)
BUN/CREAT SERPL: 14.5 (ref 7–25)
CALCIUM SPEC-SCNC: 8.8 MG/DL (ref 8.6–10.5)
CHLORIDE SERPL-SCNC: 103 MMOL/L (ref 98–107)
CO2 SERPL-SCNC: 25.9 MMOL/L (ref 22–29)
CREAT SERPL-MCNC: 0.55 MG/DL (ref 0.57–1)
DEPRECATED RDW RBC AUTO: 51.8 FL (ref 37–54)
EGFRCR SERPLBLD CKD-EPI 2021: 87.7 ML/MIN/1.73
EOSINOPHIL # BLD AUTO: 0.21 10*3/MM3 (ref 0–0.4)
EOSINOPHIL NFR BLD AUTO: 1.9 % (ref 0.3–6.2)
ERYTHROCYTE [DISTWIDTH] IN BLOOD BY AUTOMATED COUNT: 14.8 % (ref 12.3–15.4)
GLOBULIN UR ELPH-MCNC: 2.2 GM/DL
GLUCOSE SERPL-MCNC: 86 MG/DL (ref 65–99)
HCT VFR BLD AUTO: 36.5 % (ref 34–46.6)
HGB BLD-MCNC: 11.6 G/DL (ref 12–15.9)
IMM GRANULOCYTES # BLD AUTO: 0.13 10*3/MM3 (ref 0–0.05)
IMM GRANULOCYTES NFR BLD AUTO: 1.2 % (ref 0–0.5)
LYMPHOCYTES # BLD AUTO: 1.58 10*3/MM3 (ref 0.7–3.1)
LYMPHOCYTES NFR BLD AUTO: 14.1 % (ref 19.6–45.3)
MCH RBC QN AUTO: 30.4 PG (ref 26.6–33)
MCHC RBC AUTO-ENTMCNC: 31.8 G/DL (ref 31.5–35.7)
MCV RBC AUTO: 95.8 FL (ref 79–97)
MONOCYTES # BLD AUTO: 0.85 10*3/MM3 (ref 0.1–0.9)
MONOCYTES NFR BLD AUTO: 7.6 % (ref 5–12)
NEUTROPHILS NFR BLD AUTO: 74.7 % (ref 42.7–76)
NEUTROPHILS NFR BLD AUTO: 8.41 10*3/MM3 (ref 1.7–7)
NRBC BLD AUTO-RTO: 0 /100 WBC (ref 0–0.2)
PLATELET # BLD AUTO: 241 10*3/MM3 (ref 140–450)
PMV BLD AUTO: 9 FL (ref 6–12)
POTASSIUM SERPL-SCNC: 3.5 MMOL/L (ref 3.5–5.2)
PROT SERPL-MCNC: 6 G/DL (ref 6–8.5)
RBC # BLD AUTO: 3.81 10*6/MM3 (ref 3.77–5.28)
SODIUM SERPL-SCNC: 140 MMOL/L (ref 136–145)
WBC NRBC COR # BLD AUTO: 11.24 10*3/MM3 (ref 3.4–10.8)

## 2025-05-21 PROCEDURE — 85025 COMPLETE CBC W/AUTO DIFF WBC: CPT

## 2025-05-21 PROCEDURE — 80053 COMPREHEN METABOLIC PANEL: CPT

## 2025-05-21 PROCEDURE — 36415 COLL VENOUS BLD VENIPUNCTURE: CPT

## 2025-05-23 LAB
ALBUMIN SERPL ELPH-MCNC: 3.2 G/DL (ref 2.9–4.4)
ALBUMIN/GLOB SERPL: 1.2 {RATIO} (ref 0.7–1.7)
ALPHA1 GLOB SERPL ELPH-MCNC: 0.3 G/DL (ref 0–0.4)
ALPHA2 GLOB SERPL ELPH-MCNC: 0.7 G/DL (ref 0.4–1)
B-GLOBULIN SERPL ELPH-MCNC: 1.1 G/DL (ref 0.7–1.3)
GAMMA GLOB SERPL ELPH-MCNC: 0.6 G/DL (ref 0.4–1.8)
GLOBULIN SER-MCNC: 2.7 G/DL (ref 2.2–3.9)
IGA SERPL-MCNC: 374 MG/DL (ref 64–422)
IGG SERPL-MCNC: 573 MG/DL (ref 586–1602)
IGM SERPL-MCNC: 97 MG/DL (ref 26–217)
INTERPRETATION SERPL IEP-IMP: ABNORMAL
KAPPA LC FREE SER-MCNC: 28.7 MG/L (ref 3.3–19.4)
KAPPA LC FREE/LAMBDA FREE SER: 2.61 {RATIO} (ref 0.26–1.65)
LABORATORY COMMENT REPORT: ABNORMAL
LAMBDA LC FREE SERPL-MCNC: 11 MG/L (ref 5.7–26.3)
M PROTEIN SERPL ELPH-MCNC: ABNORMAL G/DL
PROT SERPL-MCNC: 5.9 G/DL (ref 6–8.5)

## 2025-05-27 NOTE — PROGRESS NOTES
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  Since her last visit here, the patient was hospitalized in March due to a right knee fracture around her prosthesis.  This was managed conservatively with a brace.  She has been in rehab for a lengthy period of time and has now transitioned to a less intensive rehab area.  She is ambulating well with the use of a rollator.  She is still undergoing physical therapy.  She does note some mild chronic diarrhea, discontinue on oral budesonide.  She does continue on weekly methotrexate injections per rheumatology.  He has lost some weight, has not been using any dietary supplements despite urging from her family.  She does report having a fairly normal appetite.  She remains active, ECOG performance status of 1.    Objective   Vital Signs:   /76   Pulse 83   Temp 98.5 °F (36.9 °C) (Oral)   Resp 17   Ht 152.4 cm (60\")   Wt 41.9 kg (92 lb 4.8 oz)   SpO2 97%   BMI 18.03 kg/m²     Physical Exam  Constitutional:       Appearance: She is well-developed.      Comments: Patient ambulating with use of a rollator   Eyes:      Conjunctiva/sclera: Conjunctivae normal.   Neck:      Thyroid: No thyromegaly.   Cardiovascular:      Rate and Rhythm: Normal rate and regular rhythm.      Heart sounds: No murmur heard.     No friction rub. No gallop.   Pulmonary:      Effort: No respiratory distress.      Breath sounds: Normal breath sounds.   Abdominal:      General: Bowel sounds are normal. There is no distension.      Palpations: Abdomen is soft.      Tenderness: There is no abdominal tenderness.   Musculoskeletal:      Comments: Sclerodactyly involving the hands.  No edema   Lymphadenopathy:      Head:      Right side of head: No submandibular " adenopathy.      Cervical: No cervical adenopathy.      Upper Body:      Right upper body: No supraclavicular adenopathy.      Left upper body: No supraclavicular adenopathy.   Skin:     General: Skin is warm and dry.      Findings: No rash.   Neurological:      Mental Status: She is alert and oriented to person, place, and time.      Cranial Nerves: No cranial nerve deficit.      Motor: No abnormal muscle tone.      Deep Tendon Reflexes: Reflexes normal.   Psychiatric:         Behavior: Behavior normal.         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  Laboratory studies 2/12/2020 showed a creatinine of 0.61, calcium 9.2, globulin 2.5, albumin 4.3, serum protein electrophoresis with a prominent protein band in the beta region, could not rule out monoclonal protein.  CBC on 4/14/2020 with WBC 6.0 with normal differential, hemoglobin 12.0, platelet count 255,000.  Subsequent labs with Dr. Rust on 7/1/2020 showed an immunoelectrophoresis confirming an IgA kappa monoclonal protein with IgA 658, IgG 7 or 24, IgM 125.  Creatinine was normal at 0.61 with calcium 9.5.    Patient was referred to our office for further evaluation.  At time of initial visit 8/12/2020, hemoglobin 13.1, platelet count 200,000, creatinine 0.54 with calcium of 9.5.  Additional labs 8/12/2020 with dual IgA kappa M spike (0.6 g and secondary M spike too small to quantify), IgA 652, IgG 743, IgM 126, free kappa light chain 87.1, free lambda light chain 12.1, free light chain ratio 7.0.  24-hour urine 8/17/2020 with 410 mg total protein, 40.6% kappa light chain (166 mg).  Skeletal survey  8/13/2020 with no evidence of lytic lesions.  Previous pathology specimens from colonoscopy 2/26/2016 and EGD 12/10/2018 were sent for Congo red stain, both negative.  Patient request to forego any further routine for further monitoring  Patient returns today for 6-month interval follow-up visit.  We are reviewing labs that were drawn on 5/21/2025 in relation to her MGUS which showed stable IgA kappa M spike at 0.3 g, IgA stable at 374, IgG 573, IgM 97, free kappa light chains stable at 28.7, free lambda light chain 11.0 and free light chain ratio stable at 2.61.  Patient with stable mild degree of anemia that is suspected to be related to chronic disease associated with her underlying autoimmune disorder.  There is no evidence of thrombocytopenia, no evidence of hypercalcemia.  Renal function remains normal.  The patient's M spike has declined over time.  We will continue for now monitoring on a 6-month basis.  As requested by patient we will forego further 24-hour urine monitoring    *Anemia:  Hemoglobin on 8/12/2020 was normal at 13.1 with MCV 95.4  Hemoglobin declined on 8/26/2020 down to 11.3 with MCV 98.1.  Additional evaluation at that time with iron 52, ferritin 66.3, iron saturation 16%, TIBC 322, folate greater than 20, B12 552.  Suspect that patient has anemia secondary to chronic disease with underlying chronic autoimmune disorder.  She is also receiving weekly methotrexate which may be a contributing factor.    Hemoglobin on 3/18/2022 declined to 10.4.  Additional labs with evidence of iron deficiency with iron 43, ferritin 8.0, iron saturation 10%, TIBC 442, folate greater than 20, B12 602, CRP less than 0.3, ESR 18  Concern regarding of patient's ability to tolerate oral iron with underlying colitis with alternating constipation and diarrhea.  Therefore proceeded with IV iron in the form of Injectafer, received 727 mg on 4/4 and 750 mg on 4/11/2022.  Labs on 6/17/2022 with significant response,  hemoglobin up to 13.1, ferritin 591, iron saturation 35%  Patient was seen by GI on 7/11/2022, no clinical evidence to suggest GI blood loss.  Patient opted to forego colonoscopy given her age and potential risks.  CT abdomen and pelvis 8/15/2022 with suggestion of long segment distal small bowel wall thickening possibly related to peristalsis, recommended CT enterography.  CT enterography performed 9/7/2022 showed multifocal areas of prominent bowel wall thickening particularly involving the jejunum, distal ileum that were nonspecific and possibly indicative of enteritis.  Distention of stomach and proximal duodenum secondary to ingestion of contrast.  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  Mild persistent anemia may be related to ongoing methotrexate use, particularly with macrocytic indices.  Unclear whether she has a component of anemia of chronic disease related to her underlying autoimmune issues (although labs on 9/8/2022 with ESR normal at 13, CRP less than 0.3).    Patient with chronic anemia in the 11-12 range, presume that this is likely related to component of anemia of chronic disease secondary to underlying autoimmune disorder.  Current hemoglobin from 5/21/2025 stable at 11.6.  We will recheck iron studies at return visit in 6 months.    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 Martell in rheumatology.    She had been treated previously with Arava, subsequently changed to methotrexate weekly injections.    She has low disease activity and her disease has been under good control.    She does have associated Sjogren's syndrome with dry eyes and dry mouth and subsequent dental issues.  She has chronic pain in her fingers with some degree of sclerodactyly and receives intermittent injections by hand surgery every 4 to 6 months.  Suspect that patient's  monoclonal gammopathy is associated with her underlying rheumatologic disorder.  There has been discussion regarding potential addition of Orencia to her weekly methotrexate injections.  Labs on 9/8/2022 with ESR 13, CRP less than 0.3  Patient returns today continuing on weekly methotrexate injections.  She continues follow-up with rheumatology and reports stable symptoms    *Lumbar spine stenosis and spondylolisthesis:  Chronic back pain  Patient followed by Dr. Licea in orthopedics  Patient received a course of epidural injections    *Microscopic colitis/ulcerative colitis:  Patient is followed by Dr. Shook  Previously treated with Entocort, discontinued around 2018  Patient did follow-up with GI in January.  She underwent CT abdomen and pelvis on 1/27/2021 which was unrevealing.      Previous pathology specimens from colonoscopy 2/26/2016 and EGD 12/10/2018 were sent for [place] red stain and were negative.  Patient recently was placed on budesonide by GI with improvement in symptoms.   Patient continuing on budesonide 6 mg daily per GI.  Patient continues with alternating constipation and diarrhea.    *Peripheral neuropathy  Patient has had some longstanding intermittent symptoms in her feet with numbness that does wax and wane.  It sounds as if her symptoms are associated with Raynaud's type changes, unclear whether this represents a true peripheral neuropathy.  It is not a consistent finding.  Previous B12 level normal at 552 on 8/26/2020.  Unclear whether symptoms could be in part related to her underlying MGUS.  Patient continues follow-up with neurology    *Peripheral vascular disease  Patient developed acute ischemia right foot and was hospitalized 3/22-3/25/2021.  On 3/22/2021 patient underwent arthrectomy right popliteal artery.  She was subsequently placed on Xarelto 20 mg daily.  Patient was subsequently transitioned from Xarelto to Plavix 75 mg daily by vascular surgery in December 2021.    *Weight  loss  Patient with recent fracture around right knee prosthesis in March 2021.  Patient has had stay in rehab and is continuing with physical therapy  Patient's nutritional status has declined recently, notes that her appetite has been somewhat diminished with diminished intake.  Weight has declined to 92 pounds.  She is encouraged to begin routine use of dietary supplements with Ensure or boost to improve her calorie and protein intake.     Plan:     Patient continues on a course of observation/surveillance regarding her IgA kappa MGUS with no evidence of change on current labs.  Patient continues on weekly methotrexate injections per rheumatology.    Patient will continue follow-up with Dr. Rust, rheumatology, GI.  Patient encouraged to increase caloric intake with use of dietary supplements such as Ensure or boost  MD visit in 6 months, labs 1 week prior with CBC, CMP, iron panel, ferritin, serum protein electrophoresis, immunoelectrophoresis, quantitative immunoglobulins, free serum light chains.

## 2025-05-28 ENCOUNTER — OFFICE VISIT (OUTPATIENT)
Dept: ONCOLOGY | Facility: CLINIC | Age: OVER 89
End: 2025-05-28
Payer: MEDICARE

## 2025-05-28 VITALS
SYSTOLIC BLOOD PRESSURE: 118 MMHG | DIASTOLIC BLOOD PRESSURE: 76 MMHG | HEIGHT: 60 IN | BODY MASS INDEX: 18.12 KG/M2 | WEIGHT: 92.3 LBS | HEART RATE: 83 BPM | RESPIRATION RATE: 17 BRPM | TEMPERATURE: 98.5 F | OXYGEN SATURATION: 97 %

## 2025-05-28 DIAGNOSIS — D50.9 IRON DEFICIENCY ANEMIA, UNSPECIFIED IRON DEFICIENCY ANEMIA TYPE: ICD-10-CM

## 2025-05-28 DIAGNOSIS — D47.2 MGUS (MONOCLONAL GAMMOPATHY OF UNKNOWN SIGNIFICANCE): Primary | ICD-10-CM

## 2025-05-28 PROCEDURE — 1160F RVW MEDS BY RX/DR IN RCRD: CPT | Performed by: INTERNAL MEDICINE

## 2025-05-28 PROCEDURE — 1159F MED LIST DOCD IN RCRD: CPT | Performed by: INTERNAL MEDICINE

## 2025-05-28 PROCEDURE — 1126F AMNT PAIN NOTED NONE PRSNT: CPT | Performed by: INTERNAL MEDICINE

## 2025-05-28 PROCEDURE — 99214 OFFICE O/P EST MOD 30 MIN: CPT | Performed by: INTERNAL MEDICINE

## 2025-05-28 NOTE — LETTER
"May 28, 2025     Megah De Leon MD  [facility] [address] [facility] [address]    Patient: Arlin Hutson   YOB: 1935   Date of Visit: 5/28/2025     Dear Megha De Leon MD:       Thank you for referring Arlin Hutson to me for evaluation. Below are the relevant portions of my assessment and plan of care.    If you have questions, please do not hesitate to call me. I look forward to following Arlin along with you.         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  Since her last visit here, the patient was hospitalized in March due to a right knee fracture around her prosthesis.  This was managed conservatively with a brace.  She has been in rehab for a lengthy period of time and has now transitioned to a less intensive rehab area.  She is ambulating well with the use of a rollator.  She is still undergoing physical therapy.  She does note some mild chronic diarrhea, discontinue on oral budesonide.  She does continue on weekly methotrexate injections per rheumatology.  He has lost some weight, has not been using any dietary supplements despite urging from her family.  She does report having a fairly normal appetite.  She remains active, ECOG performance status of 1.    Objective  Vital Signs:   /76   Pulse 83   Temp 98.5 °F (36.9 °C) (Oral)   Resp 17   Ht 152.4 cm (60\")   Wt 41.9 kg (92 lb 4.8 oz)   SpO2 97%   BMI 18.03 kg/m²     Physical Exam  Constitutional:       Appearance: She is well-developed.      Comments: Patient ambulating with use of a rollator   Eyes:      Conjunctiva/sclera: Conjunctivae normal.   Neck:      " Thyroid: No thyromegaly.   Cardiovascular:      Rate and Rhythm: Normal rate and regular rhythm.      Heart sounds: No murmur heard.     No friction rub. No gallop.   Pulmonary:      Effort: No respiratory distress.      Breath sounds: Normal breath sounds.   Abdominal:      General: Bowel sounds are normal. There is no distension.      Palpations: Abdomen is soft.      Tenderness: There is no abdominal tenderness.   Musculoskeletal:      Comments: Sclerodactyly involving the hands.  No edema   Lymphadenopathy:      Head:      Right side of head: No submandibular adenopathy.      Cervical: No cervical adenopathy.      Upper Body:      Right upper body: No supraclavicular adenopathy.      Left upper body: No supraclavicular adenopathy.   Skin:     General: Skin is warm and dry.      Findings: No rash.   Neurological:      Mental Status: She is alert and oriented to person, place, and time.      Cranial Nerves: No cranial nerve deficit.      Motor: No abnormal muscle tone.      Deep Tendon Reflexes: Reflexes normal.   Psychiatric:         Behavior: Behavior normal.         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  Laboratory studies 2/12/2020 showed a creatinine of 0.61, calcium 9.2, globulin 2.5, albumin 4.3, serum protein electrophoresis with a prominent protein band in the beta region, could not rule out monoclonal protein.  CBC on 4/14/2020 with WBC 6.0 with normal differential, hemoglobin 12.0, platelet count 255,000.  Subsequent labs with Dr. Rust on 7/1/2020 showed an immunoelectrophoresis confirming an IgA kappa monoclonal protein with IgA 658, IgG 7 or  24, IgM 125.  Creatinine was normal at 0.61 with calcium 9.5.    Patient was referred to our office for further evaluation.  At time of initial visit 8/12/2020, hemoglobin 13.1, platelet count 200,000, creatinine 0.54 with calcium of 9.5.  Additional labs 8/12/2020 with dual IgA kappa M spike (0.6 g and secondary M spike too small to quantify), IgA 652, IgG 743, IgM 126, free kappa light chain 87.1, free lambda light chain 12.1, free light chain ratio 7.0.  24-hour urine 8/17/2020 with 410 mg total protein, 40.6% kappa light chain (166 mg).  Skeletal survey 8/13/2020 with no evidence of lytic lesions.  Previous pathology specimens from colonoscopy 2/26/2016 and EGD 12/10/2018 were sent for Congo red stain, both negative.  Patient request to forego any further routine for further monitoring  Patient returns today for 6-month interval follow-up visit.  We are reviewing labs that were drawn on 5/21/2025 in relation to her MGUS which showed stable IgA kappa M spike at 0.3 g, IgA stable at 374, IgG 573, IgM 97, free kappa light chains stable at 28.7, free lambda light chain 11.0 and free light chain ratio stable at 2.61.  Patient with stable mild degree of anemia that is suspected to be related to chronic disease associated with her underlying autoimmune disorder.  There is no evidence of thrombocytopenia, no evidence of hypercalcemia.  Renal function remains normal.  The patient's M spike has declined over time.  We will continue for now monitoring on a 6-month basis.  As requested by patient we will forego further 24-hour urine monitoring    *Anemia:  Hemoglobin on 8/12/2020 was normal at 13.1 with MCV 95.4  Hemoglobin declined on 8/26/2020 down to 11.3 with MCV 98.1.  Additional evaluation at that time with iron 52, ferritin 66.3, iron saturation 16%, TIBC 322, folate greater than 20, B12 552.  Suspect that patient has anemia secondary to chronic disease with underlying chronic autoimmune disorder.  She is also  receiving weekly methotrexate which may be a contributing factor.    Hemoglobin on 3/18/2022 declined to 10.4.  Additional labs with evidence of iron deficiency with iron 43, ferritin 8.0, iron saturation 10%, TIBC 442, folate greater than 20, B12 602, CRP less than 0.3, ESR 18  Concern regarding of patient's ability to tolerate oral iron with underlying colitis with alternating constipation and diarrhea.  Therefore proceeded with IV iron in the form of Injectafer, received 727 mg on 4/4 and 750 mg on 4/11/2022.  Labs on 6/17/2022 with significant response, hemoglobin up to 13.1, ferritin 591, iron saturation 35%  Patient was seen by GI on 7/11/2022, no clinical evidence to suggest GI blood loss.  Patient opted to forego colonoscopy given her age and potential risks.  CT abdomen and pelvis 8/15/2022 with suggestion of long segment distal small bowel wall thickening possibly related to peristalsis, recommended CT enterography.  CT enterography performed 9/7/2022 showed multifocal areas of prominent bowel wall thickening particularly involving the jejunum, distal ileum that were nonspecific and possibly indicative of enteritis.  Distention of stomach and proximal duodenum secondary to ingestion of contrast.  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  Mild persistent anemia may be related to ongoing methotrexate use, particularly with macrocytic indices.  Unclear whether she has a component of anemia of chronic disease related to her underlying autoimmune issues (although labs on 9/8/2022 with ESR normal at 13, CRP less than 0.3).    Patient with chronic anemia in the 11-12 range, presume that this is likely related to component of anemia of chronic disease secondary to underlying autoimmune disorder.  Current hemoglobin from 5/21/2025 stable at 11.6.  We will recheck iron studies at return visit in 6 months.    *Crest syndrome/seropositive rheumatoid arthritis overlap syndrome with associated  Sjogren's syndrome:  Patient reports being diagnosed around 2012 and is followed by Dr. Martell in rheumatology.    She had been treated previously with Arava, subsequently changed to methotrexate weekly injections.    She has low disease activity and her disease has been under good control.    She does have associated Sjogren's syndrome with dry eyes and dry mouth and subsequent dental issues.  She has chronic pain in her fingers with some degree of sclerodactyly and receives intermittent injections by hand surgery every 4 to 6 months.  Suspect that patient's monoclonal gammopathy is associated with her underlying rheumatologic disorder.  There has been discussion regarding potential addition of Orencia to her weekly methotrexate injections.  Labs on 9/8/2022 with ESR 13, CRP less than 0.3  Patient returns today continuing on weekly methotrexate injections.  She continues follow-up with rheumatology and reports stable symptoms    *Lumbar spine stenosis and spondylolisthesis:  Chronic back pain  Patient followed by Dr. Licea in orthopedics  Patient received a course of epidural injections    *Microscopic colitis/ulcerative colitis:  Patient is followed by Dr. Shook  Previously treated with Entocort, discontinued around 2018  Patient did follow-up with GI in January.  She underwent CT abdomen and pelvis on 1/27/2021 which was unrevealing.      Previous pathology specimens from colonoscopy 2/26/2016 and EGD 12/10/2018 were sent for [place] red stain and were negative.  Patient recently was placed on budesonide by GI with improvement in symptoms.   Patient continuing on budesonide 6 mg daily per GI.  Patient continues with alternating constipation and diarrhea.    *Peripheral neuropathy  Patient has had some longstanding intermittent symptoms in her feet with numbness that does wax and wane.  It sounds as if her symptoms are associated with Raynaud's type changes, unclear whether this represents a true peripheral  neuropathy.  It is not a consistent finding.  Previous B12 level normal at 552 on 8/26/2020.  Unclear whether symptoms could be in part related to her underlying MGUS.  Patient continues follow-up with neurology    *Peripheral vascular disease  Patient developed acute ischemia right foot and was hospitalized 3/22-3/25/2021.  On 3/22/2021 patient underwent arthrectomy right popliteal artery.  She was subsequently placed on Xarelto 20 mg daily.  Patient was subsequently transitioned from Xarelto to Plavix 75 mg daily by vascular surgery in December 2021.    *Weight loss  Patient with recent fracture around right knee prosthesis in March 2021.  Patient has had stay in rehab and is continuing with physical therapy  Patient's nutritional status has declined recently, notes that her appetite has been somewhat diminished with diminished intake.  Weight has declined to 92 pounds.  She is encouraged to begin routine use of dietary supplements with Ensure or boost to improve her calorie and protein intake.     Plan:     Patient continues on a course of observation/surveillance regarding her IgA kappa MGUS with no evidence of change on current labs.  Patient continues on weekly methotrexate injections per rheumatology.    Patient will continue follow-up with Dr. Rust, rheumatology, GI.  Patient encouraged to increase caloric intake with use of dietary supplements such as Ensure or boost  MD visit in 6 months, labs 1 week prior with CBC, CMP, iron panel, ferritin, serum protein electrophoresis, immunoelectrophoresis, quantitative immunoglobulins, free serum light chains.

## 2025-06-11 ENCOUNTER — TRANSCRIBE ORDERS (OUTPATIENT)
Dept: LAB | Facility: HOSPITAL | Age: OVER 89
End: 2025-06-11
Payer: MEDICARE

## 2025-06-11 ENCOUNTER — LAB (OUTPATIENT)
Dept: LAB | Facility: HOSPITAL | Age: OVER 89
End: 2025-06-11
Payer: MEDICARE

## 2025-06-11 DIAGNOSIS — R30.0 DYSURIA: Primary | ICD-10-CM

## 2025-06-11 DIAGNOSIS — R30.0 DYSURIA: ICD-10-CM

## 2025-06-11 LAB
BACTERIA UR QL AUTO: ABNORMAL /HPF
BILIRUB UR QL STRIP: NEGATIVE
CLARITY UR: CLEAR
COLOR UR: YELLOW
GLUCOSE UR STRIP-MCNC: NEGATIVE MG/DL
HGB UR QL STRIP.AUTO: NEGATIVE
HYALINE CASTS UR QL AUTO: ABNORMAL /LPF
KETONES UR QL STRIP: NEGATIVE
LEUKOCYTE ESTERASE UR QL STRIP.AUTO: ABNORMAL
NITRITE UR QL STRIP: NEGATIVE
PH UR STRIP.AUTO: 7.5 [PH] (ref 5–8)
PROT UR QL STRIP: NEGATIVE
RBC # UR STRIP: ABNORMAL /HPF
REF LAB TEST METHOD: ABNORMAL
SP GR UR STRIP: 1.01 (ref 1–1.03)
SQUAMOUS #/AREA URNS HPF: ABNORMAL /HPF
UROBILINOGEN UR QL STRIP: ABNORMAL
WBC # UR STRIP: ABNORMAL /HPF

## 2025-06-11 PROCEDURE — 87086 URINE CULTURE/COLONY COUNT: CPT

## 2025-06-11 PROCEDURE — 81001 URINALYSIS AUTO W/SCOPE: CPT

## 2025-06-12 LAB — BACTERIA SPEC AEROBE CULT: ABNORMAL

## 2025-06-13 ENCOUNTER — TELEPHONE (OUTPATIENT)
Dept: GASTROENTEROLOGY | Facility: CLINIC | Age: OVER 89
End: 2025-06-13
Payer: MEDICARE

## 2025-06-13 NOTE — TELEPHONE ENCOUNTER
Called pt's [facility] pharmacy and spoke with pharmacist Annia who reports that the insurance will not cover this but the pt can get it filled for $86.17 using a discount card. ADvised to go ahead and fill and we will call the pt's son.       Called pt's son and advised of the above. Verb understanding.

## 2025-06-13 NOTE — TELEPHONE ENCOUNTER
Pt's son, Mumtaz, just called and said that they are having trouble filling pt's budesonide again from [facility], said the [employer] company is saying now it's not covered and need some kind of form. I know there was a discount card used last time (see notes) but he is asking for Romana ESTRADA to give him a call back to help him again get her meds. Mumtaz [phone].

## 2025-06-19 ENCOUNTER — TELEPHONE (OUTPATIENT)
Dept: GASTROENTEROLOGY | Facility: CLINIC | Age: OVER 89
End: 2025-06-19
Payer: MEDICARE

## 2025-06-19 DIAGNOSIS — K52.839 MICROSCOPIC COLITIS, UNSPECIFIED MICROSCOPIC COLITIS TYPE: ICD-10-CM

## 2025-06-19 RX ORDER — BUDESONIDE 3 MG/1
9 CAPSULE, COATED PELLETS ORAL DAILY
Qty: 90 CAPSULE | Refills: 2 | Status: SHIPPED | OUTPATIENT
Start: 2025-06-19

## 2025-06-19 NOTE — TELEPHONE ENCOUNTER
See previous note. Pts son Ray called and said that [employer] isn't covering medication Budesonide due to no diagnostic code and saying it's not medically necessary. Could someone pls call pts son back to adv on this?  Thanks.

## 2025-06-19 NOTE — TELEPHONE ENCOUNTER
Called pt 's son and he reports that [employer] is wanting a diagnosis code attached to the budesonide and it will then be covered.  He would like this to be sent to a different [facility].   Advised we will attach the microscopic colitis dx to the budesonide 3mg take 3 po daily #90 .  Advised to let us know if they have any problems getting the med.  Verb understanding.

## 2025-08-06 ENCOUNTER — TRANSCRIBE ORDERS (OUTPATIENT)
Dept: LAB | Facility: HOSPITAL | Age: OVER 89
End: 2025-08-06
Payer: MEDICARE

## 2025-08-06 ENCOUNTER — LAB (OUTPATIENT)
Dept: LAB | Facility: HOSPITAL | Age: OVER 89
End: 2025-08-06
Payer: MEDICARE

## 2025-08-06 DIAGNOSIS — N30.90 CYSTITIS: Primary | ICD-10-CM

## 2025-08-06 DIAGNOSIS — N30.90 CYSTITIS: ICD-10-CM

## 2025-08-06 LAB
BACTERIA UR QL AUTO: ABNORMAL /HPF
BILIRUB UR QL STRIP: NEGATIVE
CLARITY UR: CLEAR
COLOR UR: YELLOW
GLUCOSE UR STRIP-MCNC: NEGATIVE MG/DL
HGB UR QL STRIP.AUTO: NEGATIVE
HYALINE CASTS UR QL AUTO: ABNORMAL /LPF
KETONES UR QL STRIP: NEGATIVE
LEUKOCYTE ESTERASE UR QL STRIP.AUTO: ABNORMAL
NITRITE UR QL STRIP: POSITIVE
PH UR STRIP.AUTO: 6.5 [PH] (ref 5–8)
PROT UR QL STRIP: NEGATIVE
RBC # UR STRIP: ABNORMAL /HPF
REF LAB TEST METHOD: ABNORMAL
SP GR UR STRIP: 1.01 (ref 1–1.03)
SQUAMOUS #/AREA URNS HPF: ABNORMAL /HPF
UROBILINOGEN UR QL STRIP: ABNORMAL
WBC # UR STRIP: ABNORMAL /HPF

## 2025-08-06 PROCEDURE — 87186 SC STD MICRODIL/AGAR DIL: CPT

## 2025-08-06 PROCEDURE — 87086 URINE CULTURE/COLONY COUNT: CPT

## 2025-08-06 PROCEDURE — 81001 URINALYSIS AUTO W/SCOPE: CPT

## 2025-08-06 PROCEDURE — 87077 CULTURE AEROBIC IDENTIFY: CPT

## 2025-08-09 LAB — BACTERIA SPEC AEROBE CULT: ABNORMAL

## 2025-08-15 ENCOUNTER — TELEPHONE (OUTPATIENT)
Age: OVER 89
End: 2025-08-15
Payer: MEDICARE

## 2025-08-22 DIAGNOSIS — K52.839 MICROSCOPIC COLITIS, UNSPECIFIED MICROSCOPIC COLITIS TYPE: ICD-10-CM

## 2025-08-22 RX ORDER — BUDESONIDE 3 MG/1
3 CAPSULE, COATED PELLETS ORAL DAILY
Qty: 90 CAPSULE | Refills: 0 | Status: SHIPPED | OUTPATIENT
Start: 2025-08-22

## 2025-08-29 ENCOUNTER — LAB (OUTPATIENT)
Dept: LAB | Facility: HOSPITAL | Age: OVER 89
End: 2025-08-29
Payer: MEDICARE

## 2025-08-29 ENCOUNTER — TRANSCRIBE ORDERS (OUTPATIENT)
Dept: LAB | Facility: HOSPITAL | Age: OVER 89
End: 2025-08-29
Payer: MEDICARE

## 2025-08-29 DIAGNOSIS — R63.4 LOSS OF WEIGHT: Primary | ICD-10-CM

## 2025-08-29 DIAGNOSIS — R63.4 LOSS OF WEIGHT: ICD-10-CM

## 2025-08-29 LAB
ALBUMIN SERPL-MCNC: 4 G/DL (ref 3.5–5.2)
ALBUMIN/GLOB SERPL: 1.9 G/DL
ALP SERPL-CCNC: 76 U/L (ref 39–117)
ALT SERPL W P-5'-P-CCNC: 18 U/L (ref 1–33)
ANION GAP SERPL CALCULATED.3IONS-SCNC: 13.7 MMOL/L (ref 5–15)
AST SERPL-CCNC: 17 U/L (ref 1–32)
BASOPHILS # BLD AUTO: 0.02 10*3/MM3 (ref 0–0.2)
BASOPHILS NFR BLD AUTO: 0.2 % (ref 0–1.5)
BILIRUB SERPL-MCNC: 0.3 MG/DL (ref 0–1.2)
BUN SERPL-MCNC: 15 MG/DL (ref 8–23)
BUN/CREAT SERPL: 31.3 (ref 7–25)
CALCIUM SPEC-SCNC: 8.9 MG/DL (ref 8.6–10.5)
CHLORIDE SERPL-SCNC: 97 MMOL/L (ref 98–107)
CO2 SERPL-SCNC: 19.3 MMOL/L (ref 22–29)
CREAT SERPL-MCNC: 0.48 MG/DL (ref 0.57–1)
DEPRECATED RDW RBC AUTO: 45.5 FL (ref 37–54)
EGFRCR SERPLBLD CKD-EPI 2021: 90.7 ML/MIN/1.73
EOSINOPHIL # BLD AUTO: 0.04 10*3/MM3 (ref 0–0.4)
EOSINOPHIL NFR BLD AUTO: 0.3 % (ref 0.3–6.2)
ERYTHROCYTE [DISTWIDTH] IN BLOOD BY AUTOMATED COUNT: 13.5 % (ref 12.3–15.4)
FOLATE SERPL-MCNC: 14.7 NG/ML (ref 4.78–24.2)
GLOBULIN UR ELPH-MCNC: 2.1 GM/DL
GLUCOSE SERPL-MCNC: 131 MG/DL (ref 65–99)
HCT VFR BLD AUTO: 38.8 % (ref 34–46.6)
HGB BLD-MCNC: 13 G/DL (ref 12–15.9)
IMM GRANULOCYTES # BLD AUTO: 0.18 10*3/MM3 (ref 0–0.05)
IMM GRANULOCYTES NFR BLD AUTO: 1.4 % (ref 0–0.5)
LYMPHOCYTES # BLD AUTO: 1.34 10*3/MM3 (ref 0.7–3.1)
LYMPHOCYTES NFR BLD AUTO: 10.5 % (ref 19.6–45.3)
MCH RBC QN AUTO: 31 PG (ref 26.6–33)
MCHC RBC AUTO-ENTMCNC: 33.5 G/DL (ref 31.5–35.7)
MCV RBC AUTO: 92.4 FL (ref 79–97)
MONOCYTES # BLD AUTO: 0.95 10*3/MM3 (ref 0.1–0.9)
MONOCYTES NFR BLD AUTO: 7.4 % (ref 5–12)
NEUTROPHILS NFR BLD AUTO: 10.29 10*3/MM3 (ref 1.7–7)
NEUTROPHILS NFR BLD AUTO: 80.2 % (ref 42.7–76)
NRBC BLD AUTO-RTO: 0 /100 WBC (ref 0–0.2)
PLATELET # BLD AUTO: 322 10*3/MM3 (ref 140–450)
PMV BLD AUTO: 9.3 FL (ref 6–12)
POTASSIUM SERPL-SCNC: 3.4 MMOL/L (ref 3.5–5.2)
PROT SERPL-MCNC: 6.1 G/DL (ref 6–8.5)
RBC # BLD AUTO: 4.2 10*6/MM3 (ref 3.77–5.28)
SODIUM SERPL-SCNC: 130 MMOL/L (ref 136–145)
T4 FREE SERPL-MCNC: 1.48 NG/DL (ref 0.92–1.68)
TSH SERPL DL<=0.05 MIU/L-ACNC: 2.26 UIU/ML (ref 0.27–4.2)
VIT B12 BLD-MCNC: 538 PG/ML (ref 211–946)
WBC NRBC COR # BLD AUTO: 12.82 10*3/MM3 (ref 3.4–10.8)

## 2025-08-29 PROCEDURE — 84165 PROTEIN E-PHORESIS SERUM: CPT

## 2025-08-29 PROCEDURE — 84439 ASSAY OF FREE THYROXINE: CPT

## 2025-08-29 PROCEDURE — 80053 COMPREHEN METABOLIC PANEL: CPT

## 2025-08-29 PROCEDURE — 82607 VITAMIN B-12: CPT

## 2025-08-29 PROCEDURE — 82746 ASSAY OF FOLIC ACID SERUM: CPT

## 2025-08-29 PROCEDURE — 85025 COMPLETE CBC W/AUTO DIFF WBC: CPT

## 2025-08-29 PROCEDURE — 84443 ASSAY THYROID STIM HORMONE: CPT

## 2025-08-29 PROCEDURE — 36415 COLL VENOUS BLD VENIPUNCTURE: CPT

## (undated) DEVICE — GLV SURG SENSICARE GREEN W/ALOE PF LF 7 STRL

## (undated) DEVICE — PREMIUM WET SKIN PREP TRAY: Brand: MEDLINE INDUSTRIES, INC.

## (undated) DEVICE — GLV SURG BIOGEL LTX PF 7 1/2

## (undated) DEVICE — DRSNG BURN ACTICOAT FLEX 7 1X24IN

## (undated) DEVICE — UNDERCAST PADDING: Brand: DEROYAL

## (undated) DEVICE — SUT VIC 0 CT1 36IN J946H

## (undated) DEVICE — TOTAL TRAY, 16FR 10ML SIL FOLEY, URN: Brand: MEDLINE

## (undated) DEVICE — BNDG ELAS ELITE V/CLOSE 6IN 5YD LF STRL

## (undated) DEVICE — SUT VIC 1 CT1 36IN J947H

## (undated) DEVICE — NAVICROSS SUPPORT CATHETER: Brand: NAVICROSS

## (undated) DEVICE — PK KN TOTL 40

## (undated) DEVICE — RADIFOCUS GLIDEWIRE ADVANTAGE GUIDEWIRE: Brand: GLIDEWIRE ADVANTAGE

## (undated) DEVICE — STPLR SKIN VISISTAT WD 35CT

## (undated) DEVICE — GLV SURG SENSICARE W/ALOE PF LF 7.5 STRL

## (undated) DEVICE — ST ACC MICROPUNCTURE STFF .018 ECHO/PLDM/TP 4F/10CM 21G/7CM

## (undated) DEVICE — APPL DURAPREP IODOPHOR APL 26ML

## (undated) DEVICE — GLV SURG SENSICARE MICRO PF LF 7 STRL

## (undated) DEVICE — ST IV INFUS ALARIS PUMP MODULE 2PC M/LL 23ML 109IN

## (undated) DEVICE — ST CATH ATHRCTMY ROTAREX ROT EXCISION 6F 135CM

## (undated) DEVICE — 3M™ IOBAN™ 2 ANTIMICROBIAL INCISE DRAPE 6640EZ: Brand: IOBAN™ 2

## (undated) DEVICE — STPCK 3WY HP ROT

## (undated) DEVICE — SYRINGE KIT,PACKAGED,,150FT,MK 7(ANGIO-ARTERION, 150ML SYR KIT W/QFT,MC)(60729385): Brand: MEDRAD® MARK 7 ARTERION DISPOSABLE SYRINGE 150 ML WITH QUICK FILL TUBE

## (undated) DEVICE — PREP SOL POVIDONE/IODINE BT 4OZ

## (undated) DEVICE — CVR PROB 96IN LF STRL

## (undated) DEVICE — SOL NACL 0.9PCT 100ML SGL

## (undated) DEVICE — RADIFOCUS TORQUE DEVICE MULTI-TORQUE VISE: Brand: RADIFOCUS TORQUE DEVICE

## (undated) DEVICE — FRCP BX RADJAW4 NDL 2.8 240CM LG OG BX40

## (undated) DEVICE — TUBING, SUCTION, 1/4" X 10', STRAIGHT: Brand: MEDLINE

## (undated) DEVICE — GLV SURG SENSICARE W/ALOE PF LF 8 STRL

## (undated) DEVICE — EXTENSION SET, MALE LUER LOCK ADAPTER WITH RETRACTABLE COLLAR

## (undated) DEVICE — MYNXGRIP 6F/7F: Brand: MYNXGRIP

## (undated) DEVICE — MAT FLR ABSORBENT LG 4FT 10 2.5FT

## (undated) DEVICE — BITEBLOCK OMNI BLOC

## (undated) DEVICE — MEDI-VAC YANKAUER SUCTION HANDLE W/BULBOUS TIP: Brand: CARDINAL HEALTH

## (undated) DEVICE — EQUIPMENT COVER BAG TYPE 48” X 36” (122CM X 91CM): Brand: EQUIPMENT COVER BAG TYPE

## (undated) DEVICE — CANN NASL CO2 TRULINK W/O2 A/

## (undated) DEVICE — DRSNG WND GZ CURAD OIL EMULSION 3X8IN LF STRL 1PK

## (undated) DEVICE — PK ANGIO 40

## (undated) DEVICE — RADIFOCUS GLIDEWIRE: Brand: GLIDEWIRE

## (undated) DEVICE — ANGIOGRAPHIC CATHETER: Brand: IMAGER™ II

## (undated) DEVICE — DUAL CUT SAGITTAL BLADE

## (undated) DEVICE — ENCORE® LATEX ORTHO SIZE 7.5, STERILE LATEX POWDER-FREE SURGICAL GLOVE: Brand: ENCORE

## (undated) DEVICE — PINNACLE R/O II INTRODUCER SHEATH WITH RADIOPAQUE MARKER: Brand: PINNACLE

## (undated) DEVICE — Device: Brand: DEFENDO AIR/WATER/SUCTION AND BIOPSY VALVE

## (undated) DEVICE — SOL NACL 0.9PCT 1000ML

## (undated) DEVICE — NDL SPINE 22G 31/2IN BLK

## (undated) DEVICE — CATH GUIDE SOFTVU SELECT/V HT OMNI .038 5F 65CM